# Patient Record
Sex: FEMALE | Race: WHITE | NOT HISPANIC OR LATINO | Employment: OTHER | URBAN - METROPOLITAN AREA
[De-identification: names, ages, dates, MRNs, and addresses within clinical notes are randomized per-mention and may not be internally consistent; named-entity substitution may affect disease eponyms.]

---

## 2019-12-11 ENCOUNTER — TRANSCRIBE ORDERS (OUTPATIENT)
Dept: ADMINISTRATIVE | Facility: HOSPITAL | Age: 79
End: 2019-12-11

## 2019-12-11 DIAGNOSIS — I48.0 PAROXYSMAL ATRIAL FIBRILLATION (HCC): Primary | ICD-10-CM

## 2019-12-17 ENCOUNTER — HOSPITAL ENCOUNTER (OUTPATIENT)
Dept: NON INVASIVE DIAGNOSTICS | Facility: HOSPITAL | Age: 79
Discharge: HOME | End: 2019-12-17
Attending: INTERNAL MEDICINE
Payer: MEDICARE

## 2019-12-17 DIAGNOSIS — I48.0 PAROXYSMAL ATRIAL FIBRILLATION (HCC): ICD-10-CM

## 2019-12-17 PROCEDURE — 93306 TTE W/DOPPLER COMPLETE: CPT

## 2020-01-21 ENCOUNTER — TRANSCRIBE ORDERS (OUTPATIENT)
Dept: ADMINISTRATIVE | Facility: HOSPITAL | Age: 80
End: 2020-01-21

## 2021-11-14 ENCOUNTER — HOSPITAL ENCOUNTER (EMERGENCY)
Facility: HOSPITAL | Age: 81
Discharge: LEFT BEFORE DISCHARGE | End: 2021-11-14
Attending: EMERGENCY MEDICINE | Admitting: EMERGENCY MEDICINE
Payer: MEDICARE

## 2021-11-14 ENCOUNTER — APPOINTMENT (EMERGENCY)
Dept: NON INVASIVE DIAGNOSTICS | Facility: HOSPITAL | Age: 81
End: 2021-11-14
Payer: MEDICARE

## 2021-11-14 ENCOUNTER — APPOINTMENT (EMERGENCY)
Dept: RADIOLOGY | Facility: HOSPITAL | Age: 81
End: 2021-11-14
Payer: MEDICARE

## 2021-11-14 ENCOUNTER — APPOINTMENT (EMERGENCY)
Dept: ULTRASOUND IMAGING | Facility: HOSPITAL | Age: 81
End: 2021-11-14
Payer: MEDICARE

## 2021-11-14 ENCOUNTER — APPOINTMENT (EMERGENCY)
Dept: CT IMAGING | Facility: HOSPITAL | Age: 81
End: 2021-11-14
Payer: MEDICARE

## 2021-11-14 VITALS
TEMPERATURE: 99.4 F | HEART RATE: 93 BPM | OXYGEN SATURATION: 95 % | SYSTOLIC BLOOD PRESSURE: 122 MMHG | DIASTOLIC BLOOD PRESSURE: 58 MMHG | WEIGHT: 293 LBS | RESPIRATION RATE: 18 BRPM

## 2021-11-14 DIAGNOSIS — M79.604 BILATERAL LEG PAIN: ICD-10-CM

## 2021-11-14 DIAGNOSIS — R91.8 PULMONARY NODULES: ICD-10-CM

## 2021-11-14 DIAGNOSIS — R17 SERUM TOTAL BILIRUBIN ELEVATED: Primary | ICD-10-CM

## 2021-11-14 DIAGNOSIS — K80.20 CHOLELITHIASIS: ICD-10-CM

## 2021-11-14 DIAGNOSIS — K43.9 VENTRAL HERNIA WITHOUT OBSTRUCTION OR GANGRENE: ICD-10-CM

## 2021-11-14 DIAGNOSIS — R60.0 LOWER EXTREMITY EDEMA: ICD-10-CM

## 2021-11-14 DIAGNOSIS — K74.60 CIRRHOSIS (HCC): ICD-10-CM

## 2021-11-14 DIAGNOSIS — M79.605 LEFT LEG PAIN: ICD-10-CM

## 2021-11-14 DIAGNOSIS — L03.116 CELLULITIS OF LEFT LOWER EXTREMITY: ICD-10-CM

## 2021-11-14 DIAGNOSIS — M79.605 BILATERAL LEG PAIN: ICD-10-CM

## 2021-11-14 DIAGNOSIS — R79.89 ELEVATED BRAIN NATRIURETIC PEPTIDE (BNP) LEVEL: ICD-10-CM

## 2021-11-14 DIAGNOSIS — R79.89 ELEVATED D-DIMER: ICD-10-CM

## 2021-11-14 LAB
ALBUMIN SERPL BCP-MCNC: 2.3 G/DL (ref 3.5–5)
ALP SERPL-CCNC: 193 U/L (ref 46–116)
ALT SERPL W P-5'-P-CCNC: 41 U/L (ref 12–78)
ANION GAP SERPL CALCULATED.3IONS-SCNC: 6 MMOL/L (ref 4–13)
ANISOCYTOSIS BLD QL SMEAR: PRESENT
AST SERPL W P-5'-P-CCNC: 56 U/L (ref 5–45)
BASOPHILS # BLD MANUAL: 0.07 THOUSAND/UL (ref 0–0.1)
BASOPHILS NFR MAR MANUAL: 1 % (ref 0–1)
BILIRUB SERPL-MCNC: 2.22 MG/DL (ref 0.2–1)
BUN SERPL-MCNC: 12 MG/DL (ref 5–25)
CALCIUM ALBUM COR SERPL-MCNC: 9.2 MG/DL (ref 8.3–10.1)
CALCIUM SERPL-MCNC: 7.8 MG/DL (ref 8.3–10.1)
CARDIAC TROPONIN I PNL SERPL HS: 6 NG/L (ref 8–18)
CHLORIDE SERPL-SCNC: 106 MMOL/L (ref 100–108)
CO2 SERPL-SCNC: 25 MMOL/L (ref 21–32)
CREAT SERPL-MCNC: 0.97 MG/DL (ref 0.6–1.3)
D DIMER PPP FEU-MCNC: 1.1 UG/ML FEU
EOSINOPHIL # BLD MANUAL: 0 THOUSAND/UL (ref 0–0.4)
EOSINOPHIL NFR BLD MANUAL: 0 % (ref 0–6)
ERYTHROCYTE [DISTWIDTH] IN BLOOD BY AUTOMATED COUNT: 16 % (ref 11.6–15.1)
GFR SERPL CREATININE-BSD FRML MDRD: 55 ML/MIN/1.73SQ M
GLUCOSE SERPL-MCNC: 105 MG/DL (ref 65–140)
HCT VFR BLD AUTO: 34.5 % (ref 34.8–46.1)
HGB BLD-MCNC: 11.6 G/DL (ref 11.5–15.4)
LACTATE SERPL-SCNC: 1.2 MMOL/L (ref 0.5–2)
LYMPHOCYTES # BLD AUTO: 0.7 THOUSAND/UL (ref 0.6–4.47)
LYMPHOCYTES # BLD AUTO: 10 % (ref 14–44)
MACROCYTES BLD QL AUTO: PRESENT
MCH RBC QN AUTO: 35.7 PG (ref 26.8–34.3)
MCHC RBC AUTO-ENTMCNC: 33.6 G/DL (ref 31.4–37.4)
MCV RBC AUTO: 106 FL (ref 82–98)
MONOCYTES # BLD AUTO: 1.25 THOUSAND/UL (ref 0–1.22)
MONOCYTES NFR BLD: 18 % (ref 4–12)
NEUTROPHILS # BLD MANUAL: 4.87 THOUSAND/UL (ref 1.85–7.62)
NEUTS SEG NFR BLD AUTO: 70 % (ref 43–75)
NT-PROBNP SERPL-MCNC: 2010 PG/ML
PLATELET # BLD AUTO: 145 THOUSANDS/UL (ref 149–390)
PLATELET BLD QL SMEAR: ADEQUATE
PMV BLD AUTO: 11 FL (ref 8.9–12.7)
POTASSIUM SERPL-SCNC: 4 MMOL/L (ref 3.5–5.3)
PROT SERPL-MCNC: 6.5 G/DL (ref 6.4–8.2)
RBC # BLD AUTO: 3.25 MILLION/UL (ref 3.81–5.12)
RBC MORPH BLD: PRESENT
SODIUM SERPL-SCNC: 137 MMOL/L (ref 136–145)
VARIANT LYMPHS # BLD AUTO: 1 %
WBC # BLD AUTO: 6.96 THOUSAND/UL (ref 4.31–10.16)

## 2021-11-14 PROCEDURE — 99284 EMERGENCY DEPT VISIT MOD MDM: CPT

## 2021-11-14 PROCEDURE — 96365 THER/PROPH/DIAG IV INF INIT: CPT

## 2021-11-14 PROCEDURE — 74177 CT ABD & PELVIS W/CONTRAST: CPT

## 2021-11-14 PROCEDURE — 80053 COMPREHEN METABOLIC PANEL: CPT | Performed by: EMERGENCY MEDICINE

## 2021-11-14 PROCEDURE — 36415 COLL VENOUS BLD VENIPUNCTURE: CPT | Performed by: EMERGENCY MEDICINE

## 2021-11-14 PROCEDURE — G1004 CDSM NDSC: HCPCS

## 2021-11-14 PROCEDURE — 85025 COMPLETE CBC W/AUTO DIFF WBC: CPT | Performed by: EMERGENCY MEDICINE

## 2021-11-14 PROCEDURE — 87040 BLOOD CULTURE FOR BACTERIA: CPT | Performed by: EMERGENCY MEDICINE

## 2021-11-14 PROCEDURE — 84484 ASSAY OF TROPONIN QUANT: CPT | Performed by: EMERGENCY MEDICINE

## 2021-11-14 PROCEDURE — 83605 ASSAY OF LACTIC ACID: CPT | Performed by: EMERGENCY MEDICINE

## 2021-11-14 PROCEDURE — 85007 BL SMEAR W/DIFF WBC COUNT: CPT | Performed by: EMERGENCY MEDICINE

## 2021-11-14 PROCEDURE — 99285 EMERGENCY DEPT VISIT HI MDM: CPT | Performed by: EMERGENCY MEDICINE

## 2021-11-14 PROCEDURE — 71045 X-RAY EXAM CHEST 1 VIEW: CPT

## 2021-11-14 PROCEDURE — 83880 ASSAY OF NATRIURETIC PEPTIDE: CPT | Performed by: EMERGENCY MEDICINE

## 2021-11-14 PROCEDURE — 85027 COMPLETE CBC AUTOMATED: CPT | Performed by: EMERGENCY MEDICINE

## 2021-11-14 PROCEDURE — 85379 FIBRIN DEGRADATION QUANT: CPT | Performed by: EMERGENCY MEDICINE

## 2021-11-14 PROCEDURE — 93970 EXTREMITY STUDY: CPT

## 2021-11-14 PROCEDURE — 71275 CT ANGIOGRAPHY CHEST: CPT

## 2021-11-14 PROCEDURE — 93005 ELECTROCARDIOGRAM TRACING: CPT

## 2021-11-14 RX ORDER — CLINDAMYCIN PHOSPHATE 600 MG/50ML
600 INJECTION INTRAVENOUS ONCE
Status: COMPLETED | OUTPATIENT
Start: 2021-11-14 | End: 2021-11-14

## 2021-11-14 RX ORDER — LEVOTHYROXINE SODIUM 0.15 MG/1
TABLET ORAL
COMMUNITY
Start: 2021-09-02

## 2021-11-14 RX ORDER — CLINDAMYCIN HYDROCHLORIDE 150 MG/1
450 CAPSULE ORAL EVERY 8 HOURS SCHEDULED
Qty: 45 CAPSULE | Refills: 0 | Status: SHIPPED | OUTPATIENT
Start: 2021-11-14 | End: 2021-11-19

## 2021-11-14 RX ORDER — METOPROLOL SUCCINATE 100 MG/1
1 TABLET, EXTENDED RELEASE ORAL DAILY
COMMUNITY
Start: 2021-09-02 | End: 2022-04-04 | Stop reason: HOSPADM

## 2021-11-14 RX ORDER — TRAMADOL HYDROCHLORIDE 50 MG/1
25 TABLET ORAL EVERY 6 HOURS PRN
Qty: 6 TABLET | Refills: 0 | Status: SHIPPED | OUTPATIENT
Start: 2021-11-14 | End: 2022-04-04 | Stop reason: HOSPADM

## 2021-11-14 RX ORDER — WARFARIN SODIUM 5 MG/1
TABLET ORAL
COMMUNITY
Start: 2021-06-03 | End: 2022-04-04 | Stop reason: HOSPADM

## 2021-11-14 RX ORDER — SODIUM CHLORIDE 9 MG/ML
3 INJECTION INTRAVENOUS
Status: DISCONTINUED | OUTPATIENT
Start: 2021-11-14 | End: 2021-11-14 | Stop reason: HOSPADM

## 2021-11-14 RX ORDER — GINSENG 100 MG
1 CAPSULE ORAL ONCE
Status: COMPLETED | OUTPATIENT
Start: 2021-11-14 | End: 2021-11-14

## 2021-11-14 RX ADMIN — IOHEXOL 100 ML: 350 INJECTION, SOLUTION INTRAVENOUS at 16:03

## 2021-11-14 RX ADMIN — BACITRACIN 1 SMALL APPLICATION: 500 OINTMENT TOPICAL at 14:19

## 2021-11-14 RX ADMIN — SODIUM CHLORIDE 500 ML: 0.9 INJECTION, SOLUTION INTRAVENOUS at 14:27

## 2021-11-14 RX ADMIN — CLINDAMYCIN PHOSPHATE 600 MG: 600 INJECTION, SOLUTION INTRAVENOUS at 14:29

## 2021-11-15 LAB
ATRIAL RATE: 70 BPM
P AXIS: 33 DEGREES
PR INTERVAL: 166 MS
QRS AXIS: -4 DEGREES
QRSD INTERVAL: 80 MS
QT INTERVAL: 422 MS
QTC INTERVAL: 455 MS
T WAVE AXIS: 2 DEGREES
VENTRICULAR RATE: 70 BPM

## 2021-11-15 PROCEDURE — 93970 EXTREMITY STUDY: CPT | Performed by: SURGERY

## 2021-11-19 LAB
BACTERIA BLD CULT: NORMAL
BACTERIA BLD CULT: NORMAL

## 2022-01-01 ENCOUNTER — APPOINTMENT (EMERGENCY)
Dept: CT IMAGING | Facility: HOSPITAL | Age: 82
DRG: 291 | End: 2022-01-01
Payer: MEDICARE

## 2022-01-01 ENCOUNTER — APPOINTMENT (INPATIENT)
Dept: NON INVASIVE DIAGNOSTICS | Facility: HOSPITAL | Age: 82
DRG: 291 | End: 2022-01-01
Payer: MEDICARE

## 2022-01-01 ENCOUNTER — HOSPITAL ENCOUNTER (INPATIENT)
Facility: HOSPITAL | Age: 82
LOS: 8 days | DRG: 291 | End: 2022-10-01
Attending: EMERGENCY MEDICINE | Admitting: FAMILY MEDICINE
Payer: MEDICARE

## 2022-01-01 ENCOUNTER — APPOINTMENT (INPATIENT)
Dept: CT IMAGING | Facility: HOSPITAL | Age: 82
DRG: 291 | End: 2022-01-01
Payer: MEDICARE

## 2022-01-01 ENCOUNTER — APPOINTMENT (OUTPATIENT)
Dept: ULTRASOUND IMAGING | Facility: HOSPITAL | Age: 82
DRG: 291 | End: 2022-01-01
Payer: MEDICARE

## 2022-01-01 ENCOUNTER — APPOINTMENT (OUTPATIENT)
Dept: RADIOLOGY | Facility: HOSPITAL | Age: 82
DRG: 291 | End: 2022-01-01
Payer: MEDICARE

## 2022-01-01 VITALS
DIASTOLIC BLOOD PRESSURE: 29 MMHG | BODY MASS INDEX: 50.49 KG/M2 | SYSTOLIC BLOOD PRESSURE: 70 MMHG | HEIGHT: 60 IN | OXYGEN SATURATION: 92 % | RESPIRATION RATE: 20 BRPM | WEIGHT: 257.2 LBS | HEART RATE: 107 BPM | TEMPERATURE: 99.86 F

## 2022-01-01 DIAGNOSIS — R65.21 SEPTIC SHOCK (HCC): ICD-10-CM

## 2022-01-01 DIAGNOSIS — N39.0 URINARY TRACT INFECTION: ICD-10-CM

## 2022-01-01 DIAGNOSIS — N18.31 STAGE 3A CHRONIC KIDNEY DISEASE (HCC): ICD-10-CM

## 2022-01-01 DIAGNOSIS — R53.1 WEAKNESS: Primary | ICD-10-CM

## 2022-01-01 DIAGNOSIS — I50.9 CHF (CONGESTIVE HEART FAILURE) (HCC): ICD-10-CM

## 2022-01-01 DIAGNOSIS — E11.9 TYPE 2 DIABETES MELLITUS WITHOUT COMPLICATION, WITHOUT LONG-TERM CURRENT USE OF INSULIN (HCC): ICD-10-CM

## 2022-01-01 DIAGNOSIS — R18.8 ASCITES: ICD-10-CM

## 2022-01-01 DIAGNOSIS — R41.82 ALTERED MENTAL STATUS: ICD-10-CM

## 2022-01-01 DIAGNOSIS — A41.9 SEPTIC SHOCK (HCC): ICD-10-CM

## 2022-01-01 DIAGNOSIS — G93.41 ACUTE METABOLIC ENCEPHALOPATHY: ICD-10-CM

## 2022-01-01 DIAGNOSIS — R18.8 CIRRHOSIS OF LIVER WITH ASCITES, UNSPECIFIED HEPATIC CIRRHOSIS TYPE (HCC): ICD-10-CM

## 2022-01-01 DIAGNOSIS — K74.60 CIRRHOSIS OF LIVER WITH ASCITES, UNSPECIFIED HEPATIC CIRRHOSIS TYPE (HCC): ICD-10-CM

## 2022-01-01 DIAGNOSIS — A41.9 SEPSIS (HCC): ICD-10-CM

## 2022-01-01 DIAGNOSIS — K74.60 CIRRHOSIS (HCC): ICD-10-CM

## 2022-01-01 DIAGNOSIS — I50.33 ACUTE ON CHRONIC DIASTOLIC (CONGESTIVE) HEART FAILURE (HCC): ICD-10-CM

## 2022-01-01 LAB
2HR DELTA HS TROPONIN: -2 NG/L
ABO GROUP BLD BPU: NORMAL
ABO GROUP BLD BPU: NORMAL
ABO GROUP BLD: NORMAL
ABO GROUP BLD: NORMAL
ALBUMIN SERPL BCP-MCNC: 1.5 G/DL (ref 3.5–5)
ALBUMIN SERPL BCP-MCNC: 1.6 G/DL (ref 3.5–5)
ALBUMIN SERPL BCP-MCNC: 2.5 G/DL (ref 3.5–5)
ALBUMIN SERPL BCP-MCNC: 2.6 G/DL (ref 3.5–5)
ALBUMIN SERPL BCP-MCNC: 2.7 G/DL (ref 3.5–5)
ALP SERPL-CCNC: 118 U/L (ref 46–116)
ALP SERPL-CCNC: 129 U/L (ref 46–116)
ALP SERPL-CCNC: 159 U/L (ref 46–116)
ALP SERPL-CCNC: 167 U/L (ref 46–116)
ALP SERPL-CCNC: 97 U/L (ref 46–116)
ALT SERPL W P-5'-P-CCNC: 13 U/L (ref 12–78)
ALT SERPL W P-5'-P-CCNC: 15 U/L (ref 12–78)
ALT SERPL W P-5'-P-CCNC: 15 U/L (ref 12–78)
ALT SERPL W P-5'-P-CCNC: 17 U/L (ref 12–78)
ALT SERPL W P-5'-P-CCNC: 18 U/L (ref 12–78)
AMMONIA PLAS-SCNC: 11 UMOL/L (ref 11–35)
AMMONIA PLAS-SCNC: 29 UMOL/L (ref 11–35)
ANION GAP SERPL CALCULATED.3IONS-SCNC: 20 MMOL/L (ref 4–13)
ANION GAP SERPL CALCULATED.3IONS-SCNC: 22 MMOL/L (ref 4–13)
ANION GAP SERPL CALCULATED.3IONS-SCNC: 26 MMOL/L (ref 4–13)
ANION GAP SERPL CALCULATED.3IONS-SCNC: 4 MMOL/L (ref 4–13)
ANION GAP SERPL CALCULATED.3IONS-SCNC: 5 MMOL/L (ref 4–13)
ANION GAP SERPL CALCULATED.3IONS-SCNC: 6 MMOL/L (ref 4–13)
ANION GAP SERPL CALCULATED.3IONS-SCNC: 7 MMOL/L (ref 4–13)
ANION GAP SERPL CALCULATED.3IONS-SCNC: 8 MMOL/L (ref 4–13)
ANION GAP SERPL CALCULATED.3IONS-SCNC: 9 MMOL/L (ref 4–13)
AORTIC ROOT: 3.2 CM
AORTIC VALVE MEAN VELOCITY: 14.3 M/S
APICAL FOUR CHAMBER EJECTION FRACTION: 77 %
APPEARANCE FLD: CLEAR
APTT PPP: 51 SECONDS (ref 23–37)
ARTERIAL PATENCY WRIST A: YES
ASCENDING AORTA: 3.4 CM
AST SERPL W P-5'-P-CCNC: 32 U/L (ref 5–45)
AST SERPL W P-5'-P-CCNC: 32 U/L (ref 5–45)
AST SERPL W P-5'-P-CCNC: 46 U/L (ref 5–45)
AST SERPL W P-5'-P-CCNC: 46 U/L (ref 5–45)
AST SERPL W P-5'-P-CCNC: 47 U/L (ref 5–45)
ATRIAL RATE: 84 BPM
ATRIAL RATE: 95 BPM
AV AREA BY CONTINUOUS VTI: 2.1 CM2
AV AREA PEAK VELOCITY: 1.4 CM2
AV LVOT MEAN GRADIENT: 3 MMHG
AV LVOT PEAK GRADIENT: 7 MMHG
AV MEAN GRADIENT: 11 MMHG
AV PEAK GRADIENT: 24 MMHG
AV VALVE AREA: 2.1 CM2
AV VELOCITY RATIO: 0.56
BACTERIA BLD CULT: ABNORMAL
BACTERIA BLD CULT: NORMAL
BACTERIA SPEC BFLD CULT: NO GROWTH
BACTERIA UR CULT: ABNORMAL
BACTERIA UR QL AUTO: ABNORMAL /HPF
BASE EX.OXY STD BLDV CALC-SCNC: 94.3 % (ref 60–80)
BASE EXCESS BLDA CALC-SCNC: -0.4 MMOL/L
BASE EXCESS BLDA CALC-SCNC: -14.2 MMOL/L
BASE EXCESS BLDA CALC-SCNC: -4.6 MMOL/L
BASE EXCESS BLDA CALC-SCNC: -8.7 MMOL/L
BASE EXCESS BLDV CALC-SCNC: -7.2 MMOL/L
BASOPHILS # BLD AUTO: 0.05 THOUSANDS/ΜL (ref 0–0.1)
BASOPHILS # BLD MANUAL: 0 THOUSAND/UL (ref 0–0.1)
BASOPHILS # BLD MANUAL: 0 THOUSAND/UL (ref 0–0.1)
BASOPHILS NFR BLD AUTO: 0 % (ref 0–1)
BASOPHILS NFR MAR MANUAL: 0 % (ref 0–1)
BASOPHILS NFR MAR MANUAL: 0 % (ref 0–1)
BILIRUB DIRECT SERPL-MCNC: 2.02 MG/DL (ref 0–0.2)
BILIRUB SERPL-MCNC: 3.13 MG/DL (ref 0.2–1)
BILIRUB SERPL-MCNC: 3.18 MG/DL (ref 0.2–1)
BILIRUB SERPL-MCNC: 3.54 MG/DL (ref 0.2–1)
BILIRUB SERPL-MCNC: 3.95 MG/DL (ref 0.2–1)
BILIRUB SERPL-MCNC: 4.07 MG/DL (ref 0.2–1)
BILIRUB UR QL STRIP: ABNORMAL
BLD GP AB SCN SERPL QL: NEGATIVE
BLD GP AB SCN SERPL QL: NEGATIVE
BPU ID: NORMAL
BPU ID: NORMAL
BUN SERPL-MCNC: 25 MG/DL (ref 5–25)
BUN SERPL-MCNC: 29 MG/DL (ref 5–25)
BUN SERPL-MCNC: 30 MG/DL (ref 5–25)
BUN SERPL-MCNC: 31 MG/DL (ref 5–25)
BUN SERPL-MCNC: 31 MG/DL (ref 5–25)
BUN SERPL-MCNC: 32 MG/DL (ref 5–25)
BUN SERPL-MCNC: 33 MG/DL (ref 5–25)
BUN SERPL-MCNC: 35 MG/DL (ref 5–25)
BUN SERPL-MCNC: 36 MG/DL (ref 5–25)
BUN SERPL-MCNC: 39 MG/DL (ref 5–25)
BUN SERPL-MCNC: 41 MG/DL (ref 5–25)
BUN SERPL-MCNC: 41 MG/DL (ref 5–25)
BUN SERPL-MCNC: 44 MG/DL (ref 5–25)
C3 SERPL-MCNC: 20.7 MG/DL (ref 90–180)
C4 SERPL-MCNC: 6 MG/DL (ref 10–40)
CA-I BLD-SCNC: 1.01 MMOL/L (ref 1.12–1.32)
CALCIUM ALBUM COR SERPL-MCNC: 10.4 MG/DL (ref 8.3–10.1)
CALCIUM ALBUM COR SERPL-MCNC: 10.6 MG/DL (ref 8.3–10.1)
CALCIUM ALBUM COR SERPL-MCNC: 9.1 MG/DL (ref 8.3–10.1)
CALCIUM ALBUM COR SERPL-MCNC: 9.1 MG/DL (ref 8.3–10.1)
CALCIUM SERPL-MCNC: 8 MG/DL (ref 8.3–10.1)
CALCIUM SERPL-MCNC: 8 MG/DL (ref 8.3–10.1)
CALCIUM SERPL-MCNC: 8.1 MG/DL (ref 8.3–10.1)
CALCIUM SERPL-MCNC: 8.1 MG/DL (ref 8.3–10.1)
CALCIUM SERPL-MCNC: 8.2 MG/DL (ref 8.3–10.1)
CALCIUM SERPL-MCNC: 8.2 MG/DL (ref 8.3–10.1)
CALCIUM SERPL-MCNC: 8.3 MG/DL (ref 8.3–10.1)
CALCIUM SERPL-MCNC: 8.4 MG/DL (ref 8.3–10.1)
CALCIUM SERPL-MCNC: 8.5 MG/DL (ref 8.3–10.1)
CALCIUM SERPL-MCNC: 8.6 MG/DL (ref 8.3–10.1)
CALCIUM SERPL-MCNC: 8.8 MG/DL (ref 8.3–10.1)
CALCIUM SERPL-MCNC: 8.9 MG/DL (ref 8.3–10.1)
CALCIUM SERPL-MCNC: 9 MG/DL (ref 8.3–10.1)
CARDIAC TROPONIN I PNL SERPL HS: 15 NG/L
CARDIAC TROPONIN I PNL SERPL HS: 17 NG/L
CH50 SERPL-ACNC: <10 U/ML
CHLORIDE SERPL-SCNC: 100 MMOL/L (ref 96–108)
CHLORIDE SERPL-SCNC: 101 MMOL/L (ref 96–108)
CHLORIDE SERPL-SCNC: 101 MMOL/L (ref 96–108)
CHLORIDE SERPL-SCNC: 102 MMOL/L (ref 96–108)
CHLORIDE SERPL-SCNC: 103 MMOL/L (ref 96–108)
CHLORIDE SERPL-SCNC: 103 MMOL/L (ref 96–108)
CHLORIDE SERPL-SCNC: 97 MMOL/L (ref 96–108)
CHLORIDE SERPL-SCNC: 98 MMOL/L (ref 96–108)
CHLORIDE SERPL-SCNC: 98 MMOL/L (ref 96–108)
CHLORIDE SERPL-SCNC: 99 MMOL/L (ref 96–108)
CHLORIDE SERPL-SCNC: 99 MMOL/L (ref 96–108)
CK SERPL-CCNC: 32 U/L (ref 26–192)
CLARITY UR: ABNORMAL
CO2 SERPL-SCNC: 15 MMOL/L (ref 21–32)
CO2 SERPL-SCNC: 20 MMOL/L (ref 21–32)
CO2 SERPL-SCNC: 20 MMOL/L (ref 21–32)
CO2 SERPL-SCNC: 26 MMOL/L (ref 21–32)
CO2 SERPL-SCNC: 27 MMOL/L (ref 21–32)
CO2 SERPL-SCNC: 27 MMOL/L (ref 21–32)
CO2 SERPL-SCNC: 28 MMOL/L (ref 21–32)
CO2 SERPL-SCNC: 29 MMOL/L (ref 21–32)
CO2 SERPL-SCNC: 30 MMOL/L (ref 21–32)
CO2 SERPL-SCNC: 35 MMOL/L (ref 21–32)
CO2 SERPL-SCNC: 35 MMOL/L (ref 21–32)
COLOR FLD: YELLOW
COLOR UR: YELLOW
CREAT SERPL-MCNC: 1.67 MG/DL (ref 0.6–1.3)
CREAT SERPL-MCNC: 1.69 MG/DL (ref 0.6–1.3)
CREAT SERPL-MCNC: 1.75 MG/DL (ref 0.6–1.3)
CREAT SERPL-MCNC: 1.77 MG/DL (ref 0.6–1.3)
CREAT SERPL-MCNC: 1.91 MG/DL (ref 0.6–1.3)
CREAT SERPL-MCNC: 1.98 MG/DL (ref 0.6–1.3)
CREAT SERPL-MCNC: 2.03 MG/DL (ref 0.6–1.3)
CREAT SERPL-MCNC: 2.03 MG/DL (ref 0.6–1.3)
CREAT SERPL-MCNC: 2.05 MG/DL (ref 0.6–1.3)
CREAT SERPL-MCNC: 2.07 MG/DL (ref 0.6–1.3)
CREAT SERPL-MCNC: 2.37 MG/DL (ref 0.6–1.3)
CREAT SERPL-MCNC: 2.39 MG/DL (ref 0.6–1.3)
CREAT SERPL-MCNC: 2.41 MG/DL (ref 0.6–1.3)
CREAT UR-MCNC: 62.5 MG/DL
CROSSMATCH: NORMAL
CROSSMATCH: NORMAL
DOP CALC AO PEAK VEL: 2.44 M/S
DOP CALC AO VTI: 43.63 CM
DOP CALC LVOT AREA: 2.54 CM2
DOP CALC LVOT DIAMETER: 1.8 CM
DOP CALC LVOT PEAK VEL VTI: 36.04 CM
DOP CALC LVOT PEAK VEL: 1.36 M/S
DOP CALC LVOT STROKE INDEX: 38.5 ML/M2
DOP CALC LVOT STROKE VOLUME: 91.66
E WAVE DECELERATION TIME: 210 MS
EOSINOPHIL # BLD AUTO: 0.01 THOUSAND/ΜL (ref 0–0.61)
EOSINOPHIL # BLD MANUAL: 0 THOUSAND/UL (ref 0–0.4)
EOSINOPHIL # BLD MANUAL: 0.32 THOUSAND/UL (ref 0–0.4)
EOSINOPHIL NFR BLD AUTO: 0 % (ref 0–6)
EOSINOPHIL NFR BLD MANUAL: 0 % (ref 0–6)
EOSINOPHIL NFR BLD MANUAL: 5 % (ref 0–6)
ERYTHROCYTE [DISTWIDTH] IN BLOOD BY AUTOMATED COUNT: 16.9 % (ref 11.6–15.1)
ERYTHROCYTE [DISTWIDTH] IN BLOOD BY AUTOMATED COUNT: 17 % (ref 11.6–15.1)
ERYTHROCYTE [DISTWIDTH] IN BLOOD BY AUTOMATED COUNT: 17.2 % (ref 11.6–15.1)
ERYTHROCYTE [DISTWIDTH] IN BLOOD BY AUTOMATED COUNT: 17.3 % (ref 11.6–15.1)
ERYTHROCYTE [DISTWIDTH] IN BLOOD BY AUTOMATED COUNT: 17.7 % (ref 11.6–15.1)
ERYTHROCYTE [DISTWIDTH] IN BLOOD BY AUTOMATED COUNT: 18.1 % (ref 11.6–15.1)
ERYTHROCYTE [DISTWIDTH] IN BLOOD BY AUTOMATED COUNT: 18.5 % (ref 11.6–15.1)
ERYTHROCYTE [DISTWIDTH] IN BLOOD BY AUTOMATED COUNT: 19.8 % (ref 11.6–15.1)
FERRITIN SERPL-MCNC: 216 NG/ML (ref 8–388)
FLUAV RNA RESP QL NAA+PROBE: NEGATIVE
FLUAV RNA RESP QL NAA+PROBE: NEGATIVE
FLUBV RNA RESP QL NAA+PROBE: NEGATIVE
FLUBV RNA RESP QL NAA+PROBE: NEGATIVE
FRACTIONAL SHORTENING: 35 (ref 28–44)
GFR SERPL CREATININE-BSD FRML MDRD: 18 ML/MIN/1.73SQ M
GFR SERPL CREATININE-BSD FRML MDRD: 21 ML/MIN/1.73SQ M
GFR SERPL CREATININE-BSD FRML MDRD: 22 ML/MIN/1.73SQ M
GFR SERPL CREATININE-BSD FRML MDRD: 23 ML/MIN/1.73SQ M
GFR SERPL CREATININE-BSD FRML MDRD: 24 ML/MIN/1.73SQ M
GFR SERPL CREATININE-BSD FRML MDRD: 26 ML/MIN/1.73SQ M
GFR SERPL CREATININE-BSD FRML MDRD: 26 ML/MIN/1.73SQ M
GFR SERPL CREATININE-BSD FRML MDRD: 27 ML/MIN/1.73SQ M
GFR SERPL CREATININE-BSD FRML MDRD: 28 ML/MIN/1.73SQ M
GIANT PLATELETS BLD QL SMEAR: PRESENT
GLUCOSE SERPL-MCNC: 107 MG/DL (ref 65–140)
GLUCOSE SERPL-MCNC: 112 MG/DL (ref 65–140)
GLUCOSE SERPL-MCNC: 113 MG/DL (ref 65–140)
GLUCOSE SERPL-MCNC: 117 MG/DL (ref 65–140)
GLUCOSE SERPL-MCNC: 125 MG/DL (ref 65–140)
GLUCOSE SERPL-MCNC: 56 MG/DL (ref 65–140)
GLUCOSE SERPL-MCNC: 65 MG/DL (ref 65–140)
GLUCOSE SERPL-MCNC: 88 MG/DL (ref 65–140)
GLUCOSE SERPL-MCNC: 90 MG/DL (ref 65–140)
GLUCOSE SERPL-MCNC: 91 MG/DL (ref 65–140)
GLUCOSE SERPL-MCNC: 96 MG/DL (ref 65–140)
GLUCOSE SERPL-MCNC: 96 MG/DL (ref 65–140)
GLUCOSE SERPL-MCNC: 97 MG/DL (ref 65–140)
GLUCOSE SERPL-MCNC: 97 MG/DL (ref 65–140)
GLUCOSE SERPL-MCNC: 99 MG/DL (ref 65–140)
GLUCOSE UR STRIP-MCNC: NEGATIVE MG/DL
GRAM STN SPEC: ABNORMAL
GRAM STN SPEC: NORMAL
GRAM STN SPEC: NORMAL
HAV IGM SER QL: NORMAL
HBV CORE IGM SER QL: NORMAL
HBV SURFACE AG SER QL: NORMAL
HCO3 BLDA-SCNC: 12.6 MMOL/L (ref 22–28)
HCO3 BLDA-SCNC: 17.3 MMOL/L (ref 22–28)
HCO3 BLDA-SCNC: 19.2 MMOL/L (ref 22–28)
HCO3 BLDA-SCNC: 21.4 MMOL/L (ref 22–28)
HCO3 BLDV-SCNC: 18.9 MMOL/L (ref 24–30)
HCT VFR BLD AUTO: 22.8 % (ref 34.8–46.1)
HCT VFR BLD AUTO: 24.4 % (ref 34.8–46.1)
HCT VFR BLD AUTO: 24.6 % (ref 34.8–46.1)
HCT VFR BLD AUTO: 24.8 % (ref 34.8–46.1)
HCT VFR BLD AUTO: 25.8 % (ref 34.8–46.1)
HCT VFR BLD AUTO: 26.2 % (ref 34.8–46.1)
HCT VFR BLD AUTO: 26.9 % (ref 34.8–46.1)
HCT VFR BLD AUTO: 27 % (ref 34.8–46.1)
HCT VFR BLD AUTO: 27.3 % (ref 34.8–46.1)
HCV AB SER QL: NORMAL
HGB BLD-MCNC: 7.3 G/DL (ref 11.5–15.4)
HGB BLD-MCNC: 7.8 G/DL (ref 11.5–15.4)
HGB BLD-MCNC: 7.8 G/DL (ref 11.5–15.4)
HGB BLD-MCNC: 8 G/DL (ref 11.5–15.4)
HGB BLD-MCNC: 8.5 G/DL (ref 11.5–15.4)
HGB BLD-MCNC: 8.6 G/DL (ref 11.5–15.4)
HGB BLD-MCNC: 8.6 G/DL (ref 11.5–15.4)
HGB BLD-MCNC: 8.7 G/DL (ref 11.5–15.4)
HGB BLD-MCNC: 8.9 G/DL (ref 11.5–15.4)
HGB UR QL STRIP.AUTO: ABNORMAL
HISTIOCYTES NFR FLD: 26 %
HOROWITZ INDEX BLDA+IHG-RTO: 80 MM[HG]
HYALINE CASTS #/AREA URNS LPF: ABNORMAL /LPF
IMM GRANULOCYTES # BLD AUTO: 0.13 THOUSAND/UL (ref 0–0.2)
IMM GRANULOCYTES NFR BLD AUTO: 1 % (ref 0–2)
INR PPP: 1.59 (ref 0.84–1.19)
INR PPP: 2.08 (ref 0.84–1.19)
INR PPP: 3.39 (ref 0.84–1.19)
INR PPP: 5.16 (ref 0.84–1.19)
INR PPP: 8.01 (ref 0.84–1.19)
INR PPP: 8.39 (ref 0.84–1.19)
INTERVENTRICULAR SEPTUM IN DIASTOLE (PARASTERNAL SHORT AXIS VIEW): 1.3 CM
INTERVENTRICULAR SEPTUM: 1.3 CM (ref 0.6–1.1)
IRON SATN MFR SERPL: 38 % (ref 15–50)
IRON SERPL-MCNC: 55 UG/DL (ref 50–170)
IVC: 15 MM
KETONES UR STRIP-MCNC: NEGATIVE MG/DL
LAAS-AP2: 19.7 CM2
LACTATE SERPL-SCNC: 0.7 MMOL/L (ref 0.5–2)
LACTATE SERPL-SCNC: 11.1 MMOL/L (ref 0.5–2)
LACTATE SERPL-SCNC: 12.9 MMOL/L (ref 0.5–2)
LACTATE SERPL-SCNC: 13.4 MMOL/L (ref 0.5–2)
LACTATE SERPL-SCNC: 14.7 MMOL/L (ref 0.5–2)
LACTATE SERPL-SCNC: 2.2 MMOL/L (ref 0.5–2)
LACTATE SERPL-SCNC: 2.3 MMOL/L (ref 0.5–2)
LACTATE SERPL-SCNC: 2.4 MMOL/L (ref 0.5–2)
LACTATE SERPL-SCNC: 3.7 MMOL/L (ref 0.5–2)
LDH FLD L TO P-CCNC: 48 U/L
LEFT ATRIUM SIZE: 3.4 CM
LEFT INTERNAL DIMENSION IN SYSTOLE: 2.2 CM (ref 2.1–4)
LEFT VENTRICLE DIASTOLIC VOLUME (MOD BIPLANE): 50 ML
LEFT VENTRICLE SYSTOLIC VOLUME (MOD BIPLANE): 14 ML
LEFT VENTRICULAR INTERNAL DIMENSION IN DIASTOLE: 3.4 CM (ref 3.5–6)
LEFT VENTRICULAR POSTERIOR WALL IN END DIASTOLE: 1.2 CM
LEFT VENTRICULAR STROKE VOLUME: 31 ML
LEUKOCYTE ESTERASE UR QL STRIP: ABNORMAL
LG PLATELETS BLD QL SMEAR: PRESENT
LV EF: 72 %
LVSV (TEICH): 31 ML
LYMPHOCYTES # BLD AUTO: 1.15 THOUSAND/UL (ref 0.6–4.47)
LYMPHOCYTES # BLD AUTO: 2.54 THOUSAND/UL (ref 0.6–4.47)
LYMPHOCYTES # BLD AUTO: 2.6 THOUSANDS/ΜL (ref 0.6–4.47)
LYMPHOCYTES # BLD AUTO: 40 % (ref 14–44)
LYMPHOCYTES # BLD AUTO: 9 % (ref 14–44)
LYMPHOCYTES NFR BLD AUTO: 12 % (ref 14–44)
LYMPHOCYTES NFR BLD AUTO: 20 %
MACROCYTES BLD QL AUTO: PRESENT
MACROCYTES BLD QL AUTO: PRESENT
MAGNESIUM SERPL-MCNC: 1.9 MG/DL (ref 1.6–2.6)
MAGNESIUM SERPL-MCNC: 1.9 MG/DL (ref 1.6–2.6)
MAGNESIUM SERPL-MCNC: 2.2 MG/DL (ref 1.6–2.6)
MAGNESIUM SERPL-MCNC: 2.3 MG/DL (ref 1.6–2.6)
MCH RBC QN AUTO: 33.1 PG (ref 26.8–34.3)
MCH RBC QN AUTO: 33.2 PG (ref 26.8–34.3)
MCH RBC QN AUTO: 33.2 PG (ref 26.8–34.3)
MCH RBC QN AUTO: 33.5 PG (ref 26.8–34.3)
MCH RBC QN AUTO: 33.5 PG (ref 26.8–34.3)
MCH RBC QN AUTO: 33.7 PG (ref 26.8–34.3)
MCH RBC QN AUTO: 34.3 PG (ref 26.8–34.3)
MCH RBC QN AUTO: 34.5 PG (ref 26.8–34.3)
MCHC RBC AUTO-ENTMCNC: 31.5 G/DL (ref 31.4–37.4)
MCHC RBC AUTO-ENTMCNC: 32 G/DL (ref 31.4–37.4)
MCHC RBC AUTO-ENTMCNC: 32 G/DL (ref 31.4–37.4)
MCHC RBC AUTO-ENTMCNC: 32.2 G/DL (ref 31.4–37.4)
MCHC RBC AUTO-ENTMCNC: 32.4 G/DL (ref 31.4–37.4)
MCHC RBC AUTO-ENTMCNC: 32.5 G/DL (ref 31.4–37.4)
MCHC RBC AUTO-ENTMCNC: 33.1 G/DL (ref 31.4–37.4)
MCHC RBC AUTO-ENTMCNC: 33.3 G/DL (ref 31.4–37.4)
MCV RBC AUTO: 102 FL (ref 82–98)
MCV RBC AUTO: 102 FL (ref 82–98)
MCV RBC AUTO: 103 FL (ref 82–98)
MCV RBC AUTO: 104 FL (ref 82–98)
MCV RBC AUTO: 104 FL (ref 82–98)
MCV RBC AUTO: 105 FL (ref 82–98)
MCV RBC AUTO: 106 FL (ref 82–98)
MCV RBC AUTO: 106 FL (ref 82–98)
MICROALBUMIN UR-MCNC: 6.2 MG/L (ref 0–20)
MICROALBUMIN/CREAT 24H UR: 10 MG/G CREATININE (ref 0–30)
MONO+MESO NFR FLD MANUAL: 18 %
MONOCYTES # BLD AUTO: 0.26 THOUSAND/UL (ref 0–1.22)
MONOCYTES # BLD AUTO: 0.89 THOUSAND/UL (ref 0–1.22)
MONOCYTES # BLD AUTO: 2.44 THOUSAND/ΜL (ref 0.17–1.22)
MONOCYTES NFR BLD AUTO: 11 % (ref 4–12)
MONOCYTES NFR BLD AUTO: 21 %
MONOCYTES NFR BLD: 14 % (ref 4–12)
MONOCYTES NFR BLD: 2 % (ref 4–12)
MRSA NOSE QL CULT: ABNORMAL
MRSA NOSE QL CULT: ABNORMAL
MV E'TISSUE VEL-LAT: 11 CM/S
MV E'TISSUE VEL-SEP: 9 CM/S
MV PEAK A VEL: 1.01 M/S
MV PEAK E VEL: 74 CM/S
MV STENOSIS PRESSURE HALF TIME: 61 MS
MV VALVE AREA P 1/2 METHOD: 3.61
MYELOCYTES NFR BLD MANUAL: 1 % (ref 0–1)
MYELOCYTES NFR BLD MANUAL: 1 % (ref 0–1)
NEUTROPHILS # BLD AUTO: 16.98 THOUSANDS/ΜL (ref 1.85–7.62)
NEUTROPHILS # BLD MANUAL: 10.64 THOUSAND/UL (ref 1.85–7.62)
NEUTROPHILS # BLD MANUAL: 2.42 THOUSAND/UL (ref 1.85–7.62)
NEUTS BAND NFR BLD MANUAL: 1 % (ref 0–8)
NEUTS SEG NFR BLD AUTO: 15 %
NEUTS SEG NFR BLD AUTO: 38 % (ref 43–75)
NEUTS SEG NFR BLD AUTO: 76 % (ref 43–75)
NEUTS SEG NFR BLD AUTO: 82 % (ref 43–75)
NITRITE UR QL STRIP: POSITIVE
NON VENT ROOM AIR: 21 %
NON-SQ EPI CELLS URNS QL MICRO: ABNORMAL /HPF
NRBC BLD AUTO-RTO: 0 /100 WBCS
NT-PROBNP SERPL-MCNC: 1531 PG/ML
O2 CT BLDA-SCNC: 11.5 ML/DL (ref 16–23)
O2 CT BLDA-SCNC: 12.4 ML/DL (ref 16–23)
O2 CT BLDA-SCNC: 13.8 ML/DL (ref 16–23)
O2 CT BLDA-SCNC: 16.1 ML/DL (ref 16–23)
O2 CT BLDV-SCNC: 11.7 ML/DL
OXYHGB MFR BLDA: 90.2 % (ref 94–97)
OXYHGB MFR BLDA: 94.1 % (ref 94–97)
OXYHGB MFR BLDA: 98.1 % (ref 94–97)
OXYHGB MFR BLDA: 98.2 % (ref 94–97)
P AXIS: 31 DEGREES
P AXIS: 60 DEGREES
PCO2 BLDA: 25.7 MM HG (ref 36–44)
PCO2 BLDA: 30.5 MM HG (ref 36–44)
PCO2 BLDA: 32.4 MM HG (ref 36–44)
PCO2 BLDA: 37.6 MM HG (ref 36–44)
PCO2 BLDV: 40.3 MM HG (ref 42–50)
PEEP RESPIRATORY: 8 CM[H2O]
PH BLDA: 7.21 [PH] (ref 7.35–7.45)
PH BLDA: 7.28 [PH] (ref 7.35–7.45)
PH BLDA: 7.42 [PH] (ref 7.35–7.45)
PH BLDA: 7.54 [PH] (ref 7.35–7.45)
PH BLDV: 7.29 [PH] (ref 7.3–7.4)
PH UR STRIP.AUTO: 5.5 [PH]
PLATELET # BLD AUTO: 116 THOUSANDS/UL (ref 149–390)
PLATELET # BLD AUTO: 122 THOUSANDS/UL (ref 149–390)
PLATELET # BLD AUTO: 131 THOUSANDS/UL (ref 149–390)
PLATELET # BLD AUTO: 132 THOUSANDS/UL (ref 149–390)
PLATELET # BLD AUTO: 141 THOUSANDS/UL (ref 149–390)
PLATELET # BLD AUTO: 150 THOUSANDS/UL (ref 149–390)
PLATELET # BLD AUTO: 154 THOUSANDS/UL (ref 149–390)
PLATELET # BLD AUTO: 171 THOUSANDS/UL (ref 149–390)
PLATELET BLD QL SMEAR: ABNORMAL
PLATELET BLD QL SMEAR: ADEQUATE
PMV BLD AUTO: 10 FL (ref 8.9–12.7)
PMV BLD AUTO: 10.1 FL (ref 8.9–12.7)
PMV BLD AUTO: 9.3 FL (ref 8.9–12.7)
PMV BLD AUTO: 9.7 FL (ref 8.9–12.7)
PMV BLD AUTO: 9.7 FL (ref 8.9–12.7)
PO2 BLDA: 140.7 MM HG (ref 75–129)
PO2 BLDA: 222.4 MM HG (ref 75–129)
PO2 BLDA: 60.9 MM HG (ref 75–129)
PO2 BLDA: 85.4 MM HG (ref 75–129)
PO2 BLDV: 94.1 MM HG (ref 35–45)
POTASSIUM SERPL-SCNC: 2.8 MMOL/L (ref 3.5–5.3)
POTASSIUM SERPL-SCNC: 2.9 MMOL/L (ref 3.5–5.3)
POTASSIUM SERPL-SCNC: 3.1 MMOL/L (ref 3.5–5.3)
POTASSIUM SERPL-SCNC: 3.1 MMOL/L (ref 3.5–5.3)
POTASSIUM SERPL-SCNC: 3.2 MMOL/L (ref 3.5–5.3)
POTASSIUM SERPL-SCNC: 3.2 MMOL/L (ref 3.5–5.3)
POTASSIUM SERPL-SCNC: 3.3 MMOL/L (ref 3.5–5.3)
POTASSIUM SERPL-SCNC: 3.3 MMOL/L (ref 3.5–5.3)
POTASSIUM SERPL-SCNC: 3.4 MMOL/L (ref 3.5–5.3)
POTASSIUM SERPL-SCNC: 3.7 MMOL/L (ref 3.5–5.3)
POTASSIUM SERPL-SCNC: 3.9 MMOL/L (ref 3.5–5.3)
POTASSIUM SERPL-SCNC: 4 MMOL/L (ref 3.5–5.3)
POTASSIUM SERPL-SCNC: 4.1 MMOL/L (ref 3.5–5.3)
PR INTERVAL: 192 MS
PR INTERVAL: 204 MS
PROCALCITONIN SERPL-MCNC: 0.93 NG/ML
PROT FLD-MCNC: 0.8 G/DL
PROT SERPL-MCNC: 4.9 G/DL (ref 6.4–8.4)
PROT SERPL-MCNC: 5.1 G/DL (ref 6.4–8.4)
PROT SERPL-MCNC: 5.1 G/DL (ref 6.4–8.4)
PROT SERPL-MCNC: 5.4 G/DL (ref 6.4–8.4)
PROT SERPL-MCNC: 5.5 G/DL (ref 6.4–8.4)
PROT UR STRIP-MCNC: NEGATIVE MG/DL
PROT UR-MCNC: 7 MG/DL
PROT/CREAT UR: 0.11 MG/G{CREAT} (ref 0–0.1)
PROTHROMBIN TIME: 19.1 SECONDS (ref 11.6–14.5)
PROTHROMBIN TIME: 23.5 SECONDS (ref 11.6–14.5)
PROTHROMBIN TIME: 34.3 SECONDS (ref 11.6–14.5)
PROTHROMBIN TIME: 47.5 SECONDS (ref 11.6–14.5)
PROTHROMBIN TIME: 66.7 SECONDS (ref 11.6–14.5)
PROTHROMBIN TIME: 69.2 SECONDS (ref 11.6–14.5)
QRS AXIS: -14 DEGREES
QRS AXIS: -7 DEGREES
QRSD INTERVAL: 78 MS
QRSD INTERVAL: 84 MS
QT INTERVAL: 392 MS
QT INTERVAL: 476 MS
QTC INTERVAL: 492 MS
QTC INTERVAL: 562 MS
RA PRESSURE ESTIMATED: 3 MMHG
RBC # BLD AUTO: 2.2 MILLION/UL (ref 3.81–5.12)
RBC # BLD AUTO: 2.33 MILLION/UL (ref 3.81–5.12)
RBC # BLD AUTO: 2.33 MILLION/UL (ref 3.81–5.12)
RBC # BLD AUTO: 2.49 MILLION/UL (ref 3.81–5.12)
RBC # BLD AUTO: 2.56 MILLION/UL (ref 3.81–5.12)
RBC # BLD AUTO: 2.57 MILLION/UL (ref 3.81–5.12)
RBC # BLD AUTO: 2.63 MILLION/UL (ref 3.81–5.12)
RBC # BLD AUTO: 2.64 MILLION/UL (ref 3.81–5.12)
RBC #/AREA URNS AUTO: ABNORMAL /HPF
RBC MORPH BLD: PRESENT
RBC MORPH BLD: PRESENT
RH BLD: POSITIVE
RH BLD: POSITIVE
RIGHT VENTRICLE ID DIMENSION: 4.8 CM
RSV RNA RESP QL NAA+PROBE: NEGATIVE
RSV RNA RESP QL NAA+PROBE: NEGATIVE
RV PSP: 30 MMHG
S AUREUS+CONS DNA BLD POS NAA+NON-PROBE: DETECTED
S AUREUS+CONS DNA BLD POS NAA+NON-PROBE: DETECTED
SARS-COV-2 RNA RESP QL NAA+PROBE: NEGATIVE
SARS-COV-2 RNA RESP QL NAA+PROBE: NEGATIVE
SITE: NORMAL
SL CV LEFT ATRIUM LENGTH A2C: 5.8 CM
SL CV LV EF: 70
SL CV PED ECHO LEFT VENTRICLE DIASTOLIC VOLUME (MOD BIPLANE) 2D: 47 ML
SL CV PED ECHO LEFT VENTRICLE SYSTOLIC VOLUME (MOD BIPLANE) 2D: 16 ML
SODIUM 24H UR-SCNC: 21 MOL/L
SODIUM SERPL-SCNC: 134 MMOL/L (ref 135–147)
SODIUM SERPL-SCNC: 135 MMOL/L (ref 135–147)
SODIUM SERPL-SCNC: 137 MMOL/L (ref 135–147)
SODIUM SERPL-SCNC: 138 MMOL/L (ref 135–147)
SODIUM SERPL-SCNC: 139 MMOL/L (ref 135–147)
SODIUM SERPL-SCNC: 140 MMOL/L (ref 135–147)
SP GR UR STRIP.AUTO: 1.02 (ref 1–1.03)
SPECIMEN EXPIRATION DATE: NORMAL
SPECIMEN EXPIRATION DATE: NORMAL
SPECIMEN SOURCE: ABNORMAL
T WAVE AXIS: -50 DEGREES
T WAVE AXIS: -66 DEGREES
T3FREE SERPL-MCNC: 0.55 PG/ML (ref 2.3–4.2)
T4 FREE SERPL-MCNC: 1.85 NG/DL (ref 0.76–1.46)
TIBC SERPL-MCNC: 146 UG/DL (ref 250–450)
TOTAL CELLS COUNTED SPEC: 100
TR MAX PG: 27 MMHG
TR PEAK VELOCITY: 2.6 M/S
TRICUSPID VALVE PEAK REGURGITATION VELOCITY: 2.6 M/S
TSH SERPL DL<=0.05 MIU/L-ACNC: 0.33 UIU/ML (ref 0.45–4.5)
TSH SERPL DL<=0.05 MIU/L-ACNC: 0.36 UIU/ML (ref 0.45–4.5)
TSH SERPL DL<=0.05 MIU/L-ACNC: 1.26 UIU/ML (ref 0.45–4.5)
UNIT DISPENSE STATUS: NORMAL
UNIT DISPENSE STATUS: NORMAL
UNIT PRODUCT CODE: NORMAL
UNIT PRODUCT CODE: NORMAL
UNIT PRODUCT VOLUME: 350 ML
UNIT PRODUCT VOLUME: 350 ML
UNIT RH: NORMAL
UNIT RH: NORMAL
UROBILINOGEN UR QL STRIP.AUTO: 0.2 E.U./DL
UUN 24H UR-MCNC: 470 MG/DL
VANCOMYCIN TROUGH SERPL-MCNC: <0.8 UG/ML (ref 10–20)
VARIANT LYMPHS # BLD AUTO: 2 %
VARIANT LYMPHS # BLD AUTO: 5 %
VENT AC: 16
VENTRICULAR RATE: 84 BPM
VENTRICULAR RATE: 95 BPM
VIT B12 SERPL-MCNC: 1531 PG/ML (ref 100–900)
VT SETTING VENT: 450 ML
WBC # BLD AUTO: 12.18 THOUSAND/UL (ref 4.31–10.16)
WBC # BLD AUTO: 12.58 THOUSAND/UL (ref 4.31–10.16)
WBC # BLD AUTO: 12.82 THOUSAND/UL (ref 4.31–10.16)
WBC # BLD AUTO: 13.22 THOUSAND/UL (ref 4.31–10.16)
WBC # BLD AUTO: 22.21 THOUSAND/UL (ref 4.31–10.16)
WBC # BLD AUTO: 6.36 THOUSAND/UL (ref 4.31–10.16)
WBC # BLD AUTO: 7.57 THOUSAND/UL (ref 4.31–10.16)
WBC # BLD AUTO: 9.66 THOUSAND/UL (ref 4.31–10.16)
WBC # FLD MANUAL: 66 /UL
WBC #/AREA URNS AUTO: ABNORMAL /HPF

## 2022-01-01 PROCEDURE — 85025 COMPLETE CBC W/AUTO DIFF WBC: CPT | Performed by: PHYSICIAN ASSISTANT

## 2022-01-01 PROCEDURE — 80048 BASIC METABOLIC PNL TOTAL CA: CPT | Performed by: FAMILY MEDICINE

## 2022-01-01 PROCEDURE — 99223 1ST HOSP IP/OBS HIGH 75: CPT | Performed by: FAMILY MEDICINE

## 2022-01-01 PROCEDURE — 85007 BL SMEAR W/DIFF WBC COUNT: CPT | Performed by: EMERGENCY MEDICINE

## 2022-01-01 PROCEDURE — 87081 CULTURE SCREEN ONLY: CPT | Performed by: FAMILY MEDICINE

## 2022-01-01 PROCEDURE — 88112 CYTOPATH CELL ENHANCE TECH: CPT | Performed by: PATHOLOGY

## 2022-01-01 PROCEDURE — 80053 COMPREHEN METABOLIC PANEL: CPT | Performed by: EMERGENCY MEDICINE

## 2022-01-01 PROCEDURE — 71250 CT THORAX DX C-: CPT

## 2022-01-01 PROCEDURE — G1004 CDSM NDSC: HCPCS

## 2022-01-01 PROCEDURE — 36600 WITHDRAWAL OF ARTERIAL BLOOD: CPT

## 2022-01-01 PROCEDURE — 99232 SBSQ HOSP IP/OBS MODERATE 35: CPT | Performed by: INTERNAL MEDICINE

## 2022-01-01 PROCEDURE — 99291 CRITICAL CARE FIRST HOUR: CPT | Performed by: STUDENT IN AN ORGANIZED HEALTH CARE EDUCATION/TRAINING PROGRAM

## 2022-01-01 PROCEDURE — 85027 COMPLETE CBC AUTOMATED: CPT | Performed by: EMERGENCY MEDICINE

## 2022-01-01 PROCEDURE — 86920 COMPATIBILITY TEST SPIN: CPT

## 2022-01-01 PROCEDURE — NC001 PR NO CHARGE: Performed by: STUDENT IN AN ORGANIZED HEALTH CARE EDUCATION/TRAINING PROGRAM

## 2022-01-01 PROCEDURE — 82533 TOTAL CORTISOL: CPT | Performed by: PHYSICIAN ASSISTANT

## 2022-01-01 PROCEDURE — 83880 ASSAY OF NATRIURETIC PEPTIDE: CPT | Performed by: EMERGENCY MEDICINE

## 2022-01-01 PROCEDURE — 85025 COMPLETE CBC W/AUTO DIFF WBC: CPT

## 2022-01-01 PROCEDURE — 84439 ASSAY OF FREE THYROXINE: CPT | Performed by: FAMILY MEDICINE

## 2022-01-01 PROCEDURE — 87205 SMEAR GRAM STAIN: CPT | Performed by: FAMILY MEDICINE

## 2022-01-01 PROCEDURE — 85027 COMPLETE CBC AUTOMATED: CPT | Performed by: FAMILY MEDICINE

## 2022-01-01 PROCEDURE — 36415 COLL VENOUS BLD VENIPUNCTURE: CPT | Performed by: EMERGENCY MEDICINE

## 2022-01-01 PROCEDURE — C9113 INJ PANTOPRAZOLE SODIUM, VIA: HCPCS | Performed by: PHYSICIAN ASSISTANT

## 2022-01-01 PROCEDURE — P9016 RBC LEUKOCYTES REDUCED: HCPCS

## 2022-01-01 PROCEDURE — 85610 PROTHROMBIN TIME: CPT | Performed by: FAMILY MEDICINE

## 2022-01-01 PROCEDURE — 85027 COMPLETE CBC AUTOMATED: CPT | Performed by: INTERNAL MEDICINE

## 2022-01-01 PROCEDURE — 82805 BLOOD GASES W/O2 SATURATION: CPT | Performed by: PHYSICIAN ASSISTANT

## 2022-01-01 PROCEDURE — 94760 N-INVAS EAR/PLS OXIMETRY 1: CPT

## 2022-01-01 PROCEDURE — 86901 BLOOD TYPING SEROLOGIC RH(D): CPT | Performed by: FAMILY MEDICINE

## 2022-01-01 PROCEDURE — 85730 THROMBOPLASTIN TIME PARTIAL: CPT | Performed by: PHYSICIAN ASSISTANT

## 2022-01-01 PROCEDURE — 80048 BASIC METABOLIC PNL TOTAL CA: CPT | Performed by: NURSE PRACTITIONER

## 2022-01-01 PROCEDURE — 82805 BLOOD GASES W/O2 SATURATION: CPT | Performed by: STUDENT IN AN ORGANIZED HEALTH CARE EDUCATION/TRAINING PROGRAM

## 2022-01-01 PROCEDURE — 85025 COMPLETE CBC W/AUTO DIFF WBC: CPT | Performed by: EMERGENCY MEDICINE

## 2022-01-01 PROCEDURE — 0BH17EZ INSERTION OF ENDOTRACHEAL AIRWAY INTO TRACHEA, VIA NATURAL OR ARTIFICIAL OPENING: ICD-10-PCS | Performed by: STUDENT IN AN ORGANIZED HEALTH CARE EDUCATION/TRAINING PROGRAM

## 2022-01-01 PROCEDURE — 89051 BODY FLUID CELL COUNT: CPT | Performed by: FAMILY MEDICINE

## 2022-01-01 PROCEDURE — 93308 TTE F-UP OR LMTD: CPT

## 2022-01-01 PROCEDURE — 99233 SBSQ HOSP IP/OBS HIGH 50: CPT | Performed by: FAMILY MEDICINE

## 2022-01-01 PROCEDURE — 87070 CULTURE OTHR SPECIMN AEROBIC: CPT | Performed by: FAMILY MEDICINE

## 2022-01-01 PROCEDURE — 86850 RBC ANTIBODY SCREEN: CPT | Performed by: FAMILY MEDICINE

## 2022-01-01 PROCEDURE — 84481 FREE ASSAY (FT-3): CPT | Performed by: FAMILY MEDICINE

## 2022-01-01 PROCEDURE — 84157 ASSAY OF PROTEIN OTHER: CPT | Performed by: FAMILY MEDICINE

## 2022-01-01 PROCEDURE — 82330 ASSAY OF CALCIUM: CPT | Performed by: PHYSICIAN ASSISTANT

## 2022-01-01 PROCEDURE — 74176 CT ABD & PELVIS W/O CONTRAST: CPT

## 2022-01-01 PROCEDURE — 86162 COMPLEMENT TOTAL (CH50): CPT | Performed by: INTERNAL MEDICINE

## 2022-01-01 PROCEDURE — 82570 ASSAY OF URINE CREATININE: CPT | Performed by: INTERNAL MEDICINE

## 2022-01-01 PROCEDURE — 80076 HEPATIC FUNCTION PANEL: CPT

## 2022-01-01 PROCEDURE — 81001 URINALYSIS AUTO W/SCOPE: CPT | Performed by: EMERGENCY MEDICINE

## 2022-01-01 PROCEDURE — 85018 HEMOGLOBIN: CPT | Performed by: PHYSICIAN ASSISTANT

## 2022-01-01 PROCEDURE — 82550 ASSAY OF CK (CPK): CPT

## 2022-01-01 PROCEDURE — 88305 TISSUE EXAM BY PATHOLOGIST: CPT | Performed by: PATHOLOGY

## 2022-01-01 PROCEDURE — 5A1945Z RESPIRATORY VENTILATION, 24-96 CONSECUTIVE HOURS: ICD-10-PCS | Performed by: STUDENT IN AN ORGANIZED HEALTH CARE EDUCATION/TRAINING PROGRAM

## 2022-01-01 PROCEDURE — 85014 HEMATOCRIT: CPT | Performed by: PHYSICIAN ASSISTANT

## 2022-01-01 PROCEDURE — 80048 BASIC METABOLIC PNL TOTAL CA: CPT | Performed by: INTERNAL MEDICINE

## 2022-01-01 PROCEDURE — 84156 ASSAY OF PROTEIN URINE: CPT | Performed by: INTERNAL MEDICINE

## 2022-01-01 PROCEDURE — 84443 ASSAY THYROID STIM HORMONE: CPT

## 2022-01-01 PROCEDURE — 87040 BLOOD CULTURE FOR BACTERIA: CPT | Performed by: EMERGENCY MEDICINE

## 2022-01-01 PROCEDURE — NC001 PR NO CHARGE: Performed by: PHYSICIAN ASSISTANT

## 2022-01-01 PROCEDURE — 83605 ASSAY OF LACTIC ACID: CPT | Performed by: EMERGENCY MEDICINE

## 2022-01-01 PROCEDURE — 82140 ASSAY OF AMMONIA: CPT | Performed by: INTERNAL MEDICINE

## 2022-01-01 PROCEDURE — 99285 EMERGENCY DEPT VISIT HI MDM: CPT

## 2022-01-01 PROCEDURE — 80074 ACUTE HEPATITIS PANEL: CPT | Performed by: FAMILY MEDICINE

## 2022-01-01 PROCEDURE — 99285 EMERGENCY DEPT VISIT HI MDM: CPT | Performed by: EMERGENCY MEDICINE

## 2022-01-01 PROCEDURE — 84145 PROCALCITONIN (PCT): CPT | Performed by: INTERNAL MEDICINE

## 2022-01-01 PROCEDURE — 87040 BLOOD CULTURE FOR BACTERIA: CPT | Performed by: INTERNAL MEDICINE

## 2022-01-01 PROCEDURE — 99232 SBSQ HOSP IP/OBS MODERATE 35: CPT | Performed by: NURSE PRACTITIONER

## 2022-01-01 PROCEDURE — 97110 THERAPEUTIC EXERCISES: CPT

## 2022-01-01 PROCEDURE — 86900 BLOOD TYPING SEROLOGIC ABO: CPT | Performed by: FAMILY MEDICINE

## 2022-01-01 PROCEDURE — 76770 US EXAM ABDO BACK WALL COMP: CPT

## 2022-01-01 PROCEDURE — 84443 ASSAY THYROID STIM HORMONE: CPT | Performed by: EMERGENCY MEDICINE

## 2022-01-01 PROCEDURE — 83540 ASSAY OF IRON: CPT | Performed by: FAMILY MEDICINE

## 2022-01-01 PROCEDURE — 80048 BASIC METABOLIC PNL TOTAL CA: CPT

## 2022-01-01 PROCEDURE — 36556 INSERT NON-TUNNEL CV CATH: CPT | Performed by: STUDENT IN AN ORGANIZED HEALTH CARE EDUCATION/TRAINING PROGRAM

## 2022-01-01 PROCEDURE — 82043 UR ALBUMIN QUANTITATIVE: CPT | Performed by: INTERNAL MEDICINE

## 2022-01-01 PROCEDURE — 83615 LACTATE (LD) (LDH) ENZYME: CPT | Performed by: FAMILY MEDICINE

## 2022-01-01 PROCEDURE — 93010 ELECTROCARDIOGRAM REPORT: CPT | Performed by: INTERNAL MEDICINE

## 2022-01-01 PROCEDURE — 80202 ASSAY OF VANCOMYCIN: CPT

## 2022-01-01 PROCEDURE — 83735 ASSAY OF MAGNESIUM: CPT

## 2022-01-01 PROCEDURE — 83735 ASSAY OF MAGNESIUM: CPT | Performed by: PHYSICIAN ASSISTANT

## 2022-01-01 PROCEDURE — 87077 CULTURE AEROBIC IDENTIFY: CPT | Performed by: EMERGENCY MEDICINE

## 2022-01-01 PROCEDURE — 71045 X-RAY EXAM CHEST 1 VIEW: CPT

## 2022-01-01 PROCEDURE — 82140 ASSAY OF AMMONIA: CPT | Performed by: EMERGENCY MEDICINE

## 2022-01-01 PROCEDURE — 85610 PROTHROMBIN TIME: CPT | Performed by: PHYSICIAN ASSISTANT

## 2022-01-01 PROCEDURE — 84443 ASSAY THYROID STIM HORMONE: CPT | Performed by: FAMILY MEDICINE

## 2022-01-01 PROCEDURE — 94002 VENT MGMT INPAT INIT DAY: CPT

## 2022-01-01 PROCEDURE — 36620 INSERTION CATHETER ARTERY: CPT | Performed by: PHYSICIAN ASSISTANT

## 2022-01-01 PROCEDURE — 86901 BLOOD TYPING SEROLOGIC RH(D): CPT | Performed by: PHYSICIAN ASSISTANT

## 2022-01-01 PROCEDURE — 83605 ASSAY OF LACTIC ACID: CPT

## 2022-01-01 PROCEDURE — 87154 CUL TYP ID BLD PTHGN 6+ TRGT: CPT | Performed by: EMERGENCY MEDICINE

## 2022-01-01 PROCEDURE — 82948 REAGENT STRIP/BLOOD GLUCOSE: CPT

## 2022-01-01 PROCEDURE — 30233N1 TRANSFUSION OF NONAUTOLOGOUS RED BLOOD CELLS INTO PERIPHERAL VEIN, PERCUTANEOUS APPROACH: ICD-10-PCS | Performed by: STUDENT IN AN ORGANIZED HEALTH CARE EDUCATION/TRAINING PROGRAM

## 2022-01-01 PROCEDURE — 87186 SC STD MICRODIL/AGAR DIL: CPT | Performed by: EMERGENCY MEDICINE

## 2022-01-01 PROCEDURE — 86900 BLOOD TYPING SEROLOGIC ABO: CPT | Performed by: PHYSICIAN ASSISTANT

## 2022-01-01 PROCEDURE — 82607 VITAMIN B-12: CPT | Performed by: FAMILY MEDICINE

## 2022-01-01 PROCEDURE — 86850 RBC ANTIBODY SCREEN: CPT | Performed by: PHYSICIAN ASSISTANT

## 2022-01-01 PROCEDURE — 99292 CRITICAL CARE ADDL 30 MIN: CPT | Performed by: STUDENT IN AN ORGANIZED HEALTH CARE EDUCATION/TRAINING PROGRAM

## 2022-01-01 PROCEDURE — 99233 SBSQ HOSP IP/OBS HIGH 50: CPT | Performed by: INTERNAL MEDICINE

## 2022-01-01 PROCEDURE — 97167 OT EVAL HIGH COMPLEX 60 MIN: CPT

## 2022-01-01 PROCEDURE — 93005 ELECTROCARDIOGRAM TRACING: CPT

## 2022-01-01 PROCEDURE — 80053 COMPREHEN METABOLIC PANEL: CPT | Performed by: PHYSICIAN ASSISTANT

## 2022-01-01 PROCEDURE — 84540 ASSAY OF URINE/UREA-N: CPT | Performed by: INTERNAL MEDICINE

## 2022-01-01 PROCEDURE — 82728 ASSAY OF FERRITIN: CPT | Performed by: FAMILY MEDICINE

## 2022-01-01 PROCEDURE — 84484 ASSAY OF TROPONIN QUANT: CPT | Performed by: EMERGENCY MEDICINE

## 2022-01-01 PROCEDURE — 0W9G3ZX DRAINAGE OF PERITONEAL CAVITY, PERCUTANEOUS APPROACH, DIAGNOSTIC: ICD-10-PCS | Performed by: ANESTHESIOLOGY

## 2022-01-01 PROCEDURE — 83735 ASSAY OF MAGNESIUM: CPT | Performed by: FAMILY MEDICINE

## 2022-01-01 PROCEDURE — 83550 IRON BINDING TEST: CPT | Performed by: FAMILY MEDICINE

## 2022-01-01 PROCEDURE — 4A133J1 MONITORING OF ARTERIAL PULSE, PERIPHERAL, PERCUTANEOUS APPROACH: ICD-10-PCS | Performed by: STUDENT IN AN ORGANIZED HEALTH CARE EDUCATION/TRAINING PROGRAM

## 2022-01-01 PROCEDURE — 83605 ASSAY OF LACTIC ACID: CPT | Performed by: FAMILY MEDICINE

## 2022-01-01 PROCEDURE — 96365 THER/PROPH/DIAG IV INF INIT: CPT

## 2022-01-01 PROCEDURE — 97163 PT EVAL HIGH COMPLEX 45 MIN: CPT

## 2022-01-01 PROCEDURE — 99223 1ST HOSP IP/OBS HIGH 75: CPT | Performed by: INTERNAL MEDICINE

## 2022-01-01 PROCEDURE — 86160 COMPLEMENT ANTIGEN: CPT | Performed by: INTERNAL MEDICINE

## 2022-01-01 PROCEDURE — 02H633Z INSERTION OF INFUSION DEVICE INTO RIGHT ATRIUM, PERCUTANEOUS APPROACH: ICD-10-PCS | Performed by: STUDENT IN AN ORGANIZED HEALTH CARE EDUCATION/TRAINING PROGRAM

## 2022-01-01 PROCEDURE — 87086 URINE CULTURE/COLONY COUNT: CPT | Performed by: EMERGENCY MEDICINE

## 2022-01-01 PROCEDURE — NC001 PR NO CHARGE: Performed by: ANESTHESIOLOGY

## 2022-01-01 PROCEDURE — 84300 ASSAY OF URINE SODIUM: CPT | Performed by: INTERNAL MEDICINE

## 2022-01-01 PROCEDURE — 83605 ASSAY OF LACTIC ACID: CPT | Performed by: PHYSICIAN ASSISTANT

## 2022-01-01 PROCEDURE — 49083 ABD PARACENTESIS W/IMAGING: CPT | Performed by: ANESTHESIOLOGY

## 2022-01-01 PROCEDURE — 85007 BL SMEAR W/DIFF WBC COUNT: CPT | Performed by: INTERNAL MEDICINE

## 2022-01-01 PROCEDURE — 03HY32Z INSERTION OF MONITORING DEVICE INTO UPPER ARTERY, PERCUTANEOUS APPROACH: ICD-10-PCS | Performed by: STUDENT IN AN ORGANIZED HEALTH CARE EDUCATION/TRAINING PROGRAM

## 2022-01-01 PROCEDURE — 0241U HB NFCT DS VIR RESP RNA 4 TRGT: CPT | Performed by: INTERNAL MEDICINE

## 2022-01-01 PROCEDURE — 83735 ASSAY OF MAGNESIUM: CPT | Performed by: INTERNAL MEDICINE

## 2022-01-01 PROCEDURE — 0241U HB NFCT DS VIR RESP RNA 4 TRGT: CPT | Performed by: EMERGENCY MEDICINE

## 2022-01-01 PROCEDURE — 83605 ASSAY OF LACTIC ACID: CPT | Performed by: INTERNAL MEDICINE

## 2022-01-01 PROCEDURE — 4A133B1 MONITORING OF ARTERIAL PRESSURE, PERIPHERAL, PERCUTANEOUS APPROACH: ICD-10-PCS | Performed by: STUDENT IN AN ORGANIZED HEALTH CARE EDUCATION/TRAINING PROGRAM

## 2022-01-01 PROCEDURE — 80053 COMPREHEN METABOLIC PANEL: CPT | Performed by: FAMILY MEDICINE

## 2022-01-01 RX ORDER — PANTOPRAZOLE SODIUM 40 MG/10ML
40 INJECTION, POWDER, LYOPHILIZED, FOR SOLUTION INTRAVENOUS ONCE
Status: COMPLETED | OUTPATIENT
Start: 2022-01-01 | End: 2022-01-01

## 2022-01-01 RX ORDER — CEFTRIAXONE 1 G/50ML
1000 INJECTION, SOLUTION INTRAVENOUS ONCE
Status: COMPLETED | OUTPATIENT
Start: 2022-01-01 | End: 2022-01-01

## 2022-01-01 RX ORDER — CEFEPIME HYDROCHLORIDE 2 G/50ML
2000 INJECTION, SOLUTION INTRAVENOUS EVERY 12 HOURS
Status: DISCONTINUED | OUTPATIENT
Start: 2022-01-01 | End: 2022-01-01

## 2022-01-01 RX ORDER — ACETAMINOPHEN 325 MG/1
650 TABLET ORAL EVERY 4 HOURS PRN
Status: DISCONTINUED | OUTPATIENT
Start: 2022-01-01 | End: 2022-01-01

## 2022-01-01 RX ORDER — METOPROLOL SUCCINATE 25 MG/1
12.5 TABLET, EXTENDED RELEASE ORAL DAILY
Status: DISCONTINUED | OUTPATIENT
Start: 2022-01-01 | End: 2022-01-01

## 2022-01-01 RX ORDER — POTASSIUM CHLORIDE 14.9 MG/ML
20 INJECTION INTRAVENOUS
Status: DISCONTINUED | OUTPATIENT
Start: 2022-01-01 | End: 2022-01-01

## 2022-01-01 RX ORDER — FENTANYL CITRATE 50 UG/ML
50 INJECTION, SOLUTION INTRAMUSCULAR; INTRAVENOUS
Status: DISCONTINUED | OUTPATIENT
Start: 2022-01-01 | End: 2022-01-01

## 2022-01-01 RX ORDER — CEPHALEXIN 250 MG/1
250 CAPSULE ORAL EVERY 8 HOURS SCHEDULED
Status: COMPLETED | OUTPATIENT
Start: 2022-01-01 | End: 2022-01-01

## 2022-01-01 RX ORDER — HALOPERIDOL 5 MG/ML
0.5 INJECTION INTRAMUSCULAR EVERY 2 HOUR PRN
Status: DISCONTINUED | OUTPATIENT
Start: 2022-01-01 | End: 2022-01-01

## 2022-01-01 RX ORDER — PHYTONADIONE 5 MG/1
5 TABLET ORAL ONCE
Status: COMPLETED | OUTPATIENT
Start: 2022-01-01 | End: 2022-01-01

## 2022-01-01 RX ORDER — POTASSIUM CHLORIDE 20 MEQ/1
40 TABLET, EXTENDED RELEASE ORAL ONCE
Status: COMPLETED | OUTPATIENT
Start: 2022-01-01 | End: 2022-01-01

## 2022-01-01 RX ORDER — HEPARIN SODIUM 1000 [USP'U]/ML
4000 INJECTION, SOLUTION INTRAVENOUS; SUBCUTANEOUS
Status: DISCONTINUED | OUTPATIENT
Start: 2022-01-01 | End: 2022-01-01

## 2022-01-01 RX ORDER — LIDOCAINE HYDROCHLORIDE 10 MG/ML
10 INJECTION, SOLUTION EPIDURAL; INFILTRATION; INTRACAUDAL; PERINEURAL ONCE
Status: COMPLETED | OUTPATIENT
Start: 2022-01-01 | End: 2022-01-01

## 2022-01-01 RX ORDER — POTASSIUM CHLORIDE 20 MEQ/1
40 TABLET, EXTENDED RELEASE ORAL 2 TIMES DAILY
Status: COMPLETED | OUTPATIENT
Start: 2022-01-01 | End: 2022-01-01

## 2022-01-01 RX ORDER — MIDODRINE HYDROCHLORIDE 5 MG/1
12.5 TABLET ORAL
Status: DISCONTINUED | OUTPATIENT
Start: 2022-01-01 | End: 2022-01-01

## 2022-01-01 RX ORDER — ALBUMIN (HUMAN) 12.5 G/50ML
12.5 SOLUTION INTRAVENOUS ONCE
Status: COMPLETED | OUTPATIENT
Start: 2022-01-01 | End: 2022-01-01

## 2022-01-01 RX ORDER — ALBUMIN (HUMAN) 12.5 G/50ML
12.5 SOLUTION INTRAVENOUS 2 TIMES DAILY
Status: DISCONTINUED | OUTPATIENT
Start: 2022-01-01 | End: 2022-01-01

## 2022-01-01 RX ORDER — BISACODYL 10 MG
10 SUPPOSITORY, RECTAL RECTAL DAILY
Status: DISCONTINUED | OUTPATIENT
Start: 2022-01-01 | End: 2022-01-01

## 2022-01-01 RX ORDER — MIDODRINE HYDROCHLORIDE 5 MG/1
5 TABLET ORAL
Status: DISCONTINUED | OUTPATIENT
Start: 2022-01-01 | End: 2022-01-01

## 2022-01-01 RX ORDER — CEFTRIAXONE 1 G/50ML
1000 INJECTION, SOLUTION INTRAVENOUS EVERY 24 HOURS
Status: DISCONTINUED | OUTPATIENT
Start: 2022-01-01 | End: 2022-01-01

## 2022-01-01 RX ORDER — ALBUMIN, HUMAN INJ 5% 5 %
25 SOLUTION INTRAVENOUS ONCE
Status: DISCONTINUED | OUTPATIENT
Start: 2022-01-01 | End: 2022-01-01

## 2022-01-01 RX ORDER — DEXTROSE MONOHYDRATE 25 G/50ML
INJECTION, SOLUTION INTRAVENOUS
Status: COMPLETED
Start: 2022-01-01 | End: 2022-01-01

## 2022-01-01 RX ORDER — ETOMIDATE 2 MG/ML
20 INJECTION INTRAVENOUS ONCE
Status: COMPLETED | OUTPATIENT
Start: 2022-01-01 | End: 2022-01-01

## 2022-01-01 RX ORDER — LEVOTHYROXINE SODIUM 0.15 MG/1
150 TABLET ORAL
Status: DISCONTINUED | OUTPATIENT
Start: 2022-01-01 | End: 2022-01-01

## 2022-01-01 RX ORDER — PANTOPRAZOLE SODIUM 40 MG/10ML
40 INJECTION, POWDER, LYOPHILIZED, FOR SOLUTION INTRAVENOUS
Status: DISCONTINUED | OUTPATIENT
Start: 2022-01-01 | End: 2022-01-01

## 2022-01-01 RX ORDER — FUROSEMIDE 10 MG/ML
10 SYRINGE (ML) INJECTION CONTINUOUS
Status: DISCONTINUED | OUTPATIENT
Start: 2022-01-01 | End: 2022-01-01

## 2022-01-01 RX ORDER — POTASSIUM CHLORIDE 20 MEQ/1
40 TABLET, EXTENDED RELEASE ORAL EVERY 4 HOURS
Status: COMPLETED | OUTPATIENT
Start: 2022-01-01 | End: 2022-01-01

## 2022-01-01 RX ORDER — ALBUMIN (HUMAN) 12.5 G/50ML
25 SOLUTION INTRAVENOUS ONCE
Status: COMPLETED | OUTPATIENT
Start: 2022-01-01 | End: 2022-01-01

## 2022-01-01 RX ORDER — AMOXICILLIN 250 MG
1 CAPSULE ORAL 2 TIMES DAILY
Status: DISCONTINUED | OUTPATIENT
Start: 2022-01-01 | End: 2022-01-01

## 2022-01-01 RX ORDER — VANCOMYCIN HYDROCHLORIDE 1 G/200ML
15 INJECTION, SOLUTION INTRAVENOUS EVERY 12 HOURS
Status: DISCONTINUED | OUTPATIENT
Start: 2022-01-01 | End: 2022-01-01 | Stop reason: DRUGHIGH

## 2022-01-01 RX ORDER — FENTANYL CITRATE-0.9 % NACL/PF 10 MCG/ML
75 PLASTIC BAG, INJECTION (ML) INTRAVENOUS CONTINUOUS
Status: DISCONTINUED | OUTPATIENT
Start: 2022-01-01 | End: 2022-01-01

## 2022-01-01 RX ORDER — ALBUMIN (HUMAN) 12.5 G/50ML
12.5 SOLUTION INTRAVENOUS ONCE
Status: DISCONTINUED | OUTPATIENT
Start: 2022-01-01 | End: 2022-01-01

## 2022-01-01 RX ORDER — FENTANYL CITRATE 50 UG/ML
50 INJECTION, SOLUTION INTRAMUSCULAR; INTRAVENOUS EVERY 2 HOUR PRN
Status: DISCONTINUED | OUTPATIENT
Start: 2022-01-01 | End: 2022-01-01

## 2022-01-01 RX ORDER — METOPROLOL SUCCINATE 25 MG/1
25 TABLET, EXTENDED RELEASE ORAL DAILY
Status: DISCONTINUED | OUTPATIENT
Start: 2022-01-01 | End: 2022-01-01

## 2022-01-01 RX ORDER — POTASSIUM CHLORIDE 29.8 MG/ML
40 INJECTION INTRAVENOUS ONCE
Status: COMPLETED | OUTPATIENT
Start: 2022-01-01 | End: 2022-01-01

## 2022-01-01 RX ORDER — FUROSEMIDE 10 MG/ML
80 INJECTION INTRAMUSCULAR; INTRAVENOUS 2 TIMES DAILY
Status: DISCONTINUED | OUTPATIENT
Start: 2022-01-01 | End: 2022-01-01

## 2022-01-01 RX ORDER — LORAZEPAM 2 MG/ML
1 INJECTION INTRAMUSCULAR
Status: COMPLETED | OUTPATIENT
Start: 2022-01-01 | End: 2022-01-01

## 2022-01-01 RX ORDER — FUROSEMIDE 10 MG/ML
40 INJECTION INTRAMUSCULAR; INTRAVENOUS 2 TIMES DAILY
Status: DISCONTINUED | OUTPATIENT
Start: 2022-01-01 | End: 2022-01-01

## 2022-01-01 RX ORDER — METOLAZONE 5 MG/1
2.5 TABLET ORAL DAILY
Status: DISCONTINUED | OUTPATIENT
Start: 2022-01-01 | End: 2022-01-01

## 2022-01-01 RX ORDER — HEPARIN SODIUM 10000 [USP'U]/100ML
3-20 INJECTION, SOLUTION INTRAVENOUS
Status: DISCONTINUED | OUTPATIENT
Start: 2022-01-01 | End: 2022-01-01

## 2022-01-01 RX ORDER — HEPARIN SODIUM 1000 [USP'U]/ML
2000 INJECTION, SOLUTION INTRAVENOUS; SUBCUTANEOUS
Status: DISCONTINUED | OUTPATIENT
Start: 2022-01-01 | End: 2022-01-01

## 2022-01-01 RX ORDER — ALBUMIN (HUMAN) 12.5 G/50ML
25 SOLUTION INTRAVENOUS 2 TIMES DAILY
Status: DISCONTINUED | OUTPATIENT
Start: 2022-01-01 | End: 2022-01-01

## 2022-01-01 RX ORDER — MIDODRINE HYDROCHLORIDE 5 MG/1
10 TABLET ORAL
Status: DISCONTINUED | OUTPATIENT
Start: 2022-01-01 | End: 2022-01-01

## 2022-01-01 RX ORDER — VECURONIUM BROMIDE 1 MG/ML
10 INJECTION, POWDER, LYOPHILIZED, FOR SOLUTION INTRAVENOUS ONCE
Status: COMPLETED | OUTPATIENT
Start: 2022-01-01 | End: 2022-01-01

## 2022-01-01 RX ORDER — FENTANYL CITRATE 50 UG/ML
25 INJECTION, SOLUTION INTRAMUSCULAR; INTRAVENOUS ONCE
Status: DISCONTINUED | OUTPATIENT
Start: 2022-01-01 | End: 2022-01-01

## 2022-01-01 RX ORDER — PANTOPRAZOLE SODIUM 40 MG/10ML
40 INJECTION, POWDER, LYOPHILIZED, FOR SOLUTION INTRAVENOUS EVERY 12 HOURS SCHEDULED
Status: DISCONTINUED | OUTPATIENT
Start: 2022-01-01 | End: 2022-01-01

## 2022-01-01 RX ORDER — VANCOMYCIN HYDROCHLORIDE 1 G/200ML
15 INJECTION, SOLUTION INTRAVENOUS EVERY 24 HOURS
Status: DISCONTINUED | OUTPATIENT
Start: 2022-01-01 | End: 2022-01-01

## 2022-01-01 RX ORDER — GLYCOPYRROLATE 0.2 MG/ML
0.2 INJECTION INTRAMUSCULAR; INTRAVENOUS EVERY 4 HOURS PRN
Status: DISCONTINUED | OUTPATIENT
Start: 2022-01-01 | End: 2022-01-01

## 2022-01-01 RX ADMIN — POTASSIUM CHLORIDE 40 MEQ: 1500 TABLET, EXTENDED RELEASE ORAL at 16:24

## 2022-01-01 RX ADMIN — MIDODRINE HYDROCHLORIDE 10 MG: 5 TABLET ORAL at 10:49

## 2022-01-01 RX ADMIN — Medication 10 MG/HR: at 23:14

## 2022-01-01 RX ADMIN — FUROSEMIDE 80 MG: 10 INJECTION, SOLUTION INTRAMUSCULAR; INTRAVENOUS at 17:26

## 2022-01-01 RX ADMIN — NOREPINEPHRINE BITARTRATE 30 MCG/MIN: 1 INJECTION INTRAVENOUS at 00:14

## 2022-01-01 RX ADMIN — METOPROLOL SUCCINATE 12.5 MG: 25 TABLET, EXTENDED RELEASE ORAL at 09:07

## 2022-01-01 RX ADMIN — PANTOPRAZOLE SODIUM 40 MG: 40 INJECTION, POWDER, FOR SOLUTION INTRAVENOUS at 23:17

## 2022-01-01 RX ADMIN — MIDODRINE HYDROCHLORIDE 10 MG: 5 TABLET ORAL at 06:35

## 2022-01-01 RX ADMIN — EPINEPHRINE 10 MCG/MIN: 1 INJECTION, SOLUTION, CONCENTRATE INTRAVENOUS at 23:47

## 2022-01-01 RX ADMIN — GLYCOPYRROLATE 0.2 MG: 0.2 INJECTION, SOLUTION INTRAMUSCULAR; INTRAVENOUS at 02:30

## 2022-01-01 RX ADMIN — MIDODRINE HYDROCHLORIDE 10 MG: 5 TABLET ORAL at 06:00

## 2022-01-01 RX ADMIN — MIDODRINE HYDROCHLORIDE 10 MG: 5 TABLET ORAL at 16:24

## 2022-01-01 RX ADMIN — MIDODRINE HYDROCHLORIDE 10 MG: 5 TABLET ORAL at 11:13

## 2022-01-01 RX ADMIN — NOREPINEPHRINE BITARTRATE 30 MCG/MIN: 1 INJECTION INTRAVENOUS at 20:14

## 2022-01-01 RX ADMIN — METOPROLOL SUCCINATE 12.5 MG: 25 TABLET, EXTENDED RELEASE ORAL at 08:02

## 2022-01-01 RX ADMIN — CEPHALEXIN 250 MG: 250 CAPSULE ORAL at 21:17

## 2022-01-01 RX ADMIN — VASOPRESSIN 0.04 UNITS/MIN: 20 INJECTION INTRAVENOUS at 23:57

## 2022-01-01 RX ADMIN — Medication 10 MG/HR: at 12:16

## 2022-01-01 RX ADMIN — LORAZEPAM 1 MG: 2 INJECTION INTRAMUSCULAR; INTRAVENOUS at 02:30

## 2022-01-01 RX ADMIN — Medication 10 MG/HR: at 23:48

## 2022-01-01 RX ADMIN — ACETAMINOPHEN 650 MG: 325 TABLET ORAL at 06:35

## 2022-01-01 RX ADMIN — VECURONIUM BROMIDE 10 MG: 1 INJECTION, POWDER, LYOPHILIZED, FOR SOLUTION INTRAVENOUS at 17:15

## 2022-01-01 RX ADMIN — POTASSIUM CHLORIDE 40 MEQ: 1500 TABLET, EXTENDED RELEASE ORAL at 13:23

## 2022-01-01 RX ADMIN — MIDODRINE HYDROCHLORIDE 5 MG: 5 TABLET ORAL at 11:07

## 2022-01-01 RX ADMIN — CEFTRIAXONE 1000 MG: 1 INJECTION, SOLUTION INTRAVENOUS at 09:08

## 2022-01-01 RX ADMIN — POTASSIUM CHLORIDE 40 MEQ: 29.8 INJECTION, SOLUTION INTRAVENOUS at 23:20

## 2022-01-01 RX ADMIN — CEFEPIME HYDROCHLORIDE 2000 MG: 2 INJECTION, SOLUTION INTRAVENOUS at 19:06

## 2022-01-01 RX ADMIN — CEPHALEXIN 250 MG: 250 CAPSULE ORAL at 15:16

## 2022-01-01 RX ADMIN — PHYTONADIONE 5 MG: 5 TABLET ORAL at 12:06

## 2022-01-01 RX ADMIN — ALBUMIN (HUMAN) 25 G: 0.25 INJECTION, SOLUTION INTRAVENOUS at 11:26

## 2022-01-01 RX ADMIN — MIDODRINE HYDROCHLORIDE 5 MG: 5 TABLET ORAL at 16:21

## 2022-01-01 RX ADMIN — RIVAROXABAN 15 MG: 15 TABLET, FILM COATED ORAL at 09:38

## 2022-01-01 RX ADMIN — ALBUMIN (HUMAN) 12.5 G: 0.25 INJECTION, SOLUTION INTRAVENOUS at 09:04

## 2022-01-01 RX ADMIN — LEVOTHYROXINE SODIUM 150 MCG: 150 TABLET ORAL at 13:10

## 2022-01-01 RX ADMIN — RIVAROXABAN 15 MG: 15 TABLET, FILM COATED ORAL at 09:15

## 2022-01-01 RX ADMIN — CEPHALEXIN 250 MG: 250 CAPSULE ORAL at 15:05

## 2022-01-01 RX ADMIN — SODIUM CHLORIDE, SODIUM LACTATE, POTASSIUM CHLORIDE, AND CALCIUM CHLORIDE 1000 ML: .6; .31; .03; .02 INJECTION, SOLUTION INTRAVENOUS at 17:25

## 2022-01-01 RX ADMIN — POTASSIUM CHLORIDE 40 MEQ: 1500 TABLET, EXTENDED RELEASE ORAL at 15:16

## 2022-01-01 RX ADMIN — FENTANYL CITRATE 50 MCG: 50 INJECTION INTRAMUSCULAR; INTRAVENOUS at 23:45

## 2022-01-01 RX ADMIN — EPINEPHRINE 2 MCG/MIN: 1 INJECTION, SOLUTION, CONCENTRATE INTRAVENOUS at 19:50

## 2022-01-01 RX ADMIN — MIDODRINE HYDROCHLORIDE 10 MG: 5 TABLET ORAL at 17:29

## 2022-01-01 RX ADMIN — MIDODRINE HYDROCHLORIDE 10 MG: 5 TABLET ORAL at 05:56

## 2022-01-01 RX ADMIN — MIDODRINE HYDROCHLORIDE 10 MG: 5 TABLET ORAL at 17:15

## 2022-01-01 RX ADMIN — SODIUM CHLORIDE, SODIUM LACTATE, POTASSIUM CHLORIDE, AND CALCIUM CHLORIDE 1000 ML: .6; .31; .03; .02 INJECTION, SOLUTION INTRAVENOUS at 16:20

## 2022-01-01 RX ADMIN — VASOPRESSIN 0.04 UNITS/MIN: 20 INJECTION INTRAVENOUS at 19:05

## 2022-01-01 RX ADMIN — DEXTROSE MONOHYDRATE 50 ML: 25 INJECTION, SOLUTION INTRAVENOUS at 19:21

## 2022-01-01 RX ADMIN — FENTANYL CITRATE 50 MCG: 50 INJECTION INTRAMUSCULAR; INTRAVENOUS at 02:42

## 2022-01-01 RX ADMIN — POTASSIUM CHLORIDE 40 MEQ: 29.8 INJECTION, SOLUTION INTRAVENOUS at 19:16

## 2022-01-01 RX ADMIN — ALBUMIN (HUMAN) 12.5 G: 0.25 INJECTION, SOLUTION INTRAVENOUS at 17:14

## 2022-01-01 RX ADMIN — FENTANYL CITRATE 50 MCG/HR: 0.05 INJECTION, SOLUTION INTRAMUSCULAR; INTRAVENOUS at 18:52

## 2022-01-01 RX ADMIN — ACETAMINOPHEN 650 MG: 325 TABLET ORAL at 14:39

## 2022-01-01 RX ADMIN — MIDODRINE HYDROCHLORIDE 10 MG: 5 TABLET ORAL at 08:02

## 2022-01-01 RX ADMIN — POTASSIUM CHLORIDE 40 MEQ: 1500 TABLET, EXTENDED RELEASE ORAL at 10:49

## 2022-01-01 RX ADMIN — POTASSIUM CHLORIDE 40 MEQ: 1500 TABLET, EXTENDED RELEASE ORAL at 09:07

## 2022-01-01 RX ADMIN — LORAZEPAM 1 MG: 2 INJECTION INTRAMUSCULAR; INTRAVENOUS at 00:53

## 2022-01-01 RX ADMIN — LEVOTHYROXINE SODIUM 150 MCG: 150 TABLET ORAL at 05:40

## 2022-01-01 RX ADMIN — NOREPINEPHRINE BITARTRATE 30 MCG/MIN: 1 INJECTION INTRAVENOUS at 01:51

## 2022-01-01 RX ADMIN — MIDODRINE HYDROCHLORIDE 12.5 MG: 5 TABLET ORAL at 14:39

## 2022-01-01 RX ADMIN — ALBUMIN (HUMAN) 12.5 G: 0.25 INJECTION, SOLUTION INTRAVENOUS at 20:53

## 2022-01-01 RX ADMIN — METHYLENE BLUE 117 MG: 5 INJECTION INTRAVENOUS at 20:51

## 2022-01-01 RX ADMIN — LIDOCAINE HYDROCHLORIDE 10 ML: 10 INJECTION, SOLUTION EPIDURAL; INFILTRATION; INTRACAUDAL; PERINEURAL at 11:48

## 2022-01-01 RX ADMIN — POTASSIUM CHLORIDE 40 MEQ: 1500 TABLET, EXTENDED RELEASE ORAL at 11:40

## 2022-01-01 RX ADMIN — METOLAZONE 2.5 MG: 5 TABLET ORAL at 09:07

## 2022-01-01 RX ADMIN — MIDODRINE HYDROCHLORIDE 10 MG: 5 TABLET ORAL at 11:40

## 2022-01-01 RX ADMIN — ALBUMIN (HUMAN) 12.5 G: 0.25 INJECTION, SOLUTION INTRAVENOUS at 12:46

## 2022-01-01 RX ADMIN — ALBUMIN (HUMAN) 12.5 G: 0.25 INJECTION, SOLUTION INTRAVENOUS at 20:21

## 2022-01-01 RX ADMIN — CALCIUM GLUCONATE 3 G: 98 INJECTION, SOLUTION INTRAVENOUS at 20:05

## 2022-01-01 RX ADMIN — RIVAROXABAN 15 MG: 15 TABLET, FILM COATED ORAL at 08:02

## 2022-01-01 RX ADMIN — Medication 15 MCG/MIN: at 16:33

## 2022-01-01 RX ADMIN — CEPHALEXIN 250 MG: 250 CAPSULE ORAL at 05:30

## 2022-01-01 RX ADMIN — Medication 10 MG/HR: at 01:07

## 2022-01-01 RX ADMIN — MIDODRINE HYDROCHLORIDE 10 MG: 5 TABLET ORAL at 12:11

## 2022-01-01 RX ADMIN — MIDODRINE HYDROCHLORIDE 10 MG: 5 TABLET ORAL at 10:42

## 2022-01-01 RX ADMIN — HYDROCORTISONE SODIUM SUCCINATE 50 MG: 100 INJECTION, POWDER, FOR SOLUTION INTRAMUSCULAR; INTRAVENOUS at 23:18

## 2022-01-01 RX ADMIN — CEFTRIAXONE 1000 MG: 1 INJECTION, SOLUTION INTRAVENOUS at 10:48

## 2022-01-01 RX ADMIN — LORAZEPAM 1 MG: 2 INJECTION INTRAMUSCULAR; INTRAVENOUS at 04:08

## 2022-01-01 RX ADMIN — MIDODRINE HYDROCHLORIDE 5 MG: 5 TABLET ORAL at 05:40

## 2022-01-01 RX ADMIN — POTASSIUM CHLORIDE 40 MEQ: 1500 TABLET, EXTENDED RELEASE ORAL at 11:13

## 2022-01-01 RX ADMIN — PHYTONADIONE 5 MG: 5 TABLET ORAL at 17:21

## 2022-01-01 RX ADMIN — NOREPINEPHRINE BITARTRATE 30 MCG/MIN: 1 INJECTION INTRAVENOUS at 22:07

## 2022-01-01 RX ADMIN — PHENYLEPHRINE HYDROCHLORIDE 20 MCG/MIN: 10 INJECTION INTRAVENOUS at 19:01

## 2022-01-01 RX ADMIN — VASOPRESSIN 0.04 UNITS/MIN: 20 INJECTION INTRAVENOUS at 19:03

## 2022-01-01 RX ADMIN — NOREPINEPHRINE BITARTRATE 30 MCG/MIN: 1 INJECTION INTRAVENOUS at 19:15

## 2022-01-01 RX ADMIN — ALBUMIN (HUMAN) 12.5 G: 0.25 INJECTION, SOLUTION INTRAVENOUS at 08:15

## 2022-01-01 RX ADMIN — CEFTRIAXONE 1000 MG: 1 INJECTION, SOLUTION INTRAVENOUS at 09:25

## 2022-01-01 RX ADMIN — MIDODRINE HYDROCHLORIDE 10 MG: 5 TABLET ORAL at 05:31

## 2022-01-01 RX ADMIN — CEFTRIAXONE 1000 MG: 1 INJECTION, SOLUTION INTRAVENOUS at 08:40

## 2022-01-01 RX ADMIN — ALBUMIN (HUMAN) 12.5 G: 0.25 INJECTION, SOLUTION INTRAVENOUS at 21:32

## 2022-01-01 RX ADMIN — MIDODRINE HYDROCHLORIDE 10 MG: 5 TABLET ORAL at 05:58

## 2022-01-01 RX ADMIN — POTASSIUM CHLORIDE 40 MEQ: 1500 TABLET, EXTENDED RELEASE ORAL at 17:29

## 2022-01-01 RX ADMIN — ALBUMIN (HUMAN) 12.5 G: 0.25 INJECTION, SOLUTION INTRAVENOUS at 09:06

## 2022-01-01 RX ADMIN — ALBUMIN (HUMAN) 12.5 G: 0.25 INJECTION, SOLUTION INTRAVENOUS at 09:14

## 2022-01-01 RX ADMIN — SODIUM CHLORIDE 1000 ML: 0.9 INJECTION, SOLUTION INTRAVENOUS at 11:50

## 2022-01-01 RX ADMIN — RIVAROXABAN 15 MG: 15 TABLET, FILM COATED ORAL at 09:07

## 2022-01-01 RX ADMIN — FENTANYL CITRATE 50 MCG: 50 INJECTION INTRAMUSCULAR; INTRAVENOUS at 21:07

## 2022-01-01 RX ADMIN — ALBUMIN (HUMAN) 12.5 G: 0.25 INJECTION, SOLUTION INTRAVENOUS at 08:45

## 2022-01-01 RX ADMIN — ETOMIDATE 20 MG: 2 INJECTION, SOLUTION INTRAVENOUS at 17:30

## 2022-01-01 RX ADMIN — ALBUMIN (HUMAN) 12.5 G: 0.25 INJECTION, SOLUTION INTRAVENOUS at 21:17

## 2022-01-01 RX ADMIN — Medication 10 MG/HR: at 09:35

## 2022-01-01 RX ADMIN — RIVAROXABAN 15 MG: 15 TABLET, FILM COATED ORAL at 07:55

## 2022-01-01 RX ADMIN — Medication 10 MG/HR: at 23:00

## 2022-01-01 RX ADMIN — ALBUMIN (HUMAN) 12.5 G: 0.25 INJECTION, SOLUTION INTRAVENOUS at 13:06

## 2022-01-01 RX ADMIN — METOLAZONE 2.5 MG: 5 TABLET ORAL at 08:02

## 2022-01-01 RX ADMIN — HYDROCORTISONE SODIUM SUCCINATE 50 MG: 100 INJECTION, POWDER, FOR SOLUTION INTRAMUSCULAR; INTRAVENOUS at 19:25

## 2022-01-01 RX ADMIN — METOLAZONE 2.5 MG: 5 TABLET ORAL at 07:55

## 2022-01-01 RX ADMIN — FUROSEMIDE 80 MG: 10 INJECTION, SOLUTION INTRAMUSCULAR; INTRAVENOUS at 08:57

## 2022-01-01 RX ADMIN — ALBUMIN (HUMAN) 25 G: 0.25 INJECTION, SOLUTION INTRAVENOUS at 21:22

## 2022-01-01 RX ADMIN — MIDODRINE HYDROCHLORIDE 10 MG: 5 TABLET ORAL at 16:38

## 2022-01-01 RX ADMIN — NOREPINEPHRINE BITARTRATE 15 MCG/MIN: 1 INJECTION INTRAVENOUS at 16:33

## 2022-01-01 RX ADMIN — CEFTRIAXONE 1000 MG: 1 INJECTION, SOLUTION INTRAVENOUS at 08:57

## 2022-01-01 RX ADMIN — VANCOMYCIN HYDROCHLORIDE 1000 MG: 1 INJECTION, SOLUTION INTRAVENOUS at 19:15

## 2022-01-01 RX ADMIN — METOLAZONE 2.5 MG: 5 TABLET ORAL at 09:14

## 2022-01-01 RX ADMIN — FENTANYL CITRATE 50 MCG: 50 INJECTION INTRAMUSCULAR; INTRAVENOUS at 00:48

## 2022-01-01 RX ADMIN — POTASSIUM CHLORIDE 40 MEQ: 1500 TABLET, EXTENDED RELEASE ORAL at 09:15

## 2022-01-01 RX ADMIN — METOPROLOL SUCCINATE 12.5 MG: 25 TABLET, EXTENDED RELEASE ORAL at 09:14

## 2022-01-01 RX ADMIN — PHENYLEPHRINE HYDROCHLORIDE 180 MCG/MIN: 10 INJECTION INTRAVENOUS at 23:48

## 2022-01-01 RX ADMIN — MIDODRINE HYDROCHLORIDE 10 MG: 5 TABLET ORAL at 15:16

## 2022-01-01 RX ADMIN — POTASSIUM CHLORIDE 40 MEQ: 1500 TABLET, EXTENDED RELEASE ORAL at 05:51

## 2022-01-01 RX ADMIN — MIDODRINE HYDROCHLORIDE 10 MG: 5 TABLET ORAL at 10:36

## 2022-01-26 ENCOUNTER — HOSPITAL ENCOUNTER (OUTPATIENT)
Dept: RADIOLOGY | Facility: HOSPITAL | Age: 82
Discharge: HOME | End: 2022-01-26
Payer: MEDICARE

## 2022-01-26 DIAGNOSIS — R60.9 EDEMA, UNSPECIFIED TYPE: ICD-10-CM

## 2022-01-26 DIAGNOSIS — I48.0 PAROXYSMAL ATRIAL FIBRILLATION (HCC): ICD-10-CM

## 2022-01-26 DIAGNOSIS — R79.89 ELEVATED BRAIN NATRIURETIC PEPTIDE (BNP) LEVEL: ICD-10-CM

## 2022-01-26 DIAGNOSIS — I50.9 CONGESTIVE HEART FAILURE, UNSPECIFIED HF CHRONICITY, UNSPECIFIED HEART FAILURE TYPE (HCC): ICD-10-CM

## 2022-01-26 DIAGNOSIS — I50.9 ACUTE CONGESTIVE HEART FAILURE, UNSPECIFIED HEART FAILURE TYPE (HCC): ICD-10-CM

## 2022-01-26 PROCEDURE — 71046 X-RAY EXAM CHEST 2 VIEWS: CPT

## 2022-02-09 DIAGNOSIS — I50.33 ACUTE ON CHRONIC DIASTOLIC (CONGESTIVE) HEART FAILURE (HCC): ICD-10-CM

## 2022-03-25 ENCOUNTER — APPOINTMENT (EMERGENCY)
Dept: CT IMAGING | Facility: HOSPITAL | Age: 82
DRG: 853 | End: 2022-03-25
Payer: MEDICARE

## 2022-03-25 ENCOUNTER — HOSPITAL ENCOUNTER (INPATIENT)
Facility: HOSPITAL | Age: 82
LOS: 10 days | Discharge: NURSING, REHAB OR LONG-TERM CARE | DRG: 853 | End: 2022-04-04
Attending: EMERGENCY MEDICINE | Admitting: STUDENT IN AN ORGANIZED HEALTH CARE EDUCATION/TRAINING PROGRAM
Payer: MEDICARE

## 2022-03-25 ENCOUNTER — APPOINTMENT (EMERGENCY)
Dept: RADIOLOGY | Facility: HOSPITAL | Age: 82
DRG: 853 | End: 2022-03-25
Payer: MEDICARE

## 2022-03-25 ENCOUNTER — APPOINTMENT (INPATIENT)
Dept: ULTRASOUND IMAGING | Facility: HOSPITAL | Age: 82
DRG: 853 | End: 2022-03-25
Payer: MEDICARE

## 2022-03-25 DIAGNOSIS — A41.9 SEPTIC SHOCK (HCC): ICD-10-CM

## 2022-03-25 DIAGNOSIS — L03.115 CELLULITIS OF RIGHT THIGH: ICD-10-CM

## 2022-03-25 DIAGNOSIS — R74.01 TRANSAMINITIS: ICD-10-CM

## 2022-03-25 DIAGNOSIS — I48.91 ATRIAL FIBRILLATION WITH RVR (HCC): ICD-10-CM

## 2022-03-25 DIAGNOSIS — N17.9 AKI (ACUTE KIDNEY INJURY) (HCC): ICD-10-CM

## 2022-03-25 DIAGNOSIS — T07.XXXA ABRASIONS OF MULTIPLE SITES: ICD-10-CM

## 2022-03-25 DIAGNOSIS — I48.0 PAROXYSMAL ATRIAL FIBRILLATION WITH RAPID VENTRICULAR RESPONSE (HCC): ICD-10-CM

## 2022-03-25 DIAGNOSIS — R78.81 BACTEREMIA: ICD-10-CM

## 2022-03-25 DIAGNOSIS — D68.9 COAGULOPATHY (HCC): ICD-10-CM

## 2022-03-25 DIAGNOSIS — T14.8XXA CONTUSION: ICD-10-CM

## 2022-03-25 DIAGNOSIS — R65.21 SEPTIC SHOCK (HCC): ICD-10-CM
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
## 2022-03-25 DIAGNOSIS — N39.0 URINARY TRACT INFECTION: ICD-10-CM

## 2022-03-25 PROBLEM — R23.9 IMPAIRED SKIN INTEGRITY: Status: ACTIVE | Noted: 2022-03-25

## 2022-03-25 PROBLEM — R74.8 ELEVATED CK: Status: ACTIVE | Noted: 2022-03-25

## 2022-03-25 PROBLEM — E78.5 HLD (HYPERLIPIDEMIA): Status: ACTIVE | Noted: 2022-03-25

## 2022-03-25 PROBLEM — E11.9 DIABETES MELLITUS (HCC): Status: ACTIVE | Noted: 2022-03-25

## 2022-03-25 PROBLEM — E03.9 HYPOTHYROIDISM: Status: ACTIVE | Noted: 2022-03-25

## 2022-03-25 LAB
2HR DELTA HS TROPONIN: -3 NG/L
4HR DELTA HS TROPONIN: 1 NG/L
ALBUMIN SERPL BCP-MCNC: 1.9 G/DL (ref 3.5–5)
ALP SERPL-CCNC: 165 U/L (ref 46–116)
ALT SERPL W P-5'-P-CCNC: 42 U/L (ref 12–78)
ANION GAP SERPL CALCULATED.3IONS-SCNC: 11 MMOL/L (ref 4–13)
APTT PPP: 60 SECONDS (ref 23–37)
AST SERPL W P-5'-P-CCNC: 66 U/L (ref 5–45)
ATRIAL RATE: 138 BPM
ATRIAL RATE: 147 BPM
BACTERIA UR QL AUTO: ABNORMAL /HPF
BACTERIA UR QL AUTO: ABNORMAL /HPF
BASOPHILS # BLD MANUAL: 0 THOUSAND/UL (ref 0–0.1)
BASOPHILS NFR MAR MANUAL: 0 % (ref 0–1)
BILIRUB SERPL-MCNC: 4.26 MG/DL (ref 0.2–1)
BILIRUB UR QL STRIP: ABNORMAL
BILIRUB UR QL STRIP: ABNORMAL
BUN SERPL-MCNC: 71 MG/DL (ref 5–25)
CALCIUM ALBUM COR SERPL-MCNC: 10.2 MG/DL (ref 8.3–10.1)
CALCIUM SERPL-MCNC: 8.5 MG/DL (ref 8.3–10.1)
CARDIAC TROPONIN I PNL SERPL HS: 106 NG/L
CARDIAC TROPONIN I PNL SERPL HS: 109 NG/L
CARDIAC TROPONIN I PNL SERPL HS: 110 NG/L
CHLORIDE SERPL-SCNC: 99 MMOL/L (ref 100–108)
CK MB SERPL-MCNC: 3.9 NG/ML (ref 0–5)
CK MB SERPL-MCNC: <1 % (ref 0–2.5)
CK SERPL-CCNC: 543 U/L (ref 26–192)
CLARITY UR: ABNORMAL
CLARITY UR: CLEAR
CO2 SERPL-SCNC: 24 MMOL/L (ref 21–32)
COLOR UR: ABNORMAL
COLOR UR: YELLOW
CREAT SERPL-MCNC: 2.37 MG/DL (ref 0.6–1.3)
EOSINOPHIL # BLD MANUAL: 0 THOUSAND/UL (ref 0–0.4)
EOSINOPHIL NFR BLD MANUAL: 0 % (ref 0–6)
ERYTHROCYTE [DISTWIDTH] IN BLOOD BY AUTOMATED COUNT: 14.6 % (ref 11.6–15.1)
GFR SERPL CREATININE-BSD FRML MDRD: 18 ML/MIN/1.73SQ M
GLUCOSE SERPL-MCNC: 79 MG/DL (ref 65–140)
GLUCOSE SERPL-MCNC: 91 MG/DL (ref 65–140)
GLUCOSE SERPL-MCNC: 95 MG/DL (ref 65–140)
GLUCOSE UR STRIP-MCNC: NEGATIVE MG/DL
GLUCOSE UR STRIP-MCNC: NEGATIVE MG/DL
HCT VFR BLD AUTO: 38.6 % (ref 34.8–46.1)
HGB BLD-MCNC: 13.7 G/DL (ref 11.5–15.4)
HGB UR QL STRIP.AUTO: ABNORMAL
HGB UR QL STRIP.AUTO: ABNORMAL
HYALINE CASTS #/AREA URNS LPF: ABNORMAL /LPF
HYALINE CASTS #/AREA URNS LPF: ABNORMAL /LPF
INR PPP: 11.78 (ref 0.84–1.19)
INR PPP: 7.91 (ref 0.84–1.19)
KETONES UR STRIP-MCNC: ABNORMAL MG/DL
KETONES UR STRIP-MCNC: NEGATIVE MG/DL
LACTATE SERPL-SCNC: 1.8 MMOL/L (ref 0.5–2)
LACTATE SERPL-SCNC: 2.6 MMOL/L (ref 0.5–2)
LACTATE SERPL-SCNC: 2.9 MMOL/L (ref 0.5–2)
LACTATE SERPL-SCNC: 3.3 MMOL/L (ref 0.5–2)
LEUKOCYTE ESTERASE UR QL STRIP: ABNORMAL
LEUKOCYTE ESTERASE UR QL STRIP: ABNORMAL
LYMPHOCYTES # BLD AUTO: 2.47 THOUSAND/UL (ref 0.6–4.47)
LYMPHOCYTES # BLD AUTO: 8 % (ref 14–44)
MAGNESIUM SERPL-MCNC: 2.3 MG/DL (ref 1.6–2.6)
MCH RBC QN AUTO: 34.9 PG (ref 26.8–34.3)
MCHC RBC AUTO-ENTMCNC: 35.5 G/DL (ref 31.4–37.4)
MCV RBC AUTO: 99 FL (ref 82–98)
MONOCYTES # BLD AUTO: 2.16 THOUSAND/UL (ref 0–1.22)
MONOCYTES NFR BLD: 7 % (ref 4–12)
MUCOUS THREADS UR QL AUTO: ABNORMAL
NEUTROPHILS # BLD MANUAL: 26.21 THOUSAND/UL (ref 1.85–7.62)
NEUTS SEG NFR BLD AUTO: 85 % (ref 43–75)
NITRITE UR QL STRIP: NEGATIVE
NITRITE UR QL STRIP: NEGATIVE
NON-SQ EPI CELLS URNS QL MICRO: ABNORMAL /HPF
NON-SQ EPI CELLS URNS QL MICRO: ABNORMAL /HPF
NT-PROBNP SERPL-MCNC: 8903 PG/ML
PH UR STRIP.AUTO: 5 [PH]
PH UR STRIP.AUTO: 5 [PH]
PLATELET # BLD AUTO: 248 THOUSANDS/UL (ref 149–390)
PLATELET BLD QL SMEAR: ADEQUATE
PMV BLD AUTO: 10.9 FL (ref 8.9–12.7)
POTASSIUM SERPL-SCNC: 4.6 MMOL/L (ref 3.5–5.3)
PROT SERPL-MCNC: 6.9 G/DL (ref 6.4–8.2)
PROT UR STRIP-MCNC: NEGATIVE MG/DL
PROT UR STRIP-MCNC: NEGATIVE MG/DL
PROTHROMBIN TIME: 63.5 SECONDS (ref 11.6–14.5)
PROTHROMBIN TIME: 86.1 SECONDS (ref 11.6–14.5)
QRS AXIS: -8 DEGREES
QRS AXIS: 0 DEGREES
QRSD INTERVAL: 72 MS
QRSD INTERVAL: 78 MS
QT INTERVAL: 304 MS
QT INTERVAL: 322 MS
QTC INTERVAL: 475 MS
QTC INTERVAL: 517 MS
RBC # BLD AUTO: 3.92 MILLION/UL (ref 3.81–5.12)
RBC #/AREA URNS AUTO: ABNORMAL /HPF
RBC #/AREA URNS AUTO: ABNORMAL /HPF
RBC MORPH BLD: NORMAL
SODIUM SERPL-SCNC: 134 MMOL/L (ref 136–145)
SP GR UR STRIP.AUTO: 1.01 (ref 1–1.03)
SP GR UR STRIP.AUTO: 1.02 (ref 1–1.03)
T WAVE AXIS: 136 DEGREES
T WAVE AXIS: 250 DEGREES
TSH SERPL DL<=0.05 MIU/L-ACNC: 0.3 UIU/ML (ref 0.36–3.74)
UROBILINOGEN UR QL STRIP.AUTO: 1 E.U./DL
UROBILINOGEN UR QL STRIP.AUTO: 2 E.U./DL
VENTRICULAR RATE: 147 BPM
VENTRICULAR RATE: 155 BPM
WBC # BLD AUTO: 30.84 THOUSAND/UL (ref 4.31–10.16)
WBC #/AREA URNS AUTO: ABNORMAL /HPF
WBC #/AREA URNS AUTO: ABNORMAL /HPF

## 2022-03-25 PROCEDURE — 99291 CRITICAL CARE FIRST HOUR: CPT | Performed by: PHYSICIAN ASSISTANT

## 2022-03-25 PROCEDURE — 85610 PROTHROMBIN TIME: CPT | Performed by: EMERGENCY MEDICINE

## 2022-03-25 PROCEDURE — 72170 X-RAY EXAM OF PELVIS: CPT

## 2022-03-25 PROCEDURE — 82550 ASSAY OF CK (CPK): CPT | Performed by: EMERGENCY MEDICINE

## 2022-03-25 PROCEDURE — 90715 TDAP VACCINE 7 YRS/> IM: CPT | Performed by: EMERGENCY MEDICINE

## 2022-03-25 PROCEDURE — 84484 ASSAY OF TROPONIN QUANT: CPT | Performed by: NURSE PRACTITIONER

## 2022-03-25 PROCEDURE — NC001 PR NO CHARGE: Performed by: STUDENT IN AN ORGANIZED HEALTH CARE EDUCATION/TRAINING PROGRAM

## 2022-03-25 PROCEDURE — 85025 COMPLETE CBC W/AUTO DIFF WBC: CPT | Performed by: EMERGENCY MEDICINE

## 2022-03-25 PROCEDURE — 83880 ASSAY OF NATRIURETIC PEPTIDE: CPT | Performed by: NURSE PRACTITIONER

## 2022-03-25 PROCEDURE — 4A133J1 MONITORING OF ARTERIAL PULSE, PERIPHERAL, PERCUTANEOUS APPROACH: ICD-10-PCS | Performed by: STUDENT IN AN ORGANIZED HEALTH CARE EDUCATION/TRAINING PROGRAM

## 2022-03-25 PROCEDURE — 85730 THROMBOPLASTIN TIME PARTIAL: CPT | Performed by: EMERGENCY MEDICINE

## 2022-03-25 PROCEDURE — 83605 ASSAY OF LACTIC ACID: CPT | Performed by: PHYSICIAN ASSISTANT

## 2022-03-25 PROCEDURE — 70450 CT HEAD/BRAIN W/O DYE: CPT

## 2022-03-25 PROCEDURE — 81001 URINALYSIS AUTO W/SCOPE: CPT | Performed by: EMERGENCY MEDICINE

## 2022-03-25 PROCEDURE — 87186 SC STD MICRODIL/AGAR DIL: CPT | Performed by: EMERGENCY MEDICINE

## 2022-03-25 PROCEDURE — 03HY32Z INSERTION OF MONITORING DEVICE INTO UPPER ARTERY, PERCUTANEOUS APPROACH: ICD-10-PCS | Performed by: STUDENT IN AN ORGANIZED HEALTH CARE EDUCATION/TRAINING PROGRAM

## 2022-03-25 PROCEDURE — 99291 CRITICAL CARE FIRST HOUR: CPT | Performed by: EMERGENCY MEDICINE

## 2022-03-25 PROCEDURE — 36620 INSERTION CATHETER ARTERY: CPT | Performed by: STUDENT IN AN ORGANIZED HEALTH CARE EDUCATION/TRAINING PROGRAM

## 2022-03-25 PROCEDURE — 74177 CT ABD & PELVIS W/CONTRAST: CPT

## 2022-03-25 PROCEDURE — 84439 ASSAY OF FREE THYROXINE: CPT | Performed by: NURSE PRACTITIONER

## 2022-03-25 PROCEDURE — 82948 REAGENT STRIP/BLOOD GLUCOSE: CPT

## 2022-03-25 PROCEDURE — 96365 THER/PROPH/DIAG IV INF INIT: CPT

## 2022-03-25 PROCEDURE — 70486 CT MAXILLOFACIAL W/O DYE: CPT

## 2022-03-25 PROCEDURE — 99292 CRITICAL CARE ADDL 30 MIN: CPT | Performed by: STUDENT IN AN ORGANIZED HEALTH CARE EDUCATION/TRAINING PROGRAM

## 2022-03-25 PROCEDURE — 93005 ELECTROCARDIOGRAM TRACING: CPT

## 2022-03-25 PROCEDURE — 72125 CT NECK SPINE W/O DYE: CPT

## 2022-03-25 PROCEDURE — 4A133B1 MONITORING OF ARTERIAL PRESSURE, PERIPHERAL, PERCUTANEOUS APPROACH: ICD-10-PCS | Performed by: STUDENT IN AN ORGANIZED HEALTH CARE EDUCATION/TRAINING PROGRAM

## 2022-03-25 PROCEDURE — 85027 COMPLETE CBC AUTOMATED: CPT | Performed by: EMERGENCY MEDICINE

## 2022-03-25 PROCEDURE — 85610 PROTHROMBIN TIME: CPT | Performed by: NURSE PRACTITIONER

## 2022-03-25 PROCEDURE — 83605 ASSAY OF LACTIC ACID: CPT | Performed by: NURSE PRACTITIONER

## 2022-03-25 PROCEDURE — 85007 BL SMEAR W/DIFF WBC COUNT: CPT | Performed by: EMERGENCY MEDICINE

## 2022-03-25 PROCEDURE — 80053 COMPREHEN METABOLIC PANEL: CPT | Performed by: EMERGENCY MEDICINE

## 2022-03-25 PROCEDURE — 87086 URINE CULTURE/COLONY COUNT: CPT | Performed by: EMERGENCY MEDICINE

## 2022-03-25 PROCEDURE — 83605 ASSAY OF LACTIC ACID: CPT | Performed by: EMERGENCY MEDICINE

## 2022-03-25 PROCEDURE — 96368 THER/DIAG CONCURRENT INF: CPT

## 2022-03-25 PROCEDURE — 84443 ASSAY THYROID STIM HORMONE: CPT | Performed by: NURSE PRACTITIONER

## 2022-03-25 PROCEDURE — 87040 BLOOD CULTURE FOR BACTERIA: CPT | Performed by: EMERGENCY MEDICINE

## 2022-03-25 PROCEDURE — 82553 CREATINE MB FRACTION: CPT | Performed by: EMERGENCY MEDICINE

## 2022-03-25 PROCEDURE — 81001 URINALYSIS AUTO W/SCOPE: CPT | Performed by: NURSE PRACTITIONER

## 2022-03-25 PROCEDURE — 36415 COLL VENOUS BLD VENIPUNCTURE: CPT | Performed by: EMERGENCY MEDICINE

## 2022-03-25 PROCEDURE — 83735 ASSAY OF MAGNESIUM: CPT | Performed by: PHYSICIAN ASSISTANT

## 2022-03-25 PROCEDURE — 87077 CULTURE AEROBIC IDENTIFY: CPT | Performed by: EMERGENCY MEDICINE

## 2022-03-25 PROCEDURE — 1124F ACP DISCUSS-NO DSCNMKR DOCD: CPT | Performed by: EMERGENCY MEDICINE

## 2022-03-25 PROCEDURE — 71260 CT THORAX DX C+: CPT

## 2022-03-25 PROCEDURE — 90471 IMMUNIZATION ADMIN: CPT

## 2022-03-25 PROCEDURE — 99285 EMERGENCY DEPT VISIT HI MDM: CPT

## 2022-03-25 PROCEDURE — 76705 ECHO EXAM OF ABDOMEN: CPT

## 2022-03-25 PROCEDURE — 71045 X-RAY EXAM CHEST 1 VIEW: CPT

## 2022-03-25 PROCEDURE — 96375 TX/PRO/DX INJ NEW DRUG ADDON: CPT

## 2022-03-25 RX ORDER — METOPROLOL TARTRATE 5 MG/5ML
2.5 INJECTION INTRAVENOUS ONCE
Status: DISCONTINUED | OUTPATIENT
Start: 2022-03-25 | End: 2022-03-25

## 2022-03-25 RX ORDER — FUROSEMIDE 40 MG/1
40 TABLET ORAL 2 TIMES DAILY
COMMUNITY
Start: 2022-02-09 | End: 2022-04-04 | Stop reason: HOSPADM

## 2022-03-25 RX ORDER — ALBUMIN, HUMAN INJ 5% 5 %
25 SOLUTION INTRAVENOUS ONCE
Status: COMPLETED | OUTPATIENT
Start: 2022-03-25 | End: 2022-03-25

## 2022-03-25 RX ORDER — CALCIUM CHLORIDE 100 MG/ML
1 INJECTION INTRAVENOUS; INTRAVENTRICULAR ONCE
Status: COMPLETED | OUTPATIENT
Start: 2022-03-25 | End: 2022-03-25

## 2022-03-25 RX ORDER — SODIUM CHLORIDE, SODIUM GLUCONATE, SODIUM ACETATE, POTASSIUM CHLORIDE, MAGNESIUM CHLORIDE, SODIUM PHOSPHATE, DIBASIC, AND POTASSIUM PHOSPHATE .53; .5; .37; .037; .03; .012; .00082 G/100ML; G/100ML; G/100ML; G/100ML; G/100ML; G/100ML; G/100ML
1000 INJECTION, SOLUTION INTRAVENOUS ONCE
Status: COMPLETED | OUTPATIENT
Start: 2022-03-25 | End: 2022-03-26

## 2022-03-25 RX ORDER — SODIUM CHLORIDE, SODIUM GLUCONATE, SODIUM ACETATE, POTASSIUM CHLORIDE, MAGNESIUM CHLORIDE, SODIUM PHOSPHATE, DIBASIC, AND POTASSIUM PHOSPHATE .53; .5; .37; .037; .03; .012; .00082 G/100ML; G/100ML; G/100ML; G/100ML; G/100ML; G/100ML; G/100ML
500 INJECTION, SOLUTION INTRAVENOUS ONCE
Status: COMPLETED | OUTPATIENT
Start: 2022-03-25 | End: 2022-03-25

## 2022-03-25 RX ORDER — PHYTONADIONE 5 MG/1
10 TABLET ORAL ONCE
Status: COMPLETED | OUTPATIENT
Start: 2022-03-25 | End: 2022-03-25

## 2022-03-25 RX ORDER — POTASSIUM CHLORIDE 750 MG/1
10 TABLET, EXTENDED RELEASE ORAL DAILY
COMMUNITY
Start: 2022-02-09 | End: 2022-04-04 | Stop reason: HOSPADM

## 2022-03-25 RX ORDER — METOPROLOL TARTRATE 5 MG/5ML
10 INJECTION INTRAVENOUS ONCE
Status: COMPLETED | OUTPATIENT
Start: 2022-03-25 | End: 2022-03-25

## 2022-03-25 RX ORDER — METOPROLOL TARTRATE 50 MG/1
50 TABLET, FILM COATED ORAL ONCE
Status: COMPLETED | OUTPATIENT
Start: 2022-03-25 | End: 2022-03-25

## 2022-03-25 RX ORDER — METOPROLOL TARTRATE 5 MG/5ML
5 INJECTION INTRAVENOUS ONCE
Status: COMPLETED | OUTPATIENT
Start: 2022-03-25 | End: 2022-03-25

## 2022-03-25 RX ORDER — GINSENG 100 MG
1 CAPSULE ORAL 2 TIMES DAILY
Status: DISCONTINUED | OUTPATIENT
Start: 2022-03-26 | End: 2022-03-31

## 2022-03-25 RX ORDER — LEVOTHYROXINE SODIUM 0.15 MG/1
150 TABLET ORAL
Status: DISCONTINUED | OUTPATIENT
Start: 2022-03-26 | End: 2022-04-04 | Stop reason: HOSPADM

## 2022-03-25 RX ORDER — FLUTICASONE PROPIONATE 50 MCG
2 SPRAY, SUSPENSION (ML) NASAL DAILY PRN
COMMUNITY
End: 2022-07-10

## 2022-03-25 RX ORDER — CEFTRIAXONE 1 G/50ML
1000 INJECTION, SOLUTION INTRAVENOUS ONCE
Status: COMPLETED | OUTPATIENT
Start: 2022-03-25 | End: 2022-03-25

## 2022-03-25 RX ORDER — LIDOCAINE HYDROCHLORIDE 10 MG/ML
2 INJECTION, SOLUTION EPIDURAL; INFILTRATION; INTRACAUDAL; PERINEURAL ONCE
Status: COMPLETED | OUTPATIENT
Start: 2022-03-25 | End: 2022-03-25

## 2022-03-25 RX ORDER — CEFTRIAXONE 1 G/50ML
1000 INJECTION, SOLUTION INTRAVENOUS EVERY 24 HOURS
Status: DISCONTINUED | OUTPATIENT
Start: 2022-03-26 | End: 2022-03-26

## 2022-03-25 RX ORDER — DILTIAZEM HYDROCHLORIDE 5 MG/ML
20 INJECTION INTRAVENOUS ONCE
Status: COMPLETED | OUTPATIENT
Start: 2022-03-25 | End: 2022-03-25

## 2022-03-25 RX ADMIN — ALBUMIN (HUMAN) 25 G: 12.5 INJECTION, SOLUTION INTRAVENOUS at 20:19

## 2022-03-25 RX ADMIN — IOHEXOL 100 ML: 350 INJECTION, SOLUTION INTRAVENOUS at 15:20

## 2022-03-25 RX ADMIN — METOPROLOL TARTRATE 50 MG: 50 TABLET, FILM COATED ORAL at 22:38

## 2022-03-25 RX ADMIN — TETANUS TOXOID, REDUCED DIPHTHERIA TOXOID AND ACELLULAR PERTUSSIS VACCINE, ADSORBED 0.5 ML: 5; 2.5; 8; 8; 2.5 SUSPENSION INTRAMUSCULAR at 15:34

## 2022-03-25 RX ADMIN — PHYTONADIONE 10 MG: 5 TABLET ORAL at 21:11

## 2022-03-25 RX ADMIN — SODIUM CHLORIDE, SODIUM LACTATE, POTASSIUM CHLORIDE, AND CALCIUM CHLORIDE 1000 ML: .6; .31; .03; .02 INJECTION, SOLUTION INTRAVENOUS at 17:33

## 2022-03-25 RX ADMIN — NOREPINEPHRINE BITARTRATE 2 MCG/MIN: 1 INJECTION INTRAVENOUS at 21:53

## 2022-03-25 RX ADMIN — SODIUM CHLORIDE, SODIUM LACTATE, POTASSIUM CHLORIDE, AND CALCIUM CHLORIDE 1000 ML: .6; .31; .03; .02 INJECTION, SOLUTION INTRAVENOUS at 15:34

## 2022-03-25 RX ADMIN — PHYTONADIONE 10 MG: 10 INJECTION, EMULSION INTRAMUSCULAR; INTRAVENOUS; SUBCUTANEOUS at 17:33

## 2022-03-25 RX ADMIN — LIDOCAINE HYDROCHLORIDE 2 ML: 10 INJECTION, SOLUTION EPIDURAL; INFILTRATION; INTRACAUDAL; PERINEURAL at 22:39

## 2022-03-25 RX ADMIN — METOPROLOL TARTRATE 10 MG: 1 INJECTION, SOLUTION INTRAVENOUS at 22:03

## 2022-03-25 RX ADMIN — CEFTRIAXONE 1000 MG: 1 INJECTION, SOLUTION INTRAVENOUS at 15:34

## 2022-03-25 RX ADMIN — ALBUMIN (HUMAN) 25 G: 12.5 INJECTION, SOLUTION INTRAVENOUS at 18:34

## 2022-03-25 RX ADMIN — METOPROLOL TARTRATE 5 MG: 1 INJECTION, SOLUTION INTRAVENOUS at 20:29

## 2022-03-25 RX ADMIN — BACITRACIN ZINC AND POLYMYXIN B SULFATE: 500; 10000 OINTMENT OPHTHALMIC at 21:50

## 2022-03-25 RX ADMIN — SODIUM CHLORIDE, SODIUM GLUCONATE, SODIUM ACETATE, POTASSIUM CHLORIDE, MAGNESIUM CHLORIDE, SODIUM PHOSPHATE, DIBASIC, AND POTASSIUM PHOSPHATE 500 ML: .53; .5; .37; .037; .03; .012; .00082 INJECTION, SOLUTION INTRAVENOUS at 21:15

## 2022-03-25 RX ADMIN — CALCIUM CHLORIDE 1 G: 100 INJECTION INTRAVENOUS; INTRAVENTRICULAR at 16:12

## 2022-03-25 RX ADMIN — DILTIAZEM HYDROCHLORIDE 20 MG: 5 INJECTION INTRAVENOUS at 16:14

## 2022-03-25 RX ADMIN — METOPROLOL TARTRATE 5 MG: 1 INJECTION, SOLUTION INTRAVENOUS at 19:41

## 2022-03-25 RX ADMIN — SODIUM CHLORIDE, SODIUM LACTATE, POTASSIUM CHLORIDE, AND CALCIUM CHLORIDE 750 ML: 600; 310; 30; 20 INJECTION, SOLUTION INTRAVENOUS at 16:31

## 2022-03-25 RX ADMIN — SODIUM CHLORIDE, SODIUM GLUCONATE, SODIUM ACETATE, POTASSIUM CHLORIDE, MAGNESIUM CHLORIDE, SODIUM PHOSPHATE, DIBASIC, AND POTASSIUM PHOSPHATE 1000 ML: .53; .5; .37; .037; .03; .012; .00082 INJECTION, SOLUTION INTRAVENOUS at 23:21

## 2022-03-25 NOTE — ASSESSMENT & PLAN NOTE
· CT of the head revealed Anterolateral right frontal scalp hematoma without acute traumatic injury to the facial bones  · Hematoma noted at right brow  Continue to monitor closely

## 2022-03-25 NOTE — ASSESSMENT & PLAN NOTE
· CK elevated on admission at 534 Likely secondary to fall  · Patient currently receiving fluid resuscitation for septic shock  · Repeat CK in a m

## 2022-03-25 NOTE — ASSESSMENT & PLAN NOTE
· AFib RVR with heart rate in 160s on presentation  · Patient follows with [facility] cardiology  · Patient's home metoprolol and Coumadin held  Coumadin held secondary to coagulopathy with INR greater than 11   · Heart rate improving with fluid resuscitation  With improvement in BP would resume metoprolol      · TSH, troponin and BNP pending  · Check ECHO

## 2022-03-25 NOTE — H&P
[address]  H&P- Paul Guzman 1940, 80 y o  female MRN: [id number]  Unit/Bed#: -01 Encounter: [id number]  Primary Care Provider: Debora Diaz MD   Date and time admitted to hospital: 3/25/2022  3:02 PM    * Septic shock [facility])  Assessment & Plan  Background:  Patient found down on floor by daughter  Patient is alert and oriented however does not entirely recall events of suspected fall  Patient does state that she believes she may have fell on a chair however daughter stated that she was found in the living room next to a cabinet  Patient noted to have multiple injuries and areas of ecchymosis  Trauma alert called from the ED  Patient met sepsis criteria on admission  · Meets criteria as evidence by heart rate 160s, respiratory rate 20-25, WBCs 30 and LEONARDO present on admission  · Source likely urinary +/- bacteremia  UA showed small leukocytes, no nitrates, WBCs 10-20 and innumerable bacteria  Culture pending  · Blood culture sent x2  · Trend lactic acid every 2 hours until cleared  Initial lactic acid 3 3  · Started on ceftriaxone in ED  Will continue Q 24 hours  · Initially fluid resuscitated with 1 75 L of lactated Ringer's which was based on ideal body weight due to BMI of 57 66  Patient remained hypotensive and therefore was given an additional 1 25 L  Norepinephrine ordered however not started at this time because blood pressure improved during 3 L fluid boluses  · Discussed with patient central line and arterial line placements  Patient would be agreeable if indicated  · CT the chest/abdomen/pelvis: No findings of acute thoracic or abdominopelvic injury  Small right and trace left pleural effusions are new since November 14, 2021  Persistent findings of hepatic cirrhosis new trace perihepatic ascites  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    Recommendation is for follow-up low radiation dose chest CT in one year  · Of note patient also had elevated LFTs on admission  Patient reports no history of liver disease however labs reviewed in Care everywhere reveal previous elevation LFTs  Also now with elevated INR of 11 78  Coagulopathy ([address] )  Assessment & Plan  · Patient is maintained on Coumadin for atrial fib outpatient  · INR 11 78 at time of admission  · Hold Coumadin and trend INR  · CT facial bones and head revealed Anterolateral right frontal scalp hematoma without acute traumatic injury to the facial bones  · Trauma CT scans of CT chest/abdomen/pelvis negative for hematoma or injury  · Administer vitamin K IV stat  · Monitor H/H and platelets  · Continue to monitor for further bleeding  · Would repeat CT scan for acute changes    HLD (hyperlipidemia)  Assessment & Plan  · Hold statin at this time due to transaminitis    Diabetes mellitus [facility])  Assessment & Plan  Lab Results   Component Value Date    HGBA1C 5 5 01/31/2018       No results for input(s): POCGLU in the last 72 hours  Blood Sugar Average: Last 72 hrs:     · Q 6 hour Accu-Cheks with sliding scale insulin    Hypothyroidism  Assessment & Plan  · Continue Synthroid when able to take p o  · Check TSH    Impaired skin integrity  Assessment & Plan  · Multiple areas of impaired skin integrity present at time of admission likely secondary to fall in the setting of supratherapeutic INR on Coumadin  · Ecchymosis noted in albert orbital region, forehead, chest, abdomen and extremities  · Supportive care    Hematoma  Assessment & Plan  · CT of the head revealed Anterolateral right frontal scalp hematoma without acute traumatic injury to the facial bones  · Hematoma noted at right brow  Continue to monitor closely  Fall  Assessment & Plan  · Suspected fall as patient was found down by daughter  Unknown time down however last time patient was seen or heard from was approximately 4 days ago by daughter    Patient states she does not recall all of the events but does report that she fell  · Trauma alert in ED  Right frontal scalp hematoma noted on imaging  Multiple areas of ecchymosis over entire body including bilateral periorbital areas, chest, abdomen and extremities  · PT/OT consult placed    Elevated CK  Assessment & Plan  · CK elevated on admission at 534 Likely secondary to fall  · Patient currently receiving fluid resuscitation for septic shock  · Repeat CK in a m  Atrial fibrillation with RVR (HCC)  Assessment & Plan  · AFib RVR with heart rate in 160s on presentation  · Patient follows with [facility] cardiology  · Patient's home metoprolol and Coumadin held  Coumadin held secondary to coagulopathy with INR greater than 11   · Heart rate improving with fluid resuscitation  With improvement in BP would resume metoprolol  · TSH, troponin and BNP pending  · Check ECHO    Transaminitis  Assessment & Plan  · LFTs elevated at time of admission:  AST 66, ALT 44, , T bili 4 26  · Labs reviewed in Care everywhere and showed that LFTs were elevated on 01/26/22:  AST 87, ALT 42, , T bili 2  · Patient states she has no known liver disease  · Continue to trend LFTs  · Hold statin  · Obtain right upper quadrant ultrasound    LEONARDO (acute kidney injury) ([facility] [address] )  Assessment & Plan  · Likely secondary to septic shock  · Baseline creatinine 0 8-1 1   · Creatinine 2 3 on presentation  · Ramires catheter placed in ED  Will maintain for accurate I&O at this time  · Urine output improving with fluid resuscitation    · Monitor BUN and creatinine  · Strict I&Os  · Avoid nephrotoxins and hypotension      -------------------------------------------------------------------------------------------------------------  Chief Complaint: Fall at home/head trauma/afib with RVR    History of Present Illness   HX and PE limited by: CALIN Bolden is a 80 y o  female who presents with a past medical history of hyporthyroidism, diabetes mellitus type 2, hyperlipidemia and atrial fibrillation on Coumadin  Patient was found on the floor today by her daughter  There is an unknown down time but it could have been up to 4 days when she was last seen by her daughter  Patient is unable to recall when or how she fell but thinks she may have struck her head on a chair that was nearby where she was lying  Upon evaluation in the emergency department, patient was found to have a hematoma above her right eye  She is also noted to have contusions to her head, face, left and right breast, right upper leg and left upper leg  She is alert oriented x3  Code status was discussed and the patient has made herself a level 2 with accepting intubation but does not want CPR  The patient was noted to me sirs criteria with a heart rate 160s, respiratory rate 20 to 25, white blood cell count greater than 30, LEONAROD and borderline hypotensive blood pressure  She had initial lactic acid 3 3,  and her INR was noted to be greater than 11  Ceftriaxone was administered in the ED for possible UTI  Vitamin K was administered for her elevated INR  She received 3 L fluid bolus per sepsis criteria  Levophed was ordered for persistent hypotension but was not started with her map being greater than 65  She has a history of atrial fibrillation is on Coumadin and was noted to be in atrial fibrillation with rapid ventricular rate  A dose of Cardizem was administered in the ED with no resolution of the rapid ventricular rate  CT of the chest/abdomen/pelvis had no findings of acute thoracic or abdominal pelvic injury  There was noted to be a small right and trace left pleural effusion  Critical Care was called for evaluation and admission  History obtained from child, chart review and the patient   -------------------------------------------------------------------------------------------------------------  Dispo:  Admit to Critical Care     Code Status: Level 2 - DNAR: but accepts endotracheal intubation  --------------------------------------------------------------------------------------------------------------  Review of Systems    A 12-point, complete review of systems was reviewed and negative except as stated above     Physical Exam  Constitutional:       General: She is not in acute distress  Appearance: She is obese  She is ill-appearing  She is not diaphoretic  HENT:      Head: Normocephalic  Raccoon eyes present  Jaw: No tenderness  Right Ear: External ear normal       Left Ear: External ear normal       Nose: Nose normal       Mouth/Throat:      Mouth: Mucous membranes are dry  Pharynx: Oropharynx is clear  Eyes:      General: No scleral icterus  Right eye: No discharge  Left eye: No discharge  Extraocular Movements: Extraocular movements intact  Pupils: Pupils are equal, round, and reactive to light  Cardiovascular:      Rate and Rhythm: Tachycardia present  Rhythm irregularly irregular  Heart sounds: No friction rub  No gallop  Pulmonary:      Effort: Pulmonary effort is normal  No respiratory distress  Breath sounds: Normal breath sounds  No stridor  No wheezing, rhonchi or rales  Chest:      Chest wall: Tenderness (Below right collar bone) present  Abdominal:      General: Bowel sounds are normal  There is no distension  Palpations: Abdomen is soft  There is no mass  Tenderness: There is no abdominal tenderness  There is no guarding or rebound  Musculoskeletal:         General: Signs of injury (Contusions per head and chest exam ) present  No deformity  Normal range of motion  Cervical back: Normal range of motion and neck supple  No rigidity or tenderness  Right lower leg: No edema  Left lower leg: No edema  Skin:     General: Skin is warm  Capillary Refill: Capillary refill takes less than 2 seconds  Coloration: Skin is not jaundiced or pale        Findings: Bruising present  No rash  Comments: Skin cool to touch  Neurological:      General: No focal deficit present  Mental Status: She is alert and oriented to person, place, and time  GCS: GCS eye subscore is 4  GCS verbal subscore is 5  GCS motor subscore is 6  Sensory: No sensory deficit        --------------------------------------------------------------------------------------------------------------  Vitals:   Vitals:    03/25/22 1730 03/25/22 1736 03/25/22 1742 03/25/22 1745   BP:  104/61 98/59    Pulse: (!) 146 (!) 150 (!) 149 (!) 152   Resp: 19 20 19 19   Temp: (!) 95 9 °F (35 5 °C) (!) 96 1 °F (35 6 °C) (!) 95 9 °F (35 5 °C) (!) 96 1 °F (35 6 °C)   SpO2: 99% 98% 97% 99%   Height:         Temp  Min: 93 7 °F (34 3 °C)  Max: 96 1 °F (35 6 °C)     Height: 5' 2" (157 5 cm)  Body mass index is 57 66 kg/m²  Laboratory and Diagnostics:  Results from last 7 days   Lab Units 03/25/22  1506   WBC Thousand/uL 30 84*   HEMOGLOBIN g/dL 13 7   HEMATOCRIT % 38 6   PLATELETS Thousands/uL 248   MONO PCT % 7     Results from last 7 days   Lab Units 03/25/22  1506   SODIUM mmol/L 134*   POTASSIUM mmol/L 4 6   CHLORIDE mmol/L 99*   CO2 mmol/L 24   ANION GAP mmol/L 11   BUN mg/dL 71*   CREATININE mg/dL 2 37*   CALCIUM mg/dL 8 5   GLUCOSE RANDOM mg/dL 95   ALT U/L 42   AST U/L 66*   ALK PHOS U/L 165*   ALBUMIN g/dL 1 9*   TOTAL BILIRUBIN mg/dL 4 26*          Results from last 7 days   Lab Units 03/25/22  1506   INR  11 78*   PTT seconds 60*          Results from last 7 days   Lab Units 03/25/22  1819 03/25/22  1618   LACTIC ACID mmol/L 2 9* 3 3*     ABG:    VBG:          Micro:        EKG: Afib with RVR  Imaging: I have personally reviewed pertinent reports     and I have personally reviewed pertinent films in PACS      Historical Information   Past Medical History:   Diagnosis Date    Atrial fibrillation ([facility] [address] )     Diabetes mellitus ([facility] [address] )     Disease of thyroid gland     Hyperlipidemia      Past Surgical History:   Procedure Laterality Date    HERNIA REPAIR      HIP ARTHROPLASTY      HYSTERECTOMY      KNEE ARTHROPLASTY      OOPHORECTOMY       Social History   Social History     Substance and Sexual Activity   Alcohol Use Never     Social History     Substance and Sexual Activity   Drug Use Never     Social History     Tobacco Use   Smoking Status Never Smoker   Smokeless Tobacco Never Used     Exercise History: Lives independently at home with family assistants  Family History:   History reviewed  No pertinent family history  Medications:  Current Facility-Administered Medications   Medication Dose Route Frequency    [START ON 3/26/2022] cefTRIAXone (ROCEPHIN) IVPB (premix in dextrose) 1,000 mg 50 mL  1,000 mg Intravenous Q24H    insulin lispro (HumaLOG) 100 units/mL subcutaneous injection 2-12 Units  2-12 Units Subcutaneous Q6H [address]    metoprolol (LOPRESSOR) injection 5 mg  5 mg Intravenous Once    norepinephrine (LEVOPHED) 4 mg (STANDARD CONCENTRATION) IV in sodium chloride 0 9% 250 mL  1-30 mcg/min Intravenous Titrated     Home medications:  Prior to Admission Medications   Prescriptions Last Dose Informant Patient Reported? Taking?   levothyroxine 150 mcg tablet   Yes No   Sig: TAKE (1) TABLET DAILY  FIRST THING IN THE MORNING (AT LEAST 30 MIN PRIOR TO BREAKFAST OR OTHER MEDS)  metoprolol succinate (TOPROL-XL) 100 mg 24 hr tablet   Yes No   Sig: Take 1 tablet by mouth daily   traMADol (Ultram) 50 mg tablet   No No   Sig: Take 0 5 tablets (25 mg total) by mouth every 6 (six) hours as needed for moderate pain   warfarin (COUMADIN) 5 mg tablet   Yes No   Sig: TAKE 2 & 1/2 (12 5MG) TABLETS THURS  AND 1 & 1/2 TABLETS ALL OTHER DAYS OR AS DIRECTED BY COUMADIN CLINIC      Facility-Administered Medications: None     Allergies:   Allergies   Allergen Reactions    Sulfamethoxazole-Trimethoprim GI Intolerance     Nausea/ diarrha     Penicillins Hives     rash ------------------------------------------------------------------------------------------------------------  Advance Directive and Living Will:      Power of :    POLST:    ------------------------------------------------------------------------------------------------------------  Anticipated Length of Stay is > 2 midnights    Care Time Delivered:   Upon my evaluation, this patient had a high probability of imminent or life-threatening deterioration due to trauma INR > 11 and septic shock, which required my direct attention, intervention, and personal management  I have personally provided 50 minutes (1800 to 1850) of critical care time, exclusive of procedures, teaching, family meetings, and any prior time recorded by providers other than myself  [address] [place] [address]        Portions of the record may have been created with voice recognition software  Occasional wrong word or "sound a like" substitutions may have occurred due to the inherent limitations of voice recognition software    Read the chart carefully and recognize, using context, where substitutions have occurred

## 2022-03-25 NOTE — ASSESSMENT & PLAN NOTE
· LFTs elevated at time of admission:  AST 66, ALT 44, , T bili 4 26  · Labs reviewed in Care everywhere and showed that LFTs were elevated on 01/26/22:  AST 87, ALT 42, , T bili 2  · Patient states she has no known liver disease    · Continue to trend LFTs  · Hold statin  · Obtain right upper quadrant ultrasound

## 2022-03-25 NOTE — ASSESSMENT & PLAN NOTE
· Suspected fall as patient was found down by daughter  Unknown time down however last time patient was seen or heard from was approximately 4 days ago by daughter  Patient states she does not recall all of the events but does report that she fell  · Trauma alert in ED  Right frontal scalp hematoma noted on imaging  Multiple areas of ecchymosis over entire body including bilateral periorbital areas, chest, abdomen and extremities    · PT/OT consult placed

## 2022-03-25 NOTE — ASSESSMENT & PLAN NOTE
· Likely secondary to septic shock  · Baseline creatinine 0 8-1 1   · Creatinine 2 3 on presentation  · Ramires catheter placed in ED  Will maintain for accurate I&O at this time  · Urine output improving with fluid resuscitation    · Monitor BUN and creatinine  · Strict I&Os  · Avoid nephrotoxins and hypotension

## 2022-03-25 NOTE — ED PROVIDER NOTES
History  Chief Complaint   Patient presents with    Trauma     pt found on the ground by her daughter today  unknown how long she was down for, could be up to four days  +thinners     80-year-old female arrives emergency department after being found on the floor in her home by her daughter today  It is unclear as to how long the patient was on the ground  Patient cannot recall when she fell and she was not seen for about 4 days per EMS report  Patient arrives to the emergency room with multiple areas of bruising  Patient was found in the prone position and had been incontinent  As stated, patient cannot recall any portion of the fall  She is unsure as to how many days she was on the ground  She is alert aware and at this time appears oriented  She is only complaining of pain in the area of her breast where she has evidence of abrasion to the soft tissue  Patient is bruised about the head and face status post fall        History provided by:  Patient and EMS personnel  Trauma  Mechanism of injury: fall  Injury location: head/neck, face, torso and leg  Injury location detail: head, face, L breast and R breast and R upper leg and L upper leg  Incident location: home  Time since incident: 4 days  Arrived directly from scene: yes     Fall:       Fall occurred: in unknown circumstances       Impact surface: hard floor       Point of impact: unknown       Suspicion of alcohol use: no       Suspicion of drug use: no    EMS/PTA data:       Bystander interventions: none       Ambulatory at scene: no       Blood loss: none       Responsiveness: alert       Oriented to: person, place and situation       Airway interventions: none       Breathing interventions: none       IV access: established       Airway condition since incident: stable       Breathing condition since incident: stable       Circulation condition since incident: stable       Mental status condition since incident: stable    Current symptoms: Associated symptoms:             Reports chest pain  Denies abdominal pain, back pain, nausea and vomiting  Prior to Admission Medications   Prescriptions Last Dose Informant Patient Reported? Taking?   levothyroxine 150 mcg tablet   Yes No   Sig: TAKE (1) TABLET DAILY  FIRST THING IN THE MORNING (AT LEAST 30 MIN PRIOR TO BREAKFAST OR OTHER MEDS)  metoprolol succinate (TOPROL-XL) 100 mg 24 hr tablet   Yes No   Sig: Take 1 tablet by mouth daily   traMADol (Ultram) 50 mg tablet   No No   Sig: Take 0 5 tablets (25 mg total) by mouth every 6 (six) hours as needed for moderate pain   warfarin (COUMADIN) 5 mg tablet   Yes No   Sig: TAKE 2 & 1/2 (12 5MG) TABLETS THURS  AND 1 & 1/2 TABLETS ALL OTHER DAYS OR AS DIRECTED BY COUMADIN CLINIC      Facility-Administered Medications: None       Past Medical History:   Diagnosis Date    [facility] fibrillation ([facility] )     Diabetes mellitus ([facility] )     Disease of thyroid gland     Hyperlipidemia        Past Surgical History:   Procedure Laterality Date    HERNIA REPAIR      HIP ARTHROPLASTY      HYSTERECTOMY      KNEE ARTHROPLASTY      OOPHORECTOMY         History reviewed  No pertinent family history  I have reviewed and agree with the history as documented  E-Cigarette/Vaping     E-Cigarette/Vaping Substances     Social History     Tobacco Use    Smoking status: Never Smoker    Smokeless tobacco: Never Used   Substance Use Topics    Alcohol use: Never    Drug use: Never       Review of Systems   Constitutional: Negative  HENT: Negative  Respiratory: Negative  Negative for chest tightness, shortness of breath and wheezing  Cardiovascular: Positive for chest pain  Negative for palpitations  Gastrointestinal: Negative for abdominal pain, constipation, diarrhea, nausea and vomiting  Genitourinary: Negative  Musculoskeletal: Negative for back pain and joint swelling  Skin: Positive for wound  Negative for color change     All other systems reviewed and are negative  Physical Exam  Physical Exam  Vitals and nursing note reviewed  Constitutional:       General: She is awake  She is not in acute distress  Appearance: Normal appearance  She is well-developed  She is obese  She is not ill-appearing, toxic-appearing or diaphoretic  Interventions: Cervical collar in place  Comments: Cervical collar was removed as it was causing further skin breakdown and restricting airway  Patient has no tenderness over cervical spine  HENT:      Head: Normocephalic  Raccoon eyes, contusion and laceration present  Hair is normal       Jaw: No pain on movement  Comments: Patient's face is with bruising/ecchymosis  Right Ear: External ear normal       Left Ear: External ear normal       Nose: Nose normal    Eyes:      General: Lids are normal  No scleral icterus  Extraocular Movements: Extraocular movements intact  Pupils: Pupils are equal, round, and reactive to light  Neck:     Cardiovascular:      Rate and Rhythm: Tachycardia present  Rhythm irregularly irregular  Heart sounds: Normal heart sounds  No murmur heard  Pulmonary:      Effort: Pulmonary effort is normal  No respiratory distress  Breath sounds: Normal breath sounds  No stridor or decreased air movement  No decreased breath sounds, wheezing or rales  Chest:      Chest wall: No lacerations  Comments: Patient has multiple abrasions and skin breakdown across both breast   Abdominal:      General: Abdomen is flat  There is no distension or abdominal bruit  There are no signs of injury  Palpations: Abdomen is soft  Tenderness: There is no abdominal tenderness  There is no guarding  Musculoskeletal:         General: No deformity  Normal range of motion  Cervical back: Normal range of motion and neck supple  No swelling, deformity, signs of trauma or crepitus        Thoracic back: Normal       Lumbar back: Normal    Skin:     General: Skin is warm and dry  Coloration: Skin is not jaundiced or pale  Findings: Abrasion and rash present  Rash is pustular  Neurological:      Mental Status: She is alert, oriented to person, place, and time and easily aroused  Mental status is at baseline  Cranial Nerves: No cranial nerve deficit  Psychiatric:         Attention and Perception: Attention normal          Mood and Affect: Mood normal          Speech: Speech normal          Behavior: Behavior normal  Behavior is cooperative           Vital Signs  ED Triage Vitals   Temperature Pulse Respirations Blood Pressure SpO2   03/25/22 1545 03/25/22 1500 03/25/22 1500 03/25/22 1500 03/25/22 1500   (!) 93 7 °F (34 3 °C) (!) 165 20 110/76 (!) 88 %      Temp src Heart Rate Source Patient Position - Orthostatic VS BP Location FiO2 (%)   -- 03/25/22 1500 03/25/22 1500 -- --    Monitor Lying        Pain Score       --                  Vitals:    03/25/22 1645 03/25/22 1700 03/25/22 1715 03/25/22 1736   BP: (!) 79/40 95/67 119/54 104/61   Pulse: (!) 143 (!) 148 (!) 148 (!) 150   Patient Position - Orthostatic VS:             Visual Acuity  Visual Acuity      Most Recent Value   L Pupil Size (mm) 3   R Pupil Size (mm) 3   L Pupil Shape Round   R Pupil Shape Round          ED Medications  Medications   lactated ringers bolus 1,000 mL (1,000 mL Intravenous New Bag 3/25/22 1733)   norepinephrine (LEVOPHED) 4 mg (STANDARD CONCENTRATION) IV in sodium chloride 0 9% 250 mL (has no administration in time range)   phytonadione (AQUA-MEPHYTON) 10 mg/mL 10 mg in sodium chloride 0 9 % 50 mL IVPB (10 mg Intravenous New Bag 3/25/22 1733)   lactated ringers bolus 1,000 mL (1,000 mL Intravenous New Bag 3/25/22 1534)   tetanus-diphtheria-acellular pertussis (BOOSTRIX) IM injection 0 5 mL (0 5 mL Intramuscular Given 3/25/22 1534)   iohexol (OMNIPAQUE) 350 MG/ML injection (SINGLE-DOSE) 100 mL (100 mL Intravenous Given 3/25/22 1520)   cefTRIAXone (ROCEPHIN) IVPB (premix in dextrose) 1,000 mg 50 mL (0 mg Intravenous Stopped 3/25/22 1604)   diltiazem (CARDIZEM) injection 20 mg (20 mg Intravenous Given 3/25/22 1614)   calcium chloride 10 % injection 1 g (1 g Intravenous Given 3/25/22 1612)   lactated ringers bolus 750 mL (0 mL Intravenous Stopped 3/25/22 1731)       Diagnostic Studies  Results Reviewed     Procedure Component Value Units Date/Time    TSH, 3rd generation with Free T4 reflex [[id number]]     Lab Status: No result Specimen: Blood     Lactic acid 2 Hours [[id number]]     Lab Status: No result Specimen: Blood     Lactic acid, plasma [[id number]]  (Abnormal) Collected: 03/25/22 1618    Lab Status: Final result Specimen: Blood from Arm, Right Updated: 03/25/22 1647     LACTIC ACID 3 3 mmol/L     Narrative:      Result may be elevated if tourniquet was used during collection  Blood culture #2 [[id number]] Collected: 03/25/22 1611    Lab Status: In process Specimen: Blood from Arm, Left Updated: 03/25/22 1622    Blood culture #1 [[id number]] Collected: 03/25/22 1618    Lab Status: In process Specimen: Blood from Arm, Right Updated: 03/25/22 1622    CKMB [[id number]]  (Normal) Collected: 03/25/22 1506    Lab Status: Final result Specimen: Blood Updated: 03/25/22 1620     CK-MB Index <1 0 %      CK-MB 3 9 ng/mL     Urine Microscopic [[id number]]  (Abnormal) Collected: 03/25/22 1545    Lab Status: Final result Specimen: Urine, Indwelling Ramires Catheter Updated: 03/25/22 1616     RBC, UA 4-10 /hpf      WBC, UA 10-20 /hpf      Epithelial Cells Moderate /hpf      Bacteria, UA Innumerable /hpf      Hyaline Casts, UA 4-10 /lpf      MUCUS THREADS Occasional    Urine culture [[id number]] Collected: 03/25/22 1545    Lab Status:  In process Specimen: Urine, Indwelling Ramires Catheter Updated: 03/25/22 1616    Urine culture [[id number]]     Lab Status: No result Specimen: Urine     UA w Reflex to Microscopic w Reflex to Culture [[id number]]  (Abnormal) Collected: 03/25/22 1545    Lab Status: Final result Specimen: Urine, Indwelling Ramires Catheter Updated: 03/25/22 1601     Color, UA Johanna     Clarity, UA Cloudy     Specific Gravity, UA 1 025     pH, UA 5 0     Leukocytes, UA Small     Nitrite, UA Negative     Protein, UA Negative mg/dl      Glucose, UA Negative mg/dl      Ketones, UA Trace mg/dl      Urobilinogen, UA 2 0 E U /dl      Bilirubin, UA Moderate     Blood, UA Small    Comprehensive metabolic panel [[id number]]  (Abnormal) Collected: 03/25/22 1506    Lab Status: Final result Specimen: Blood Updated: 03/25/22 1559     Sodium 134 mmol/L      Potassium 4 6 mmol/L      Chloride 99 mmol/L      CO2 24 mmol/L      ANION GAP 11 mmol/L      BUN 71 mg/dL      Creatinine 2 37 mg/dL      Glucose 95 mg/dL      Calcium 8 5 mg/dL      Corrected Calcium 10 2 mg/dL      AST 66 U/L      ALT 42 U/L      Alkaline Phosphatase 165 U/L      Total Protein 6 9 g/dL      Albumin 1 9 g/dL      Total Bilirubin 4 26 mg/dL      eGFR 18 ml/min/1 73sq m     Narrative:      National Kid[employer] guidelines for Chronic Kidney Disease (CKD):     Stage 1 with normal or high GFR (GFR > 90 mL/min/1 73 square meters)    Stage 2 Mild CKD (GFR = 60-89 mL/min/1 73 square meters)    Stage 3A Moderate CKD (GFR = 45-59 mL/min/1 73 square meters)    Stage 3B Moderate CKD (GFR = 30-44 mL/min/1 73 square meters)    Stage 4 Severe CKD (GFR = 15-29 mL/min/1 73 square meters)    Stage 5 End Stage CKD (GFR <15 mL/min/1 73 square meters)  Note: GFR calculation is accurate only with a steady state creatinine    Manual Differential(PHLEBS Do Not Order) [[id number]]  (Abnormal) Collected: 03/25/22 1506    Lab Status: Final result Specimen: Blood Updated: 03/25/22 1557     Segmented % 85 %      Lymphocytes % 8 %      Monocytes % 7 %      Eosinophils, % 0 %      Basophils % 0 %      Absolute Neutrophils 26 21 Thousand/uL      Lymphocytes Absolute 2 47 Thousand/uL      Monocytes Absolute 2 16 Thousand/uL      Eosinophils Absolute 0 00 Thousand/uL      Basophils Absolute 0 00 Thousand/uL      Total Counted --     RBC Morphology Normal     Platelet Estimate Adequate    CK (with reflex to MB) [[id number]]  (Abnormal) Collected: 03/25/22 1506    Lab Status: Final result Specimen: Blood Updated: 03/25/22 1533     Total  U/L     Protime-INR [[id number]]  (Abnormal) Collected: 03/25/22 1506    Lab Status: Final result Specimen: Blood Updated: 03/25/22 1532     Protime 86 1 seconds      INR 11 78    APTT [[id number]]  (Abnormal) Collected: 03/25/22 1506    Lab Status: Final result Specimen: Blood Updated: 03/25/22 1532     PTT 60 seconds     CBC and differential [[id number]]  (Abnormal) Collected: 03/25/22 1506    Lab Status: Final result Specimen: Blood Updated: 03/25/22 1527     WBC 30 84 Thousand/uL      RBC 3 92 Million/uL      Hemoglobin 13 7 g/dL      Hematocrit 38 6 %      MCV 99 fL      MCH 34 9 pg      MCHC 35 5 g/dL      RDW 14 6 %      MPV 10 9 fL      Platelets 573 Thousands/uL     Narrative: This is an appended report  These results have been appended to a previously verified report  CT recon only thoracolumbar (no charge)   Final Result by Mayra Manzano MD (03/25 1629)      No fracture or traumatic subluxation  Scoliosis  Severe lumbar degenerative spondylosis  Workstation performed: [id number]         CT facial bones without contrast   Final Result by Fabrice Paniagua MD (03/25 1537)      Anterolateral right frontal scalp hematoma without acute traumatic injury to the facial bones  Workstation performed: [id number]         TRAUMA - CT head wo contrast   Final Result by Fabrice Paniagua MD (03/25 1540)      Anterolateral right frontal scalp hematoma without an acute intracranial abnormality                    Workstation performed: [id number]         TRAUMA - CT spine cervical wo contrast   Final Result by Fabrice Paniagua MD (03/25 5970)      No cervical spine fracture or traumatic malalignment  Workstation performed: [id number]         TRAUMA - CT chest abdomen pelvis w contrast   Final Result by Juan J Dudley MD (03/25 9266)      1  No findings of acute thoracic or abdominopelvic injury  2   Small right and trace left pleural effusions are new since November 14, 2021  3   Persistent findings of hepatic cirrhosis new trace perihepatic ascites   4  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  Recommendation is for follow-up low radiation dose chest CT in one year  Workstation performed: [id number]         XR Trauma chest portable   ED Interpretation by Vicente Michele DO (03/25 1541)   No active disease      XR Trauma pelvis ap only 1 or 2 vw   ED Interpretation by Vicente Michele DO (03/25 1542)   No obvious fracture or dislocation  Procedures  ECG 12 Lead Documentation Only    Date/Time: 3/25/2022 3:18 PM  Performed by: Vicente Michele DO  Authorized by: Vicente Michele DO     Indications / Diagnosis:  Trauma  ECG reviewed by me, the ED Provider: yes    Patient location:  ED  Previous ECG:     Previous ECG:  Compared to current    Comparison ECG info:  Patient now in atrial fibrillation  Similarity:  Changes noted    Comparison to cardiac monitor: Yes    Interpretation:     Interpretation: abnormal    Rate:     ECG rate assessment: tachycardic    Rhythm:     Rhythm: atrial fibrillation    Ectopy:     Ectopy: none    QRS:     QRS axis:  Left  Conduction:     Conduction: normal    ST segments:     ST segments:  Non-specific  T waves:     T waves: non-specific    Comments:      Patient with documented history of paroxysmal atrial fibrillation so this finding does not appear to be new  ED Course  ED Course as of 03/25/22 1737   Fri Mar 25, 2022   1540 Patient's EKG demonstrates atrial fibrillation  Patient has history of same    Patient reports that she has not taken her medications for multiple days  Will provided dose of her beta-blocker  1550 Beta-blocker held secondary to the patient's blood pressure   1550 Patient's BMI > 30  For purposes of fluid resuscitation, IBW was utilized to calculate target volume to be administered  0927 Daughter at bedside  Patient was last seen or spoken to on Tuesday  Patient did try to call her daughter today while she was on the floor  Daughter did not answer the phone at the time and tried to call her back and did not get an answer  [phone] Patient also made a sepsis alert  1619 No acute traumatic injuries in the chest abdomen pelvis  Small pleural effusions noted  1619 Scalp hematoma  No acute intracranial injury  1619 No spinal fracture   1619 No acute facial bone injury  1619 Attempting to control the patient's heart rate and hopefully improve blood pressure in subsequently doing so    1620 Discussed with medicine  Request critical care team    1266 No acute fracture on thoracolumbar spine   1646 Critical care team to evaluate   1648 Patient's mentation and interaction have remained normal despite fluctuations in blood pressure  1702 Blood pressure improved to 95/67   1702 ICU team accepting patient  1717 Blood pressure 119/54      Patient has continue to improve with fluid resuscitation                             Default Flowsheet Data (last 720 hours)     Sepsis Reassess     Row Name 03/25/22 1700                   Repeat Volume Status and Tissue Perfusion Assessment Performed    Repeat Volume Status and Tissue Perfusion Assessment Performed Yes  -JR                  Volume Status and Tissue Perfusion Post Fluid Resuscitation * Must Document All *    Vital Signs Reviewed (HR, RR, BP, T) Yes  -JR        Shock Index Reviewed --        Arterial Oxygen Saturation Reviewed (POx, SaO2 or SpO2) Yes (comment %)  SPO2 98% on room air  -JR        Cardio Irregular rhythm  Afib RVR  -JR        Pulmonary Normal effort;Clear to auscultation  -JR        Capillary Refill Brisk  -JR        Peripheral Pulses Radial  -JR        Peripheral Pulse +3  -JR        Skin Cool;Pale  -JR        Urine output assessed Other (comment)  Improving-50 ml since catheter insertion  -JR                  *OR*   Intensive Monitoring- Must Document One of the Following Four *:    Vital Signs Reviewed Yes  -JR        * Central Venous Pressure (CVP or RAP) --        * Central Venous Oxygen (SVO2, ScvO2 or Oxygen saturation via central catheter) --        * Bedside Cardiovascular US in IVC diameter and % collapse --        * Passive Leg Raise OR Crystalloid Challenge Crystalloid fluid challenge completed  -JR        Crystalloid fluid challenge completed 1000mL in 30 minutes  -Ul  [address]  (r) = Recorded By, (t) = Taken By, (c) = Cosigned By    Initials Name Provider Type    LEXIE Marte Nurse Practitioner                            MDM  Number of Diagnoses or Management Options  Abrasions of multiple sites  LEONARDO (acute kidney injury) ([facility] )  Coagulopathy ([facility] )  Contusion  Fall, initial encounter  Paroxysmal atrial fibrillation with rapid ventricular response (HCC)  Urinary tract infection  Diagnosis management comments: CXR was obtained  Pelvic plain film was obtained    Patient deemed stable to proceed to any necessary CT imaging  Cervical spine was cleared via imaging and then clinically    Patient presented to the emergency department and a MSE was performed  The patient was evaluated and diagnosed with acute fall with multiple injuries and evidence of urinary tract infection LEONARDO I with paroxysmal atrial fibrillation with rapid ventricular response  Patient on anticoagulation therapy  This is a new issue that will require additional planned work-up and treatment in a hospitalized setting   As may have been required as part of this evaluation, clinical laboratory test, radiology imaging and medical testing (I e  EKG) were ordered as necessitated by the patient's presentation  I independently reviewed these studies, imaging and testing  This patient's case is considered to be a considerable risk secondary to the above listed disease process and poses a threat to the patient's well-being and baseline function  Further in-patient diagnostic testing and management, which may include the administration of parenteral medications, is required  Amount and/or Complexity of Data Reviewed  Decide to obtain previous medical records or to obtain history from someone other than the patient: yes  Independent visualization of images, tracings, or specimens: yes    Risk of Complications, Morbidity, and/or Mortality  Presenting problems: high  Diagnostic procedures: moderate  Management options: moderate  General comments: Patient presented to the ED and was found to be critically ill as demonstrated by the clinical history and primary physical evaluation  Pt had demonstrative findings and / or derangements of vital signs indicative for severe illness or injury  I personally performed bedside history and evaluation  Interventions to address these clinical needs were ordered/performed  These included, but not necessarily limited to, the ordering and subsequent review of lab studies, imaging and EKG  Please see chart with regards to specific resuscitative interventions and diagnostics  Due to a probability of clinically significant, life or limb threatening condition, the patient required my highest level of care, intervention and attention  I personally spent the documented time directly managing the patient  The critical care time included obtaining a history, examining the patient, ordering and review of studies, arranging urgent treatment with development of a management plan, evaluation of patient's response to treatment, reassessment, and, if warranted, discussions with other providers or consultants   Documentation to the medical record for continuity of care was also required  Patients records pertinent to the emergent presenting condition were reviewed as available  Family was updated as available and appropriate  This critical care time was performed to assess and manage the high probability of imminent, life-threatening deterioration that could result in multi-organ failure if not addressed  It was exclusive of separately billable procedures and treating other patients and teaching time  Please see MDM section and the rest of the note for further information on patient assessment, reassessment, interventions and treatment  Total time was 40 mins exclusive of separate billable procedures  Patient Progress  Patient progress: improved      Disposition  Final diagnoses:   Fall, initial encounter   Contusion   Abrasions of multiple sites   Urinary tract infection   LEONARDO (acute kidney injury) ([facility] [address] )   Coagulopathy ([facility] )   Paroxysmal atrial fibrillation with rapid ventricular response ([facility] )     Time reflects when diagnosis was documented in both MDM as applicable and the Disposition within this note     Time User Action Codes Description Comment    3/25/2022  4:17 PM Orlo Poplar Add Deneb Talib  [id number]] Fall, initial encounter     3/25/2022  4:17 PM Rolo Poplar Add Ardelle Hait  [id number]] Contusion     3/25/2022  4:18 PM Bonfante Vernemadonna Cuna Add [T07  XXXA] Abrasions of multiple sites     3/25/2022  4:18 PM Rolo Rosedale Add [N39 0] Urinary tract infection     3/25/2022  4:18 PM Bonfante Verneice Cuna Add [N17 9] LEONARDO (acute kidney injury) ([facility] )     3/25/2022  4:18 PM Rolo Poplar Add [D68 9] Coagulopathy ([facility] [address] )     3/25/2022  4:20 PM Rolo Poplar Add [I48 0] Paroxysmal atrial fibrillation with rapid ventricular response [facility])       ED Disposition     ED Disposition Condition Date/Time Comment    Admit Stable Fri Mar 25, 2022  4:20 PM         Follow-up Information    None         Patient's Medications   Discharge Prescriptions    No medications on file       No discharge procedures on file      PDMP Review     None          ED Provider  Electronically Signed by           Bryn Voss, DO  03/25/22 9515 Jhonatan Collins, DO  03/25/22 Fausto, DO  03/25/22 9515 Jhonatan Collins, DO  03/25/22 1737

## 2022-03-25 NOTE — ASSESSMENT & PLAN NOTE
· Patient is maintained on Coumadin for atrial fib outpatient  · INR 11 78 at time of admission  · Hold Coumadin and trend INR  · CT facial bones and head revealed Anterolateral right frontal scalp hematoma without acute traumatic injury to the facial bones     · Trauma CT scans of CT chest/abdomen/pelvis negative for hematoma or injury  · Administer vitamin K IV stat  · Monitor H/H and platelets  · Continue to monitor for further bleeding  · Would repeat CT scan for acute changes

## 2022-03-25 NOTE — ASSESSMENT & PLAN NOTE
Lab Results   Component Value Date    HGBA1C 5 5 01/31/2018       No results for input(s): POCGLU in the last 72 hours      Blood Sugar Average: Last 72 hrs:     · Q 6 hour Accu-Cheks with sliding scale insulin

## 2022-03-25 NOTE — ASSESSMENT & PLAN NOTE
· Multiple areas of impaired skin integrity present at time of admission likely secondary to fall in the setting of supratherapeutic INR on Coumadin  · Ecchymosis noted in albert orbital region, forehead, chest, abdomen and extremities  · Supportive care

## 2022-03-25 NOTE — ASSESSMENT & PLAN NOTE
Background:  Patient found down on floor by daughter  Patient is alert and oriented however does not entirely recall events of suspected fall  Patient does state that she believes she may have fell on a chair however daughter stated that she was found in the living room next to a cabinet  Patient noted to have multiple injuries and areas of ecchymosis  Trauma alert called from the ED  Patient met sepsis criteria on admission  · Meets criteria as evidence by heart rate 160s, respiratory rate 20-25, WBCs 30 and LEONARDO present on admission  · Source likely urinary +/- bacteremia  UA showed small leukocytes, no nitrates, WBCs 10-20 and innumerable bacteria  Culture pending  · Blood culture sent x2  · Trend lactic acid every 2 hours until cleared  Initial lactic acid 3 3  · Started on ceftriaxone in ED  Will continue Q 24 hours  · Initially fluid resuscitated with 1 75 L of lactated Ringer's which was based on ideal body weight due to BMI of 57 66  Patient remained hypotensive and therefore was given an additional 1 25 L  Norepinephrine ordered however not started at this time because blood pressure improved during 3 L fluid boluses  · Discussed with patient central line and arterial line placements  Patient would be agreeable if indicated  · CT the chest/abdomen/pelvis: No findings of acute thoracic or abdominopelvic injury  Small right and trace left pleural effusions are new since November 14, 2021  Persistent findings of hepatic cirrhosis new trace perihepatic ascites  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

## 2022-03-26 ENCOUNTER — APPOINTMENT (INPATIENT)
Dept: RADIOLOGY | Facility: HOSPITAL | Age: 82
DRG: 853 | End: 2022-03-26
Payer: MEDICARE

## 2022-03-26 ENCOUNTER — APPOINTMENT (INPATIENT)
Dept: CT IMAGING | Facility: HOSPITAL | Age: 82
DRG: 853 | End: 2022-03-26
Payer: MEDICARE

## 2022-03-26 PROBLEM — Q25.40 AORTIC ANOMALY: Chronic | Status: ACTIVE | Noted: 2022-03-26

## 2022-03-26 PROBLEM — E44.0 MODERATE PROTEIN-CALORIE MALNUTRITION (HCC): Status: ACTIVE | Noted: 2022-03-26

## 2022-03-26 PROBLEM — H57.89 EYE DISCHARGE: Status: ACTIVE | Noted: 2022-03-26

## 2022-03-26 LAB
ABO GROUP BLD: NORMAL
ALBUMIN SERPL BCP-MCNC: 2.5 G/DL (ref 3.5–5)
ALP SERPL-CCNC: 121 U/L (ref 46–116)
ALT SERPL W P-5'-P-CCNC: 33 U/L (ref 12–78)
ANION GAP SERPL CALCULATED.3IONS-SCNC: 10 MMOL/L (ref 4–13)
ANION GAP SERPL CALCULATED.3IONS-SCNC: 13 MMOL/L (ref 4–13)
AST SERPL W P-5'-P-CCNC: 54 U/L (ref 5–45)
BASE EX.OXY STD BLDV CALC-SCNC: 95.5 % (ref 60–80)
BASE EXCESS BLDV CALC-SCNC: -3.8 MMOL/L
BASOPHILS # BLD MANUAL: 0 THOUSAND/UL (ref 0–0.1)
BASOPHILS NFR MAR MANUAL: 0 % (ref 0–1)
BILIRUB SERPL-MCNC: 5.08 MG/DL (ref 0.2–1)
BLD GP AB SCN SERPL QL: NEGATIVE
BODY TEMPERATURE: 99.3 DEGREES FEHRENHEIT
BUN SERPL-MCNC: 62 MG/DL (ref 5–25)
BUN SERPL-MCNC: 66 MG/DL (ref 5–25)
CALCIUM ALBUM COR SERPL-MCNC: 9.3 MG/DL (ref 8.3–10.1)
CALCIUM SERPL-MCNC: 8.1 MG/DL (ref 8.3–10.1)
CALCIUM SERPL-MCNC: 8.1 MG/DL (ref 8.3–10.1)
CARDIAC TROPONIN I PNL SERPL HS: 112 NG/L (ref 8–18)
CHLORIDE SERPL-SCNC: 101 MMOL/L (ref 100–108)
CHLORIDE SERPL-SCNC: 102 MMOL/L (ref 100–108)
CK MB SERPL-MCNC: 2.6 NG/ML (ref 0–5)
CK MB SERPL-MCNC: <1 % (ref 0–2.5)
CK SERPL-CCNC: 326 U/L (ref 26–192)
CO2 SERPL-SCNC: 20 MMOL/L (ref 21–32)
CO2 SERPL-SCNC: 22 MMOL/L (ref 21–32)
CREAT SERPL-MCNC: 1.81 MG/DL (ref 0.6–1.3)
CREAT SERPL-MCNC: 1.82 MG/DL (ref 0.6–1.3)
EOSINOPHIL # BLD MANUAL: 0 THOUSAND/UL (ref 0–0.4)
EOSINOPHIL NFR BLD MANUAL: 0 % (ref 0–6)
ERYTHROCYTE [DISTWIDTH] IN BLOOD BY AUTOMATED COUNT: 14.7 % (ref 11.6–15.1)
FIBRINOGEN PPP-MCNC: 365 MG/DL (ref 227–495)
GFR SERPL CREATININE-BSD FRML MDRD: 25 ML/MIN/1.73SQ M
GFR SERPL CREATININE-BSD FRML MDRD: 25 ML/MIN/1.73SQ M
GLUCOSE SERPL-MCNC: 132 MG/DL (ref 65–140)
GLUCOSE SERPL-MCNC: 153 MG/DL (ref 65–140)
GLUCOSE SERPL-MCNC: 169 MG/DL (ref 65–140)
GLUCOSE SERPL-MCNC: 86 MG/DL (ref 65–140)
GLUCOSE SERPL-MCNC: 88 MG/DL (ref 65–140)
GLUCOSE SERPL-MCNC: 97 MG/DL (ref 65–140)
HCO3 BLDV-SCNC: 20.3 MMOL/L (ref 24–30)
HCT VFR BLD AUTO: 29 % (ref 34.8–46.1)
HGB BLD-MCNC: 10.5 G/DL (ref 11.5–15.4)
INR PPP: 4.35 (ref 0.84–1.19)
LG PLATELETS BLD QL SMEAR: PRESENT
LYMPHOCYTES # BLD AUTO: 0.53 THOUSAND/UL (ref 0.6–4.47)
LYMPHOCYTES # BLD AUTO: 2 % (ref 14–44)
MAGNESIUM SERPL-MCNC: 2.3 MG/DL (ref 1.6–2.6)
MCH RBC QN AUTO: 35.8 PG (ref 26.8–34.3)
MCHC RBC AUTO-ENTMCNC: 36.2 G/DL (ref 31.4–37.4)
MCV RBC AUTO: 99 FL (ref 82–98)
METAMYELOCYTES NFR BLD MANUAL: 1 % (ref 0–1)
MONOCYTES # BLD AUTO: 1.85 THOUSAND/UL (ref 0–1.22)
MONOCYTES NFR BLD: 7 % (ref 4–12)
NASAL CANNULA: 2
NEUTROPHILS # BLD MANUAL: 23.78 THOUSAND/UL (ref 1.85–7.62)
NEUTS BAND NFR BLD MANUAL: 1 % (ref 0–8)
NEUTS SEG NFR BLD AUTO: 89 % (ref 43–75)
O2 CT BLDV-SCNC: 15.7 ML/DL
PCO2 BLD: 34.1 MM HG (ref 42–50)
PCO2 BLDV: 33.5 MM HG (ref 42–50)
PH BLD: 7.39 [PH] (ref 7.3–7.4)
PH BLDV: 7.4 [PH] (ref 7.3–7.4)
PLATELET # BLD AUTO: 208 THOUSANDS/UL (ref 149–390)
PLATELET BLD QL SMEAR: ADEQUATE
PMV BLD AUTO: 10.9 FL (ref 8.9–12.7)
PO2 BLDV: 102.6 MM HG (ref 35–45)
PO2 VENOUS TEMP CORRECTED: 105 MM HG (ref 35–45)
POTASSIUM SERPL-SCNC: 3.7 MMOL/L (ref 3.5–5.3)
POTASSIUM SERPL-SCNC: 3.8 MMOL/L (ref 3.5–5.3)
POTASSIUM SERPL-SCNC: 4.1 MMOL/L (ref 3.5–5.3)
PROT SERPL-MCNC: 5.6 G/DL (ref 6.4–8.2)
PROTHROMBIN TIME: 40.3 SECONDS (ref 11.6–14.5)
RBC # BLD AUTO: 2.93 MILLION/UL (ref 3.81–5.12)
RBC MORPH BLD: NORMAL
RH BLD: POSITIVE
SMUDGE CELLS BLD QL SMEAR: PRESENT
SODIUM SERPL-SCNC: 133 MMOL/L (ref 136–145)
SODIUM SERPL-SCNC: 135 MMOL/L (ref 136–145)
SPECIMEN EXPIRATION DATE: NORMAL
T4 FREE SERPL-MCNC: 1.07 NG/DL (ref 0.76–1.46)
TOXIC GRANULES BLD QL SMEAR: PRESENT
WBC # BLD AUTO: 26.42 THOUSAND/UL (ref 4.31–10.16)

## 2022-03-26 PROCEDURE — 85007 BL SMEAR W/DIFF WBC COUNT: CPT | Performed by: PHYSICIAN ASSISTANT

## 2022-03-26 PROCEDURE — P9017 PLASMA 1 DONOR FRZ W/IN 8 HR: HCPCS

## 2022-03-26 PROCEDURE — 82948 REAGENT STRIP/BLOOD GLUCOSE: CPT

## 2022-03-26 PROCEDURE — 30233K1 TRANSFUSION OF NONAUTOLOGOUS FROZEN PLASMA INTO PERIPHERAL VEIN, PERCUTANEOUS APPROACH: ICD-10-PCS | Performed by: STUDENT IN AN ORGANIZED HEALTH CARE EDUCATION/TRAINING PROGRAM

## 2022-03-26 PROCEDURE — 73700 CT LOWER EXTREMITY W/O DYE: CPT

## 2022-03-26 PROCEDURE — 02HV33Z INSERTION OF INFUSION DEVICE INTO SUPERIOR VENA CAVA, PERCUTANEOUS APPROACH: ICD-10-PCS | Performed by: STUDENT IN AN ORGANIZED HEALTH CARE EDUCATION/TRAINING PROGRAM

## 2022-03-26 PROCEDURE — 94760 N-INVAS EAR/PLS OXIMETRY 1: CPT

## 2022-03-26 PROCEDURE — 80048 BASIC METABOLIC PNL TOTAL CA: CPT | Performed by: PHYSICIAN ASSISTANT

## 2022-03-26 PROCEDURE — 36556 INSERT NON-TUNNEL CV CATH: CPT | Performed by: STUDENT IN AN ORGANIZED HEALTH CARE EDUCATION/TRAINING PROGRAM

## 2022-03-26 PROCEDURE — 82553 CREATINE MB FRACTION: CPT | Performed by: NURSE PRACTITIONER

## 2022-03-26 PROCEDURE — 85027 COMPLETE CBC AUTOMATED: CPT | Performed by: PHYSICIAN ASSISTANT

## 2022-03-26 PROCEDURE — 84484 ASSAY OF TROPONIN QUANT: CPT | Performed by: PHYSICIAN ASSISTANT

## 2022-03-26 PROCEDURE — 82550 ASSAY OF CK (CPK): CPT | Performed by: NURSE PRACTITIONER

## 2022-03-26 PROCEDURE — 82805 BLOOD GASES W/O2 SATURATION: CPT | Performed by: PHYSICIAN ASSISTANT

## 2022-03-26 PROCEDURE — 86901 BLOOD TYPING SEROLOGIC RH(D): CPT | Performed by: PHYSICIAN ASSISTANT

## 2022-03-26 PROCEDURE — 83735 ASSAY OF MAGNESIUM: CPT | Performed by: NURSE PRACTITIONER

## 2022-03-26 PROCEDURE — 94660 CPAP INITIATION&MGMT: CPT

## 2022-03-26 PROCEDURE — 87081 CULTURE SCREEN ONLY: CPT | Performed by: PHYSICIAN ASSISTANT

## 2022-03-26 PROCEDURE — 80053 COMPREHEN METABOLIC PANEL: CPT | Performed by: NURSE PRACTITIONER

## 2022-03-26 PROCEDURE — 99292 CRITICAL CARE ADDL 30 MIN: CPT | Performed by: STUDENT IN AN ORGANIZED HEALTH CARE EDUCATION/TRAINING PROGRAM

## 2022-03-26 PROCEDURE — 85610 PROTHROMBIN TIME: CPT | Performed by: NURSE PRACTITIONER

## 2022-03-26 PROCEDURE — 71045 X-RAY EXAM CHEST 1 VIEW: CPT

## 2022-03-26 PROCEDURE — 85384 FIBRINOGEN ACTIVITY: CPT | Performed by: PHYSICIAN ASSISTANT

## 2022-03-26 PROCEDURE — 99291 CRITICAL CARE FIRST HOUR: CPT | Performed by: PHYSICIAN ASSISTANT

## 2022-03-26 PROCEDURE — 84132 ASSAY OF SERUM POTASSIUM: CPT | Performed by: PHYSICIAN ASSISTANT

## 2022-03-26 PROCEDURE — G1004 CDSM NDSC: HCPCS

## 2022-03-26 PROCEDURE — NC001 PR NO CHARGE: Performed by: PHYSICIAN ASSISTANT

## 2022-03-26 PROCEDURE — 86850 RBC ANTIBODY SCREEN: CPT | Performed by: PHYSICIAN ASSISTANT

## 2022-03-26 PROCEDURE — 86900 BLOOD TYPING SEROLOGIC ABO: CPT | Performed by: PHYSICIAN ASSISTANT

## 2022-03-26 RX ORDER — VANCOMYCIN HYDROCHLORIDE 1 G/200ML
12.5 INJECTION, SOLUTION INTRAVENOUS DAILY PRN
Status: DISCONTINUED | OUTPATIENT
Start: 2022-03-26 | End: 2022-03-26

## 2022-03-26 RX ORDER — POTASSIUM CHLORIDE 14.9 MG/ML
20 INJECTION INTRAVENOUS ONCE
Status: COMPLETED | OUTPATIENT
Start: 2022-03-26 | End: 2022-03-26

## 2022-03-26 RX ORDER — ALBUMIN, HUMAN INJ 5% 5 %
12.5 SOLUTION INTRAVENOUS ONCE
Status: DISCONTINUED | OUTPATIENT
Start: 2022-03-26 | End: 2022-03-27

## 2022-03-26 RX ORDER — ALBUMIN, HUMAN INJ 5% 5 %
25 SOLUTION INTRAVENOUS ONCE
Status: COMPLETED | OUTPATIENT
Start: 2022-03-26 | End: 2022-03-26

## 2022-03-26 RX ORDER — SODIUM CHLORIDE, SODIUM GLUCONATE, SODIUM ACETATE, POTASSIUM CHLORIDE, MAGNESIUM CHLORIDE, SODIUM PHOSPHATE, DIBASIC, AND POTASSIUM PHOSPHATE .53; .5; .37; .037; .03; .012; .00082 G/100ML; G/100ML; G/100ML; G/100ML; G/100ML; G/100ML; G/100ML
125 INJECTION, SOLUTION INTRAVENOUS CONTINUOUS
Status: DISCONTINUED | OUTPATIENT
Start: 2022-03-26 | End: 2022-03-26

## 2022-03-26 RX ORDER — FUROSEMIDE 10 MG/ML
20 INJECTION INTRAMUSCULAR; INTRAVENOUS ONCE
Status: COMPLETED | OUTPATIENT
Start: 2022-03-26 | End: 2022-03-26

## 2022-03-26 RX ORDER — FUROSEMIDE 10 MG/ML
40 INJECTION INTRAMUSCULAR; INTRAVENOUS ONCE
Status: COMPLETED | OUTPATIENT
Start: 2022-03-26 | End: 2022-03-26

## 2022-03-26 RX ORDER — METOPROLOL TARTRATE 50 MG/1
50 TABLET, FILM COATED ORAL EVERY 12 HOURS SCHEDULED
Status: DISCONTINUED | OUTPATIENT
Start: 2022-03-26 | End: 2022-03-29

## 2022-03-26 RX ORDER — SODIUM CHLORIDE, SODIUM GLUCONATE, SODIUM ACETATE, POTASSIUM CHLORIDE, MAGNESIUM CHLORIDE, SODIUM PHOSPHATE, DIBASIC, AND POTASSIUM PHOSPHATE .53; .5; .37; .037; .03; .012; .00082 G/100ML; G/100ML; G/100ML; G/100ML; G/100ML; G/100ML; G/100ML
1000 INJECTION, SOLUTION INTRAVENOUS ONCE
Status: COMPLETED | OUTPATIENT
Start: 2022-03-26 | End: 2022-03-26

## 2022-03-26 RX ORDER — VANCOMYCIN HYDROCHLORIDE 1 G/200ML
12.5 INJECTION, SOLUTION INTRAVENOUS EVERY 24 HOURS
Status: DISCONTINUED | OUTPATIENT
Start: 2022-03-27 | End: 2022-03-27

## 2022-03-26 RX ORDER — POTASSIUM CHLORIDE 14.9 MG/ML
20 INJECTION INTRAVENOUS ONCE
Status: COMPLETED | OUTPATIENT
Start: 2022-03-26 | End: 2022-03-27

## 2022-03-26 RX ORDER — CEFEPIME HYDROCHLORIDE 1 G/50ML
1000 INJECTION, SOLUTION INTRAVENOUS EVERY 12 HOURS
Status: DISCONTINUED | OUTPATIENT
Start: 2022-03-26 | End: 2022-03-29

## 2022-03-26 RX ADMIN — VASOPRESSIN 0.04 UNITS/MIN: 20 INJECTION INTRAVENOUS at 07:21

## 2022-03-26 RX ADMIN — METOPROLOL TARTRATE 50 MG: 50 TABLET, FILM COATED ORAL at 20:16

## 2022-03-26 RX ADMIN — POTASSIUM CHLORIDE 20 MEQ: 14.9 INJECTION, SOLUTION INTRAVENOUS at 23:42

## 2022-03-26 RX ADMIN — CEFEPIME HYDROCHLORIDE 1000 MG: 1 INJECTION, SOLUTION INTRAVENOUS at 18:41

## 2022-03-26 RX ADMIN — SODIUM CHLORIDE, SODIUM GLUCONATE, SODIUM ACETATE, POTASSIUM CHLORIDE, MAGNESIUM CHLORIDE, SODIUM PHOSPHATE, DIBASIC, AND POTASSIUM PHOSPHATE 1000 ML: .53; .5; .37; .037; .03; .012; .00082 INJECTION, SOLUTION INTRAVENOUS at 00:31

## 2022-03-26 RX ADMIN — LEVOTHYROXINE SODIUM 150 MCG: 150 TABLET ORAL at 05:03

## 2022-03-26 RX ADMIN — CEFEPIME HYDROCHLORIDE 1000 MG: 1 INJECTION, SOLUTION INTRAVENOUS at 07:33

## 2022-03-26 RX ADMIN — FUROSEMIDE 40 MG: 10 INJECTION, SOLUTION INTRAMUSCULAR; INTRAVENOUS at 22:16

## 2022-03-26 RX ADMIN — INSULIN LISPRO 2 UNITS: 100 INJECTION, SOLUTION INTRAVENOUS; SUBCUTANEOUS at 22:20

## 2022-03-26 RX ADMIN — BACITRACIN 1 LARGE APPLICATION: 500 OINTMENT TOPICAL at 09:29

## 2022-03-26 RX ADMIN — NOREPINEPHRINE BITARTRATE 8 MCG/MIN: 1 INJECTION INTRAVENOUS at 03:10

## 2022-03-26 RX ADMIN — VASOPRESSIN 0.04 UNITS/MIN: 20 INJECTION INTRAVENOUS at 11:35

## 2022-03-26 RX ADMIN — NOREPINEPHRINE BITARTRATE 20 MCG/MIN: 1 INJECTION INTRAVENOUS at 13:43

## 2022-03-26 RX ADMIN — METOPROLOL TARTRATE 50 MG: 50 TABLET, FILM COATED ORAL at 09:26

## 2022-03-26 RX ADMIN — NOREPINEPHRINE BITARTRATE 16 MCG/MIN: 1 INJECTION INTRAVENOUS at 11:37

## 2022-03-26 RX ADMIN — FUROSEMIDE 20 MG: 10 INJECTION, SOLUTION INTRAMUSCULAR; INTRAVENOUS at 13:48

## 2022-03-26 RX ADMIN — BACITRACIN 1 LARGE APPLICATION: 500 OINTMENT TOPICAL at 18:38

## 2022-03-26 RX ADMIN — VASOPRESSIN 0.04 UNITS/MIN: 20 INJECTION INTRAVENOUS at 20:16

## 2022-03-26 RX ADMIN — SODIUM CHLORIDE, SODIUM GLUCONATE, SODIUM ACETATE, POTASSIUM CHLORIDE, MAGNESIUM CHLORIDE, SODIUM PHOSPHATE, DIBASIC, AND POTASSIUM PHOSPHATE 125 ML/HR: .53; .5; .37; .037; .03; .012; .00082 INJECTION, SOLUTION INTRAVENOUS at 03:40

## 2022-03-26 RX ADMIN — VANCOMYCIN HYDROCHLORIDE 1250 MG: 5 INJECTION, POWDER, LYOPHILIZED, FOR SOLUTION INTRAVENOUS at 07:27

## 2022-03-26 RX ADMIN — POTASSIUM CHLORIDE 20 MEQ: 14.9 INJECTION, SOLUTION INTRAVENOUS at 22:27

## 2022-03-26 RX ADMIN — ALBUMIN (HUMAN) 25 G: 12.5 INJECTION, SOLUTION INTRAVENOUS at 03:50

## 2022-03-26 RX ADMIN — INSULIN LISPRO 2 UNITS: 100 INJECTION, SOLUTION INTRAVENOUS; SUBCUTANEOUS at 12:26

## 2022-03-26 RX ADMIN — NOREPINEPHRINE BITARTRATE 20 MCG/MIN: 1 INJECTION INTRAVENOUS at 07:21

## 2022-03-26 NOTE — ASSESSMENT & PLAN NOTE
· AFib RVR with heart rate in 160s on presentation  · Patient follows with [facility] cardiology  · Patient's home metoprolol and Coumadin held  Coumadin held secondary to coagulopathy with INR greater than 11   · Heart rate improving with fluid resuscitation  With improvement in BP would resume metoprolol  · TSH 0 301  · Troponin peaked at 109 and trending down  · BNP > 8900  · Check ECHO  · Persistent afib with RVR despite Lopressor IV and PO  Rates improved to 120s from 150s

## 2022-03-26 NOTE — PROGRESS NOTES
Vancomycin Assessment    Charla Miles is a 80 y o  female who is currently receiving vancomycin 1000mg daily PRN if  random level 20 for bacteremia,skin-soft tissue infection     Relevant clinical data and objective history reviewed:  Creatinine   Date Value Ref Range Status   03/26/2022 1 81 (H) 0 60 - 1 30 mg/dL Final     Comment:     Specimen Icteric; Results May be Affected   03/25/2022 2 37 (H) 0 60 - 1 30 mg/dL Final     Comment:     Specimen Icteric; Results May be Affected   11/14/2021 0 97 0 60 - 1 30 mg/dL Final     Comment:     Standardized to IDMS reference method     /55   Pulse (!) 116   Temp 99 5 °F (37 5 °C)   Resp (!) 24   Ht 5' 2" (1 575 m)   Wt 118 kg (260 lb 5 8 oz)   SpO2 96%   BMI 47 62 kg/m²   I/O last 3 completed shifts: In: 1800 [IV Piggyback:1800]  Out: 150 [Urine:150]  Lab Results   Component Value Date/Time    BUN 62 (H) 03/26/2022 05:03 AM    WBC 26 42 (H) 03/26/2022 05:03 AM    HGB 10 5 (L) 03/26/2022 05:03 AM    HCT 29 0 (L) 03/26/2022 05:03 AM    MCV 99 (H) 03/26/2022 05:03 AM     03/26/2022 05:03 AM     Temp Readings from Last 3 Encounters:   03/26/22 99 5 °F (37 5 °C)   11/14/21 99 4 °F (37 4 °C) (Temporal)     Vancomycin Days of Therapy: 1    Assessment/Plan  The patient is currently on vancomycin utilizing pulse dosing  Baseline risks associated with therapy include: pre-existing renal impairment and advanced age  The patient is receiving 1000mg daily PRN if  random level 20 for a goal level of 15-20 (appropriate for most indications)  Pharmacy will continue to follow closely for s/sx of nephrotoxicity, infusion reactions, and appropriateness of therapy  BMP and CBC will be ordered per protocol  Plan for random with AM lab at 0600 on 3/27/22  Pharmacy will continue to follow the patients culture results and clinical progress daily      Speedy Young, Pharmacist, PharmD, BCPS

## 2022-03-26 NOTE — ASSESSMENT & PLAN NOTE
· Patient is maintained on Coumadin for atrial fib outpatient  · INR 11 78 at time of admission  · Hold Coumadin and trend INR  · Vit K 10 mg IV x1 in ED  · Repeat INR 7 6 Vit K 10 mg PO x1   · 1 unit FFP  · 3/27: INR 2 01  · CT facial bones and head revealed Anterolateral right frontal scalp hematoma without acute traumatic injury to the facial bones     · Trauma CT scans of CT chest/abdomen/pelvis negative for hematoma or injury  · Monitor H/H and platelets  · Continue to monitor for further bleeding  · Would repeat CT scan for acute changes

## 2022-03-26 NOTE — RESPIRATORY THERAPY NOTE
Paged to patient's room by nurse for hours of sleep CPAP  Patient had bruising around her eyes and a larger hematoma above her right eye  Discussed bruising with critical care LILIAN QUINTANA felt if patient was comfortable wearing mask the benefit outweighed the risk    Patient deneid any discomfort when mask was applied

## 2022-03-26 NOTE — PROCEDURES
Arterial Line Insertion    Date/Time: 3/25/2022 11:20 PM  Performed by: Kathleen Rodríguez PA-C  Authorized by: Kathleen Rodríguez PA-C     Patient location:  ICU  Consent:     Consent obtained:  Written    Consent given by:  Patient    Risks discussed:  Bleeding, ischemia, pain, infection and repeat procedure  Universal protocol:     Procedure explained and questions answered to patient or proxy's satisfaction: yes      Relevant documents present and verified: yes      Required blood products, implants, devices, and special equipment available: yes      Site/side marked: yes      Immediately prior to procedure a time out was called: yes      Patient identity confirmed:  Verbally with patient and arm band  Indications:     Indications: hemodynamic monitoring and continuous blood pressure monitoring    Pre-procedure details:     Skin preparation:  Chlorhexidine    Preparation: Patient was prepped and draped in sterile fashion    Anesthesia (see MAR for exact dosages): Anesthesia method:  Local infiltration    Local anesthetic:  Lidocaine 1% w/o epi  Procedure details:     Location / Tip of Catheter:  Radial    Laterality:  Right    Needle gauge:  20 G    Placement technique:  Percutaneous    Number of attempts:  1    Successful placement: yes      Transducer: waveform confirmed    Post-procedure details:     Post-procedure:  Biopatch applied, secured with tape, sterile dressing applied, sutured and wrist guard applied    CMS:  Normal    Patient tolerance of procedure:   Tolerated well, no immediate complications

## 2022-03-26 NOTE — PROGRESS NOTES
[address]  Progress Note - Cecil Camp 1940, 80 y o  female MRN: [id number]  Unit/Bed#: -01 Encounter: [id number]  Primary Care Provider: Day Todd MD   Date and time admitted to hospital: 3/25/2022  3:02 PM    * Septic shock [facility])  Assessment & Plan  Background:  Patient found down on floor by daughter  Patient is alert and oriented however does not entirely recall events of suspected fall  Patient does state that she believes she may have fell on a chair however daughter stated that she was found in the living room next to a cabinet  Patient noted to have multiple injuries and areas of ecchymosis  Trauma alert called from the ED  Patient met sepsis criteria on admission  · Meets criteria as evidence by heart rate 160s, respiratory rate 20-25, WBCs 30 and LEONARDO present on admission  · Source likely urinary +/- bacteremia  UA showed small leukocytes, no nitrates, WBCs 10-20 and innumerable bacteria  Culture pending  · Blood culture sent x2  · Initial lactic acid 3 3  Lactic acidosis cleared  · Started on ceftriaxone in ED  Will continue Q 24 hours  · Initially fluid resuscitated with 1 75 L of lactated Ringer's which was based on ideal body weight due to BMI of 57 66  Patient remained hypotensive and therefore was given an additional 1 25 L  Norepinephrine ordered however not started at this time because blood pressure improved during 3 L fluid boluses  · Discussed with patient central line and arterial line placements  Patient would be agreeable if indicated  · CT the chest/abdomen/pelvis: No findings of acute thoracic or abdominopelvic injury  Small right and trace left pleural effusions are new since November 14, 2021  Persistent findings of hepatic cirrhosis new trace perihepatic ascites  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    Recommendation is for follow-up low radiation dose chest CT in one year   · Of note patient also had elevated LFTs on admission  Patient reports no history of liver disease however labs reviewed in Care everywhere reveal previous elevation LFTs  Also now with elevated INR of 11 78    · 3/25 persistent hypotension despite fluid bolus'  Levophed started, arterial line and CVC inserted  Coagulopathy ([address] )  Assessment & Plan  · Patient is maintained on Coumadin for atrial fib outpatient  · INR 11 78 at time of admission  · Hold Coumadin and trend INR  · Vit K 10 mg IV x1 in ED  · Repeat INR 7 6 Vit K 10 mg PO x1   · CT facial bones and head revealed Anterolateral right frontal scalp hematoma without acute traumatic injury to the facial bones  · Trauma CT scans of CT chest/abdomen/pelvis negative for hematoma or injury  · Monitor H/H and platelets  · Continue to monitor for further bleeding  · Would repeat CT scan for acute changes    HLD (hyperlipidemia)  Assessment & Plan  · Hold statin at this time due to transaminitis    Diabetes mellitus [facility])  Assessment & Plan  Lab Results   Component Value Date    HGBA1C 5 5 01/31/2018       Recent Labs     03/25/22  1830 03/25/22  2155 03/26/22  0141   POCGLU 91 79 97       Blood Sugar Average: Last 72 hrs:  (P) 91   · AC/HS Accu-Cheks with sliding scale insulin    Hypothyroidism  Assessment & Plan  · Continue home Synthroid dosage  · TSH 0 301    Impaired skin integrity  Assessment & Plan  · Multiple areas of impaired skin integrity present at time of admission likely secondary to fall in the setting of supratherapeutic INR on Coumadin  · Ecchymosis noted in albert orbital region, forehead, chest, abdomen and extremities  · Supportive care    Hematoma  Assessment & Plan  · CT of the head revealed Anterolateral right frontal scalp hematoma without acute traumatic injury to the facial bones  · Hematoma noted at right brow  Continue to monitor closely  Fall  Assessment & Plan  · Suspected fall as patient was found down by daughter  Unknown time down however last time patient was seen or heard from was approximately 4 days ago by daughter  Patient states she does not recall all of the events but does report that she fell  · Trauma alert in ED  Right frontal scalp hematoma noted on imaging  Multiple areas of ecchymosis over entire body including bilateral periorbital areas, chest, abdomen and extremities  · PT/OT consult placed    Elevated CK  Assessment & Plan  · CK elevated on admission at 534 Likely secondary to fall  · Patient currently receiving fluid resuscitation for septic shock  · Repeat CK in a m  Atrial fibrillation with RVR (HCC)  Assessment & Plan  · AFib RVR with heart rate in 160s on presentation  · Patient follows with [facility] cardiology  · Patient's home metoprolol and Coumadin held  Coumadin held secondary to coagulopathy with INR greater than 11   · Heart rate improving with fluid resuscitation  With improvement in BP would resume metoprolol  · TSH 0 301  · Troponin peaked at 109 and trending down  · BNP > 8900  · Check ECHO  · Persistent afib with RVR despite Lopressor IV and PO  Rates improved to 120s from 150s  Transaminitis  Assessment & Plan  · LFTs elevated at time of admission:  AST 66, ALT 44, , T bili 4 26  · Labs reviewed in Care everywhere and showed that LFTs were elevated on 01/26/22:  AST 87, ALT 42, , T bili 2  · Patient states she has no known liver disease  · Continue to trend LFTs  · Hold statin  · Right upper quadrant ultrasound: results pending    LEONARDO (acute kidney injury) ([facility] [address] )  Assessment & Plan  · Likely secondary to septic shock  · Baseline creatinine 0 8-1 1   · Creatinine 2 3 on presentation  · Ramires catheter placed in ED  Will maintain for accurate I&O at this time  · Urine output improving with fluid resuscitation    · Monitor BUN and creatinine  · Strict I&Os  · Avoid nephrotoxins and hypotension      ----------------------------------------------------------------------------------------  HPI/24hr events: Afib with RVR persisted despite fluids, lopressor IV and lopressor PO  Hypotension presented last in evening of 3/25 requiring vasopressor support  Additional fluid bolus' were administered  BP responsive during fluid bolus' but hypotension persisted after fluids ended  An arterial line and right CVC were inserted  Patient became disoriented several times overnight but was able to be redirected  Of note, while patient was in reverse trendelenburg, her BP increased significantly  Patient c/o chest pressure with deep breaths one time overnight  The pressure resolved without intervention        Patient appropriate for transfer out of the ICU today?: No  Disposition: Continue Critical Care   Code Status: Level 2 - DNAR: but accepts endotracheal intubation  ---------------------------------------------------------------------------------------    Review of Systems  Review of systems was reviewed and negative unless stated above in HPI/24-hour events   ---------------------------------------------------------------------------------------  OBJECTIVE    Vitals   Vitals:    22 0230 22 0245 22 0300 22 031   BP: 106/57  108/57    BP Location:       Pulse: (!) 120 (!) 118 (!) 122 (!) 123   Resp: (!) 26 (!) 27 (!) 29 (!) 28   Temp:   99 °F (37 2 °C)    TempSrc:       SpO2: 97%  100%    Weight:       Height:         Temp (24hrs), Av 8 °F (36 °C), Min:93 7 °F (34 3 °C), Max:99 °F (37 2 °C)  Current: Temperature: 99 °F (37 2 °C)  Arterial Line BP: 117/47  Arterial Line MAP (mmHg): 67 mmHg    Respiratory:  SpO2: SpO2: 100 %  Nasal Cannula O2 Flow Rate (L/min): 6 L/min    Invasive/non-invasive ventilation settings   Respiratory  Report   Lab Data (Last 4 hours)    None         O2/Vent Data (Last 4 hours)    None                Physical Exam  Constitutional:       General: She is not in acute distress  Appearance: She is obese  She is ill-appearing  She is not diaphoretic  Interventions: Nasal cannula in place  HENT:      Head: Normocephalic  Right Ear: External ear normal       Left Ear: External ear normal       Mouth/Throat:      Pharynx: Oropharynx is clear  Eyes:      General:         Right eye: Discharge present  Left eye: Discharge present  Extraocular Movements: Extraocular movements intact  Pupils: Pupils are equal, round, and reactive to light  Cardiovascular:      Rate and Rhythm: Tachycardia present  Rhythm irregularly irregular  Heart sounds: No friction rub  No gallop  Pulmonary:      Effort: Pulmonary effort is normal  No respiratory distress  Breath sounds: Normal breath sounds  No stridor  No wheezing, rhonchi or rales  Chest:      Chest wall: No tenderness  Abdominal:      General: Bowel sounds are normal  There is no distension  Palpations: Abdomen is soft  There is no mass  Tenderness: There is no abdominal tenderness  There is no guarding or rebound  Musculoskeletal:         General: No tenderness or deformity  Normal range of motion  Cervical back: Normal range of motion and neck supple  No rigidity or tenderness  Right lower leg: No edema  Left lower leg: No edema  Skin:     General: Skin is warm and dry  Capillary Refill: Capillary refill takes less than 2 seconds  Coloration: Skin is not jaundiced or pale  Findings: No rash  Neurological:      General: No focal deficit present  Mental Status: She is alert  GCS: GCS eye subscore is 4  GCS verbal subscore is 5  GCS motor subscore is 6  Sensory: No sensory deficit  Comments: Becomes disoriented to place at times  Easily redirectable                Laboratory and Diagnostics:  Results from last 7 days   Lab Units 03/25/22  1506   WBC Thousand/uL 30 84*   HEMOGLOBIN g/dL 13 7   HEMATOCRIT % 38 6 PLATELETS Thousands/uL 248   MONO PCT % 7     Results from last 7 days   Lab Units 03/25/22  1506   SODIUM mmol/L 134*   POTASSIUM mmol/L 4 6   CHLORIDE mmol/L 99*   CO2 mmol/L 24   ANION GAP mmol/L 11   BUN mg/dL 71*   CREATININE mg/dL 2 37*   CALCIUM mg/dL 8 5   GLUCOSE RANDOM mg/dL 95   ALT U/L 42   AST U/L 66*   ALK PHOS U/L 165*   ALBUMIN g/dL 1 9*   TOTAL BILIRUBIN mg/dL 4 26*     Results from last 7 days   Lab Units 03/25/22 2013   MAGNESIUM mg/dL 2 3      Results from last 7 days   Lab Units 03/25/22 2016 03/25/22  1506   INR  7 91* 11 78*   PTT seconds  --  60*          Results from last 7 days   Lab Units 03/25/22  2317 03/25/22 2013 03/25/22  1819 03/25/22  1618   LACTIC ACID mmol/L 1 8 2 6* 2 9* 3 3*     ABG:    VBG:          Micro  Results from last 7 days   Lab Units 03/25/22  1618 03/25/22  1611   BLOOD CULTURE  Received in Microbiology Lab  Culture in Progress  Received in Microbiology Lab  Culture in Progress  EKG: Afib with RVR  Imaging: I have personally reviewed pertinent films in PACS    Intake and Output  I/O       03/24 0701  03/25 0700 03/25 0701  03/26 0700    P  O   400    I V  (mL/kg)  1565 3 (13 7)    IV Piggyback  3850    Total Intake(mL/kg)  5815 3 (51)    Urine (mL/kg/hr)  435    Stool  0    Total Output  435    Net  +5380 3          Unmeasured Stool Occurrence  1 x          Height and Weights   Height: 5' 2" (157 5 cm)     Body mass index is 45 93 kg/m²  Weight (last 2 days)     Date/Time Weight    03/25/22 1930 114 (251 1)            Nutrition       Diet Orders   (From admission, onward)             Start     Ordered    03/25/22 2025  Diet Mohit/CHO Controlled; Consistent Carbohydrate Diet Level 3 (6 carb servings/90 grams CHO/meal);  Cardiac  Diet effective now        References:    Nutrtion Support Algorithm Enteral vs  Parenteral   Question Answer Comment   Diet Type Mohit/CHO Controlled    Mohit/CHO Controlled Consistent Carbohydrate Diet Level 3 (6 carb servings/90 grams CHO/meal)    Other Restriction(s): Cardiac    RD to adjust diet per protocol? Yes        03/25/22 2025                  Active Medications  Scheduled Meds:  Current Facility-Administered Medications   Medication Dose Route Frequency Provider Last Rate    bacitracin  1 large application Topical BID Andre Harrison PA-C      bacitracin-polymyxin b   Both Eyes TID Ivy Del Rosario PA-C      cefTRIAXone  1,000 mg Intravenous Q24H LEXIE Antony      insulin lispro  2-12 Units Subcutaneous 4x Daily (AC & HS) Ivy Del Rosario PA-C      levothyroxine  150 mcg Oral Early Morning Ivy Del Rosario PA-C      norepinephrine  1-30 mcg/min Intravenous Titrated Ivy Villafana Jr, PA-C 8 mcg/min (03/26/22 0310)     Continuous Infusions:  norepinephrine, 1-30 mcg/min, Last Rate: 8 mcg/min (03/26/22 0310)      PRN Meds:        Invasive Devices Review  Invasive Devices  Report    Central Venous Catheter Line            CVC Central Lines 03/26/22 Triple 16cm <1 day          Peripheral Intravenous Line            Peripheral IV 03/25/22 Left;Proximal;Ventral (anterior) Forearm <1 day    Peripheral IV 03/25/22 Right Antecubital <1 day          Arterial Line            Arterial Line 03/25/22 Right Radial <1 day          Drain            Urethral Catheter Temperature probe <1 day              ---------------------------------------------------------------------------------------  Advance Directive and Living Will:      Power of :    POLST:    ---------------------------------------------------------------------------------------  Care Time Delivered:   Upon my evaluation, this patient had a high probability of imminent or life-threatening deterioration due to septic shock, which required my direct attention, intervention, and personal management    I have personally provided 40 minutes (0200 to 0240) of critical care time, exclusive of procedures, teaching, family meetings, and any prior time recorded by providers other than myself  [facility], PA-C      Portions of the record may have been created with voice recognition software  Occasional wrong word or "sound a like" substitutions may have occurred due to the inherent limitations of voice recognition software    Read the chart carefully and recognize, using context, where substitutions have occurred

## 2022-03-26 NOTE — ASSESSMENT & PLAN NOTE
· LFTs elevated at time of admission:  AST 66, ALT 44, , T bili 4 26  · Labs reviewed in Care everywhere and showed that LFTs were elevated on 01/26/22:  AST 87, ALT 42, , T bili 2  · Patient states she has no known liver disease    · Continue to trend LFTs  · Hold statin  · Right upper quadrant ultrasound: results pending

## 2022-03-26 NOTE — ASSESSMENT & PLAN NOTE
Background:  Patient found down on floor by daughter  Patient is alert and oriented however does not entirely recall events of suspected fall  Patient does state that she believes she may have fell on a chair however daughter stated that she was found in the living room next to a cabinet  Patient noted to have multiple injuries and areas of ecchymosis  Trauma alert called from the ED  Patient met sepsis criteria on admission  · Meets criteria as evidence by heart rate 160s, respiratory rate 20-25, WBCs 30 and LEONARDO present on admission  · Source likely urinary vs cellulitis right thigh +/- bacteremia  UA showed small leukocytes, no nitrates, WBCs 10-20 and innumerable bacteria  Culture: Gram negative mark enteric like  Sensitivity pending  · Blood culture x2: 1/2 positive for GPC in clusters  · Initial lactic acid 3 3  Lactic acidosis cleared  · Started on ceftriaxone in ED, but transitioned to Vanco/Cefepime 3/26 due to worsening clinical status  · MRSA swab sent  · Initially fluid resuscitated with 1 75 L of lactated Ringer's which was based on ideal body weight due to BMI of 57 66  Patient remained hypotensive and therefore was given an additional 1 25 L  Norepinephrine ordered however not started at this time because blood pressure improved during 3 L fluid boluses  · Discussed with patient central line and arterial line placements  Patient would be agreeable if indicated  · CT the chest/abdomen/pelvis: No findings of acute thoracic or abdominopelvic injury  Small right and trace left pleural effusions are new since November 14, 2021  Persistent findings of hepatic cirrhosis new trace perihepatic ascites  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  Recommendation is for follow-up low radiation dose chest CT in one year  · Of note patient also had elevated LFTs on admission    Patient reports no history of liver disease however labs reviewed in Care everywhere reveal previous elevation LFTs  Also now with elevated INR of 11 78    · 3/25 persistent hypotension despite fluid bolus'  Levophed started, arterial line and CVC inserted    · 3/26 Vasopressin added to due escalating dose of levophed  · "Insect bite" right thigh with cellulitis, appears to be worsening, concern for abscess vs necrotizing infection- CT leg ordered and General Surgery consulted  · CT showed: Diffuse subcutaneous edema and overlying skin thickening most prominent about the medial aspect of the mid/distal thigh with superficial and deep subcutaneous fluid #3/169 appears nonloculated without a surrounding rind however abscess cannot be excluded   without IV contrast    · Surgery will continue to follow- no need for surgery at this time

## 2022-03-26 NOTE — ASSESSMENT & PLAN NOTE
· Patient is maintained on Coumadin for atrial fib outpatient  · INR 11 78 at time of admission  · Hold Coumadin and trend INR  · Vit K 10 mg IV x1 in ED  · Repeat INR 7 6 Vit K 10 mg PO x1   · CT facial bones and head revealed Anterolateral right frontal scalp hematoma without acute traumatic injury to the facial bones     · Trauma CT scans of CT chest/abdomen/pelvis negative for hematoma or injury  · Monitor H/H and platelets  · Continue to monitor for further bleeding  · Would repeat CT scan for acute changes

## 2022-03-26 NOTE — PROGRESS NOTES
Vancomycin IV Pharmacy-to-Dose Consultation    Jd Haynes is a 80 y o  female who is currently receiving Vancomycin IV with management by the Pharmacy Consult service  Assessment/Plan:    Day # 1    The patient was reviewed and the current dose of 1000mg daily PRN if random level 20 will be changed to 1000 mg IV every 24 hours  Dose will be adjusted accordingly based on renal fxn  Next scheduled trough to be drawn prior to 4th dose on 3/29/22 @ 0700  We will continue to follow the patients culture results and clinical progress daily       Daniel Kelsey, Pharmacist

## 2022-03-26 NOTE — PLAN OF CARE
Pt admitted yesterday after being found on floor  Pt being treated for sepsis  Pt's continued with hypotension  Pt given multiple boluses of isolyte and given total of 1500ml of albumin  Pt started on levophed and dose titrated up to 15 mcg/min  [place] and right IJ TFC placed during this shift  Pt was AxOx4 at beginnning of shift but as of 0130, pt confused to place, time, and situation  Pt having visual hallucinations of cats, dogs, and people in room  Pt needing frequent reorientation to place  Pt with multiple wounds on admit  O2 sat high 90's on 2L when awake but when she falls asleep her appears to have sleep apnea and sats down to low 80's then back up to 90's  Labs drawn and results pending  Problem: Nutrition/Hydration-ADULT  Goal: Nutrient/Hydration intake appropriate for improving, restoring or maintaining nutritional needs  Description: Monitor and assess patient's nutrition/hydration status for malnutrition  Collaborate with interdisciplinary team and initiate plan and interventions as ordered  Monitor patient's weight and dietary intake as ordered or per policy  Utilize nutrition screening tool and intervene as necessary  Determine patient's food preferences and provide high-protein, high-caloric foods as appropriate       INTERVENTIONS:  - Monitor oral intake, urinary output, labs, and treatment plans  - Assess nutrition and hydration status and recommend course of action  - Evaluate amount of meals eaten  - Assist patient with eating if necessary   - Allow adequate time for meals  - Recommend/ encourage appropriate diets, oral nutritional supplements, and vitamin/mineral supplements  - Order, calculate, and assess calorie counts as needed  - Recommend, monitor, and adjust tube feedings and TPN/PPN based on assessed needs  - Assess need for intravenous fluids  - Provide specific nutrition/hydration education as appropriate  - Include patient/family/caregiver in decisions related to nutrition  Outcome: Progressing     Problem: Prexisting or High Potential for Compromised Skin Integrity  Goal: Skin integrity is maintained or improved  Description: INTERVENTIONS:  - Identify patients at risk for skin breakdown  - Assess and monitor skin integrity  - Assess and monitor nutrition and hydration status  - Monitor labs   - Assess for incontinence   - Turn and reposition patient  - Assist with mobility/ambulation  - Relieve pressure over bony prominences  - Avoid friction and shearing  - Provide appropriate hygiene as needed including keeping skin clean and dry  - Evaluate need for skin moisturizer/barrier cream  - Collaborate with interdisciplinary team   - Patient/family teaching  - Consider wound care consult   Outcome: Progressing     Problem: MOBILITY - ADULT  Goal: Maintain or return to baseline ADL function  Description: INTERVENTIONS:  -  Assess patient's ability to carry out ADLs; assess patient's baseline for ADL function and identify physical deficits which impact ability to perform ADLs (bathing, care of mouth/teeth, toileting, grooming, dressing, etc )  - Assess/evaluate cause of self-care deficits   - Assess range of motion  - Assess patient's mobility; develop plan if impaired  - Assess patient's need for assistive devices and provide as appropriate  - Encourage maximum independence but intervene and supervise when necessary  - Involve family in performance of ADLs  - Assess for home care needs following discharge   - Consider OT consult to assist with ADL evaluation and planning for discharge  - Provide patient education as appropriate  Outcome: Progressing  Goal: Maintains/Returns to pre admission functional level  Description: INTERVENTIONS:  - Perform BMAT or MOVE assessment daily    - Set and communicate daily mobility goal to care team and patient/family/caregiver     - Collaborate with rehabilitation services on mobility goals if consulted  - Perform Range of Motion 3 times a day   - Reposition patient every 2 hours    - Record patient progress and toleration of activity level   Outcome: Progressing     Problem: PAIN - ADULT  Goal: Verbalizes/displays adequate comfort level or baseline comfort level  Description: Interventions:  - Encourage patient to monitor pain and request assistance  - Assess pain using appropriate pain scale  - Administer analgesics based on type and severity of pain and evaluate response  - Implement non-pharmacological measures as appropriate and evaluate response  - Consider cultural and social influences on pain and pain management  - Notify physician/advanced practitioner if interventions unsuccessful or patient reports new pain  Outcome: Progressing     Problem: INFECTION - ADULT  Goal: Absence or prevention of progression during hospitalization  Description: INTERVENTIONS:  - Assess and monitor for signs and symptoms of infection  - Monitor lab/diagnostic results  - Monitor all insertion sites, i e  indwelling lines, tubes, and drains  - Monitor endotracheal if appropriate and nasal secretions for changes in amount and color  - [place] appropriate cooling/warming therapies per order  - Administer medications as ordered  - Instruct and encourage patient and family to use good hand hygiene technique  - Identify and instruct in appropriate isolation precautions for identified infection/condition  Outcome: Progressing     Problem: SAFETY ADULT  Goal: Maintain or return to baseline ADL function  Description: INTERVENTIONS:  -  Assess patient's ability to carry out ADLs; assess patient's baseline for ADL function and identify physical deficits which impact ability to perform ADLs (bathing, care of mouth/teeth, toileting, grooming, dressing, etc )  - Assess/evaluate cause of self-care deficits   - Assess range of motion  - Assess patient's mobility; develop plan if impaired  - Assess patient's need for assistive devices and provide as appropriate  - Encourage maximum independence but intervene and supervise when necessary  - Involve family in performance of ADLs  - Assess for home care needs following discharge   - Consider OT consult to assist with ADL evaluation and planning for discharge  - Provide patient education as appropriate  Outcome: Progressing  Goal: Maintains/Returns to pre admission functional level  Description: INTERVENTIONS:  - Perform BMAT or MOVE assessment daily    - Set and communicate daily mobility goal to care team and patient/family/caregiver  - Collaborate with rehabilitation services on mobility goals if consulted  - Perform Range of Motion 3 times a day  - Reposition patient every 2 hours    - Record patient progress and toleration of activity level   Outcome: Progressing  Goal: Patient will remain free of falls  Description: INTERVENTIONS:  - Educate patient/family on patient safety including physical limitations  - Instruct patient to call for assistance with activity   - Consult OT/PT to assist with strengthening/mobility   - Keep Call bell within reach  - Keep bed low and locked with side rails adjusted as appropriate  - Keep care items and personal belongings within reach  - Initiate and maintain comfort rounds  - Make Fall Risk Sign visible to staff  - Offer Toileting every 2 Hours, in advance of need  - Initiate/Maintain bed/chair alarm prn  - Apply yellow socks and bracelet for high fall risk patients  - Consider moving patient to room near nurses station  Outcome: Progressing     Problem: DISCHARGE PLANNING  Goal: Discharge to home or other facility with appropriate resources  Description: INTERVENTIONS:  - Identify barriers to discharge w/patient and caregiver  - Arrange for needed discharge resources and transportation as appropriate  - Identify discharge learning needs (meds, wound care, etc )  - Arrange for interpretive services to assist at discharge as needed  - Refer to Case Management Department for coordinating discharge planning if the patient needs post-hospital services based on physician/advanced practitioner order or complex needs related to functional status, cognitive ability, or social support system  Outcome: Progressing     Problem: Knowledge Deficit  Goal: Patient/family/caregiver demonstrates understanding of disease process, treatment plan, medications, and discharge instructions  Description: Complete learning assessment and assess knowledge base    Interventions:  - Provide teaching at level of understanding  - Provide teaching via preferred learning methods  Outcome: Progressing     Problem: CARDIOVASCULAR - ADULT  Goal: Maintains optimal cardiac output and hemodynamic stability  Description: INTERVENTIONS:  - Monitor I/O, vital signs and rhythm  - Monitor for S/S and trends of decreased cardiac output  - Administer and titrate ordered vasoactive medications to optimize hemodynamic stability  - Assess quality of pulses, skin color and temperature  - Assess for signs of decreased coronary artery perfusion  - Instruct patient to report change in severity of symptoms  Outcome: Progressing  Goal: Absence of cardiac dysrhythmias or at baseline rhythm  Description: INTERVENTIONS:  - Continuous cardiac monitoring, vital signs, obtain 12 lead EKG if ordered  - Administer antiarrhythmic and heart rate control medications as ordered  - Monitor electrolytes and administer replacement therapy as ordered  Outcome: Progressing     Problem: GENITOURINARY - ADULT  Goal: Maintains or returns to baseline urinary function  Description: INTERVENTIONS:  - Assess urinary function  - Encourage oral fluids to ensure adequate hydration if ordered  - Administer IV fluids as ordered to ensure adequate hydration  - Administer ordered medications as needed  - Offer frequent toileting  - Follow urinary retention protocol if ordered  Outcome: Progressing  Goal: Absence of urinary retention  Description: INTERVENTIONS:  - Assess patients ability to void and empty bladder  - Monitor I/O  - Bladder scan as needed  - Discuss with physician/AP medications to alleviate retention as needed  - Discuss catheterization for long term situations as appropriate  Outcome: Progressing  Goal: Urinary catheter remains patent  Description: INTERVENTIONS:  - Assess patency of urinary catheter  - If patient has a chronic gutierrez, consider changing catheter if non-functioning  - Follow guidelines for intermittent irrigation of non-functioning urinary catheter  Outcome: Progressing     Problem: METABOLIC, FLUID AND ELECTROLYTES - ADULT  Goal: Electrolytes maintained within normal limits  Description: INTERVENTIONS:  - Monitor labs and assess patient for signs and symptoms of electrolyte imbalances  - Administer electrolyte replacement as ordered  - Monitor response to electrolyte replacements, including repeat lab results as appropriate  - Instruct patient on fluid and nutrition as appropriate  Outcome: Progressing  Goal: Fluid balance maintained  Description: INTERVENTIONS:  - Monitor labs   - Monitor I/O and WT  - Instruct patient on fluid and nutrition as appropriate  - Assess for signs & symptoms of volume excess or deficit  Outcome: Progressing  Goal: Glucose maintained within target range  Description: INTERVENTIONS:  - Monitor Blood Glucose as ordered  - Assess for signs and symptoms of hyperglycemia and hypoglycemia  - Administer ordered medications to maintain glucose within target range  - Assess nutritional intake and initiate nutrition service referral as needed  Outcome: Progressing     Problem: SKIN/TISSUE INTEGRITY - ADULT  Goal: Incision(s), wounds(s) or drain site(s) healing without S/S of infection  Description: INTERVENTIONS  - Assess and document dressing, incision, wound bed, drain sites and surrounding tissue  - Provide patient and family education  Outcome: Progressing  Goal: Pressure injury heals and does not worsen  Description: Interventions:  - Implement low air loss mattress or specialty surface (Criteria met)  - Apply silicone foam dressing  - Consider nutrition services referral as needed  Outcome: Progressing

## 2022-03-26 NOTE — ASSESSMENT & PLAN NOTE
Background:  Patient found down on floor by daughter  Patient is alert and oriented however does not entirely recall events of suspected fall  Patient does state that she believes she may have fell on a chair however daughter stated that she was found in the living room next to a cabinet  Patient noted to have multiple injuries and areas of ecchymosis  Trauma alert called from the ED  Patient met sepsis criteria on admission  · Meets criteria as evidence by heart rate 160s, respiratory rate 20-25, WBCs 30 and LEONARDO present on admission  · Source likely urinary +/- bacteremia  UA showed small leukocytes, no nitrates, WBCs 10-20 and innumerable bacteria  Culture pending  · Blood culture sent x2  · Initial lactic acid 3 3  Lactic acidosis cleared  · Started on ceftriaxone in ED  Will continue Q 24 hours  · Initially fluid resuscitated with 1 75 L of lactated Ringer's which was based on ideal body weight due to BMI of 57 66  Patient remained hypotensive and therefore was given an additional 1 25 L  Norepinephrine ordered however not started at this time because blood pressure improved during 3 L fluid boluses  · Discussed with patient central line and arterial line placements  Patient would be agreeable if indicated  · CT the chest/abdomen/pelvis: No findings of acute thoracic or abdominopelvic injury  Small right and trace left pleural effusions are new since November 14, 2021  Persistent findings of hepatic cirrhosis new trace perihepatic ascites  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

## 2022-03-26 NOTE — ASSESSMENT & PLAN NOTE
Lab Results   Component Value Date    HGBA1C 5 5 01/31/2018       Recent Labs     03/25/22  1830 03/25/22  2155 03/26/22  0141   POCGLU 91 79 97       Blood Sugar Average: Last 72 hrs:  (P) 91   · AC/HS Accu-Cheks with sliding scale insulin

## 2022-03-26 NOTE — PLAN OF CARE
Problem: Nutrition/Hydration-ADULT  Goal: Nutrient/Hydration intake appropriate for improving, restoring or maintaining nutritional needs  Description: Monitor and assess patient's nutrition/hydration status for malnutrition  Collaborate with interdisciplinary team and initiate plan and interventions as ordered  Monitor patient's weight and dietary intake as ordered or per policy  Utilize nutrition screening tool and intervene as necessary  Determine patient's food preferences and provide high-protein, high-caloric foods as appropriate       INTERVENTIONS:  - Monitor oral intake, urinary output, labs, and treatment plans  - Assess nutrition and hydration status and recommend course of action  - Evaluate amount of meals eaten  - Assist patient with eating if necessary   - Allow adequate time for meals  - Recommend/ encourage appropriate diets, oral nutritional supplements, and vitamin/mineral supplements  - Order, calculate, and assess calorie counts as needed  - Recommend, monitor, and adjust tube feedings and TPN/PPN based on assessed needs  - Assess need for intravenous fluids  - Provide specific nutrition/hydration education as appropriate  - Include patient/family/caregiver in decisions related to nutrition  Outcome: Progressing     Problem: Prexisting or High Potential for Compromised Skin Integrity  Goal: Skin integrity is maintained or improved  Description: INTERVENTIONS:  - Identify patients at risk for skin breakdown  - Assess and monitor skin integrity  - Assess and monitor nutrition and hydration status  - Monitor labs   - Assess for incontinence   - Turn and reposition patient  - Assist with mobility/ambulation  - Relieve pressure over bony prominences  - Avoid friction and shearing  - Provide appropriate hygiene as needed including keeping skin clean and dry  - Evaluate need for skin moisturizer/barrier cream  - Collaborate with interdisciplinary team   - Patient/family teaching  - Consider wound care consult   Outcome: Progressing     Problem: MOBILITY - ADULT  Goal: Maintain or return to baseline ADL function  Description: INTERVENTIONS:  -  Assess patient's ability to carry out ADLs; assess patient's baseline for ADL function and identify physical deficits which impact ability to perform ADLs (bathing, care of mouth/teeth, toileting, grooming, dressing, etc )  - Assess/evaluate cause of self-care deficits   - Assess range of motion  - Assess patient's mobility; develop plan if impaired  - Assess patient's need for assistive devices and provide as appropriate  - Encourage maximum independence but intervene and supervise when necessary  - Involve family in performance of ADLs  - Assess for home care needs following discharge   - Consider OT consult to assist with ADL evaluation and planning for discharge  - Provide patient education as appropriate  Outcome: Progressing  Goal: Maintains/Returns to pre admission functional level  Description: INTERVENTIONS:  - Perform BMAT or MOVE assessment daily    - Set and communicate daily mobility goal to care team and patient/family/caregiver  - Collaborate with rehabilitation services on mobility goals if consulted  - Perform Range of Motion 3 times a day  - Reposition patient every 2 hours    - Record patient progress and toleration of activity level   Outcome: Progressing     Problem: PAIN - ADULT  Goal: Verbalizes/displays adequate comfort level or baseline comfort level  Description: Interventions:  - Encourage patient to monitor pain and request assistance  - Assess pain using appropriate pain scale  - Administer analgesics based on type and severity of pain and evaluate response  - Implement non-pharmacological measures as appropriate and evaluate response  - Consider cultural and social influences on pain and pain management  - Notify physician/advanced practitioner if interventions unsuccessful or patient reports new pain  Outcome: Progressing     Problem: INFECTION - ADULT  Goal: Absence or prevention of progression during hospitalization  Description: INTERVENTIONS:  - Assess and monitor for signs and symptoms of infection  - Monitor lab/diagnostic results  - Monitor all insertion sites, i e  indwelling lines, tubes, and drains  - Monitor endotracheal if appropriate and nasal secretions for changes in amount and color  - Soldier appropriate cooling/warming therapies per order  - Administer medications as ordered  - Instruct and encourage patient and family to use good hand hygiene technique  - Identify and instruct in appropriate isolation precautions for identified infection/condition  Outcome: Progressing     Problem: SAFETY ADULT  Goal: Maintain or return to baseline ADL function  Description: INTERVENTIONS:  -  Assess patient's ability to carry out ADLs; assess patient's baseline for ADL function and identify physical deficits which impact ability to perform ADLs (bathing, care of mouth/teeth, toileting, grooming, dressing, etc )  - Assess/evaluate cause of self-care deficits   - Assess range of motion  - Assess patient's mobility; develop plan if impaired  - Assess patient's need for assistive devices and provide as appropriate  - Encourage maximum independence but intervene and supervise when necessary  - Involve family in performance of ADLs  - Assess for home care needs following discharge   - Consider OT consult to assist with ADL evaluation and planning for discharge  - Provide patient education as appropriate  Outcome: Progressing  Goal: Maintains/Returns to pre admission functional level  Description: INTERVENTIONS:  - Perform BMAT or MOVE assessment daily    - Set and communicate daily mobility goal to care team and patient/family/caregiver  - Collaborate with rehabilitation services on mobility goals if consulted  - Perform Range of Motion 3 times a day  - Reposition patient every 2 hours    - Record patient progress and toleration of activity level   Outcome: Progressing  Goal: Patient will remain free of falls  Description: INTERVENTIONS:  - Educate patient/family on patient safety including physical limitations  - Instruct patient to call for assistance with activity   - Consult OT/PT to assist with strengthening/mobility   - Keep Call bell within reach  - Keep bed low and locked with side rails adjusted as appropriate  - Keep care items and personal belongings within reach  - Initiate and maintain comfort rounds  - Make Fall Risk Sign visible to staff  - Offer Toileting every 2 Hours, in advance of need  - Initiate/Maintain bed/chair alarm prn  - Apply yellow socks and bracelet for high fall risk patients  - Consider moving patient to room near nurses station  Outcome: Progressing     Problem: DISCHARGE PLANNING  Goal: Discharge to home or other facility with appropriate resources  Description: INTERVENTIONS:  - Identify barriers to discharge w/patient and caregiver  - Arrange for needed discharge resources and transportation as appropriate  - Identify discharge learning needs (meds, wound care, etc )  - Arrange for interpretive services to assist at discharge as needed  - Refer to Case Management Department for coordinating discharge planning if the patient needs post-hospital services based on physician/advanced practitioner order or complex needs related to functional status, cognitive ability, or social support system  Outcome: Progressing     Problem: Knowledge Deficit  Goal: Patient/family/caregiver demonstrates understanding of disease process, treatment plan, medications, and discharge instructions  Description: Complete learning assessment and assess knowledge base    Interventions:  - Provide teaching at level of understanding  - Provide teaching via preferred learning methods  Outcome: Progressing     Problem: CARDIOVASCULAR - ADULT  Goal: Maintains optimal cardiac output and hemodynamic stability  Description: INTERVENTIONS:  - Monitor I/O, vital signs and rhythm  - Monitor for S/S and trends of decreased cardiac output  - Administer and titrate ordered vasoactive medications to optimize hemodynamic stability  - Assess quality of pulses, skin color and temperature  - Assess for signs of decreased coronary artery perfusion  - Instruct patient to report change in severity of symptoms  Outcome: Progressing  Goal: Absence of cardiac dysrhythmias or at baseline rhythm  Description: INTERVENTIONS:  - Continuous cardiac monitoring, vital signs, obtain 12 lead EKG if ordered  - Administer antiarrhythmic and heart rate control medications as ordered  - Monitor electrolytes and administer replacement therapy as ordered  Outcome: Progressing     Problem: GENITOURINARY - ADULT  Goal: Maintains or returns to baseline urinary function  Description: INTERVENTIONS:  - Assess urinary function  - Encourage oral fluids to ensure adequate hydration if ordered  - Administer IV fluids as ordered to ensure adequate hydration  - Administer ordered medications as needed  - Offer frequent toileting  - Follow urinary retention protocol if ordered  Outcome: Progressing  Goal: Absence of urinary retention  Description: INTERVENTIONS:  - Assess patients ability to void and empty bladder  - Monitor I/O  - Bladder scan as needed  - Discuss with physician/AP medications to alleviate retention as needed  - Discuss catheterization for long term situations as appropriate  Outcome: Progressing  Goal: Urinary catheter remains patent  Description: INTERVENTIONS:  - Assess patency of urinary catheter  - If patient has a chronic gutierrez, consider changing catheter if non-functioning  - Follow guidelines for intermittent irrigation of non-functioning urinary catheter  Outcome: Progressing     Problem: METABOLIC, FLUID AND ELECTROLYTES - ADULT  Goal: Electrolytes maintained within normal limits  Description: INTERVENTIONS:  - Monitor labs and assess patient for signs and symptoms of electrolyte imbalances  - Administer electrolyte replacement as ordered  - Monitor response to electrolyte replacements, including repeat lab results as appropriate  - Instruct patient on fluid and nutrition as appropriate  Outcome: Progressing  Goal: Fluid balance maintained  Description: INTERVENTIONS:  - Monitor labs   - Monitor I/O and WT  - Instruct patient on fluid and nutrition as appropriate  - Assess for signs & symptoms of volume excess or deficit  Outcome: Progressing  Goal: Glucose maintained within target range  Description: INTERVENTIONS:  - Monitor Blood Glucose as ordered  - Assess for signs and symptoms of hyperglycemia and hypoglycemia  - Administer ordered medications to maintain glucose within target range  - Assess nutritional intake and initiate nutrition service referral as needed  Outcome: Progressing     Problem: SKIN/TISSUE INTEGRITY - ADULT  Goal: Incision(s), wounds(s) or drain site(s) healing without S/S of infection  Description: INTERVENTIONS  - Assess and document dressing, incision, wound bed, drain sites and surrounding tissue  - Provide patient and family education  - Perform skin care/dressing changes as needed  Outcome: Progressing  Goal: Pressure injury heals and does not worsen  Description: Interventions:  - Implement low air loss mattress or specialty surface (Criteria met)  - Apply silicone foam dressing  - Use special pressure reducing interventions such as waffle cushion when in chair   - Apply fecal or urinary incontinence containment device   - Perform passive or active ROM every 8  - Turn and reposition patient & offload bony prominences every 2 hours   - Utilize friction reducing device or surface for transfers   - Consider consults to  interdisciplinary teams such as wound team  - Use incontinent care products after each incontinent episode such as barrier cream  - Consider nutrition services referral as needed  Outcome: Progressing

## 2022-03-26 NOTE — CONSULTS
Consultation - General Surgery   Nilton Colin 80 y o  female MRN: [id number]  Unit/Bed#: -01 Encounter: [id number]    Assessment/Plan     Assessment:  Ecchymosis with sloughing superficial skin of the right medial thigh  Combination of possible insect bite and trauma  Status post fall  Elevated INR  Elevated liver enzymes  Evidence of cirrhosis on ultrasound  Confusion  Sepsis    Plan:  No surgical intervention at this time  Will observe the area the right inner thigh  If the area does change or worsen the patient may need debridement  The patient is a high surgical risk  This was discussed with the patient and family  History of Present Illness     HPI:  Nilton Colin is a 80 y o  female who presents with complaint of fall  The patient was apparently found by family yesterday  The patient does not remember falling  She states that she was lost and could not find where she was  She is not oriented to place but she is oriented to the month  She admits to some pain in her back in her right leg and in her forehead  She denies any paresthesias  She denies any shortness of breath       Consults    Review of Systems   Respiratory: Negative  Gastrointestinal: Negative  All other systems reviewed and are negative        Historical Information   Past Medical History:   Diagnosis Date    LEONARDO (acute kidney injury) ([address] )     Atrial fibrillation ([address] )     CHF (congestive heart failure) (HCC)     diastolic grade 1    Cirrhosis ([address]     Diabetes mellitus ([address] )     Disease of thyroid gland     hypothyroid    Endometrial ca ([address]     Hiatal hernia     Hyperlipidemia     Hypertension     Hypothyroid     Lung nodules     Periumbilical hernia     Pulmonary HTN ([address] )     Thoracic aortic aneurysm without rupture ([facility] [address] ) 03/25/2022    41 mm     Past Surgical History:   Procedure Laterality Date    HERNIA REPAIR      HIP ARTHROPLASTY      HYSTERECTOMY      JOINT REPLACEMENT Bilateral knee; b/l hip    KNEE ARTHROPLASTY      OOPHORECTOMY       Social History   Social History     Substance and Sexual Activity   Alcohol Use Never     Social History     Substance and Sexual Activity   Drug Use Never     E-Cigarette/Vaping     E-Cigarette/Vaping Substances     Social History     Tobacco Use   Smoking Status Never Smoker   Smokeless Tobacco Never Used     Family History: History reviewed  No pertinent family history  Meds/Allergies   current meds:   Current Facility-Administered Medications   Medication Dose Route Frequency    albumin human (FLEXBUMIN) 5 % injection 12 5 g  12 5 g Intravenous Once    alteplase (CATHFLO) injection 2 mg  2 mg Intracatheter Once    bacitracin topical ointment 1 large application  1 large application Topical BID    bacitracin-polymyxin b (POLYSPORIN) ophthalmic ointment   Both Eyes TID    cefepime (MAXIPIME) IVPB (premix in dextrose) 1,000 mg 50 mL  1,000 mg Intravenous Q12H    insulin lispro (HumaLOG) 100 units/mL subcutaneous injection 2-12 Units  2-12 Units Subcutaneous 4x Daily (AC & HS)    levothyroxine tablet 150 mcg  150 mcg Oral Early Morning    metoprolol tartrate (LOPRESSOR) tablet 50 mg  50 mg Oral Q12H MOR    norepinephrine (LEVOPHED) 8 mg (DOUBLE CONCENTRATION) IV in sodium chloride 0 9% 250 mL  1-30 mcg/min Intravenous Titrated    [START ON 3/27/2022] vancomycin (VANCOCIN) IVPB (premix in dextrose) 1,000 mg 200 mL  12 5 mg/kg (Adjusted) Intravenous Q24H    vasopressin (PITRESSIN) 20 Units in sodium chloride 0 9 % 100 mL infusion  0 04 Units/min Intravenous Continuous    and PTA meds:   Prior to Admission Medications   Prescriptions Last Dose Informant Patient Reported?  Taking?   fluticasone (FLONASE) 50 mcg/act nasal spray Unknown at Unknown time  Yes No   Si sprays into each nostril daily as needed   furosemide (LASIX) 40 mg tablet Unknown at Unknown time  Yes No   Sig: Take 40 mg by mouth 2 (two) times a day   levothyroxine 150 mcg tablet Unknown at Unknown time  Yes No   Sig: TAKE (1) TABLET DAILY  FIRST THING IN THE MORNING (AT LEAST 30 MIN PRIOR TO BREAKFAST OR OTHER MEDS)  metoprolol succinate (TOPROL-XL) 100 mg 24 hr tablet Unknown at Unknown time  Yes No   Sig: Take 1 tablet by mouth daily   potassium chloride (K-DUR,KLOR-CON) 10 mEq tablet Unknown at Unknown time  Yes No   Sig: Take 10 mEq by mouth daily   traMADol (Ultram) 50 mg tablet Unknown at Unknown time  No No   Sig: Take 0 5 tablets (25 mg total) by mouth every 6 (six) hours as needed for moderate pain   warfarin (COUMADIN) 5 mg tablet Unknown at Unknown time  Yes No   Sig: TAKE 2 & 1/2 (12 5MG) TABLETS THURS   AND 1 & 1/2 TABLETS ALL OTHER DAYS OR AS DIRECTED BY COUMADIN CLINIC      Facility-Administered Medications: None     Allergies   Allergen Reactions    Penicillins Hives           Sulfamethoxazole-Trimethoprim GI Intolerance       Objective   First Vitals:   Blood Pressure: 110/76 (03/25/22 1500)  Pulse: (!) 165 (03/25/22 1500)  Temperature: (!) 93 7 °F (34 3 °C) (03/25/22 1545)  Temp Source: Bladder (03/25/22 1930)  Respirations: 20 (03/25/22 1500)  Height: 5' 2" (157 5 cm) (03/25/22 1615)  Weight - Scale: 114 kg (251 lb 1 7 oz) (03/25/22 1930)  SpO2: (!) 88 % (03/25/22 1500)    Current Vitals:   Blood Pressure: 107/58 (03/26/22 1342)  Pulse: 78 (03/26/22 1342)  Temperature: 99 3 °F (37 4 °C) (03/26/22 1342)  Temp Source: Bladder (03/26/22 1200)  Respirations: (!) 28 (03/26/22 1342)  Height: 5' 2" (157 5 cm) (03/25/22 1930)  Weight - Scale: 118 kg (260 lb 5 8 oz) (03/26/22 0400)  SpO2: 96 % (03/26/22 1342)      Intake/Output Summary (Last 24 hours) at 3/26/2022 1426  Last data filed at 3/26/2022 1342  Gross per 24 hour   Intake 9565 87 ml   Output 891 ml   Net 8674 87 ml       Invasive Devices  Report    Central Venous Catheter Line            CVC Central Lines 03/26/22 Triple 16cm <1 day          Peripheral Intravenous Line            Peripheral IV 03/25/22 Left;Proximal;Ventral (anterior) Forearm <1 day    Peripheral IV 03/25/22 Right Antecubital <1 day          Arterial Line            Arterial Line 03/25/22 Right Radial <1 day          Drain            Urethral Catheter Temperature probe <1 day                Physical Exam  HENT:      Head: Normocephalic  Comments: Ecchymosis noted across the forehead and both eyebrows areas  There is a hematoma of the right eyebrow with no active bleeding or oozing  Neck is supple and nontender  Scalp is without laceration  Nose: Nose normal       Mouth/Throat:      Mouth: Mucous membranes are moist    Cardiovascular:      Rate and Rhythm: Normal rate and regular rhythm  Pulses: Normal pulses  Heart sounds: Normal heart sounds  Pulmonary:      Effort: Pulmonary effort is normal    Abdominal:      General: Abdomen is flat  Palpations: Abdomen is soft  Tenderness: There is no abdominal tenderness  There is no guarding  Hernia: No hernia is present  Musculoskeletal:         General: Tenderness present  Normal range of motion  Cervical back: Normal range of motion and neck supple  Comments: The bilateral lower extremities are edematous  There is edema of the bilateral thighs  The right inner thigh has an area of ecchymosis with some overlying blistering of the skin  There was a small amount of serous drainage and no bleeding  There is no fluctuance  There is no erythema  Skin:     General: Skin is warm and dry  Comments: There is ecchymosis noted of the bilateral breasts with a small blister at the left inframammary crease  No drainage  No erythema  Neurological:      Mental Status: She is alert  She is disoriented     Psychiatric:         Mood and Affect: Mood normal          Behavior: Behavior normal          Lab Results:   CBC:   Lab Results   Component Value Date    WBC 26 42 (H) 03/26/2022    HGB 10 5 (L) 03/26/2022    HCT 29 0 (L) 03/26/2022    MCV 99 (H) 03/26/2022     03/26/2022    MCH 35 8 (H) 03/26/2022    MCHC 36 2 03/26/2022    RDW 14 7 03/26/2022    MPV 10 9 03/26/2022   , CMP:   Lab Results   Component Value Date    SODIUM 135 (L) 03/26/2022    K 4 1 03/26/2022     03/26/2022    CO2 20 (L) 03/26/2022    BUN 62 (H) 03/26/2022    CREATININE 1 81 (H) 03/26/2022    CALCIUM 8 1 (L) 03/26/2022    AST 54 (H) 03/26/2022    ALT 33 03/26/2022    ALKPHOS 121 (H) 03/26/2022    EGFR 25 03/26/2022   , Coagulation:   Lab Results   Component Value Date    INR 4 35 (H) 03/26/2022     Imaging: I have personally reviewed pertinent films in PACS  EKG, Pathology, and Other Studies: I have personally reviewed pertinent reports  Counseling / Coordination of Care   Total floor / unit time spent today 30 minutes  Greater than 50% of total time was spent with the patient and / or family counseling and / or coordination of care  A description of the counseling / coordination of care: Will observe the area of the right thigh  Serial labs     This was discussed with the advanced practitioner and the patient's family

## 2022-03-26 NOTE — ASSESSMENT & PLAN NOTE
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

## 2022-03-26 NOTE — ASSESSMENT & PLAN NOTE
Lab Results   Component Value Date    HGBA1C 5 5 01/31/2018       Recent Labs     03/26/22  0141 03/26/22  0732 03/26/22  1106 03/26/22  2217   POCGLU 97 88 169* 153*       Blood Sugar Average: Last 72 hrs:  (P) 104 8   · AC/HS Accu-Cheks with sliding scale insulin

## 2022-03-26 NOTE — PROCEDURES
Central Line Insertion    Date/Time: 3/26/2022 3:11 AM  Performed by: Pravin Condon PA-C  Authorized by: Pravin Condon PA-C     Patient location:  ICU  Consent:     Consent obtained:  Written    Consent given by:  Patient    Risks discussed:  Arterial puncture, bleeding, infection, incorrect placement, nerve damage and pneumothorax    Alternatives discussed:  No treatment, delayed treatment and observation  Universal protocol:     Procedure explained and questions answered to patient or proxy's satisfaction: yes      Relevant documents present and verified: yes      Required blood products, implants, devices, and special equipment available: yes      Site/side marked: yes      Immediately prior to procedure, a time out was called: yes      Patient identity confirmed:  Verbally with patient and arm band  Pre-procedure details:     Hand hygiene: Hand hygiene performed prior to insertion      Sterile barrier technique: All elements of maximal sterile technique followed      Skin preparation:  ChloraPrep    Skin preparation agent: Skin preparation agent completely dried prior to procedure    Indications:     Central line indications: medications requiring central line    Anesthesia (see MAR for exact dosages):      Anesthesia method:  Local infiltration    Local anesthetic:  Lidocaine 1% w/o epi  Procedure details:     Location:  Right internal jugular    Vessel type: vein      Laterality:  Right    Approach: percutaneous technique used      Patient position:  Reverse Trendelenburg    Catheter type:  Triple lumen 16cm    Catheter size:  7 Fr    Landmarks identified: yes      Ultrasound guidance: yes      Ultrasound image availability:  Images available in PACS    Sterile ultrasound techniques: Sterile gel and sterile probe covers were used      Manometry confirmation: yes      Number of attempts:  1    Successful placement: yes      Vessel of catheter tip end:  Cavoatrial junction  Post-procedure details: Post-procedure:  Dressing applied and line sutured    Assessment:  Blood return through all ports, no pneumothorax on x-ray, placement verified by x-ray and free fluid flow    Patient tolerance of procedure:   Tolerated well, no immediate complications

## 2022-03-27 ENCOUNTER — APPOINTMENT (INPATIENT)
Dept: RADIOLOGY | Facility: HOSPITAL | Age: 82
DRG: 853 | End: 2022-03-27
Payer: MEDICARE

## 2022-03-27 LAB
ABO GROUP BLD BPU: NORMAL
ALBUMIN SERPL BCP-MCNC: 2.2 G/DL (ref 3.5–5)
ALP SERPL-CCNC: 123 U/L (ref 46–116)
ALT SERPL W P-5'-P-CCNC: 35 U/L (ref 12–78)
ANION GAP SERPL CALCULATED.3IONS-SCNC: 10 MMOL/L (ref 4–13)
ANION GAP SERPL CALCULATED.3IONS-SCNC: 9 MMOL/L (ref 4–13)
APTT PPP: 36 SECONDS (ref 23–37)
AST SERPL W P-5'-P-CCNC: 62 U/L (ref 5–45)
ATRIAL RATE: 144 BPM
BACTERIA UR CULT: ABNORMAL
BACTERIA UR CULT: ABNORMAL
BILIRUB SERPL-MCNC: 6.35 MG/DL (ref 0.2–1)
BPU ID: NORMAL
BUN SERPL-MCNC: 63 MG/DL (ref 5–25)
BUN SERPL-MCNC: 63 MG/DL (ref 5–25)
CALCIUM ALBUM COR SERPL-MCNC: 9.4 MG/DL (ref 8.3–10.1)
CALCIUM SERPL-MCNC: 8 MG/DL (ref 8.3–10.1)
CALCIUM SERPL-MCNC: 8.4 MG/DL (ref 8.3–10.1)
CHLORIDE SERPL-SCNC: 101 MMOL/L (ref 100–108)
CHLORIDE SERPL-SCNC: 102 MMOL/L (ref 100–108)
CO2 SERPL-SCNC: 22 MMOL/L (ref 21–32)
CO2 SERPL-SCNC: 23 MMOL/L (ref 21–32)
CREAT SERPL-MCNC: 1.65 MG/DL (ref 0.6–1.3)
CREAT SERPL-MCNC: 1.77 MG/DL (ref 0.6–1.3)
ERYTHROCYTE [DISTWIDTH] IN BLOOD BY AUTOMATED COUNT: 14.6 % (ref 11.6–15.1)
ERYTHROCYTE [DISTWIDTH] IN BLOOD BY AUTOMATED COUNT: 14.8 % (ref 11.6–15.1)
GFR SERPL CREATININE-BSD FRML MDRD: 26 ML/MIN/1.73SQ M
GFR SERPL CREATININE-BSD FRML MDRD: 28 ML/MIN/1.73SQ M
GLUCOSE SERPL-MCNC: 119 MG/DL (ref 65–140)
GLUCOSE SERPL-MCNC: 129 MG/DL (ref 65–140)
GLUCOSE SERPL-MCNC: 132 MG/DL (ref 65–140)
GLUCOSE SERPL-MCNC: 132 MG/DL (ref 65–140)
GLUCOSE SERPL-MCNC: 139 MG/DL (ref 65–140)
GLUCOSE SERPL-MCNC: 148 MG/DL (ref 65–140)
HCT VFR BLD AUTO: 27.5 % (ref 34.8–46.1)
HCT VFR BLD AUTO: 29.5 % (ref 34.8–46.1)
HGB BLD-MCNC: 10.1 G/DL (ref 11.5–15.4)
HGB BLD-MCNC: 10.3 G/DL (ref 11.5–15.4)
INR PPP: 1.85 (ref 0.84–1.19)
INR PPP: 2.01 (ref 0.84–1.19)
MAGNESIUM SERPL-MCNC: 2 MG/DL (ref 1.6–2.6)
MAGNESIUM SERPL-MCNC: 2.1 MG/DL (ref 1.6–2.6)
MAGNESIUM SERPL-MCNC: 2.5 MG/DL (ref 1.6–2.6)
MCH RBC QN AUTO: 35 PG (ref 26.8–34.3)
MCH RBC QN AUTO: 35.6 PG (ref 26.8–34.3)
MCHC RBC AUTO-ENTMCNC: 34.9 G/DL (ref 31.4–37.4)
MCHC RBC AUTO-ENTMCNC: 36.7 G/DL (ref 31.4–37.4)
MCV RBC AUTO: 100 FL (ref 82–98)
MCV RBC AUTO: 97 FL (ref 82–98)
MRSA NOSE QL CULT: NORMAL
PLATELET # BLD AUTO: 175 THOUSANDS/UL (ref 149–390)
PLATELET # BLD AUTO: 178 THOUSANDS/UL (ref 149–390)
PMV BLD AUTO: 10.5 FL (ref 8.9–12.7)
PMV BLD AUTO: 10.7 FL (ref 8.9–12.7)
POTASSIUM SERPL-SCNC: 3.5 MMOL/L (ref 3.5–5.3)
POTASSIUM SERPL-SCNC: 4.1 MMOL/L (ref 3.5–5.3)
PROT SERPL-MCNC: 6 G/DL (ref 6.4–8.2)
PROTHROMBIN TIME: 21 SECONDS (ref 11.6–14.5)
PROTHROMBIN TIME: 22.3 SECONDS (ref 11.6–14.5)
QRS AXIS: -3 DEGREES
QRSD INTERVAL: 78 MS
QT INTERVAL: 312 MS
QTC INTERVAL: 496 MS
RBC # BLD AUTO: 2.84 MILLION/UL (ref 3.81–5.12)
RBC # BLD AUTO: 2.94 MILLION/UL (ref 3.81–5.12)
SODIUM SERPL-SCNC: 133 MMOL/L (ref 136–145)
SODIUM SERPL-SCNC: 134 MMOL/L (ref 136–145)
T WAVE AXIS: 105 DEGREES
UNIT DISPENSE STATUS: NORMAL
UNIT PRODUCT CODE: NORMAL
UNIT PRODUCT VOLUME: 208 ML
UNIT RH: NORMAL
VENTRICULAR RATE: 152 BPM
WBC # BLD AUTO: 13.46 THOUSAND/UL (ref 4.31–10.16)
WBC # BLD AUTO: 16.5 THOUSAND/UL (ref 4.31–10.16)

## 2022-03-27 PROCEDURE — 85610 PROTHROMBIN TIME: CPT | Performed by: NURSE PRACTITIONER

## 2022-03-27 PROCEDURE — 71045 X-RAY EXAM CHEST 1 VIEW: CPT

## 2022-03-27 PROCEDURE — 99291 CRITICAL CARE FIRST HOUR: CPT | Performed by: STUDENT IN AN ORGANIZED HEALTH CARE EDUCATION/TRAINING PROGRAM

## 2022-03-27 PROCEDURE — 85730 THROMBOPLASTIN TIME PARTIAL: CPT | Performed by: PHYSICIAN ASSISTANT

## 2022-03-27 PROCEDURE — 83735 ASSAY OF MAGNESIUM: CPT | Performed by: PHYSICIAN ASSISTANT

## 2022-03-27 PROCEDURE — 82948 REAGENT STRIP/BLOOD GLUCOSE: CPT

## 2022-03-27 PROCEDURE — 80048 BASIC METABOLIC PNL TOTAL CA: CPT | Performed by: PHYSICIAN ASSISTANT

## 2022-03-27 PROCEDURE — 80053 COMPREHEN METABOLIC PANEL: CPT | Performed by: PHYSICIAN ASSISTANT

## 2022-03-27 PROCEDURE — 94760 N-INVAS EAR/PLS OXIMETRY 1: CPT

## 2022-03-27 PROCEDURE — 85027 COMPLETE CBC AUTOMATED: CPT | Performed by: PHYSICIAN ASSISTANT

## 2022-03-27 PROCEDURE — NC001 PR NO CHARGE: Performed by: PHYSICIAN ASSISTANT

## 2022-03-27 PROCEDURE — 85610 PROTHROMBIN TIME: CPT | Performed by: PHYSICIAN ASSISTANT

## 2022-03-27 RX ORDER — FUROSEMIDE 10 MG/ML
40 INJECTION INTRAMUSCULAR; INTRAVENOUS
Status: COMPLETED | OUTPATIENT
Start: 2022-03-27 | End: 2022-03-27

## 2022-03-27 RX ORDER — METOLAZONE 5 MG/1
5 TABLET ORAL ONCE
Status: COMPLETED | OUTPATIENT
Start: 2022-03-27 | End: 2022-03-27

## 2022-03-27 RX ORDER — POTASSIUM CHLORIDE 29.8 MG/ML
40 INJECTION INTRAVENOUS ONCE
Status: COMPLETED | OUTPATIENT
Start: 2022-03-27 | End: 2022-03-27

## 2022-03-27 RX ORDER — HEPARIN SODIUM 1000 [USP'U]/ML
4000 INJECTION, SOLUTION INTRAVENOUS; SUBCUTANEOUS
Status: DISCONTINUED | OUTPATIENT
Start: 2022-03-27 | End: 2022-04-04

## 2022-03-27 RX ORDER — FUROSEMIDE 10 MG/ML
40 INJECTION INTRAMUSCULAR; INTRAVENOUS ONCE
Status: COMPLETED | OUTPATIENT
Start: 2022-03-27 | End: 2022-03-27

## 2022-03-27 RX ORDER — HEPARIN SODIUM 10000 [USP'U]/100ML
3-20 INJECTION, SOLUTION INTRAVENOUS
Status: DISCONTINUED | OUTPATIENT
Start: 2022-03-27 | End: 2022-04-04

## 2022-03-27 RX ORDER — HEPARIN SODIUM 1000 [USP'U]/ML
2000 INJECTION, SOLUTION INTRAVENOUS; SUBCUTANEOUS
Status: DISCONTINUED | OUTPATIENT
Start: 2022-03-27 | End: 2022-04-04

## 2022-03-27 RX ADMIN — CEFEPIME HYDROCHLORIDE 1000 MG: 1 INJECTION, SOLUTION INTRAVENOUS at 18:14

## 2022-03-27 RX ADMIN — VANCOMYCIN HYDROCHLORIDE 1000 MG: 1 INJECTION, SOLUTION INTRAVENOUS at 08:23

## 2022-03-27 RX ADMIN — METOLAZONE 5 MG: 5 TABLET ORAL at 05:22

## 2022-03-27 RX ADMIN — POTASSIUM CHLORIDE 40 MEQ: 29.8 INJECTION, SOLUTION INTRAVENOUS at 18:12

## 2022-03-27 RX ADMIN — BACITRACIN 1 LARGE APPLICATION: 500 OINTMENT TOPICAL at 08:38

## 2022-03-27 RX ADMIN — FUROSEMIDE 40 MG: 10 INJECTION, SOLUTION INTRAMUSCULAR; INTRAVENOUS at 11:37

## 2022-03-27 RX ADMIN — METOPROLOL TARTRATE 50 MG: 50 TABLET, FILM COATED ORAL at 08:04

## 2022-03-27 RX ADMIN — CEFEPIME HYDROCHLORIDE 1000 MG: 1 INJECTION, SOLUTION INTRAVENOUS at 06:29

## 2022-03-27 RX ADMIN — VASOPRESSIN 0.04 UNITS/MIN: 20 INJECTION INTRAVENOUS at 14:35

## 2022-03-27 RX ADMIN — LEVOTHYROXINE SODIUM 150 MCG: 150 TABLET ORAL at 04:25

## 2022-03-27 RX ADMIN — BACITRACIN 1 LARGE APPLICATION: 500 OINTMENT TOPICAL at 18:00

## 2022-03-27 RX ADMIN — NOREPINEPHRINE BITARTRATE 9 MCG/MIN: 1 INJECTION INTRAVENOUS at 18:14

## 2022-03-27 RX ADMIN — FUROSEMIDE 40 MG: 10 INJECTION, SOLUTION INTRAMUSCULAR; INTRAVENOUS at 05:50

## 2022-03-27 RX ADMIN — METOPROLOL TARTRATE 50 MG: 50 TABLET, FILM COATED ORAL at 20:58

## 2022-03-27 RX ADMIN — VASOPRESSIN 0.04 UNITS/MIN: 20 INJECTION INTRAVENOUS at 04:35

## 2022-03-27 RX ADMIN — HEPARIN SODIUM 11.1 UNITS/KG/HR: 10000 INJECTION, SOLUTION INTRAVENOUS at 18:13

## 2022-03-27 RX ADMIN — ALTEPLASE 2 MG: 2.2 INJECTION, POWDER, LYOPHILIZED, FOR SOLUTION INTRAVENOUS at 05:17

## 2022-03-27 RX ADMIN — NOREPINEPHRINE BITARTRATE 10 MCG/MIN: 1 INJECTION INTRAVENOUS at 03:09

## 2022-03-27 RX ADMIN — FUROSEMIDE 40 MG: 10 INJECTION, SOLUTION INTRAMUSCULAR; INTRAVENOUS at 18:16

## 2022-03-27 NOTE — NURSING NOTE
Attempted to get blood return via medial and distal ports  Both ports still without blood return  TPA to instill for another 1 5 hrs per orders

## 2022-03-27 NOTE — PROGRESS NOTES
[address]  Progress Note - [place] Listen 1940, 80 y o  female MRN: [id number]  Unit/Bed#: -01 Encounter: [id number]  Primary Care Provider: Thuan Kelley MD   Date and time admitted to hospital: 3/25/2022  3:02 PM    * Septic shock [facility])  Assessment & Plan  Background:  Patient found down on floor by daughter  Patient is alert and oriented however does not entirely recall events of suspected fall  Patient does state that she believes she may have fell on a chair however daughter stated that she was found in the living room next to a cabinet  Patient noted to have multiple injuries and areas of ecchymosis  Trauma alert called from the ED  Patient met sepsis criteria on admission  · Meets criteria as evidence by heart rate 160s, respiratory rate 20-25, WBCs 30 and LEONARDO present on admission  · Source likely urinary vs cellulitis right thigh +/- bacteremia  UA showed small leukocytes, no nitrates, WBCs 10-20 and innumerable bacteria  Culture: Gram negative mark enteric like  Sensitivity pending  · Blood culture x2: 1/2 positive for GPC in clusters  · Initial lactic acid 3 3  Lactic acidosis cleared  · Started on ceftriaxone in ED, but transitioned to Vanco/Cefepime 3/26 due to worsening clinical status  · MRSA swab sent  · Initially fluid resuscitated with 1 75 L of lactated Ringer's which was based on ideal body weight due to BMI of 57 66  Patient remained hypotensive and therefore was given an additional 1 25 L  Norepinephrine ordered however not started at this time because blood pressure improved during 3 L fluid boluses  · Discussed with patient central line and arterial line placements  Patient would be agreeable if indicated  · CT the chest/abdomen/pelvis: No findings of acute thoracic or abdominopelvic injury  Small right and trace left pleural effusions are new since November 14, 2021   Persistent findings of hepatic cirrhosis new trace perihepatic ascites  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    · 3/25 persistent hypotension despite fluid bolus'  Levophed started, arterial line and CVC inserted  · 3/26 Vasopressin added to due escalating dose of levophed  · "Insect bite" right thigh with cellulitis, appears to be worsening, concern for abscess vs necrotizing infection- CT leg ordered and General Surgery consulted  · CT showed: Diffuse subcutaneous edema and overlying skin thickening most prominent about the medial aspect of the mid/distal thigh with superficial and deep subcutaneous fluid #3/169 appears nonloculated without a surrounding rind however abscess cannot be excluded   without IV contrast    · Surgery will continue to follow- no need for surgery at this time    Coagulopathy [facility])  Assessment & Plan  · Patient is maintained on Coumadin for atrial fib outpatient  · INR 11 78 at time of admission  · Hold Coumadin and trend INR  · Vit K 10 mg IV x1 in ED  · Repeat INR 7 6 Vit K 10 mg PO x1   · 1 unit FFP  · 3/27: INR 2 01  · CT facial bones and head revealed Anterolateral right frontal scalp hematoma without acute traumatic injury to the facial bones  · Trauma CT scans of CT chest/abdomen/pelvis negative for hematoma or injury  · Monitor H/H and platelets  · Continue to monitor for further bleeding  · Would repeat CT scan for acute changes    Fusiform ectasia of ascending aorta  Assessment & Plan  · CT abd shows: 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  · Recommendation is for follow-up low radiation dose chest CT in one year      Eye discharge  Assessment & Plan  · Suhail Ramos discharge from both eyes  · Bacitracin-polymyxin B ointment ordered  HLD (hyperlipidemia)  Assessment & Plan  · Hold statin at this time due to transaminitis    Diabetes mellitus [facility])  Assessment & Plan  Lab Results   Component Value Date    HGBA1C 5 5 01/31/2018       Recent Labs     03/26/22  0141 03/26/22  0732 03/26/22  1106 03/26/22  2217   POCGLU 97 88 169* 153*       Blood Sugar Average: Last 72 hrs:  (P) 104 8   · AC/HS Accu-Cheks with sliding scale insulin    Hypothyroidism  Assessment & Plan  · Continue home Synthroid dosage  · TSH 0 301    Impaired skin integrity  Assessment & Plan  · Multiple areas of impaired skin integrity present at time of admission likely secondary to fall in the setting of supratherapeutic INR on Coumadin  · Ecchymosis noted in albert orbital region, forehead, chest, abdomen and extremities  · Supportive care    Hematoma  Assessment & Plan  · CT of the head revealed Anterolateral right frontal scalp hematoma without acute traumatic injury to the facial bones  · Hematoma noted at right brow  Continue to monitor closely  Fall  Assessment & Plan  · Suspected fall as patient was found down by daughter  Unknown time down however last time patient was seen or heard from was approximately 4 days ago by daughter  Patient states she does not recall all of the events but does report that she fell  · Trauma alert in ED  Right frontal scalp hematoma noted on imaging  Multiple areas of ecchymosis over entire body including bilateral periorbital areas, chest, abdomen and extremities  · PT/OT consult placed    Elevated CK  Assessment & Plan  · CK elevated on admission at 534 Likely secondary to fall  · Patient currently receiving fluid resuscitation for septic shock  · CK decreasing on repeat  Trend as clinically necessary      Atrial fibrillation with RVR (HCC)  Assessment & Plan  · AFib RVR with heart rate in 160s on presentation  · Patient [facility] with [facility] cardiology  · Patient's home metoprolol and Coumadin held  Coumadin held secondary to coagulopathy with INR greater than 11   · Heart rate improving with fluid resuscitation  With improvement in BP would resume metoprolol  · TSH 0 301  · Troponin peaked at 109 and trending down  · BNP > 8900  · Check ECHO  · Persistent afib with RVR despite Lopressor IV and PO  Rates improved to 120s from 150s  · Rate controlled as of late morning 3/26     Transaminitis  Assessment & Plan  · LFTs elevated at time of admission:  AST 66, ALT 44, , T bili 4 26  · Labs reviewed in Care everywhere and showed that LFTs were elevated on 01/26/22:  AST 87, ALT 42, , T bili 2  · Patient states she has no known liver disease  · Continue to trend LFTs  · Hold statin  · Right upper quadrant ultrasound: Cholelithiasis and sludge with wall thickening and pericholecystic fluid  Sonographic Mila Cellar sign is negative  Findings are equivocal for acute cholecystitis, particularly in the setting of underlying hepatic dysfunction which can also cause gallbladder wall abnormalities  If there is continued clinical concern, further evaluation with HIDA scan recommended  LEONARDO (acute kidney injury) ([facility] [address] )  Assessment & Plan  · Likely secondary to septic shock  · Baseline creatinine 0 8-1 1   · Creatinine 2 3 on presentation  · Ramires catheter placed in ED  Will maintain for accurate I&O at this time  · Urine output improving with fluid resuscitation  · Monitor BUN and creatinine  · Strict I&Os  · Avoid nephrotoxins and hypotension        ----------------------------------------------------------------------------------------  HPI/24hr events: Lasix 40 mg IV repeated last evening  Urine output averaged 150 mL over 2 hrs overnight  Repeat dosing of Lasix 40 mg IV and addition of metolazone administered this morning  CXR this morning shows moderate pulmonary edema    Nursing reported 2 ports on CVC with no return blood flow and not able to flush  Cathflo ordered  Urine culture resulted with gram neg mark enteric like  Sensitivity pending   blood cultures with GPC in clusters  Will wait for second set and consider repeating blood cultures later today or tomorrow  INR 2 01 this morning  Patient appears jaundiced this morning  AST, ALT and alk phos slightly worse  T bili increased to 6 35  There were no other acute events overnight  Intake/Output Summary (Last 24 hours) at 3/27/2022 0544  Last data filed at 3/27/2022 0530  Gross per 24 hour   Intake 3746 34 ml   Output 1386 ml   Net 2360 34 ml       Patient appropriate for transfer out of the ICU today?: No  Disposition: Continue Critical Care   Code Status: Level 2 - DNAR: but accepts endotracheal intubation  ---------------------------------------------------------------------------------------    Review of Systems  Review of systems was reviewed and negative unless stated above in HPI/24-hour events   ---------------------------------------------------------------------------------------  OBJECTIVE    Vitals   Vitals:    22 0445 22 0500 22 0515 22 0530   BP:  124/57     BP Location:       Pulse: 88 97 99 103   Resp: (!) 27 (!) 27 (!) 25 (!) 25   Temp:  99 5 °F (37 5 °C)     TempSrc:       SpO2:  96%     Weight:       Height:         Temp (24hrs), Av 2 °F (37 3 °C), Min:99 °F (37 2 °C), Max:99 9 °F (37 7 °C)  Current: Temperature: 99 5 °F (37 5 °C)  Arterial Line BP: 115/51  Arterial Line MAP (mmHg): 74 mmHg    Respiratory:  SpO2: SpO2: 96 %  Nasal Cannula O2 Flow Rate (L/min): 2 L/min    Invasive/non-invasive ventilation settings   Respiratory  Report   Lab Data (Last 4 hours)    None         O2/Vent Data (Last 4 hours)    None                Physical Exam  Constitutional:       General: She is not in acute distress  Appearance: She is ill-appearing  She is not diaphoretic  HENT:      Head: Normocephalic  Raccoon eyes present          Right Ear: External ear normal       Left Ear: External ear normal       Nose: Nose normal       Mouth/Throat:      Mouth: Mucous membranes are moist       Pharynx: Oropharynx is clear  Eyes:      General:         Right eye: Discharge present  Left eye: Discharge present  Extraocular Movements: Extraocular movements intact  Pupils: Pupils are equal, round, and reactive to light  Comments: Scleral jaundice  Cardiovascular:      Rate and Rhythm: Normal rate  Rhythm regularly irregular  Pulses: Normal pulses  Heart sounds: No friction rub  No gallop  Pulmonary:      Effort: Pulmonary effort is normal  No respiratory distress  Breath sounds: No stridor  Rales present  No wheezing or rhonchi  Chest:      Chest wall: No tenderness  Abdominal:      General: Bowel sounds are normal  There is no distension  Palpations: Abdomen is soft  There is no mass  Tenderness: There is no abdominal tenderness  There is no guarding or rebound  Musculoskeletal:         General: Signs of injury present  No swelling or tenderness  Normal range of motion  Cervical back: Normal range of motion and neck supple  No rigidity or tenderness  Right lower leg: Edema (trace) present  Left lower leg: Edema (trace) present  Skin:     General: Skin is warm and dry  Capillary Refill: Capillary refill takes less than 2 seconds  Coloration: Skin is jaundiced  Skin is not pale  Findings: No rash  Neurological:      General: No focal deficit present  Mental Status: She is alert and oriented to person, place, and time  GCS: GCS eye subscore is 4  GCS verbal subscore is 5  GCS motor subscore is 6  Sensory: No sensory deficit               Laboratory and Diagnostics:  Results from last 7 days   Lab Units 03/27/22  0433 03/26/22  0503 03/25/22  1506   WBC Thousand/uL 16 50* 26 42* 30 84*   HEMOGLOBIN g/dL 10 1* 10 5* 13 7   HEMATOCRIT % 27 5* 29 0* 38 6 PLATELETS Thousands/uL 178 208 248   BANDS PCT %  --  1  --    MONO PCT %  --  7 7     Results from last 7 days   Lab Units 03/27/22  0433 03/26/22  1621 03/26/22  0720 03/26/22  0503 03/25/22  1506   SODIUM mmol/L 134* 133*  --  135* 134*   POTASSIUM mmol/L 4 1 3 7 4 1 3 8 4 6   CHLORIDE mmol/L 102 101  --  102 99*   CO2 mmol/L 22 22  --  20* 24   ANION GAP mmol/L 10 10  --  13 11   BUN mg/dL 63* 66*  --  62* 71*   CREATININE mg/dL 1 77* 1 82*  --  1 81* 2 37*   CALCIUM mg/dL 8 0* 8 1*  --  8 1* 8 5   GLUCOSE RANDOM mg/dL 119 132  --  86 95   ALT U/L 35  --   --  33 42   AST U/L 62*  --   --  54* 66*   ALK PHOS U/L 123*  --   --  121* 165*   ALBUMIN g/dL 2 2*  --   --  2 5* 1 9*   TOTAL BILIRUBIN mg/dL 6 35*  --   --  5 08* 4 26*     Results from last 7 days   Lab Units 03/27/22  0433 03/26/22  0503 03/25/22 2013   MAGNESIUM mg/dL 2 1 2 3 2 3      Results from last 7 days   Lab Units 03/27/22  0433 03/26/22  0503 03/25/22 2016 03/25/22  1506   INR  2 01* 4 35* 7 91* 11 78*   PTT seconds  --   --   --  60*          Results from last 7 days   Lab Units 03/25/22  2317 03/25/22 2013 03/25/22  1819 03/25/22  1618   LACTIC ACID mmol/L 1 8 2 6* 2 9* 3 3*     ABG:    VBG:  Results from last 7 days   Lab Units 03/26/22  1225   PH JOANN  7 400   PCO2 JOANN mm Hg 33 5*   PO2 JOANN mm Hg 102 6*   HCO3 JOANN mmol/L 20 3*   BASE EXC JOANN mmol/L -3 8           Micro  Results from last 7 days   Lab Units 03/25/22  1618 03/25/22  1611 03/25/22  1545   BLOOD CULTURE  No Growth at 24 hrs   --   --    Reita Albe STAIN RESULT   --  Gram positive cocci in clusters*  --    URINE CULTURE   --   --  >100,000 cfu/ml Gram Negative Ryley Enteric Like*       EKG: afib with controlled rate  Imaging: I have personally reviewed pertinent reports  and I have personally reviewed pertinent films in PACS    Intake and Output  I/O       03/25 0701 03/26 0700 03/26 0701 03/27 0700    P  O  640 1140    I V  (mL/kg) 3117 9 (26 4) 1433 8 (11 9)    Blood  208 IV Piggyback 4350 520 1    Total Intake(mL/kg) 8107 9 (68 7) 3301 9 (27 5)    Urine (mL/kg/hr) 610 1306 (0 5)    Stool 0     Blood 15     Total Output 625 1306    Net +7482 9 +1995 9          Unmeasured Stool Occurrence 1 x           Height and Weights   Height: 5' 2" (157 5 cm)     Body mass index is 48 27 kg/m²  Weight (last 2 days)     Date/Time Weight    03/27/22 0400 120 (263 89)    03/26/22 0400 118 (260 36)    03/25/22 1930 114 (251 1)            Nutrition       Diet Orders   (From admission, onward)             Start     Ordered    03/25/22 2025  Diet Mohit/CHO Controlled; Consistent Carbohydrate Diet Level 3 (6 carb servings/90 grams CHO/meal); Cardiac  Diet effective now        References:    Nutrtion Support Algorithm Enteral vs  Parenteral   Question Answer Comment   Diet Type Mohit/CHO Controlled    Mohit/CHO Controlled Consistent Carbohydrate Diet Level 3 (6 carb servings/90 grams CHO/meal)    Other Restriction(s): Cardiac    RD to adjust diet per protocol?  Yes        03/25/22 2025                  Active Medications  Scheduled Meds:  Current Facility-Administered Medications   Medication Dose Route Frequency Provider Last Rate    albumin human  12 5 g Intravenous Once Mathieu Sawyer PA-C      bacitracin  1 large application Topical BID Trevon Oakes PA-C      bacitracin-polymyxin b   Both Eyes TID Trevon Morales [facility] LILIAN Qureshi      cefepime  1,000 mg Intravenous Q12H Geoffrey Gardiner PA-C Stopped (03/26/22 1911)    furosemide  40 mg Intravenous Once Mathieu Sawyer PA-C      insulin lispro  2-12 Units Subcutaneous 4x Daily [facility]) Trevon Oakes PA-C      levothyroxine  150 mcg Oral Early Morning Trevon Morales angel Qureshi PA-C      metoprolol tartrate  50 mg Oral Q12H [address] [place], [place]      norepinephrine  1-30 mcg/min Intravenous Titrated Geoffrey Gardiner PA-C 10 mcg/min (03/27/22 0309)    vancomycin  12 5 mg/kg (Adjusted) Intravenous Q24H Ovidio Badillo DO      vasopressin  0 04 Units/min Intravenous Continuous Jan Schulz PA-C 0 04 Units/min (03/27/22 0435)     Continuous Infusions:  norepinephrine, 1-30 mcg/min, Last Rate: 10 mcg/min (03/27/22 0309)  vasopressin, 0 04 Units/min, Last Rate: 0 04 Units/min (03/27/22 0435)      PRN Meds:        Invasive Devices Review  Invasive Devices  Report    Central Venous Catheter Line            CVC Central Lines 03/26/22 Triple 16cm 1 day          Peripheral Intravenous Line            Peripheral IV 03/25/22 Left;Proximal;Ventral (anterior) Forearm 1 day    Peripheral IV 03/25/22 Right Antecubital 1 day          Arterial Line            Arterial Line 03/25/22 Right Radial 1 day          Drain            Urethral Catheter Temperature probe 1 day              ---------------------------------------------------------------------------------------  Advance Directive and Living Will:      Power of :    POLST:    ---------------------------------------------------------------------------------------  Care Time Delivered:   Upon my evaluation, this patient had a high probability of imminent or life-threatening deterioration due to septic shock with hemodynamic instability requirng vasopressor support, which required my direct attention, intervention, and personal management  I have personally provided 20 minutes (0500 to [phone]) of critical care time, exclusive of procedures, teaching, family meetings, and any prior time recorded by providers other than myself  [facility]      Portions of the record may have been created with voice recognition software  Occasional wrong word or "sound a like" substitutions may have occurred due to the inherent limitations of voice recognition software    Read the chart carefully and recognize, using context, where substitutions have occurred

## 2022-03-27 NOTE — ASSESSMENT & PLAN NOTE
· LFTs elevated at time of admission:  AST 66, ALT 44, , T bili 4 26  · Labs reviewed in Care everywhere and showed that LFTs were elevated on 01/26/22:  AST 87, ALT 42, , T bili 2  · Patient states she has no known liver disease  · Continue to trend LFTs  · Hold statin  · Right upper quadrant ultrasound: Cholelithiasis and sludge with wall thickening and pericholecystic fluid  Sonographic Hedy Foots sign is negative  Findings are equivocal for acute cholecystitis, particularly in the setting of underlying hepatic dysfunction which can also cause gallbladder wall abnormalities  If there is continued clinical concern, further evaluation with HIDA scan recommended

## 2022-03-27 NOTE — ASSESSMENT & PLAN NOTE
· AFib RVR with heart rate in 160s on presentation  · Patient follows with [facility] cardiology  · Patient's home metoprolol and Coumadin held  Coumadin held secondary to coagulopathy with INR greater than 11   · Heart rate improving with fluid resuscitation  With improvement in BP would resume metoprolol  · TSH 0 301  · Troponin peaked at 109 and trending down  · BNP > 8900  · Check ECHO  · Persistent afib with RVR despite Lopressor IV and PO  Rates improved to 120s from 150s    · Rate controlled as of late morning 3/26

## 2022-03-27 NOTE — ASSESSMENT & PLAN NOTE
· AFib RVR with heart rate in 160s on presentation  · Patient follows with [facility] cardiology  · Patient's home metoprolol and Coumadin held  Coumadin held secondary to coagulopathy with INR greater than 11   · Heart rate improving with fluid resuscitation  With improvement in BP would resume metoprolol  · TSH 0 301  · Troponin peaked at 109 and trending down  · BNP > 8900  · Check ECHO  · Persistent afib with RVR despite Lopressor IV and PO  Rates improved to 120s from 150s    · Rate controlled as of late morning 3/26   · Heparin gtt started since INR <2

## 2022-03-27 NOTE — PLAN OF CARE
Pt continues on levophed and vasopressin  Pt continues to be confused to place and intermittently to time  Pt slept through shift without difficulty and without desaturations  Pt has gradually become more jaundiced as the shift has progressed  Marcel COPPOLA notified  CXR completed and results pending  Pt given 5 mg metolazone followed by 40mg IV lasix 1/2 hr later  Pt received 40 meq KCL IV last evening  TPA continues to instill and will reevaluate at [address]  Pt's right thigh more firm with more area of black/purple  Pt with moderate to large serosanguinous drainage  Will continue to monitor and support as needed  Problem: Nutrition/Hydration-ADULT  Goal: Nutrient/Hydration intake appropriate for improving, restoring or maintaining nutritional needs  Description: Monitor and assess patient's nutrition/hydration status for malnutrition  Collaborate with interdisciplinary team and initiate plan and interventions as ordered  Monitor patient's weight and dietary intake as ordered or per policy  Utilize nutrition screening tool and intervene as necessary  Determine patient's food preferences and provide high-protein, high-caloric foods as appropriate       INTERVENTIONS:  - Monitor oral intake, urinary output, labs, and treatment plans  - Assess nutrition and hydration status and recommend course of action  - Evaluate amount of meals eaten  - Assist patient with eating if necessary   - Allow adequate time for meals  - Recommend/ encourage appropriate diets, oral nutritional supplements, and vitamin/mineral supplements  - Order, calculate, and assess calorie counts as needed  - Recommend, monitor, and adjust tube feedings and TPN/PPN based on assessed needs  - Assess need for intravenous fluids  - Provide specific nutrition/hydration education as appropriate  - Include patient/family/caregiver in decisions related to nutrition  3/27/2022 0608 by Fara Marques RN  Outcome: Progressing  3/27/2022 0606 by Nurys Son RN  Outcome: Progressing     Problem: Prexisting or High Potential for Compromised Skin Integrity  Goal: Skin integrity is maintained or improved  Description: INTERVENTIONS:  - Identify patients at risk for skin breakdown  - Assess and monitor skin integrity  - Assess and monitor nutrition and hydration status  - Monitor labs   - Assess for incontinence   - Turn and reposition patient  - Assist with mobility/ambulation  - Relieve pressure over bony prominences  - Avoid friction and shearing  - Provide appropriate hygiene as needed including keeping skin clean and dry  - Evaluate need for skin moisturizer/barrier cream  - Collaborate with interdisciplinary team   - Patient/family teaching  - Consider wound care consult   3/27/2022 0608 by Nurys Son RN  Outcome: Progressing  3/27/2022 0606 by Nurys Son RN  Outcome: Progressing     Problem: MOBILITY - ADULT  Goal: Maintain or return to baseline ADL function  Description: INTERVENTIONS:  -  Assess patient's ability to carry out ADLs; assess patient's baseline for ADL function and identify physical deficits which impact ability to perform ADLs (bathing, care of mouth/teeth, toileting, grooming, dressing, etc )  - Assess/evaluate cause of self-care deficits   - Assess range of motion  - Assess patient's mobility; develop plan if impaired  - Assess patient's need for assistive devices and provide as appropriate  - Encourage maximum independence but intervene and supervise when necessary  - Involve family in performance of ADLs  - Assess for home care needs following discharge   - Consider OT consult to assist with ADL evaluation and planning for discharge  - Provide patient education as appropriate  3/27/2022 0608 by Nurys Son RN  Outcome: Progressing  3/27/2022 0606 by Nurys Son RN  Outcome: Progressing  Goal: Maintains/Returns to pre admission functional level  Description: INTERVENTIONS:  - Perform BMAT or MOVE assessment daily    - Set and communicate daily mobility goal to care team and patient/family/caregiver  - Collaborate with rehabilitation services on mobility goals if consulted  - Perform Range of Motion 3 times a day  - Reposition patient every 2 hours    - Record patient progress and toleration of activity level   3/27/2022 0608 by Darwin Rico RN  Outcome: Progressing  3/27/2022 0606 by Darwin Rico RN  Outcome: Progressing     Problem: PAIN - ADULT  Goal: Verbalizes/displays adequate comfort level or baseline comfort level  Description: Interventions:  - Encourage patient to monitor pain and request assistance  - Assess pain using appropriate pain scale  - Administer analgesics based on type and severity of pain and evaluate response  - Implement non-pharmacological measures as appropriate and evaluate response  - Consider cultural and social influences on pain and pain management  - Notify physician/advanced practitioner if interventions unsuccessful or patient reports new pain  3/27/2022 0608 by Darwin Rico RN  Outcome: Progressing  3/27/2022 0606 by Darwin Rico RN  Outcome: Progressing     Problem: INFECTION - ADULT  Goal: Absence or prevention of progression during hospitalization  Description: INTERVENTIONS:  - Assess and monitor for signs and symptoms of infection  - Monitor lab/diagnostic results  - Monitor all insertion sites, i e  indwelling lines, tubes, and drains  - Monitor endotracheal if appropriate and nasal secretions for changes in amount and color  - [place] appropriate cooling/warming therapies per order  - Administer medications as ordered  - Instruct and encourage patient and family to use good hand hygiene technique  - Identify and instruct in appropriate isolation precautions for identified infection/condition  3/27/2022 0608 by Darwin Rico RN  Outcome: Progressing  3/27/2022 0606 by Darwin Rico RN  Outcome: Progressing     Problem: SAFETY ADULT  Goal: Maintain or return to baseline ADL function  Description: INTERVENTIONS:  -  Assess patient's ability to carry out ADLs; assess patient's baseline for ADL function and identify physical deficits which impact ability to perform ADLs (bathing, care of mouth/teeth, toileting, grooming, dressing, etc )  - Assess/evaluate cause of self-care deficits   - Assess range of motion  - Assess patient's mobility; develop plan if impaired  - Assess patient's need for assistive devices and provide as appropriate  - Encourage maximum independence but intervene and supervise when necessary  - Involve family in performance of ADLs  - Assess for home care needs following discharge   - Consider OT consult to assist with ADL evaluation and planning for discharge  - Provide patient education as appropriate  3/27/2022 0608 by Martha Van RN  Outcome: Progressing  3/27/2022 0606 by Martha Van RN  Outcome: Progressing  Goal: Maintains/Returns to pre admission functional level  Description: INTERVENTIONS:  - Perform BMAT or MOVE assessment daily    - Set and communicate daily mobility goal to care team and patient/family/caregiver  - Collaborate with rehabilitation services on mobility goals if consulted  - Perform Range of Motion 3 times a day  - Reposition patient every 2 hours    - Record patient progress and toleration of activity level   3/27/2022 0608 by Martha Van RN  Outcome: Progressing  3/27/2022 0606 by Martha Van RN  Outcome: Progressing  Goal: Patient will remain free of falls  Description: INTERVENTIONS:  - Educate patient/family on patient safety including physical limitations  - Instruct patient to call for assistance with activity   - Consult OT/PT to assist with strengthening/mobility   - Keep Call bell within reach  - Keep bed low and locked with side rails adjusted as appropriate  - Keep care items and personal belongings within reach  - Initiate and maintain comfort rounds  - Make Fall Risk Sign visible to staff  - Offer Toileting every 2 Hours, in advance of need  - Initiate/Maintain bed/chair alarm prn  - Apply yellow socks and bracelet for high fall risk patients  - Consider moving patient to room near nurses station  3/27/2022 0608 by Evangelista Motley RN  Outcome: Progressing  3/27/2022 0606 by Evangelista Motlye RN  Outcome: Progressing     Problem: DISCHARGE PLANNING  Goal: Discharge to home or other facility with appropriate resources  Description: INTERVENTIONS:  - Identify barriers to discharge w/patient and caregiver  - Arrange for needed discharge resources and transportation as appropriate  - Identify discharge learning needs (meds, wound care, etc )  - Arrange for interpretive services to assist at discharge as needed  - Refer to Case Management Department for coordinating discharge planning if the patient needs post-hospital services based on physician/advanced practitioner order or complex needs related to functional status, cognitive ability, or social support system  3/27/2022 0608 by Evangelista Motley RN  Outcome: Progressing  3/27/2022 0606 by Evangelista Motley RN  Outcome: Progressing     Problem: Knowledge Deficit  Goal: Patient/family/caregiver demonstrates understanding of disease process, treatment plan, medications, and discharge instructions  Description: Complete learning assessment and assess knowledge base    Interventions:  - Provide teaching at level of understanding  - Provide teaching via preferred learning methods  3/27/2022 0608 by Evangelista Motley RN  Outcome: Progressing  3/27/2022 0606 by Evangelista Motley RN  Outcome: Progressing     Problem: CARDIOVASCULAR - ADULT  Goal: Maintains optimal cardiac output and hemodynamic stability  Description: INTERVENTIONS:  - Monitor I/O, vital signs and rhythm  - Monitor for S/S and trends of decreased cardiac output  - Administer and titrate ordered vasoactive medications to optimize hemodynamic stability  - Assess quality of pulses, skin color and temperature  - Assess for signs of decreased coronary artery perfusion  - Instruct patient to report change in severity of symptoms  3/27/2022 0608 by Elvis Otto RN  Outcome: Progressing  3/27/2022 0606 by Elvis Otto RN  Outcome: Progressing  Goal: Absence of cardiac dysrhythmias or at baseline rhythm  Description: INTERVENTIONS:  - Continuous cardiac monitoring, vital signs, obtain 12 lead EKG if ordered  - Administer antiarrhythmic and heart rate control medications as ordered  - Monitor electrolytes and administer replacement therapy as ordered  3/27/2022 0608 by Elvis Otto RN  Outcome: Progressing  3/27/2022 0606 by Elvis Otto RN  Outcome: Progressing     Problem: GENITOURINARY - ADULT  Goal: Maintains or returns to baseline urinary function  Description: INTERVENTIONS:  - Assess urinary function  - Encourage oral fluids to ensure adequate hydration if ordered  - Administer IV fluids as ordered to ensure adequate hydration  - Administer ordered medications as needed  - Offer frequent toileting  - Follow urinary retention protocol if ordered  3/27/2022 0608 by Elvis Otto RN  Outcome: Progressing  3/27/2022 0606 by Elvis Otto RN  Outcome: Progressing  Goal: Absence of urinary retention  Description: INTERVENTIONS:  - Assess patients ability to void and empty bladder  - Monitor I/O  - Bladder scan as needed  - Discuss with physician/AP medications to alleviate retention as needed  - Discuss catheterization for long term situations as appropriate  3/27/2022 0608 by Elvis Otto RN  Outcome: Progressing  3/27/2022 0606 by Elvis Otto RN  Outcome: Progressing  Goal: Urinary catheter remains patent  Description: INTERVENTIONS:  - Assess patency of urinary catheter  - If patient has a chronic gutierrez, consider changing catheter if non-functioning  - Follow guidelines for intermittent irrigation of non-functioning urinary catheter  3/27/2022 0608 by Vika Tatum RN  Outcome: Progressing  3/27/2022 0606 by Vika Tatum RN  Outcome: Progressing     Problem: METABOLIC, FLUID AND ELECTROLYTES - ADULT  Goal: Electrolytes maintained within normal limits  Description: INTERVENTIONS:  - Monitor labs and assess patient for signs and symptoms of electrolyte imbalances  - Administer electrolyte replacement as ordered  - Monitor response to electrolyte replacements, including repeat lab results as appropriate  - Instruct patient on fluid and nutrition as appropriate  3/27/2022 0608 by Vika Tatum RN  Outcome: Progressing  3/27/2022 0606 by Vika Tatum RN  Outcome: Progressing  Goal: Fluid balance maintained  Description: INTERVENTIONS:  - Monitor labs   - Monitor I/O and WT  - Instruct patient on fluid and nutrition as appropriate  - Assess for signs & symptoms of volume excess or deficit  3/27/2022 0608 by Vika Tatum RN  Outcome: Progressing  3/27/2022 0606 by Vika Tatum RN  Outcome: Progressing  Goal: Glucose maintained within target range  Description: INTERVENTIONS:  - Monitor Blood Glucose as ordered  - Assess for signs and symptoms of hyperglycemia and hypoglycemia  - Administer ordered medications to maintain glucose within target range  - Assess nutritional intake and initiate nutrition service referral as needed  3/27/2022 0608 by Vika Tatum RN  Outcome: Progressing  3/27/2022 0606 by Vika Tatum RN  Outcome: Progressing     Problem: SKIN/TISSUE INTEGRITY - ADULT  Goal: Incision(s), wounds(s) or drain site(s) healing without S/S of infection  Description: INTERVENTIONS  - Assess and document dressing, incision, wound bed, drain sites and surrounding tissue  - Provide patient and family education  - Perform skin care/dressing changes as needed  3/27/2022 0608 by Vika Tatum RN  Outcome: Progressing  3/27/2022 0606 by Fara Marques RN  Outcome: Progressing  Goal: Pressure injury heals and does not worsen  Description: Interventions:  - Implement low air loss mattress or specialty surface (Criteria met)  - Apply silicone foam dressing  - Use special pressure reducing interventions such as waffle cushion when in chair   - Apply fecal or urinary incontinence containment device   - Perform passive or active ROM every 8  - Turn and reposition patient & offload bony prominences every 2 hours   - Utilize friction reducing device or surface for transfers   - Consider consults to  interdisciplinary teams such as wound team  - Use incontinent care products after each incontinent episode such as barrier cream  - Consider nutrition services referral as needed  3/27/2022 0608 by Fara Marques RN  Outcome: Progressing  3/27/2022 0606 by Fara Marques RN  Outcome: Progressing     Problem: Potential for Falls  Goal: Patient will remain free of falls  Description: INTERVENTIONS:  - Educate patient/family on patient safety including physical limitations  - Instruct patient to call for assistance with activity   - Consult OT/PT to assist with strengthening/mobility   - Keep Call bell within reach  - Keep bed low and locked with side rails adjusted as appropriate  - Keep care items and personal belongings within reach  - Initiate and maintain comfort rounds  - Make Fall Risk Sign visible to staff  - Offer Toileting every 2 Hours, in advance of need  - Initiate/Maintain bed/chair alarm prn  - Apply yellow socks and bracelet for high fall risk patients  - Consider moving patient to room near nurses station  3/27/2022 0608 by Fara Marques RN  Outcome: Progressing  3/27/2022 0606 by Fara Marques RN  Outcome: Progressing

## 2022-03-27 NOTE — PROGRESS NOTES
Vancomycin IV Pharmacy-to-Dose Consultation    Rom Mahmood is a 80 y o  female who is currently receiving Vancomycin IV with management by the Pharmacy Consult service  Assessment/Plan:    Day # 2    The patient was reviewed and the current dose of 1000 mg IV every 24 hours will be continued  Dose will be adjusted accordingly based on renal fxn  Next scheduled trough to be drawn on 3/29/22 @ 0700  We will continue to follow the patients culture results and clinical progress daily       Kristi Davison, Pharmacist

## 2022-03-27 NOTE — ASSESSMENT & PLAN NOTE
· Patient is maintained on Coumadin for atrial fib outpatient  · INR 11 78 at time of admission  · Hold Coumadin and trend INR  · Vit K 10 mg IV x1 in ED  · Repeat INR 7 6 Vit K 10 mg PO x1   · 1 unit FFP  · 3/27: INR 2 01  · CT facial bones and head revealed Anterolateral right frontal scalp hematoma without acute traumatic injury to the facial bones  · Trauma CT scans of CT chest/abdomen/pelvis negative for hematoma or injury  · Monitor H/H and platelets  · Continue to monitor for further bleeding  · Would repeat CT scan for acute changes  · Heparin gtt started 3/27

## 2022-03-27 NOTE — NURSING NOTE
Attempted to get blood return off of medial and distal ports  Unable to remove TPA from ports  Mike COPPOLA notified

## 2022-03-27 NOTE — PROGRESS NOTES
Progress Note - General Surgery   Tyrone Erickson 80 y o  female MRN: [id number]  Unit/Bed#: -01 Encounter: [id number]    Assessment:  Decreasing area of ecchymosis with sloughing superficial skin of the right medial thigh  Status post fall with multiple contusions, reportedly down for 4 days  Septic shock present on admission with tachycardia, tachypnea, leukocytosis, LEONARDO  Leukocytosis improving 16 50 (26 42, 30 84,)  Elevated INR 11 78 on admission, now 2 01  Hgb 10 1 (10 5, 13 7)  Blood cultures with GPC in clusters  Elevated liver enzymes, evidence of cirrhosis on ultrasound, appears jaundiced  Confusion    Plan:  Continue conservative management of right thigh at this time  The patient is a surgical high risk   Continue antibiotics as indicated  Medicate PRN pain, minimize opioids  Follow CBC and CMP  Frequent turning while in bed    Subjective/Objective     Subjective: Patient resting in bed this AM  Complains of pain to right thigh with palpation  Denies fevers/chills  Objective:   Blood pressure 127/65, pulse 85, temperature 99 3 °F (37 4 °C), resp  rate (!) 25, height 5' 2" (1 575 m), weight 120 kg (263 lb 14 3 oz), SpO2 97 %  ,Body mass index is 48 27 kg/m²  Intake/Output Summary (Last 24 hours) at 3/27/2022 0708  Last data filed at 3/27/2022 0630  Gross per 24 hour   Intake 3301 86 ml   Output 1496 ml   Net 1805 86 ml       Invasive Devices  Report    Central Venous Catheter Line            CVC Central Lines 03/26/22 Triple 16cm 1 day          Peripheral Intravenous Line            Peripheral IV 03/25/22 Left;Proximal;Ventral (anterior) Forearm 1 day    Peripheral IV 03/25/22 Right Antecubital 1 day          Arterial Line            Arterial Line 03/25/22 Right Radial 1 day          Drain            Urethral Catheter Temperature probe 1 day                Physical Exam:  Gen: alert to person and place, no distress  Ext: Right thigh contusion decreasing in size, area is tender to touch  Superficial skin slough  Small amount of serous drainage for areas of skin tear  No purulence noted  No tracking erythema noted  Lab, Imaging and other studies:  I have personally reviewed pertinent lab results      CBC:   Lab Results   Component Value Date    WBC 16 50 (H) 03/27/2022    HGB 10 1 (L) 03/27/2022    HCT 27 5 (L) 03/27/2022    MCV 97 03/27/2022     03/27/2022    MCH 35 6 (H) 03/27/2022    MCHC 36 7 03/27/2022    RDW 14 6 03/27/2022    MPV 10 5 03/27/2022     CMP:   Lab Results   Component Value Date    SODIUM 134 (L) 03/27/2022    K 4 1 03/27/2022     03/27/2022    CO2 22 03/27/2022    BUN 63 (H) 03/27/2022    CREATININE 1 77 (H) 03/27/2022    CALCIUM 8 0 (L) 03/27/2022    AST 62 (H) 03/27/2022    ALT 35 03/27/2022    ALKPHOS 123 (H) 03/27/2022    EGFR 26 03/27/2022     Coagulation:   Lab Results   Component Value Date    INR 2 01 (H) 03/27/2022     VTE Pharmacologic Prophylaxis: Reason for no pharmacologic prophylaxis supratherapeutic INR on admission, multiple areas of ecchymosis  VTE Mechanical Prophylaxis: sequential compression device       Nicholas Medeiros PA-C

## 2022-03-27 NOTE — ASSESSMENT & PLAN NOTE
· CK elevated on admission at 534 Likely secondary to fall  · Patient currently receiving fluid resuscitation for septic shock  · CK decreasing on repeat  Trend as clinically necessary

## 2022-03-27 NOTE — ASSESSMENT & PLAN NOTE
Background:  Patient found down on floor by daughter  Patient is alert and oriented however does not entirely recall events of suspected fall  Patient does state that she believes she may have fell on a chair however daughter stated that she was found in the living room next to a cabinet  Patient noted to have multiple injuries and areas of ecchymosis  Trauma alert called from the ED  Patient met sepsis criteria on admission  · Meets criteria as evidence by heart rate 160s, respiratory rate 20-25, WBCs 30 and LEONARDO present on admission  · Source likely urinary vs cellulitis right thigh +/- bacteremia  UA showed small leukocytes, no nitrates, WBCs 10-20 and innumerable bacteria  Culture: Gram negative mark enteric like  Sensitivity pending  · Blood culture x2: 1/2 positive for GPC in clusters  · Initial lactic acid 3 3  Lactic acidosis cleared  · Started on ceftriaxone in ED, but transitioned to Vanco/Cefepime 3/26 due to worsening clinical status  · MRSA swab sent  · Initially fluid resuscitated with 1 75 L of lactated Ringer's which was based on ideal body weight due to BMI of 57 66  Patient remained hypotensive and therefore was given an additional 1 25 L  Norepinephrine ordered however not started at this time because blood pressure improved during 3 L fluid boluses  · Discussed with patient central line and arterial line placements  Patient would be agreeable if indicated  · CT the chest/abdomen/pelvis: No findings of acute thoracic or abdominopelvic injury  Small right and trace left pleural effusions are new since November 14, 2021  Persistent findings of hepatic cirrhosis new trace perihepatic ascites  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  Recommendation is for follow-up low radiation dose chest CT in one year  · Of note patient also had elevated LFTs on admission    Patient reports no history of liver disease however labs reviewed in Care everywhere reveal previous elevation LFTs  Also now with elevated INR of 11 78    · 3/25 persistent hypotension despite fluid bolus'  Levophed started, arterial line and CVC inserted    · 3/26 Vasopressin added to due escalating dose of levophed  · "Insect bite" right thigh with cellulitis, appears to be worsening, concern for abscess vs necrotizing infection- CT leg ordered and General Surgery consulted  · CT showed: Diffuse subcutaneous edema and overlying skin thickening most prominent about the medial aspect of the mid/distal thigh with superficial and deep subcutaneous fluid #3/169 appears nonloculated without a surrounding rind however abscess cannot be excluded   without IV contrast    · Surgery will continue to follow- conservative management with wound care

## 2022-03-28 ENCOUNTER — APPOINTMENT (INPATIENT)
Dept: CT IMAGING | Facility: HOSPITAL | Age: 82
DRG: 853 | End: 2022-03-28
Payer: MEDICARE

## 2022-03-28 ENCOUNTER — APPOINTMENT (INPATIENT)
Dept: NON INVASIVE DIAGNOSTICS | Facility: HOSPITAL | Age: 82
DRG: 853 | End: 2022-03-28
Payer: MEDICARE

## 2022-03-28 LAB
ALBUMIN SERPL BCP-MCNC: 1.9 G/DL (ref 3.5–5)
ALP SERPL-CCNC: 130 U/L (ref 46–116)
ALT SERPL W P-5'-P-CCNC: 37 U/L (ref 12–78)
ANION GAP SERPL CALCULATED.3IONS-SCNC: 8 MMOL/L (ref 4–13)
AORTIC ROOT: 2.9 CM
AORTIC VALVE MEAN VELOCITY: 27.6 M/S
APICAL FOUR CHAMBER EJECTION FRACTION: 85 %
APTT PPP: 55 SECONDS (ref 23–37)
APTT PPP: 74 SECONDS (ref 23–37)
APTT PPP: 80 SECONDS (ref 23–37)
ASCENDING AORTA: 3.8 CM (ref 2.19–3.28)
AST SERPL W P-5'-P-CCNC: 56 U/L (ref 5–45)
AV AREA BY CONTINUOUS VTI: 0.7 CM2
AV AREA PEAK VELOCITY: 0.9 CM2
AV LVOT MEAN GRADIENT: 4 MMHG
AV LVOT PEAK GRADIENT: 6 MMHG
AV MEAN GRADIENT: 41 MMHG
AV PEAK GRADIENT: 77 MMHG
AV VALVE AREA: 0.62 CM2
AV VELOCITY RATIO: 0.28
BILIRUB DIRECT SERPL-MCNC: 3.48 MG/DL (ref 0–0.2)
BILIRUB SERPL-MCNC: 5.74 MG/DL (ref 0.2–1)
BUN SERPL-MCNC: 57 MG/DL (ref 5–25)
CALCIUM ALBUM COR SERPL-MCNC: 9.8 MG/DL (ref 8.3–10.1)
CALCIUM SERPL-MCNC: 8.1 MG/DL (ref 8.3–10.1)
CHLORIDE SERPL-SCNC: 101 MMOL/L (ref 100–108)
CO2 SERPL-SCNC: 25 MMOL/L (ref 21–32)
CREAT SERPL-MCNC: 1.53 MG/DL (ref 0.6–1.3)
DOP CALC AO PEAK VEL: 4.3 M/S
DOP CALC AO VTI: 87 CM
DOP CALC LVOT AREA: 2.27 CM2
DOP CALC LVOT DIAMETER: 1.7 CM
DOP CALC LVOT PEAK VEL VTI: 23.67 CM
DOP CALC LVOT PEAK VEL: 1.22 M/S
DOP CALC LVOT STROKE INDEX: 25.5 ML/M2
DOP CALC LVOT STROKE VOLUME: 53.7 CM3
ERYTHROCYTE [DISTWIDTH] IN BLOOD BY AUTOMATED COUNT: 14.6 % (ref 11.6–15.1)
FERRITIN SERPL-MCNC: 922 NG/ML (ref 8–388)
FRACTIONAL SHORTENING: 49 % (ref 28–44)
GFR SERPL CREATININE-BSD FRML MDRD: 31 ML/MIN/1.73SQ M
GLUCOSE SERPL-MCNC: 108 MG/DL (ref 65–140)
GLUCOSE SERPL-MCNC: 116 MG/DL (ref 65–140)
GLUCOSE SERPL-MCNC: 118 MG/DL (ref 65–140)
GLUCOSE SERPL-MCNC: 129 MG/DL (ref 65–140)
GLUCOSE SERPL-MCNC: 98 MG/DL (ref 65–140)
HBV CORE AB SER QL: NORMAL
HBV SURFACE AB SER-ACNC: <3.1 MIU/ML
HBV SURFACE AG SER QL: NORMAL
HCT VFR BLD AUTO: 27.9 % (ref 34.8–46.1)
HCV AB SER QL: NORMAL
HGB BLD-MCNC: 9.8 G/DL (ref 11.5–15.4)
IGG SERPL-MCNC: 1280 MG/DL (ref 700–1600)
INR PPP: 1.91 (ref 0.84–1.19)
INTERVENTRICULAR SEPTUM IN DIASTOLE (PARASTERNAL SHORT AXIS VIEW): 1 CM
INTERVENTRICULAR SEPTUM: 1 CM (ref 0.57–1.06)
IRON SATN MFR SERPL: 39 % (ref 15–50)
IRON SERPL-MCNC: 55 UG/DL (ref 50–170)
LAAS-AP4: 30.3 CM2
LEFT ATRIUM SIZE: 4.1 CM
LEFT INTERNAL DIMENSION IN SYSTOLE: 2.3 CM (ref 4.71–7.13)
LEFT VENTRICULAR INTERNAL DIMENSION IN DIASTOLE: 4.5 CM (ref 7.89–11.76)
LEFT VENTRICULAR POSTERIOR WALL IN END DIASTOLE: 0.9 CM (ref 0.55–1.05)
LEFT VENTRICULAR STROKE VOLUME: 74 ML
LVSV (TEICH): 74 ML
MCH RBC QN AUTO: 35.3 PG (ref 26.8–34.3)
MCHC RBC AUTO-ENTMCNC: 35.1 G/DL (ref 31.4–37.4)
MCV RBC AUTO: 100 FL (ref 82–98)
PLATELET # BLD AUTO: 151 THOUSANDS/UL (ref 149–390)
PMV BLD AUTO: 10.9 FL (ref 8.9–12.7)
POTASSIUM SERPL-SCNC: 3.6 MMOL/L (ref 3.5–5.3)
PROCALCITONIN SERPL-MCNC: 2.65 NG/ML
PROT SERPL-MCNC: 5.9 G/DL (ref 6.4–8.2)
PROTHROMBIN TIME: 21.4 SECONDS (ref 11.6–14.5)
RA PRESSURE ESTIMATED: 15 MMHG
RBC # BLD AUTO: 2.78 MILLION/UL (ref 3.81–5.12)
RIGHT ATRIAL 2D VOLUME: 65 ML
RIGHT ATRIUM AREA SYSTOLE A4C: 21.4 CM2
RIGHT VENTRICLE ID DIMENSION: 4.1 CM
RV PSP: 44 MMHG
SL CV LV EF: 70
SL CV PED ECHO LEFT VENTRICLE DIASTOLIC VOLUME (MOD BIPLANE) 2D: 93 ML
SL CV PED ECHO LEFT VENTRICLE SYSTOLIC VOLUME (MOD BIPLANE) 2D: 19 ML
SODIUM SERPL-SCNC: 134 MMOL/L (ref 136–145)
TIBC SERPL-MCNC: 142 UG/DL (ref 250–450)
TR MAX PG: 29 MMHG
TR PEAK VELOCITY: 2.7 M/S
TRICUSPID VALVE PEAK REGURGITATION VELOCITY: 2.69 M/S
WBC # BLD AUTO: 9.42 THOUSAND/UL (ref 4.31–10.16)
Z-SCORE OF ASCENDING AORTA: 3.86
Z-SCORE OF INTERVENTRICULAR SEPTUM IN END DIASTOLE: 1.46
Z-SCORE OF LEFT VENTRICULAR DIMENSION IN END DIASTOLE: -7.61
Z-SCORE OF LEFT VENTRICULAR DIMENSION IN END SYSTOLE: -7.3
Z-SCORE OF LEFT VENTRICULAR POSTERIOR WALL IN END DIASTOLE: 0.78

## 2022-03-28 PROCEDURE — 93306 TTE W/DOPPLER COMPLETE: CPT

## 2022-03-28 PROCEDURE — 87340 HEPATITIS B SURFACE AG IA: CPT | Performed by: INTERNAL MEDICINE

## 2022-03-28 PROCEDURE — 73701 CT LOWER EXTREMITY W/DYE: CPT

## 2022-03-28 PROCEDURE — 83550 IRON BINDING TEST: CPT | Performed by: INTERNAL MEDICINE

## 2022-03-28 PROCEDURE — 82728 ASSAY OF FERRITIN: CPT | Performed by: INTERNAL MEDICINE

## 2022-03-28 PROCEDURE — 86015 ACTIN ANTIBODY EACH: CPT | Performed by: INTERNAL MEDICINE

## 2022-03-28 PROCEDURE — 85730 THROMBOPLASTIN TIME PARTIAL: CPT | Performed by: STUDENT IN AN ORGANIZED HEALTH CARE EDUCATION/TRAINING PROGRAM

## 2022-03-28 PROCEDURE — 86381 MITOCHONDRIAL ANTIBODY EACH: CPT | Performed by: INTERNAL MEDICINE

## 2022-03-28 PROCEDURE — 82103 ALPHA-1-ANTITRYPSIN TOTAL: CPT | Performed by: INTERNAL MEDICINE

## 2022-03-28 PROCEDURE — 82248 BILIRUBIN DIRECT: CPT | Performed by: INTERNAL MEDICINE

## 2022-03-28 PROCEDURE — 97163 PT EVAL HIGH COMPLEX 45 MIN: CPT

## 2022-03-28 PROCEDURE — 86803 HEPATITIS C AB TEST: CPT | Performed by: INTERNAL MEDICINE

## 2022-03-28 PROCEDURE — 83540 ASSAY OF IRON: CPT | Performed by: INTERNAL MEDICINE

## 2022-03-28 PROCEDURE — 82948 REAGENT STRIP/BLOOD GLUCOSE: CPT

## 2022-03-28 PROCEDURE — 85027 COMPLETE CBC AUTOMATED: CPT | Performed by: PHYSICIAN ASSISTANT

## 2022-03-28 PROCEDURE — 87040 BLOOD CULTURE FOR BACTERIA: CPT | Performed by: PHYSICIAN ASSISTANT

## 2022-03-28 PROCEDURE — G1004 CDSM NDSC: HCPCS

## 2022-03-28 PROCEDURE — 99233 SBSQ HOSP IP/OBS HIGH 50: CPT | Performed by: ANESTHESIOLOGY

## 2022-03-28 PROCEDURE — 85610 PROTHROMBIN TIME: CPT | Performed by: PHYSICIAN ASSISTANT

## 2022-03-28 PROCEDURE — 86704 HEP B CORE ANTIBODY TOTAL: CPT | Performed by: INTERNAL MEDICINE

## 2022-03-28 PROCEDURE — NC001 PR NO CHARGE: Performed by: PHYSICIAN ASSISTANT

## 2022-03-28 PROCEDURE — 82784 ASSAY IGA/IGD/IGG/IGM EACH: CPT | Performed by: INTERNAL MEDICINE

## 2022-03-28 PROCEDURE — 97530 THERAPEUTIC ACTIVITIES: CPT

## 2022-03-28 PROCEDURE — 84145 PROCALCITONIN (PCT): CPT | Performed by: PHYSICIAN ASSISTANT

## 2022-03-28 PROCEDURE — 86706 HEP B SURFACE ANTIBODY: CPT | Performed by: INTERNAL MEDICINE

## 2022-03-28 PROCEDURE — 86038 ANTINUCLEAR ANTIBODIES: CPT | Performed by: INTERNAL MEDICINE

## 2022-03-28 PROCEDURE — 97535 SELF CARE MNGMENT TRAINING: CPT

## 2022-03-28 PROCEDURE — 80053 COMPREHEN METABOLIC PANEL: CPT | Performed by: PHYSICIAN ASSISTANT

## 2022-03-28 PROCEDURE — 93306 TTE W/DOPPLER COMPLETE: CPT | Performed by: STUDENT IN AN ORGANIZED HEALTH CARE EDUCATION/TRAINING PROGRAM

## 2022-03-28 PROCEDURE — 97167 OT EVAL HIGH COMPLEX 60 MIN: CPT

## 2022-03-28 RX ORDER — SODIUM CHLORIDE 9 MG/ML
100 INJECTION, SOLUTION INTRAVENOUS CONTINUOUS
Status: DISCONTINUED | OUTPATIENT
Start: 2022-03-28 | End: 2022-03-29

## 2022-03-28 RX ORDER — POTASSIUM CHLORIDE 20 MEQ/1
40 TABLET, EXTENDED RELEASE ORAL ONCE
Status: COMPLETED | OUTPATIENT
Start: 2022-03-28 | End: 2022-03-28

## 2022-03-28 RX ORDER — ALBUMIN, HUMAN INJ 5% 5 %
12.5 SOLUTION INTRAVENOUS ONCE
Status: COMPLETED | OUTPATIENT
Start: 2022-03-28 | End: 2022-03-28

## 2022-03-28 RX ADMIN — POTASSIUM CHLORIDE 40 MEQ: 1500 TABLET, EXTENDED RELEASE ORAL at 07:42

## 2022-03-28 RX ADMIN — IODIXANOL 100 ML: 320 INJECTION, SOLUTION INTRAVASCULAR at 12:05

## 2022-03-28 RX ADMIN — ALBUMIN (HUMAN) 12.5 G: 12.5 INJECTION, SOLUTION INTRAVENOUS at 06:40

## 2022-03-28 RX ADMIN — BACITRACIN 1 LARGE APPLICATION: 500 OINTMENT TOPICAL at 18:00

## 2022-03-28 RX ADMIN — NOREPINEPHRINE BITARTRATE 8 MCG/MIN: 1 INJECTION INTRAVENOUS at 16:16

## 2022-03-28 RX ADMIN — HEPARIN SODIUM 2000 UNITS: 1000 INJECTION INTRAVENOUS; SUBCUTANEOUS at 01:25

## 2022-03-28 RX ADMIN — BACITRACIN 1 LARGE APPLICATION: 500 OINTMENT TOPICAL at 08:08

## 2022-03-28 RX ADMIN — LEVOTHYROXINE SODIUM 150 MCG: 150 TABLET ORAL at 05:28

## 2022-03-28 RX ADMIN — CEFEPIME HYDROCHLORIDE 1000 MG: 1 INJECTION, SOLUTION INTRAVENOUS at 06:40

## 2022-03-28 RX ADMIN — HEPARIN SODIUM 13.1 UNITS/KG/HR: 10000 INJECTION, SOLUTION INTRAVENOUS at 13:42

## 2022-03-28 RX ADMIN — METOPROLOL TARTRATE 50 MG: 50 TABLET, FILM COATED ORAL at 20:17

## 2022-03-28 RX ADMIN — VASOPRESSIN 0.04 UNITS/MIN: 20 INJECTION INTRAVENOUS at 00:17

## 2022-03-28 RX ADMIN — CEFEPIME HYDROCHLORIDE 1000 MG: 1 INJECTION, SOLUTION INTRAVENOUS at 20:09

## 2022-03-28 RX ADMIN — VANCOMYCIN HYDROCHLORIDE 1500 MG: 1 INJECTION, POWDER, LYOPHILIZED, FOR SOLUTION INTRAVENOUS at 13:39

## 2022-03-28 RX ADMIN — METOPROLOL TARTRATE 50 MG: 50 TABLET, FILM COATED ORAL at 08:07

## 2022-03-28 RX ADMIN — SODIUM CHLORIDE 100 ML/HR: 0.9 INJECTION, SOLUTION INTRAVENOUS at 11:30

## 2022-03-28 NOTE — DISCHARGE INSTR - OTHER ORDERS
Skin care plans:  1-Calazime to sacrum, buttocks TID and PRN  2-Hydraguard to bilateral heel BID and PRN  3-Elevate heels to offload pressure  4-Ehob cushion when out of bed  5-Turn/repoisiton q2h or when medically stable for pressure re-distribution on skin  6-Moisturize skin daily with skin nourishing cream  7-Cleanse right medial thigh wound with normal saline, apply santyl to all necrotic areas wound bed, then pack wound with saline moistened Kerlix/Grayson peer, cover with gauze 4x4 , then ABD pad change daily and prn soil or dislodgement  8-Cleanse open area under left breast with normal saline, apply Maxorb robe to open wound then pad beneath breast and intact blister on breast with an ABD pad  Change daily and prn soil or dislodgment  9-DTI left anterior foot open to air      Follow up with wound clinic

## 2022-03-28 NOTE — ASSESSMENT & PLAN NOTE
· LFTs elevated at time of admission:  AST 66, ALT 44, , T bili 4 26  · Labs reviewed in Care everywhere and showed that LFTs were elevated on 01/26/22:  AST 87, ALT 42, , T bili 2  · Patient states she has no known liver disease  · Continue to trend LFTs  · Hold statin  · Right upper quadrant ultrasound: Cholelithiasis and sludge with wall thickening and pericholecystic fluid  Sonographic Fauzia Sinner sign is negative  Findings are equivocal for acute cholecystitis, particularly in the setting of underlying hepatic dysfunction which can also cause gallbladder wall abnormalities  If there is continued clinical concern, further evaluation with HIDA scan recommended  · GI consult pending

## 2022-03-28 NOTE — PLAN OF CARE
Problem: OCCUPATIONAL THERAPY ADULT  Goal: Performs self-care activities at highest level of function for planned discharge setting  See evaluation for individualized goals  Description: Treatment Interventions: ADL retraining,Functional transfer training,UE strengthening/ROM,Endurance training,Patient/family training,Equipment evaluation/education,Cognitive reorientation,Neuromuscular reeducation,Compensatory technique education,Continued evaluation,Energy conservation,Activityengagement          See flowsheet documentation for full assessment, interventions and recommendations  Note: Limitation: Decreased ADL status,Decreased UE ROM,Decreased UE strength,Decreased Safe judgement during ADL,Decreased cognition,Decreased endurance,Decreased self-care trans,Decreased high-level ADLs  Prognosis: Good  Assessment: Pt is a 80 y o  female, admitted to [facility] 3/25/2022 after being found on floor for an unknown amount of time  Dx: septic shock  Pt with PMHx impacting their performance during ADL tasks, including: hypothyroidism, DM2, hyperlipidemia and A-Fib  Prior to admission to the hospital Pt was performing ADLs without physical assistance  IADLs with physical assistance  Functional transfers/ambulation without physical assistance  Cognitive status was PTA was Intact  OT order placed to assess Pt's ADLs, cognitive status, and performance during functional tasks in order to maximize safety and independence while making most appropriate d/c recommendations   Pt's clinical presentation is currently evolving given new onset deficits that effect Pt's occupational performance and ability to safely return to OF including decrease activity tolerance, decrease standing balance, decrease performance during ADL tasks, decrease cognition, decrease BUE ROM, decrease UB MS, increased pain, decrease generalized strength, decrease activity engagement, decrease performance during functional transfers, limited family support and high fall risk combined with medical complications of hypotension, pain impacting overall mobility status, abnormal renal lab values, abnormal H&H, abnormal CBC, abnormal sodium values, edema/swelling, wounds and need for input for mobility technique/safety  Personal factors affecting Pt at time of initial evaluation include: step(s) to enter environment, limited home support, advanced age, inability to perform IADLs, inability to ambulate household distances, recent fall(s)/fall history and questionable non-compliance  At this time, Pt required OT/PT co-eval d/t significant deficits with mobility and safety concerns  Pt will benefit from continued skilled OT services to address deficits as defined above and to maximize level independence/participation during ADLs and functional tasks to facilitate return toward PLOF and improved quality of life  From an occupational therapy standpoint, recommendation at time of d/c would be post acute rehabilitation services       OT Discharge Recommendation: Post acute rehabilitation services

## 2022-03-28 NOTE — CONSULTS
Consultation - [facility]  Jeannine Chen 80 y o  female MRN: [id number]  Unit/Bed#: -01 Encounter: [id number]        Inpatient consult to gastroenterology  Consult performed by: LEXIE Cruz  Consult ordered by: Bola Mar PA-C          Reason for Consult / Principal Problem:   Elevated liver enzymes, longstanding with acute worsening  Nodular appearence to liver, suggestive of cirrhosis      ASSESSMENT AND PLAN:    Elevated liver enzymes, longstanding with acute worsening  Nodular appearence to liver, suggestive of cirrhosis    Dating back to 2018 per chart review with elevated AST and ALT and Alk phos, per PCP note has been elevated since GYN cancer   INR not accurate for measure of intrinsic function as she was on coumadin for afib with presenting INR of 11  plts currently normal at 151  Etiology can be from hepatic congestion, chronic hepatitis, or iron overload  Recommend checking chronic hepatitis panel and iron panel  Echo pending  Limit hepatotoxic medications   MELD NA 27  Child coughlin 11 points, class C  r factor 1 0 consistent with cholestatic injury  Unsure if dementia vs metabolic encephalopathy vs hepatic encephalopathy  If felt to be possible hepatic encephalopathy, can check ammonia level and consider addition of lactulose if elevated to see if any improvement      [facility]  [facility]astroenter[facility]    Patient plan of care formulated with Dr Kathryn Hensley     ______________________________________________________________________    HPI:    Patient is a 80year old female with a past medical history of hyporthyroidism, diabetes mellitus type 2, hyperlipidemia and atrial fibrillation on Coumadin  Lives alone with her dog  Reports she was found on the floor by her daughter, unsure how long she was on the floor  Does not remember what happened, but remembered seeing her dog walking around her  Thinks she hit her head on a chair        Says she does not believe that she has any history of liver disease, but upon chart review enzymes were elevated since 2018  She denies any GI symptoms  Does not know if she has had any previous endoscopic evaluations  Says she is on coumadin but does not know why  OTC medications-denies    Supplements-denies    Tobacco use-denies    ETOH use-denies    Drug use-denies    Family history for   Colon cancer-denies  Pancreatic cancer-denies  Gastric cancer-denies  Esophogeal cancer-denies  Liver disease-denies  Bowel disorders-denies          REVIEW OF SYSTEMS:    Review of Systems   Constitutional:        Per HPI   All other systems reviewed and are negative  Historical Information   Past Medical History:   Diagnosis Date    LEONARDO [facility]) ([facility] )     Atrial fibrillation (HCC)     CHF (congestive heart failure) (HCC)     diastolic grade 1    Cirrhosis ([place] [address] )     Diabetes mellitus ([place] [address] )     Disease of thyroid gland     hypothyroid    Endometrial ca ([place] [address] )     Hiatal hernia     Hyperlipidemia     Hypertension     Hypothyroid     Lung nodules     Periumbilical hernia     Pulmonary HTN ([place] [address] )     Thoracic aortic aneurysm without rupture ([place] [address] ) 03/25/2022    41 mm     Past Surgical History:   Procedure Laterality Date    HERNIA REPAIR      HIP ARTHROPLASTY      HYSTERECTOMY      JOINT REPLACEMENT Bilateral     knee; b/l hip    KNEE ARTHROPLASTY      OOPHORECTOMY       Social History   Social History     Substance and Sexual Activity   Alcohol Use Never     Social History     Substance and Sexual Activity   Drug Use Never     Social History     Tobacco Use   Smoking Status Never Smoker   Smokeless Tobacco Never Used     History reviewed  No pertinent family history      Meds/Allergies     Medications Prior to Admission   Medication    fluticasone (FLONASE) 50 mcg/act nasal spray    furosemide (LASIX) 40 mg tablet    levothyroxine 150 mcg tablet    metoprolol succinate (TOPROL-XL) 100 mg 24 hr tablet    potassium chloride (K-DUR,KLOR-CON) 10 mEq tablet    traMADol (Ultram) 50 mg tablet    warfarin (COUMADIN) 5 mg tablet     Current Facility-Administered Medications   Medication Dose Route Frequency    bacitracin topical ointment 1 large application  1 large application Topical BID    bacitracin-polymyxin b (POLYSPORIN) ophthalmic ointment   Both Eyes TID    cefepime (MAXIPIME) IVPB (premix in dextrose) 1,000 mg 50 mL  1,000 mg Intravenous Q12H    heparin (porcine) 25,000 units in 0 45% NaCl 250 mL infusion (premix)  3-20 Units/kg/hr (Order-Specific) Intravenous Titrated    heparin (porcine) injection 2,000 Units  2,000 Units Intravenous Q1H PRN    heparin (porcine) injection 4,000 Units  4,000 Units Intravenous Q1H PRN    insulin lispro (HumaLOG) 100 units/mL subcutaneous injection 2-12 Units  2-12 Units Subcutaneous 4x Daily (AC & HS)    levothyroxine tablet 150 mcg  150 mcg Oral Early Morning    metoprolol tartrate (LOPRESSOR) tablet 50 mg  50 mg Oral Q12H MOR    norepinephrine (LEVOPHED) 8 mg (DOUBLE CONCENTRATION) IV in sodium chloride 0 9% 250 mL  1-30 mcg/min Intravenous Titrated       Allergies   Allergen Reactions    Penicillins Hives           Sulfamethoxazole-Trimethoprim GI Intolerance           Objective     Blood pressure 107/55, pulse 74, temperature 98 1 °F (36 7 °C), resp  rate 22, height 5' 2" (1 575 m), weight 118 kg (260 lb), SpO2 96 %  Body mass index is 47 55 kg/m²  Intake/Output Summary (Last 24 hours) at 3/28/2022 0936  Last data filed at 3/28/2022 0730  Gross per 24 hour   Intake 1729 56 ml   Output 5940 ml   Net -4210 44 ml         PHYSICAL EXAM:      Physical Exam  Vitals and nursing note reviewed  Constitutional:       General: She is not in acute distress  Appearance: Normal appearance  She is obese  She is ill-appearing  She is not toxic-appearing or diaphoretic     HENT:      Head:      Comments: Multiple ecchymosis around face     Mouth/Throat: Mouth: Mucous membranes are moist       Pharynx: Oropharynx is clear  Eyes:      General: Scleral icterus present  Conjunctiva/sclera: Conjunctivae normal    Cardiovascular:      Rate and Rhythm: Normal rate  Pulmonary:      Effort: Pulmonary effort is normal    Abdominal:      General: There is no distension  Palpations: Abdomen is soft  There is no mass  Tenderness: There is no abdominal tenderness  There is no guarding or rebound  Hernia: No hernia is present  Skin:     General: Skin is warm and dry  Coloration: Skin is not jaundiced  Findings: Bruising present  Neurological:      General: No focal deficit present  Mental Status: She is alert  She is disoriented  Comments: AA&OX2, did not know president, no asterixis    Psychiatric:         Mood and Affect: Mood normal          Behavior: Behavior normal              Lab Results:   No results displayed because visit has over 200 results  Imaging Studies: I have personally reviewed pertinent imaging studies  Us RUQ 03/26/2022:  TECHNIQUE:   Real-time ultrasound of the right upper quadrant was performed with a curvilinear transducer with both volumetric sweeps and still imaging techniques      FINDINGS:     PANCREAS:  Visualized portions of the pancreas are within normal limits      AORTA AND IVC:  Visualized portions are normal for patient age      LIVER:  Size:  Within normal range  The liver measures 13 6 cm in the midclavicular line  Contour:  Surface contour is nodular  Parenchyma: There is diffuse coarsened heterogeneous echotexture suggesting underlying cirrhotic changes  No liver mass identified  Limited imaging of the main portal vein shows it to be patent and hepatopetal      BILIARY:  The gallbladder is normal in caliber  There is gallbladder wall thickening and pericholecystic fluid  There are multiple calculi and sludge present    2 mm hyperechoic focus with ringdown artifact is present, likely adenomyomatosis  No sonographic Mendoza sign  No intrahepatic biliary dilatation  CBD measures 7 mm  No choledocholithiasis      KIDNEY:   Right kidney measures 10 4 x 4 9 x 5 0 cm  Volume 133 1 mL  Kidney within normal limits      ASCITES:   None      IMPRESSION:        1  Cholelithiasis and sludge with wall thickening and pericholecystic fluid  Sonographic Mila Cellar sign is negative  Findings are equivocal for acute cholecystitis, particularly in the setting of underlying hepatic dysfunction which can also cause   gallbladder wall abnormalities  If there is continued clinical concern, further evaluation with HIDA scan recommended      2   Liver cirrhosis

## 2022-03-28 NOTE — PLAN OF CARE
Problem: PHYSICAL THERAPY ADULT  Goal: Performs mobility at highest level of function for planned discharge setting  See evaluation for individualized goals  Description: Treatment/Interventions: Functional transfer training,LE strengthening/ROM,Therapeutic exercise,Endurance training,Patient/family training,Equipment eval/education,Bed mobility,Gait training,Compensatory technique education,Spoke to nursing,OT  Equipment Recommended:  (TBD by rehab)       See flowsheet documentation for full assessment, interventions and recommendations  3/28/2022 1538 by Dhara Maldonado PT  Outcome: Progressing  Note: Prognosis: Good  Problem List: Decreased strength,Decreased range of motion,Decreased endurance,Decreased mobility,Impaired balance,Impaired judgement,Decreased safety awareness,Obesity,Decreased skin integrity  Pt seen for PT treatment session this date with interventions consisting of Therapeutic exercise consisting of: AROM 10 reps and 20 reps B LE in sitting position and therapeutic activity consisting of training: sit<>stand transfers  Pt agreeable to PT treatment session upon arrival, pt found seated at EOB, in no apparent distress  In comparison to previous session, pt with improvements in BP control with ability to stand at bedside for second trial x 1 min with min A for balance and RW to swap bed <> chair for pt to sit OOB  Post session: pt returned back to recliner, chair alarm engaged, all needs in reach and RN notified of session findings/recommendations Continue to recommend STR at time of d/c in order to maximize pt's functional independence and safety w/ mobility  Pt continues to be functioning below baseline level, and remains limited 2* factors listed above and including decreased strength, ROM, balance, mobility, activity tolerance, and skin integrity   PT will continue to see pt while here in order to address the deficits listed above and provide interventions consistent w/ POC in effort to achieve STGs  PT Discharge Recommendation: Post acute rehabilitation services          See flowsheet documentation for full assessment

## 2022-03-28 NOTE — OCCUPATIONAL THERAPY NOTE
Occupational Therapy Cancel Note    Patient Name: Anel Duque  XWNUF'O Date: 3/28/2022     03/28/22 1125   Note Type   Note type Evaluation; Cancelled Session   Cancel Reasons Patient off floor/test     OT orders received  Chart reviewed  Pt admitted to [address] [place] [address] on 3/25/2022 with Dx: septic shock  Attempted to see pt this AM  Pt off floor to CT scan  Will continue to follow case and address as appropriate and as time allows      NESTOR Jade/MANUEL

## 2022-03-28 NOTE — PROGRESS NOTES
Progress Note - General Surgery   Jose Wilde 80 y o  female MRN: [id number]  Unit/Bed#: -01 Encounter: [id number]    Assessment:  Decreasing area of ecchymosis of the right medial thigh  Thigh pain improving  Status post fall with multiple contusions, reportedly down for 4 days  Multiple contusions  Leukocytosis resolved 9 42 (16 50, 26 42, 30 84,)  Elevated INR 11 78 on admission, now 1 85  Hgb 9 8 (10 1, 10 5, 13 7)  Urine Culture grew E  Coli  Blood cultures with GPC in clusters on stain  Elevated liver enzymes, evidence of cirrhosis on ultrasound, appears jaundiced  Confusion  Pressors being weaned    Plan:  Continue conservative management of right thigh at this time  The patient is a high risk surgical candidate   Daily and PRN non adherent dressing changes, xeroform to wound, cover with ABDs  Continue antibiotics as indicated, currently Cefepime 1g IV q12h  Medicate PRN pain, minimize opioids  Follow CBC and CMP  Frequent turning while in bed  Management of comorbidities per primary team    Subjective/Objective     Subjective: No acute events  Sitting comfortably in bed eating breakfast this morning  No complaints    Objective:  Blood pressure 94/51, pulse 84, temperature 98 1 °F (36 7 °C), resp  rate 22, height 5' 2" (1 575 m), weight 116 kg (255 lb 11 7 oz), SpO2 96 %  ,Body mass index is 46 77 kg/m²        Intake/Output Summary (Last 24 hours) at 3/28/2022 0711  Last data filed at 3/28/2022 0631  Gross per 24 hour   Intake 1746 56 ml   Output 6540 ml   Net -4793 44 ml       Invasive Devices  Report    Central Venous Catheter Line            CVC Central Lines 03/26/22 Triple 16cm 2 days          Peripheral Intravenous Line            Peripheral IV 03/25/22 Left;Proximal;Ventral (anterior) Forearm 2 days    Peripheral IV 03/25/22 Right Antecubital 2 days          Arterial Line            Arterial Line 03/25/22 Right Radial 2 days          Drain            Urethral Catheter Temperature probe 2 days Physical Exam:   Gen: appears comfortable this AM, no distress  Ext: Right thigh contusion decreasing in size, less tender to touch  No fluctuance is appreciated  Superficial skin slough is noted  No purulence noted  No tracking erythema noted  Lab, Imaging and other studies:  I have personally reviewed pertinent lab results      CBC:   Lab Results   Component Value Date    WBC 9 42 03/28/2022    HGB 9 8 (L) 03/28/2022    HCT 27 9 (L) 03/28/2022     (H) 03/28/2022     03/28/2022    MCH 35 3 (H) 03/28/2022    MCHC 35 1 03/28/2022    RDW 14 6 03/28/2022    MPV 10 9 03/28/2022     CMP:   Lab Results   Component Value Date    SODIUM 134 (L) 03/28/2022    K 3 6 03/28/2022     03/28/2022    CO2 25 03/28/2022    BUN 57 (H) 03/28/2022    CREATININE 1 53 (H) 03/28/2022    CALCIUM 8 1 (L) 03/28/2022    AST 56 (H) 03/28/2022    ALT 37 03/28/2022    ALKPHOS 130 (H) 03/28/2022    EGFR 31 03/28/2022     Coagulation:   Lab Results   Component Value Date    INR 1 85 (H) 03/27/2022     VTE Pharmacologic Prophylaxis: Heparin  VTE Mechanical Prophylaxis: sequential compression device       Rock Jaimee PA-C

## 2022-03-28 NOTE — CASE MANAGEMENT
Case Management Assessment & Discharge Planning Note    Patient name Jd Haynes  Location /-51 MRN [id number]  : 1940 Date 3/28/2022       Current Admission Date: 3/25/2022  Current Admission Diagnosis:Septic shock [facility])   Patient Active Problem List    Diagnosis Date Noted    Eye discharge 2022    Fusiform ectasia of ascending aorta 2022    Moderate protein-calorie malnutrition ([address] ) 2022    Septic shock ([facility] ) 2022    LEONARDO (acute kidney injury) ([facility] ) 2022    Transaminitis 2022    Atrial fibrillation with RVR ([facility] ) 2022    Elevated CK 2022    Fall 2022    Hematoma 2022    Impaired skin integrity 2022    Hypothyroidism 2022    Diabetes mellitus ([facility] ) 2022    HLD (hyperlipidemia) 2022    Coagulopathy ([facility] ) 2022      LOS (days): 3  Geometric Mean LOS (GMLOS) (days): 4 80  Days to GMLOS:1 9     OBJECTIVE:    Risk of Unplanned Readmission Score: 16         Current admission status: Inpatient  Referral Reason:  (Disposition planning)    Preferred Pharmacy:   [id number] [id number] [place] [address] [facility]  [address] [facility]  Phone: [phone] Fax: [phone]    [place] [address] [address]  [address]  [address] [facility] [place] [address]  Phone: [phone] Fax: [phone]    Primary Care Provider: Ciara Davis MD    Primary Insurance: MEDICARE  Secondary Insurance: AARP    ASSESSMENT:  Adela Daniel Proxies    There are no active Health Care Proxies on file           Readmission Root Cause  30 Day Readmission: No    Patient Information  Admitted from[de-identified] Home  Mental Status: Alert  During Assessment patient was accompanied by: Not accompanied during assessment  Assessment information provided by[de-identified] Patient,Daughter  Primary Caregiver: Self  Support Systems: [address] of Residence: [address] do you live in?: [address]  Type of Current Residence: [place]  In the last 12 months, was there a time when you were not able to pay the mortgage or rent on time?: No  In the last 12 months, how many places have you lived?: 1  In the last 12 months, was there a time when you did not have a steady place to sleep or slept in a shelter (including now)?: No  Homeless/housing insecurity resource given?: N/A  Living Arrangements: Lives Alone  Is patient a ?: No    Activities of Daily Living Prior to Admission  Functional Status: Independent  Completes ADLs independently?: Yes  Ambulates independently?: Yes  Does patient use assisted devices?: Yes  Assisted Devices (DME) used:  Shower Chair  Does patient currently own DME?: Yes  What DME does the patient currently own?: Shower Chair  Does patient have a history of Outpatient Therapy (PT/OT)?: No  Does the patient have a history of Short-Term Rehab?: No  Does patient have a history of HH?: Yes  Does patient currently have [facility]?: No         Patient Information Continued  Income Source: SSI/SSD  Does patient have prescription coverage?: No  Within the past 12 months, you worried that your food would run out before you got the money to buy more : Never true  Within the past 12 months, the food you bought just didnt last and you didnt have money to get more : Never true  Food insecurity resource given?: N/A  Does patient receive dialysis treatments?: No  Does patient have a history of substance abuse?: No  Does patient have a history of Mental Health Diagnosis?: No         Means of Transportation  Means of Transport to Appts[de-identified] Drives Self  In the past 12 months, has lack of transportation kept you from medical appointments or from getting medications?: No  In the past 12 months, has lack of transportation kept you from meetings, work, or from getting things needed for daily living?: No  Was application for public transport provided?: Refused        DISCHARGE DETAILS:    Discharge planning discussed with[de-identified] patient and daughter, Charley Cranker of Choice: Yes     CM contacted family/caregiver?: Yes             Contacts  Patient Contacts: Abby Wang, daughter  Relationship to Patient[de-identified] Family  Contact Method: Phone  Phone Number: see face sheet  Reason/Outcome: Discharge Planning,Continuity of Care      CM met with patient at the bedside,baseline information  was obtained  CM discussed the role of CM in helping the patient develop a discharge plan and assist the patient in carry out their plan  Patient identified she lives independently but knows she needs to be strong in order to return back home independently  Patient has identified Abby Wang, daughter, as contact resource  CM contacted Abby Wang and she indicated Lady Dominguez, daughter, is decision maker for patient  CM will follow for discharge recommendations and referrals when identified Potential for [place] [address] vs SNF to be determined  Potential recommendations discussed with daughter, Abby Wang

## 2022-03-28 NOTE — PROGRESS NOTES
[address]  Progress Note - Scott Carson 1940, 80 y o  female MRN: [id number]  Unit/Bed#: -01 Encounter: [id number]  Primary Care Provider: Aida Galeano MD   Date and time admitted to hospital: 3/25/2022  3:02 PM    * Septic shock [facility])  Assessment & Plan  Background:  Patient found down on floor by daughter  Patient is alert and oriented however does not entirely recall events of suspected fall  Patient does state that she believes she may have fell on a chair however daughter stated that she was found in the living room next to a cabinet  Patient noted to have multiple injuries and areas of ecchymosis  Trauma alert called from the ED  Patient met sepsis criteria on admission  · Meets criteria as evidence by heart rate 160s, respiratory rate 20-25, WBCs 30 and LEONARDO present on admission  · Source likely urinary vs cellulitis right thigh +/- bacteremia  UA showed small leukocytes, no nitrates, WBCs 10-20 and innumerable bacteria  Culture: Gram negative mark enteric like  Sensitivity pending  · Blood culture x2: 1/2 positive for GPC in clusters  · Initial lactic acid 3 3  Lactic acidosis cleared  · Started on ceftriaxone in ED, but transitioned to Vanco/Cefepime 3/26 due to worsening clinical status  · MRSA swab sent  · Initially fluid resuscitated with 1 75 L of lactated Ringer's which was based on ideal body weight due to BMI of 57 66  Patient remained hypotensive and therefore was given an additional 1 25 L  Norepinephrine ordered however not started at this time because blood pressure improved during 3 L fluid boluses  · Discussed with patient central line and arterial line placements  Patient would be agreeable if indicated  · CT the chest/abdomen/pelvis: No findings of acute thoracic or abdominopelvic injury  Small right and trace left pleural effusions are new since November 14, 2021   Persistent findings of hepatic cirrhosis new trace perihepatic [de-identified] : 83 year old male postop from left CFA flap and ear lobe reconstruction. 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    · 3/25 persistent hypotension despite fluid bolus'  Levophed started, arterial line and CVC inserted  · 3/26 Vasopressin added to due escalating dose of levophed  · "Insect bite" right thigh with cellulitis, appears to be worsening, concern for abscess vs necrotizing infection- CT leg ordered and General Surgery consulted  · CT showed: Diffuse subcutaneous edema and overlying skin thickening most prominent about the medial aspect of the mid/distal thigh with superficial and deep subcutaneous fluid #3/169 appears nonloculated without a surrounding rind however abscess cannot be excluded   without IV contrast    · Surgery will continue to follow- conservative management with wound care    Coagulopathy [facility])  Assessment & Plan  · Patient is maintained on Coumadin for atrial fib outpatient  · INR 11 78 at time of admission  · Hold Coumadin and trend INR  · Vit K 10 mg IV x1 in ED  · Repeat INR 7 6 Vit K 10 mg PO x1   · 1 unit FFP  · 3/27: INR 2 01  · CT facial bones and head revealed Anterolateral right frontal scalp hematoma without acute traumatic injury to the facial bones  · Trauma CT scans of CT chest/abdomen/pelvis negative for hematoma or injury  · Monitor H/H and platelets  · Continue to monitor for further bleeding  · Would repeat CT scan for acute changes  · Heparin gtt started 3/27  Fusiform ectasia of ascending aorta  Assessment & Plan  · CT abd shows: 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  · Recommendation is for follow-up low radiation dose chest CT in one year      Eye discharge  Assessment & Plan  · Greenish discharge from both eyes  · Bacitracin-polymyxin B ointment ordered  HLD (hyperlipidemia)  Assessment & Plan  · Hold statin at this time due to transaminitis    Diabetes mellitus [facility])  Assessment & Plan  Lab Results   Component Value Date    HGBA1C 5 5 01/31/2018       Recent Labs     03/27/22  0751 03/27/22  1154 03/27/22  1630 03/27/22  2056   POCGLU 129 148* 132 139       Blood Sugar Average: Last 72 hrs:  (P) 122 5   · AC/HS Accu-Cheks with sliding scale insulin    Hypothyroidism  Assessment & Plan  · Continue home Synthroid dosage  · TSH 0 301    Impaired skin integrity  Assessment & Plan  · Multiple areas of impaired skin integrity present at time of admission likely secondary to fall in the setting of supratherapeutic INR on Coumadin  · Ecchymosis noted in albert orbital region, forehead, chest, abdomen and extremities  · Supportive care    Hematoma  Assessment & Plan  · CT of the head revealed Anterolateral right frontal scalp hematoma without acute traumatic injury to the facial bones  · Hematoma noted at right brow  Continue to monitor closely  Fall  Assessment & Plan  · Suspected fall as patient was found down by daughter  Unknown time down however last time patient was seen or heard from was approximately 4 days ago by daughter  Patient states she does not recall all of the events but does report that she fell  · Trauma alert in ED  Right frontal scalp hematoma noted on imaging  Multiple areas of ecchymosis over entire body including bilateral periorbital areas, chest, abdomen and extremities  · PT/OT consult placed    Elevated CK  Assessment & Plan  · CK elevated on admission at 534 Likely secondary to fall  · Patient currently receiving fluid resuscitation for septic shock  · CK decreasing on repeat  Trend as clinically necessary      Atrial fibrillation with RVR (HCC)  Assessment & Plan  · AFib RVR with heart rate in 160s on presentation  · Patient follows with [facility] cardiology  · Patient's home metoprolol and Coumadin held  Coumadin held secondary to coagulopathy with INR greater than 11   · Heart rate improving with fluid resuscitation  With improvement in BP would resume metoprolol  · TSH 0 301  · Troponin peaked at 109 and trending down  · BNP > 8900  · Check ECHO  · Persistent afib with RVR despite Lopressor IV and PO  Rates improved to 120s from 150s  · Rate controlled as of late morning 3/26   · Heparin gtt started since INR <2    Transaminitis  Assessment & Plan  · LFTs elevated at time of admission:  AST 66, ALT 44, , T bili 4 26  · Labs reviewed in Care everywhere and showed that LFTs were elevated on 01/26/22:  AST 87, ALT 42, , T bili 2  · Patient states she has no known liver disease  · Continue to trend LFTs  · Hold statin  · Right upper quadrant ultrasound: Cholelithiasis and sludge with wall thickening and pericholecystic fluid  Sonographic Hickman Bur sign is negative  Findings are equivocal for acute cholecystitis, particularly in the setting of underlying hepatic dysfunction which can also cause gallbladder wall abnormalities  If there is continued clinical concern, further evaluation with HIDA scan recommended  · GI consult pending  LEONARDO (acute kidney injury) ([facility] [address] )  Assessment & Plan  · Likely secondary to septic shock  · Baseline creatinine 0 8-1 1   · Creatinine 2 3 on presentation  · Ramires catheter placed in ED  Will maintain for accurate I&O at this time  · Urine output improving with fluid resuscitation  · Monitor BUN and creatinine  · Strict I&Os  · Avoid nephrotoxins and hypotension      ----------------------------------------------------------------------------------------  HPI/24hr events: Overnight, Levophed was able to be weaned to 3 mcg  Will attempt to hold Vasopressin this morning and wean Levophed off as her BP permits    Both sets of blood cultures positive for GPC in clusters  Repeat blood cultures sent this AM   MRSA culture negative  UA culture positive for E  Coli showing no resistance to antibiotics  Vancomycin d/c last evening  Morning labs pending for this AM   Patient offers no complaints this morning and denies headache, abdominal pain, chest pain and SOB  Intake/Output Summary (Last 24 hours) at 3/28/2022 0530  Last data filed at 3/28/2022 0500  Gross per 24 hour   Intake 1567 ml   Output 6480 ml   Net -4913 ml       Patient appropriate for transfer out of the ICU today?: No  Disposition: Continue Critical Care   Code Status: Level 2 - DNAR: but accepts endotracheal intubation  ---------------------------------------------------------------------------------------    Review of Systems  Review of systems was reviewed and negative unless stated above in HPI/24-hour events   ---------------------------------------------------------------------------------------  OBJECTIVE    Vitals   Vitals:    22 0245 22 0300 22 0315 22 0415   BP:       BP Location:       Pulse: 87 86 105 96   Resp: (!) 25 (!) 24 (!) 27 (!) 24   Temp: 99 °F (37 2 °C) 99 °F (37 2 °C) 99 °F (37 2 °C)    TempSrc:       SpO2: 95% 96% 95% 96%   Weight:       Height:         Temp (24hrs), Av 2 °F (37 3 °C), Min:98 8 °F (37 1 °C), Max:99 5 °F (37 5 °C)  Current: Temperature: 99 °F (37 2 °C)  Arterial Line BP: 113/55  Arterial Line MAP (mmHg): 78 mmHg    Respiratory:  SpO2: SpO2: 96 %  Nasal Cannula O2 Flow Rate (L/min): 2 L/min    Invasive/non-invasive ventilation settings   Respiratory  Report   Lab Data (Last 4 hours)    None         O2/Vent Data (Last 4 hours)    None                Physical Exam  Constitutional:       General: She is not in acute distress  Appearance: She is obese  She is not ill-appearing or diaphoretic  HENT:      Head: Normocephalic  Raccoon eyes and contusion present          Right Ear: External ear normal       Left Ear: External ear normal  Nose: Nose normal       Mouth/Throat:      Mouth: Mucous membranes are moist       Pharynx: Oropharynx is clear  Eyes:      General: No scleral icterus  Right eye: No discharge  Left eye: No discharge  Extraocular Movements: Extraocular movements intact  Pupils: Pupils are equal, round, and reactive to light  Cardiovascular:      Rate and Rhythm: Normal rate  Rhythm regularly irregular  Pulses: Normal pulses  Heart sounds: No friction rub  No gallop  Pulmonary:      Effort: Pulmonary effort is normal  No respiratory distress  Breath sounds: Normal breath sounds  No stridor  No wheezing, rhonchi or rales  Chest:      Chest wall: No tenderness  Abdominal:      General: Bowel sounds are normal  There is no distension  Palpations: Abdomen is soft  There is no mass  Tenderness: There is no guarding or rebound  Musculoskeletal:         General: No swelling  Normal range of motion  Cervical back: Normal range of motion and neck supple  No rigidity or tenderness  Right lower leg: No edema  Left lower leg: No edema  Legs:    Skin:     General: Skin is warm and dry  Capillary Refill: Capillary refill takes less than 2 seconds  Coloration: Skin is jaundiced  Skin is not pale  Findings: No rash  Neurological:      General: No focal deficit present  Mental Status: She is alert and oriented to person, place, and time  GCS: GCS eye subscore is 4  GCS verbal subscore is 5  GCS motor subscore is 6  Sensory: No sensory deficit               Laboratory and Diagnostics:  Results from last 7 days   Lab Units 03/27/22  1604 03/27/22  0433 03/26/22  0503 03/25/22  1506   WBC Thousand/uL 13 46* 16 50* 26 42* 30 84*   HEMOGLOBIN g/dL 10 3* 10 1* 10 5* 13 7   HEMATOCRIT % 29 5* 27 5* 29 0* 38 6   PLATELETS Thousands/uL 175 178 208 248   BANDS PCT %  --   --  1  --    MONO PCT %  --   --  7 7     Results from last 7 days   Lab Units 03/27/22  1557 03/27/22  0433 03/26/22  1621 03/26/22  0720 03/26/22  0503 03/25/22  1506   SODIUM mmol/L 133* 134* 133*  --  135* 134*   POTASSIUM mmol/L 3 5 4 1 3 7 4 1 3 8 4 6   CHLORIDE mmol/L 101 102 101  --  102 99*   CO2 mmol/L 23 22 22  --  20* 24   ANION GAP mmol/L 9 10 10  --  13 11   BUN mg/dL 63* 63* 66*  --  62* 71*   CREATININE mg/dL 1 65* 1 77* 1 82*  --  1 81* 2 37*   CALCIUM mg/dL 8 4 8 0* 8 1*  --  8 1* 8 5   GLUCOSE RANDOM mg/dL 132 119 132  --  86 95   ALT U/L  --  35  --   --  33 42   AST U/L  --  62*  --   --  54* 66*   ALK PHOS U/L  --  123*  --   --  121* 165*   ALBUMIN g/dL  --  2 2*  --   --  2 5* 1 9*   TOTAL BILIRUBIN mg/dL  --  6 35*  --   --  5 08* 4 26*     Results from last 7 days   Lab Units 03/27/22  1557 03/27/22  1347 03/27/22  0433 03/26/22  0503 03/25/22 2013   MAGNESIUM mg/dL 2 5 2 0 2 1 2 3 2 3      Results from last 7 days   Lab Units 03/28/22  0019 03/27/22  1807 03/27/22  1556 03/27/22  0433 03/26/22  0503 03/25/22 2016 03/25/22  1506   INR   --   --  1 85* 2 01* 4 35* 7 91* 11 78*   PTT seconds 55* 36  --   --   --   --  60*          Results from last 7 days   Lab Units 03/25/22  2317 03/25/22 2013 03/25/22  1819 03/25/22  1618   LACTIC ACID mmol/L 1 8 2 6* 2 9* 3 3*     ABG:    VBG:  Results from last 7 days   Lab Units 03/26/22  1225   PH JOANN  7 400   PCO2 JOANN mm Hg 33 5*   PO2 JOANN mm Hg 102 6*   HCO3 JOANN mmol/L 20 3*   BASE EXC JOANN mmol/L -3 8           Micro  Results from last 7 days   Lab Units 03/26/22  0720 03/25/22  1618 03/25/22  1611 03/25/22  1545   GRAM STAIN RESULT   --  Gram positive cocci in clusters* Gram positive cocci in clusters*  --    URINE CULTURE   --   --   --  >100,000 cfu/ml Escherichia coli*  <10,000 cfu/ml    MRSA CULTURE ONLY  No Methicillin Resistant Staphlyococcus aureus (MRSA) isolated  --   --   --        EKG: Afib with controlled rate on telemetry  Imaging: I have personally reviewed pertinent reports        Intake and Output  I/O       03/26 0701  03/27 0700 03/27 0701  03/28 0700    P  O  1140 360    I V  (mL/kg) 1458 8 (12 2) 698 1 (5 8)    Blood 208     IV Piggyback 580 1 367 9    Total Intake(mL/kg) 3386 9 (28 2) 1426 (11 9)    Urine (mL/kg/hr) 1496 (0 5) 5790 (2)    Stool  0    Total Output 1496 5790    Net +1890 9 -4364          Unmeasured Stool Occurrence  2 x          Height and Weights   Height: 5' 2" (157 5 cm)     Body mass index is 48 27 kg/m²  Weight (last 2 days)     Date/Time Weight    03/27/22 0400 120 (263 89)    03/26/22 0400 118 (260 36)            Nutrition       Diet Orders   (From admission, onward)             Start     Ordered    03/25/22 2025  Diet Mohit/CHO Controlled; Consistent Carbohydrate Diet Level 3 (6 carb servings/90 grams CHO/meal); Cardiac  Diet effective now        References:    Nutrtion Support Algorithm Enteral vs  Parenteral   Question Answer Comment   Diet Type Mohit/CHO Controlled    Mohit/CHO Controlled Consistent Carbohydrate Diet Level 3 (6 carb servings/90 grams CHO/meal)    Other Restriction(s): Cardiac    RD to adjust diet per protocol?  Yes        03/25/22 2025                  Active Medications  Scheduled Meds:  Current Facility-Administered Medications   Medication Dose Route Frequency Provider Last Rate    bacitracin  1 large application Topical BID Niles Cruz PA-C      bacitracin-polymyxin b   Both Eyes TID Boogie Preciado [facility] LILIAN Qureshi      cefepime  1,000 mg Intravenous Q12H Todd Smiley PA-C Stopped (03/1940)    heparin (porcine)  3-20 Units/kg/hr (Order-Specific) Intravenous Titrated Todd LILIAN Smiley 13 1 Units/kg/hr (03/28/22 0300)    heparin (porcine)  2,000 Units Intravenous Q1H PRN Todd LILIAN Smiley      heparin (porcine)  4,000 Units Intravenous Q1H PRN Todd Smiley PA-C      insulin lispro  2-12 Units Subcutaneous 4x Daily (AC & HS) Boogie Jacobs PA-C      levothyroxine  150 mcg Oral Early Morning Boogie Jacobs , LILIAN      metoprolol tartrate  50 mg Oral Q12H [facility] Pearl Delatorre [place], [place]      norepinephrine  1-30 mcg/min Intravenous Titrated Juanna Nageotte, PA-C 2 mcg/min (03/28/22 0316)   Eugene Andrés vasopressin  0 04 Units/min Intravenous Continuous Pearl MenesesLILIAN 0 04 Units/min (03/28/22 0300)     Continuous Infusions:  heparin (porcine), 3-20 Units/kg/hr (Order-Specific), Last Rate: 13 1 Units/kg/hr (03/28/22 0300)  norepinephrine, 1-30 mcg/min, Last Rate: 2 mcg/min (03/28/22 0316)  vasopressin, 0 04 Units/min, Last Rate: 0 04 Units/min (03/28/22 0300)      PRN Meds:   heparin (porcine), 2,000 Units, Q1H PRN  heparin (porcine), 4,000 Units, Q1H PRN        Invasive Devices Review  Invasive Devices  Report    Central Venous Catheter Line            CVC Central Lines 03/26/22 Triple 16cm 2 days          Peripheral Intravenous Line            Peripheral IV 03/25/22 Left;Proximal;Ventral (anterior) Forearm 2 days    Peripheral IV 03/25/22 Right Antecubital 2 days          Arterial Line            Arterial Line 03/25/22 Right Radial 2 days          Drain            Urethral Catheter Temperature probe 2 days                Rationale for remaining devices: CVC and arterial lines due to hemodynamic instability requiring vasopressor medications and close hemodynamic monitoring  Ramires remained due to aggressive diuresis and close monitoring of I&O    ---------------------------------------------------------------------------------------  Advance Directive and Living Will:      Power of :    POLST:    ---------------------------------------------------------------------------------------  Care Time Delivered:   Upon my evaluation, this patient had a high probability of imminent or life-threatening deterioration due to septic shock requiring vasopressor support, which required my direct attention, intervention, and personal management    I have personally provided 15 minutes (0500 to 0515) of critical care time, exclusive of procedures, teaching, family meetings, and any prior time recorded by providers other than myself  17 [facility], PA-C      Portions of the record may have been created with voice recognition software  Occasional wrong word or "sound a like" substitutions may have occurred due to the inherent limitations of voice recognition software    Read the chart carefully and recognize, using context, where substitutions have occurred

## 2022-03-28 NOTE — PROGRESS NOTES
Pt was unable to provide much diet hx during time of visit  Says she was typically eating 3 meals per day though could not describe further, "I dont remember " Was not taking nutrition supplements at home per her report  Noting 11 6% weight loss x 4 mo significant, pt reports this partly fluid related once she started taking lasix  Suspect also due to poor intake  11/19/20 295lb, 6/28/21 304lb, 11/23/21 294lb, 3/28/22 260lb  Physical exam performed noting moderate malnutrition  Will liberalize diet 4 gm GLADYS, CCD 3  Trial ensure max protein daily and glucerna daily, vanilla flavors per pt preference  Recommend daily weights  Will follow up

## 2022-03-28 NOTE — MALNUTRITION/BMI
This medical record reflects one or more clinical indicators suggestive of malnutrition and morbid obesity  Malnutrition Findings:   Adult Malnutrition type: Chronic illness  Adult Degree of Malnutrition: Malnutrition of moderate degree  Malnutrition Characteristics: Muscle loss,Fat loss,Weight loss                  360 Statement: Chronic moderate malnutrition related to decreased appetite as evidenced by 11 6% weight loss x 4 mo (11/23/21 294lb, 3/28/22 260lb, suspect partly fluid related), moderate fat loss to orbitals, moderate muscle loss to temporalis, deltoid and interroseous muscles  Treated with: Will liberalize cardiac to 4gm GLADYS, continue CCD 3 at this time  Will trial glucerna daily and ensure max protein daily, increase as tolerated/indicated  Recommend daily weights  BMI Findings: Body mass index is 47 55 kg/m²  See Nutrition note dated 3/28/22 for additional details  Completed nutrition assessment is viewable in the nutrition documentation

## 2022-03-28 NOTE — PROGRESS NOTES
Vancomycin Assessment    Anel Duque is a 80 y o  female who is currently ordered vancomycin 1750 mg IV q 12 hours for skin-soft tissue infection,bacteremia     Relevant clinical data and objective history reviewed:  Creatinine   Date Value Ref Range Status   03/28/2022 1 53 (H) 0 60 - 1 30 mg/dL Final     Comment:     Specimen Icteric; Results May be Affected   03/27/2022 1 65 (H) 0 60 - 1 30 mg/dL Final     Comment:     Specimen Icteric; Results May be Affected   03/27/2022 1 77 (H) 0 60 - 1 30 mg/dL Final     Comment:     Specimen Icteric; Results May be Affected     BP 98/62   Pulse 88   Temp 98 6 °F (37 °C)   Resp (!) 26   Ht 5' 2" (1 575 m)   Wt 118 kg (260 lb)   SpO2 98%   BMI 47 55 kg/m²   I/O last 3 completed shifts: In: 3120 [P O :1440; I V :1141 1; IV Piggyback:897 9]  Out: 7505 [Urine:7505]  Lab Results   Component Value Date/Time    BUN 57 (H) 03/28/2022 04:38 AM    WBC 9 42 03/28/2022 04:38 AM    HGB 9 8 (L) 03/28/2022 04:38 AM    HCT 27 9 (L) 03/28/2022 04:38 AM     (H) 03/28/2022 04:38 AM     03/28/2022 04:38 AM     Temp Readings from Last 3 Encounters:   03/28/22 98 6 °F (37 °C)   11/14/21 99 4 °F (37 4 °C) (Temporal)     Vancomycin Days of Therapy: 1    Assessment/Plan  The patient is currently on vancomycin utilizing scheduled dosing based on adjusted body weight (due to obesity)  Baseline risks associated with therapy include: pre-existing renal impairment, advanced age, and dehydration  The patient is currently ordered 1750 mg IV q 12 hours and after clinical evaluation will be changed to 1500 mg IV q 24 hours  Pharmacy will also follow closely for s/sx of nephrotoxicity, infusion reactions, and appropriateness of therapy  BMP and CBC will be ordered per protocol  Plan for trough as patient approaches steady state, prior to the 4th  dose at approximately 1330 on 3/31/22    Due to infection severity, will target a trough of 15-20 (appropriate for most indications)    Pharmacy will continue to follow the patients culture results and clinical progress daily      Yarely Bradford, Pharmacist

## 2022-03-28 NOTE — WOUND OSTOMY CARE
Consult Note - Wound   Thai Fenton 80 y o  female MRN: [id number]  Unit/Bed#: -01 Encounter: [id number]      History and Present Illness:   80year-old female arrives emergency department after being found on the floor in her home by her daughter 3/25/2022    It is unclear as to how long the patient was on the ground  Patient cannot recall when she fell and she was not seen for about 4 days per EMS report  Patient arrives to the emergency room with multiple areas of bruising  Patient was found in the prone position and had been incontinent  Seen today for initial wound consult  Due to patients unknown down time, pressure injury, Deep Tissue Injury may develop to a stage 3, 4 or unstageable injury  Seen today for initial wound consult  Pt is alert, seated out of bed in recliner  Family is present and agreeable to wound assessment  Pt requires assist to turn and reposition  She has a gutierrez catheter for bladder management  She has nutritional supplements  Glucerna and Ensure  Assessment Findings:   1) Ecchymosis and hematoma to head and face, see media  2) Bilateral heels are intact, lower extremities are elevated and heels are offloaded  3) Anterior aspect of left foot deep tissue injury noted POA nonblanchable deep maroon light purple  4) Below left breast in non blanchabe erythema light purple, lateral edges is open with partial thickness skin loss, area cleansed   Intact fluid filled blister to underiside of same breast  ABd applied as a protective meausure   May be and evolving DTI  5) Medial aspect of right inner thigh, appears as evolving deep tissue injury, wound has induration surrounding open wound   Wound bed is decreased in size in past 3 days  Wound edges are outline in photo below  Wound bed is a mix of red and black, moderate amount of serosangenous drainage on Maxorb and 4x4 gauze bangades (6)  Area cleansed and Maxorb AG  4x4 ABd applied         Skin care plans:  1-Calazime to sacrum, buttocks TID and PRN  2-Hydraguard to bilateral heel BID and PRN  3-Elevate heels to offload pressure  4-Ehob cushion when out of bed  5-Turn/repoisiton q2h or when medically stable for pressure re-distribution on skin  6-Moisturize skin daily with skin nourishing cream  7-Cleanse right medial thigh wound with normal saline, apply Maxorb AG to wound bed, 4x4 , then ABD pad change daily and prn soil or dislodgement  8-Cleanse open area under left breast with normal saline, apply Maxorb robe to open wound then pad beneath breast and intact blister on breast with an ABD pad  Change daily and prn soil or dislodgment  9-DTI left anterior foot open to air  Wound 03/25/22 Other (comment) Other (Comment) Thigh Anterior;Right (Active)   Wound Image   03/28/22 1630   Wound Description Beefy red;Black;Drainage;Fragile; Non-blanchable erythema 03/28/22 1630   Pressure Injury Stage DTPI 03/28/22 1630   Argenis-wound Assessment Intact; Erythema;Fragile; Induration; Purple 03/28/22 1630   Wound Length (cm) 10 cm 03/28/22 1630   Wound Width (cm) 9 cm 03/28/22 1630   Wound Surface Area (cm^2) 90 cm^2 03/28/22 1630   Wound Site Closure Unapproximated 03/28/22 0400   Drainage Amount Moderate 03/28/22 1630   Drainage Description Serosanguineous 03/28/22 1630   Non-staged Wound Description Partial thickness 03/28/22 1530   Treatments Cleansed;Site care 03/28/22 1630   Dressing ABD;Calcium Alginate with Silver 03/28/22 1630   Dressing Changed New 03/28/22 1630   Patient Tolerance Tolerated well 03/28/22 1630   Dressing Status Clean;Dry; Intact 03/28/22 1630       Wound 03/25/22 Skin Tear Skin tear Breast Lateral;Left;Lower (Active)   Wound Image    03/28/22 1626   Wound Description Dry; Intact; Beefy red 03/28/22 1626   Pressure Injury Stage 2 03/28/22 1626   Argenis-wound Assessment Purple 03/28/22 1626   Wound Length (cm) 3 cm 03/28/22 1626   Wound Width (cm) 5 cm 03/28/22 1626   Wound Surface Area (cm^2) 15 cm^2 03/28/22 1626 Wound Site Closure Unapproximated 03/28/22 0400   Drainage Amount Scant 03/28/22 1626   Drainage Description Clear 03/28/22 1626   Non-staged Wound Description Full thickness 03/28/22 1626   Treatments Cleansed;Site care 03/28/22 1626   Dressing ABD;Calcium Alginate with Silver 03/28/22 1626   Dressing Changed New 03/28/22 1626   Patient Tolerance Tolerated well 03/28/22 1626   Dressing Status Clean;Dry; Intact 03/28/22 1626       Wound 03/25/22 Pressure Injury Foot Anterior;Left;Medial (Active)   Wound Image   03/28/22 1628   Wound Description Dry; Intact; Beefy red;Fragile;Light purple;Non-blanchable erythema 03/28/22 1628   Pressure Injury Stage DTPI 03/28/22 1628   Argenis-wound Assessment Clean;Dry; Intact 03/28/22 1530   Wound Length (cm) 0 8 cm 03/28/22 1628   Wound Width (cm) 1 1 cm 03/28/22 1628   Wound Surface Area (cm^2) 0 88 cm^2 03/28/22 1628   Drainage Amount None 03/28/22 1628   Treatments Cleansed;Site care 03/25/22 1930   Dressing Open to air 03/28/22 1628   Patient Tolerance Tolerated well 03/25/22 1930   Dressing Status Clean;Dry; Intact 03/28/22 1530           Call or tigertext with any questions  Wound Care will continue to follow    Bonifacio Kraft

## 2022-03-28 NOTE — ASSESSMENT & PLAN NOTE
Lab Results   Component Value Date    HGBA1C 5 5 01/31/2018       Recent Labs     03/27/22  0751 03/27/22  1154 03/27/22  1630 03/27/22 2056   POCGLU 129 148* 132 139       Blood Sugar Average: Last 72 hrs:  (P) 122 5   · AC/HS Accu-Cheks with sliding scale insulin

## 2022-03-28 NOTE — OCCUPATIONAL THERAPY NOTE
Occupational Therapy Evaluation     Patient Name: Zurdo Ortiz  UTCJY'R Date: 3/28/2022  Problem List  Principal Problem:    Septic shock ([place] [address] )  Active Problems:    LEONARDO (acute kidney injury) ([facility] )    Transaminitis    Atrial fibrillation with RVR ([facility])    Elevated CK    Fall    Hematoma    Impaired skin integrity    Hypothyroidism    Diabetes mellitus ([facility] )    HLD (hyperlipidemia)    Coagulopathy ([place] [address] )    Eye discharge    Fusiform ectasia of ascending aorta    Moderate protein-calorie malnutrition ([place] [address] )    Past Medical History  Past Medical History:   Diagnosis Date    LEONARDO (acute kidney injury) ([facility] )     Atrial fibrillation ([place] [address] )     CH[facility]congestive heart failure[facility])     diastolic grade 1    Cirrhosis ([place] [address] )     Diabetes mellitus ([place] [address] )     Disease of thyroid gland     hypothyroid    Endometrial ca ([facility] )     Hiatal hernia     Hyperlipidemia     Hypertension     Hypothyroid     Lung nodules     Periumbilical hernia     Pulmonary HTN ([place] [address] )     Thoracic aortic aneurysm without rupture ([place] [address] ) 03/25/2022    41 mm     Past Surgical History  Past Surgical History:   Procedure Laterality Date    HERNIA REPAIR      HIP ARTHROPLASTY      HYSTERECTOMY      JOINT REPLACEMENT Bilateral     knee; b/l hip    KNEE ARTHROPLASTY     [facility]       OT Evaluation [id number]  OT Treatment [id number]   03/28/22 1355   Note Type   Note type Evaluation   Restrictions/Precautions   Other Precautions Chair Alarm; Bed Alarm;Multiple lines;Telemetry; Fall Risk   Pain Assessment   Pain Assessment Tool 0-10   Pain Score No Pain   Home Living   Type of Home House  (1 JAMIA)   Home Layout One level;Performs ADLs on one level; Able to live on main level with bedroom/bathroom   Bathroom Shower/Tub Tub/shower unit   Bathroom Equipment Tub transfer bench   Home Equipment Walker;Cane   Additional Comments Pt reports living alone in a [facility]  No AD PTA     Prior Function   Level of [place] Independent with ADLs and functional mobility   Lives With Alone   Receives Help From Family   ADL Assistance Independent   IADLs Needs assistance   Falls in the last 6 months 1 to 4   Comments Pt reports completing ADLs and community mobility (+driving) @ I, requires daughters assistance with IADLs  Subjective   Subjective "I have no idea how I used to put my socks on before this"   ADL   UB Dressing Assistance 5  Supervision/Setup   UB Dressing Deficit Setup; Increased time to complete   LB Dressing Assistance 2  Maximal Assistance   LB Dressing Deficit Setup; Requires assistive device for steadying;Supervision/safety; Increased time to complete;Verbal cueing   Additional Comments UB ADLs @ S/set-up while seated  LB ADLs @ Max A  Pt required assist to don socks while seated at EOB  Declined briefs but would require assist for CM while standing d/t BUE support required on RW  Bed Mobility   Supine to Sit 4  Minimal assistance   Additional items Assist x 1; Increased time required;Verbal cues  (trunk management)   Additional Comments Pt supine in bed at beginning of session  Supine to sit @ Min A  Transfers   Sit to Stand 4  Minimal assistance   Additional items Assist x 2; Increased time required;Verbal cues   Stand to Sit 4  Minimal assistance   Additional items Assist x 2; Increased time required;Verbal cues   Stand pivot Unable to assess   Additional Comments STS from EOB to RW @ Min A x2  Pt able to stand for approximately 30 seconds before requesting to sit d/t dizziness  Vitals monitored throughout     Balance   Static Sitting Good   Dynamic Sitting Good   Static Standing Fair   Activity Tolerance   Activity Tolerance Patient limited by fatigue;Patient limited by pain   Medical Staff Made Aware Co-eval with PT Shari   Nurse Made Aware Spoke with [facility]   RUE Assessment   RUE Assessment WFL   LUE Assessment   LUE Assessment WFL   Hand Function   Gross Motor Coordination Functional   Fine Motor Coordination Functional   Sensation   Light Touch No apparent deficits   Additional Comments Decreased RUE shoulder AROM but still WFL   Cognition   Overall Cognitive Status Impaired   Arousal/Participation Alert; Responsive; Cooperative   Attention Attends with cues to redirect   Orientation Level Oriented to person;Oriented to place;Oriented to time;Disoriented to situation  (increased cues for time)   Memory Decreased recall of recent events   Following Commands Follows one step commands with increased time or repetition   Assessment   Limitation Decreased ADL status; Decreased UE ROM; Decreased UE strength;Decreased Safe judgement during ADL;Decreased cognition;Decreased endurance;Decreased self-care trans;Decreased high-level ADLs   Prognosis Good   Assessment Pt is a 80 y o  female, admitted to [address] [place] [address] 3/25/2022 after being found on floor for an unknown amount of time  Dx: septic shock  Pt with PMHx impacting their performance during ADL tasks, including: hypothyroidism, DM2, hyperlipidemia and A-Fib  Prior to admission to the hospital Pt was performing ADLs without physical assistance  IADLs with physical assistance  Functional transfers/ambulation without physical assistance  Cognitive status was PTA was Intact  OT order placed to assess Pt's ADLs, cognitive status, and performance during functional tasks in order to maximize safety and independence while making most appropriate d/c recommendations   Pt's clinical presentation is currently evolving given new onset deficits that effect Pt's occupational performance and ability to safely return to [facility] including decrease activity tolerance, decrease standing balance, decrease performance during ADL tasks, decrease cognition, decrease BUE ROM, decrease UB MS, increased pain, decrease generalized strength, decrease activity engagement, decrease performance during functional transfers, limited family support and high fall risk combined with medical complications of hypotension, pain impacting overall mobility status, abnormal renal lab values, abnormal H&H, abnormal CBC, abnormal sodium values, edema/swelling, wounds and need for input for mobility technique/safety  Personal factors affecting Pt at time of initial evaluation include: step(s) to enter environment, limited home support, advanced age, inability to perform IADLs, inability to ambulate household distances, recent fall(s)/fall history and questionable non-compliance  At this time, Pt required OT/PT co-eval d/t significant deficits with mobility and safety concerns  Pt will benefit from continued skilled OT services to address deficits as defined above and to maximize level independence/participation during ADLs and functional tasks to facilitate return toward PLOF and improved quality of life  From an occupational therapy standpoint, recommendation at time of d/c would be post acute rehabilitation services  Plan   Treatment Interventions ADL retraining;Functional transfer training;UE strengthening/ROM; Endurance training;Patient/family training;Equipment evaluation/education;Cognitive reorientation; Neuromuscular reeducation; Compensatory technique education;Continued evaluation; Energy conservation; Activityengagement   Goal Expiration Date 04/11/22   OT Treatment Day 1   OT Frequency 3-5x/wk   Additional Treatment Session   Start Time 5255   End Time 1444   Treatment Assessment Pt completed OT tx session #1 on this date focused on ADL performance and functional mobility  Pt alert and agreeable  Pt seated at EOB at beginning of session  Pt able to maintain Good seated balance while seated at EOB performing ADL tasks  Pt able to complete grooming tasks of brushing hair and teeth @ S/set-up  STS from EOB to RW @ Min A x2  Pt able to maintain standing for approximately 1 minute with Min A x1 to switch bed and chair  Pt seated in recliner chair at end of session with chair alarm intact and call bell within reach  All needs met     Recommendation   OT Discharge Recommendation Post acute rehabilitation services   [facility] Daily Activity Inpatient   Lower Body Dressing 2   Bathing 2   Toileting 2   Upper Body Dressing 3   Grooming 3   Eating 4   Daily Activity Raw Score 16   Daily Activity Standardized Score (Calc for Raw Score >=11) 35 96   AM-PAC Applied Cognition Inpatient   Following a Speech/Presentation 3   Understanding Ordinary Conversation 3   Taking Medications 3   Remembering Where Things Are Placed or Put Away 3   Remembering List of 4-5 Errands 2   Taking Care of Complicated Tasks 2   Applied Cognition Raw Score 16   Applied Cognition Standardized Score 35 03      03/28/22 1415 03/28/22 1422 03/28/22 1424   Vitals   Blood Pressure 102/63 (!) 84/48 (!) 83/51   MAP (mmHg) 72 58 62   BP Location Left arm Left arm Left arm   BP Method Automatic Automatic Automatic   Patient Position - Orthostatic VS Sitting  (c/o dizziness) Sitting  (c/o dizziness) Sitting  (c/o minimal dizziness)      03/28/22 1426   Vitals   Blood Pressure 107/57   MAP (mmHg) 74   BP Location Left arm   BP Method Automatic   Patient Position - Orthostatic VS Sitting  (asymptomatic)      The patient's raw score on the AM-PAC Daily Activity inpatient short form is 16, standardized score is 35 96, less than 39 4  Patients at this level are likely to benefit from DC to post-acute rehabilitation services  Please refer to the recommendation of the Occupational Therapist for safe DC planning  Pt goals to be met by 4/11/2022    1  Pt will demonstrate ability to complete supine<>sit @ Mod I in order to increase safety and independence during ADL tasks  2  Pt will demonstrate ability to complete UB ADLs including washing/dressing @ Mod I in order to increase performance and participation during meaningful tasks  3  Pt will demonstrate ability to complete LB dressing @ Min A in order to increase safety and independence during meaningful tasks     4  Pt will demonstrate ability to luis/doff socks/shoes while sitting EOB @ Min A in order to increase safety and independence during meaningful tasks  5  Pt will demonstrate ability to complete toileting tasks including CM and pericare @ Min A in order to increase safety and independence during meaningful tasks  6  Pt will demonstrate ability to complete EOB, chair, toilet/commode transfers @ SBA in order to increase performance and participation during functional tasks  7  Pt will demonstrate ability to stand for 3-5 minutes while maintaining Good balance with use of RW for UB support PRN  8  Pt will demonstrate ability to tolerate 30-35 minute OT session with no vc'ing for deep breathing or use of energy conservation techniques in order to increase activity tolerance during functional tasks  9  Pt will demonstrate Good carryover of use of energy conservation/compensatory strategies during ADLs and functional tasks in order to increase safety and reduce risk for falls  10  Pt will demonstrate Good attention and participation in continued evaluation of functional ambulation house hold distances in order to assist with safe d/c planning  11  Pt will attend to continued cognitive assessments 100% of the time in order to provide most appropriate d/c recommendations  12  Pt will follow 100% simple 2-step commands and be A&O x4 consistently with environmental cues to increase participation in functional activities  13  Pt will identify 3 areas of interest/hobbies and 1 intervention on how to incorporate into daily life in order to increase interaction with environment and peers as well as increase participation in meaningful tasks  14  Pt will demonstrate 100% carryover of BUE HEP in order to increase BUE MS and increase performance during functional tasks upon d/c home      Renita Allen OTR/L

## 2022-03-28 NOTE — PHYSICAL THERAPY NOTE
PHYSICAL THERAPY EVALUATION  NAME:  Scott Carson  DATE: 03/28/22    AGE:   80 y o  Mrn:   [id number]  ADMIT DX:  Coagulopathy ([facility] ) [D68 9]  Urinary tract infection [N39 0]  Contusion [T14  8XXA]  Head injury [S09 90XA]  Abrasions of multiple sites [T07  XXXA]  LEONARDO (acute kidney injury) ([facility] ) [N17 9]  Paroxysmal atrial fibrillation with rapid ventricular response ([address] ) [I48 0]    Past Medical History:   Diagnosis Date    LEONARDO (acute kidney injury) ([facility] )     Atrial fibrillation ([facility] )     CHF (congestive heart failure) (HCC)     diastolic grade 1    Cirrhosis ([facility] )     Diabetes mellitus ([facility] )     Disease of thyroid gland     hypothyroid    Endometrial ca ([address] )     Hiatal hernia     Hyperlipidemia     Hypertension     Hypothyroid     Lung nodules     Periumbilical hernia     Pulmonary HTN ([facility] )     Thoracic aortic aneurysm without rupture ([facility] ) 03/25/2022    41 mm     Length Of Stay: 3  Performed at least 2 patient identifiers during session: Name and Birthday  PHYSICAL THERAPY EVALUATION :        03/28/22 1416   Note Type   Note type Evaluation   Pain Assessment   Pain Assessment Tool 0-10   Pain Score No Pain   Restrictions/Precautions   Other Precautions Chair Alarm; Bed Alarm; Fall Risk;Pain;Multiple lines;Telemetry   Home Living   Type of Home House  (1 JAMIA)   Home Layout One level;Performs ADLs on one level; Able to live on main level with bedroom/bathroom   Bathroom Shower/Tub Tub/shower unit   Bathroom Equipment Tub transfer bench   Home Equipment Walker;Cane  (doesn't use PTA)   Additional Comments Pt reports living alone in [facility]    Prior Function   Level of [place] Independent with ADLs and functional mobility   Lives With Alone   Receives Help From Family   ADL Assistance Independent   IADLs Needs assistance   Falls in the last 6 months 1 to 4   Comments Pt reports completing ADLs, mobility without AD, and drives at , has assistance from dtr for IADLs   Cognition   Overall Cognitive Status Impaired   Orientation Level Oriented to person;Oriented to place; Disoriented to situation;Oriented to time  (time with cues)   Following Commands Follows one step commands without difficulty   RLE Assessment   RLE Assessment WFL  (grossly assessed at least 3+/5 strength)   LLE Assessment   LLE Assessment WFL  (4-/5 strength)   Light Touch   RLE Light Touch Grossly intact   LLE Light Touch Grossly intact   Bed Mobility   Supine to Sit 4  Minimal assistance   Additional items Assist x 1; Increased time required;Verbal cues  (trunk management)   Additional Comments Min A x 1 for trunk management, bedrails PRN, increased time and VC for technique  Pt c/o dizziness when sitting upright, BP assessed initially 102/63, after 7-8 min seated EOB with improving sx BP 84/48, reassessed within 2 min 85/51, and after another 2 min 107/57, slight dizziness only  Pt sat EOB with F+ static sitting balance x 20 min   Transfers   Sit to Stand 4  Minimal assistance   Additional items Assist x 2; Increased time required;Verbal cues   Stand to Sit 4  Minimal assistance   Additional items Assist x 2; Increased time required;Verbal cues   Stand pivot Unable to assess   Additional Comments Pt used RW for STS, min A x 2 for force production, unable to progress LEs to walk, swapped bed <> recliner to complete transfer to chair  Moderate weeping noted from RLE wound, XAVIER Lucas present and aware  Pt able to tolerate standing with RW x 1 min    Balance   Static Sitting Fair +   Dynamic Sitting Fair   Static Standing Poor +   Endurance Deficit   Endurance Deficit Yes   Endurance Deficit Description fatigue   Activity Tolerance   Activity Tolerance Patient limited by fatigue   Medical Staff Made Aware Abbie DEAN   Nurse Made Aware Shalini PARK present throughout session   Assessment   Prognosis Good   Problem List Decreased strength;Decreased range of motion;Decreased endurance;Decreased mobility; Impaired balance; Impaired judgement;Decreased safety awareness; Obesity; Decreased skin integrity   Barriers to Discharge Inaccessible home environment;Decreased caregiver support   Barriers to Discharge Comments Pt requires increased assistance for mobility and lives alone   Goals   Patient Goals Get into the chair   STG Expiration Date 04/11/22   Plan   Treatment/Interventions Functional transfer training;LE strengthening/ROM; Therapeutic exercise; Endurance training;Patient/family training;Equipment eval/education; Bed mobility;Gait training; Compensatory technique education;Spoke to nursing;OT   PT Frequency 3-5x/wk   Recommendation   PT Discharge Recommendation Post acute rehabilitation services   Equipment Recommended   (TBD by rehab)   AM-PAC Basic Mobility Inpatient   Turning in Bed Without Bedrails 3   Lying on Back to Sitting on Edge of Flat Bed 3   Moving Bed to Chair 2   Standing Up From Chair 3   Walk in Room 2   Climb 3-5 Stairs 2   Basic Mobility Inpatient Raw Score 15   Basic Mobility Standardized Score 36 97   Highest Level Of Mobility   JH-HLM Goal 4: Move to chair/commode   JH-HLM Highest Level of Mobility 4: Move to chair/commode   JH-HLM Goal Achieved Yes   Additional Treatment Session   Start Time 1430   End Time 1443   Treatment Assessment Pt seen for PT treatment session this date with interventions consisting of Therapeutic exercise consisting of: AROM 10 reps and 20 reps B LE in sitting position and therapeutic activity consisting of training: sit<>stand transfers  Pt agreeable to PT treatment session upon arrival, pt found seated at EOB, in no apparent distress  In comparison to previous session, pt with improvements in BP control with ability to stand at bedside for second trial x 1 min with min A for balance and RW to swap bed <> chair for pt to sit OOB   Post session: pt returned back to recliner, chair alarm engaged, all needs in reach and RN notified of session findings/recommendations Continue to recommend STR at time of d/c in order to maximize pt's functional independence and safety w/ mobility  Pt continues to be functioning below baseline level, and remains limited 2* factors listed above and including decreased strength, ROM, balance, mobility, activity tolerance, and skin integrity  PT will continue to see pt while here in order to address the deficits listed above and provide interventions consistent w/ POC in effort to achieve STGs  Equipment Use Pt performed therex as noted below  Pt completed sit <> stand transfer with RW and min A x 2 able to stand x 1 min with min A x 1, unable to progress LEs to attempt ambulation  Able to swap bed <> recliner for pt to sit OOB with chair alarm engaged, all needs in reach, and RN, Cristin Diaz present   Additional Treatment Day 1   Exercises   Knee AROM Long Arc Quad Sitting;10 reps;AROM; Bilateral   Ankle Pumps Sitting;20 reps;Bilateral   End of Consult   Patient Position at End of Consult Bedside chair;Bed/Chair alarm activated; All needs within reach     The patient's AM-PAC Basic Mobility Inpatient Short Form Raw Score is 15    A Raw score of less than or equal to 16 suggests the patient may benefit from discharge to post-acute rehabilitation services  Please also refer to the recommendation of the Physical Therapist for safe discharge planning  (Please find full objective findings from PT assessment regarding body systems outlined above)  Assessment: Pt is a 80 y o  female seen for PT evaluation s/p admission to [address] [place] [address] on 3/25/2022 with Septic shock ([facility] )  Order placed for PT services  Upon evaluation: Pt is presenting with impaired functional mobility due to decreased strength, decreased ROM, decreased endurance, impaired balance, gait deviations, decreased safety awareness, impaired judgment, fall risk and impaired skin integrity requiring min A x 1 assistance for bed mobility and min A x 2 assistance for transfers   Pt's clinical presentation is currently unstable/unpredictable given the functional mobility deficits above coupled with fall risks as indicated by AM-PAC 6-Clicks: 22/46 as well as hx of falls, impaired balance, polypharmacy, impaired judgement and decreased safety awareness and combined with medical complications of hypertension , poor blood pressure control, abnormal renal lab values, abnormal H&H, abnormal sodium values and need for input for mobility technique/safety  Pt's PMHx and comorbidities that may affect physical performance and progress include: CHF, A fib, DM, HTN and obesity  Personal factors affecting pt at time of IE include: inaccessible home environment, limited home support, advanced age, past experience, inability to perform IADLs, inability to perform ADLs, inability to navigate level surfaces without external assistance, inability to ambulate household distances, inability to navigate community distances and recent fall(s)/fall history  Pt will benefit from continued skilled PT services to address deficits as defined above and to maximize level of functional mobility to facilitate return toward PLOF and improved QOL  From PT/mobility standpoint, recommendation at time of d/c would be Short term rehab pending progress in order to reduce fall risk and maximize pt's functional independence and consistency with mobility in order to facilitate return to PLOF  Recommend trial with walker next 1-2 sessions and ther ex next 1-2 sessions  Goals: Pt will: Perform bed mobility tasks with modified I to reposition in bed and prepare for transfers  Pt will perform transfers with modified I to increase Indep in home alone environment and prepare for ambulation  Pt will ambulate with RW for >/= 10' with  modified I  to improve gait quality and promote proper use of assistive device and to access home environment   Increase bilateral LE strength 1/2 grade to facilitate independent mobility and Increase all balance 1/2 grade to decrease risk for falls          Mariann oMreno, PT,DPT

## 2022-03-29 PROBLEM — K74.60 CIRRHOSIS (HCC): Status: ACTIVE | Noted: 2022-01-01

## 2022-03-29 PROBLEM — K80.20 CHOLELITHIASES: Status: ACTIVE | Noted: 2022-03-29

## 2022-03-29 PROBLEM — L03.115 CELLULITIS OF RIGHT THIGH: Status: ACTIVE | Noted: 2022-01-01

## 2022-03-29 PROBLEM — S00.93XA TRAUMATIC HEMATOMA OF HEAD: Status: ACTIVE | Noted: 2022-03-25

## 2022-03-29 LAB
A1AT SERPL-MCNC: 195 MG/DL (ref 101–187)
ACTIN IGG SERPL-ACNC: 10 UNITS (ref 0–19)
ALBUMIN SERPL BCP-MCNC: 1.9 G/DL (ref 3.5–5)
ALP SERPL-CCNC: 146 U/L (ref 46–116)
ALT SERPL W P-5'-P-CCNC: 35 U/L (ref 12–78)
ANION GAP SERPL CALCULATED.3IONS-SCNC: 7 MMOL/L (ref 4–13)
APTT PPP: 86 SECONDS (ref 23–37)
APTT PPP: 86 SECONDS (ref 23–37)
APTT PPP: 95 SECONDS (ref 23–37)
AST SERPL W P-5'-P-CCNC: 48 U/L (ref 5–45)
BACTERIA BLD CULT: ABNORMAL
BILIRUB SERPL-MCNC: 4.15 MG/DL (ref 0.2–1)
BUN SERPL-MCNC: 46 MG/DL (ref 5–25)
CALCIUM ALBUM COR SERPL-MCNC: 9.8 MG/DL (ref 8.3–10.1)
CALCIUM SERPL-MCNC: 8.1 MG/DL (ref 8.3–10.1)
CHLORIDE SERPL-SCNC: 102 MMOL/L (ref 100–108)
CO2 SERPL-SCNC: 26 MMOL/L (ref 21–32)
CREAT SERPL-MCNC: 1.39 MG/DL (ref 0.6–1.3)
ERYTHROCYTE [DISTWIDTH] IN BLOOD BY AUTOMATED COUNT: 14.7 % (ref 11.6–15.1)
GFR SERPL CREATININE-BSD FRML MDRD: 35 ML/MIN/1.73SQ M
GLUCOSE SERPL-MCNC: 100 MG/DL (ref 65–140)
GLUCOSE SERPL-MCNC: 104 MG/DL (ref 65–140)
GLUCOSE SERPL-MCNC: 108 MG/DL (ref 65–140)
GLUCOSE SERPL-MCNC: 121 MG/DL (ref 65–140)
GLUCOSE SERPL-MCNC: 127 MG/DL (ref 65–140)
GRAM STN SPEC: ABNORMAL
GRAM STN SPEC: ABNORMAL
HCT VFR BLD AUTO: 30.8 % (ref 34.8–46.1)
HGB BLD-MCNC: 10.8 G/DL (ref 11.5–15.4)
IMM GRANULOCYTES # BLD AUTO: >0.5 THOUSAND/UL (ref 0–0.2)
INR PPP: 1.72 (ref 0.84–1.19)
MCH RBC QN AUTO: 35.2 PG (ref 26.8–34.3)
MCHC RBC AUTO-ENTMCNC: 35.1 G/DL (ref 31.4–37.4)
MCV RBC AUTO: 100 FL (ref 82–98)
MITOCHONDRIA M2 IGG SER-ACNC: 134.8 UNITS (ref 0–20)
NRBC BLD AUTO-RTO: 0 /100 WBCS
PLATELET # BLD AUTO: 180 THOUSANDS/UL (ref 149–390)
PMV BLD AUTO: 10.2 FL (ref 8.9–12.7)
POTASSIUM SERPL-SCNC: 3.4 MMOL/L (ref 3.5–5.3)
PROT SERPL-MCNC: 6.2 G/DL (ref 6.4–8.2)
PROTHROMBIN TIME: 19.8 SECONDS (ref 11.6–14.5)
RBC # BLD AUTO: 3.07 MILLION/UL (ref 3.81–5.12)
SODIUM SERPL-SCNC: 135 MMOL/L (ref 136–145)
WBC # BLD AUTO: 12.48 THOUSAND/UL (ref 4.31–10.16)

## 2022-03-29 PROCEDURE — NC001 PR NO CHARGE: Performed by: PHYSICIAN ASSISTANT

## 2022-03-29 PROCEDURE — 85730 THROMBOPLASTIN TIME PARTIAL: CPT | Performed by: ANESTHESIOLOGY

## 2022-03-29 PROCEDURE — 85730 THROMBOPLASTIN TIME PARTIAL: CPT | Performed by: PHYSICIAN ASSISTANT

## 2022-03-29 PROCEDURE — NC001 PR NO CHARGE: Performed by: INTERNAL MEDICINE

## 2022-03-29 PROCEDURE — 97530 THERAPEUTIC ACTIVITIES: CPT

## 2022-03-29 PROCEDURE — 85610 PROTHROMBIN TIME: CPT | Performed by: PHYSICIAN ASSISTANT

## 2022-03-29 PROCEDURE — G0427 INPT/ED TELECONSULT70: HCPCS | Performed by: INTERNAL MEDICINE

## 2022-03-29 PROCEDURE — 85027 COMPLETE CBC AUTOMATED: CPT | Performed by: PHYSICIAN ASSISTANT

## 2022-03-29 PROCEDURE — 97116 GAIT TRAINING THERAPY: CPT

## 2022-03-29 PROCEDURE — 99233 SBSQ HOSP IP/OBS HIGH 50: CPT | Performed by: ANESTHESIOLOGY

## 2022-03-29 PROCEDURE — 80053 COMPREHEN METABOLIC PANEL: CPT | Performed by: PHYSICIAN ASSISTANT

## 2022-03-29 PROCEDURE — 82948 REAGENT STRIP/BLOOD GLUCOSE: CPT

## 2022-03-29 RX ORDER — POTASSIUM CHLORIDE 20 MEQ/1
40 TABLET, EXTENDED RELEASE ORAL ONCE
Status: COMPLETED | OUTPATIENT
Start: 2022-03-29 | End: 2022-03-29

## 2022-03-29 RX ORDER — CEFAZOLIN SODIUM 2 G/50ML
2000 SOLUTION INTRAVENOUS EVERY 8 HOURS
Status: DISCONTINUED | OUTPATIENT
Start: 2022-03-29 | End: 2022-04-02

## 2022-03-29 RX ORDER — ALBUMIN, HUMAN INJ 5% 5 %
12.5 SOLUTION INTRAVENOUS ONCE
Status: COMPLETED | OUTPATIENT
Start: 2022-03-29 | End: 2022-03-29

## 2022-03-29 RX ORDER — POTASSIUM CHLORIDE 14.9 MG/ML
20 INJECTION INTRAVENOUS ONCE
Status: COMPLETED | OUTPATIENT
Start: 2022-03-29 | End: 2022-03-29

## 2022-03-29 RX ADMIN — HEPARIN SODIUM 11.1 UNITS/KG/HR: 10000 INJECTION, SOLUTION INTRAVENOUS at 11:41

## 2022-03-29 RX ADMIN — CEFAZOLIN SODIUM 2000 MG: 2 SOLUTION INTRAVENOUS at 14:51

## 2022-03-29 RX ADMIN — NOREPINEPHRINE BITARTRATE 3 MCG/MIN: 1 INJECTION INTRAVENOUS at 18:03

## 2022-03-29 RX ADMIN — CEFEPIME HYDROCHLORIDE 1000 MG: 1 INJECTION, SOLUTION INTRAVENOUS at 06:19

## 2022-03-29 RX ADMIN — METOPROLOL TARTRATE 50 MG: 50 TABLET, FILM COATED ORAL at 09:11

## 2022-03-29 RX ADMIN — METRONIDAZOLE 500 MG: 500 INJECTION, SOLUTION INTRAVENOUS at 10:19

## 2022-03-29 RX ADMIN — BACITRACIN 1 LARGE APPLICATION: 500 OINTMENT TOPICAL at 09:11

## 2022-03-29 RX ADMIN — POTASSIUM CHLORIDE 20 MEQ: 14.9 INJECTION, SOLUTION INTRAVENOUS at 06:14

## 2022-03-29 RX ADMIN — BACITRACIN 1 LARGE APPLICATION: 500 OINTMENT TOPICAL at 18:03

## 2022-03-29 RX ADMIN — CEFAZOLIN SODIUM 2000 MG: 2 SOLUTION INTRAVENOUS at 22:10

## 2022-03-29 RX ADMIN — NOREPINEPHRINE BITARTRATE 4 MCG/MIN: 1 INJECTION INTRAVENOUS at 23:06

## 2022-03-29 RX ADMIN — POTASSIUM CHLORIDE 40 MEQ: 1500 TABLET, EXTENDED RELEASE ORAL at 06:10

## 2022-03-29 RX ADMIN — LEVOTHYROXINE SODIUM 150 MCG: 150 TABLET ORAL at 06:10

## 2022-03-29 RX ADMIN — ALBUMIN (HUMAN) 12.5 G: 12.5 INJECTION, SOLUTION INTRAVENOUS at 10:19

## 2022-03-29 NOTE — ASSESSMENT & PLAN NOTE
· LFTs elevated at time of admission:  AST 66, ALT 44, , T bili 4 26  · Labs reviewed in Care everywhere and showed that LFTs were elevated on 01/26/22:  AST 87, ALT 42, , T bili 2  · Patient states she has no known liver disease  · Continue to trend LFTs  · Hold statin  · Right upper quadrant ultrasound: Cholelithiasis and sludge with wall thickening and pericholecystic fluid  Sonographic Enid Carne sign is negative  Findings are equivocal for acute cholecystitis, particularly in the setting of underlying hepatic dysfunction which can also cause gallbladder wall abnormalities  If there is continued clinical concern, further evaluation with HIDA scan recommended  · GI consult pending

## 2022-03-29 NOTE — PHYSICAL THERAPY NOTE
PHYSICAL THERAPY TREATMENT NOTE  NAME:  Jd Haynes  DATE: 03/29/22    Length Of Stay: 4  Performed at least 2 patient identifiers during session: Name and Birthday    TREATMENT FLOW SHEET:    03/29/22 0917   PT Last Visit   PT Visit Date 03/29/22   Note Type   Note Type Treatment   Pain Assessment   Pain Assessment Tool FLACC   Pain Rating: FLACC (Rest) - Face 0   Pain Rating: FLACC (Rest) - Legs 0   Pain Rating: FLACC (Rest) - Activity 0   Pain Rating: FLACC (Rest) - Cry 1   Pain Rating: FLACC (Rest) - Consolability 0   Score: FLACC (Rest) 1   Pain Rating: FLACC (Activity) - Face 1   Pain Rating: FLACC (Activity) - Legs 1   Pain Rating: FLACC (Activity) - Activity 1   Pain Rating: FLACC (Activity) - Cry 1   Pain Rating: FLACC (Activity) - Consolability 0   Score: FLACC (Activity) 4   Restrictions/Precautions   Other Precautions Chair Alarm;Multiple lines;Telemetry; Fall Risk;Pain   General   Chart Reviewed Yes   Response to Previous Treatment Patient reporting fatigue but able to participate  Family/Caregiver Present Yes  (daughter mandi)   Cognition   Arousal/Participation Lethargic   Following Commands Follows one step commands without difficulty   Comments Patient was slightly confused at times   Subjective   Subjective Patient was agreeable to participation  "I'll try"   Bed Mobility   Supine to Sit 4  Minimal assistance   Additional items Assist x 1;HOB elevated;Verbal cues; Bedrails; Increased time required;LE management  (trunk management )   Additional Comments HOB elevated 45 degrees  Verbal and tactile cues provided   Transfers   Sit to Stand 4  Minimal assistance   Additional items Assist x 2;Verbal cues; Increased time required   Stand to Sit 4  Minimal assistance   Additional items Assist x 1;Verbal cues; Increased time required   Stand pivot   (mod x 1, min x 1 )   Additional items Increased time required;Assist x 2;Verbal cues   Toilet transfer 4  Minimal assistance   Additional items Assist x 1;Increased time required;Verbal cues; Commode   Additional Comments Patient utilized a bariatric FWW for all mobility  Verbal and tactile cues provided t/o  with A for weight shifting   Ambulation/Elevation   Gait pattern Decreased foot clearance; Foward flexed; Short stride; Step to;Excessively slow;Decreased hip extension;Decreased heel strike;Decreased toe off   Gait Assistance 4  Minimal assist   Additional items Assist x 1;Verbal cues; Tactile cues   Assistive Device Bariatric Rolling walker   Distance Patient ambulated 3' with bariatric RW and min A x 1  Max VC's for encouragment, weight shift   Balance   Static Sitting Fair +   Dynamic Sitting Fair   Static Standing Poor +   Dynamic Standing Poor +   Ambulatory Poor +   Endurance Deficit   Endurance Deficit Yes   Activity Tolerance   Activity Tolerance Patient limited by fatigue;Patient limited by pain;Treatment limited secondary to medical complications (Comment)  (patient with c/o light headedness during standing)   Nurse Made Aware Spoke with Rolo PARK    Assessment   Prognosis Good   Problem List Decreased strength;Decreased endurance; Impaired balance;Decreased mobility; Decreased coordination;Decreased cognition;Decreased safety awareness; Obesity; Decreased skin integrity;Pain   Barriers to Discharge Decreased caregiver support; Inaccessible home environment   Barriers to Discharge Comments Patient lives alone, decreased activity tolerance, increased assistance with mobility   Goals   Patient Goals unstated   PT Treatment Day 1   Plan   Treatment/Interventions Functional transfer training;LE strengthening/ROM; Endurance training;Elevations; Therapeutic exercise;Patient/family training;Bed mobility;Gait training;Spoke to nursing;Spoke to case management;OT;Equipment eval/education   Progress Slow progress, decreased activity tolerance   PT Frequency 3-5x/wk   Recommendation   PT Discharge Recommendation Post acute rehabilitation services   Equipment Recommended (TBD by rehab)   [address] Mobility Inpatient   Turning in Bed Without Bedrails 2   Lying on Back to Sitting on Edge of Flat Bed 2   Moving Bed to Chair 1   Standing Up From Chair 1   Walk in Room 3   Climb 3-5 Stairs 1   Basic Mobility Inpatient Raw Score 10   Turning Head Towards Sound 4   Follow Simple Instructions 4   Low Function Basic Mobility Raw Score 18   Low Function Basic Mobility Standardized Score 29 25   Highest Level Of Mobility   JH-HLM Goal 4: Move to chair/commode   JH-HLM Highest Level of Mobility 4: Move to chair/commode   JH-HLM Goal Achieved Yes   End of Consult   Patient Position at End of Consult Bedside chair;Bed/Chair alarm activated; All needs within reach  (reclined in recliner, daughter present)      03/29/22 1000   Vitals   Pulse (!) 150   Heart Rate Source Monitor   Respirations (!) 26   Blood Pressure 96/54   MAP (mmHg) 67   BP Location Left arm   BP Method Automatic   Patient Position - Orthostatic VS Sitting   Cardiac Rhythm Atrial fibrillation       The patient's AM-PAC Basic Mobility Inpatient Short Form Raw Score is 10  A Raw score of less than or equal to 16 suggests the patient may benefit from discharge to post-acute rehabilitation services  Please also refer to the recommendation of the Physical Therapist for safe discharge planning  Patient tolerated treatment well  Although lethargic and limited by decreased activity tolerance and pain, patient demonstrated improvement with BM and STS with RW  Patient able to progress to SPT with RW and ambulate  Further activity limited by onset of dizziness with static standing after toileting with patient stating "its going black"  Increased rest breaks required  Patient noted to have elevated HR with activity        David Solomon, PT

## 2022-03-29 NOTE — TELEMEDICINE
REQUIRED DOCUMENTATION:     1  This service was provided via Telemedicine  2  Provider located at [facility]  3  TeleMed provider: Giancarlo Avalos MD   4  Identify all parties in room with patient during tele consult:  Patient, RN  5  After connecting through [employer], patient was identified by name and date of birth and assistant checked wristband  Patient was then informed that this was a Telemedicine visit and that the exam was being conducted confidentially over secure lines  Patient acknowledged consent and understanding of privacy and security of the Telemedicine visit, and gave us permission to have the assistant stay in the room in order to assist with the history and to conduct the exam   I informed the patient that I have reviewed their record in Epic and presented the opportunity for them to ask any questions regarding the visit today  The patient agreed to participate  TeleConsultation - Infectious Disease   cD Sosa 80 y o  female MRN: [id number]  Unit/Bed#: -01 Encounter: [id number]      IMPRESSION & RECOMMENDATIONS:   This is an 20-year-old woman with multiple medical problems, admitted on 08/25 after she was found down at home for an unknown period of time  At presentation, she had hypotension and multiorgan failure  Admission blood cultures now have growth of coagulase-negative Staphylococcus  1  SIRS, with hypotension, POA  Consider sepsis/septic shock with there is no obvious source of infection, other than possible right thigh cellulitis  Patient is clinically improved  Patient remains on Levophed but pressor support is decreasing  She remains systemically well, without toxicity  Antibiotic plan as in below  Monitor temperature/WBC  Monitor hemodynamics  Pressor weaning per Critical Medicine Service  2  Coagulase-negative Staphylococcus bacteremia    Although there was growth in both admission blood cultures, it appears the patient has different species of coagulase-negative Staphylococcus in each of the set of blood cultures  With patient being hypotensive on admission, I suspect that she was a very difficult blood draw access  Patient has no indwelling intravascular foreign body  Repeat blood cultures obtained yesterday have no growth thus far  I suspect this is contaminated blood draw  No antibiotic needed for this  Follow-up on repeat blood cultures  3  Right thigh cellulitis, versus hematoma  Erythema and pain appears to be improved  With MRSA screen negative, MRSA would be unlikely pathogen  Vancomycin can be de-escalated  CT appearance is more consistent with a hematoma than abscess  Regardless, if superficial necrosis continues to worsen in right eye, hematoma may need to be drained, either surgically or percutaneously  Change IV vancomycin to IV cefazolin  No further need for cefepime  Serial right thigh exams  If right thigh superficial necrosis worsens, hematoma may need to be drained  4  Asymptomatic bacteriuria, likely bladder colonization in this elderly patient  Antibiotic is not necessary, although cefazolin above foot cellulitis with provide coverage for E coli and urine culture  No antibiotic needed for this, although cefazolin provides coverage for E coli in urine culture  5  LOENARDO, likely secondary to prerenal state and rhabdomyolysis  Creatinine improving  Antibiotic dosages adjusted accordingly  Monitor creatinine  6  Possible cirrhosis  GI follow-up  7  Status post fall with patient being down for an unknown period of time  Fortunately, there is no significant sequela from fall  Extensive review of the medical records in epic including review of the notes, radiographs, and laboratory results    Discussed with patient in detail regarding the above plan  Discussed with Critical Medicine Service      I spent 70 minutes in evaluation of the patient of which 40 minutes was in counseling/coordination of care    HISTORY OF PRESENT ILLNESS:  Reason for Consult:  Sepsis  HPI: Charla Miles is a 80y o  year old female, with multiple medical problems, including DM, hyperlipidemia, hypothyroidism and atrial fibrillation on anticoagulation, was brought to the ER on 03/25 after she was found down for an unclear period of time by her daughter  On presentation, patient had hypotension and multiorgan failure  She also had multiple ecchymosis  Patient was admitted and started on IV ceftriaxone for possible sepsis  Antibiotic regimen was subsequently escalated to vancomycin/cefepime  Admission blood cultures subsequently have growth of coagulase-negative Staphylococcus  For these reasons, we are asked to evaluate the patient  At present, patient complains of pain all over, worse in the right thigh  She has no flank pain  She has no urinary symptoms  She has no fever or chills  She has no recollection of her fall or event leading to her being down  REVIEW OF SYSTEMS:  A complete 12 point system-based review of systems is negative other than that noted in the HPI      PAST MEDICAL HISTORY:  Past Medical History:   Diagnosis Date    LEONARDO (acute kidney injury) [facility]     Atrial fibrillation ([facility]     CHF (congestive heart failure) (HCC)     diastolic grade 1    Cirrhosis ([facility]     Diabetes mellitus ([facility]     Disease of thyroid gland     hypothyroid    Endometrial ca ([address]     Hiatal hernia     Hyperlipidemia     Hypertension     Hypothyroid     Lung nodules     Periumbilical hernia     Pulmonary HTN ([facility]     Thoracic aortic aneurysm without rupture ([facility] ) 03/25/2022    41 mm     Past Surgical History:   Procedure Laterality Date    HERNIA REPAIR      HIP ARTHROPLASTY      HYSTERECTOMY      JOINT REPLACEMENT Bilateral     knee; b/l hip    KNEE ARTHROPLASTY      OOPHORECTOMY         FAMILY HISTORY:  Non-contributory    SOCIAL HISTORY:  Social History   Social History     Substance and Sexual Activity   Alcohol Use Never     Social History     Substance and Sexual Activity   Drug Use Never     Social History     Tobacco Use   Smoking Status Never Smoker   Smokeless Tobacco Never Used       ALLERGIES:  Allergies   Allergen Reactions    Penicillins Hives           Sulfamethoxazole-Trimethoprim GI Intolerance       MEDICATIONS:  All current active medications have been reviewed  Patient is currently on vancomycin/cefepime  PHYSICAL EXAM:  Temp:  [97 2 °F (36 2 °C)-99 5 °F (37 5 °C)] 97 7 °F (36 5 °C)  HR:  [] 119  Resp:  [15-36] 21  BP: ()/(47-72) 98/57  SpO2:  [94 %-100 %] 98 %  Temp (24hrs), Av °F (36 7 °C), Min:97 2 °F (36 2 °C), Max:99 5 °F (37 5 °C)  Current: Temperature: 97 7 °F (36 5 °C)    Intake/Output Summary (Last 24 hours) at 3/29/2022 1249  Last data filed at 3/29/2022 1101  Gross per 24 hour   Intake 2349 95 ml   Output 2140 ml   Net 209 95 ml        Physical exam has been primarily done by the patient's nurse and/or the primary service due to limited examination abilities on telemedicine    General Appearance:  Appearing well, nontoxic, and in no distress   Head:  Normocephalic, without obvious abnormality, atraumatic   Eyes:  Conjunctiva pink and sclera anicteric, both eyes   Nose: Nares normal, mucosa normal, no drainage   Throat: Oropharynx moist without lesions   Neck: Supple, symmetrical, no adenopathy   Back:   Symmetric, no curvature, ROM normal, no CVA tenderness   Lungs:   Clear to auscultation bilaterally, respirations unlabored   Chest Wall:  No tenderness or deformity   Heart:  RRR; no murmur, rub or gallop   Abdomen:   Soft, non-tender, non-distended, positive bowel sounds,    Extremities: Right thigh edematous  There is erythema that appears to be receding from ink romi placed earlier  Wound with superficial necrosis  No purulence  Skin: No rashes or lesions  No draining wounds noted     Lymph nodes: Cervical, supraclavicular nodes normal   Neurologic: Alert and oriented times 3, extremity strength 5/5 and symmetric       LABS, IMAGING, & OTHER STUDIES:  Lab Results:  I have personally reviewed pertinent labs  Results from last 7 days   Lab Units 03/29/22  0434 03/28/22  0438 03/27/22  1604   WBC Thousand/uL 12 48* 9 42 13 46*   HEMOGLOBIN g/dL 10 8* 9 8* 10 3*   PLATELETS Thousands/uL 180 151 175     Results from last 7 days   Lab Units 03/29/22  0434 03/28/22  0438 03/28/22  0438 03/27/22  1557 03/27/22  0433   POTASSIUM mmol/L 3 4*   < > 3 6   < > 4 1   CHLORIDE mmol/L 102   < > 101   < > 102   CO2 mmol/L 26   < > 25   < > 22   BUN mg/dL 46*   < > 57*   < > 63*   CREATININE mg/dL 1 39*   < > 1 53*   < > 1 77*   EGFR ml/min/1 73sq m 35   < > 31   < > 26   CALCIUM mg/dL 8 1*   < > 8 1*   < > 8 0*   AST U/L 48*  --  56*  --  62*   ALT U/L 35   < > 37  --  35   ALK PHOS U/L 146*   < > 130*  --  123*    < > = values in this interval not displayed  Results from last 7 days   Lab Units 03/28/22  0527 03/26/22  0720 03/25/22  1618 03/25/22  1611 03/25/22  1545   BLOOD CULTURE  No Growth at 24 hrs  No Growth at 24 hrs   --  Staphylococcus epidermidis* Staphylococcus coagulase negative*  Staphylococcus epidermidis*  --    GRAM STAIN RESULT   --   --  Gram positive cocci in clusters* Gram positive cocci in clusters*  --    URINE CULTURE   --   --   --   --  >100,000 cfu/ml Escherichia coli*  <10,000 cfu/ml    MRSA CULTURE ONLY   --  No Methicillin Resistant Staphlyococcus aureus (MRSA) isolated  --   --   --      Results from last 7 days   Lab Units 03/28/22  0438   PROCALCITONIN ng/ml 2 65*         Results from last 7 days   Lab Units 03/28/22  0438   FERRITIN ng/mL 922*           Imaging Studies:   I have personally reviewed pertinent imaging study reports and images in PACS  Right leg CT with contrast reviewed personally  Subcutaneous fluid collection without enhancement  Chest/abdomen/pelvis CT reviewed personally  Small bilateral pleural effusions    No consolidation  Cirrhosis findings  No acute intra-abdominal pathology  Other Studies:   I have personally reviewed pertinent reports

## 2022-03-29 NOTE — ASSESSMENT & PLAN NOTE
Malnutrition Findings:   Adult Malnutrition type: Chronic illness  Adult Degree of Malnutrition: Malnutrition of moderate degree  Malnutrition Characteristics: Muscle loss,Fat loss,Weight loss                  360 Statement: Chronic moderate malnutrition related to decreased appetite as evidenced by 11 6% weight loss x 4 mo (11/23/21 294lb, 3/28/22 260lb, suspect partly fluid related), moderate fat loss to orbitals, moderate muscle loss to temporalis, deltoid and interroseous muscles  Treated with: Will liberalize cardiac to 4gm GLADYS, continue CCD 3 at this time  Will trial glucerna daily and ensure max protein daily, increase as tolerated/indicated  Recommend daily weights  BMI Findings: Body mass index is 47 55 kg/m²

## 2022-03-29 NOTE — ASSESSMENT & PLAN NOTE
· AFib RVR with heart rate in 160s on presentation  · Patient follows with [facility] cardiology  · Patient's home metoprolol and Coumadin held  Coumadin held secondary to coagulopathy with INR greater than 11   · Heart rate improving with fluid resuscitation  With improvement in BP would resume metoprolol  · TSH 0 301  · Troponin peaked at 109 and trending down  · BNP > 8900  · Check ECHO  · Persistent afib with RVR despite Lopressor IV and PO  Rates improved to 120s from 150s  · Rate controlled as of late morning 3/26   · Heparin gtt started since INR <2  · Metoprolol PO discontinued 3/29 as pt is still requiring levophed  Eventually will resume metoprolol when BP improved off pressors  · Amiodarone gtt started 3/30 with rates sustained in the 120s

## 2022-03-29 NOTE — PLAN OF CARE
Problem: PHYSICAL THERAPY ADULT  Goal: Performs mobility at highest level of function for planned discharge setting  See evaluation for individualized goals  Description: Treatment/Interventions: Functional transfer training,LE strengthening/ROM,Therapeutic exercise,Endurance training,Patient/family training,Equipment eval/education,Bed mobility,Gait training,Compensatory technique education,Spoke to nursing,OT  Equipment Recommended:  (TBD by rehab)       See flowsheet documentation for full assessment, interventions and recommendations  3/29/2022 1108 by Félix Purdy PT  Outcome: Progressing  Note: Prognosis: Good  Problem List: Decreased strength,Decreased endurance,Impaired balance,Decreased mobility,Decreased coordination,Decreased cognition,Decreased safety awareness,Obesity,Decreased skin integrity,Pain  Assessment: Patient tolerated treatment well  Although lethargic and limited by decreased activity tolerance and pain, patient demonstrated improvement with BM and STS with RW  Patient able to progress to SPT with RW and ambulate  Further activity limited by onset of dizziness with static standing after toileting with patient stating "its going black"  Increased rest breaks required  Patient noted to have elevated HR with activity  Barriers to Discharge: Decreased caregiver support,Inaccessible home environment  Barriers to Discharge Comments: Patient lives alone, decreased activity tolerance, increased assistance with mobility     PT Discharge Recommendation: Post acute rehabilitation services          See flowsheet documentation for full assessment

## 2022-03-29 NOTE — ASSESSMENT & PLAN NOTE
· Ultrasound revealed gallbladder sludge and cholelithiasis  · GI on consult - pt does not appear to be symptomatic from this at this time and she would be a high risk surgical candidate; if this is still concerning and contributing to overall picture consider HIDA scan and if positive consider surgical consultation

## 2022-03-29 NOTE — ASSESSMENT & PLAN NOTE
· AFib RVR with heart rate in 160s on presentation  · Patient follows with [facility] cardiology  · Patient's home metoprolol and Coumadin held  Coumadin held secondary to coagulopathy with INR greater than 11   · Heart rate improving with fluid resuscitation  With improvement in BP would resume metoprolol  · TSH 0 301  · Troponin peaked at 109 and trending down  · BNP > 8900  · Check ECHO  · Persistent afib with RVR despite Lopressor IV and PO  Rates improved to 120s from 150s    · Rate controlled as of late morning 3/26   · Heparin gtt started since INR <2

## 2022-03-29 NOTE — ASSESSMENT & PLAN NOTE
· suspect cirrhosis likely secondary to SORIA  · CT abd/pelvis revealed "persistent findings of hepatic cirrhosis new trace perihepatic ascites"   Ultrasound also revealed "Liver cirrhosis "  · GI on consult  · Avoid hepatotoxins

## 2022-03-29 NOTE — PLAN OF CARE
Problem: Prexisting or High Potential for Compromised Skin Integrity  Goal: Skin integrity is maintained or improved  Description: INTERVENTIONS:  - Identify patients at risk for skin breakdown  - Assess and monitor skin integrity  - Assess and monitor nutrition and hydration status  - Monitor labs   - Assess for incontinence   - Turn and reposition patient  - Assist with mobility/ambulation  - Relieve pressure over bony prominences  - Avoid friction and shearing  - Provide appropriate hygiene as needed including keeping skin clean and dry  - Evaluate need for skin moisturizer/barrier cream  - Collaborate with interdisciplinary team   - Patient/family teaching  - Consider wound care consult   Outcome: Progressing     Problem: PAIN - ADULT  Goal: Verbalizes/displays adequate comfort level or baseline comfort level  Description: Interventions:  - Encourage patient to monitor pain and request assistance  - Assess pain using appropriate pain scale  - Administer analgesics based on type and severity of pain and evaluate response  - Implement non-pharmacological measures as appropriate and evaluate response  - Consider cultural and social influences on pain and pain management  - Notify physician/advanced practitioner if interventions unsuccessful or patient reports new pain  Outcome: Progressing     Problem: SAFETY ADULT  Goal: Patient will remain free of falls  Description: INTERVENTIONS:  - Educate patient/family on patient safety including physical limitations  - Instruct patient to call for assistance with activity   - Consult OT/PT to assist with strengthening/mobility   - Keep Call bell within reach  - Keep bed low and locked with side rails adjusted as appropriate  - Keep care items and personal belongings within reach  - Initiate and maintain comfort rounds  - Make Fall Risk Sign visible to staff  - Offer Toileting every 2 Hours, in advance of need  - Initiate/Maintain bed/chair alarm prn  - Apply yellow socks and bracelet for high fall risk patients  - Consider moving patient to room near nurses station  Outcome: Progressing     Problem: Knowledge Deficit  Goal: Patient/family/caregiver demonstrates understanding of disease process, treatment plan, medications, and discharge instructions  Description: Complete learning assessment and assess knowledge base    Interventions:  - Provide teaching at level of understanding  - Provide teaching via preferred learning methods  Outcome: Progressing     Problem: CARDIOVASCULAR - ADULT  Goal: Maintains optimal cardiac output and hemodynamic stability  Description: INTERVENTIONS:  - Monitor I/O, vital signs and rhythm  - Monitor for S/S and trends of decreased cardiac output  - Administer and titrate ordered vasoactive medications to optimize hemodynamic stability  - Assess quality of pulses, skin color and temperature  - Assess for signs of decreased coronary artery perfusion  - Instruct patient to report change in severity of symptoms  Outcome: Progressing  Goal: Absence of cardiac dysrhythmias or at baseline rhythm  Description: INTERVENTIONS:  - Continuous cardiac monitoring, vital signs, obtain 12 lead EKG if ordered  - Administer antiarrhythmic and heart rate control medications as ordered  - Monitor electrolytes and administer replacement therapy as ordered  Outcome: Progressing     Problem: GENITOURINARY - ADULT  Goal: Maintains or returns to baseline urinary function  Description: INTERVENTIONS:  - Assess urinary function  - Encourage oral fluids to ensure adequate hydration if ordered  - Administer IV fluids as ordered to ensure adequate hydration  - Administer ordered medications as needed  - Offer frequent toileting  - Follow urinary retention protocol if ordered  Outcome: Progressing  Goal: Absence of urinary retention  Description: INTERVENTIONS:  - Assess patients ability to void and empty bladder  - Monitor I/O  - Bladder scan as needed  - Discuss with physician/AP medications to alleviate retention as needed  - Discuss catheterization for long term situations as appropriate  Outcome: Progressing  Goal: Urinary catheter remains patent  Description: INTERVENTIONS:  - Assess patency of urinary catheter  - If patient has a chronic gutierrez, consider changing catheter if non-functioning  - Follow guidelines for intermittent irrigation of non-functioning urinary catheter  Outcome: Progressing     Problem: METABOLIC, FLUID AND ELECTROLYTES - ADULT  Goal: Electrolytes maintained within normal limits  Description: INTERVENTIONS:  - Monitor labs and assess patient for signs and symptoms of electrolyte imbalances  - Administer electrolyte replacement as ordered  - Monitor response to electrolyte replacements, including repeat lab results as appropriate  - Instruct patient on fluid and nutrition as appropriate  Outcome: Progressing  Goal: Fluid balance maintained  Description: INTERVENTIONS:  - Monitor labs   - Monitor I/O and WT  - Instruct patient on fluid and nutrition as appropriate  - Assess for signs & symptoms of volume excess or deficit  Outcome: Progressing  Goal: Glucose maintained within target range  Description: INTERVENTIONS:  - Monitor Blood Glucose as ordered  - Assess for signs and symptoms of hyperglycemia and hypoglycemia  - Administer ordered medications to maintain glucose within target range  - Assess nutritional intake and initiate nutrition service referral as needed  Outcome: Progressing     Problem: SKIN/TISSUE INTEGRITY - ADULT  Goal: Incision(s), wounds(s) or drain site(s) healing without S/S of infection  Description: INTERVENTIONS  - Assess and document dressing, incision, wound bed, drain sites and surrounding tissue  - Provide patient and family education  - Perform skin care/dressing changes as needed  Outcome: Progressing  Goal: Pressure injury heals and does not worsen  Description: Interventions:  - Implement low air loss mattress or specialty surface (Criteria met)  - Apply silicone foam dressing  - Use special pressure reducing interventions such as waffle cushion when in chair   - Apply fecal or urinary incontinence containment device   - Perform passive or active ROM every 8  - Turn and reposition patient & offload bony prominences every 2 hours   - Utilize friction reducing device or surface for transfers   - Consider consults to  interdisciplinary teams such as wound team  - Use incontinent care products after each incontinent episode such as barrier cream  - Consider nutrition services referral as needed  Outcome: Progressing     Problem: Potential for Falls  Goal: Patient will remain free of falls  Description: INTERVENTIONS:  - Educate patient/family on patient safety including physical limitations  - Instruct patient to call for assistance with activity   - Consult OT/PT to assist with strengthening/mobility   - Keep Call bell within reach  - Keep bed low and locked with side rails adjusted as appropriate  - Keep care items and personal belongings within reach  - Initiate and maintain comfort rounds  - Make Fall Risk Sign visible to staff  - Offer Toileting every 2 Hours, in advance of need  - Initiate/Maintain bed/chair alarm prn  - Apply yellow socks and bracelet for high fall risk patients  - Consider moving patient to room near nurses station  Outcome: Progressing     Problem: Nutrition/Hydration-ADULT  Goal: Nutrient/Hydration intake appropriate for improving, restoring or maintaining nutritional needs  Description: Monitor and assess patient's nutrition/hydration status for malnutrition  Collaborate with interdisciplinary team and initiate plan and interventions as ordered  Monitor patient's weight and dietary intake as ordered or per policy  Utilize nutrition screening tool and intervene as necessary  Determine patient's food preferences and provide high-protein, high-caloric foods as appropriate       INTERVENTIONS:  - Monitor oral intake, urinary output, labs, and treatment plans  - Assess nutrition and hydration status and recommend course of action  - Evaluate amount of meals eaten  - Assist patient with eating if necessary   - Allow adequate time for meals  - Recommend/ encourage appropriate diets, oral nutritional supplements, and vitamin/mineral supplements  - Order, calculate, and assess calorie counts as needed  - Recommend, monitor, and adjust tube feedings and TPN/PPN based on assessed needs  - Assess need for intravenous fluids  - Provide specific nutrition/hydration education as appropriate  - Include patient/family/caregiver in decisions related to nutrition  Outcome: Not Progressing     Problem: MOBILITY - ADULT  Goal: Maintain or return to baseline ADL function  Description: INTERVENTIONS:  -  Assess patient's ability to carry out ADLs; assess patient's baseline for ADL function and identify physical deficits which impact ability to perform ADLs (bathing, care of mouth/teeth, toileting, grooming, dressing, etc )  - Assess/evaluate cause of self-care deficits   - Assess range of motion  - Assess patient's mobility; develop plan if impaired  - Assess patient's need for assistive devices and provide as appropriate  - Encourage maximum independence but intervene and supervise when necessary  - Involve family in performance of ADLs  - Assess for home care needs following discharge   - Consider OT consult to assist with ADL evaluation and planning for discharge  - Provide patient education as appropriate  Outcome: Not Progressing  Goal: Maintains/Returns to pre admission functional level  Description: INTERVENTIONS:  - Perform BMAT or MOVE assessment daily    - Set and communicate daily mobility goal to care team and patient/family/caregiver  - Collaborate with rehabilitation services on mobility goals if consulted  - Perform Range of Motion 3 times a day  - Reposition patient every 2 hours    - Record patient progress and toleration of activity level   Outcome: Not Progressing     Problem: INFECTION - ADULT  Goal: Absence or prevention of progression during hospitalization  Description: INTERVENTIONS:  - Assess and monitor for signs and symptoms of infection  - Monitor lab/diagnostic results  - Monitor all insertion sites, i e  indwelling lines, tubes, and drains  - Monitor endotracheal if appropriate and nasal secretions for changes in amount and color  - [place] appropriate cooling/warming therapies per order  - Administer medications as ordered  - Instruct and encourage patient and family to use good hand hygiene technique  - Identify and instruct in appropriate isolation precautions for identified infection/condition  Outcome: Not Progressing     Problem: SAFETY ADULT  Goal: Maintain or return to baseline ADL function  Description: INTERVENTIONS:  -  Assess patient's ability to carry out ADLs; assess patient's baseline for ADL function and identify physical deficits which impact ability to perform ADLs (bathing, care of mouth/teeth, toileting, grooming, dressing, etc )  - Assess/evaluate cause of self-care deficits   - Assess range of motion  - Assess patient's mobility; develop plan if impaired  - Assess patient's need for assistive devices and provide as appropriate  - Encourage maximum independence but intervene and supervise when necessary  - Involve family in performance of ADLs  - Assess for home care needs following discharge   - Consider OT consult to assist with ADL evaluation and planning for discharge  - Provide patient education as appropriate  Outcome: Not Progressing  Goal: Maintains/Returns to pre admission functional level  Description: INTERVENTIONS:  - Perform BMAT or MOVE assessment daily    - Set and communicate daily mobility goal to care team and patient/family/caregiver  - Collaborate with rehabilitation services on mobility goals if consulted  - Perform Range of Motion 3 times a day    - Reposition patient every 2 hours   - Record patient progress and toleration of activity level   Outcome: Not Progressing     Problem: DISCHARGE PLANNING  Goal: Discharge to home or other facility with appropriate resources  Description: INTERVENTIONS:  - Identify barriers to discharge w/patient and caregiver  - Arrange for needed discharge resources and transportation as appropriate  - Identify discharge learning needs (meds, wound care, etc )  - Arrange for interpretive services to assist at discharge as needed  - Refer to Case Management Department for coordinating discharge planning if the patient needs post-hospital services based on physician/advanced practitioner order or complex needs related to functional status, cognitive ability, or social support system  Outcome: Not Progressing

## 2022-03-29 NOTE — PROGRESS NOTES
[address]  Progress Note - Brian Fan 1940, 80 y o  female MRN: [id number]  Unit/Bed#: -01 Encounter: [id number]  Primary Care Provider: Elsie Eubanks MD   Date and time admitted to hospital: 3/25/2022  3:02 PM    * Septic shock [facility])  Assessment & Plan  Background:  Patient found down on floor by daughter  Patient is alert and oriented however does not entirely recall events of suspected fall  Patient does state that she believes she may have fell on a chair however daughter stated that she was found in the living room next to a cabinet  Patient noted to have multiple injuries and areas of ecchymosis  Trauma alert called from the ED  Patient met sepsis criteria on admission  · Meets criteria as evidence by heart rate 160s, respiratory rate 20-25, WBCs 30 and LEONARDO present on admission  · Source likely urinary vs cellulitis right thigh +/- bacteremia  UA showed small leukocytes, no nitrates, WBCs 10-20 and innumerable bacteria  Culture: Gram negative mark enteric like  Sensitivity pending  · Blood culture x2: 1/2 positive for GPC in clusters  · Repeat BC drawn 3/28   · Initial lactic acid 3 3  Lactic acidosis cleared  · Started on ceftriaxone in ED, but transitioned to Vanco/Cefepime 3/26 due to worsening clinical status  · MRSA swab negative  · Initially fluid resuscitated with 1 75 L of lactated Ringer's which was based on ideal body weight due to BMI of 57 66  Patient remained hypotensive and therefore was given an additional 1 25 L  Norepinephrine ordered however not started at this time because blood pressure improved during 3 L fluid boluses  · Discussed with patient central line and arterial line placements  Patient would be agreeable if indicated  · CT the chest/abdomen/pelvis: No findings of acute thoracic or abdominopelvic injury  Small right and trace left pleural effusions are new since November 14, 2021   Persistent findings of hepatic cirrhosis new trace perihepatic ascites  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    · 3/25 persistent hypotension despite fluid bolus'  Levophed started, arterial line and CVC inserted  · 3/26 Vasopressin added to due escalating dose of levophed  · Vasopressin d/c 3/28  · Levophed continued  · "Insect bite" right thigh with cellulitis, appears to be worsening, concern for abscess vs necrotizing infection- CT leg ordered and General Surgery consulted  · CT showed: Diffuse subcutaneous edema and overlying skin thickening most prominent about the medial aspect of the mid/distal thigh with superficial and deep subcutaneous fluid #3/169 appears nonloculated without a surrounding rind however abscess cannot be excluded   without IV contrast    · Surgery will continue to follow- conservative management with wound care    Coagulopathy [facility])  Assessment & Plan  · Patient is maintained on Coumadin for atrial fib outpatient  · INR 11 78 at time of admission  · Hold Coumadin and trend INR  · Vit K 10 mg IV x1 in ED  · Repeat INR 7 6 Vit K 10 mg PO x1   · 1 unit FFP  · 3/27: INR 2 01  · CT facial bones and head revealed Anterolateral right frontal scalp hematoma without acute traumatic injury to the facial bones  · Trauma CT scans of CT chest/abdomen/pelvis negative for hematoma or injury  · Monitor H/H and platelets  · Continue to monitor for further bleeding  · Would repeat CT scan for acute changes  · Heparin gtt started 3/27      Moderate protein-calorie malnutrition ([facility] [address] )  Assessment & Plan  Malnutrition Findings:   Adult Malnutrition type: Chronic illness  Adult Degree of Malnutrition: Malnutrition of moderate degree  Malnutrition Characteristics: Muscle loss,Fat loss,Weight loss                  360 Statement: Chronic moderate malnutrition related to decreased appetite as evidenced by 11 6% weight loss x 4 mo (11/23/21 294lb, 3/28/22 260lb, suspect partly fluid related), moderate fat loss to orbitals, moderate muscle loss to temporalis, deltoid and interroseous muscles  Treated with: Will liberalize cardiac to 4gm GLADYS, continue CCD 3 at this time  Will trial glucerna daily and ensure max protein daily, increase as tolerated/indicated  Recommend daily weights  BMI Findings: Body mass index is 47 55 kg/m²  Fusiform ectasia of ascending aorta  Assessment & Plan  · CT abd shows: 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  · Recommendation is for follow-up low radiation dose chest CT in one year  Eye discharge  Assessment & Plan  · Greenish discharge from both eyes  · Bacitracin-polymyxin B ointment ordered  HLD (hyperlipidemia)  Assessment & Plan  · Hold statin at this time due to transaminitis    Diabetes mellitus [facility])  Assessment & Plan  Lab Results   Component Value Date    HGBA1C 5 5 01/31/2018       Recent Labs     03/28/22  0729 03/28/22  1119 03/28/22  1625 03/28/22  2121   POCGLU 98 116 118 129       Blood Sugar Average: Last 72 hrs:  (P) 060 [id number]   · AC/HS Accu-Cheks with sliding scale insulin    Hypothyroidism  Assessment & Plan  · Continue home Synthroid dosage    · TSH 0 301    Impaired skin integrity  Assessment & Plan  · Multiple areas of impaired skin integrity present at time of admission likely secondary to fall in the setting of supratherapeutic INR on Coumadin  · Ecchymosis noted in albert orbital region, forehead, chest, abdomen and extremities  · Supportive care    Hematoma  Assessment & Plan  · CT of the head revealed Anterolateral right frontal scalp hematoma without acute traumatic injury to the facial bones  · Hematoma noted at right brow  Continue to monitor closely  Fall  Assessment & Plan  · Suspected fall as patient was found down by daughter  Unknown time down however last time patient was seen or heard from was approximately 4 days ago by daughter  Patient states she does not recall all of the events but does report that she fell  · Trauma alert in ED  Right frontal scalp hematoma noted on imaging  Multiple areas of ecchymosis over entire body including bilateral periorbital areas, chest, abdomen and extremities  · PT/OT consult placed    Elevated CK  Assessment & Plan  · CK elevated on admission at 534 Likely secondary to fall  · Patient currently receiving fluid resuscitation for septic shock  · CK decreasing on repeat  Trend as clinically necessary  Atrial fibrillation with RVR (HCC)  Assessment & Plan  · AFib RVR with heart rate in 160s on presentation  · Patient [facility] with [facility] cardiology  · Patient's home metoprolol and Coumadin held  Coumadin held secondary to coagulopathy with INR greater than 11   · Heart rate improving with fluid resuscitation  With improvement in BP would resume metoprolol  · TSH 0 301  · Troponin peaked at 109 and trending down  · BNP > 8900  · Check ECHO  · Persistent afib with RVR despite Lopressor IV and PO  Rates improved to 120s from 150s  · Rate controlled as of late morning 3/26   · Heparin gtt started since INR <2    Transaminitis  Assessment & Plan  · LFTs elevated at time of admission:  AST 66, ALT 44, , T bili 4 26  · Labs reviewed in Care everywhere and showed that LFTs were elevated on 01/26/22:  AST 87, ALT 42, , T bili 2  · Patient states she has no known liver disease  · Continue to trend LFTs  · Hold statin  · Right upper quadrant ultrasound: Cholelithiasis and sludge with wall thickening and pericholecystic fluid  Sonographic Roselynn Miguel sign is negative    Findings are equivocal for acute cholecystitis, particularly in the setting of underlying hepatic dysfunction which can also cause gallbladder wall abnormalities  If there is continued clinical concern, further evaluation with HIDA scan recommended  · GI consult pending  LEONARDO (acute kidney injury) ([address] )  Assessment & Plan  · Likely secondary to septic shock  · Baseline creatinine 0 8-1 1   · Creatinine 2 3 on presentation  · Ramires catheter placed in ED  Will maintain for accurate I&O at this time  · Urine output improving with fluid resuscitation  · Monitor BUN and creatinine  · Strict I&Os  · Avoid nephrotoxins and hypotension      ----------------------------------------------------------------------------------------  HPI/24hr events: There were no acute events reported overnight  Patient remains on Levophed  Vasopressin discontinued  Repeat blood cultures drawn on 3/28 pending results  Day 4 of vancomycin and cefepime  Patient offers no complaints       Patient appropriate for transfer out of the ICU today?: No  Disposition: Continue Critical Care   Code Status: Level 2 - DNAR: but accepts endotracheal intubation  ---------------------------------------------------------------------------------------    Review of Systems  Review of systems was reviewed and negative unless stated above in HPI/24-hour events   ---------------------------------------------------------------------------------------  OBJECTIVE    Vitals   Vitals:    22 0415 22 0430 22 0445 22 0500   BP:    117/65   BP Location:       Pulse: 94 101 89 102   Resp: 19 17 18 17   Temp: 97 7 °F (36 5 °C) 97 5 °F (36 4 °C) 97 5 °F (36 4 °C) (!) 97 2 °F (36 2 °C)   TempSrc:       SpO2: 95% 94% 96% 96%   Weight:       Height:         Temp (24hrs), Av 2 °F (36 8 °C), Min:97 2 °F (36 2 °C), Max:99 5 °F (37 5 °C)  Current: Temperature: (!) 97 2 °F (36 2 °C)  Arterial Line BP: 107/48  Arterial Line MAP (mmHg): 67 mmHg    Respiratory:  SpO2: SpO2: 96 %  Nasal Cannula O2 Flow Rate (L/min): 2 L/min    Invasive/non-invasive ventilation settings   Respiratory  Report   Lab Data (Last 4 hours)    None         O2/Vent Data (Last 4 hours)    None                Physical Exam  Constitutional:       General: She is not in acute distress  Appearance: She is obese  She is not ill-appearing or diaphoretic  HENT:      Head: Normocephalic  Comments: Hematoma over right eye with raccoon eyes  Contusion present anterior neck  Nose: Nose normal       Mouth/Throat:      Mouth: Mucous membranes are moist       Pharynx: Oropharynx is clear  Eyes:      General: No scleral icterus  Right eye: No discharge  Left eye: No discharge  Extraocular Movements: Extraocular movements intact  Conjunctiva/sclera: Conjunctivae normal       Pupils: Pupils are equal, round, and reactive to light  Cardiovascular:      Rate and Rhythm: Normal rate  Rhythm regularly irregular  Pulses: Normal pulses  Heart sounds: No friction rub  No gallop  Pulmonary:      Effort: Pulmonary effort is normal  No respiratory distress  Breath sounds: Normal breath sounds  No stridor  No wheezing, rhonchi or rales  Chest:      Chest wall: No tenderness  Abdominal:      General: Bowel sounds are normal  There is no distension  Palpations: Abdomen is soft  There is no mass  Tenderness: There is no abdominal tenderness  There is no guarding or rebound  Musculoskeletal:         General: Tenderness (Left thigh area ) present  No swelling or deformity  Normal range of motion  Cervical back: Normal range of motion and neck supple  No rigidity or tenderness  Right lower leg: No edema  Left lower leg: No edema  Comments: Left wound with clean, dry dressing in place  Skin:     General: Skin is warm and dry  Capillary Refill: Capillary refill takes less than 2 seconds  Coloration: Skin is not jaundiced or pale  Findings: No rash  Neurological:      General: No focal deficit present  Mental Status: She is alert and oriented to person, place, and time  GCS: GCS eye subscore is 4  GCS verbal subscore is 5  GCS motor subscore is 6  Sensory: No sensory deficit  Laboratory and Diagnostics:  Results from last 7 days   Lab Units 03/29/22  0434 03/28/22  0438 03/27/22  1604 03/27/22  0433 03/26/22  0503 03/25/22  1506   WBC Thousand/uL 12 48* 9 42 13 46* 16 50* 26 42* 30 84*   HEMOGLOBIN g/dL 10 8* 9 8* 10 3* 10 1* 10 5* 13 7   HEMATOCRIT % 30 8* 27 9* 29 5* 27 5* 29 0* 38 6   PLATELETS Thousands/uL 180 151 175 178 208 248   BANDS PCT %  --   --   --   --  1  --    MONO PCT %  --   --   --   --  7 7     Results from last 7 days   Lab Units 03/29/22  0434 03/28/22  0438 03/27/22  1557 03/27/22  0433 03/26/22  1621 03/26/22  0720 03/26/22  0503 03/25/22  1506 03/25/22  1506   SODIUM mmol/L 135* 134* 133* 134* 133*  --  135*  --  134*   POTASSIUM mmol/L 3 4* 3 6 3 5 4 1 3 7 4 1 3 8   < > 4 6   CHLORIDE mmol/L 102 101 101 102 101  --  102  --  99*   CO2 mmol/L 26 25 23 22 22  --  20*  --  24   ANION GAP mmol/L 7 8 9 10 10  --  13  --  11   BUN mg/dL 46* 57* 63* 63* 66*  --  62*  --  71*   CREATININE mg/dL 1 39* 1 53* 1 65* 1 77* 1 82*  --  1 81*  --  2 37*   CALCIUM mg/dL 8 1* 8 1* 8 4 8 0* 8 1*  --  8 1*  --  8 5   GLUCOSE RANDOM mg/dL 104 108 132 119 132  --  86  --  95   ALT U/L 35 37  --  35  --   --  33  --  42   AST U/L 48* 56*  --  62*  --   --  54*  --  66*   ALK PHOS U/L 146* 130*  --  123*  --   --  121*  --  165*   ALBUMIN g/dL 1 9* 1 9*  --  2 2*  --   --  2 5*  --  1 9*   TOTAL BILIRUBIN mg/dL 4 15* 5 74*  --  6 35*  --   --  5 08*  --  4 26*    < > = values in this interval not displayed       Results from last 7 days   Lab Units 03/27/22  1557 03/27/22  1347 03/27/22  0433 03/26/22  0503 03/25/22 2013   MAGNESIUM mg/dL 2 5 2 0 2 1 2 3 2 3      Results from last 7 days   Lab Units 03/29/22  0434 03/28/22  1404 03/28/22  0801 03/28/22  0019 03/27/22  1807 03/27/22  1556 03/27/22  0433 03/26/22  0503 03/25/22  2016 03/25/22  1506   INR  1 72*  --  1 91*  --   --  1 85* 2 01* 4 35* 7 91* 11 78*   PTT seconds 95* 74* 80* 55* 36  --   --   --   --  60*          Results from last 7 days   Lab Units 03/25/22  2317 03/25/22 2013 03/25/22  1819 03/25/22  1618   LACTIC ACID mmol/L 1 8 2 6* 2 9* 3 3*     ABG:    VBG:  Results from last 7 days   Lab Units 03/26/22  1225   PH JOANN  7 400   PCO2 JOANN mm Hg 33 5*   PO2 JOANN mm Hg 102 6*   HCO3 JOANN mmol/L 20 3*   BASE EXC JOANN mmol/L -3 8     Results from last 7 days   Lab Units 03/28/22  0438   PROCALCITONIN ng/ml 2 65*       Micro  Results from last 7 days   Lab Units 03/28/22  0527 03/26/22  0720 03/25/22  1618 03/25/22  1611 03/25/22  1545   BLOOD CULTURE  Received in Microbiology Lab  Culture in Progress  Received in Microbiology Lab  Culture in Progress  --  Staphylococcus epidermidis* Staphylococcus coagulase negative*  Staphylococcus epidermidis*  --    GRAM STAIN RESULT   --   --  Gram positive cocci in clusters* Gram positive cocci in clusters*  --    URINE CULTURE   --   --   --   --  >100,000 cfu/ml Escherichia coli*  <10,000 cfu/ml    MRSA CULTURE ONLY   --  No Methicillin Resistant Staphlyococcus aureus (MRSA) isolated  --   --   --        EKG: Afib with controlled rate on telemetry  Imaging: I have personally reviewed pertinent reports  Intake and Output  I/O       03/27 0701 03/28 0700 03/28 0701 03/29 0700    P  O  600 420    I V  (mL/kg) 778 6 (6 7) 1644 7 (13 9)    IV Piggyback 617 9 100    Total Intake(mL/kg) 1996 6 (17 2) 2164 7 (18 3)    Urine (mL/kg/hr) 6540 (2 3) 2385 (0 8)    Stool 0 0    Total Output 6540 2385    Net -4543 4 -220 4          Unmeasured Stool Occurrence 2 x 2 x          Height and Weights   Height: 5' 2" (157 5 cm)     Body mass index is 47 55 kg/m²    Weight (last 2 days)     Date/Time Weight    03/28/22 0730 118 (260)    03/28/22 0525 116 (255 73)    03/27/22 0400 120 (263 89)            Nutrition       Diet Orders   (From admission, onward)             Start     Ordered    03/28/22 0917  Diet Mohit/CHO Controlled; Consistent Carbohydrate Diet Level 3 (6 carb servings/90 grams CHO/meal); Sodium 4 GM (GLADYS)  Diet effective now        References:    Nutrtion Support Algorithm Enteral vs  Parenteral   Question Answer Comment   Diet Type Mohit/CHO Controlled    Mohit/CHO Controlled Consistent Carbohydrate Diet Level 3 (6 carb servings/90 grams CHO/meal)    Other Restriction(s): Sodium 4 GM (GLADSY)    RD to adjust diet per protocol?  Yes        03/28/22 0916    03/28/22 0917  Dietary nutrition supplements  Once        Question Answer Comment   Select Supplement: Blair Zaidi    Frequency Dinner        03/28/22 6798    03/28/22 0917  Dietary nutrition supplements  Once        Question Answer Comment   Select Supplement: Abeba Genia    Frequency Breakfast        03/28/22 7907                  Active Medications  Scheduled Meds:  Current Facility-Administered Medications   Medication Dose Route Frequency Provider Last Rate    bacitracin  1 large application Topical BID Abigail Sexton PA-C      bacitracin-polymyxin b   Both Eyes TID Aba Woods [facility] LILIAN Qureshi      cefepime  1,000 mg Intravenous Q12H Boom Jauregui PA-C Stopped (03/28/22 2101)    heparin (porcine)  3-20 Units/kg/hr (Order-Specific) Intravenous Titrated Boom Jauregui PA-C 11 1 Units/kg/hr (03/29/22 0501)    heparin (porcine)  2,000 Units Intravenous Q1H PRN Boom Jauregui PA-C      heparin (porcine)  4,000 Units Intravenous Q1H PRN Boom Jauregui PA-C      insulin lispro  2-12 Units Subcutaneous 4x Daily (AC & HS) Aba Avitia PA-C      levothyroxine  150 mcg Oral Early Morning Aba Villafana Jr, PA-C      metoprolol tartrate  50 mg Oral Q12H [facility] Ousmane Ziegler      norepinephrine  1-30 mcg/min Intravenous Titrated Boom Jauregui PA-C 4 mcg/min (03/29/22 0501)    potassium chloride  40 mEq Oral Once [employer] , PA-C      potassium chloride  20 mEq Intravenous Once [employer] , PA-C      sodium chloride  100 mL/hr Intravenous Continuous Waylon Hippo, PA-C Stopped (03/28/22 2301)    vancomycin  20 mg/kg (Adjusted) Intravenous Q24H Jori Diez,        Continuous Infusions:  heparin (porcine), 3-20 Units/kg/hr (Order-Specific), Last Rate: 11 1 Units/kg/hr (03/29/22 0501)  norepinephrine, 1-30 mcg/min, Last Rate: 4 mcg/min (03/29/22 0501)  sodium chloride, 100 mL/hr, Last Rate: Stopped (03/28/22 2301)      PRN Meds:   heparin (porcine), 2,000 Units, Q1H PRN  heparin (porcine), 4,000 Units, Q1H PRN        Invasive Devices Review  Invasive Devices  Report    Central Venous Catheter Line            CVC Central Lines 03/26/22 Triple 16cm 3 days          Peripheral Intravenous Line            Peripheral IV 03/25/22 Left;Proximal;Ventral (anterior) Forearm 3 days    Peripheral IV 03/25/22 Right Antecubital 3 days          Arterial Line            Arterial Line 03/25/22 Right Radial 3 days          Drain            Urethral Catheter Temperature probe 3 days                ---------------------------------------------------------------------------------------  Advance Directive and Living Will:      Power of :    POLST:    ---------------------------------------------------------------------------------------  Care Time Delivered:   Upon my evaluation, this patient had a high probability of imminent or life-threatening deterioration due to septic shock requiring vasopressor support, which required my direct attention, intervention, and personal management  I have personally provided 20 minutes (0500 to [phone]) of critical care time, exclusive of procedures, teaching, family meetings, and any prior time recorded by providers other than myself         [employer], LILIAN      Portions of the record may have been created with voice recognition software  Occasional wrong word or "sound a like" substitutions may have occurred due to the inherent limitations of voice recognition software    Read the chart carefully and recognize, using context, where substitutions have occurred

## 2022-03-29 NOTE — PROGRESS NOTES
Vancomycin IV Pharmacy-to-Dose Consultation    Jed Day is a 80 y o  female who is currently receiving Vancomycin IV with management by the Pharmacy Consult service  Assessment/Plan:  The patient was reviewed  Renal function is stable and no signs or symptoms of nephrotoxicity and/or infusion reactions were documented in the chart  Based on todays assessment, continue current vancomycin (day # 2) dosing of 1500 mg IV q 24 hours, with a plan for trough to be drawn at 1330 on 3/31/22  We will continue to follow the patients culture results and clinical progress daily      Sriram Gordon, Pharmacist

## 2022-03-29 NOTE — ASSESSMENT & PLAN NOTE
Lab Results   Component Value Date    HGBA1C 5 5 01/31/2018       Recent Labs     03/28/22  0729 03/28/22  1119 03/28/22  1625 03/28/22 2121   POCGLU 98 116 118 129       Blood Sugar Average: Last 72 hrs:  (P) 196 [id number]   · AC/HS Accu-Cheks with sliding scale insulin

## 2022-03-29 NOTE — ASSESSMENT & PLAN NOTE
Background:  Patient found down on floor by daughter  Patient is alert and oriented however does not entirely recall events of suspected fall  Patient does state that she believes she may have fell on a chair however daughter stated that she was found in the living room next to a cabinet  Patient noted to have multiple injuries and areas of ecchymosis  Trauma alert called from the ED  Patient met sepsis criteria on admission  · Meets criteria as evidence by heart rate 160s, respiratory rate 20-25, WBCs 30 and LEONARDO present on admission  · Source likely urinary vs cellulitis right thigh +/- bacteremia  · UA showed small leukocytes, no nitrates, WBCs 10-20 and innumerable bacteria  Culture: E  Coli  Likely a colonization per ID  · Blood culture x2: First set-Staph epi  Second set-Staph epi, Staph pettenkoferi  Likely a contamination per ID  · Repeat BC drawn 3/28 - pending  · Initial lactic acid 3 3  Lactic acidosis cleared  · Started on ceftriaxone in ED, but transitioned to Vanco/Cefepime 3/26 due to worsening clinical status  · MRSA swab negative  · Initially fluid resuscitated with 1 75 L of lactated Ringer's which was based on ideal body weight due to BMI of 57 66  Patient remained hypotensive and therefore was given an additional 1 25 L  Norepinephrine ordered however not started at this time because blood pressure improved during 3 L fluid boluses  · Discussed with patient central line and arterial line placements  Patient would be agreeable if indicated  · CT the chest/abdomen/pelvis: No findings of acute thoracic or abdominopelvic injury  Small right and trace left pleural effusions are new since November 14, 2021  Persistent findings of hepatic cirrhosis new trace perihepatic ascites  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  Recommendation is for follow-up low radiation dose chest CT in one year    · Of note patient also had elevated LFTs on admission  Patient reports no history of liver disease however labs reviewed in Care everywhere reveal previous elevation LFTs  Also now with elevated INR of 11 78    · 3/25 persistent hypotension despite fluid bolus'  Levophed started, arterial line and CVC inserted  · 3/26 Vasopressin added to due escalating dose of levophed  · Vasopressin d/c 3/28  · Levophed continued  · "Insect bite" right thigh with cellulitis, appears to be worsening, concern for abscess vs necrotizing infection- CT leg ordered and General Surgery consulted  · CT showed: Diffuse subcutaneous edema and overlying skin thickening most prominent about the medial aspect of the mid/distal thigh with superficial and deep subcutaneous fluid #3/169 appears nonloculated without a surrounding rind however abscess cannot be excluded   without IV contrast    · Surgery will continue to follow- conservative management with wound care  · ID on consult  Abx changed to Cefazolin

## 2022-03-29 NOTE — PROGRESS NOTES
Progress Note - General Surgery   Liudmila Bautista 80 y o  female MRN: [id number]  Unit/Bed#: -01 Encounter: [id number]    Assessment:  Right medial thigh hematoma, improving  Status post fall with multiple contusions, reportedly down for 4 days  Leukocytosis 12 48 (9 42, 16 50, 26 42, 30 84,)  Elevated INR 11 78 on admission, now 1 72  Hgb 10 8 (9 8, 10 1, 10 5, 13 7)  Urine Culture grew E  Coli  Blood cultures with Staph epidemidis  Elevated liver enzymes, evidence of cirrhosis on ultrasound, appears jaundiced  Confusion      Plan:  Continue conservative management of right thigh at this time  The patient is a high risk surgical candidate   Daily and PRN non adherent dressing changes, xeroform to wound, cover with ABDs  Continue antibiotics as indicated, currently Cefepime 1g IV q12h  Medicate PRN pain, minimize opioids  Follow CBC and CMP  Frequent turning while in bed  Management of comorbidities per primary team        Subjective/Objective     Subjective: Resting in bed  States that her right thigh pain is improving  Denies fevers/chills  Objective:   Blood pressure 108/53, pulse 87, temperature (!) 97 2 °F (36 2 °C), resp  rate 18, height 5' 2" (1 575 m), weight 116 kg (256 lb 9 9 oz), SpO2 97 %  ,Body mass index is 46 94 kg/m²        Intake/Output Summary (Last 24 hours) at 3/29/2022 0721  Last data filed at 3/29/2022 0716  Gross per 24 hour   Intake 2332 81 ml   Output 2485 ml   Net -152 19 ml       Invasive Devices  Report    Central Venous Catheter Line            CVC Central Lines 03/26/22 Triple 16cm 3 days          Peripheral Intravenous Line            Peripheral IV 03/25/22 Left;Proximal;Ventral (anterior) Forearm 3 days    Peripheral IV 03/25/22 Right Antecubital 3 days          Arterial Line            Arterial Line 03/25/22 Right Radial 3 days          Drain            Urethral Catheter Temperature probe 3 days                Physical Exam:  Gen: resting in bed this morning, no distress  Ext: Right thigh contusion continues to decrease in size, less tender to touch  No fluctuance is appreciated  Superficial skin slough is noted  No purulence  No tracking erythema  Lab, Imaging and other studies:  I have personally reviewed pertinent lab results      CBC:   Lab Results   Component Value Date    WBC 12 48 (H) 03/29/2022    HGB 10 8 (L) 03/29/2022    HCT 30 8 (L) 03/29/2022     (H) 03/29/2022     03/29/2022    MCH 35 2 (H) 03/29/2022    MCHC 35 1 03/29/2022    RDW 14 7 03/29/2022    MPV 10 2 03/29/2022    NRBC 0 03/29/2022     CMP:   Lab Results   Component Value Date    SODIUM 135 (L) 03/29/2022    K 3 4 (L) 03/29/2022     03/29/2022    CO2 26 03/29/2022    BUN 46 (H) 03/29/2022    CREATININE 1 39 (H) 03/29/2022    CALCIUM 8 1 (L) 03/29/2022    AST 48 (H) 03/29/2022    ALT 35 03/29/2022    ALKPHOS 146 (H) 03/29/2022    EGFR 35 03/29/2022     VTE Pharmacologic Prophylaxis: Heparin  VTE Mechanical Prophylaxis: sequential compression device      Lamar Mcgregor PA-C

## 2022-03-29 NOTE — ASSESSMENT & PLAN NOTE
· +/- hematoma of right thigh  · See further plan under septic shock  · 3/26 CT of right lower extremity revealed "Diffuse subcutaneous edema and overlying skin thickening most prominent about the medial aspect of the mid/distal thigh with superficial and deep subcutaneous fluid #3/169 appears nonloculated without a surrounding rind however abscess cannot be excluded without IV contrast "  · 3/28 Repeat CT revealed "Skin thickening and inflammatory stranding subcutaneous tissues both medially and laterally greater medially compatible with cellulitis  More confluent areas of fluid are redemonstrated medially and laterally without significant rim enhancement suggestive of phlegmonous change    No well-defined discrete rim-enhancing fluid collection seen to suggest a discrete abscess "  · Cont wound care  · Surgery following  · ID recommending changing abx to cefazolin

## 2022-03-29 NOTE — ASSESSMENT & PLAN NOTE
Background:  Patient found down on floor by daughter  Patient is alert and oriented however does not entirely recall events of suspected fall  Patient does state that she believes she may have fell on a chair however daughter stated that she was found in the living room next to a cabinet  Patient noted to have multiple injuries and areas of ecchymosis  Trauma alert called from the ED  Patient met sepsis criteria on admission  · Meets criteria as evidence by heart rate 160s, respiratory rate 20-25, WBCs 30 and LEONARDO present on admission  · Source likely urinary vs cellulitis right thigh +/- bacteremia  UA showed small leukocytes, no nitrates, WBCs 10-20 and innumerable bacteria  Culture: Gram negative mark enteric like  Sensitivity pending  · Blood culture x2: 1/2 positive for GPC in clusters  · Repeat BC drawn 3/28   · Initial lactic acid 3 3  Lactic acidosis cleared  · Started on ceftriaxone in ED, but transitioned to Vanco/Cefepime 3/26 due to worsening clinical status  · MRSA swab negative  · Initially fluid resuscitated with 1 75 L of lactated Ringer's which was based on ideal body weight due to BMI of 57 66  Patient remained hypotensive and therefore was given an additional 1 25 L  Norepinephrine ordered however not started at this time because blood pressure improved during 3 L fluid boluses  · Discussed with patient central line and arterial line placements  Patient would be agreeable if indicated  · CT the chest/abdomen/pelvis: No findings of acute thoracic or abdominopelvic injury  Small right and trace left pleural effusions are new since November 14, 2021  Persistent findings of hepatic cirrhosis new trace perihepatic ascites  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  Recommendation is for follow-up low radiation dose chest CT in one year  · Of note patient also had elevated LFTs on admission    Patient reports no history of liver disease however labs reviewed in Care everywhere reveal previous elevation LFTs  Also now with elevated INR of 11 78    · 3/25 persistent hypotension despite fluid bolus'  Levophed started, arterial line and CVC inserted  · 3/26 Vasopressin added to due escalating dose of levophed  · Vasopressin d/c 3/28  · Levophed continued    · "Insect bite" right thigh with cellulitis, appears to be worsening, concern for abscess vs necrotizing infection- CT leg ordered and General Surgery consulted  · CT showed: Diffuse subcutaneous edema and overlying skin thickening most prominent about the medial aspect of the mid/distal thigh with superficial and deep subcutaneous fluid #3/169 appears nonloculated without a surrounding rind however abscess cannot be excluded   without IV contrast    · Surgery will continue to follow- conservative management with wound care

## 2022-03-29 NOTE — ASSESSMENT & PLAN NOTE
· LFTs elevated at time of admission:  AST 66, ALT 44, , T bili 4 26  · Labs reviewed in Care everywhere and showed that LFTs were elevated on 01/26/22:  AST 87, ALT 42, , T bili 2  · Patient states she has no known liver disease  · Continue to trend LFTs  · Hold statin  · Right upper quadrant ultrasound: Cholelithiasis and sludge with wall thickening and pericholecystic fluid  Sonographic Roselynn Miguel sign is negative  Findings are equivocal for acute cholecystitis, particularly in the setting of underlying hepatic dysfunction which can also cause gallbladder wall abnormalities  If there is continued clinical concern, further evaluation with HIDA scan recommended    · GI consult -suspect cirrhosis likely secondary to SORIA

## 2022-03-30 PROBLEM — D68.9 COAGULOPATHY (HCC): Status: RESOLVED | Noted: 2022-03-25 | Resolved: 2022-03-30

## 2022-03-30 PROBLEM — R74.8 ELEVATED CK: Status: RESOLVED | Noted: 2022-03-25 | Resolved: 2022-03-30

## 2022-03-30 PROBLEM — N17.9 AKI (ACUTE KIDNEY INJURY) (HCC): Status: RESOLVED | Noted: 2022-03-25 | Resolved: 2022-03-30

## 2022-03-30 PROBLEM — E03.9 HYPOTHYROIDISM: Chronic | Status: ACTIVE | Noted: 2022-03-25

## 2022-03-30 PROBLEM — R17 SERUM TOTAL BILIRUBIN ELEVATED: Status: ACTIVE | Noted: 2022-03-25

## 2022-03-30 LAB
ALBUMIN SERPL BCP-MCNC: 1.8 G/DL (ref 3.5–5)
ALP SERPL-CCNC: 166 U/L (ref 46–116)
ALT SERPL W P-5'-P-CCNC: 29 U/L (ref 12–78)
ANION GAP SERPL CALCULATED.3IONS-SCNC: 6 MMOL/L (ref 4–13)
APTT PPP: 87 SECONDS (ref 23–37)
AST SERPL W P-5'-P-CCNC: 42 U/L (ref 5–45)
BILIRUB SERPL-MCNC: 2.48 MG/DL (ref 0.2–1)
BUN SERPL-MCNC: 35 MG/DL (ref 5–25)
CALCIUM ALBUM COR SERPL-MCNC: 9.7 MG/DL (ref 8.3–10.1)
CALCIUM SERPL-MCNC: 7.9 MG/DL (ref 8.3–10.1)
CHLORIDE SERPL-SCNC: 105 MMOL/L (ref 100–108)
CO2 SERPL-SCNC: 27 MMOL/L (ref 21–32)
CREAT SERPL-MCNC: 1.17 MG/DL (ref 0.6–1.3)
ERYTHROCYTE [DISTWIDTH] IN BLOOD BY AUTOMATED COUNT: 14.9 % (ref 11.6–15.1)
GFR SERPL CREATININE-BSD FRML MDRD: 43 ML/MIN/1.73SQ M
GLUCOSE SERPL-MCNC: 113 MG/DL (ref 65–140)
HCT VFR BLD AUTO: 28.6 % (ref 34.8–46.1)
HGB BLD-MCNC: 10.3 G/DL (ref 11.5–15.4)
INR PPP: 1.79 (ref 0.84–1.19)
MAGNESIUM SERPL-MCNC: 2 MG/DL (ref 1.6–2.6)
MCH RBC QN AUTO: 35.5 PG (ref 26.8–34.3)
MCHC RBC AUTO-ENTMCNC: 36 G/DL (ref 31.4–37.4)
MCV RBC AUTO: 99 FL (ref 82–98)
PLATELET # BLD AUTO: 165 THOUSANDS/UL (ref 149–390)
PMV BLD AUTO: 10.3 FL (ref 8.9–12.7)
POTASSIUM SERPL-SCNC: 3.7 MMOL/L (ref 3.5–5.3)
PROT SERPL-MCNC: 5.8 G/DL (ref 6.4–8.2)
PROTHROMBIN TIME: 20.5 SECONDS (ref 11.6–14.5)
RBC # BLD AUTO: 2.9 MILLION/UL (ref 3.81–5.12)
RYE IGE QN: NEGATIVE
SODIUM SERPL-SCNC: 138 MMOL/L (ref 136–145)
WBC # BLD AUTO: 9.71 THOUSAND/UL (ref 4.31–10.16)

## 2022-03-30 PROCEDURE — 85027 COMPLETE CBC AUTOMATED: CPT | Performed by: NURSE PRACTITIONER

## 2022-03-30 PROCEDURE — 99233 SBSQ HOSP IP/OBS HIGH 50: CPT | Performed by: PHYSICIAN ASSISTANT

## 2022-03-30 PROCEDURE — NC001 PR NO CHARGE

## 2022-03-30 PROCEDURE — 80053 COMPREHEN METABOLIC PANEL: CPT | Performed by: NURSE PRACTITIONER

## 2022-03-30 PROCEDURE — 85610 PROTHROMBIN TIME: CPT | Performed by: PHYSICIAN ASSISTANT

## 2022-03-30 PROCEDURE — 85730 THROMBOPLASTIN TIME PARTIAL: CPT | Performed by: ANESTHESIOLOGY

## 2022-03-30 PROCEDURE — G0408 INPT/TELE FOLLOW UP 35: HCPCS | Performed by: INTERNAL MEDICINE

## 2022-03-30 PROCEDURE — 83735 ASSAY OF MAGNESIUM: CPT | Performed by: NURSE PRACTITIONER

## 2022-03-30 RX ORDER — MIDODRINE HYDROCHLORIDE 5 MG/1
10 TABLET ORAL
Status: DISCONTINUED | OUTPATIENT
Start: 2022-03-30 | End: 2022-03-31

## 2022-03-30 RX ORDER — POTASSIUM CHLORIDE 20 MEQ/1
40 TABLET, EXTENDED RELEASE ORAL ONCE
Status: COMPLETED | OUTPATIENT
Start: 2022-03-30 | End: 2022-03-30

## 2022-03-30 RX ADMIN — AMIODARONE HYDROCHLORIDE 1 MG/MIN: 50 INJECTION, SOLUTION INTRAVENOUS at 04:38

## 2022-03-30 RX ADMIN — MIDODRINE HYDROCHLORIDE 10 MG: 5 TABLET ORAL at 10:50

## 2022-03-30 RX ADMIN — BACITRACIN 1 LARGE APPLICATION: 500 OINTMENT TOPICAL at 17:19

## 2022-03-30 RX ADMIN — BACITRACIN 1 LARGE APPLICATION: 500 OINTMENT TOPICAL at 08:24

## 2022-03-30 RX ADMIN — CEFAZOLIN SODIUM 2000 MG: 2 SOLUTION INTRAVENOUS at 21:14

## 2022-03-30 RX ADMIN — AMIODARONE HYDROCHLORIDE 1 MG/MIN: 50 INJECTION, SOLUTION INTRAVENOUS at 16:26

## 2022-03-30 RX ADMIN — HEPARIN SODIUM 11.1 UNITS/KG/HR: 10000 INJECTION, SOLUTION INTRAVENOUS at 14:40

## 2022-03-30 RX ADMIN — AMIODARONE HYDROCHLORIDE 150 MG: 50 INJECTION, SOLUTION INTRAVENOUS at 10:44

## 2022-03-30 RX ADMIN — POTASSIUM CHLORIDE 40 MEQ: 1500 TABLET, EXTENDED RELEASE ORAL at 05:48

## 2022-03-30 RX ADMIN — AMIODARONE HYDROCHLORIDE 150 MG: 50 INJECTION, SOLUTION INTRAVENOUS at 20:49

## 2022-03-30 RX ADMIN — CEFAZOLIN SODIUM 2000 MG: 2 SOLUTION INTRAVENOUS at 14:40

## 2022-03-30 RX ADMIN — AMIODARONE HYDROCHLORIDE 150 MG: 50 INJECTION, SOLUTION INTRAVENOUS at 04:14

## 2022-03-30 RX ADMIN — MIDODRINE HYDROCHLORIDE 10 MG: 5 TABLET ORAL at 16:24

## 2022-03-30 RX ADMIN — CEFAZOLIN SODIUM 2000 MG: 2 SOLUTION INTRAVENOUS at 05:47

## 2022-03-30 RX ADMIN — LEVOTHYROXINE SODIUM 150 MCG: 150 TABLET ORAL at 05:47

## 2022-03-30 NOTE — TELEMEDICINE
REQUIRED DOCUMENTATION:     1  This service was provided via Telemedicine  2  Provider located at [facility]  3  TeleMed provider: Francis Guillermo MD   4  Identify all parties in room with patient during tele consult:  Patient  5  After connecting through [email or web address], patient was identified by name and date of birth and assistant checked wristband  Patient was then informed that this was a Telemedicine visit and that the exam was being conducted confidentially over secure lines  Patient acknowledged consent and understanding of privacy and security of the Telemedicine visit, and gave us permission to have the assistant stay in the room in order to assist with the history and to conduct the exam   I informed the patient that I have reviewed their record in Epic and presented the opportunity for them to ask any questions regarding the visit today  The patient agreed to participate  TeleConsultation - Infectious Disease   Thai Fenton 80 y o  female MRN: [id number]  Unit/Bed#: -01 Encounter: [id number]      Impression/Recommendations:  1  SIRS, with hypotension, POA  Consider sepsis/septic shock with there is no obvious source of infection, other than possible right thigh cellulitis  Patient is clinically improved  Vasopressin was discontinued yesterday and Levophed was just discontinued earlier today  She remains systemically well, without toxicity  Antibiotic plan as in below  Monitor temperature/WBC  Monitor hemodynamics     2  Coagulase-negative Staphylococcus bacteremia  Although there was growth in both admission blood cultures, it appears the patient has different species of coagulase-negative Staphylococcus in each of the set of blood cultures  With patient being hypotensive on admission, I suspect that she was a very difficult blood draw access  Patient has no indwelling intravascular foreign body  Repeat blood cultures have no growth thus far    I suspect this is contaminated blood draw  No antibiotic needed for this  Follow-up on repeat blood cultures      3  Right thigh cellulitis, versus hematoma  Erythema and pain appears to be improved  With MRSA screen negative, MRSA would be unlikely pathogen  Vancomycin can be de-escalated  CT appearance is more consistent with a hematoma than abscess  Regardless, if superficial necrosis continues to worsen in right eye, hematoma may need to be drained, either surgically or percutaneously  Fortunately, right thigh wound appears to be improving  Continue IV cefazolin  Serial right thigh exams  If right thigh superficial necrosis worsens, hematoma may need to be drained      4  Asymptomatic bacteriuria, likely bladder colonization in this elderly patient  Antibiotic is not necessary, although cefazolin above foot cellulitis with provide coverage for E coli and urine culture  No antibiotic needed for this, although cefazolin provides coverage for E coli in urine culture      5  LEONARDO, likely secondary to prerenal state and rhabdomyolysis  Creatinine continues to improve  Antibiotic at full dose  Monitor creatinine      6  Possible cirrhosis  GI follow-up      7  Status post fall with patient being down for an unknown period of time  Fortunately, there is no significant sequela from fall      Discussed with patient in detail regarding the above plan  Discussed with Critical Medicine Service  I spent 30 minutes in evaluation of the patient of which 15 minutes was in counseling/coordination of care    Antibiotics:  Cefazolin  Antibiotic # 5     Subjective:  Patient feels better  Pain in right thigh is improved  Temperature stays down  No chills  She is tolerating antibiotic well  No nausea, vomiting or diarrhea      Objective:  Vitals:  Temp:  [97 5 °F (36 4 °C)-98 8 °F (37 1 °C)] 98 1 °F (36 7 °C)  HR:  [] 112  Resp:  [15-23] 20  BP: ()/(47-63) 99/60  SpO2:  [96 %-99 %] 98 %  Temp (24hrs), Av 2 °F (36 8 °C), Min:97 5 °F (36 4 °C), Max:98 8 °F (37 1 °C)  Current: Temperature: 98 1 °F (36 7 °C)    Physical Exam:    Physical exam has been primarily done by the patient's nurse and/or the primary service due to limited examination abilities on telemedicine  General: Awake, alert, cooperative, no distress  Neck:  Supple  No mass  No lymphadenopathy  Lungs: Expansion symmetric, no rales, no wheezing, respirations unlabored  Heart:  Regular rate and rhythm, S1 and S2 normal, no murmur  Abdomen: Soft, nondistended, non-tender, bowel sounds active all four quadrants, no masses, no organomegaly  Extremities: Right thigh with stable erythema  Wound with stable necrotic base without extension or necrosis  No purulence  Skin:  No rash  Neuro: Moves all extremities  Invasive Devices  Report    Central Venous Catheter Line            CVC Central Lines 03/26/22 Triple 16cm 4 days          Peripheral Intravenous Line            Peripheral IV 03/25/22 Left;Proximal;Ventral (anterior) Forearm 4 days    Peripheral IV 03/25/22 Right Antecubital 4 days          Arterial Line            Arterial Line 03/25/22 Right Radial 4 days          Drain            Urethral Catheter Temperature probe 4 days                Labs studies:   I have personally reviewed pertinent labs    Results from last 7 days   Lab Units 03/30/22  0441 03/29/22  0434 03/28/22  0438   POTASSIUM mmol/L 3 7 3 4* 3 6   CHLORIDE mmol/L 105 102 101   CO2 mmol/L 27 26 25   BUN mg/dL 35* 46* 57*   CREATININE mg/dL 1 17 1 39* 1 53*   EGFR ml/min/1 73sq m 43 35 31   CALCIUM mg/dL 7 9* 8 1* 8 1*   AST U/L 42 48* 56*   ALT U/L 29 35 37   ALK PHOS U/L 166* 146* 130*     Results from last 7 days   Lab Units 03/30/22  0441 03/29/22  0434 03/28/22  0438   WBC Thousand/uL 9 71 12 48* 9 42   HEMOGLOBIN g/dL 10 3* 10 8* 9 8*   PLATELETS Thousands/uL 165 180 151     Results from last 7 days   Lab Units 03/28/22  0527 03/26/22  0720 03/25/22  1618 03/25/22  1611 03/25/22  1545   BLOOD CULTURE  No Growth at 48 hrs  No Growth at 48 hrs  --  Staphylococcus epidermidis* Staphylococcus pettenkoferi*  Staphylococcus epidermidis*  --    GRAM STAIN RESULT   --   --  Gram positive cocci in clusters* Gram positive cocci in clusters*  --    URINE CULTURE   --   --   --   --  >100,000 cfu/ml Escherichia coli*  <10,000 cfu/ml    MRSA CULTURE ONLY   --  No Methicillin Resistant Staphlyococcus aureus (MRSA) isolated  --   --   --        Imaging Studies:   I have personally reviewed pertinent imaging study reports and images in PACS  EKG, Pathology, and Other Studies:   I have personally reviewed pertinent reports

## 2022-03-30 NOTE — PROGRESS NOTES
Progress Note - General Surgery   Sabine Escudero 80 y o  female MRN: [id number]  Unit/Bed#: -01 Encounter: [id number]    Assessment:  80 y o  female with right medial thigh hematoma   - Status post fall with multiple contusions, reportedly down x 4 days  - Afebrile, tachycardia, episodes of hypotension  - Leukocytosis improved 9 71 (12 48, 9 42, 13 46, 16 50)  - Hgb stable 10 3 (10 8, 9 8, 10 3)  - Elevated INR 11 78 on admission, now 1 79  - Urine culture grew E  Coli  - Blood cultures grew Staph epidemidis  - DM, Hypothyroid  - Transaminitis, Cirrhosis on US, appears jaundiced   - A fib RVR    Plan:  - Will continue conservative management at this time, high risk surgical candidate  - Daily and PRN non adherent dressing changes, xeroform to wound, cover with ABDs  - Restarting diet at this time  - NPO at midnight   - Trend WBC, vitals  - Continue antibiotics as indicated, currently Cefepime 1g IV q12  - Pain control PRN - minimize opioids  - Co-morbidities per primary team    Subjective:   Patient states she is doing well today  She feels her leg has continued to show improvement stating it seems like it continues to get smaller everyday  She has had some continued leg pain but mentions "it will take awhile until this gets better anyway " She denies fevers, chills and is resting comfortably in bed  Objective:   Blood pressure 115/55, pulse (!) 108, temperature 98 4 °F (36 9 °C), resp  rate 19, height 5' 2" (1 575 m), weight 117 kg (257 lb 11 5 oz), SpO2 97 %  ,Body mass index is 47 14 kg/m²      Intake/Output Summary (Last 24 hours) at 3/30/2022 0749  Last data filed at 3/30/2022 0601  Gross per 24 hour   Intake 1024 12 ml   Output 2145 ml   Net -1120 88 ml     Invasive Devices  Report    Central Venous Catheter Line            CVC Central Lines 03/26/22 Triple 16cm 4 days          Peripheral Intravenous Line            Peripheral IV 03/25/22 Left;Proximal;Ventral (anterior) Forearm 4 days    Peripheral IV 03/25/22 Right Antecubital 4 days          Arterial Line            Arterial Line 03/25/22 Right Radial 4 days          Drain            Urethral Catheter Temperature probe 4 days              Physical Exam:  General: no acute distress, pt comfortably resting in bed  Skin: warm and dry to touch, several contusions noted   Musculoskeletal: right thigh contusion noted with some superficial skin necrosis, erythema showing improvement, fluctuance noted to the inferior aspect, serous drainage with minimal purulence noted on dressing, unable to milk further drainage out  4x4 dressing changed at this time with ABDs and tape placed back over the area  Neuro: alert and oriented     Lab, Imaging and other studies:  I have personally reviewed pertinent lab results      CBC:   Lab Results   Component Value Date    WBC 9 71 03/30/2022    HGB 10 3 (L) 03/30/2022    HCT 28 6 (L) 03/30/2022    MCV 99 (H) 03/30/2022     03/30/2022    MCH 35 5 (H) 03/30/2022    MCHC 36 0 03/30/2022    RDW 14 9 03/30/2022    MPV 10 3 03/30/2022   CMP:   Lab Results   Component Value Date    SODIUM 138 03/30/2022    K 3 7 03/30/2022     03/30/2022    CO2 27 03/30/2022    BUN 35 (H) 03/30/2022    CREATININE 1 17 03/30/2022    CALCIUM 7 9 (L) 03/30/2022    AST 42 03/30/2022    ALT 29 03/30/2022    ALKPHOS 166 (H) 03/30/2022    EGFR 43 03/30/2022     VTE Pharmacologic Prophylaxis: Heparin  VTE Mechanical Prophylaxis: sequential compression device     Pearl Parra PA-C   3/30/2022

## 2022-03-30 NOTE — ASSESSMENT & PLAN NOTE
· LFTs elevated at time of admission:  AST 66, ALT 44, , T bili 4 26  · Labs reviewed in Care everywhere and showed that LFTs were elevated on 01/26/22:  AST 87, ALT 42, , T bili 2  · Patient states she has no known liver disease  · Continue to trend LFTs  · Hold statin  · Right upper quadrant ultrasound: Cholelithiasis and sludge with wall thickening and pericholecystic fluid  Sonographic Angelique Good sign is negative  Findings are equivocal for acute cholecystitis, particularly in the setting of underlying hepatic dysfunction which can also cause gallbladder wall abnormalities  If there is continued clinical concern, further evaluation with HIDA scan recommended    · GI consult -suspect cirrhosis likely secondary to SORIA

## 2022-03-30 NOTE — PROGRESS NOTES
[address]  Progress Note - Paul Guzman 1940, 80 y o  female MRN: [id number]  Unit/Bed#: -01 Encounter: [id number]  Primary Care Provider: Debora Diaz MD   Date and time admitted to hospital: 3/25/2022  3:02 PM    * Septic shock [facility])  Assessment & Plan  Background:  Patient found down on floor by daughter  Patient is alert and oriented however does not entirely recall events of suspected fall  Patient does state that she believes she may have fell on a chair however daughter stated that she was found in the living room next to a cabinet  Patient noted to have multiple injuries and areas of ecchymosis  Trauma alert called from the ED  Patient met sepsis criteria on admission  · Meets criteria as evidence by heart rate 160s, respiratory rate 20-25, WBCs 30 and LEONARDO present on admission  · Source likely urinary vs cellulitis right thigh +/- bacteremia  · UA showed small leukocytes, no nitrates, WBCs 10-20 and innumerable bacteria  Culture: E  Coli  Likely a colonization per ID  · Blood culture x2: First set-Staph epi  Second set-Staph epi, Staph pettenkoferi  Likely a contamination per ID  · Repeat BC drawn 3/28 - pending  · Initial lactic acid 3 3  Lactic acidosis cleared  · Started on ceftriaxone in ED, but transitioned to Vanco/Cefepime 3/26 due to worsening clinical status  · MRSA swab negative  · Initially fluid resuscitated with 1 75 L of lactated Ringer's which was based on ideal body weight due to BMI of 57 66  Patient remained hypotensive and therefore was given an additional 1 25 L  Norepinephrine ordered however not started at this time because blood pressure improved during 3 L fluid boluses  · Discussed with patient central line and arterial line placements  Patient would be agreeable if indicated  · CT the chest/abdomen/pelvis: No findings of acute thoracic or abdominopelvic injury   Small right and trace left pleural effusions are new since November 14, 2021  Persistent findings of hepatic cirrhosis new trace perihepatic ascites  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    · 3/25 persistent hypotension despite fluid bolus'  Levophed started, arterial line and CVC inserted  · 3/26 Vasopressin added to due escalating dose of levophed  · Vasopressin d/c 3/28  · Levophed continued  · "Insect bite" right thigh with cellulitis, appears to be worsening, concern for abscess vs necrotizing infection- CT leg ordered and General Surgery consulted  · CT showed: Diffuse subcutaneous edema and overlying skin thickening most prominent about the medial aspect of the mid/distal thigh with superficial and deep subcutaneous fluid #3/169 appears nonloculated without a surrounding rind however abscess cannot be excluded   without IV contrast    · Surgery will continue to follow- conservative management with wound care  · ID on consult  Abx changed to Cefazolin  Coagulopathy ([address] )  Assessment & Plan  · Patient is maintained on Coumadin for atrial fib outpatient  · INR 11 78 at time of admission  · Hold Coumadin and trend INR  · Vit K 10 mg IV x1 in ED  · Repeat INR 7 6 Vit K 10 mg PO x1   · 1 unit FFP  · 3/27: INR 2 01  · CT facial bones and head revealed Anterolateral right frontal scalp hematoma without acute traumatic injury to the facial bones  · Trauma CT scans of CT chest/abdomen/pelvis negative for hematoma or injury  · Monitor H/H and platelets  · Continue to monitor for further bleeding  · Would repeat CT scan for acute changes  · Heparin gtt started 3/27      Cellulitis of right thigh  Assessment & Plan  · +/- hematoma of right thigh  · See further plan under septic shock  · 3/26 CT of right lower extremity revealed "Diffuse subcutaneous edema and overlying skin thickening most prominent about the medial aspect of the mid/distal thigh with superficial and deep subcutaneous fluid #3/169 appears nonloculated without a surrounding rind however abscess cannot be excluded without IV contrast "  · 3/28 Repeat CT revealed "Skin thickening and inflammatory stranding subcutaneous tissues both medially and laterally greater medially compatible with cellulitis  More confluent areas of fluid are redemonstrated medially and laterally without significant rim enhancement suggestive of phlegmonous change  No well-defined discrete rim-enhancing fluid collection seen to suggest a discrete abscess "  · Cont wound care  · Surgery following  · ID recommending changing abx to cefazolin    Cirrhosis ([facility] [address] )  Assessment & Plan  · suspect cirrhosis likely secondary to SORIA  · CT abd/pelvis revealed "persistent findings of hepatic cirrhosis new trace perihepatic ascites"   Ultrasound also revealed "Liver cirrhosis "  · GI on consult  · Avoid hepatotoxins    Cholelithiases  Assessment & Plan  · Ultrasound revealed gallbladder sludge and cholelithiasis  · GI on consult - pt does not appear to be symptomatic from this at this time and she would be a high risk surgical candidate; if this is still concerning and contributing to overall picture consider HIDA scan and if positive consider surgical consultation    Moderate protein-calorie malnutrition ([facility] [address] )  Assessment & Plan  Malnutrition Findings:   Adult Malnutrition type: Chronic illness  Adult Degree of Malnutrition: Malnutrition of moderate degree  Malnutrition Characteristics: Muscle loss,Fat loss,Weight loss                  360 Statement: Chronic moderate malnutrition related to decreased appetite as evidenced by 11 6% weight loss x 4 mo (11/23/21 294lb, 3/28/22 260lb, suspect partly fluid related), moderate fat loss to orbitals, moderate muscle loss to temporalis, deltoid and interroseous muscles  Treated with: Will liberalize cardiac to 4gm GLADYS, continue CCD 3 at this time  Will trial glucerna daily and ensure max protein daily, increase as tolerated/indicated  Recommend daily weights  BMI Findings: Body mass index is 46 94 kg/m²  Fusiform ectasia of ascending aorta  Assessment & Plan  · CT abd shows: 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  · Recommendation is for follow-up low radiation dose chest CT in one year  Eye discharge  Assessment & Plan  · Greenish discharge from both eyes  · Bacitracin-polymyxin B ointment ordered  HLD (hyperlipidemia)  Assessment & Plan  · Hold statin at this time due to transaminitis    Diabetes mellitus [facility])  Assessment & Plan  Lab Results   Component Value Date    HGBA1C 5 5 01/31/2018       Recent Labs     03/29/22  0735 03/29/22  1104 03/29/22  1616 03/29/22  2116   POCGLU 100 108 121 127       Blood Sugar Average: Last 72 hrs:  (P) 867 [id number]   · AC/HS Accu-Cheks with sliding scale insulin    Hypothyroidism  Assessment & Plan  · Continue home Synthroid dosage  · TSH 0 301    Impaired skin integrity  Assessment & Plan  · Multiple areas of impaired skin integrity present at time of admission likely secondary to fall in the setting of supratherapeutic INR on Coumadin  · Ecchymosis noted in albert orbital region, forehead, chest, abdomen and extremities  · Supportive care    Traumatic hematoma of head  Assessment & Plan  · CT of the head revealed Anterolateral right frontal scalp hematoma without acute traumatic injury to the facial bones  · Hematoma noted at right brow  Continue to monitor closely  Fall  Assessment & Plan  · Suspected fall as patient was found down by daughter  Unknown time down however last time patient was seen or heard from was approximately 4 days ago by daughter    Patient states she does not recall all of the events but does report that she fell  · Trauma alert in ED  Right frontal scalp hematoma noted on imaging  Multiple areas of ecchymosis over entire body including bilateral periorbital areas, chest, abdomen and extremities  · PT/OT consult placed    Elevated CK  Assessment & Plan  · CK elevated on admission at 534 Likely secondary to fall  · Patient currently receiving fluid resuscitation for septic shock  · CK decreasing on repeat  Trend as clinically necessary  Atrial fibrillation with RVR (HCC)  Assessment & Plan  · AFib RVR with heart rate in 160s on presentation  · Patient follows with [facility] cardiology  · Patient's home metoprolol and Coumadin held  Coumadin held secondary to coagulopathy with INR greater than 11   · Heart rate improving with fluid resuscitation  With improvement in BP would resume metoprolol  · TSH 0 301  · Troponin peaked at 109 and trending down  · BNP > 8900  · Check ECHO  · Persistent afib with RVR despite Lopressor IV and PO  Rates improved to 120s from 150s  · Rate controlled as of late morning 3/26   · Heparin gtt started since INR <2  · Metoprolol PO discontinued 3/29 as pt is still requiring levophed  Eventually will resume metoprolol when BP improved off pressors  · Amiodarone gtt started 3/30 with rates sustained in the 120s  Transaminitis  Assessment & Plan  · LFTs elevated at time of admission:  AST 66, ALT 44, , T bili 4 26  · Labs reviewed in Care everywhere and showed that LFTs were elevated on 01/26/22:  AST 87, ALT 42, , T bili 2  · Patient states she has no known liver disease  · Continue to trend LFTs  · Hold statin  · Right upper quadrant ultrasound: Cholelithiasis and sludge with wall thickening and pericholecystic fluid  Sonographic Virlinda Marielena sign is negative    Findings are equivocal for acute cholecystitis, particularly in the setting of underlying hepatic dysfunction which can also cause gallbladder wall abnormalities  If there is continued clinical concern, further evaluation with HIDA scan recommended  · GI consult -suspect cirrhosis likely secondary to SORIA    LEONARDO (acute kidney injury) ([facility] [address] )  Assessment & Plan  · Likely secondary to septic shock  · Baseline creatinine 0 8-1 1   · Creatinine 2 3 on presentation  · Ramires catheter placed in ED  Will maintain for accurate I&O at this time  · Urine output improving with fluid resuscitation  · Monitor BUN and creatinine  · Strict I&Os  · Avoid nephrotoxins and hypotension      ----------------------------------------------------------------------------------------  HPI/24hr events: Metoprolol d/c yesterday due to patient requiring vasopressor support  HR increased to the 120s overnight  Amiodarone gtt started  ID consult complete yesterday  Abx de-escalated to cefazolin  Levophed remains at 4 mcg/min       Patient appropriate for transfer out of the ICU today?: No  Disposition: Continue Critical Care   Code Status: Level 2 - DNAR: but accepts endotracheal intubation  ---------------------------------------------------------------------------------------    Review of Systems  Review of systems was reviewed and negative unless stated above in HPI/24-hour events   ---------------------------------------------------------------------------------------  OBJECTIVE    Vitals   Vitals:    22 0200 22 0300 22 0400 22 0500   BP: (!) 94/47 100/56 (!) 87/51 91/53   Pulse: (!) 112 (!) 112 (!) 117 (!) 112   Resp:  18  20   Temp: 98 6 °F (37 °C) 98 8 °F (37 1 °C) 98 8 °F (37 1 °C) 98 8 °F (37 1 °C)   TempSrc:       SpO2:       Weight:       Height:         Temp (24hrs), Av 8 °F (36 6 °C), Min:97 2 °F (36 2 °C), Max:98 8 °F (37 1 °C)  Current: Temperature: 98 8 °F (37 1 °C)  Arterial Line BP: 107/48  Arterial Line MAP (mmHg): 67 mmHg    Respiratory:  SpO2: SpO2: 97 %  Nasal Cannula O2 Flow Rate (L/min): 2 L/min    Invasive/non-invasive ventilation settings   Respiratory  Report   Lab Data (Last 4 hours)    None         O2/Vent Data (Last 4 hours)    None                Physical Exam  Constitutional:       General: She is not in acute distress  Appearance: She is obese  She is not ill-appearing or diaphoretic  HENT:      Head: Normocephalic  Comments: Raccoon eyes with hematoma over right eye  Right Ear: External ear normal       Left Ear: External ear normal       Nose: Nose normal       Mouth/Throat:      Mouth: Mucous membranes are moist       Pharynx: Oropharynx is clear  Eyes:      General: No scleral icterus  Right eye: No discharge  Left eye: No discharge  Extraocular Movements: Extraocular movements intact  Conjunctiva/sclera: Conjunctivae normal       Pupils: Pupils are equal, round, and reactive to light  Neck:      Comments: Contusion to anterior, lower portion of neck  Cardiovascular:      Rate and Rhythm: Tachycardia present  Rhythm irregularly irregular  Pulses: Normal pulses  Heart sounds: No friction rub  No gallop  Pulmonary:      Effort: Pulmonary effort is normal  No respiratory distress  Breath sounds: Normal breath sounds  No stridor  No wheezing, rhonchi or rales  Chest:      Chest wall: No tenderness  Abdominal:      General: Bowel sounds are normal  There is no distension  Palpations: Abdomen is soft  There is no mass  Tenderness: There is no abdominal tenderness  There is no guarding or rebound  Musculoskeletal:         General: Tenderness (Anterior right thigh) and signs of injury (Anterior right thigh) present  No deformity  Normal range of motion  Cervical back: Normal range of motion and neck supple  No rigidity or tenderness  Right lower leg: No edema  Left lower leg: No edema  Skin:     General: Skin is warm and dry  Capillary Refill: Capillary refill takes less than 2 seconds        Coloration: Skin is not jaundiced or pale       Findings: No rash  Neurological:      General: No focal deficit present  Mental Status: She is alert and oriented to person, place, and time  GCS: GCS eye subscore is 4  GCS verbal subscore is 5  GCS motor subscore is 6  Sensory: No sensory deficit               Laboratory and Diagnostics:  Results from last 7 days   Lab Units 03/30/22 0441 03/29/22  0434 03/28/22  0438 03/27/22  1604 03/27/22  0433 03/26/22  0503 03/25/22  1506   WBC Thousand/uL 9 71 12 48* 9 42 13 46* 16 50* 26 42* 30 84*   HEMOGLOBIN g/dL 10 3* 10 8* 9 8* 10 3* 10 1* 10 5* 13 7   HEMATOCRIT % 28 6* 30 8* 27 9* 29 5* 27 5* 29 0* 38 6   PLATELETS Thousands/uL 165 180 151 175 178 208 248   BANDS PCT %  --   --   --   --   --  1  --    MONO PCT %  --   --   --   --   --  7 7     Results from last 7 days   Lab Units 03/30/22  0441 03/29/22  0434 03/28/22  0438 03/27/22  1557 03/27/22  0433 03/26/22  1621 03/26/22  0720 03/26/22  0503 03/26/22  0503 03/25/22  1506 03/25/22  1506   SODIUM mmol/L 138 135* 134* 133* 134* 133*  --   --  135*   < > 134*   POTASSIUM mmol/L 3 7 3 4* 3 6 3 5 4 1 3 7 4 1   < > 3 8   < > 4 6   CHLORIDE mmol/L 105 102 101 101 102 101  --   --  102   < > 99*   CO2 mmol/L 27 26 25 23 22 22  --   --  20*   < > 24   ANION GAP mmol/L 6 7 8 9 10 10  --   --  13   < > 11   BUN mg/dL 35* 46* 57* 63* 63* 66*  --   --  62*   < > 71*   CREATININE mg/dL 1 17 1 39* 1 53* 1 65* 1 77* 1 82*  --   --  1 81*   < > 2 37*   CALCIUM mg/dL 7 9* 8 1* 8 1* 8 4 8 0* 8 1*  --   --  8 1*   < > 8 5   GLUCOSE RANDOM mg/dL 113 104 108 132 119 132  --   --  86   < > 95   ALT U/L 29 35 37  --  35  --   --   --  33  --  42   AST U/L 42 48* 56*  --  62*  --   --   --  54*  --  66*   ALK PHOS U/L 166* 146* 130*  --  123*  --   --   --  121*  --  165*   ALBUMIN g/dL 1 8* 1 9* 1 9*  --  2 2*  --   --   --  2 5*  --  1 9*   TOTAL BILIRUBIN mg/dL 2 48* 4 15* 5 74*  --  6 35*  --   --   --  5 08*  --  4 26*    < > = values in this interval not displayed  Results from last 7 days   Lab Units 03/30/22  0441 03/27/22  1557 03/27/22  1347 03/27/22  0433 03/26/22  0503 03/25/22 2013   MAGNESIUM mg/dL 2 0 2 5 2 0 2 1 2 3 2 3      Results from last 7 days   Lab Units 03/30/22  0441 03/29/22  1804 03/29/22  1150 03/29/22  0434 03/28/22  1404 03/28/22  0801 03/28/22  0019 03/27/22  1807 03/27/22  1556 03/27/22  0433 03/26/22  0503 03/25/22 2016   INR  1 79*  --   --  1 72*  --  1 91*  --   --  1 85* 2 01* 4 35* 7 91*   PTT seconds 87* 86* 86* 95* 74* 80* 55*   < >  --   --   --   --     < > = values in this interval not displayed  Results from last 7 days   Lab Units 03/25/22  2317 03/25/22 2013 03/25/22  1819 03/25/22  1618   LACTIC ACID mmol/L 1 8 2 6* 2 9* 3 3*     ABG:    VBG:  Results from last 7 days   Lab Units 03/26/22  1225   PH JOANN  7 400   PCO2 JOANN mm Hg 33 5*   PO2 JOANN mm Hg 102 6*   HCO3 JOANN mmol/L 20 3*   BASE EXC JOANN mmol/L -3 8     Results from last 7 days   Lab Units 03/28/22  0438   PROCALCITONIN ng/ml 2 65*       Micro  Results from last 7 days   Lab Units 03/28/22  0527 03/26/22  0720 03/25/22  1618 03/25/22  1611 03/25/22  1545   BLOOD CULTURE  No Growth at 24 hrs  No Growth at 24 hrs   --  Staphylococcus epidermidis* Staphylococcus pettenkoferi*  Staphylococcus epidermidis*  --    GRAM STAIN RESULT   --   --  Gram positive cocci in clusters* Gram positive cocci in clusters*  --    URINE CULTURE   --   --   --   --  >100,000 cfu/ml Escherichia coli*  <10,000 cfu/ml    MRSA CULTURE ONLY   --  No Methicillin Resistant Staphlyococcus aureus (MRSA) isolated  --   --   --        Intake and Output  I/O       03/28 0701  03/29 0700 03/29 0701 03/30 0700    P  O  540 240    I V  (mL/kg) 1662 8 (14 3) 264 1 (2 3)    IV Piggyback 100 600    Total Intake(mL/kg) 2302 8 (19 9) 1104 1 (9 5)    Urine (mL/kg/hr) 2485 (0 9) 1895 (0 7)    Stool 0 0    Total Output 2485 1895    Net -182 2 -790 9          Unmeasured Stool Occurrence 2 x 1 x          Height and Weights   Height: 5' 2" (157 5 cm)     Body mass index is 46 94 kg/m²  Weight (last 2 days)     Date/Time Weight    03/29/22 0600 116 (256 62)    03/28/22 0730 118 (260)    03/28/22 0528 116 (255 73)            Nutrition       Diet Orders   (From admission, onward)             Start     Ordered    03/30/22 0001  Diet NPO  Diet effective midnight        References:    Nutrtion Support Algorithm Enteral vs  Parenteral   Question Answer Comment   Diet Type NPO    RD to adjust diet per protocol?  Yes        03/29/22 1405    03/28/22 0917  Dietary nutrition supplements  Once        Question Answer Comment   Select Supplement: Ensure Max - Vanilla    Frequency Dinner        03/28/22 4483    03/28/22 0917  Dietary nutrition supplements  Once        Question Answer Comment   Select Supplement: Veatrice Kurk    Frequency Breakfast        03/28/22 2803                  Active Medications  Scheduled Meds:  Current Facility-Administered Medications   Medication Dose Route Frequency Provider Last Rate    amiodarone  1 mg/min Intravenous Continuous Treva Sabins Eric Cranker , PA-C 1 mg/min (03/30/22 0438)    bacitracin  1 large application Topical BID Yesy Guerra PA-C      bacitracin-polymyxin b   Both Eyes TID Treva Sabins Eric Cranker , PA-C      cefazolin  2,000 mg Intravenous Q8H Mauri Aguilar MD 2,000 mg (03/29/22 2210)    heparin (porcine)  3-20 Units/kg/hr (Order-Specific) Intravenous Titrated ABDON Mosquera-C 11 1 Units/kg/hr (03/29/22 1141)    heparin (porcine)  2,000 Units Intravenous Q1H PRN Viridiana Giron PA-C      heparin (porcine)  4,000 Units Intravenous Q1H PRN Viridiana Giron PA-DENISSE      insulin lispro  2-12 Units Subcutaneous 4x Daily (AC & HS) Treva Sabritesh Sydic Cranker , PA-DENISSE      levothyroxine  150 mcg Oral Early Morning Treva Sabins Eric Cranker , PA-DENISSE      norepinephrine  1-30 mcg/min Intravenous Titrated Viridiana Giron PA-C 4 mcg/min (03/29/22 2306)     Continuous Infusions:  amiodarone, 1 mg/min, Last Rate: 1 mg/min (03/30/22 5038)  heparin (porcine), 3-20 Units/kg/hr (Order-Specific), Last Rate: 11 1 Units/kg/hr (03/29/22 1141)  norepinephrine, 1-30 mcg/min, Last Rate: 4 mcg/min (03/29/22 2306)      PRN Meds:   heparin (porcine), 2,000 Units, Q1H PRN  heparin (porcine), 4,000 Units, Q1H PRN        Invasive Devices Review  Invasive Devices  Report    Central Venous Catheter Line            CVC Central Lines 03/26/22 Triple 16cm 4 days          Peripheral Intravenous Line            Peripheral IV 03/25/22 Left;Proximal;Ventral (anterior) Forearm 4 days    Peripheral IV 03/25/22 Right Antecubital 4 days          Arterial Line            Arterial Line 03/25/22 Right Radial 4 days          Drain            Urethral Catheter Temperature probe 4 days                Rationale for remaining devices: CVC/arterial line for hemodynamic monitoring and medications requiring central line  Ramires left in place pending decision for OR    ---------------------------------------------------------------------------------------  Advance Directive and Living Will:      Power of :    POLST:    ---------------------------------------------------------------------------------------  Care Time Delivered:   Upon my evaluation, this patient had a high probability of imminent or life-threatening deterioration due to septic shock requiring vasopressor support, which required my direct attention, intervention, and personal management  I have personally provided 20 minutes (0400 to 0420) of critical care time, exclusive of procedures, teaching, family meetings, and any prior time recorded by providers other than myself  [address] [place] [address], PA-C      Portions of the record may have been created with voice recognition software  Occasional wrong word or "sound a like" substitutions may have occurred due to the inherent limitations of voice recognition software    Read the chart carefully and recognize, using context, where substitutions have occurred

## 2022-03-30 NOTE — PLAN OF CARE
Problem: Nutrition/Hydration-ADULT  Goal: Nutrient/Hydration intake appropriate for improving, restoring or maintaining nutritional needs  Description: Monitor and assess patient's nutrition/hydration status for malnutrition  Collaborate with interdisciplinary team and initiate plan and interventions as ordered  Monitor patient's weight and dietary intake as ordered or per policy  Utilize nutrition screening tool and intervene as necessary  Determine patient's food preferences and provide high-protein, high-caloric foods as appropriate       INTERVENTIONS:  - Monitor oral intake, urinary output, labs, and treatment plans  - Assess nutrition and hydration status and recommend course of action  - Evaluate amount of meals eaten  - Assist patient with eating if necessary   - Allow adequate time for meals  - Recommend/ encourage appropriate diets, oral nutritional supplements, and vitamin/mineral supplements  - Order, calculate, and assess calorie counts as needed  - Recommend, monitor, and adjust tube feedings and TPN/PPN based on assessed needs  - Assess need for intravenous fluids  - Provide specific nutrition/hydration education as appropriate  - Include patient/family/caregiver in decisions related to nutrition  Outcome: Progressing     Problem: Prexisting or High Potential for Compromised Skin Integrity  Goal: Skin integrity is maintained or improved  Description: INTERVENTIONS:  - Identify patients at risk for skin breakdown  - Assess and monitor skin integrity  - Assess and monitor nutrition and hydration status  - Monitor labs   - Assess for incontinence   - Turn and reposition patient  - Assist with mobility/ambulation  - Relieve pressure over bony prominences  - Avoid friction and shearing  - Provide appropriate hygiene as needed including keeping skin clean and dry  - Evaluate need for skin moisturizer/barrier cream  - Collaborate with interdisciplinary team   - Patient/family teaching  - Consider wound care consult   Outcome: Progressing     Problem: MOBILITY - ADULT  Goal: Maintain or return to baseline ADL function  Description: INTERVENTIONS:  -  Assess patient's ability to carry out ADLs; assess patient's baseline for ADL function and identify physical deficits which impact ability to perform ADLs (bathing, care of mouth/teeth, toileting, grooming, dressing, etc )  - Assess/evaluate cause of self-care deficits   - Assess range of motion  - Assess patient's mobility; develop plan if impaired  - Assess patient's need for assistive devices and provide as appropriate  - Encourage maximum independence but intervene and supervise when necessary  - Involve family in performance of ADLs  - Assess for home care needs following discharge   - Consider OT consult to assist with ADL evaluation and planning for discharge  - Provide patient education as appropriate  Outcome: Progressing  Goal: Maintains/Returns to pre admission functional level  Description: INTERVENTIONS:  - Perform BMAT or MOVE assessment daily    - Set and communicate daily mobility goal to care team and patient/family/caregiver  - Collaborate with rehabilitation services on mobility goals if consulted  - Perform Range of Motion 3 times a day  - Reposition patient every 2 hours    - Record patient progress and toleration of activity level   Outcome: Progressing     Problem: PAIN - ADULT  Goal: Verbalizes/displays adequate comfort level or baseline comfort level  Description: Interventions:  - Encourage patient to monitor pain and request assistance  - Assess pain using appropriate pain scale  - Administer analgesics based on type and severity of pain and evaluate response  - Implement non-pharmacological measures as appropriate and evaluate response  - Consider cultural and social influences on pain and pain management  - Notify physician/advanced practitioner if interventions unsuccessful or patient reports new pain  Outcome: Progressing     Problem: SAFETY ADULT  Goal: Maintain or return to baseline ADL function  Description: INTERVENTIONS:  -  Assess patient's ability to carry out ADLs; assess patient's baseline for ADL function and identify physical deficits which impact ability to perform ADLs (bathing, care of mouth/teeth, toileting, grooming, dressing, etc )  - Assess/evaluate cause of self-care deficits   - Assess range of motion  - Assess patient's mobility; develop plan if impaired  - Assess patient's need for assistive devices and provide as appropriate  - Encourage maximum independence but intervene and supervise when necessary  - Involve family in performance of ADLs  - Assess for home care needs following discharge   - Consider OT consult to assist with ADL evaluation and planning for discharge  - Provide patient education as appropriate  Outcome: Progressing  Goal: Maintains/Returns to pre admission functional level  Description: INTERVENTIONS:  - Perform BMAT or MOVE assessment daily    - Set and communicate daily mobility goal to care team and patient/family/caregiver  - Collaborate with rehabilitation services on mobility goals if consulted  - Perform Range of Motion 3 times a day  - Reposition patient every 2 hours    - Record patient progress and toleration of activity level   Outcome: Progressing  Goal: Patient will remain free of falls  Description: INTERVENTIONS:  - Educate patient/family on patient safety including physical limitations  - Instruct patient to call for assistance with activity   - Consult OT/PT to assist with strengthening/mobility   - Keep Call bell within reach  - Keep bed low and locked with side rails adjusted as appropriate  - Keep care items and personal belongings within reach  - Initiate and maintain comfort rounds  - Make Fall Risk Sign visible to staff  - Offer Toileting every 2 Hours, in advance of need  - Initiate/Maintain bed/chair alarm prn  - Apply yellow socks and bracelet for high fall risk patients  - Consider moving patient to room near nurses station  Outcome: Progressing     Problem: INFECTION - ADULT  Goal: Absence or prevention of progression during hospitalization  Description: INTERVENTIONS:  - Assess and monitor for signs and symptoms of infection  - Monitor lab/diagnostic results  - Monitor all insertion sites, i e  indwelling lines, tubes, and drains  - Monitor endotracheal if appropriate and nasal secretions for changes in amount and color  - [place] appropriate cooling/warming therapies per order  - Administer medications as ordered  - Instruct and encourage patient and family to use good hand hygiene technique  - Identify and instruct in appropriate isolation precautions for identified infection/condition  Outcome: Progressing     Problem: DISCHARGE PLANNING  Goal: Discharge to home or other facility with appropriate resources  Description: INTERVENTIONS:  - Identify barriers to discharge w/patient and caregiver  - Arrange for needed discharge resources and transportation as appropriate  - Identify discharge learning needs (meds, wound care, etc )  - Arrange for interpretive services to assist at discharge as needed  - Refer to Case Management Department for coordinating discharge planning if the patient needs post-hospital services based on physician/advanced practitioner order or complex needs related to functional status, cognitive ability, or social support system  Outcome: Progressing     Problem: CARDIOVASCULAR - ADULT  Goal: Maintains optimal cardiac output and hemodynamic stability  Description: INTERVENTIONS:  - Monitor I/O, vital signs and rhythm  - Monitor for S/S and trends of decreased cardiac output  - Administer and titrate ordered vasoactive medications to optimize hemodynamic stability  - Assess quality of pulses, skin color and temperature  - Assess for signs of decreased coronary artery perfusion  - Instruct patient to report change in severity of symptoms  Outcome: Progressing  Goal: Absence of cardiac dysrhythmias or at baseline rhythm  Description: INTERVENTIONS:  - Continuous cardiac monitoring, vital signs, obtain 12 lead EKG if ordered  - Administer antiarrhythmic and heart rate control medications as ordered  - Monitor electrolytes and administer replacement therapy as ordered  Outcome: Progressing     Problem: Knowledge Deficit  Goal: Patient/family/caregiver demonstrates understanding of disease process, treatment plan, medications, and discharge instructions  Description: Complete learning assessment and assess knowledge base    Interventions:  - Provide teaching at level of understanding  - Provide teaching via preferred learning methods  Outcome: Progressing     Problem: GENITOURINARY - ADULT  Goal: Maintains or returns to baseline urinary function  Description: INTERVENTIONS:  - Assess urinary function  - Encourage oral fluids to ensure adequate hydration if ordered  - Administer IV fluids as ordered to ensure adequate hydration  - Administer ordered medications as needed  - Offer frequent toileting  - Follow urinary retention protocol if ordered  Outcome: Progressing  Goal: Absence of urinary retention  Description: INTERVENTIONS:  - Assess patients ability to void and empty bladder  - Monitor I/O  - Bladder scan as needed  - Discuss with physician/AP medications to alleviate retention as needed  - Discuss catheterization for long term situations as appropriate  Outcome: Progressing  Goal: Urinary catheter remains patent  Description: INTERVENTIONS:  - Assess patency of urinary catheter  - If patient has a chronic gutierrez, consider changing catheter if non-functioning  - Follow guidelines for intermittent irrigation of non-functioning urinary catheter  Outcome: Progressing     Problem: METABOLIC, FLUID AND ELECTROLYTES - ADULT  Goal: Electrolytes maintained within normal limits  Description: INTERVENTIONS:  - Monitor labs and assess patient for signs and symptoms of electrolyte imbalances  - Administer electrolyte replacement as ordered  - Monitor response to electrolyte replacements, including repeat lab results as appropriate  - Instruct patient on fluid and nutrition as appropriate  Outcome: Progressing  Goal: Fluid balance maintained  Description: INTERVENTIONS:  - Monitor labs   - Monitor I/O and WT  - Instruct patient on fluid and nutrition as appropriate  - Assess for signs & symptoms of volume excess or deficit  Outcome: Progressing  Goal: Glucose maintained within target range  Description: INTERVENTIONS:  - Monitor Blood Glucose as ordered  - Assess for signs and symptoms of hyperglycemia and hypoglycemia  - Administer ordered medications to maintain glucose within target range  - Assess nutritional intake and initiate nutrition service referral as needed  Outcome: Progressing     Problem: SKIN/TISSUE INTEGRITY - ADULT  Goal: Incision(s), wounds(s) or drain site(s) healing without S/S of infection  Description: INTERVENTIONS  - Assess and document dressing, incision, wound bed, drain sites and surrounding tissue  - Provide patient and family education  - Perform skin care/dressing changes as needed  Outcome: Progressing  Goal: Pressure injury heals and does not worsen  Description: Interventions:  - Implement low air loss mattress or specialty surface (Criteria met)  - Apply silicone foam dressing  - Use special pressure reducing interventions such as waffle cushion when in chair   - Apply fecal or urinary incontinence containment device   - Perform passive or active ROM every 8  - Turn and reposition patient & offload bony prominences every 2 hours   - Utilize friction reducing device or surface for transfers   - Consider consults to  interdisciplinary teams such as wound team  - Use incontinent care products after each incontinent episode such as barrier cream  - Consider nutrition services referral as needed  Outcome: Progressing     Problem: Potential for Falls  Goal: Patient will remain free of falls  Description: INTERVENTIONS:  - Educate patient/family on patient safety including physical limitations  - Instruct patient to call for assistance with activity   - Consult OT/PT to assist with strengthening/mobility   - Keep Call bell within reach  - Keep bed low and locked with side rails adjusted as appropriate  - Keep care items and personal belongings within reach  - Initiate and maintain comfort rounds  - Make Fall Risk Sign visible to staff  - Offer Toileting every 2 Hours, in advance of need  - Initiate/Maintain bed/chair alarm prn  - Apply yellow socks and bracelet for high fall risk patients  - Consider moving patient to room near nurses station  Outcome: Progressing

## 2022-03-30 NOTE — WOUND OSTOMY CARE
Progress Note - Wound   Shirley Polo 80 y o  female MRN: [id number]  Unit/Bed#: -01 Encounter: [id number]  History and Present Illness:   80year-old female arrives emergency department after being found on the floor in her home by her daughter 3/25/2022    It is unclear as to how long the patient was on the ground   Patient cannot recall when she fell and she was not seen for about 4 days per EMS report  Hershell Barrier arrives to the emergency room with multiple areas of bruising   Patient was found in the prone position and had been incontinent  Due to patients unknown down time, pressure injury, Deep Tissue Injury may develop to a stage 3, 4 or unstageable injury  Seen today for follow up wound assessment         Assessment:   1)Deep Tissue Injury Right anterior thigh wound 4x4 removed, was changed at 0800  Removed at 1345, small to moderate clear serosangenous drainage on dressing,no odor  48 hours prior drainage was milky and tan  Distal end of wound is moist , with 75% of wound with soften black eschar, edges are intact, Maxorb Ag and dsd ABd applied  2)Fluid filled blister intact beneath left breast   3)Deep Tissue injury to left breast fold, ecchymosis with open partial thickness wound on lateral end of incision deep in skin fold  Maxorb rope and ABD applied to decrease pressure  4)Bilateral heels intact  5)Deep Tissue Injury left anterior foot non blanchable, no induration  No drainage  open to air  6)Right foot 3rd and 5th toe deep purple DTI POA, non blanchable  No induration open to air  Wound 03/25/22 Traumatic Abrasion(s) Face Upper (Active)   Wound Image   03/30/22 1300   Wound Description Dry; Intact;Fragile;Light purple 03/30/22 1300   Argenis-wound Assessment Fragile;Edema;Purple; Swelling 03/30/22 1200   Wound Length (cm) 5 cm 03/30/22 1300   Wound Width (cm) 5 cm 03/30/22 1300   Wound Surface Area (cm^2) 25 cm^2 03/30/22 1300   Wound Site Closure Unapproximated 03/29/22 0400 Drainage Amount None 03/30/22 1300   Non-staged Wound Description Partial thickness 03/28/22 0400   Treatments Cleansed;Site care 03/30/22 0800   Dressing Open to air 03/30/22 1300   Patient Tolerance Tolerated well 03/30/22 1300       Wound 03/25/22 Other (comment) Other (Comment) Thigh Anterior;Right (Active)   Wound Image   03/30/22 1305   Wound Description Dry; Intact; Black;Drainage;Fragile;Pink;Swelling 03/30/22 1305   Pressure Injury Stage DTPI 03/30/22 1305   Argenis-wound Assessment Intact;Edema; Erythema;Fragile 03/30/22 1305   Wound Length (cm) 9 cm 03/30/22 1305   Wound Width (cm) 17 5 cm 03/30/22 1305   Wound Surface Area (cm^2) 157 5 cm^2 03/30/22 1305   Wound Site Closure Unapproximated 03/29/22 0400   Drainage Amount Moderate 03/30/22 1305   Drainage Description Clear;Serosanguineous 03/30/22 1305   Non-staged Wound Description Partial thickness 03/30/22 1200   Treatments Elevated;Site care 03/30/22 0800   Dressing ABD;Calcium Alginate with Silver 03/30/22 1305   Wound packed? No 03/29/22 0400   Dressing Changed Changed 03/30/22 1305   Patient Tolerance Tolerated well 03/30/22 1305   Dressing Status Intact; Clean;Dry 03/30/22 1200       Wound 03/25/22 Skin Tear Skin tear Breast Lateral;Left;Lower (Active)   Wound Image    03/30/22 1255   Wound Description Intact; Beefy red;Fragile 03/30/22 1255   Pressure Injury Stage 2 03/30/22 1255   Argenis-wound Assessment Fragile; Purple 03/30/22 1255   Wound Length (cm) 0 5 cm 03/30/22 1255   Wound Width (cm) 1 cm 03/30/22 1255   Wound Surface Area (cm^2) 0 5 cm^2 03/30/22 1255   Wound Site Closure Unapproximated 03/29/22 0400   Drainage Amount None 03/30/22 1255   Drainage Description Clear 03/28/22 1626   Non-staged Wound Description Full thickness 03/28/22 1626   Treatments Site care 03/30/22 1255   Dressing ABD;Calcium Alginate with Silver 03/30/22 1255   Wound packed?  No 03/29/22 1600   Dressing Changed Changed 03/30/22 1255   Patient Tolerance Tolerated well 03/30/22 1255   Dressing Status Clean;Dry; Intact 03/30/22 1255       Wound 03/25/22 Pressure Injury Foot Anterior;Left;Medial (Active)   Wound Image   03/30/22 1301   Wound Description Light purple;Non-blanchable erythema 03/30/22 1301   Pressure Injury Stage DTPI 03/30/22 1301   Argenis-wound Assessment Clean;Dry; Intact 03/30/22 1301   Wound Length (cm) 1 cm 03/30/22 1301   Wound Width (cm) 1 5 cm 03/30/22 1301   Wound Surface Area (cm^2) 1 5 cm^2 03/30/22 1301   Drainage Amount None 03/29/22 0400   Treatments Cleansed;Site care;Elevated 03/30/22 1301   Dressing Open to air 03/30/22 1301   Patient Tolerance Tolerated well 03/30/22 1301   Dressing Status Clean;Dry; Intact 03/30/22 1301       Wound 03/30/22 Pressure Injury Toe (Comment  which one) Right (Active)   Wound Image   03/30/22 1302   Wound Description Dry;Light purple;Non-blanchable erythema 03/30/22 1302   Pressure Injury Stage DTPI 03/30/22 1302   Argenis-wound Assessment Dry; Intact 03/30/22 1302   Wound Length (cm) 0 5 cm 03/30/22 1302   Wound Width (cm) 1 cm 03/30/22 1302   Wound Surface Area (cm^2) 0 5 cm^2 03/30/22 1302   Treatments Elevated 03/30/22 1302   Dressing Open to air 03/30/22 1302         Skin care plans:  1-Calazime to sacrum, buttocks TID and PRN  2-Hydraguard to bilateral heel BID and PRN  3-Elevate heels to offload pressure  4-Ehob cushion when out of bed  5-Turn/repoisiton q2h or when medically stable for pressure re-distribution on skin  6-Moisturize skin daily with skin nourishing cream  7-Cleanse right medial thigh wound with normal saline, apply Maxorb AG to wound bed, 4x4 , then ABD pad change daily and prn soil or dislodgement  8-Cleanse open area under left breast with normal saline, apply Maxorb robe to open wound then pad beneath breast and intact blister on breast with an ABD pad  Change daily and prn soil or dislodgment  9-DTI left anterior foot open to air    10-P500 low air loss mattress       Call or tigertext with any questions  Wound Care will continue to follow      Larry Corona

## 2022-03-30 NOTE — CASE MANAGEMENT
Case Management Discharge Planning Note    Patient name Heather Carvajal  Location /-75 MRN [id number]  : 1940 Date 3/30/2022       Current Admission Date: 3/25/2022  Current Admission Diagnosis:Septic shock [facility])   Patient Active Problem List    Diagnosis Date Noted    Cholelithiases 2022    Cirrhosis ([facility] ) 2022    Cellulitis of right thigh 2022    Eye discharge 2022    Fusiform ectasia of ascending aorta 2022    Moderate protein-calorie malnutrition ([facility] ) 2022    [facility] 20[facility]    Serum total bilirubin elevated 2022    Atrial fibrillation with RVR ([facility] ) 2022    Fall 2022    Traumatic hematoma of head 2022    Impaired skin integrity 2022    Hypothyroidism 2022    Diabetes mellitus [facility] 2022    HLD (hyperlipidemia) 2022      LOS (days): 5  Geometric Mean LOS (GMLOS) (days): 4 80  Days to GMLOS:-0 1     OBJECTIVE:  Risk of Unplanned Readmission Score: 16         Current admission status: Inpatient   Preferred Pharmacy:   [address] [place] [address]  Phone: [phone] Fax: [phone]    [place], [address]  [place] [address] [place] [address]  Phone: [phone] Fax: [phone]    Primary Care Provider: Yaneth Calderon MD    Primary Insurance: MEDICARE  Secondary Insurance: AARP    DISCHARGE DETAILS:  CM met with patient to discuss therapy recommendation of SNF when patient medically clear for discharge  CM reviewed options with patient  Patient indicated she would prefer local SNF and decided on [address], [address] [facility]  CM submitted [facility] referrals for patient

## 2022-03-30 NOTE — ASSESSMENT & PLAN NOTE
Lab Results   Component Value Date    HGBA1C 5 5 01/31/2018       Recent Labs     03/29/22  0735 03/29/22  1104 03/29/22  1616 03/29/22  2116   POCGLU 100 108 121 127       Blood Sugar Average: Last 72 hrs:  (P) 028 [id number]   · AC/HS Accu-Cheks with sliding scale insulin

## 2022-03-30 NOTE — PROGRESS NOTES
[facility]  Progress Note - Rom Mahmood 80 y o  female MRN: [id number]    Unit/Bed#: -01 Encounter: [id number]    Assessment/Plan:  Cirrhosis  -MELD Na 19  -AMA is positive so likely secondary to longstanding and undiagnosed PBC(primary biliary cholangitis) versus SORIA given her obesity state  -given cirrhotic state of the liver no indication for treatment of PBC  -given her advanced age and comorbid conditions liver biopsy would not  and she is not a candidate for liver transplantation  -her liver enzymes are downtrending including bilirubin suspect acute rise secondary to sepsis  -labs are not consistent with autoimmune hepatitis, alpha-1 antitrypsin deficiency, chronic hepatitis or hemochromatosis  Suspect elevated ferritin is because it is an acute phase reactant  -she appears to be compensated at this time-no evidence of HE, ascites or variceal bleeding or HCC  -from a GI standpoint would recommend supportive measures and direct cirrhotic care at potential complications  -patient appears to be high risk for anticoagulation given her fall however if anticoagulation will be continued, may need to consider EGD to evaluate for varices as she could be a high risk for bleeding-will wait until patient is more stable  -when appropriate advise on modest weight loss of 5-10%        Subjective:   Patient seen and examined  Objective:     Vitals: Blood pressure 99/60, pulse (!) 112, temperature 98 1 °F (36 7 °C), resp  rate 20, height 5' 2" (1 575 m), weight 117 kg (257 lb 11 5 oz), SpO2 98 %  ,Body mass index is 47 14 kg/m²  Intake/Output Summary (Last 24 hours) at 3/30/2022 1329  Last data filed at 3/30/2022 1301  Gross per 24 hour   Intake 1237 09 ml   Output 2225 ml   Net -987 91 ml       Review of Systems: as per HPI  Review of Systems    Physical Exam:     Physical Exam  Constitutional:       General: She is not in acute distress  Appearance: She is obese  She is ill-appearing  HENT:      Head:      Comments: Traumatic  Eyes:      General: No scleral icterus  Cardiovascular:      Rate and Rhythm: Tachycardia present  Pulmonary:      Effort: Pulmonary effort is normal    Abdominal:      General: There is no distension  Palpations: Abdomen is soft  Tenderness: There is no abdominal tenderness  There is no guarding  Musculoskeletal:         General: Swelling present  Cervical back: Neck supple  Skin:     Coloration: Skin is jaundiced  Neurological:      Mental Status: She is alert and oriented to person, place, and time     Psychiatric:         Behavior: Behavior normal            Invasive Devices  Report    Central Venous Catheter Line            CVC Central Lines 03/26/22 Triple 16cm 4 days          Peripheral Intravenous Line            Peripheral IV 03/25/22 Left;Proximal;Ventral (anterior) Forearm 4 days    Peripheral IV 03/25/22 Right Antecubital 4 days          Arterial Line            Arterial Line 03/25/22 Right Radial 4 days          Drain            Urethral Catheter Temperature probe 4 days                        CBC:   Lab Results   Component Value Date    WBC 9 71 03/30/2022    HGB 10 3 (L) 03/30/2022    HCT 28 6 (L) 03/30/2022    MCV 99 (H) 03/30/2022     03/30/2022    MCH 35 5 (H) 03/30/2022    MCHC 36 0 03/30/2022    RDW 14 9 03/30/2022    MPV 10 3 03/30/2022   ,   CMP:   Lab Results   Component Value Date    K 3 7 03/30/2022     03/30/2022    CO2 27 03/30/2022    BUN 35 (H) 03/30/2022    CREATININE 1 17 03/30/2022    CALCIUM 7 9 (L) 03/30/2022    AST 42 03/30/2022    ALT 29 03/30/2022    ALKPHOS 166 (H) 03/30/2022    EGFR 43 03/30/2022   ,   Lipase: No results found for: LIPASE,  PT/INR:   Lab Results   Component Value Date    INR 1 79 (H) 03/30/2022

## 2022-03-30 NOTE — ASSESSMENT & PLAN NOTE
Malnutrition Findings:   Adult Malnutrition type: Chronic illness  Adult Degree of Malnutrition: Malnutrition of moderate degree  Malnutrition Characteristics: Muscle loss,Fat loss,Weight loss                  360 Statement: Chronic moderate malnutrition related to decreased appetite as evidenced by 11 6% weight loss x 4 mo (11/23/21 294lb, 3/28/22 260lb, suspect partly fluid related), moderate fat loss to orbitals, moderate muscle loss to temporalis, deltoid and interroseous muscles  Treated with: Will liberalize cardiac to 4gm GLADYS, continue CCD 3 at this time  Will trial glucerna daily and ensure max protein daily, increase as tolerated/indicated  Recommend daily weights  BMI Findings: Body mass index is 46 94 kg/m²

## 2022-03-31 ENCOUNTER — ANESTHESIA EVENT (INPATIENT)
Dept: PERIOP | Facility: HOSPITAL | Age: 82
DRG: 853 | End: 2022-03-31
Payer: MEDICARE

## 2022-03-31 ENCOUNTER — ANESTHESIA (INPATIENT)
Dept: PERIOP | Facility: HOSPITAL | Age: 82
DRG: 853 | End: 2022-03-31
Payer: MEDICARE

## 2022-03-31 PROBLEM — I71.20 THORACIC AORTIC ANEURYSM WITHOUT RUPTURE: Status: ACTIVE | Noted: 2022-01-01

## 2022-03-31 PROBLEM — H57.89 EYE DISCHARGE: Status: RESOLVED | Noted: 2022-01-01 | Resolved: 2022-01-01

## 2022-03-31 PROBLEM — I35.0 AORTIC STENOSIS, SEVERE: Status: ACTIVE | Noted: 2022-01-01

## 2022-03-31 PROBLEM — I35.0 AORTIC STENOSIS, SEVERE: Chronic | Status: ACTIVE | Noted: 2022-03-31

## 2022-03-31 PROBLEM — R17 SERUM TOTAL BILIRUBIN ELEVATED: Status: RESOLVED | Noted: 2022-03-25 | Resolved: 2022-03-31

## 2022-03-31 PROBLEM — I71.2 THORACIC AORTIC ANEURYSM WITHOUT RUPTURE (HCC): Status: ACTIVE | Noted: 2022-03-25

## 2022-03-31 PROBLEM — I71.20 THORACIC AORTIC ANEURYSM WITHOUT RUPTURE: Chronic | Status: ACTIVE | Noted: 2022-01-01

## 2022-03-31 PROBLEM — I71.2 THORACIC AORTIC ANEURYSM WITHOUT RUPTURE (HCC): Chronic | Status: ACTIVE | Noted: 2022-03-25

## 2022-03-31 LAB
ALBUMIN SERPL BCP-MCNC: 1.8 G/DL (ref 3.5–5)
ALP SERPL-CCNC: 182 U/L (ref 46–116)
ALT SERPL W P-5'-P-CCNC: 28 U/L (ref 12–78)
ANION GAP SERPL CALCULATED.3IONS-SCNC: 4 MMOL/L (ref 4–13)
ANISOCYTOSIS BLD QL SMEAR: PRESENT
APTT PPP: 110 SECONDS (ref 23–37)
APTT PPP: 119 SECONDS (ref 23–37)
AST SERPL W P-5'-P-CCNC: 52 U/L (ref 5–45)
BASOPHILS # BLD MANUAL: 0 THOUSAND/UL (ref 0–0.1)
BASOPHILS NFR MAR MANUAL: 0 % (ref 0–1)
BILIRUB SERPL-MCNC: 2.43 MG/DL (ref 0.2–1)
BUN SERPL-MCNC: 28 MG/DL (ref 5–25)
CALCIUM ALBUM COR SERPL-MCNC: 9.7 MG/DL (ref 8.3–10.1)
CALCIUM SERPL-MCNC: 7.9 MG/DL (ref 8.3–10.1)
CHLORIDE SERPL-SCNC: 104 MMOL/L (ref 100–108)
CO2 SERPL-SCNC: 27 MMOL/L (ref 21–32)
CREAT SERPL-MCNC: 1.17 MG/DL (ref 0.6–1.3)
EOSINOPHIL # BLD MANUAL: 0.11 THOUSAND/UL (ref 0–0.4)
EOSINOPHIL NFR BLD MANUAL: 1 % (ref 0–6)
ERYTHROCYTE [DISTWIDTH] IN BLOOD BY AUTOMATED COUNT: 15.9 % (ref 11.6–15.1)
GFR SERPL CREATININE-BSD FRML MDRD: 43 ML/MIN/1.73SQ M
GLUCOSE SERPL-MCNC: 87 MG/DL (ref 65–140)
GLUCOSE SERPL-MCNC: 88 MG/DL (ref 65–140)
HCT VFR BLD AUTO: 29.7 % (ref 34.8–46.1)
HGB BLD-MCNC: 9.8 G/DL (ref 11.5–15.4)
INR PPP: 1.8 (ref 0.84–1.19)
LYMPHOCYTES # BLD AUTO: 2.9 THOUSAND/UL (ref 0.6–4.47)
LYMPHOCYTES # BLD AUTO: 27 % (ref 14–44)
MACROCYTES BLD QL AUTO: PRESENT
MCH RBC QN AUTO: 34.8 PG (ref 26.8–34.3)
MCHC RBC AUTO-ENTMCNC: 33 G/DL (ref 31.4–37.4)
MCV RBC AUTO: 105 FL (ref 82–98)
METAMYELOCYTES NFR BLD MANUAL: 1 % (ref 0–1)
MONOCYTES # BLD AUTO: 0.97 THOUSAND/UL (ref 0–1.22)
MONOCYTES NFR BLD: 9 % (ref 4–12)
MYELOCYTES NFR BLD MANUAL: 1 % (ref 0–1)
NEUTROPHILS # BLD MANUAL: 6.56 THOUSAND/UL (ref 1.85–7.62)
NEUTS BAND NFR BLD MANUAL: 3 % (ref 0–8)
NEUTS SEG NFR BLD AUTO: 58 % (ref 43–75)
PLATELET # BLD AUTO: 155 THOUSANDS/UL (ref 149–390)
PLATELET BLD QL SMEAR: ADEQUATE
PMV BLD AUTO: 10.3 FL (ref 8.9–12.7)
POLYCHROMASIA BLD QL SMEAR: PRESENT
POTASSIUM SERPL-SCNC: 4.2 MMOL/L (ref 3.5–5.3)
PROCALCITONIN SERPL-MCNC: 0.85 NG/ML
PROT SERPL-MCNC: 5.8 G/DL (ref 6.4–8.2)
PROTHROMBIN TIME: 20.5 SECONDS (ref 11.6–14.5)
RBC # BLD AUTO: 2.82 MILLION/UL (ref 3.81–5.12)
RBC MORPH BLD: PRESENT
SODIUM SERPL-SCNC: 135 MMOL/L (ref 136–145)
STOMATOCYTES BLD QL SMEAR: PRESENT
WBC # BLD AUTO: 10.75 THOUSAND/UL (ref 4.31–10.16)

## 2022-03-31 PROCEDURE — 85730 THROMBOPLASTIN TIME PARTIAL: CPT | Performed by: PHYSICIAN ASSISTANT

## 2022-03-31 PROCEDURE — 82948 REAGENT STRIP/BLOOD GLUCOSE: CPT

## 2022-03-31 PROCEDURE — 87070 CULTURE OTHR SPECIMN AEROBIC: CPT | Performed by: STUDENT IN AN ORGANIZED HEALTH CARE EDUCATION/TRAINING PROGRAM

## 2022-03-31 PROCEDURE — NC001 PR NO CHARGE: Performed by: NURSE PRACTITIONER

## 2022-03-31 PROCEDURE — 84145 PROCALCITONIN (PCT): CPT | Performed by: NURSE PRACTITIONER

## 2022-03-31 PROCEDURE — 80053 COMPREHEN METABOLIC PANEL: CPT | Performed by: NURSE PRACTITIONER

## 2022-03-31 PROCEDURE — G0408 INPT/TELE FOLLOW UP 35: HCPCS | Performed by: INTERNAL MEDICINE

## 2022-03-31 PROCEDURE — 88304 TISSUE EXAM BY PATHOLOGIST: CPT | Performed by: PATHOLOGY

## 2022-03-31 PROCEDURE — 87205 SMEAR GRAM STAIN: CPT | Performed by: STUDENT IN AN ORGANIZED HEALTH CARE EDUCATION/TRAINING PROGRAM

## 2022-03-31 PROCEDURE — 0JBL0ZZ EXCISION OF RIGHT UPPER LEG SUBCUTANEOUS TISSUE AND FASCIA, OPEN APPROACH: ICD-10-PCS | Performed by: STUDENT IN AN ORGANIZED HEALTH CARE EDUCATION/TRAINING PROGRAM

## 2022-03-31 PROCEDURE — 85610 PROTHROMBIN TIME: CPT | Performed by: NURSE PRACTITIONER

## 2022-03-31 PROCEDURE — 99233 SBSQ HOSP IP/OBS HIGH 50: CPT | Performed by: PHYSICIAN ASSISTANT

## 2022-03-31 PROCEDURE — 87075 CULTR BACTERIA EXCEPT BLOOD: CPT | Performed by: STUDENT IN AN ORGANIZED HEALTH CARE EDUCATION/TRAINING PROGRAM

## 2022-03-31 PROCEDURE — 85027 COMPLETE CBC AUTOMATED: CPT | Performed by: PHYSICIAN ASSISTANT

## 2022-03-31 PROCEDURE — 85025 COMPLETE CBC W/AUTO DIFF WBC: CPT | Performed by: PHYSICIAN ASSISTANT

## 2022-03-31 PROCEDURE — 85007 BL SMEAR W/DIFF WBC COUNT: CPT | Performed by: PHYSICIAN ASSISTANT

## 2022-03-31 PROCEDURE — 87186 SC STD MICRODIL/AGAR DIL: CPT | Performed by: STUDENT IN AN ORGANIZED HEALTH CARE EDUCATION/TRAINING PROGRAM

## 2022-03-31 RX ORDER — LIDOCAINE HYDROCHLORIDE AND EPINEPHRINE 10; 10 MG/ML; UG/ML
INJECTION, SOLUTION INFILTRATION; PERINEURAL AS NEEDED
Status: DISCONTINUED | OUTPATIENT
Start: 2022-03-31 | End: 2022-03-31 | Stop reason: HOSPADM

## 2022-03-31 RX ORDER — AMIODARONE HYDROCHLORIDE 200 MG/1
200 TABLET ORAL
Status: DISCONTINUED | OUTPATIENT
Start: 2022-04-08 | End: 2022-04-01

## 2022-03-31 RX ORDER — ONDANSETRON 2 MG/ML
4 INJECTION INTRAMUSCULAR; INTRAVENOUS ONCE AS NEEDED
Status: DISCONTINUED | OUTPATIENT
Start: 2022-03-31 | End: 2022-03-31

## 2022-03-31 RX ORDER — ACETAMINOPHEN 325 MG/1
650 TABLET ORAL ONCE
Status: COMPLETED | OUTPATIENT
Start: 2022-03-31 | End: 2022-03-31

## 2022-03-31 RX ORDER — MIDODRINE HYDROCHLORIDE 5 MG/1
5 TABLET ORAL
Status: DISCONTINUED | OUTPATIENT
Start: 2022-03-31 | End: 2022-04-02

## 2022-03-31 RX ORDER — LANOLIN ALCOHOL/MO/W.PET/CERES
6 CREAM (GRAM) TOPICAL
Status: DISCONTINUED | OUTPATIENT
Start: 2022-03-31 | End: 2022-04-04 | Stop reason: HOSPADM

## 2022-03-31 RX ORDER — HYDROMORPHONE HCL IN WATER/PF 6 MG/30 ML
0.2 PATIENT CONTROLLED ANALGESIA SYRINGE INTRAVENOUS
Status: DISCONTINUED | OUTPATIENT
Start: 2022-03-31 | End: 2022-03-31

## 2022-03-31 RX ORDER — SODIUM CHLORIDE, SODIUM LACTATE, POTASSIUM CHLORIDE, CALCIUM CHLORIDE 600; 310; 30; 20 MG/100ML; MG/100ML; MG/100ML; MG/100ML
INJECTION, SOLUTION INTRAVENOUS CONTINUOUS PRN
Status: DISCONTINUED | OUTPATIENT
Start: 2022-03-31 | End: 2022-03-31

## 2022-03-31 RX ORDER — FENTANYL CITRATE 50 UG/ML
INJECTION, SOLUTION INTRAMUSCULAR; INTRAVENOUS AS NEEDED
Status: DISCONTINUED | OUTPATIENT
Start: 2022-03-31 | End: 2022-03-31

## 2022-03-31 RX ORDER — LANOLIN ALCOHOL/MO/W.PET/CERES
3 CREAM (GRAM) TOPICAL
Status: DISCONTINUED | OUTPATIENT
Start: 2022-03-31 | End: 2022-03-31

## 2022-03-31 RX ORDER — ONDANSETRON 2 MG/ML
INJECTION INTRAMUSCULAR; INTRAVENOUS AS NEEDED
Status: DISCONTINUED | OUTPATIENT
Start: 2022-03-31 | End: 2022-03-31

## 2022-03-31 RX ORDER — SODIUM CHLORIDE, SODIUM LACTATE, POTASSIUM CHLORIDE, CALCIUM CHLORIDE 600; 310; 30; 20 MG/100ML; MG/100ML; MG/100ML; MG/100ML
100 INJECTION, SOLUTION INTRAVENOUS CONTINUOUS
Status: ACTIVE | OUTPATIENT
Start: 2022-03-31 | End: 2022-03-31

## 2022-03-31 RX ORDER — AMIODARONE HYDROCHLORIDE 200 MG/1
200 TABLET ORAL
Status: DISCONTINUED | OUTPATIENT
Start: 2022-03-31 | End: 2022-04-01

## 2022-03-31 RX ORDER — FENTANYL CITRATE/PF 50 MCG/ML
25 SYRINGE (ML) INJECTION
Status: DISCONTINUED | OUTPATIENT
Start: 2022-03-31 | End: 2022-03-31

## 2022-03-31 RX ORDER — MAGNESIUM HYDROXIDE 1200 MG/15ML
LIQUID ORAL AS NEEDED
Status: DISCONTINUED | OUTPATIENT
Start: 2022-03-31 | End: 2022-03-31 | Stop reason: HOSPADM

## 2022-03-31 RX ORDER — PROPOFOL 10 MG/ML
INJECTION, EMULSION INTRAVENOUS AS NEEDED
Status: DISCONTINUED | OUTPATIENT
Start: 2022-03-31 | End: 2022-03-31

## 2022-03-31 RX ADMIN — PROPOFOL 30 MG: 10 INJECTION, EMULSION INTRAVENOUS at 13:30

## 2022-03-31 RX ADMIN — ACETAMINOPHEN 325MG 650 MG: 325 TABLET ORAL at 05:48

## 2022-03-31 RX ADMIN — FENTANYL CITRATE 25 MCG: 50 INJECTION, SOLUTION INTRAMUSCULAR; INTRAVENOUS at 13:45

## 2022-03-31 RX ADMIN — BACITRACIN 1 LARGE APPLICATION: 500 OINTMENT TOPICAL at 09:01

## 2022-03-31 RX ADMIN — FENTANYL CITRATE 25 MCG: 50 INJECTION, SOLUTION INTRAMUSCULAR; INTRAVENOUS at 13:31

## 2022-03-31 RX ADMIN — ONDANSETRON 4 MG: 2 INJECTION INTRAMUSCULAR; INTRAVENOUS at 13:39

## 2022-03-31 RX ADMIN — MIDODRINE HYDROCHLORIDE 5 MG: 5 TABLET ORAL at 15:40

## 2022-03-31 RX ADMIN — LEVOTHYROXINE SODIUM 150 MCG: 150 TABLET ORAL at 04:45

## 2022-03-31 RX ADMIN — FENTANYL CITRATE 25 MCG: 50 INJECTION, SOLUTION INTRAMUSCULAR; INTRAVENOUS at 13:39

## 2022-03-31 RX ADMIN — AMIODARONE HYDROCHLORIDE 200 MG: 200 TABLET ORAL at 15:40

## 2022-03-31 RX ADMIN — CEFAZOLIN SODIUM 2000 MG: 2 SOLUTION INTRAVENOUS at 21:40

## 2022-03-31 RX ADMIN — PROPOFOL 60 MG: 10 INJECTION, EMULSION INTRAVENOUS at 13:29

## 2022-03-31 RX ADMIN — PHENYLEPHRINE HYDROCHLORIDE 100 MCG/MIN: 10 INJECTION INTRAVENOUS at 13:29

## 2022-03-31 RX ADMIN — CEFAZOLIN SODIUM 2000 MG: 2 SOLUTION INTRAVENOUS at 04:47

## 2022-03-31 RX ADMIN — SODIUM CHLORIDE, SODIUM LACTATE, POTASSIUM CHLORIDE, AND CALCIUM CHLORIDE: .6; .31; .03; .02 INJECTION, SOLUTION INTRAVENOUS at 13:14

## 2022-03-31 RX ADMIN — CEFAZOLIN SODIUM 2000 MG: 2 SOLUTION INTRAVENOUS at 13:40

## 2022-03-31 RX ADMIN — MIDODRINE HYDROCHLORIDE 10 MG: 5 TABLET ORAL at 11:36

## 2022-03-31 RX ADMIN — FENTANYL CITRATE 25 MCG: 50 INJECTION, SOLUTION INTRAMUSCULAR; INTRAVENOUS at 13:52

## 2022-03-31 RX ADMIN — MIDODRINE HYDROCHLORIDE 10 MG: 5 TABLET ORAL at 09:00

## 2022-03-31 NOTE — ASSESSMENT & PLAN NOTE
· Elevated LFT's and T Bili levels 4 26-current 2 43  · suspect cirrhosis PBC versus SORIA MELD 19  · CT abd/pelvis revealed "persistent findings of hepatic cirrhosis new trace perihepatic ascites"   Ultrasound also revealed "Liver cirrhosis "  · GI consulted and signed off  · Avoid hepatotoxins

## 2022-03-31 NOTE — OCCUPATIONAL THERAPY NOTE
Occupational Therapy Cancel Note    Patient Name: Sae Aguilar  [id number]'U Date: 3/31/2022     03/31/22 1230   Note Type   Note Type Cancelled Session   Cancel Reasons Patient off floor/test     Chart reviewed  Attempted to see pt for OT session at 0940 this AM  Spoke with ICU nursing staff who reported pt was undergoing an ID consult at the time  Attempted to see pt for 2nd time at 1230  Pt being taken down to OR for wound debridement, unavailable for therapy services at this time  Will continue to follow case and address as appropriate and as time allows      NESTOR Camara/MANUEL

## 2022-03-31 NOTE — ASSESSMENT & PLAN NOTE
· Sepsis Secondary to right thigh cellulitis  · POD #1 s/p debridement/washout R thigh  · Wound cx Pending  · BC -staph contaminate, Repeat BC NG  · UC + ecoli bacteria asymptomatic  ? Colonization  · ID/Surgery consulted  · Cefazolin day 4 anbx day 7  · Consider discontinuing TLC  · Possible return to OR today for addition debridement/washout vs vac placement   Cont heparin gtt off and NPO until re eval by surgery

## 2022-03-31 NOTE — ASSESSMENT & PLAN NOTE
· AFib RVR with heart rate in 160s on presentation  · Patient follows with [facility] cardiology  · Patient's home metoprolol and Coumadin held  Coumadin held secondary to coagulopathy with INR greater than 11   · Heart rate improving with fluid resuscitation  With improvement in BP would resume metoprolol  · TSH 0 301  · Troponin peaked at 109 and trending down  · BNP > 8900  · Check ECHO  · Persistent afib with RVR despite Lopressor IV and PO  Rates improved to 120s from 150s  · Rate controlled as of late morning 3/26   · Heparin gtt started since INR <2  · Metoprolol PO discontinued 3/29 as pt is still requiring levophed  Eventually will resume metoprolol when BP improved off pressors  · Amiodarone gtt started 3/30 with amiodarone bolus for rates sustained in the 120s  · 3/30: Repeat Amiodarone bolus in the evening  Overnight, patient converted to NSR  Amiodarone gtt decreased to 0 5

## 2022-03-31 NOTE — PROGRESS NOTES
[address]  Progress Note - Zurdo Ortiz 1940, 80 y o  female MRN: [id number]  Unit/Bed#: -01 Encounter: [id number]  Primary Care Provider: Denia Carter MD   Date and time admitted to hospital: 3/25/2022  3:02 PM    * Septic shock [facility])  Assessment & Plan  Background:  Patient found down on floor by daughter  Patient is alert and oriented however does not entirely recall events of suspected fall  Patient does state that she believes she may have fell on a chair however daughter stated that she was found in the living room next to a cabinet  Patient noted to have multiple injuries and areas of ecchymosis  Trauma alert called from the ED  Patient met sepsis criteria on admission  · Meets criteria as evidence by heart rate 160s, respiratory rate 20-25, WBCs 30 and LEONARDO present on admission  · Source likely urinary vs cellulitis right thigh +/- bacteremia  · UA showed small leukocytes, no nitrates, WBCs 10-20 and innumerable bacteria  Culture: E  Coli  Likely a colonization per ID  · Blood culture x2: First set-Staph epi  Second set-Staph epi, Staph pettenkoferi  Likely a contamination per ID  · Repeat BC drawn 3/28 - pending  · Initial lactic acid 3 3  Lactic acidosis cleared  · Started on ceftriaxone in ED, but transitioned to Vanco/Cefepime 3/26 due to worsening clinical status  · MRSA swab negative  · Initially fluid resuscitated with 1 75 L of lactated Ringer's which was based on ideal body weight due to BMI of 57 66  Patient remained hypotensive and therefore was given an additional 1 25 L  Norepinephrine ordered however not started at this time because blood pressure improved during 3 L fluid boluses  · Discussed with patient central line and arterial line placements  Patient would be agreeable if indicated  · CT the chest/abdomen/pelvis: No findings of acute thoracic or abdominopelvic injury   Small right and trace left pleural effusions are new since November 14, 2021  Persistent findings of hepatic cirrhosis new trace perihepatic ascites  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    · 3/25 persistent hypotension despite fluid bolus'  Levophed started, arterial line and CVC inserted  · 3/26 Vasopressin added to due escalating dose of levophed  · Vasopressin d/c 3/28  · Levophed continued  · "Insect bite" right thigh with cellulitis, appears to be worsening, concern for abscess vs necrotizing infection- CT leg ordered and General Surgery consulted  · CT showed: Diffuse subcutaneous edema and overlying skin thickening most prominent about the medial aspect of the mid/distal thigh with superficial and deep subcutaneous fluid #3/169 appears nonloculated without a surrounding rind however abscess cannot be excluded   without IV contrast    · Surgery will continue to follow- conservative management with wound care  · ID on consult  Abx changed to Cefazolin  Cellulitis of right thigh  Assessment & Plan  · +/- hematoma of right thigh  · See further plan under septic shock  · 3/26 CT of right lower extremity revealed "Diffuse subcutaneous edema and overlying skin thickening most prominent about the medial aspect of the mid/distal thigh with superficial and deep subcutaneous fluid #3/169 appears nonloculated without a surrounding rind however abscess cannot be excluded without IV contrast "  · 3/28 Repeat CT revealed "Skin thickening and inflammatory stranding subcutaneous tissues both medially and laterally greater medially compatible with cellulitis    More confluent areas of fluid are redemonstrated medially and laterally without significant rim enhancement suggestive of phlegmonous change  No well-defined discrete rim-enhancing fluid collection seen to suggest a discrete abscess "  · Cont wound care  · Surgery following  · ID recommending changing abx to cefazolin    Cirrhosis ([facility] [address] )  Assessment & Plan  · suspect cirrhosis likely secondary to SORIA  · CT abd/pelvis revealed "persistent findings of hepatic cirrhosis new trace perihepatic ascites"  Ultrasound also revealed "Liver cirrhosis "  · GI on consult  · Avoid hepatotoxins    Cholelithiases  Assessment & Plan  · Ultrasound revealed gallbladder sludge and cholelithiasis  · GI on consult - pt does not appear to be symptomatic from this at this time and she would be a high risk surgical candidate; if this is still concerning and contributing to overall picture consider HIDA scan and if positive consider surgical consultation    Moderate protein-calorie malnutrition ([facility] [address] )  Assessment & Plan  Malnutrition Findings:   Adult Malnutrition type: Chronic illness  Adult Degree of Malnutrition: Malnutrition of moderate degree  Malnutrition Characteristics: Muscle loss,Fat loss,Weight loss                  360 Statement: Chronic moderate malnutrition related to decreased appetite as evidenced by 11 6% weight loss x 4 mo (11/23/21 294lb, 3/28/22 260lb, suspect partly fluid related), moderate fat loss to orbitals, moderate muscle loss to temporalis, deltoid and interroseous muscles  Treated with: Will liberalize cardiac to 4gm GLADYS, continue CCD 3 at this time  Will trial glucerna daily and ensure max protein daily, increase as tolerated/indicated  Recommend daily weights  BMI Findings: Body mass index is 47 14 kg/m²  Fusiform ectasia of ascending aorta  Assessment & Plan  · CT abd shows: 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  · Recommendation is for follow-up low radiation dose chest CT in one year      Eye discharge  Assessment & Plan  · Greenish discharge from both eyes  · Bacitracin-polymyxin B ointment ordered  Hypothyroidism  Assessment & Plan  · Continue home Synthroid dosage  · TSH 0 301    Impaired skin integrity  Assessment & Plan  · Multiple areas of impaired skin integrity present at time of admission likely secondary to fall in the setting of supratherapeutic INR on Coumadin  · Ecchymosis noted in albert orbital region, forehead, chest, abdomen and extremities  · Supportive care    Traumatic hematoma of head  Assessment & Plan  · CT of the head revealed Anterolateral right frontal scalp hematoma without acute traumatic injury to the facial bones  · Hematoma noted at right brow  Continue to monitor closely  Fall  Assessment & Plan  · Suspected fall as patient was found down by daughter  Unknown time down however last time patient was seen or heard from was approximately 4 days ago by daughter  Patient states she does not recall all of the events but does report that she fell  · Trauma alert in ED  Right frontal scalp hematoma noted on imaging  Multiple areas of ecchymosis over entire body including bilateral periorbital areas, chest, abdomen and extremities  · PT/OT consult placed    Atrial fibrillation with RVR ([address] )  Assessment & Plan  · AFib RVR with heart rate in 160s on presentation  · Patient follows with [facility] cardiology  · Patient's home metoprolol and Coumadin held  Coumadin held secondary to coagulopathy with INR greater than 11   · Heart rate improving with fluid resuscitation  With improvement in BP would resume metoprolol  · TSH 0 301  · Troponin peaked at 109 and trending down  · BNP > 8900  · Check ECHO  · Persistent afib with RVR despite Lopressor IV and PO  Rates improved to 120s from 150s  · Rate controlled as of late morning 3/26   · Heparin gtt started since INR <2  · Metoprolol PO discontinued 3/29 as pt is still requiring levophed   Eventually will resume metoprolol when BP improved off pressors  · Amiodarone gtt started 3/30 with amiodarone bolus for rates sustained in the 120s  · 3/30: Repeat Amiodarone bolus in the evening  Overnight, patient converted to NSR  Amiodarone gtt decreased to 0 5  Serum total bilirubin elevated  Assessment & Plan  · LFTs elevated at time of admission:  AST 66, ALT 44, , T bili 4 26  · Labs reviewed in Care everywhere and showed that LFTs were elevated on 01/26/22:  AST 87, ALT 42, , T bili 2  · Patient states she has no known liver disease  · Continue to trend LFTs  · Hold statin  · Right upper quadrant ultrasound: Cholelithiasis and sludge with wall thickening and pericholecystic fluid  Sonographic Monia Sat sign is negative  Findings are equivocal for acute cholecystitis, particularly in the setting of underlying hepatic dysfunction which can also cause gallbladder wall abnormalities  If there is continued clinical concern, further evaluation with HIDA scan recommended  · GI consult -suspect cirrhosis likely secondary to SORIA      ----------------------------------------------------------------------------------------  HPI/24hr events: Levophed weaned off 3/30  Repeat amiodarone bolus evening of 3/30 with heart rates sustained in the 120s  Overnight, heart rhythm converted to NSR  Patient complains of dizziness with movement  She denies headache, SOB, chest pain and abdominal pain  Patient appropriate for transfer out of the ICU today?: Patient does not meet criteria for referral to the ICU Follow-Up Clinic; referral has not been made     Disposition: Transfer to Med Surg with Telemetry   Code Status: Level 2 - DNAR: but accepts endotracheal intubation  ---------------------------------------------------------------------------------------    Review of Systems  Review of systems was reviewed and negative unless stated above in HPI/24-hour events ---------------------------------------------------------------------------------------  OBJECTIVE    Vitals   Vitals:    22 0015 22 0100 22 0115 22 0200   BP:  119/59  110/62   BP Location:       Pulse: 75 62 61 67   Resp: 18 18 19 20   Temp:       TempSrc:       SpO2: 96% 97% 97% 97%   Weight:       Height:         Temp (24hrs), Av 2 °F (36 8 °C), Min:98 °F (36 7 °C), Max:98 8 °F (37 1 °C)  Current: Temperature: 98 °F (36 7 °C)  Arterial Line BP: 107/48  Arterial Line MAP (mmHg): 67 mmHg    Respiratory:  SpO2: SpO2: 97 %  Nasal Cannula O2 Flow Rate (L/min): 2 L/min    Invasive/non-invasive ventilation settings   Respiratory  Report   Lab Data (Last 4 hours)    None         O2/Vent Data (Last 4 hours)    None                Physical Exam  Constitutional:       General: She is not in acute distress  Appearance: She is obese  She is not ill-appearing or diaphoretic  HENT:      Head: Normocephalic  Comments: Hematoma above right eye  Raccoon eyes present  Right Ear: External ear normal       Left Ear: External ear normal       Nose: Nose normal  No congestion  Mouth/Throat:      Mouth: Mucous membranes are moist       Pharynx: Oropharynx is clear  Eyes:      General: No scleral icterus  Right eye: No discharge  Left eye: No discharge  Extraocular Movements: Extraocular movements intact  Conjunctiva/sclera: Conjunctivae normal       Pupils: Pupils are equal, round, and reactive to light  Cardiovascular:      Rate and Rhythm: Normal rate and regular rhythm  Pulses: Normal pulses  Heart sounds: No friction rub  No gallop  Pulmonary:      Effort: Pulmonary effort is normal  No respiratory distress  Breath sounds: Normal breath sounds  No stridor  No wheezing, rhonchi or rales  Chest:      Chest wall: No tenderness  Abdominal:      General: Bowel sounds are normal  There is no distension  Palpations: Abdomen is soft  There is no mass  Tenderness: There is no abdominal tenderness  There is no guarding or rebound  Musculoskeletal:         General: Tenderness (Tenderness to right anterior thigh wound ) and signs of injury (Right anterior thigh wound ) present  No swelling or deformity  Normal range of motion  Cervical back: Normal range of motion and neck supple  No rigidity or tenderness  Right lower leg: No edema  Left lower leg: No edema  Skin:     General: Skin is warm and dry  Capillary Refill: Capillary refill takes less than 2 seconds  Coloration: Skin is not jaundiced or pale  Findings: No rash  Neurological:      General: No focal deficit present  Mental Status: She is alert and oriented to person, place, and time  GCS: GCS eye subscore is 4  GCS verbal subscore is 5  GCS motor subscore is 6  Sensory: No sensory deficit               Laboratory and Diagnostics:  Results from last 7 days   Lab Units 03/30/22 0441 03/29/22  0434 03/28/22  0438 03/27/22  1604 03/27/22  0433 03/26/22  0503 03/25/22  1506   WBC Thousand/uL 9 71 12 48* 9 42 13 46* 16 50* 26 42* 30 84*   HEMOGLOBIN g/dL 10 3* 10 8* 9 8* 10 3* 10 1* 10 5* 13 7   HEMATOCRIT % 28 6* 30 8* 27 9* 29 5* 27 5* 29 0* 38 6   PLATELETS Thousands/uL 165 180 151 175 178 208 248   BANDS PCT %  --   --   --   --   --  1  --    MONO PCT %  --   --   --   --   --  7 7     Results from last 7 days   Lab Units 03/30/22 0441 03/29/22 0434 03/28/22  0438 03/27/22  1557 03/27/22  0433 03/26/22  1621 03/26/22  0720 03/26/22  0503 03/26/22  0503 03/25/22  1506 03/25/22  1506   SODIUM mmol/L 138 135* 134* 133* 134* 133*  --   --  135*   < > 134*   POTASSIUM mmol/L 3 7 3 4* 3 6 3 5 4 1 3 7 4 1   < > 3 8   < > 4 6   CHLORIDE mmol/L 105 102 101 101 102 101  --   --  102   < > 99*   CO2 mmol/L 27 26 25 23 22 22  --   --  20*   < > 24   ANION GAP mmol/L 6 7 8 9 10 10  --   --  13   < > 11   BUN mg/dL 35* 46* 57* 63* 63* 66*  --   -- 62*   < > 71*   CREATININE mg/dL 1 17 1 39* 1 53* 1 65* 1 77* 1 82*  --   --  1 81*   < > 2 37*   CALCIUM mg/dL 7 9* 8 1* 8 1* 8 4 8 0* 8 1*  --   --  8 1*   < > 8 5   GLUCOSE RANDOM mg/dL 113 104 108 132 119 132  --   --  86   < > 95   ALT U/L 29 35 37  --  35  --   --   --  33  --  42   AST U/L 42 48* 56*  --  62*  --   --   --  54*  --  66*   ALK PHOS U/L 166* 146* 130*  --  123*  --   --   --  121*  --  165*   ALBUMIN g/dL 1 8* 1 9* 1 9*  --  2 2*  --   --   --  2 5*  --  1 9*   TOTAL BILIRUBIN mg/dL 2 48* 4 15* 5 74*  --  6 35*  --   --   --  5 08*  --  4 26*    < > = values in this interval not displayed  Results from last 7 days   Lab Units 03/30/22  0441 03/27/22  1557 03/27/22  1347 03/27/22  0433 03/26/22  0503 03/25/22 2013   MAGNESIUM mg/dL 2 0 2 5 2 0 2 1 2 3 2 3      Results from last 7 days   Lab Units 03/30/22  0441 03/29/22  1804 03/29/22  1150 03/29/22  0434 03/28/22  1404 03/28/22  0801 03/28/22  0019 03/27/22  1807 03/27/22  1556 03/27/22  0433 03/26/22  0503 03/25/22 2016   INR  1 79*  --   --  1 72*  --  1 91*  --   --  1 85* 2 01* 4 35* 7 91*   PTT seconds 87* 86* 86* 95* 74* 80* 55*   < >  --   --   --   --     < > = values in this interval not displayed  Results from last 7 days   Lab Units 03/25/22  2317 03/25/22 2013 03/25/22  1819 03/25/22  1618   LACTIC ACID mmol/L 1 8 2 6* 2 9* 3 3*     ABG:    VBG:  Results from last 7 days   Lab Units 03/26/22  1225   PH JOANN  7 400   PCO2 JOANN mm Hg 33 5*   PO2 JOANN mm Hg 102 6*   HCO3 JOANN mmol/L 20 3*   BASE EXC JOANN mmol/L -3 8     Results from last 7 days   Lab Units 03/28/22  0438   PROCALCITONIN ng/ml 2 65*       Micro  Results from last 7 days   Lab Units 03/28/22  0527 03/26/22  0720 03/25/22  1618 03/25/22  1611 03/25/22  1545   BLOOD CULTURE  No Growth at 48 hrs  No Growth at 48 hrs    --  Staphylococcus epidermidis* Staphylococcus pettenkoferi*  Staphylococcus epidermidis*  --    GRAM STAIN RESULT   --   --  Gram positive cocci in clusters* Gram positive cocci in clusters*  --    URINE CULTURE   --   --   --   --  >100,000 cfu/ml Escherichia coli*  <10,000 cfu/ml    MRSA CULTURE ONLY   --  No Methicillin Resistant Staphlyococcus aureus (MRSA) isolated  --   --   --        EKG: NSR on telemetry  Imaging: I have personally reviewed pertinent reports  Intake and Output  I/O       03/29 0701 03/30 0700 03/30 0701 03/31 0700    P  O  240 840    I V  (mL/kg) 519 8 (4 4) 854 9 (7 3)    IV Piggyback 600 500    Total Intake(mL/kg) 1359 8 (11 6) 2194 9 (18 8)    Urine (mL/kg/hr) 2145 (0 8) 700 (0 2)    Stool 0     Total Output 2145 700    Net -785 2 +1494 9          Unmeasured Stool Occurrence 1 x           Height and Weights   Height: 5' 2" (157 5 cm)     Body mass index is 47 14 kg/m²  Weight (last 2 days)     Date/Time Weight    03/30/22 0546 117 (257 72)    03/29/22 0600 116 (256 62)            Nutrition       Diet Orders   (From admission, onward)             Start     Ordered    03/31/22 0001  Diet NPO  Diet effective midnight        References:    Nutrtion Support Algorithm Enteral vs  Parenteral   Question Answer Comment   Diet Type NPO    RD to adjust diet per protocol?  Yes        03/30/22 0919    03/28/22 0917  Dietary nutrition supplements  Once        Question Answer Comment   Select Supplement: Ensure Max Marcelyn Mazzoni    Frequency Dinner        03/28/22 5681    03/28/22 0917  Dietary nutrition supplements  Once        Question Answer Comment   Select Supplement: Delta John    Frequency Breakfast        03/28/22 3145                  Active Medications  Scheduled Meds:  Current Facility-Administered Medications   Medication Dose Route Frequency Provider Last Rate    amiodarone  0 5 mg/min Intravenous Continuous [facility]  PA-C 0 5 mg/min (03/31/22 0000)    bacitracin  1 large application Topical BID [facility] , PA-C      bacitracin-polymyxin b   Both Eyes TID [facility]  PA-C      cefazolin  2,000 mg Intravenous Q8H Gemini Moreira MD Stopped (03/30/22 6510)    heparin (porcine)  3-20 Units/kg/hr (Order-Specific) Intravenous Titrated Anel Kaplan PA-C 11 1 Units/kg/hr (03/31/22 0000)    heparin (porcine)  2,000 Units Intravenous Q1H PRN Anel Kaplan PA-C      heparin (porcine)  4,000 Units Intravenous Q1H PRN Anel Kaplan PA-C      levothyroxine  150 mcg Oral Early Morning [facility] LILIAN Qureshi      midodrine  10 mg Oral TID AC LEXIE Martinez       Continuous Infusions:  amiodarone, 0 5 mg/min, Last Rate: 0 5 mg/min (03/31/22 0000)  heparin (porcine), 3-20 Units/kg/hr (Order-Specific), Last Rate: 11 1 Units/kg/hr (03/31/22 0000)      PRN Meds:   heparin (porcine), 2,000 Units, Q1H PRN  heparin (porcine), 4,000 Units, Q1H PRN        Invasive Devices Review  Invasive Devices  Report    Central Venous Catheter Line            CVC Central Lines 03/26/22 Triple 16cm 5 days          Drain            External Urinary Catheter <1 day                Rationale for remaining devices: Consider d/c central line today    ---------------------------------------------------------------------------------------  Advance Directive and Living Will:      Power of :    POLST:    ---------------------------------------------------------------------------------------  Care Time Delivered:   No Critical Care time spent       [employer], PA-C      Portions of the record may have been created with voice recognition software  Occasional wrong word or "sound a like" substitutions may have occurred due to the inherent limitations of voice recognition software    Read the chart carefully and recognize, using context, where substitutions have occurred

## 2022-03-31 NOTE — OP NOTE
OPERATIVE REPORT  PATIENT NAME: Jackie Escobar    :  1940  MRN: [id number]  Pt Location: OW OR ROOM 01    SURGERY DATE: 3/31/2022    Surgeon(s) and Role:     * Adarsh Valero DO - Primary     * Loyda Meier PA-C - Assisting    Preop Diagnosis:  Right thigh abscess    Post-Op Diagnosis Codes:    Same    Procedure(s) (LRB):  DEBRIDEMENT WOUND ([facility] OUT), right medial thigh (Right)    Specimen(s):  ID Type Source Tests Collected by Time Destination   1 : right medial thigh wound debrided tissue Tissue Soft Tissue, Debridement TISSUE EXAM Adarsh Valero DO 3/31/2022  1:50 PM    A : wound cultures, right medial thigh wound Wound Wound ANAEROBIC CULTURE AND GRAM STAIN, WOUND CULTURE Adarsh Valero DO 3/31/2022  1:40 PM        Estimated Blood Loss:   Minimal    Drains:  External Urinary Catheter (Active)   Collection Container Canister and suction tubing (For Female) 22 0600   Suction Pressure (mmHg) 120 mmHg 22 0600   Interventions Removed and skin assessed; Pericare performed;Device changed;Suction canister changed;Suction tubing changed 22 0400   Output (mL) 200 mL 22 1147   Number of days: 1       Anesthesia Type:   Choice    Operative Indications: The patient has a large area of skin necrosis of the right upper thigh with underlying fluctuance and purulent drainage    Operative Findings: An approximately 17 x 6 cm area of skin necrosis was identified  Deep to this, there was an abscess collection which involved only the subcutaneous tissue  Complications:   None    Procedure and Technique:  The patient is brought to the operating  A time-out was held the patient identified and procedure verified  Appropriate antibiotic prophylaxis and DVT prophylaxis was used  General anesthesia was administered  The area of the right thigh was prepped and draped usual sterile fashion using Betadine solution    Local anesthesia using 1% lidocaine was infiltrated around wound  The necrotic skin was sharply excisionally debrided using a 15 blade scalpel  Underlying the necrotic skin was then abscess collection  Necrotic subcutaneous tissue was present which was also sharply excisionally debrided using a scalpel  Only skin and subcutaneous tissue were debrided  Once debridement was completed the wound measured approximately 17 x 6 x 5 cm  Hemostasis was achieved using electrocautery  The wound was packed with a saline moistened Kerlix and covered with a clean sterile dressing and Ace wrap  The patient tolerated the procedure well transferred to recovery in stable condition  At the end the procedure all needle instrument sponge counts were correct x2       I was present for the entire procedure and A physician assistant was required during the procedure for retraction tissue handling,dissection and suturing    Patient Disposition:  PACU       SIGNATURE: Antionette Nassar DO  DATE: March 31, 2022  TIME: 2:10 PM

## 2022-03-31 NOTE — PROGRESS NOTES
Progress Note - General Surgery   Cecil Camp 80 y o  female MRN: [id number]  Unit/Bed#: -01 Encounter: [id number]    Assessment:  Right thigh hematoma/contusion w seropurulent drainage  Afebrile, VSS  Leukocytosis 10 75 (9 71)  Hgb 9 8 (10 3)  Procalcitonin 0 85  INR  1 80  Urine Culture grew E  Coli  Blood cultures with Staph epidemidis (3/25), repeat 3/28 NG x48h  Elevated liver enzymes, evidence of cirrhosis on ultrasound, appears jaundiced  Confusion    Plan:  NPO  OR later today for wound debridement/washout  Medicate PRN pain/nausea  IVF hydration  Monitor I/Os  Follow qAM CBC and BMP  Management of comorbidities per primary team      Subjective/Objective     Subjective: No acute events  Continues to complain of right thigh pain  Denies fevers/chills  Objective:    Blood pressure 117/56, pulse 61, temperature 98 7 °F (37 1 °C), temperature source Temporal, resp  rate 16, height 5' 2" (1 575 m), weight 116 kg (255 lb 11 7 oz), SpO2 98 %  ,Body mass index is 46 77 kg/m²  Intake/Output Summary (Last 24 hours) at 3/31/2022 0817  Last data filed at 3/31/2022 0600  Gross per 24 hour   Intake 2471 6 ml   Output 850 ml   Net 1621 6 ml       Invasive Devices  Report    Central Venous Catheter Line            CVC Central Lines 03/26/22 Triple 16cm 5 days          Peripheral Intravenous Line            Peripheral IV 03/31/22 Right;Ventral (anterior); Lateral Antecubital <1 day          Drain            External Urinary Catheter <1 day                Physical Exam:   Gen: AxOx3  Ext: right medial thigh now with seropurulent drainage with foul odor, no surrounding erythema, overlying superficial skin necrosis  Area is tender to the touch  Area of fluctuance slightly enlarged  Lab, Imaging and other studies:  I have personally reviewed pertinent lab results      CBC:   Lab Results   Component Value Date    WBC 10 75 (H) 03/31/2022    HGB 9 8 (L) 03/31/2022    HCT 29 7 (L) 03/31/2022     (H) 03/31/2022     03/31/2022    MCH 34 8 (H) 03/31/2022    MCHC 33 0 03/31/2022    RDW 15 9 (H) 03/31/2022    MPV 10 3 03/31/2022     CMP:   Lab Results   Component Value Date    SODIUM 135 (L) 03/31/2022    K 4 2 03/31/2022     03/31/2022    CO2 27 03/31/2022    BUN 28 (H) 03/31/2022    CREATININE 1 17 03/31/2022    CALCIUM 7 9 (L) 03/31/2022    AST 52 (H) 03/31/2022    ALT 28 03/31/2022    ALKPHOS 182 (H) 03/31/2022    EGFR 43 03/31/2022     Coagulation:   Lab Results   Component Value Date    INR 1 80 (H) 03/31/2022     VTE Pharmacologic Prophylaxis: Heparin  VTE Mechanical Prophylaxis: sequential compression device     Cong Saini PA-C

## 2022-03-31 NOTE — ASSESSMENT & PLAN NOTE
· AFib RVR rate of 160s   · Home Metoprolol held with shock state now on midodrine  · Heparin gtt on hold per Surgery until aM  · Patient continued to have difficult rate control and needed pressure support  · Amiodarone changed to PO load on 3/31

## 2022-03-31 NOTE — PROGRESS NOTES
Critical Care Services- Post op Progress Note   Shirley Polo 80 y o  female MRN: [id number]  Unit/Bed#: -01 Encounter: [id number]  Assessment and Plan  Interval Events:  Post op R thigh debridement and washout        Diagnosis: Post Op R thight Debridement and Washout   o Plan: wound care per surgery, cx pending, re eval in am for addition debridement/washout vs vac placement, cont heparin gtt off until tomorrow am on re eval     Diagnosis: Afib w RVR now NSR   o Plan: D/C amio gtt, change to oral amio load po then maintenance vs eventual change back to lopressor if weaned off midodrine        Upon my evaluation, this patient had a high probability of imminent or life-threatening deterioration due to pos op, which required my direct attention, intervention, and personal management  I have personally provided 0 minute of critical care time, exclusive of procedures, teaching, family meetings, and any prior time recorded by providers other than myself  Time includes discussion with consultants,  Interventions were performed as documented above    -------------------------------------------------------------------------------------------------------------------------------------  Hemodynamic Monitoring:  Vital Signs:   HR: see epic      Laboratory   Results from last 7 days   Lab Units 03/31/22  0448 03/30/22  0441 03/29/22  0434 03/28/22  0438 03/27/22  1604 03/27/22  0433 03/26/22  0503 03/25/22  1506 03/25/22  1506   WBC Thousand/uL 10 75* 9 71 12 48* 9 42 13 46* 16 50* 26 42*   < > 30 84*   HEMOGLOBIN g/dL 9 8* 10 3* 10 8* 9 8* 10 3* 10 1* 10 5*   < > 13 7   HEMATOCRIT % 29 7* 28 6* 30 8* 27 9* 29 5* 27 5* 29 0*   < > 38 6   PLATELETS Thousands/uL 155 165 180 151 175 178 208   < > 248   BANDS PCT % 3  --   --   --   --   --  1  --   --    MONO PCT % 9  --   --   --   --   --  7  --  7    < > = values in this interval not displayed       Results from last 7 days   Lab Units 03/31/22  0448 03/30/22  0441 03/29/22  0434 03/28/22  0438 03/27/22  1557 03/27/22  0433 03/26/22  1621 03/26/22  0720 03/26/22  0503 03/25/22  1506 03/25/22  1506   SODIUM mmol/L 135* 138 135* 134* 133* 134* 133*   < > 135*   < > 134*   POTASSIUM mmol/L 4 2 3 7 3 4* 3 6 3 5 4 1 3 7   < > 3 8   < > 4 6   CHLORIDE mmol/L 104 105 102 101 101 102 101   < > 102   < > 99*   CO2 mmol/L 27 27 26 25 23 22 22   < > 20*   < > 24   ANION GAP mmol/L 4 6 7 8 9 10 10   < > 13   < > 11   BUN mg/dL 28* 35* 46* 57* 63* 63* 66*   < > 62*   < > 71*   CREATININE mg/dL 1 17 1 17 1 39* 1 53* 1 65* 1 77* 1 82*   < > 1 81*   < > 2 37*   CALCIUM mg/dL 7 9* 7 9* 8 1* 8 1* 8 4 8 0* 8 1*   < > 8 1*   < > 8 5   GLUCOSE RANDOM mg/dL 87 113 104 108 132 119 132   < > 86   < > 95   ALT U/L 28 29 35 37  --  35  --   --  33  --  42   AST U/L 52* 42 48* 56*  --  62*  --   --  54*  --  66*   ALK PHOS U/L 182* 166* 146* 130*  --  123*  --   --  121*  --  165*   ALBUMIN g/dL 1 8* 1 8* 1 9* 1 9*  --  2 2*  --   --  2 5*  --  1 9*   TOTAL BILIRUBIN mg/dL 2 43* 2 48* 4 15* 5 74*  --  6 35*  --   --  5 08*  --  4 26*    < > = values in this interval not displayed  Results from last 7 days   Lab Units 03/30/22 0441 03/27/22  1557 03/27/22  1347 03/27/22  0433 03/26/22  0503 03/25/22 2013   MAGNESIUM mg/dL 2 0 2 5 2 0 2 1 2 3 2 3      Results from last 7 days   Lab Units 03/31/22  0540 03/31/22  0448 03/30/22  0441 03/29/22  1804 03/29/22  1150 03/29/22  0434 03/28/22  1404 03/28/22  0801 03/28/22  0801 03/27/22  1807 03/27/22  1556 03/27/22  0433 03/26/22  0503   INR   --  1 80* 1 79*  --   --  1 72*  --   --  1 91*  --  1 85* 2 01* 4 35*   PTT seconds 119* 110* 87* 86* 86* 95* 74*   < > 80*   < >  --   --   --     < > = values in this interval not displayed            Results from last 7 days   Lab Units 03/25/22  2317 03/25/22  2013 03/25/22  1819 03/25/22  1618   LACTIC ACID mmol/L 1 8 2 6* 2 9* 3 3*     ABG:    VBG:  Results from last 7 days   Lab Units 03/26/22  1225   PH JOANN  7 400   PCO2 JOANN mm Hg 33 5*   PO2 JOANN mm Hg 102 6*   HCO3 JOANN mmol/L 20 3*   BASE EXC JOANN mmol/L -3 8     Results from last 7 days   Lab Units 03/31/22  0448 03/28/22  0438   PROCALCITONIN ng/ml 0 85* 2 65*       Micro  Results from last 7 days   Lab Units 03/28/22  0527 03/26/22  0720 03/25/22  1618 03/25/22  1611 03/25/22  1545   BLOOD CULTURE  No Growth at 72 hrs  No Growth at 72 hrs   --  Staphylococcus epidermidis* Staphylococcus pettenkoferi*  Staphylococcus epidermidis*  --    GRAM STAIN RESULT   --   --  Gram positive cocci in clusters* Gram positive cocci in clusters*  --    URINE CULTURE   --   --   --   --  >100,000 cfu/ml Escherichia coli*  <10,000 cfu/ml    MRSA CULTURE ONLY   --  No Methicillin Resistant Staphlyococcus aureus (MRSA) isolated  --   --   --        Diagnostic Imaging / Data: I have personally reviewed pertinent reports  Medications:  Current Facility-Administered Medications   Medication Dose Route Frequency    amiodarone tablet 200 mg  200 mg Oral TID With Meals    Followed by   [facility] Agent ON 4/8/2022] amiodarone tablet 200 mg  200 mg Oral Daily With Breakfast    ceFAZolin (ANCEF) IVPB (premix in dextrose) 2,000 mg 50 mL  2,000 mg Intravenous Q8H    heparin (porcine) 25,000 units in 0 45% NaCl 250 mL infusion (premix)  3-20 Units/kg/hr (Order-Specific) Intravenous Titrated    heparin (porcine) injection 2,000 Units  2,000 Units Intravenous Q1H PRN    heparin (porcine) injection 4,000 Units  4,000 Units Intravenous Q1H PRN    lactated ringers infusion  100 mL/hr Intravenous Continuous    levothyroxine tablet 150 mcg  150 mcg Oral Early Morning    melatonin tablet 3 mg  3 mg Oral HS    midodrine (PROAMATINE) tablet 5 mg  5 mg Oral TID AC       SIGNATURE: LEXIE Florentino    Portions of the record may have been created with voice recognition software    Occasional wrong word or "sound a like" substitutions may have occurred due to the inherent limitations of voice recognition software    Read the chart carefully and recognize, using context, where substitutions have occurred

## 2022-03-31 NOTE — PLAN OF CARE
Problem: Nutrition/Hydration-ADULT  Goal: Nutrient/Hydration intake appropriate for improving, restoring or maintaining nutritional needs  Description: Monitor and assess patient's nutrition/hydration status for malnutrition  Collaborate with interdisciplinary team and initiate plan and interventions as ordered  Monitor patient's weight and dietary intake as ordered or per policy  Utilize nutrition screening tool and intervene as necessary  Determine patient's food preferences and provide high-protein, high-caloric foods as appropriate       INTERVENTIONS:  - Monitor oral intake, urinary output, labs, and treatment plans  - Assess nutrition and hydration status and recommend course of action  - Evaluate amount of meals eaten  - Assist patient with eating if necessary   - Allow adequate time for meals  - Recommend/ encourage appropriate diets, oral nutritional supplements, and vitamin/mineral supplements  - Order, calculate, and assess calorie counts as needed  - Recommend, monitor, and adjust tube feedings and TPN/PPN based on assessed needs  - Assess need for intravenous fluids  - Provide specific nutrition/hydration education as appropriate  - Include patient/family/caregiver in decisions related to nutrition  Outcome: Progressing     Problem: Prexisting or High Potential for Compromised Skin Integrity  Goal: Skin integrity is maintained or improved  Description: INTERVENTIONS:  - Identify patients at risk for skin breakdown  - Assess and monitor skin integrity  - Assess and monitor nutrition and hydration status  - Monitor labs   - Assess for incontinence   - Turn and reposition patient  - Assist with mobility/ambulation  - Relieve pressure over bony prominences  - Avoid friction and shearing  - Provide appropriate hygiene as needed including keeping skin clean and dry  - Evaluate need for skin moisturizer/barrier cream  - Collaborate with interdisciplinary team   - Patient/family teaching  - Consider wound care consult   Outcome: Progressing     Problem: MOBILITY - ADULT  Goal: Maintain or return to baseline ADL function  Description: INTERVENTIONS:  -  Assess patient's ability to carry out ADLs; assess patient's baseline for ADL function and identify physical deficits which impact ability to perform ADLs (bathing, care of mouth/teeth, toileting, grooming, dressing, etc )  - Assess/evaluate cause of self-care deficits   - Assess range of motion  - Assess patient's mobility; develop plan if impaired  - Assess patient's need for assistive devices and provide as appropriate  - Encourage maximum independence but intervene and supervise when necessary  - Involve family in performance of ADLs  - Assess for home care needs following discharge   - Consider OT consult to assist with ADL evaluation and planning for discharge  - Provide patient education as appropriate  Outcome: Progressing  Goal: Maintains/Returns to pre admission functional level  Description: INTERVENTIONS:  - Perform BMAT or MOVE assessment daily    - Set and communicate daily mobility goal to care team and patient/family/caregiver  - Collaborate with rehabilitation services on mobility goals if consulted  - Perform Range of Motion 3 times a day  - Reposition patient every 2 hours    - Record patient progress and toleration of activity level   Outcome: Progressing     Problem: PAIN - ADULT  Goal: Verbalizes/displays adequate comfort level or baseline comfort level  Description: Interventions:  - Encourage patient to monitor pain and request assistance  - Assess pain using appropriate pain scale  - Administer analgesics based on type and severity of pain and evaluate response  - Implement non-pharmacological measures as appropriate and evaluate response  - Consider cultural and social influences on pain and pain management  - Notify physician/advanced practitioner if interventions unsuccessful or patient reports new pain  Outcome: Progressing     Problem: INFECTION - ADULT  Goal: Absence or prevention of progression during hospitalization  Description: INTERVENTIONS:  - Assess and monitor for signs and symptoms of infection  - Monitor lab/diagnostic results  - Monitor all insertion sites, i e  indwelling lines, tubes, and drains  - Monitor endotracheal if appropriate and nasal secretions for changes in amount and color  - [place] appropriate cooling/warming therapies per order  - Administer medications as ordered  - Instruct and encourage patient and family to use good hand hygiene technique  - Identify and instruct in appropriate isolation precautions for identified infection/condition  Outcome: Progressing     Problem: SAFETY ADULT  Goal: Maintain or return to baseline ADL function  Description: INTERVENTIONS:  -  Assess patient's ability to carry out ADLs; assess patient's baseline for ADL function and identify physical deficits which impact ability to perform ADLs (bathing, care of mouth/teeth, toileting, grooming, dressing, etc )  - Assess/evaluate cause of self-care deficits   - Assess range of motion  - Assess patient's mobility; develop plan if impaired  - Assess patient's need for assistive devices and provide as appropriate  - Encourage maximum independence but intervene and supervise when necessary  - Involve family in performance of ADLs  - Assess for home care needs following discharge   - Consider OT consult to assist with ADL evaluation and planning for discharge  - Provide patient education as appropriate  Outcome: Progressing  Goal: Maintains/Returns to pre admission functional level  Description: INTERVENTIONS:  - Perform BMAT or MOVE assessment daily    - Set and communicate daily mobility goal to care team and patient/family/caregiver  - Collaborate with rehabilitation services on mobility goals if consulted  - Perform Range of Motion 3 times a day  - Reposition patient every 2 hours    - Record patient progress and toleration of activity level   Outcome: Progressing  Goal: Patient will remain free of falls  Description: INTERVENTIONS:  - Educate patient/family on patient safety including physical limitations  - Instruct patient to call for assistance with activity   - Consult OT/PT to assist with strengthening/mobility   - Keep Call bell within reach  - Keep bed low and locked with side rails adjusted as appropriate  - Keep care items and personal belongings within reach  - Initiate and maintain comfort rounds  - Make Fall Risk Sign visible to staff  - Offer Toileting every 2 Hours, in advance of need  - Initiate/Maintain bed/chair alarm prn  - Apply yellow socks and bracelet for high fall risk patients  - Consider moving patient to room near nurses station  Outcome: Progressing     Problem: DISCHARGE PLANNING  Goal: Discharge to home or other facility with appropriate resources  Description: INTERVENTIONS:  - Identify barriers to discharge w/patient and caregiver  - Arrange for needed discharge resources and transportation as appropriate  - Identify discharge learning needs (meds, wound care, etc )  - Arrange for interpretive services to assist at discharge as needed  - Refer to Case Management Department for coordinating discharge planning if the patient needs post-hospital services based on physician/advanced practitioner order or complex needs related to functional status, cognitive ability, or social support system  Outcome: Progressing     Problem: Knowledge Deficit  Goal: Patient/family/caregiver demonstrates understanding of disease process, treatment plan, medications, and discharge instructions  Description: Complete learning assessment and assess knowledge base    Interventions:  - Provide teaching at level of understanding  - Provide teaching via preferred learning methods  Outcome: Progressing     Problem: CARDIOVASCULAR - ADULT  Goal: Maintains optimal cardiac output and hemodynamic stability  Description: INTERVENTIONS:  - Monitor I/O, vital signs and rhythm  - Monitor for S/S and trends of decreased cardiac output  - Administer and titrate ordered vasoactive medications to optimize hemodynamic stability  - Assess quality of pulses, skin color and temperature  - Assess for signs of decreased coronary artery perfusion  - Instruct patient to report change in severity of symptoms  Outcome: Progressing  Goal: Absence of cardiac dysrhythmias or at baseline rhythm  Description: INTERVENTIONS:  - Continuous cardiac monitoring, vital signs, obtain 12 lead EKG if ordered  - Administer antiarrhythmic and heart rate control medications as ordered  - Monitor electrolytes and administer replacement therapy as ordered  Outcome: Progressing     Problem: GENITOURINARY - ADULT  Goal: Maintains or returns to baseline urinary function  Description: INTERVENTIONS:  - Assess urinary function  - Encourage oral fluids to ensure adequate hydration if ordered  - Administer IV fluids as ordered to ensure adequate hydration  - Administer ordered medications as needed  - Offer frequent toileting  - Follow urinary retention protocol if ordered  Outcome: Progressing  Goal: Absence of urinary retention  Description: INTERVENTIONS:  - Assess patients ability to void and empty bladder  - Monitor I/O  - Bladder scan as needed  - Discuss with physician/AP medications to alleviate retention as needed  - Discuss catheterization for long term situations as appropriate  Outcome: Progressing  Goal: Urinary catheter remains patent  Description: INTERVENTIONS:  - Assess patency of urinary catheter  - If patient has a chronic gutierrez, consider changing catheter if non-functioning  - Follow guidelines for intermittent irrigation of non-functioning urinary catheter  Outcome: Progressing     Problem: METABOLIC, FLUID AND ELECTROLYTES - ADULT  Goal: Electrolytes maintained within normal limits  Description: INTERVENTIONS:  - Monitor labs and assess patient for signs and symptoms of electrolyte imbalances  - Administer electrolyte replacement as ordered  - Monitor response to electrolyte replacements, including repeat lab results as appropriate  - Instruct patient on fluid and nutrition as appropriate  Outcome: Progressing  Goal: Fluid balance maintained  Description: INTERVENTIONS:  - Monitor labs   - Monitor I/O and WT  - Instruct patient on fluid and nutrition as appropriate  - Assess for signs & symptoms of volume excess or deficit  Outcome: Progressing  Goal: Glucose maintained within target range  Description: INTERVENTIONS:  - Monitor Blood Glucose as ordered  - Assess for signs and symptoms of hyperglycemia and hypoglycemia  - Administer ordered medications to maintain glucose within target range  - Assess nutritional intake and initiate nutrition service referral as needed  Outcome: Progressing     Problem: SKIN/TISSUE INTEGRITY - ADULT  Goal: Incision(s), wounds(s) or drain site(s) healing without S/S of infection  Description: INTERVENTIONS  - Assess and document dressing, incision, wound bed, drain sites and surrounding tissue  - Provide patient and family education  - Perform skin care/dressing changes as needed  Outcome: Progressing  Goal: Pressure injury heals and does not worsen  Description: Interventions:  - Implement low air loss mattress or specialty surface (Criteria met)  - Apply silicone foam dressing  - Use special pressure reducing interventions such as waffle cushion when in chair   - Apply fecal or urinary incontinence containment device   - Perform passive or active ROM every 8  - Turn and reposition patient & offload bony prominences every 2 hours   - Utilize friction reducing device or surface for transfers   - Consider consults to  interdisciplinary teams such as wound team  - Use incontinent care products after each incontinent episode such as barrier cream  - Consider nutrition services referral as needed  Outcome: Progressing     Problem: Potential for Falls  Goal: Patient will remain free of falls  Description: INTERVENTIONS:  - Educate patient/family on patient safety including physical limitations  - Instruct patient to call for assistance with activity   - Consult OT/PT to assist with strengthening/mobility   - Keep Call bell within reach  - Keep bed low and locked with side rails adjusted as appropriate  - Keep care items and personal belongings within reach  - Initiate and maintain comfort rounds  - Make Fall Risk Sign visible to staff  - Offer Toileting every 2 Hours, in advance of need  - Initiate/Maintain bed/chair alarm prn  - Apply yellow socks and bracelet for high fall risk patients  - Consider moving patient to room near nurses station  Outcome: Progressing

## 2022-03-31 NOTE — CASE MANAGEMENT
Case Management Discharge Planning Note    Patient name Scott Carson  Location /-67 MRN [id number]  : 1940 Date 3/31/2022       Current Admission Date: 3/25/2022  Current Admission Diagnosis:Septic shock [facility])   Patient Active Problem List    Diagnosis Date Noted    Aortic stenosis, severe 2022    Cholelithiases 2022    Cirrhosis ([facility] ) 2022    Cellulitis of right thigh 2022    Eye discharge 2022    Fusiform ectasia of ascending aorta 2022    Moderate protein-calorie malnutrition ([facility] ) 2022    [facility] shock [facility] 2022    Serum total bilirubin elevated 2022    [facility]rial fibrillation with RVR ([facility] 2022    Fall 2022    Traumatic hematoma of head 2022    Impaired skin integrity 2022    Hypothyroidism 2022    Diabetes mellitus ([facility] ) 2022    HLD (hyperlipidemia) 2022      LOS (days): 6  Geometric Mean LOS (GMLOS) (days): 4 80  Days to GMLOS:-0 9     OBJECTIVE:  Risk of Unplanned Readmission Score: 16         Current admission status: Inpatient   Preferred Pharmacy:   [address] [facility]  [address] [facility]  Phone: [phone] Fax: [phone]    [address] [address]  Phone: [phone] Fax: [phone]    Primary Care Provider: Aida Galeano MD    Primary Insurance: MEDICARE  Secondary Insurance: [facility]    DISCHARGE DETAILS:   CM spoke with daughter, Roz Madera, regarding patient's choices yesterday  Patient was accepted at [facility], patient's first choice  Daughter, Roz Madera, in agreement and will review with Andrew Wood who she indicated is decision maker  CM accepted SNF bed at [facility] through [employer]

## 2022-03-31 NOTE — ASSESSMENT & PLAN NOTE
· LFTs elevated at time of admission:  AST 66, ALT 44, , T bili 4 26 current T-bili 2 43  · GI Consult PBC vs SORIA MELD 19  · Continue to trend LFTs/ hold statin  · US 3/26 Cholelithiasis and sludge with wall thickening and pericholecystic fluid  Sonographic Margert Drain sign is negative  Findings are equivocal for acute cholecystitis, particularly in the setting of underlying hepatic dysfunction which can also cause gallbladder wall abnormalities  If there is continued clinical concern, further evaluation with HIDA scan recommended    · GI signed off will need EGD outpatient

## 2022-03-31 NOTE — ASSESSMENT & PLAN NOTE
· Sepsis Secondary to right thigh cellulitis  · POD #1 s/p debridement/washout R thigh  · Wound cx Pending  · BC -staph contaminate, Repeat BC NG  · UC + ecoli bacteria asymptomatic  ? Colonization  · ID/Surgery consulted  · POD #1 - washout  · No plans to return to OR at this time     · Cefazolin day 4 anbx day 7

## 2022-03-31 NOTE — ANESTHESIA POSTPROCEDURE EVALUATION
Post-Op Assessment Note    CV Status:  Stable  Pain Score: 0    Pain management: adequate     Mental Status:  Alert and awake   Hydration Status:  Euvolemic   PONV Controlled:  Controlled   Airway Patency:  Patent      Post Op Vitals Reviewed: Yes      Staff: CRNA         No complications documented      /55 (03/31/22 1500)    Temp   97 7   Pulse 89 (03/31/22 1500)   Resp (!) 23 (03/31/22 1500)    SpO2 90 % (03/31/22 1500)

## 2022-03-31 NOTE — ASSESSMENT & PLAN NOTE
Malnutrition Findings:   Adult Malnutrition type: Chronic illness  Adult Degree of Malnutrition: Malnutrition of moderate degree  Malnutrition Characteristics: Muscle loss,Fat loss,Weight loss              360 Statement: Chronic moderate malnutrition related to decreased appetite as evidenced by 11 6% weight loss x 4 mo (11/23/21 294lb, 3/28/22 260lb, suspect partly fluid related), moderate fat loss to orbitals, moderate muscle loss to temporalis, deltoid and interroseous muscles  Treated with: Will liberalize cardiac to 4gm GLADYS, continue CCD 3 at this time  Will trial glucerna daily and ensure max protein daily, increase as tolerated/indicated  Recommend daily weights  BMI Findings: Body mass index is 46 77 kg/m²

## 2022-03-31 NOTE — PHYSICAL THERAPY NOTE
Physical Therapy Cancellation Note      Patient Name: Thasi Davalos  ZGLRP'A Date: 3/31/22       03/31/22 1231   Note Type   Note Type Cancelled Session   Cancel Reasons Patient to operating room       Chart reviewed  Attempted to see pt for PT session at 0940 this AM  Spoke with ICU nursing staff who reported pt was undergoing an ID consult at the time  Attempted to see pt for 2nd time at 1230  Pt being taken down to OR for wound debridement, unavailable for therapy services at this time  Will continue to follow case and address as appropriate and as time allows      Maribell Rashid, PT, DPT

## 2022-03-31 NOTE — TELEMEDICINE
REQUIRED DOCUMENTATION:     1  This service was provided via Telemedicine  2  Provider located at [facility]  3  TeleMed provider: Sarah Weston MD   4  Identify all parties in room with patient during tele consult:  Patient  RN   5  After connecting through [employer], patient was identified by name and date of birth and assistant checked wristband  Patient was then informed that this was a Telemedicine visit and that the exam was being conducted confidentially over secure lines  Patient acknowledged consent and understanding of privacy and security of the Telemedicine visit, and gave us permission to have the assistant stay in the room in order to assist with the history and to conduct the exam   I informed the patient that I have reviewed their record in Epic and presented the opportunity for them to ask any questions regarding the visit today  The patient agreed to participate  TeleConsultation - Infectious Disease   Cecil Camp 80 y o  female MRN: [id number]  Unit/Bed#: -01 Encounter: [id number]      Impression/Recommendations:  1  SIRS, with hypotension, POA   Consider sepsis/septic shock from right thigh cellulitis but patient also has multiple noninfectious etiologies for SIRS   Patient is clinically and systemically improved  Vasopressin was discontinued 2 days ago and Levophed was just discontinued yesterday   She remains systemically well, without toxicity  Antibiotic plan as in below  Monitor temperature/WBC    Monitor hemodynamics     2  Coagulase-negative Staphylococcus bacteremia   Although there was growth in both admission blood cultures, patient has different species of coagulase-negative Staphylococcus in each of the set of blood cultures   With patient being hypotensive on admission, I suspect that she was a very difficult blood draw access   Patient has no indwelling intravascular foreign body   Repeat blood cultures have no growth thus far   I suspect this is contaminated blood draw  No antibiotic needed for this  Follow-up on repeat blood cultures      3  Right thigh cellulitis, with underlying subcutaneous hematoma  Although cellulitis is improved, right thigh wound has more necrosis and now with purulent drainage, likely secondary to infected hematoma  Plan for I&D in OR noted  With patient clinically well, will continue current antibiotic regimen  Continue IV cefazolin  Plan for right thigh I&D noted  Follow-up on operative findings and culture      4  Asymptomatic bacteriuria, likely bladder colonization in this elderly patient   Antibiotic is not necessary, although cefazolin above foot cellulitis with provide coverage for E coli and urine culture  No antibiotic needed for this, although cefazolin provides coverage for E coli in urine culture      5  LEONARDO, likely secondary to prerenal state and rhabdomyolysis   Creatinine  has improved and at baseline  Antibiotic at full dose  Monitor creatinine      6  Possible cirrhosis  GI follow-up      7  Status post fall with patient being down for an unknown period of time   Fortunately, there are no significant sequela from fall      Discussed with patient in detail regarding the above plan      I spent 30 minutes in evaluation of the patient of which 15 minutes was in counseling/coordination of care     Antibiotics:  Cefazolin  Antibiotic # 6     Subjective:  Patient feels about the same  Pain in right thigh is stable  Temperature stays down  No chills  She is tolerating antibiotic well  No nausea, vomiting or diarrhea      Discussed with patient in detail regarding the above plan      I spent 30 minutes in evaluation of the patient of which 15 minutes was in counseling/coordination of care    Objective:  Vitals:  Temp:  [98 °F (36 7 °C)-98 7 °F (37 1 °C)] 98 7 °F (37 1 °C)  HR:  [] 61  Resp:  [16-23] 16  BP: ()/(51-71) 117/56  SpO2:  [94 %-99 %] 98 %  Temp (24hrs), Av 2 °F (36 8 °C), Min:98 °F (36 7 °C), Max:98 7 °F (37 1 °C)  Current: Temperature: 98 7 °F (37 1 °C)    Physical Exam:    Physical exam has been primarily done by the patient's nurse and/or the primary service due to limited examination abilities on telemedicine  General: Awake, alert, cooperative, no distress  Neck:  Supple  No mass  No lymphadenopathy  Lungs: Expansion symmetric, no rales, no wheezing, respirations unlabored  Heart:  Regular rate and rhythm, S1 and S2 normal, no murmur  Abdomen: Soft, nondistended, non-tender, bowel sounds active all four quadrants, no masses, no organomegaly  Extremities: Stable right thigh edema  Worsening necrosis but improved erythema  No purulent drainage to present (purulent drainage earlier noted)  Stable tenderness  Skin:  No rash  Neuro: Moves all extremities  Invasive Devices  Report    Central Venous Catheter Line            CVC Central Lines 03/26/22 Triple 16cm 5 days          Peripheral Intravenous Line            Peripheral IV 03/31/22 Right;Ventral (anterior); Lateral Antecubital <1 day          Drain            External Urinary Catheter <1 day                Labs studies:   I have personally reviewed pertinent labs  Results from last 7 days   Lab Units 03/31/22 0448 03/30/22 0441 03/29/22  0434   POTASSIUM mmol/L 4 2 3 7 3 4*   CHLORIDE mmol/L 104 105 102   CO2 mmol/L 27 27 26   BUN mg/dL 28* 35* 46*   CREATININE mg/dL 1 17 1 17 1 39*   EGFR ml/min/1 73sq m 43 43 35   CALCIUM mg/dL 7 9* 7 9* 8 1*   AST U/L 52* 42 48*   ALT U/L 28 29 35   ALK PHOS U/L 182* 166* 146*     Results from last 7 days   Lab Units 03/31/22 0448 03/30/22  0441 03/29/22  0434   WBC Thousand/uL 10 75* 9 71 12 48*   HEMOGLOBIN g/dL 9 8* 10 3* 10 8*   PLATELETS Thousands/uL 155 165 180     Results from last 7 days   Lab Units 03/28/22  0527 03/26/22  0720 03/25/22  1618 03/25/22  1611 03/25/22  1545   BLOOD CULTURE  No Growth at 48 hrs  No Growth at 48 hrs    --  Staphylococcus epidermidis* Staphylococcus pettenkoferi*  Staphylococcus epidermidis*  --    GRAM STAIN RESULT   --   --  Gram positive cocci in clusters* Gram positive cocci in clusters*  --    URINE CULTURE   --   --   --   --  >100,000 cfu/ml Escherichia coli*  <10,000 cfu/ml    MRSA CULTURE ONLY   --  No Methicillin Resistant Staphlyococcus aureus (MRSA) isolated  --   --   --        Imaging Studies:   I have personally reviewed pertinent imaging study reports and images in PACS  EKG, Pathology, and Other Studies:   I have personally reviewed pertinent reports

## 2022-03-31 NOTE — ASSESSMENT & PLAN NOTE
Malnutrition Findings:   Adult Malnutrition type: Chronic illness  Adult Degree of Malnutrition: Malnutrition of moderate degree  Malnutrition Characteristics: Muscle loss,Fat loss,Weight loss                  360 Statement: Chronic moderate malnutrition related to decreased appetite as evidenced by 11 6% weight loss x 4 mo (11/23/21 294lb, 3/28/22 260lb, suspect partly fluid related), moderate fat loss to orbitals, moderate muscle loss to temporalis, deltoid and interroseous muscles  Treated with: Will liberalize cardiac to 4gm GLADYS, continue CCD 3 at this time  Will trial glucerna daily and ensure max protein daily, increase as tolerated/indicated  Recommend daily weights  BMI Findings: Body mass index is 47 14 kg/m²

## 2022-03-31 NOTE — ANESTHESIA PREPROCEDURE EVALUATION
Procedure:  DEBRIDEMENT WOUND [facility] OUT), right medial thigh (Right Leg Upper)    Relevant Problems   CARDIO   (+) Aortic stenosis, severe   (+) Atrial fibrillation with RVR (HCC)   (+) Fusiform ectasia of ascending aorta   (+) HLD (hyperlipidemia)   (+) Thoracic aortic aneurysm without rupture (HCC)      ENDO   (+) Hypothyroidism      GI/HEPATIC   (+) Cirrhosis (HCC)      /RENAL   (+) LEONARDO (acute kidney injury) (HCC) (Resolved)      Other   (+) Cellulitis of right thigh   (+) Cholelithiases   (+) Diabetes mellitus (HCC)   (+) Septic shock (HCC)             Anesthesia Plan  ASA Score- 4     Anesthesia Type- general with ASA Monitors  Additional Monitors: arterial line  Airway Plan: ETT  Plan Factors-    Chart reviewed  EKG reviewed  Existing labs reviewed  Patient summary reviewed  Induction- intravenous  Postoperative Plan-   Planned trial extubation    Informed Consent- Anesthetic plan and risks discussed with patient  I personally reviewed this patient with the CRNA  Discussed and agreed on the Anesthesia Plan with the CRNA  Robert Calvin

## 2022-04-01 LAB
ALBUMIN SERPL BCP-MCNC: 1.7 G/DL (ref 3.5–5)
ALP SERPL-CCNC: 141 U/L (ref 46–116)
ALT SERPL W P-5'-P-CCNC: 15 U/L (ref 12–78)
ANION GAP SERPL CALCULATED.3IONS-SCNC: 2 MMOL/L (ref 4–13)
APTT PPP: 117 SECONDS (ref 23–37)
APTT PPP: 75 SECONDS (ref 23–37)
AST SERPL W P-5'-P-CCNC: 43 U/L (ref 5–45)
BILIRUB SERPL-MCNC: 2.02 MG/DL (ref 0.2–1)
BUN SERPL-MCNC: 28 MG/DL (ref 5–25)
CALCIUM ALBUM COR SERPL-MCNC: 9.5 MG/DL (ref 8.3–10.1)
CALCIUM SERPL-MCNC: 7.7 MG/DL (ref 8.3–10.1)
CHLORIDE SERPL-SCNC: 105 MMOL/L (ref 100–108)
CO2 SERPL-SCNC: 28 MMOL/L (ref 21–32)
CREAT SERPL-MCNC: 1.15 MG/DL (ref 0.6–1.3)
ERYTHROCYTE [DISTWIDTH] IN BLOOD BY AUTOMATED COUNT: 15.8 % (ref 11.6–15.1)
GFR SERPL CREATININE-BSD FRML MDRD: 44 ML/MIN/1.73SQ M
GLUCOSE SERPL-MCNC: 102 MG/DL (ref 65–140)
GLUCOSE SERPL-MCNC: 116 MG/DL (ref 65–140)
GLUCOSE SERPL-MCNC: 77 MG/DL (ref 65–140)
GLUCOSE SERPL-MCNC: 78 MG/DL (ref 65–140)
HCT VFR BLD AUTO: 24.1 % (ref 34.8–46.1)
HGB BLD-MCNC: 8.3 G/DL (ref 11.5–15.4)
MAGNESIUM SERPL-MCNC: 1.9 MG/DL (ref 1.6–2.6)
MCH RBC QN AUTO: 35.2 PG (ref 26.8–34.3)
MCHC RBC AUTO-ENTMCNC: 34.4 G/DL (ref 31.4–37.4)
MCV RBC AUTO: 102 FL (ref 82–98)
PLATELET # BLD AUTO: 117 THOUSANDS/UL (ref 149–390)
PMV BLD AUTO: 10.1 FL (ref 8.9–12.7)
POTASSIUM SERPL-SCNC: 4.4 MMOL/L (ref 3.5–5.3)
PROT SERPL-MCNC: 5.5 G/DL (ref 6.4–8.2)
RBC # BLD AUTO: 2.36 MILLION/UL (ref 3.81–5.12)
SODIUM SERPL-SCNC: 135 MMOL/L (ref 136–145)
WBC # BLD AUTO: 7.66 THOUSAND/UL (ref 4.31–10.16)

## 2022-04-01 PROCEDURE — 82948 REAGENT STRIP/BLOOD GLUCOSE: CPT

## 2022-04-01 PROCEDURE — 97535 SELF CARE MNGMENT TRAINING: CPT

## 2022-04-01 PROCEDURE — 85027 COMPLETE CBC AUTOMATED: CPT | Performed by: NURSE PRACTITIONER

## 2022-04-01 PROCEDURE — 85730 THROMBOPLASTIN TIME PARTIAL: CPT

## 2022-04-01 PROCEDURE — 80053 COMPREHEN METABOLIC PANEL: CPT | Performed by: NURSE PRACTITIONER

## 2022-04-01 PROCEDURE — 97530 THERAPEUTIC ACTIVITIES: CPT

## 2022-04-01 PROCEDURE — 85730 THROMBOPLASTIN TIME PARTIAL: CPT | Performed by: FAMILY MEDICINE

## 2022-04-01 PROCEDURE — 97116 GAIT TRAINING THERAPY: CPT

## 2022-04-01 PROCEDURE — 99233 SBSQ HOSP IP/OBS HIGH 50: CPT | Performed by: ANESTHESIOLOGY

## 2022-04-01 PROCEDURE — 83735 ASSAY OF MAGNESIUM: CPT | Performed by: NURSE PRACTITIONER

## 2022-04-01 RX ORDER — AMIODARONE HYDROCHLORIDE 200 MG/1
200 TABLET ORAL EVERY 12 HOURS
Status: DISCONTINUED | OUTPATIENT
Start: 2022-04-01 | End: 2022-04-01

## 2022-04-01 RX ORDER — FUROSEMIDE 40 MG/1
40 TABLET ORAL DAILY
Status: DISCONTINUED | OUTPATIENT
Start: 2022-04-01 | End: 2022-04-04 | Stop reason: HOSPADM

## 2022-04-01 RX ORDER — AMIODARONE HYDROCHLORIDE 200 MG/1
200 TABLET ORAL
Status: DISCONTINUED | OUTPATIENT
Start: 2022-04-06 | End: 2022-04-01

## 2022-04-01 RX ORDER — FUROSEMIDE 10 MG/ML
20 INJECTION INTRAMUSCULAR; INTRAVENOUS ONCE
Status: COMPLETED | OUTPATIENT
Start: 2022-04-01 | End: 2022-04-01

## 2022-04-01 RX ORDER — AMIODARONE HYDROCHLORIDE 200 MG/1
200 TABLET ORAL EVERY 12 HOURS
Status: DISCONTINUED | OUTPATIENT
Start: 2022-04-01 | End: 2022-04-04 | Stop reason: HOSPADM

## 2022-04-01 RX ORDER — AMIODARONE HYDROCHLORIDE 200 MG/1
200 TABLET ORAL
Status: DISCONTINUED | OUTPATIENT
Start: 2022-04-05 | End: 2022-04-04 | Stop reason: HOSPADM

## 2022-04-01 RX ADMIN — FUROSEMIDE 40 MG: 40 TABLET ORAL at 11:33

## 2022-04-01 RX ADMIN — MIDODRINE HYDROCHLORIDE 5 MG: 5 TABLET ORAL at 07:58

## 2022-04-01 RX ADMIN — FUROSEMIDE 20 MG: 10 INJECTION, SOLUTION INTRAMUSCULAR; INTRAVENOUS at 11:32

## 2022-04-01 RX ADMIN — AMIODARONE HYDROCHLORIDE 200 MG: 200 TABLET ORAL at 20:24

## 2022-04-01 RX ADMIN — MIDODRINE HYDROCHLORIDE 5 MG: 5 TABLET ORAL at 15:51

## 2022-04-01 RX ADMIN — LEVOTHYROXINE SODIUM 150 MCG: 150 TABLET ORAL at 05:25

## 2022-04-01 RX ADMIN — Medication 6 MG: at 21:28

## 2022-04-01 RX ADMIN — MIDODRINE HYDROCHLORIDE 5 MG: 5 TABLET ORAL at 11:33

## 2022-04-01 RX ADMIN — AMIODARONE HYDROCHLORIDE 200 MG: 200 TABLET ORAL at 07:58

## 2022-04-01 RX ADMIN — CEFAZOLIN SODIUM 2000 MG: 2 SOLUTION INTRAVENOUS at 21:28

## 2022-04-01 RX ADMIN — CEFAZOLIN SODIUM 2000 MG: 2 SOLUTION INTRAVENOUS at 14:20

## 2022-04-01 RX ADMIN — HEPARIN SODIUM 11.1 UNITS/KG/HR: 10000 INJECTION, SOLUTION INTRAVENOUS at 08:54

## 2022-04-01 RX ADMIN — CEFAZOLIN SODIUM 2000 MG: 2 SOLUTION INTRAVENOUS at 05:25

## 2022-04-01 NOTE — PHYSICAL THERAPY NOTE
PHYSICAL THERAPY TREATMENT NOTE  NAME:  Daron Arrnabila  DATE: 04/01/22    Length Of Stay: 7  Performed at least 2 patient identifiers during session: Name and Birthday  Treatment time: 929-1009  Treatment time: 40 min       04/01/22 0929   Note Type   Note Type Treatment   Pain Assessment   Pain Assessment Tool 0-10   Pain Score No Pain   Restrictions/Precautions   Other Precautions Chair Alarm; Bed Alarm;Multiple lines;Telemetry; Fall Risk   General   Chart Reviewed Yes   Response to Previous Treatment Patient with no complaints from previous session  Cognition   Overall Cognitive Status WFL   Orientation Level Oriented X4   Following Commands Follows one step commands without difficulty   Bed Mobility   Additional Comments Pt received seated OOB in recliner at start and end of session; bed mobility not assessed this session   Transfers   Sit to Stand   (steadying assist)   Additional items Increased time required;Verbal cues   Stand to Sit   (steadying assist)   Additional items Increased time required;Verbal cues   Stand pivot   (steadying assist)   Additional items Increased time required;Verbal cues   Additional Comments Pt used RW for all transfers  VC for hand placement and safety  Pt with instance of incontinence while performing hygiene at sink, pt able to stand at sink x 4-5 min with BUE support and SBA  Ambulation/Elevation   Gait pattern Improper Weight shift; Wide KITTY; Decreased foot clearance; Foward flexed; Ataxia; Excessively slow   Gait Assistance   (steadying assist)   Additional items Verbal cues   Assistive Device Rolling walker   Distance 25' + 15' Steadying assist for safety, VC for increased step length   No LOB, pt requiring frequent rest breaks due to fatigue however vitals WNL throughout session   Balance   Static Sitting Good   Dynamic Sitting Fair +   Static Standing Fair   Dynamic Standing Fair -   Ambulatory Fair -   Endurance Deficit   Endurance Deficit Yes   Endurance Deficit Description fatigue   Activity Tolerance   Activity Tolerance Patient limited by fatigue   Medical Staff Made Aware OTB[place]bara   Nurse Made Aware Bernice PARK   Assessment   Prognosis Good   Problem List Decreased strength;Decreased endurance; Impaired balance;Decreased mobility; Impaired judgement;Decreased safety awareness; Obesity   Barriers to Discharge Inaccessible home environment;Decreased caregiver support   Barriers to Discharge Comments Pt requires increased assistance for mobility and lives alone   Goals   PT Treatment Day 2   Plan   Treatment/Interventions Functional transfer training;LE strengthening/ROM; Elevations; Therapeutic exercise; Endurance training;Patient/family training;Equipment eval/education; Bed mobility;Gait training; Compensatory technique education;Spoke to nursing;OT   Progress Slow progress, decreased activity tolerance   PT Frequency 3-5x/wk   Recommendation   PT Discharge Recommendation Post acute rehabilitation services   Equipment Recommended   (TBD by rehab)   Additional Comments 2 RN, Lou Gusman made aware of pt soiling her RLE dressing as well as weeping of uncovered wound under L breast   AM-PAC Basic Mobility Inpatient   Turning in Bed Without Bedrails 2   Lying on Back to Sitting on Edge of Flat Bed 2   Moving Bed to Chair 3   Standing Up From Chair 3   Walk in Room 3   Climb 3-5 Stairs 2   Basic Mobility Inpatient Raw Score 15   Basic Mobility Standardized Score 36 97   Highest Level Of Mobility   JH-HLM Goal 4: Move to chair/commode   JH-HLM Highest Level of Mobility 7: Walk 25 feet or more   JH-HLM Goal Achieved Yes   Education   Education Provided Mobility training   Patient Demonstrates acceptance/verbal understanding   End of Consult   Patient Position at End of Consult Bedside chair;Bed/Chair alarm activated; All needs within reach     The patient's AM-PAC Basic Mobility Inpatient Short Form Raw Score is 15   A Raw score of less than or equal to 16 suggests the patient may benefit from discharge to post-acute rehabilitation services  Please also refer to the recommendation of the Physical Therapist for safe discharge planning  Assessment: Pt seen for PT treatment session this date with interventions consisting of gait training w/ emphasis on improving pt's ability to ambulate level surfaces x 25' + 15' with steadying assist provided by therapist with RW and therapeutic activity consisting of training: sit<>stand transfers  Pt agreeable to PT treatment session upon arrival, pt found seated OOB in recliner, in no apparent distress  In comparison to previous session, pt with improvements in pt requires decreased assistance for mobility and is able to ambulate increased distance this session  Post session: pt returned back to recliner, chair alarm engaged, all needs in reach and RN notified of session findings/recommendations Continue to recommend STR at time of d/c in order to maximize pt's functional independence and safety w/ mobility  Pt continues to be functioning below baseline level, and remains limited 2* factors listed above and including decreased strength, balance, mobility, and activity tolerance  PT will continue to see pt while here in order to address the deficits listed above and provide interventions consistent w/ POC in effort to achieve STGs       Ingrid Song, PT,DPT

## 2022-04-01 NOTE — ASSESSMENT & PLAN NOTE
· AFib RVR rate of 160s   · Home Metoprolol held with shock state now on midodrine  · Heparin gtt restarted on 4/1   · Patient continued to have difficult rate control and needed pressure support  ?  Amiodarone changed to PO load on 3/31

## 2022-04-01 NOTE — PROGRESS NOTES
[address]  Progress Note - Marlon Listen 1940, 80 y o  female MRN: [id number]  Unit/Bed#: -01 Encounter: [id number]  Primary Care Provider: Thuan Kelley MD   Date and time admitted to hospital: 3/25/2022  3:02 PM    * Septic shock [facility])  Assessment & Plan  · Sepsis Secondary to right thigh cellulitis  · POD #1 s/p debridement/washout R thigh  · Wound cx Pending  · BC -staph contaminate, Repeat BC NG  · UC + ecoli bacteria asymptomatic  ? Colonization  · ID/Surgery consulted  · Cefazolin day 4 anbx day 7  · Consider discontinuing TLC  · Possible return to OR today for addition debridement/washout vs vac placement  Cont heparin gtt off and NPO until re eval by surgery    Cellulitis of right thigh  Assessment & Plan  · See above in septic shock    Aortic stenosis, severe  Assessment & Plan  · Valve area stenosis 0 6    Atrial fibrillation with RVR (HCC)  Assessment & Plan  · AFib RVR rate of 160s   · Home Metoprolol held with shock state now on midodrine  · Heparin gtt on hold per Surgery until aM  · Patient continued to have difficult rate control and needed pressure support  · Amiodarone changed to PO load on 3/31      Cirrhosis ([address] )  Assessment & Plan  · Elevated LFT's and T Bili levels 4 26-current 2 43  · suspect cirrhosis PBC versus SORIA MELD 19  · CT abd/pelvis revealed "persistent findings of hepatic cirrhosis new trace perihepatic ascites"  Ultrasound also revealed "Liver cirrhosis "  · GI consulted and signed off  · Avoid hepatotoxins    Serum total bilirubin elevated-resolved as of 3/31/2022  Assessment & Plan  · LFTs elevated at time of admission:  AST 66, ALT 44, , T bili 4 26 current T-bili 2 43  · GI Consult PBC vs SORIA MELD 19  · Continue to trend LFTs/ hold statin  · US 3/26 Cholelithiasis and sludge with wall thickening and pericholecystic fluid  Sonographic Nova Savant sign is negative    Findings are equivocal for acute cholecystitis, particularly in the setting of underlying hepatic dysfunction which can also cause gallbladder wall abnormalities  If there is continued clinical concern, further evaluation with HIDA scan recommended  · GI signed off will need EGD outpatient      Cholelithiases  Assessment & Plan  · US with cholelithiasis, not surgical candidate  · GI signed off     Moderate protein-calorie malnutrition ([address] )  Assessment & Plan  Malnutrition Findings:   Adult Malnutrition type: Chronic illness  Adult Degree of Malnutrition: Malnutrition of moderate degree  Malnutrition Characteristics: Muscle loss,Fat loss,Weight loss              360 Statement: Chronic moderate malnutrition related to decreased appetite as evidenced by 11 6% weight loss x 4 mo (11/23/21 294lb, 3/28/22 260lb, suspect partly fluid related), moderate fat loss to orbitals, moderate muscle loss to temporalis, deltoid and interroseous muscles  Treated with: Will liberalize cardiac to 4gm GLADYS, continue CCD 3 at this time  Will trial glucerna daily and ensure max protein daily, increase as tolerated/indicated  Recommend daily weights  BMI Findings: Body mass index is 46 77 kg/m²  Fusiform ectasia of ascending aorta  Assessment & Plan  · CT abd shows: 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  · Recommendation is for follow-up low radiation dose chest CT in one year  Hypothyroidism  Assessment & Plan  · Continue home Synthroid dosage  · TSH 0 301    Impaired skin integrity  Assessment & Plan  · Patient with multiple DTI's   · Wound Care consult    Traumatic hematoma of head  Assessment & Plan  · Hematoma noted at right brow  Continue to monitor closely  Fall  Assessment & Plan  · PT/OT consult placed  · Placement in acute rehab    Eye discharge-resolved as of 3/31/2022  Assessment & Plan  · Greenish discharge from both eyes  · Bacitracin-polymyxin B ointment ordered  ----------------------------------------------------------------------------------------  HPI/24hr events: Yesterday (), pt was taken to the OR for right thigh debridement and washout  Cultures sent  Heparin gtt remained off post op till re-evaluated today  Possible return to OR for additional debridement/washout verses vac placement  Pt remained in NSR  Amiodarone gtt was transitioned to amiodarone PO loading dose  BP improved and remained off pressors  Midodrine decreased from 10 mg to 5 mg TID  No acute events overnight  Pt slept well  Denies pain  Patient appropriate for transfer out of the ICU today?: Patient does not meet criteria for referral to the ICU Follow-Up Clinic; referral has not been made  Disposition: Transer to med-surg with telemetry after OR    Code Status: Level 2 - DNAR: but accepts endotracheal intubation  ---------------------------------------------------------------------------------------  SUBJECTIVE    Review of Systems  Review of systems was reviewed and negative unless stated above in HPI/24-hour events   ---------------------------------------------------------------------------------------  OBJECTIVE    Vitals   Vitals:    22 1854 22 0000   BP: 136/64   129/58   BP Location: Left arm   Left arm   Pulse: (!) 54 (!) 52 58 63   Resp: 13 14 15 16   Temp: 97 5 °F (36 4 °C)   97 8 °F (36 6 °C)   TempSrc: Temporal   Temporal   SpO2: 100% 96% 97% 94%   Weight:       Height:         Temp (24hrs), Av 8 °F (36 6 °C), Min:97 5 °F (36 4 °C), Max:98 7 °F (37 1 °C)  Current: Temperature: 97 8 °F (36 6 °C)  Arterial Line BP: 107/48  Arterial Line MAP (mmHg): 67 mmHg    Respiratory:  SpO2: SpO2: 94 %, SpO2 Activity: SpO2 Activity: At Rest, SpO2 Device: O2 Device: None (Room air)    Invasive/non-invasive ventilation settings   Respiratory  Report   Lab Data (Last 4 hours)    None         O2/Vent Data (Last 4 hours)    None Physical Exam  Constitutional:       General: She is not in acute distress  Appearance: She is obese  She is not ill-appearing, toxic-appearing or diaphoretic  HENT:      Head:      Comments: Right forehead hematoma     Nose: No congestion  Mouth/Throat:      Mouth: Mucous membranes are moist       Pharynx: Oropharynx is clear  No oropharyngeal exudate  Eyes:      Extraocular Movements: Extraocular movements intact  Conjunctiva/sclera: Conjunctivae normal       Pupils: Pupils are equal, round, and reactive to light  Comments: Bilateral orbital ecchymosis   Cardiovascular:      Rate and Rhythm: Normal rate and regular rhythm  Pulses: Normal pulses  Pulmonary:      Effort: Pulmonary effort is normal  No respiratory distress  Breath sounds: Normal breath sounds  No wheezing or rhonchi  Abdominal:      General: Bowel sounds are normal  There is no distension  Palpations: Abdomen is soft  Musculoskeletal:         General: Normal range of motion  Cervical back: No rigidity  Right lower leg: Edema present  Left lower leg: Edema present  Skin:     General: Skin is warm and dry  Capillary Refill: Capillary refill takes less than 2 seconds  Comments: Dressing intact to right thigh  Ecchymotic areas to face, neck, chest, abdomen, extremities  Neurological:      General: No focal deficit present  Mental Status: She is alert and oriented to person, place, and time     Psychiatric:         Mood and Affect: Mood normal          Laboratory and Diagnostics:  Results from last 7 days   Lab Units 03/31/22  0448 03/30/22  0441 03/29/22  0434 03/28/22  0438 03/27/22  1604 03/27/22  0433 03/26/22  0503 03/25/22  1506 03/25/22  1506   WBC Thousand/uL 10 75* 9 71 12 48* 9 42 13 46* 16 50* 26 42*   < > 30 84*   HEMOGLOBIN g/dL 9 8* 10 3* 10 8* 9 8* 10 3* 10 1* 10 5*   < > 13 7   HEMATOCRIT % 29 7* 28 6* 30 8* 27 9* 29 5* 27 5* 29 0*   < > 38 6   PLATELETS Thousands/uL 155 165 180 151 175 178 208   < > 248   BANDS PCT % 3  --   --   --   --   --  1  --   --    MONO PCT % 9  --   --   --   --   --  7  --  7    < > = values in this interval not displayed  Results from last 7 days   Lab Units 03/31/22  0448 03/30/22  0441 03/29/22  0434 03/28/22  0438 03/27/22  1557 03/27/22  0433 03/26/22  1621 03/26/22  0720 03/26/22  0503 03/25/22  1506 03/25/22  1506   SODIUM mmol/L 135* 138 135* 134* 133* 134* 133*   < > 135*   < > 134*   POTASSIUM mmol/L 4 2 3 7 3 4* 3 6 3 5 4 1 3 7   < > 3 8   < > 4 6   CHLORIDE mmol/L 104 105 102 101 101 102 101   < > 102   < > 99*   CO2 mmol/L 27 27 26 25 23 22 22   < > 20*   < > 24   ANION GAP mmol/L 4 6 7 8 9 10 10   < > 13   < > 11   BUN mg/dL 28* 35* 46* 57* 63* 63* 66*   < > 62*   < > 71*   CREATININE mg/dL 1 17 1 17 1 39* 1 53* 1 65* 1 77* 1 82*   < > 1 81*   < > 2 37*   CALCIUM mg/dL 7 9* 7 9* 8 1* 8 1* 8 4 8 0* 8 1*   < > 8 1*   < > 8 5   GLUCOSE RANDOM mg/dL 87 113 104 108 132 119 132   < > 86   < > 95   ALT U/L 28 29 35 37  --  35  --   --  33  --  42   AST U/L 52* 42 48* 56*  --  62*  --   --  54*  --  66*   ALK PHOS U/L 182* 166* 146* 130*  --  123*  --   --  121*  --  165*   ALBUMIN g/dL 1 8* 1 8* 1 9* 1 9*  --  2 2*  --   --  2 5*  --  1 9*   TOTAL BILIRUBIN mg/dL 2 43* 2 48* 4 15* 5 74*  --  6 35*  --   --  5 08*  --  4 26*    < > = values in this interval not displayed       Results from last 7 days   Lab Units 03/30/22  0441 03/27/22  1557 03/27/22  1347 03/27/22  0433 03/26/22  0503 03/25/22 2013   MAGNESIUM mg/dL 2 0 2 5 2 0 2 1 2 3 2 3      Results from last 7 days   Lab Units 03/31/22  0540 03/31/22  0448 03/30/22  0441 03/29/22  1804 03/29/22  1150 03/29/22  0434 03/28/22  1404 03/28/22  0801 03/28/22  0801 03/27/22  1807 03/27/22  1556 03/27/22  0433 03/26/22  0503   INR   --  1 80* 1 79*  --   --  1 72*  --   --  1 91*  --  1 85* 2 01* 4 35*   PTT seconds 119* 110* 87* 86* 86* 95* 74*   < > 80*   < >  --   --   -- < > = values in this interval not displayed  Results from last 7 days   Lab Units 03/25/22  2317 03/25/22  2013 03/25/22  1819 03/25/22  1618   LACTIC ACID mmol/L 1 8 2 6* 2 9* 3 3*     ABG:    VBG:  Results from last 7 days   Lab Units 03/26/22  1225   PH JOANN  7 400   PCO2 JOANN mm Hg 33 5*   PO2 JOANN mm Hg 102 6*   HCO3 JOANN mmol/L 20 3*   BASE EXC JOANN mmol/L -3 8     Results from last 7 days   Lab Units 03/31/22  0448 03/28/22  0438   PROCALCITONIN ng/ml 0 85* 2 65*       Micro  Results from last 7 days   Lab Units 03/28/22  0527 03/26/22  0720 03/25/22  1618 03/25/22  1611 03/25/22  1545   BLOOD CULTURE  No Growth at 72 hrs  No Growth at 72 hrs   --  Staphylococcus epidermidis* Staphylococcus pettenkoferi*  Staphylococcus epidermidis*  --    GRAM STAIN RESULT   --   --  Gram positive cocci in clusters* Gram positive cocci in clusters*  --    URINE CULTURE   --   --   --   --  >100,000 cfu/ml Escherichia coli*  <10,000 cfu/ml    MRSA CULTURE ONLY   --  No Methicillin Resistant Staphlyococcus aureus (MRSA) isolated  --   --   --        EKG: NSR  Imaging: I have personally reviewed pertinent reports  and I have personally reviewed pertinent films in PACS    Intake and Output  I/O       03/30 0701  03/31 0700 03/31 0701  04/01 0700    P  O  900 120    I V  (mL/kg) 1085 1 (9 4) 722 (6 2)    IV Piggyback 550 110    Total Intake(mL/kg) 2535 1 (21 9) 952 (8 2)    Urine (mL/kg/hr) 1100 (0 4) 350 (0 2)    Stool      Total Output 1100 350    Net +1435 1 +602                Height and Weights   Height: 5' 2" (157 5 cm)     Body mass index is 46 77 kg/m²    Weight (last 2 days)     Date/Time Weight    03/31/22 1214 116 (255 73)    03/31/22 0400 116 (255 73)    03/30/22 0546 117 (257 72)            Nutrition       Diet Orders   (From admission, onward)             Start     Ordered    04/01/22 0001  Diet NPO  Diet effective midnight        References:    Nutrtion Support Algorithm Enteral vs  Parenteral   Question Answer Comment   Diet Type NPO    RD to adjust diet per protocol? Yes        03/31/22 1928    03/28/22 0917  Dietary nutrition supplements  Once        Question Answer Comment   Select Supplement: Ensure Max - Vanilla    Frequency Dinner        03/28/22 1791    03/28/22 0917  Dietary nutrition supplements  Once        Question Answer Comment   Select Supplement: Shani Stallion    Frequency Breakfast        03/28/22 3214                  Active Medications  Scheduled Meds:  Current Facility-Administered Medications   Medication Dose Route Frequency Provider Last Rate    amiodarone  200 mg Oral TID With Meals LEXIE Frederick      Followed by   Bobby Hernandez ON 4/8/2022] amiodarone  200 mg Oral Daily With Breakfast LEXIE Frederick      cefazolin  2,000 mg Intravenous Q8H Coughlin Orange, DO Stopped (03/31/22 2210)    heparin (porcine)  3-20 Units/kg/hr (Order-Specific) Intravenous Titrated Coughlin Orange, DO Stopped (03/31/22 0631)    heparin (porcine)  2,000 Units Intravenous Q1H PRN Coughlin Orange, DO      heparin (porcine)  4,000 Units Intravenous Q1H PRN Coughlin Orange, DO      levothyroxine  150 mcg Oral Early Morning Coughlin Orange, DO      melatonin  6 mg Oral HS LEXIE Antony      midodrine  5 mg Oral TID AC LEXIE Martinez       Continuous Infusions:  heparin (porcine), 3-20 Units/kg/hr (Order-Specific), Last Rate: Stopped (03/31/22 0631)      PRN Meds:   heparin (porcine), 2,000 Units, Q1H PRN  heparin (porcine), 4,000 Units, Q1H PRN        Invasive Devices Review  Invasive Devices  Report    Central Venous Catheter Line            CVC Central Lines 03/26/22 Triple 16cm 5 days          Peripheral Intravenous Line            Peripheral IV 03/31/22 Right;Ventral (anterior); Lateral Antecubital <1 day          Drain            External Urinary Catheter 1 day                Rationale for remaining devices: CVC maintained due to lack of peripheral access;  ---------------------------------------------------------------------------------------  Advance Directive and Living Will:      Power of :    POLST:    ---------------------------------------------------------------------------------------  Care Time Delivered:   No Critical Care time spent       [employer]      Portions of the record may have been created with voice recognition software  Occasional wrong word or "sound a like" substitutions may have occurred due to the inherent limitations of voice recognition software    Read the chart carefully and recognize, using context, where substitutions have occurred

## 2022-04-01 NOTE — PLAN OF CARE
Problem: OCCUPATIONAL THERAPY ADULT  Goal: Performs self-care activities at highest level of function for planned discharge setting  See evaluation for individualized goals  Description: Treatment Interventions: ADL retraining,Functional transfer training,UE strengthening/ROM,Endurance training,Patient/family training,Equipment evaluation/education,Cognitive reorientation,Neuromuscular reeducation,Compensatory technique education,Continued evaluation,Energy conservation,Activityengagement          See flowsheet documentation for full assessment, interventions and recommendations  Outcome: Progressing  Note: Limitation: Decreased ADL status,Decreased UE ROM,Decreased UE strength,Decreased Safe judgement during ADL,Decreased cognition,Decreased endurance,Decreased self-care trans,Decreased high-level ADLs  Prognosis: Good  Assessment: Pt completed OT tx session #1 focused on ADL performance and functional mobility  Pt alert and agreeable to participate  Pt demonstrating improvements in functional mobility, transfer status and endurance this session but still requires significant assistance for toileting and LB dressing d/t body hiabitus and decreased standing balance/tolerance  Pt demonstrating Good potential to achieve goals but is currently demonstrating deficits in endurance, activity tolerance, standing balance/tolerance, activity tolerance  Continue to recommend post acute rehabilitation services at this time to increase safety and independence in ADL tasks and functional mobility       OT Discharge Recommendation: Post acute rehabilitation services

## 2022-04-01 NOTE — PROGRESS NOTES
Pt consuming 100% of glucerna daily and ensure max protein daily per pt report, she likes the supplements  Eating % of meals per nursing flowsheets  Noting POC BG levels have been WNL, will increase to CCD 3, if continues with appropriate levels will d/c restriction at follow up

## 2022-04-01 NOTE — OCCUPATIONAL THERAPY NOTE
Occupational Therapy Progress Note     Patient Name: Marlon Price  GPJPI'T Date: 4/1/2022  Problem List  Principal Problem:    Septic shock [facility])  Active Problems:    Atrial fibrillation with RVR ([address] )    Fall    Traumatic hematoma of head    Impaired skin integrity    Hypothyroidism    Moderate protein-calorie malnutrition (HCC)    Cholelithiases    Cirrhosis (HCC)    Cellulitis of right thigh    Aortic stenosis, severe    *Flowsheet time entered in error- OT treatment: [id number]*   04/01/22 1200   Note Type   Note Type Treatment   Restrictions/Precautions   Other Precautions Chair Alarm; Bed Alarm;Telemetry;Multiple lines; Fall Risk   Pain Assessment   Pain Assessment Tool 0-10   Pain Score No Pain   ADL   Grooming Assistance   (SBA)   Grooming Comments Pt able to wash hands while standing at sink @ SBA   UB Dressing Assistance 5  Supervision/Setup   UB Dressing Deficit Setup; Increased time to complete   LB Dressing Assistance 2  Maximal Assistance   LB Dressing Deficit Setup; Increased time to complete   LB Dressing Comments Pt required assist to do socks while seated on BSC  Not wearing briefs d/t wound dressing but would require assist to thread and for CM while standing  Toileting Assistance  2  Maximal Assistance   Toileting Deficit Perineal hygiene;Clothing management down;Clothing management up   Toileting Comments Pt spent significant time on BSC over standard toilet attempting to urinate, unsuccessful  While pt washing hands at sink, pt incontinent of urine on floor  Assist for pericare  Transfers   Sit to Stand   (Steadying assist)   Additional items Assist x 1; Increased time required;Verbal cues   Stand to Sit   (Steadying assist)   Additional items Assist x 1; Increased time required;Verbal cues   Stand pivot   (Steadying assist)   Additional items Assist x 1; Increased time required;Verbal cues   Toilet transfer   (Steadying assist)   Additional items Assist x 1; Increased time required;Verbal cues;Commode   Additional Comments Pt seated in recliner chair before and after tx session  STS from chair to RW @ Steadying assist  Pt able to complete 25ft +15ft to/from bathroom, toilet and sink and return to chair with RW @ Steadying assist  Pt seated in chair with all needs met at end of session  Cognition   Overall Cognitive Status WFL   Arousal/Participation Alert; Responsive; Cooperative   Attention Within functional limits   Orientation Level Oriented X4   Memory Decreased recall of recent events   Following Commands Follows one step commands without difficulty   Activity Tolerance   Activity Tolerance Patient limited by fatigue   Medical Staff Made Aware Co-treat with PT Jarad Damico; Spoke with RN Bart Pablo   Assessment   Assessment Pt completed OT tx session #1 focused on ADL performance and functional mobility  Pt alert and agreeable to participate  Pt demonstrating improvements in functional mobility, transfer status and endurance this session but still requires significant assistance for toileting and LB dressing d/t body hiabitus and decreased standing balance/tolerance  Pt demonstrating Good potential to achieve goals but is currently demonstrating deficits in endurance, activity tolerance, standing balance/tolerance, activity tolerance  Continue to recommend post acute rehabilitation services at this time to increase safety and independence in ADL tasks and functional mobility  Plan   Treatment Interventions ADL retraining;Functional transfer training; Endurance training   Goal Expiration Date 04/11/22   OT Treatment Day 2   OT Frequency 3-5x/wk   Recommendation   OT Discharge Recommendation Post acute rehabilitation services   AM-PAC Daily Activity Inpatient   Lower Body Dressing 2   Bathing 2   Toileting 2   Upper Body Dressing 3   Grooming 3   Eating 4   Daily Activity Raw Score 16   Daily Activity Standardized Score (Calc for Raw Score >=11) 35 96   AM-PAC Applied Cognition Inpatient   Following a Speech/Presentation 3   Understanding Ordinary Conversation 3   Taking Medications 3   Remembering Where Things Are Placed or Put Away 3   Remembering List of 4-5 Errands 3   Taking Care of Complicated Tasks 3   Applied Cognition Raw Score 18   Applied Cognition Standardized Score 38 07     Pt goals to be met by 4/11/2022     1  Pt will demonstrate ability to complete supine<>sit @ Mod I in order to increase safety and independence during ADL tasks  2  Pt will demonstrate ability to complete UB ADLs including washing/dressing @ Mod I in order to increase performance and participation during meaningful tasks  3  Pt will demonstrate ability to complete LB dressing @ Min A in order to increase safety and independence during meaningful tasks  4  Pt will demonstrate ability to luis/doff socks/shoes while sitting EOB @ Min A in order to increase safety and independence during meaningful tasks  5  Pt will demonstrate ability to complete toileting tasks including CM and pericare @ Min A in order to increase safety and independence during meaningful tasks  6  Pt will demonstrate ability to complete EOB, chair, toilet/commode transfers @ SBA in order to increase performance and participation during functional tasks  7  Pt will demonstrate ability to stand for 3-5 minutes while maintaining Good balance with use of RW for UB support PRN  8  Pt will demonstrate ability to tolerate 30-35 minute OT session with no vc'ing for deep breathing or use of energy conservation techniques in order to increase activity tolerance during functional tasks  9  Pt will demonstrate Good carryover of use of energy conservation/compensatory strategies during ADLs and functional tasks in order to increase safety and reduce risk for falls  10  Pt will demonstrate Good attention and participation in continued evaluation of functional ambulation house hold distances in order to assist with safe d/c planning    11  Pt will attend to continued cognitive assessments 100% of the time in order to provide most appropriate d/c recommendations  12  Pt will follow 100% simple 2-step commands and be A&O x4 consistently with environmental cues to increase participation in functional activities  13  Pt will identify 3 areas of interest/hobbies and 1 intervention on how to incorporate into daily life in order to increase interaction with environment and peers as well as increase participation in meaningful tasks     14  Pt will demonstrate 100% carryover of BUE HEP in order to increase BUE MS and increase performance during functional tasks upon d/c home      Frederick Salomon, OTR/L

## 2022-04-01 NOTE — PROGRESS NOTES
REQUIRED DOCUMENTATION:      1  This service was provided via Telemedicine  2  Provider located at [facility] [place]  3  TeleMed provider: Claudean Parchment, MD  4  Identify all parties in room with patient during tele consult:  Patient and virtual care assistant Puma Larson  After connecting through [employer], patient was identified by name and date of birth and assistant checked wristband  Patient was then informed that this was a Telemedicine visit and that the exam was being conducted confidentially over secure lines  My office door was closed  No one else was in the room  Patient acknowledged consent and understanding of privacy and security of the Telemedicine visit, and gave us permission to have the assistant stay in the room in order to assist with the history and to conduct the exam   I informed the patient that I have reviewed their record in Epic and presented the opportunity for them to ask any questions regarding the visit today  The patient agreed to participate  Progress Note - Infectious Disease   Mary Pierre 80 y o  female MRN: [id number]  Unit/Bed#: -01 Encounter: [id number]      Impression/Plan:    1  SIRS, with hypotension, POA   Consider sepsis/septic shock from right thigh cellulitis but patient also has multiple noninfectious etiologies for SIRS   Patient is clinically and systemically improved   Off pressors   -Antibiotic plan as below  -Monitor temperature/WBC count  -Monitor hemodynamics     2  Coagulase-negative Staphylococcus bacteremia   Although there was growth in both admission blood cultures, patient has different species of coagulase-negative Staphylococcus in each of the set of blood cultures   With patient being hypotensive on admission, suspect that she was a very difficult blood draw access   Patient has no indwelling intravascular foreign body   Repeat blood cultures have no growth thus far   Suspect this is contamination   -no antibiotics indicated for this     3  Right thigh cellulitis, with underlying subcutaneous hematoma  Although cellulitis is improved, right thigh wound developed more necrosis and purulent drainage, evidence of infected hematoma  Status post  debridement and washout of right thigh wound and SQ abscess 3/31  Culture growing staph aureus   -continue IV cefazolin  -follow up OR cultures  If MRSA, switch to vancomycin  -surgery follow up  -if continues to improve, anticipate switch to PO antibiotics before discharge and 7 day post-op course of antibiotics      4  Asymptomatic bacteriuria, likely bladder colonization in this elderly patient   Antibiotic is not necessary, although cefazolin above foot cellulitis with provide coverage for E coli and urine culture  No antibiotic needed for this, although cefazolin provides coverage for E coli in urine culture      5  LEONARDO, likely secondary to prerenal state and rhabdomyolysis   Creatinine  has improved and at baseline  Antibiotic at full dose  Monitor creatinine      6  Possible cirrhosis  GI follow-up      7  Status post fall with patient being down for an unknown period of time   Fortunately, there are no significant sequela from fall  Above management plan discussed in detail with the primary team   ID will see again 22, please call with questions  I spent 30 minutes in evaluation of the patient of which 15 minutes was in counseling/coordination of care    Antibiotics:  Cefazolin day 4  Abx day 8    Subjective:  s/p debridement and washout of right thigh wound yesterday  Culture growing staph aureus  She feels well today, denies fever, chills, chest pain, shortness of breath, pain in her right leg      Objective:  Vitals:  Temp:  [97 5 °F (36 4 °C)-98 3 °F (36 8 °C)] 98 3 °F (36 8 °C)  HR:  [52-78] 70  Resp:  [13-22] 19  BP: (101-136)/(51-65) 116/61  SpO2:  [93 %-100 %] 95 %  Temp (24hrs), Av 8 °F (36 6 °C), Min:97 5 °F (36 4 °C), Max:98 3 °F (36 8 °C)  Current: Temperature: 98 3 °F (36 8 °C)    Physical Exam:     Documented physical exam has been primarily done by the patient's nurse and/or the primary service due to limited examination abilities on telemedicine     General Appearance:  Alert, interactive, nontoxic, no acute distress  Throat: Oropharynx moist without lesions  Lungs:   Clear to auscultation bilaterally; no wheezes, rhonchi or rales; respirations unlabored   Heart:  RRR; no murmur, rub or gallop   Abdomen:   Soft, non-tender, non-distended, positive bowel sounds  Extremities: Bilateral lower extremity edema  Right thigh wrapped in ace bandage   Skin: No new rashes  Ecchymoses over face       Labs: All pertinent labs and imaging studies were personally reviewed  Results from last 7 days   Lab Units 04/01/22 0448 03/31/22 0448 03/30/22  0441   WBC Thousand/uL 7 66 10 75* 9 71   HEMOGLOBIN g/dL 8 3* 9 8* 10 3*   PLATELETS Thousands/uL 117* 155 165     Results from last 7 days   Lab Units 04/01/22 0448 03/31/22 0448 03/31/22 0448 03/30/22 0441 03/30/22  0441   SODIUM mmol/L 135*  --  135*  --  138   POTASSIUM mmol/L 4 4   < > 4 2   < > 3 7   CHLORIDE mmol/L 105   < > 104   < > 105   CO2 mmol/L 28   < > 27   < > 27   BUN mg/dL 28*   < > 28*   < > 35*   CREATININE mg/dL 1 15   < > 1 17   < > 1 17   EGFR ml/min/1 73sq m 44   < > 43   < > 43   CALCIUM mg/dL 7 7*   < > 7 9*   < > 7 9*   AST U/L 43  --  52*  --  42   ALT U/L 15   < > 28   < > 29   ALK PHOS U/L 141*   < > 182*   < > 166*    < > = values in this interval not displayed  Results from last 7 days   Lab Units 03/31/22  0448 03/28/22  0438   PROCALCITONIN ng/ml 0 85* 2 65*         Results from last 7 days   Lab Units 03/28/22  0438   FERRITIN ng/mL 922*           Micro:  Results from last 7 days   Lab Units 03/31/22  1340 03/28/22  0527 03/26/22  0720   BLOOD CULTURE   --  No Growth After 4 Days  No Growth After 4 Days    --    GRAM STAIN RESULT  1+ Gram positive cocci in clusters*  No polys seen* --   --    WOUND CULTURE  2+ Growth of Staphylococcus aureus*  --   --    MRSA CULTURE ONLY   --   --  No Methicillin Resistant Staphlyococcus aureus (MRSA) isolated       Imaging:  I have personally reviewed pertinent imaging study reports and images in PACS

## 2022-04-01 NOTE — ASSESSMENT & PLAN NOTE
Malnutrition Findings:   Adult Malnutrition type: Chronic illness  Adult Degree of Malnutrition: Malnutrition of moderate degree  Malnutrition Characteristics: Muscle loss,Fat loss,Weight loss  360 Statement: Chronic moderate malnutrition related to decreased appetite as evidenced by 11 6% weight loss x 4 mo (11/23/21 294lb, 3/28/22 260lb, suspect partly fluid related), moderate fat loss to orbitals, moderate muscle loss to temporalis, deltoid and interroseous muscles  Treated with: Will liberalize cardiac to 4gm GLADYS, continue CCD 3 at this time  Will trial glucerna daily and ensure max protein daily, increase as tolerated/indicated  Recommend daily weights  BMI Findings: Body mass index is 46 65 kg/m²

## 2022-04-01 NOTE — PROGRESS NOTES
Progress Note - General Surgery   Jed Day 80 y o  female MRN: [id number]  Unit/Bed#: -01 Encounter: [id number]    Assessment:  POD1 s/p debridement and washout of right thigh wound  Wound gram stain with GPC in clusters  Afebrile, vss  WBC 7 66  Hgb 8 3    Plan:  Daily and PRN dressing changes, moist Kerlix, cover with Monster ACE and tape  May resume heparin this morning, hold in setting of excessive bleeding from thigh wound  Continue antibiotics as indicated  Follow qAM CBC and BMP  Management of comorbidities per primary team      Subjective/Objective     Subjective: No acute events  Seems comfortable this morning  Denies fevers/chills  Complaints of mild discomfort at operative site  Objective:   Blood pressure 112/54, pulse 77, temperature 97 9 °F (36 6 °C), temperature source Oral, resp  rate 14, height 5' 2" (1 575 m), weight 116 kg (255 lb 1 2 oz), SpO2 97 %  ,Body mass index is 46 65 kg/m²  Intake/Output Summary (Last 24 hours) at 4/1/2022 0736  Last data filed at 4/1/2022 0600  Gross per 24 hour   Intake 1086 99 ml   Output 475 ml   Net 611 99 ml       Invasive Devices  Report    Central Venous Catheter Line            CVC Central Lines 03/26/22 Triple 16cm 6 days          Peripheral Intravenous Line            Peripheral IV 03/31/22 Right;Ventral (anterior); Lateral Antecubital 1 day          Drain            External Urinary Catheter 1 day                Physical Exam:   Gen: AxO this morning, comfortable  Ext: right thigh wound dressing removed, wound bed is pink, no necrosis present, no gross purulence or foul odor  No significant erythema to surrounding skin  New dressing removed following wound evaluation  Lab, Imaging and other studies:  I have personally reviewed pertinent lab results      CBC:   Lab Results   Component Value Date    WBC 7 66 04/01/2022    HGB 8 3 (L) 04/01/2022    HCT 24 1 (L) 04/01/2022     (H) 04/01/2022     (L) 04/01/2022    MCH 35 2 (H) 04/01/2022    MCHC 34 4 04/01/2022    RDW 15 8 (H) 04/01/2022    MPV 10 1 04/01/2022     CMP:   Lab Results   Component Value Date    SODIUM 135 (L) 04/01/2022    K 4 4 04/01/2022     04/01/2022    CO2 28 04/01/2022    BUN 28 (H) 04/01/2022    CREATININE 1 15 04/01/2022    CALCIUM 7 7 (L) 04/01/2022    AST 43 04/01/2022    ALT 15 04/01/2022    ALKPHOS 141 (H) 04/01/2022    EGFR 44 04/01/2022     VTE Pharmacologic Prophylaxis: Heparin  VTE Mechanical Prophylaxis: sequential compression device       Cong Saini PA-C

## 2022-04-01 NOTE — PLAN OF CARE
Pt slept through night without problems  Pt only voided 50 ml prior to falling asleep  Bladder scan this am was 499 ml  Pt stated she did need to void  Will continue to monitor  Pt to OR yesterday for I & D of right thigh  Right thigh with ace wrap dressing with dependent serous drainage  Labs drawn with Hbg 8 3  Pt NPO for possible OR again this am       Problem: Nutrition/Hydration-ADULT  Goal: Nutrient/Hydration intake appropriate for improving, restoring or maintaining nutritional needs  Description: Monitor and assess patient's nutrition/hydration status for malnutrition  Collaborate with interdisciplinary team and initiate plan and interventions as ordered  Monitor patient's weight and dietary intake as ordered or per policy  Utilize nutrition screening tool and intervene as necessary  Determine patient's food preferences and provide high-protein, high-caloric foods as appropriate       INTERVENTIONS:  - Monitor oral intake, urinary output, labs, and treatment plans  - Assess nutrition and hydration status and recommend course of action  - Evaluate amount of meals eaten  - Assist patient with eating if necessary   - Allow adequate time for meals  - Recommend/ encourage appropriate diets, oral nutritional supplements, and vitamin/mineral supplements  - Order, calculate, and assess calorie counts as needed  - Recommend, monitor, and adjust tube feedings and TPN/PPN based on assessed needs  - Assess need for intravenous fluids  - Provide specific nutrition/hydration education as appropriate  - Include patient/family/caregiver in decisions related to nutrition  Outcome: Progressing     Problem: Prexisting or High Potential for Compromised Skin Integrity  Goal: Skin integrity is maintained or improved  Description: INTERVENTIONS:  - Identify patients at risk for skin breakdown  - Assess and monitor skin integrity  - Assess and monitor nutrition and hydration status  - Monitor labs   - Assess for incontinence   - Turn and reposition patient  - Assist with mobility/ambulation  - Relieve pressure over bony prominences  - Avoid friction and shearing  - Provide appropriate hygiene as needed including keeping skin clean and dry  - Evaluate need for skin moisturizer/barrier cream  - Collaborate with interdisciplinary team   - Patient/family teaching  - Consider wound care consult   Outcome: Progressing     Problem: MOBILITY - ADULT  Goal: Maintain or return to baseline ADL function  Description: INTERVENTIONS:  -  Assess patient's ability to carry out ADLs; assess patient's baseline for ADL function and identify physical deficits which impact ability to perform ADLs (bathing, care of mouth/teeth, toileting, grooming, dressing, etc )  - Assess/evaluate cause of self-care deficits   - Assess range of motion  - Assess patient's mobility; develop plan if impaired  - Assess patient's need for assistive devices and provide as appropriate  - Encourage maximum independence but intervene and supervise when necessary  - Involve family in performance of ADLs  - Assess for home care needs following discharge   - Consider OT consult to assist with ADL evaluation and planning for discharge  - Provide patient education as appropriate  Outcome: Progressing  Goal: Maintains/Returns to pre admission functional level  Description: INTERVENTIONS:  - Perform BMAT or MOVE assessment daily    - Set and communicate daily mobility goal to care team and patient/family/caregiver  - Collaborate with rehabilitation services on mobility goals if consulted  - Perform Range of Motion 3 times a day  - Reposition patient every 2 hours    - Record patient progress and toleration of activity level   Outcome: Progressing     Problem: PAIN - ADULT  Goal: Verbalizes/displays adequate comfort level or baseline comfort level  Description: Interventions:  - Encourage patient to monitor pain and request assistance  - Assess pain using appropriate pain scale  - Administer analgesics based on type and severity of pain and evaluate response  - Implement non-pharmacological measures as appropriate and evaluate response  - Consider cultural and social influences on pain and pain management  - Notify physician/advanced practitioner if interventions unsuccessful or patient reports new pain  Outcome: Progressing     Problem: INFECTION - ADULT  Goal: Absence or prevention of progression during hospitalization  Description: INTERVENTIONS:  - Assess and monitor for signs and symptoms of infection  - Monitor lab/diagnostic results  - Monitor all insertion sites, i e  indwelling lines, tubes, and drains  - Monitor endotracheal if appropriate and nasal secretions for changes in amount and color  - Sturgeon appropriate cooling/warming therapies per order  - Administer medications as ordered  - Instruct and encourage patient and family to use good hand hygiene technique  - Identify and instruct in appropriate isolation precautions for identified infection/condition  Outcome: Progressing     Problem: SAFETY ADULT  Goal: Maintain or return to baseline ADL function  Description: INTERVENTIONS:  -  Assess patient's ability to carry out ADLs; assess patient's baseline for ADL function and identify physical deficits which impact ability to perform ADLs (bathing, care of mouth/teeth, toileting, grooming, dressing, etc )  - Assess/evaluate cause of self-care deficits   - Assess range of motion  - Assess patient's mobility; develop plan if impaired  - Assess patient's need for assistive devices and provide as appropriate  - Encourage maximum independence but intervene and supervise when necessary  - Involve family in performance of ADLs  - Assess for home care needs following discharge   - Consider OT consult to assist with ADL evaluation and planning for discharge  - Provide patient education as appropriate  Outcome: Progressing  Goal: Maintains/Returns to pre admission functional level  Description: INTERVENTIONS:  - Perform BMAT or MOVE assessment daily    - Set and communicate daily mobility goal to care team and patient/family/caregiver  - Collaborate with rehabilitation services on mobility goals if consulted  - Perform Range of Motion 3 times a day  - Reposition patient every 2 hours    - Record patient progress and toleration of activity level   Outcome: Progressing  Goal: Patient will remain free of falls  Description: INTERVENTIONS:  - Educate patient/family on patient safety including physical limitations  - Instruct patient to call for assistance with activity   - Consult OT/PT to assist with strengthening/mobility   - Keep Call bell within reach  - Keep bed low and locked with side rails adjusted as appropriate  - Keep care items and personal belongings within reach  - Initiate and maintain comfort rounds  - Make Fall Risk Sign visible to staff  - Offer Toileting every 2 Hours, in advance of need  - Initiate/Maintain bed/chair alarm prn  - Apply yellow socks and bracelet for high fall risk patients  - Consider moving patient to room near nurses station  Outcome: Progressing     Problem: DISCHARGE PLANNING  Goal: Discharge to home or other facility with appropriate resources  Description: INTERVENTIONS:  - Identify barriers to discharge w/patient and caregiver  - Arrange for needed discharge resources and transportation as appropriate  - Identify discharge learning needs (meds, wound care, etc )  - Arrange for interpretive services to assist at discharge as needed  - Refer to Case Management Department for coordinating discharge planning if the patient needs post-hospital services based on physician/advanced practitioner order or complex needs related to functional status, cognitive ability, or social support system  Outcome: Progressing     Problem: Knowledge Deficit  Goal: Patient/family/caregiver demonstrates understanding of disease process, treatment plan, medications, and discharge instructions  Description: Complete learning assessment and assess knowledge base    Interventions:  - Provide teaching at level of understanding  - Provide teaching via preferred learning methods  Outcome: Progressing     Problem: CARDIOVASCULAR - ADULT  Goal: Maintains optimal cardiac output and hemodynamic stability  Description: INTERVENTIONS:  - Monitor I/O, vital signs and rhythm  - Monitor for S/S and trends of decreased cardiac output  - Administer and titrate ordered vasoactive medications to optimize hemodynamic stability  - Assess quality of pulses, skin color and temperature  - Assess for signs of decreased coronary artery perfusion  - Instruct patient to report change in severity of symptoms  Outcome: Progressing  Goal: Absence of cardiac dysrhythmias or at baseline rhythm  Description: INTERVENTIONS:  - Continuous cardiac monitoring, vital signs, obtain 12 lead EKG if ordered  - Administer antiarrhythmic and heart rate control medications as ordered  - Monitor electrolytes and administer replacement therapy as ordered  Outcome: Progressing     Problem: METABOLIC, FLUID AND ELECTROLYTES - ADULT  Goal: Electrolytes maintained within normal limits  Description: INTERVENTIONS:  - Monitor labs and assess patient for signs and symptoms of electrolyte imbalances  - Administer electrolyte replacement as ordered  - Monitor response to electrolyte replacements, including repeat lab results as appropriate  - Instruct patient on fluid and nutrition as appropriate  Outcome: Progressing  Goal: Fluid balance maintained  Description: INTERVENTIONS:  - Monitor labs   - Monitor I/O and WT  - Instruct patient on fluid and nutrition as appropriate  - Assess for signs & symptoms of volume excess or deficit  Outcome: Progressing  Goal: Glucose maintained within target range  Description: INTERVENTIONS:  - Monitor Blood Glucose as ordered  - Assess for signs and symptoms of hyperglycemia and hypoglycemia  - Administer ordered medications to maintain glucose within target range  - Assess nutritional intake and initiate nutrition service referral as needed  Outcome: Progressing     Problem: SKIN/TISSUE INTEGRITY - ADULT  Goal: Incision(s), wounds(s) or drain site(s) healing without S/S of infection  Description: INTERVENTIONS  - Assess and document dressing, incision, wound bed, drain sites and surrounding tissue  - Provide patient and family education  - Perform skin care/dressing changes as needed  Outcome: Progressing  Goal: Pressure injury heals and does not worsen  Description: Interventions:  - Implement low air loss mattress or specialty surface (Criteria met)  - Apply silicone foam dressing  - Use special pressure reducing interventions such as waffle cushion when in chair   - Apply fecal or urinary incontinence containment device   - Perform passive or active ROM every 8  - Turn and reposition patient & offload bony prominences every 2 hours   - Utilize friction reducing device or surface for transfers   - Consider consults to  interdisciplinary teams such as wound team  - Use incontinent care products after each incontinent episode such as barrier cream  - Consider nutrition services referral as needed  Outcome: Progressing     Problem: Potential for Falls  Goal: Patient will remain free of falls  Description: INTERVENTIONS:  - Educate patient/family on patient safety including physical limitations  - Instruct patient to call for assistance with activity   - Consult OT/PT to assist with strengthening/mobility   - Keep Call bell within reach  - Keep bed low and locked with side rails adjusted as appropriate  - Keep care items and personal belongings within reach  - Initiate and maintain comfort rounds  - Make Fall Risk Sign visible to staff  - Offer Toileting every 2 Hours, in advance of need  - Initiate/Maintain bed/chair alarm prn  - Apply yellow socks and bracelet for high fall risk patients  - Consider moving patient to room near nurses station  Outcome: Progressing

## 2022-04-01 NOTE — PLAN OF CARE
Problem: PHYSICAL THERAPY ADULT  Goal: Performs mobility at highest level of function for planned discharge setting  See evaluation for individualized goals  Description: Treatment/Interventions: Functional transfer training,LE strengthening/ROM,Therapeutic exercise,Endurance training,Patient/family training,Equipment eval/education,Bed mobility,Gait training,Compensatory technique education,Spoke to nursing,OT  Equipment Recommended:  (TBD by rehab)       See flowsheet documentation for full assessment, interventions and recommendations  Outcome: Progressing  Note: Prognosis: Good  Problem List: Decreased strength,Decreased endurance,Impaired balance,Decreased mobility,Impaired judgement,Decreased safety awareness,Obesity  Assessment: Pt seen for PT treatment session this date with interventions consisting of gait training w/ emphasis on improving pt's ability to ambulate level surfaces x 25' + 15' with steadying assist provided by therapist with RW and therapeutic activity consisting of training: sit<>stand transfers  Pt agreeable to PT treatment session upon arrival, pt found seated OOB in recliner, in no apparent distress  In comparison to previous session, pt with improvements in pt requires decreased assistance for mobility and is able to ambulate increased distance this session  Post session: pt returned back to recliner, chair alarm engaged, all needs in reach and RN notified of session findings/recommendations Continue to recommend STR at time of d/c in order to maximize pt's functional independence and safety w/ mobility  Pt continues to be functioning below baseline level, and remains limited 2* factors listed above and including decreased strength, balance, mobility, and activity tolerance  PT will continue to see pt while here in order to address the deficits listed above and provide interventions consistent w/ POC in effort to achieve STGs     Barriers to Discharge: Inaccessible home environment,Decreased caregiver support  Barriers to Discharge Comments: Pt requires increased assistance for mobility and lives alone     PT Discharge Recommendation: Post acute rehabilitation services          See flowsheet documentation for full assessment

## 2022-04-02 LAB
ANION GAP SERPL CALCULATED.3IONS-SCNC: 6 MMOL/L (ref 4–13)
APTT PPP: 87 SECONDS (ref 23–37)
BACTERIA BLD CULT: NORMAL
BACTERIA SPEC ANAEROBE CULT: NORMAL
BACTERIA WND AEROBE CULT: ABNORMAL
BUN SERPL-MCNC: 32 MG/DL (ref 5–25)
CALCIUM SERPL-MCNC: 7.6 MG/DL (ref 8.3–10.1)
CHLORIDE SERPL-SCNC: 102 MMOL/L (ref 100–108)
CO2 SERPL-SCNC: 27 MMOL/L (ref 21–32)
CREAT SERPL-MCNC: 1.2 MG/DL (ref 0.6–1.3)
ERYTHROCYTE [DISTWIDTH] IN BLOOD BY AUTOMATED COUNT: 16.4 % (ref 11.6–15.1)
GFR SERPL CREATININE-BSD FRML MDRD: 42 ML/MIN/1.73SQ M
GLUCOSE SERPL-MCNC: 78 MG/DL (ref 65–140)
GRAM STN SPEC: ABNORMAL
GRAM STN SPEC: ABNORMAL
HCT VFR BLD AUTO: 24.8 % (ref 34.8–46.1)
HGB BLD-MCNC: 8.1 G/DL (ref 11.5–15.4)
MAGNESIUM SERPL-MCNC: 1.9 MG/DL (ref 1.6–2.6)
MCH RBC QN AUTO: 34.8 PG (ref 26.8–34.3)
MCHC RBC AUTO-ENTMCNC: 32.7 G/DL (ref 31.4–37.4)
MCV RBC AUTO: 106 FL (ref 82–98)
PLATELET # BLD AUTO: 123 THOUSANDS/UL (ref 149–390)
PMV BLD AUTO: 10.3 FL (ref 8.9–12.7)
POTASSIUM SERPL-SCNC: 4.1 MMOL/L (ref 3.5–5.3)
RBC # BLD AUTO: 2.33 MILLION/UL (ref 3.81–5.12)
SODIUM SERPL-SCNC: 135 MMOL/L (ref 136–145)
WBC # BLD AUTO: 6.97 THOUSAND/UL (ref 4.31–10.16)

## 2022-04-02 PROCEDURE — 99232 SBSQ HOSP IP/OBS MODERATE 35: CPT | Performed by: INTERNAL MEDICINE

## 2022-04-02 PROCEDURE — 85027 COMPLETE CBC AUTOMATED: CPT | Performed by: NURSE PRACTITIONER

## 2022-04-02 PROCEDURE — 85730 THROMBOPLASTIN TIME PARTIAL: CPT | Performed by: FAMILY MEDICINE

## 2022-04-02 PROCEDURE — 80048 BASIC METABOLIC PNL TOTAL CA: CPT | Performed by: NURSE PRACTITIONER

## 2022-04-02 PROCEDURE — 83735 ASSAY OF MAGNESIUM: CPT | Performed by: NURSE PRACTITIONER

## 2022-04-02 RX ORDER — MIDODRINE HYDROCHLORIDE 5 MG/1
2.5 TABLET ORAL
Status: DISCONTINUED | OUTPATIENT
Start: 2022-04-02 | End: 2022-04-04 | Stop reason: HOSPADM

## 2022-04-02 RX ORDER — WARFARIN SODIUM 5 MG/1
10 TABLET ORAL
Status: DISCONTINUED | OUTPATIENT
Start: 2022-04-02 | End: 2022-04-04 | Stop reason: HOSPADM

## 2022-04-02 RX ADMIN — MIDODRINE HYDROCHLORIDE 5 MG: 5 TABLET ORAL at 11:09

## 2022-04-02 RX ADMIN — CEFAZOLIN SODIUM 2000 MG: 2 SOLUTION INTRAVENOUS at 05:27

## 2022-04-02 RX ADMIN — WARFARIN SODIUM 10 MG: 5 TABLET ORAL at 16:51

## 2022-04-02 RX ADMIN — MIDODRINE HYDROCHLORIDE 2.5 MG: 5 TABLET ORAL at 16:47

## 2022-04-02 RX ADMIN — Medication 6 MG: at 22:26

## 2022-04-02 RX ADMIN — AMIODARONE HYDROCHLORIDE 200 MG: 200 TABLET ORAL at 20:47

## 2022-04-02 RX ADMIN — VANCOMYCIN HYDROCHLORIDE 1500 MG: 1 INJECTION, POWDER, LYOPHILIZED, FOR SOLUTION INTRAVENOUS at 16:47

## 2022-04-02 RX ADMIN — MIDODRINE HYDROCHLORIDE 5 MG: 5 TABLET ORAL at 08:18

## 2022-04-02 RX ADMIN — HEPARIN SODIUM 8.1 UNITS/KG/HR: 10000 INJECTION, SOLUTION INTRAVENOUS at 13:55

## 2022-04-02 RX ADMIN — LEVOTHYROXINE SODIUM 150 MCG: 150 TABLET ORAL at 05:27

## 2022-04-02 RX ADMIN — CEFAZOLIN SODIUM 2000 MG: 2 SOLUTION INTRAVENOUS at 13:51

## 2022-04-02 NOTE — ASSESSMENT & PLAN NOTE
· Sepsis secondary to cellulitis and abscess of right thigh  · Currently on cefazolin however given growth of MRSA will change to IV vancomycin  · Infectious disease consulted  · Positive blood cultures likely contaminant per ID    Repeat blood cultures remain negative

## 2022-04-02 NOTE — PLAN OF CARE
Pt slept through shift after given melatonin last evening  VS remain stable  Pt on heparin gtt for afib  Pt for labs this am   Pt working with PT/OT  Surgery following for leg wound  Will continue to monitor and support pt  Problem: Nutrition/Hydration-ADULT  Goal: Nutrient/Hydration intake appropriate for improving, restoring or maintaining nutritional needs  Description: Monitor and assess patient's nutrition/hydration status for malnutrition  Collaborate with interdisciplinary team and initiate plan and interventions as ordered  Monitor patient's weight and dietary intake as ordered or per policy  Utilize nutrition screening tool and intervene as necessary  Determine patient's food preferences and provide high-protein, high-caloric foods as appropriate       INTERVENTIONS:  - Monitor oral intake, urinary output, labs, and treatment plans  - Assess nutrition and hydration status and recommend course of action  - Evaluate amount of meals eaten  - Assist patient with eating if necessary   - Allow adequate time for meals  - Recommend/ encourage appropriate diets, oral nutritional supplements, and vitamin/mineral supplements  - Order, calculate, and assess calorie counts as needed  - Recommend, monitor, and adjust tube feedings and TPN/PPN based on assessed needs  - Assess need for intravenous fluids  - Provide specific nutrition/hydration education as appropriate  - Include patient/family/caregiver in decisions related to nutrition  Outcome: Progressing     Problem: Prexisting or High Potential for Compromised Skin Integrity  Goal: Skin integrity is maintained or improved  Description: INTERVENTIONS:  - Identify patients at risk for skin breakdown  - Assess and monitor skin integrity  - Assess and monitor nutrition and hydration status  - Monitor labs   - Assess for incontinence   - Turn and reposition patient  - Assist with mobility/ambulation  - Relieve pressure over bony prominences  - Avoid friction and shearing  - Provide appropriate hygiene as needed including keeping skin clean and dry  - Evaluate need for skin moisturizer/barrier cream  - Collaborate with interdisciplinary team   - Patient/family teaching  - Consider wound care consult   Outcome: Progressing     Problem: MOBILITY - ADULT  Goal: Maintain or return to baseline ADL function  Description: INTERVENTIONS:  -  Assess patient's ability to carry out ADLs; assess patient's baseline for ADL function and identify physical deficits which impact ability to perform ADLs (bathing, care of mouth/teeth, toileting, grooming, dressing, etc )  - Assess/evaluate cause of self-care deficits   - Assess range of motion  - Assess patient's mobility; develop plan if impaired  - Assess patient's need for assistive devices and provide as appropriate  - Encourage maximum independence but intervene and supervise when necessary  - Involve family in performance of ADLs  - Assess for home care needs following discharge   - Consider OT consult to assist with ADL evaluation and planning for discharge  - Provide patient education as appropriate  Outcome: Progressing  Goal: Maintains/Returns to pre admission functional level  Description: INTERVENTIONS:  - Perform BMAT or MOVE assessment daily    - Set and communicate daily mobility goal to care team and patient/family/caregiver  - Collaborate with rehabilitation services on mobility goals if consulted  - Perform Range of Motion 3 times a day  - Reposition patient every 2 hours    - Record patient progress and toleration of activity level   Outcome: Progressing     Problem: PAIN - ADULT  Goal: Verbalizes/displays adequate comfort level or baseline comfort level  Description: Interventions:  - Encourage patient to monitor pain and request assistance  - Assess pain using appropriate pain scale  - Administer analgesics based on type and severity of pain and evaluate response  - Implement non-pharmacological measures as appropriate and evaluate response  - Consider cultural and social influences on pain and pain management  - Notify physician/advanced practitioner if interventions unsuccessful or patient reports new pain  Outcome: Progressing     Problem: INFECTION - ADULT  Goal: Absence or prevention of progression during hospitalization  Description: INTERVENTIONS:  - Assess and monitor for signs and symptoms of infection  - Monitor lab/diagnostic results  - Monitor all insertion sites, i e  indwelling lines, tubes, and drains  - Monitor endotracheal if appropriate and nasal secretions for changes in amount and color  - [place] appropriate cooling/warming therapies per order  - Administer medications as ordered  - Instruct and encourage patient and family to use good hand hygiene technique  - Identify and instruct in appropriate isolation precautions for identified infection/condition  Outcome: Progressing     Problem: SAFETY ADULT  Goal: Maintain or return to baseline ADL function  Description: INTERVENTIONS:  -  Assess patient's ability to carry out ADLs; assess patient's baseline for ADL function and identify physical deficits which impact ability to perform ADLs (bathing, care of mouth/teeth, toileting, grooming, dressing, etc )  - Assess/evaluate cause of self-care deficits   - Assess range of motion  - Assess patient's mobility; develop plan if impaired  - Assess patient's need for assistive devices and provide as appropriate  - Encourage maximum independence but intervene and supervise when necessary  - Involve family in performance of ADLs  - Assess for home care needs following discharge   - Consider OT consult to assist with ADL evaluation and planning for discharge  - Provide patient education as appropriate  Outcome: Progressing  Goal: Maintains/Returns to pre admission functional level  Description: INTERVENTIONS:  - Perform BMAT or MOVE assessment daily    - Set and communicate daily mobility goal to care team and patient/family/caregiver  - Collaborate with rehabilitation services on mobility goals if consulted  - Perform Range of Motion 3 times a day  - Reposition patient every 2 hours  - Record patient progress and toleration of activity level   Outcome: Progressing  Goal: Patient will remain free of falls  Description: INTERVENTIONS:  - Educate patient/family on patient safety including physical limitations  - Instruct patient to call for assistance with activity   - Consult OT/PT to assist with strengthening/mobility   - Keep Call bell within reach  - Keep bed low and locked with side rails adjusted as appropriate  - Keep care items and personal belongings within reach  - Initiate and maintain comfort rounds  - Make Fall Risk Sign visible to staff  - Offer Toileting every 2 Hours, in advance of need  - Initiate/Maintain bed/chair alarm prn  - Apply yellow socks and bracelet for high fall risk patients  - Consider moving patient to room near nurses station  Outcome: Progressing     Problem: DISCHARGE PLANNING  Goal: Discharge to home or other facility with appropriate resources  Description: INTERVENTIONS:  - Identify barriers to discharge w/patient and caregiver  - Arrange for needed discharge resources and transportation as appropriate  - Identify discharge learning needs (meds, wound care, etc )  - Arrange for interpretive services to assist at discharge as needed  - Refer to Case Management Department for coordinating discharge planning if the patient needs post-hospital services based on physician/advanced practitioner order or complex needs related to functional status, cognitive ability, or social support system  Outcome: Progressing     Problem: Knowledge Deficit  Goal: Patient/family/caregiver demonstrates understanding of disease process, treatment plan, medications, and discharge instructions  Description: Complete learning assessment and assess knowledge base    Interventions:  - Provide teaching at level of understanding  - Provide teaching via preferred learning methods  Outcome: Progressing     Problem: CARDIOVASCULAR - ADULT  Goal: Maintains optimal cardiac output and hemodynamic stability  Description: INTERVENTIONS:  - Monitor I/O, vital signs and rhythm  - Monitor for S/S and trends of decreased cardiac output  - Administer and titrate ordered vasoactive medications to optimize hemodynamic stability  - Assess quality of pulses, skin color and temperature  - Assess for signs of decreased coronary artery perfusion  - Instruct patient to report change in severity of symptoms  Outcome: Progressing  Goal: Absence of cardiac dysrhythmias or at baseline rhythm  Description: INTERVENTIONS:  - Continuous cardiac monitoring, vital signs, obtain 12 lead EKG if ordered  - Administer antiarrhythmic and heart rate control medications as ordered  - Monitor electrolytes and administer replacement therapy as ordered  Outcome: Progressing     Problem: METABOLIC, FLUID AND ELECTROLYTES - ADULT  Goal: Electrolytes maintained within normal limits  Description: INTERVENTIONS:  - Monitor labs and assess patient for signs and symptoms of electrolyte imbalances  - Administer electrolyte replacement as ordered  - Monitor response to electrolyte replacements, including repeat lab results as appropriate  - Instruct patient on fluid and nutrition as appropriate  Outcome: Progressing  Goal: Fluid balance maintained  Description: INTERVENTIONS:  - Monitor labs   - Monitor I/O and WT  - Instruct patient on fluid and nutrition as appropriate  - Assess for signs & symptoms of volume excess or deficit  Outcome: Progressing  Goal: Glucose maintained within target range  Description: INTERVENTIONS:  - Monitor Blood Glucose as ordered  - Assess for signs and symptoms of hyperglycemia and hypoglycemia  - Administer ordered medications to maintain glucose within target range  - Assess nutritional intake and initiate nutrition service referral as needed  Outcome: Progressing     Problem: SKIN/TISSUE INTEGRITY - ADULT  Goal: Incision(s), wounds(s) or drain site(s) healing without S/S of infection  Description: INTERVENTIONS  - Assess and document dressing, incision, wound bed, drain sites and surrounding tissue  - Provide patient and family education  - Perform skin care/dressing changes as needed  Outcome: Progressing  Goal: Pressure injury heals and does not worsen  Description: Interventions:  - Implement low air loss mattress or specialty surface (Criteria met)  - Apply silicone foam dressing  - Use special pressure reducing interventions such as waffle cushion when in chair   - Apply fecal or urinary incontinence containment device   - Perform passive or active ROM every 8  - Turn and reposition patient & offload bony prominences every 2 hours   - Utilize friction reducing device or surface for transfers   - Consider consults to  interdisciplinary teams such as wound team  - Use incontinent care products after each incontinent episode such as barrier cream  - Consider nutrition services referral as needed  Outcome: Progressing     Problem: Potential for Falls  Goal: Patient will remain free of falls  Description: INTERVENTIONS:  - Educate patient/family on patient safety including physical limitations  - Instruct patient to call for assistance with activity   - Consult OT/PT to assist with strengthening/mobility   - Keep Call bell within reach  - Keep bed low and locked with side rails adjusted as appropriate  - Keep care items and personal belongings within reach  - Initiate and maintain comfort rounds  - Make Fall Risk Sign visible to staff  - Offer Toileting every 2 Hours, in advance of need  - Initiate/Maintain bed/chair alarm prn  - Apply yellow socks and bracelet for high fall risk patients  - Consider moving patient to room near nurses station  Outcome: Progressing

## 2022-04-02 NOTE — PLAN OF CARE
Problem: Nutrition/Hydration-ADULT  Goal: Nutrient/Hydration intake appropriate for improving, restoring or maintaining nutritional needs  Description: Monitor and assess patient's nutrition/hydration status for malnutrition  Collaborate with interdisciplinary team and initiate plan and interventions as ordered  Monitor patient's weight and dietary intake as ordered or per policy  Utilize nutrition screening tool and intervene as necessary  Determine patient's food preferences and provide high-protein, high-caloric foods as appropriate       INTERVENTIONS:  - Monitor oral intake, urinary output, labs, and treatment plans  - Assess nutrition and hydration status and recommend course of action  - Evaluate amount of meals eaten  - Assist patient with eating if necessary   - Allow adequate time for meals  - Recommend/ encourage appropriate diets, oral nutritional supplements, and vitamin/mineral supplements  - Order, calculate, and assess calorie counts as needed  - Recommend, monitor, and adjust tube feedings and TPN/PPN based on assessed needs  - Assess need for intravenous fluids  - Provide specific nutrition/hydration education as appropriate  - Include patient/family/caregiver in decisions related to nutrition  Outcome: Progressing     Problem: Prexisting or High Potential for Compromised Skin Integrity  Goal: Skin integrity is maintained or improved  Description: INTERVENTIONS:  - Identify patients at risk for skin breakdown  - Assess and monitor skin integrity  - Assess and monitor nutrition and hydration status  - Monitor labs   - Assess for incontinence   - Turn and reposition patient  - Assist with mobility/ambulation  - Relieve pressure over bony prominences  - Avoid friction and shearing  - Provide appropriate hygiene as needed including keeping skin clean and dry  - Evaluate need for skin moisturizer/barrier cream  - Collaborate with interdisciplinary team   - Patient/family teaching  - Consider wound care consult   Outcome: Progressing     Problem: MOBILITY - ADULT  Goal: Maintain or return to baseline ADL function  Description: INTERVENTIONS:  -  Assess patient's ability to carry out ADLs; assess patient's baseline for ADL function and identify physical deficits which impact ability to perform ADLs (bathing, care of mouth/teeth, toileting, grooming, dressing, etc )  - Assess/evaluate cause of self-care deficits   - Assess range of motion  - Assess patient's mobility; develop plan if impaired  - Assess patient's need for assistive devices and provide as appropriate  - Encourage maximum independence but intervene and supervise when necessary  - Involve family in performance of ADLs  - Assess for home care needs following discharge   - Consider OT consult to assist with ADL evaluation and planning for discharge  - Provide patient education as appropriate  Outcome: Progressing  Goal: Maintains/Returns to pre admission functional level  Description: INTERVENTIONS:  - Perform BMAT or MOVE assessment daily    - Set and communicate daily mobility goal to care team and patient/family/caregiver  - Collaborate with rehabilitation services on mobility goals if consulted  - Perform Range of Motion 3 times a day  - Reposition patient every 2 hours    - Record patient progress and toleration of activity level   Outcome: Progressing     Problem: PAIN - ADULT  Goal: Verbalizes/displays adequate comfort level or baseline comfort level  Description: Interventions:  - Encourage patient to monitor pain and request assistance  - Assess pain using appropriate pain scale  - Administer analgesics based on type and severity of pain and evaluate response  - Implement non-pharmacological measures as appropriate and evaluate response  - Consider cultural and social influences on pain and pain management  - Notify physician/advanced practitioner if interventions unsuccessful or patient reports new pain  Outcome: Progressing     Problem: INFECTION - ADULT  Goal: Absence or prevention of progression during hospitalization  Description: INTERVENTIONS:  - Assess and monitor for signs and symptoms of infection  - Monitor lab/diagnostic results  - Monitor all insertion sites, i e  indwelling lines, tubes, and drains  - Monitor endotracheal if appropriate and nasal secretions for changes in amount and color  - [place] appropriate cooling/warming therapies per order  - Administer medications as ordered  - Instruct and encourage patient and family to use good hand hygiene technique  - Identify and instruct in appropriate isolation precautions for identified infection/condition  Outcome: Progressing     Problem: SAFETY ADULT  Goal: Maintain or return to baseline ADL function  Description: INTERVENTIONS:  -  Assess patient's ability to carry out ADLs; assess patient's baseline for ADL function and identify physical deficits which impact ability to perform ADLs (bathing, care of mouth/teeth, toileting, grooming, dressing, etc )  - Assess/evaluate cause of self-care deficits   - Assess range of motion  - Assess patient's mobility; develop plan if impaired  - Assess patient's need for assistive devices and provide as appropriate  - Encourage maximum independence but intervene and supervise when necessary  - Involve family in performance of ADLs  - Assess for home care needs following discharge   - Consider OT consult to assist with ADL evaluation and planning for discharge  - Provide patient education as appropriate  Outcome: Progressing  Goal: Maintains/Returns to pre admission functional level  Description: INTERVENTIONS:  - Perform BMAT or MOVE assessment daily    - Set and communicate daily mobility goal to care team and patient/family/caregiver  - Collaborate with rehabilitation services on mobility goals if consulted  - Perform Range of Motion 3 times a day  - Reposition patient every 2 hours    - Record patient progress and toleration of activity level   Outcome: Progressing  Goal: Patient will remain free of falls  Description: INTERVENTIONS:  - Educate patient/family on patient safety including physical limitations  - Instruct patient to call for assistance with activity   - Consult OT/PT to assist with strengthening/mobility   - Keep Call bell within reach  - Keep bed low and locked with side rails adjusted as appropriate  - Keep care items and personal belongings within reach  - Initiate and maintain comfort rounds  - Make Fall Risk Sign visible to staff  - Offer Toileting every 2 Hours, in advance of need  - Initiate/Maintain bed/chair alarm prn  - Apply yellow socks and bracelet for high fall risk patients  - Consider moving patient to room near nurses station  Outcome: Progressing     Problem: DISCHARGE PLANNING  Goal: Discharge to home or other facility with appropriate resources  Description: INTERVENTIONS:  - Identify barriers to discharge w/patient and caregiver  - Arrange for needed discharge resources and transportation as appropriate  - Identify discharge learning needs (meds, wound care, etc )  - Arrange for interpretive services to assist at discharge as needed  - Refer to Case Management Department for coordinating discharge planning if the patient needs post-hospital services based on physician/advanced practitioner order or complex needs related to functional status, cognitive ability, or social support system  Outcome: Progressing     Problem: Knowledge Deficit  Goal: Patient/family/caregiver demonstrates understanding of disease process, treatment plan, medications, and discharge instructions  Description: Complete learning assessment and assess knowledge base    Interventions:  - Provide teaching at level of understanding  - Provide teaching via preferred learning methods  Outcome: Progressing     Problem: CARDIOVASCULAR - ADULT  Goal: Maintains optimal cardiac output and hemodynamic stability  Description: INTERVENTIONS:  - Monitor I/O, vital signs and rhythm  - Monitor for S/S and trends of decreased cardiac output  - Administer and titrate ordered vasoactive medications to optimize hemodynamic stability  - Assess quality of pulses, skin color and temperature  - Assess for signs of decreased coronary artery perfusion  - Instruct patient to report change in severity of symptoms  Outcome: Progressing  Goal: Absence of cardiac dysrhythmias or at baseline rhythm  Description: INTERVENTIONS:  - Continuous cardiac monitoring, vital signs, obtain 12 lead EKG if ordered  - Administer antiarrhythmic and heart rate control medications as ordered  - Monitor electrolytes and administer replacement therapy as ordered  Outcome: Progressing     Problem: METABOLIC, FLUID AND ELECTROLYTES - ADULT  Goal: Electrolytes maintained within normal limits  Description: INTERVENTIONS:  - Monitor labs and assess patient for signs and symptoms of electrolyte imbalances  - Administer electrolyte replacement as ordered  - Monitor response to electrolyte replacements, including repeat lab results as appropriate  - Instruct patient on fluid and nutrition as appropriate  Outcome: Progressing  Goal: Fluid balance maintained  Description: INTERVENTIONS:  - Monitor labs   - Monitor I/O and WT  - Instruct patient on fluid and nutrition as appropriate  - Assess for signs & symptoms of volume excess or deficit  Outcome: Progressing  Goal: Glucose maintained within target range  Description: INTERVENTIONS:  - Monitor Blood Glucose as ordered  - Assess for signs and symptoms of hyperglycemia and hypoglycemia  - Administer ordered medications to maintain glucose within target range  - Assess nutritional intake and initiate nutrition service referral as needed  Outcome: Progressing     Problem: SKIN/TISSUE INTEGRITY - ADULT  Goal: Incision(s), wounds(s) or drain site(s) healing without S/S of infection  Description: INTERVENTIONS  - Assess and document dressing, incision, wound bed, drain sites and surrounding tissue  - Provide patient and family education  - Perform skin care/dressing changes as needed  Outcome: Progressing  Goal: Pressure injury heals and does not worsen  Description: Interventions:  - Implement low air loss mattress or specialty surface (Criteria met)  - Apply silicone foam dressing  - Use special pressure reducing interventions such as waffle cushion when in chair   - Apply fecal or urinary incontinence containment device   - Perform passive or active ROM every 8  - Turn and reposition patient & offload bony prominences every 2 hours   - Utilize friction reducing device or surface for transfers   - Consider consults to  interdisciplinary teams such as wound team  - Use incontinent care products after each incontinent episode such as barrier cream  - Consider nutrition services referral as needed  Outcome: Progressing     Problem: Potential for Falls  Goal: Patient will remain free of falls  Description: INTERVENTIONS:  - Educate patient/family on patient safety including physical limitations  - Instruct patient to call for assistance with activity   - Consult OT/PT to assist with strengthening/mobility   - Keep Call bell within reach  - Keep bed low and locked with side rails adjusted as appropriate  - Keep care items and personal belongings within reach  - Initiate and maintain comfort rounds  - Make Fall Risk Sign visible to staff  - Offer Toileting every 2 Hours, in advance of need  - Initiate/Maintain bed/chair alarm prn  - Apply yellow socks and bracelet for high fall risk patients  - Consider moving patient to room near nurses station  Outcome: Progressing

## 2022-04-02 NOTE — PROGRESS NOTES
[address]  Progress Note - [place] Listen 1940, 80 y o  female MRN: [id number]  Unit/Bed#: -01 Encounter: [id number]  Primary Care Provider: Thuan Kelley MD   Date and time admitted to hospital: 3/25/2022  3:02 PM    * Septic shock [facility])  Assessment & Plan  · Sepsis secondary to cellulitis and abscess of right thigh  · Currently on cefazolin however given growth of MRSA will change to IV vancomycin  · Infectious disease consulted  · Positive blood cultures likely contaminant per ID  Repeat blood cultures remain negative    Atrial fibrillation with RVR ([address] )  Assessment & Plan  · Tachycardic on admission secondary to septic shock  · Metoprolol held due to hypotension  · Continue amiodarone loading  · On heparin infusion:  Okay with surgery to restart warfarin bridging    Results from last 7 days   Lab Units 03/31/22  0448 03/30/22  0441 03/29/22  0434 03/28/22  0801 03/27/22  1556 03/27/22  0433   INR  1 80* 1 79* 1 72* 1 91* 1 85* 2 01*       Aortic stenosis, severe  Assessment & Plan  · Severe AS noted    Cirrhosis (HCC)  Assessment & Plan  · Cirrhosis noted on imaging  GI was consulted    Outpatient follow-up    Results from last 7 days   Lab Units 04/01/22  0448 03/31/22  0448 03/30/22  0441 03/29/22  0434 03/28/22  0438 03/27/22  0433   AST U/L 43 52* 42 48* 56* 62*   ALT U/L 15 28 29 35 37 35   TOTAL BILIRUBIN mg/dL 2 02* 2 43* 2 48* 4 15* 5 74* 6 35*       Moderate protein-calorie malnutrition (HCC)  Assessment & Plan  Malnutrition Findings:   Adult Malnutrition type: Chronic illness  Adult Degree of Malnutrition: Malnutrition of moderate degree  Malnutrition Characteristics: Muscle loss,Fat loss,Weight loss  360 Statement: Chronic moderate malnutrition related to decreased appetite as evidenced by 11 6% weight loss x 4 mo (11/23/21 294lb, 3/28/22 260lb, suspect partly fluid related), moderate fat loss to orbitals, moderate muscle loss to temporalis, deltoid and interroseous muscles  Treated with: Will liberalize cardiac to 4gm GLADYS, continue CCD 3 at this time  Will trial glucerna daily and ensure max protein daily, increase as tolerated/indicated  Recommend daily weights  BMI Findings: Body mass index is 46 65 kg/m²  Hypothyroidism  Assessment & Plan  · Continue levothyroxine    Impaired skin integrity  Assessment & Plan  · Wound care consulted    Traumatic hematoma of head  Assessment & Plan  · Traumatic hematoma after fall/being found down  · No active bleeding    LEONARDO (acute kidney injury) (HCC)-resolved as of 3/30/2022  Assessment & Plan  · Acute kidney injury secondary to septic shock  Resolved  Results from last 7 days   Lab Units 04/02/22  0526 04/01/22  0448 03/31/22  0448 03/30/22  0441 03/29/22  0434 03/28/22  0438 03/27/22  1557 03/27/22  0433 03/26/22  1621 03/26/22  1621   BUN mg/dL 32* 28* 28* 35* 46* 57* 63* 63*   < > 66*   CREATININE mg/dL 1 20 1 15 1 17 1 17 1 39* 1 53* 1 65* 1 77*   < > 1 82*   EGFR ml/min/1 73sq m 42 44 43 43 35 31 28 26  --  25    < > = values in this interval not displayed  VTE Pharmacologic Prophylaxis: VTE Score: 8 High Risk (Score >/= 5) - Pharmacological DVT Prophylaxis Ordered: Heparin Drip  Sequential Compression Devices Ordered  Patient Centered Rounds: I have performed bedside rounds with nursing staff today  Discussions with Specialists or Other Care Team Provider:     Education and Discussions with Family / Patient:  Case discussed with daughter on telephone    Time Spent for Care: 25 mins  More than 50% of total time spent on counseling and coordination of care as described above  Current Length of Stay: 8 day(s)  Current Patient Status: Inpatient   Certification Statement: The patient will continue to require additional inpatient hospital stay due to sepsis and will need rehab placement  Discharge Plan / Estimated Discharge Date: Anticipate discharge in 48-72 hrs to rehab facility      Code Status: Level 2 - DNAR: but accepts endotracheal intubation      Subjective:   Patient seen and examined during morning rounds  Feeling pretty good, no complaints  No pain  Objective:   Vitals: Blood pressure 106/53, pulse 58, temperature 97 6 °F (36 4 °C), temperature source Temporal, resp  rate 12, height 5' 2" (1 575 m), weight 117 kg (258 lb 13 1 oz), SpO2 96 %  Physical Exam  Vitals reviewed  Constitutional:       General: She is not in acute distress  Appearance: Normal appearance  HENT:      Head:      Comments: Hematoma right eyebrow  Cardiovascular:      Rate and Rhythm: Regular rhythm  Heart sounds: Murmur heard  Pulmonary:      Breath sounds: Decreased breath sounds present  No wheezing  Abdominal:      General: Bowel sounds are normal       Palpations: Abdomen is soft  Tenderness: There is no guarding or rebound  Musculoskeletal:         General: Swelling present  Skin:     General: Skin is warm  Neurological:      General: No focal deficit present  Mental Status: She is alert     Psychiatric:         Mood and Affect: Mood normal        Additional Data:   Labs:  Results from last 7 days   Lab Units 04/02/22  0526 04/01/22  0448 03/31/22  0448 03/30/22  0441 03/29/22  0434 03/28/22  0801 03/28/22  0438 03/27/22  1604 03/27/22  1556 03/27/22  0433   WBC Thousand/uL 6 97 7 66 10 75* 9 71 12 48*  --  9 42 13 46*  --  16 50*   HEMOGLOBIN g/dL 8 1* 8 3* 9 8* 10 3* 10 8*  --  9 8* 10 3*  --  10 1*   HEMATOCRIT % 24 8* 24 1* 29 7* 28 6* 30 8*  --  27 9* 29 5*  --  27 5*   MCV fL 106* 102* 105* 99* 100*  --  100* 100*  --  97   TOTAL NEUT ABS Thousand/uL  --   --  6 56  --   --   --   --   --   --   --    BANDS PCT %  --   --  3  --   --   --   --   --   --   --    PLATELETS Thousands/uL 123* 117* 155 165 180  --  151 175  --  178   INR   --   --  1 80* 1 79* 1 72* 1 91*  --   --  1 85* 2 01*     Results from last 7 days   Lab Units 04/02/22  0526 04/01/22  0448 03/31/22  0448 03/30/22  0441 03/29/22  0434 03/28/22  0438 03/27/22  1557 03/27/22  0433 03/26/22  1621   SODIUM mmol/L 135* 135* 135* 138 135* 134* 133* 134* 133*   POTASSIUM mmol/L 4 1 4 4 4 2 3 7 3 4* 3 6 3 5 4 1 3 7   CHLORIDE mmol/L 102 105 104 105 102 101 101 102 101   CO2 mmol/L 27 28 27 27 26 25 23 22 22   ANION GAP mmol/L 6 2* 4 6 7 8 9 10 10   BUN mg/dL 32* 28* 28* 35* 46* 57* 63* 63* 66*   CREATININE mg/dL 1 20 1 15 1 17 1 17 1 39* 1 53* 1 65* 1 77* 1 82*   CALCIUM mg/dL 7 6* 7 7* 7 9* 7 9* 8 1* 8 1* 8 4 8 0* 8 1*   ALBUMIN g/dL  --  1 7* 1 8* 1 8* 1 9* 1 9*  --  2 2*  --    TOTAL BILIRUBIN mg/dL  --  2 02* 2 43* 2 48* 4 15* 5 74*  --  6 35*  --    ALK PHOS U/L  --  141* 182* 166* 146* 130*  --  123*  --    ALT U/L  --  15 28 29 35 37  --  35  --    AST U/L  --  43 52* 42 48* 56*  --  62*  --    EGFR ml/min/1 73sq m 42 44 43 43 35 31 28 26 25   GLUCOSE RANDOM mg/dL 78 77 87 113 104 108 132 119 132     Results from last 7 days   Lab Units 04/02/22  0526 04/01/22  0448 03/30/22  0441 03/27/22  1557 03/27/22  1347   MAGNESIUM mg/dL 1 9 1 9 2 0 2 5 2 0                  Results from last 7 days   Lab Units 03/31/22 0448   PROCALCITONIN ng/ml 0 85*     Results from last 7 days   Lab Units 04/01/22  1643 04/01/22  1114 04/01/22  0742 03/31/22  1151 03/29/22  2116 03/29/22  1616 03/29/22  1104 03/29/22  0735 03/28/22  2121 03/28/22  1625 03/28/22  1119 03/28/22  0729   POC GLUCOSE mg/dl 102 116 78 88 127 121 108 100 129 118 116 98             * I Have Reviewed All Lab Data Listed Above  Cultures:   Results from last 7 days   Lab Units 03/31/22  1340 03/28/22  0527   BLOOD CULTURE   --  No Growth After 5 Days  No Growth After 5 Days     GRAM STAIN RESULT  1+ Gram positive cocci in clusters*  No polys seen*  --    WOUND CULTURE  2+ Growth of Methicillin Resistant Staphylococcus aureus*  --                  Lines/Drains:  Invasive Devices  Report    Peripheral Intravenous Line            Peripheral IV 03/31/22 Right Antecubital 2 days    Peripheral IV 04/01/22 Left Antecubital 1 day          Drain            External Urinary Catheter 2 days              Telemetry:   Telemetry Orders (From admission, onward)             48 Hour Telemetry Monitoring  Continuous x 48 hours        References:    Telemetry Guidelines   Question:  Reason for 48 Hour Telemetry  Answer:  Arrhythmias Requiring Medical Therapy (eg  SVT, Vtach/fib, Bradycardia, Uncontrolled A-fib)                  Imaging:  Imaging Reports Reviewed Today Include:   XR chest portable    Result Date: 3/27/2022  Impression: No active pulmonary disease on examination which is somewhat limited secondary to low lung volumes  Workstation performed: [id number]     XR Trauma chest portable    Result Date: 3/26/2022  Impression: No acute cardiopulmonary disease  Workstation performed: [id number]     TRAUMA - CT head wo contrast    Result Date: 3/25/2022  Impression: Anterolateral right frontal scalp hematoma without an acute intracranial abnormality  Workstation performed: [id number]     CT facial bones without contrast    Result Date: 3/25/2022  Impression: Anterolateral right frontal scalp hematoma without acute traumatic injury to the facial bones  Workstation performed: [id number]     TRAUMA - CT spine cervical wo contrast    Result Date: 3/25/2022  Impression: No cervical spine fracture or traumatic malalignment  Workstation performed: [id number]     XR Trauma pelvis ap only 1 or 2 vw    Result Date: 3/26/2022  Impression: No acute osseous abnormality  Workstation performed: [id number]     TRAUMA - CT chest abdomen pelvis w contrast    Result Date: 3/25/2022  Impression: 1  No findings of acute thoracic or abdominopelvic injury  2   Small right and trace left pleural effusions are new since November 14, 2021  3   Persistent findings of hepatic cirrhosis new trace perihepatic ascites 4  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  Recommendation is for follow-up low radiation dose chest CT in one year  Workstation performed: [id number]     XR chest portable ICU    Result Date: 3/26/2022  Impression: Right jugular catheter in mid SVC with no pneumothorax  No acute pulmonary disease  Workstation performed: [id number]     CT recon only thoracolumbar (no charge)    Result Date: 3/25/2022  Impression: No fracture or traumatic subluxation  Scoliosis  Severe lumbar degenerative spondylosis  Workstation performed: [id number]     US right upper quadrant    Result Date: 3/26/2022  Impression: 1  Cholelithiasis and sludge with wall thickening and pericholecystic fluid  Sonographic [place] sign is negative  Findings are equivocal for acute cholecystitis, particularly in the setting of underlying hepatic dysfunction which can also cause gallbladder wall abnormalities  If there is continued clinical concern, further evaluation with HIDA scan recommended  2   Liver cirrhosis  Workstation performed: [id number]     CT lower extremity w contrast right    Result Date: 3/28/2022  Impression: Skin thickening and inflammatory stranding subcutaneous tissues both medially and laterally greater medially compatible with cellulitis  More confluent areas of fluid are redemonstrated medially and laterally without significant rim enhancement suggestive of phlegmonous change  No well-defined discrete rim-enhancing fluid collection seen to suggest a discrete abscess  Workstation performed: [id number]     CT lower extremity wo contrast right    Result Date: 3/26/2022  Impression: Diffuse subcutaneous edema and overlying skin thickening most prominent about the medial aspect of the mid/distal thigh with superficial and deep subcutaneous fluid #3/169 appears nonloculated without a surrounding rind however abscess cannot be excluded  without IV contrast  The study was marked in EPIC for immediate notification   Workstation performed: [id number]       Scheduled Meds:  Current Facility-Administered Medications   Medication Dose Route Frequency Provider Last Rate    [START ON 4/5/2022] amiodarone  200 mg Oral Daily With Breakfast LEXIE Guzman      Followed by   [facility] amiodarone  200 mg Oral Q12H LEXIE Lainez      cefazolin  2,000 mg Intravenous Q8H LEXIE Buenrostro Stopped (04/02/22 1425)    furosemide  40 mg Oral Daily LEXIE Guzman      heparin (porcine)  3-20 Units/kg/hr (Order-Specific) Intravenous Titrated LEXIE Buenrostro 8 1 Units/kg/hr (04/02/22 1355)    heparin (porcine)  2,000 Units Intravenous Q1H PRN LEXIE Buenrostro      heparin (porcine)  4,000 Units Intravenous Q1H PRN LEXIE Buenrostro      levothyroxine  150 mcg Oral Early Morning Herlene Crea LEXIE Johnson      melatonin  6 mg Oral HS LEXIE Guzman      midodrine  5 mg Oral TID AC LEXIE Buenrostro         Today, Patient Was Seen By: María Dimas DO    ** Please Note: Dictation voice to text software may have been used in the creation of this document   **

## 2022-04-02 NOTE — ASSESSMENT & PLAN NOTE
· Cirrhosis noted on imaging  GI was consulted    Outpatient follow-up    Results from last 7 days   Lab Units 04/01/22  0448 03/31/22  0448 03/30/22  0441 03/29/22  0434 03/28/22  0438 03/27/22  0433   AST U/L 43 52* 42 48* 56* 62*   ALT U/L 15 28 29 35 37 35   TOTAL BILIRUBIN mg/dL 2 02* 2 43* 2 48* 4 15* 5 74* 6 35*

## 2022-04-02 NOTE — ASSESSMENT & PLAN NOTE
· Acute kidney injury secondary to septic shock  Resolved  Results from last 7 days   Lab Units 04/02/22  0526 04/01/22  0448 03/31/22  0448 03/30/22  0441 03/29/22  0434 03/28/22  0438 03/27/22  1557 03/27/22  0433 03/26/22  1621 03/26/22  1621   BUN mg/dL 32* 28* 28* 35* 46* 57* 63* 63*   < > 66*   CREATININE mg/dL 1 20 1 15 1 17 1 17 1 39* 1 53* 1 65* 1 77*   < > 1 82*   EGFR ml/min/1 73sq m 42 44 43 43 35 31 28 26  --  25    < > = values in this interval not displayed

## 2022-04-02 NOTE — ASSESSMENT & PLAN NOTE
· Tachycardic on admission secondary to septic shock  · Metoprolol held due to hypotension  · Continue amiodarone loading    · On heparin infusion:  Okay with surgery to restart warfarin bridging    Results from last 7 days   Lab Units 03/31/22  0448 03/30/22  0441 03/29/22  0434 03/28/22  0801 03/27/22  1556 03/27/22  0433   INR  1 80* 1 79* 1 72* 1 91* 1 85* 2 01*

## 2022-04-02 NOTE — PROGRESS NOTES
Progress Note - General Surgery   Ricardo Jackson 80 y o  female MRN: [id number]  Unit/Bed#: -01 Encounter: [id number]    Assessment:  - POD#2 s/p debridement and washout of right thigh wound  - Wound gram stain with staphylococcus aureus and gram positive cocci in clusters  - Afebrile, vss  - WBC 6 97 (7 66)  - Hgb 8 1 (8 3)    Plan:  - Daily packing/dressing change:  Saline moistened [place] wrap packed into the wound, cover with gauze and ABD, secured with Ace wrap  - Pain control prn  - Carb control diet  - Daily CBC and BMP  - Resume anticoagulation as per medicine  - Medical management as per primary  Subjective/Objective   Subjective: No complaints  Reports pain at some parts of the wound with dressing changes only  No fever or chills  Objective:     Blood pressure 104/58, pulse 60, temperature (!) 97 4 °F (36 3 °C), temperature source Temporal, resp  rate 16, height 5' 2" (1 575 m), weight 117 kg (258 lb 13 1 oz), SpO2 95 %  ,Body mass index is 47 34 kg/m²  Intake/Output Summary (Last 24 hours) at 4/2/2022 0953  Last data filed at 4/2/2022 0600  Gross per 24 hour   Intake 1380 13 ml   Output 500 ml   Net 880 13 ml       Invasive Devices  Report    Peripheral Intravenous Line            Peripheral IV 03/31/22 Right Antecubital 2 days    Peripheral IV 04/01/22 Left Antecubital 1 day          Drain            External Urinary Catheter 2 days                Physical Exam: /58 (BP Location: Right arm)   Pulse 60   Temp (!) 97 4 °F (36 3 °C) (Temporal)   Resp 16   Ht 5' 2" (1 575 m)   Wt 117 kg (258 lb 13 1 oz)   SpO2 95%   BMI 47 34 kg/m²   General appearance: alert and oriented, in no acute distress  Skin: Left anteromedial thigh wound measures approximately [id number]  Wound bed consists mostly of healthy-appearing BP red granulation tissue  There is some adherent slough tissue present at the superior right corner and medial aspect of the wound   There is ample serosanguinous drainage soaked into the dressing  No active bleeding or drainage at time of dressing change  Wound packed with saline-moistende gauze and covered with gauze and ABD, secured with ACE wrap  Lab, Imaging and other studies:  I have personally reviewed pertinent lab results      CBC:   Lab Results   Component Value Date    WBC 6 97 04/02/2022    HGB 8 1 (L) 04/02/2022    HCT 24 8 (L) 04/02/2022     (H) 04/02/2022     (L) 04/02/2022    MCH 34 8 (H) 04/02/2022    MCHC 32 7 04/02/2022    RDW 16 4 (H) 04/02/2022    MPV 10 3 04/02/2022   CMP:   Lab Results   Component Value Date    SODIUM 135 (L) 04/02/2022    K 4 1 04/02/2022     04/02/2022    CO2 27 04/02/2022    BUN 32 (H) 04/02/2022    CREATININE 1 20 04/02/2022    CALCIUM 7 6 (L) 04/02/2022    EGFR 42 04/02/2022     VTE Pharmacologic Prophylaxis: Heparin  VTE Mechanical Prophylaxis: sequential compression device

## 2022-04-02 NOTE — PROGRESS NOTES
Vancomycin Assessment    Rom Mahmood is a 80 y o  female who is currently receiving vancomycin 1500 mg IV every 24 hours for skin-soft tissue infection     Relevant clinical data and objective history reviewed:  Creatinine   Date Value Ref Range Status   04/02/2022 1 20 0 60 - 1 30 mg/dL Final     Comment:     Standardized to IDMS reference method   04/01/2022 1 15 0 60 - 1 30 mg/dL Final     Comment:     Standardized to IDMS reference method   03/31/2022 1 17 0 60 - 1 30 mg/dL Final     Comment:     Standardized to IDMS reference method     /53 (BP Location: Right arm)   Pulse 58   Temp 97 6 °F (36 4 °C) (Temporal)   Resp 12   Ht 5' 2" (1 575 m)   Wt 117 kg (258 lb 13 1 oz)   SpO2 96%   BMI 47 34 kg/m²   I/O last 3 completed shifts: In: 1635 1 [P O :1050; I V :345 1; IV Piggyback:240]  Out: 868 [Urine:775]  Lab Results   Component Value Date/Time    BUN 32 (H) 04/02/2022 05:26 AM    WBC 6 97 04/02/2022 05:26 AM    HGB 8 1 (L) 04/02/2022 05:26 AM    HCT 24 8 (L) 04/02/2022 05:26 AM     (H) 04/02/2022 05:26 AM     (L) 04/02/2022 05:26 AM     Temp Readings from Last 3 Encounters:   04/02/22 97 6 °F (36 4 °C) (Temporal)   11/14/21 99 4 °F (37 4 °C) (Temporal)     Vancomycin Days of Therapy: 1    Assessment/Plan  The patient is currently on vancomycin utilizing scheduled dosing based on adjusted body weight (due to obesity)  The patient is currently receiving 1500 mg IV every 24 hours and is clinically appropriate and dose will be continued  Pharmacy will also follow closely for s/sx of nephrotoxicity, infusion reactions, and appropriateness of therapy  BMP and CBC will be ordered per protocol  Plan for trough as patient approaches steady state, prior to the 4th  dose at approximately 1600 on 4/5/22  Due to infection severity, will target a trough of 15-20 (appropriate for most indications)     Pharmacy will continue to follow the patients culture results and clinical progress daily      Adebayo Jesus, Pharmacist

## 2022-04-03 LAB
ANION GAP SERPL CALCULATED.3IONS-SCNC: 5 MMOL/L (ref 4–13)
ANISOCYTOSIS BLD QL SMEAR: PRESENT
APTT PPP: 48 SECONDS (ref 23–37)
APTT PPP: 94 SECONDS (ref 23–37)
APTT PPP: 98 SECONDS (ref 23–37)
BASOPHILS # BLD MANUAL: 0.05 THOUSAND/UL (ref 0–0.1)
BASOPHILS NFR MAR MANUAL: 1 % (ref 0–1)
BUN SERPL-MCNC: 31 MG/DL (ref 5–25)
CALCIUM SERPL-MCNC: 7.7 MG/DL (ref 8.3–10.1)
CHLORIDE SERPL-SCNC: 104 MMOL/L (ref 100–108)
CO2 SERPL-SCNC: 27 MMOL/L (ref 21–32)
CREAT SERPL-MCNC: 1.15 MG/DL (ref 0.6–1.3)
EOSINOPHIL # BLD MANUAL: 0.11 THOUSAND/UL (ref 0–0.4)
EOSINOPHIL NFR BLD MANUAL: 2 % (ref 0–6)
ERYTHROCYTE [DISTWIDTH] IN BLOOD BY AUTOMATED COUNT: 16.1 % (ref 11.6–15.1)
GFR SERPL CREATININE-BSD FRML MDRD: 44 ML/MIN/1.73SQ M
GLUCOSE SERPL-MCNC: 79 MG/DL (ref 65–140)
HCT VFR BLD AUTO: 24.3 % (ref 34.8–46.1)
HGB BLD-MCNC: 8.1 G/DL (ref 11.5–15.4)
INR PPP: 1.83 (ref 0.84–1.19)
LG PLATELETS BLD QL SMEAR: PRESENT
LYMPHOCYTES # BLD AUTO: 0.66 THOUSAND/UL (ref 0.6–4.47)
LYMPHOCYTES # BLD AUTO: 12 % (ref 14–44)
MACROCYTES BLD QL AUTO: PRESENT
MCH RBC QN AUTO: 35.5 PG (ref 26.8–34.3)
MCHC RBC AUTO-ENTMCNC: 33.3 G/DL (ref 31.4–37.4)
MCV RBC AUTO: 107 FL (ref 82–98)
MONOCYTES # BLD AUTO: 0.49 THOUSAND/UL (ref 0–1.22)
MONOCYTES NFR BLD: 9 % (ref 4–12)
NEUTROPHILS # BLD MANUAL: 4.16 THOUSAND/UL (ref 1.85–7.62)
NEUTS SEG NFR BLD AUTO: 76 % (ref 43–75)
PLATELET # BLD AUTO: 129 THOUSANDS/UL (ref 149–390)
PLATELET BLD QL SMEAR: ABNORMAL
PMV BLD AUTO: 10 FL (ref 8.9–12.7)
POLYCHROMASIA BLD QL SMEAR: PRESENT
POTASSIUM SERPL-SCNC: 4.1 MMOL/L (ref 3.5–5.3)
PROTHROMBIN TIME: 20.8 SECONDS (ref 11.6–14.5)
RBC # BLD AUTO: 2.28 MILLION/UL (ref 3.81–5.12)
RBC MORPH BLD: PRESENT
SODIUM SERPL-SCNC: 136 MMOL/L (ref 136–145)
STOMATOCYTES BLD QL SMEAR: PRESENT
WBC # BLD AUTO: 5.47 THOUSAND/UL (ref 4.31–10.16)

## 2022-04-03 PROCEDURE — 85025 COMPLETE CBC W/AUTO DIFF WBC: CPT | Performed by: PHYSICIAN ASSISTANT

## 2022-04-03 PROCEDURE — 85730 THROMBOPLASTIN TIME PARTIAL: CPT | Performed by: INTERNAL MEDICINE

## 2022-04-03 PROCEDURE — 85610 PROTHROMBIN TIME: CPT | Performed by: INTERNAL MEDICINE

## 2022-04-03 PROCEDURE — 80048 BASIC METABOLIC PNL TOTAL CA: CPT | Performed by: PHYSICIAN ASSISTANT

## 2022-04-03 PROCEDURE — 85027 COMPLETE CBC AUTOMATED: CPT | Performed by: PHYSICIAN ASSISTANT

## 2022-04-03 PROCEDURE — 99232 SBSQ HOSP IP/OBS MODERATE 35: CPT | Performed by: INTERNAL MEDICINE

## 2022-04-03 PROCEDURE — 85730 THROMBOPLASTIN TIME PARTIAL: CPT | Performed by: FAMILY MEDICINE

## 2022-04-03 PROCEDURE — 85007 BL SMEAR W/DIFF WBC COUNT: CPT | Performed by: PHYSICIAN ASSISTANT

## 2022-04-03 RX ORDER — AMOXICILLIN 250 MG
1 CAPSULE ORAL 2 TIMES DAILY
Status: DISCONTINUED | OUTPATIENT
Start: 2022-04-03 | End: 2022-04-04 | Stop reason: HOSPADM

## 2022-04-03 RX ORDER — POLYETHYLENE GLYCOL 3350 17 G/17G
17 POWDER, FOR SOLUTION ORAL DAILY
Status: DISCONTINUED | OUTPATIENT
Start: 2022-04-03 | End: 2022-04-04 | Stop reason: HOSPADM

## 2022-04-03 RX ADMIN — WARFARIN SODIUM 10 MG: 5 TABLET ORAL at 17:11

## 2022-04-03 RX ADMIN — Medication 6 MG: at 22:17

## 2022-04-03 RX ADMIN — POLYETHYLENE GLYCOL 3350 17 G: 17 POWDER, FOR SOLUTION ORAL at 13:16

## 2022-04-03 RX ADMIN — AMIODARONE HYDROCHLORIDE 200 MG: 200 TABLET ORAL at 08:22

## 2022-04-03 RX ADMIN — DOCUSATE SODIUM AND SENNOSIDES 1 TABLET: 8.6; 5 TABLET ORAL at 17:11

## 2022-04-03 RX ADMIN — VANCOMYCIN HYDROCHLORIDE 750 MG: 750 INJECTION, SOLUTION INTRAVENOUS at 19:59

## 2022-04-03 RX ADMIN — AMIODARONE HYDROCHLORIDE 200 MG: 200 TABLET ORAL at 19:51

## 2022-04-03 RX ADMIN — MIDODRINE HYDROCHLORIDE 2.5 MG: 5 TABLET ORAL at 11:48

## 2022-04-03 RX ADMIN — VANCOMYCIN HYDROCHLORIDE 750 MG: 750 INJECTION, SOLUTION INTRAVENOUS at 09:20

## 2022-04-03 RX ADMIN — MIDODRINE HYDROCHLORIDE 2.5 MG: 5 TABLET ORAL at 06:34

## 2022-04-03 RX ADMIN — HEPARIN SODIUM 2000 UNITS: 1000 INJECTION INTRAVENOUS; SUBCUTANEOUS at 21:22

## 2022-04-03 RX ADMIN — MIDODRINE HYDROCHLORIDE 2.5 MG: 5 TABLET ORAL at 17:11

## 2022-04-03 RX ADMIN — LEVOTHYROXINE SODIUM 150 MCG: 150 TABLET ORAL at 06:34

## 2022-04-03 RX ADMIN — DOCUSATE SODIUM AND SENNOSIDES 1 TABLET: 8.6; 5 TABLET ORAL at 13:16

## 2022-04-03 NOTE — ASSESSMENT & PLAN NOTE
· Cirrhosis noted on imaging  GI was consulted    Outpatient follow-up    Results from last 7 days   Lab Units 04/01/22  0448 03/31/22  0448 03/30/22  0441 03/29/22  0434 03/28/22  0438   AST U/L 43 52* 42 48* 56*   ALT U/L 15 28 29 35 37   TOTAL BILIRUBIN mg/dL 2 02* 2 43* 2 48* 4 15* 5 74*

## 2022-04-03 NOTE — ASSESSMENT & PLAN NOTE
· Acute kidney injury secondary to septic shock  Resolved      Results from last 7 days   Lab Units 04/03/22  0548 04/02/22  0526 04/01/22  0448 03/31/22  0448 03/30/22  0441 03/29/22  0434 03/28/22  0438 03/27/22  1557   BUN mg/dL 31* 32* 28* 28* 35* 46* 57* 63*   CREATININE mg/dL 1 15 1 20 1 15 1 17 1 17 1 39* 1 53* 1 65*   EGFR ml/min/1 73sq m 44 42 44 43 43 35 31 28

## 2022-04-03 NOTE — ASSESSMENT & PLAN NOTE
· Tachycardic on admission secondary to septic shock  · Metoprolol held due to hypotension  · Continue amiodarone loading  Will DC telemetry    · Okay with surgery for heparin/ warfarin bridging -- warfarin ordered at 10 mg daily    Results from last 7 days   Lab Units 04/03/22  0548 03/31/22  0448 03/30/22  0441 03/29/22  0434 03/28/22  0801 03/27/22  1556   INR  1 83* 1 80* 1 79* 1 72* 1 91* 1 85*

## 2022-04-03 NOTE — PLAN OF CARE
Problem: Nutrition/Hydration-ADULT  Goal: Nutrient/Hydration intake appropriate for improving, restoring or maintaining nutritional needs  Description: Monitor and assess patient's nutrition/hydration status for malnutrition  Collaborate with interdisciplinary team and initiate plan and interventions as ordered  Monitor patient's weight and dietary intake as ordered or per policy  Utilize nutrition screening tool and intervene as necessary  Determine patient's food preferences and provide high-protein, high-caloric foods as appropriate       INTERVENTIONS:  - Monitor oral intake, urinary output, labs, and treatment plans  - Assess nutrition and hydration status and recommend course of action  - Evaluate amount of meals eaten  - Assist patient with eating if necessary   - Allow adequate time for meals  - Recommend/ encourage appropriate diets, oral nutritional supplements, and vitamin/mineral supplements  - Order, calculate, and assess calorie counts as needed  - Recommend, monitor, and adjust tube feedings and TPN/PPN based on assessed needs  - Assess need for intravenous fluids  - Provide specific nutrition/hydration education as appropriate  - Include patient/family/caregiver in decisions related to nutrition  Outcome: Progressing     Problem: Prexisting or High Potential for Compromised Skin Integrity  Goal: Skin integrity is maintained or improved  Description: INTERVENTIONS:  - Identify patients at risk for skin breakdown  - Assess and monitor skin integrity  - Assess and monitor nutrition and hydration status  - Monitor labs   - Assess for incontinence   - Turn and reposition patient  - Assist with mobility/ambulation  - Relieve pressure over bony prominences  - Avoid friction and shearing  - Provide appropriate hygiene as needed including keeping skin clean and dry  - Evaluate need for skin moisturizer/barrier cream  - Collaborate with interdisciplinary team   - Patient/family teaching  - Consider wound care consult   Outcome: Progressing     Problem: MOBILITY - ADULT  Goal: Maintain or return to baseline ADL function  Description: INTERVENTIONS:  -  Assess patient's ability to carry out ADLs; assess patient's baseline for ADL function and identify physical deficits which impact ability to perform ADLs (bathing, care of mouth/teeth, toileting, grooming, dressing, etc )  - Assess/evaluate cause of self-care deficits   - Assess range of motion  - Assess patient's mobility; develop plan if impaired  - Assess patient's need for assistive devices and provide as appropriate  - Encourage maximum independence but intervene and supervise when necessary  - Involve family in performance of ADLs  - Assess for home care needs following discharge   - Consider OT consult to assist with ADL evaluation and planning for discharge  - Provide patient education as appropriate  Outcome: Progressing  Goal: Maintains/Returns to pre admission functional level  Description: INTERVENTIONS:  - Perform BMAT or MOVE assessment daily    - Set and communicate daily mobility goal to care team and patient/family/caregiver  - Collaborate with rehabilitation services on mobility goals if consulted  - Perform Range of Motion 3 times a day  - Reposition patient every 2 hours    - Record patient progress and toleration of activity level   Outcome: Progressing     Problem: PAIN - ADULT  Goal: Verbalizes/displays adequate comfort level or baseline comfort level  Description: Interventions:  - Encourage patient to monitor pain and request assistance  - Assess pain using appropriate pain scale  - Administer analgesics based on type and severity of pain and evaluate response  - Implement non-pharmacological measures as appropriate and evaluate response  - Consider cultural and social influences on pain and pain management  - Notify physician/advanced practitioner if interventions unsuccessful or patient reports new pain  Outcome: Progressing     Problem: INFECTION - ADULT  Goal: Absence or prevention of progression during hospitalization  Description: INTERVENTIONS:  - Assess and monitor for signs and symptoms of infection  - Monitor lab/diagnostic results  - Monitor all insertion sites, i e  indwelling lines, tubes, and drains  - Monitor endotracheal if appropriate and nasal secretions for changes in amount and color  - [place] appropriate cooling/warming therapies per order  - Administer medications as ordered  - Instruct and encourage patient and family to use good hand hygiene technique  - Identify and instruct in appropriate isolation precautions for identified infection/condition  Outcome: Progressing     Problem: SAFETY ADULT  Goal: Maintain or return to baseline ADL function  Description: INTERVENTIONS:  -  Assess patient's ability to carry out ADLs; assess patient's baseline for ADL function and identify physical deficits which impact ability to perform ADLs (bathing, care of mouth/teeth, toileting, grooming, dressing, etc )  - Assess/evaluate cause of self-care deficits   - Assess range of motion  - Assess patient's mobility; develop plan if impaired  - Assess patient's need for assistive devices and provide as appropriate  - Encourage maximum independence but intervene and supervise when necessary  - Involve family in performance of ADLs  - Assess for home care needs following discharge   - Consider OT consult to assist with ADL evaluation and planning for discharge  - Provide patient education as appropriate  Outcome: Progressing  Goal: Maintains/Returns to pre admission functional level  Description: INTERVENTIONS:  - Perform BMAT or MOVE assessment daily    - Set and communicate daily mobility goal to care team and patient/family/caregiver  - Collaborate with rehabilitation services on mobility goals if consulted  - Perform Range of Motion 3 times a day  - Reposition patient every 2 hours    - Record patient progress and toleration of activity level   Outcome: Progressing  Goal: Patient will remain free of falls  Description: INTERVENTIONS:  - Educate patient/family on patient safety including physical limitations  - Instruct patient to call for assistance with activity   - Consult OT/PT to assist with strengthening/mobility   - Keep Call bell within reach  - Keep bed low and locked with side rails adjusted as appropriate  - Keep care items and personal belongings within reach  - Initiate and maintain comfort rounds  - Make Fall Risk Sign visible to staff  - Offer Toileting every 2 Hours, in advance of need  - Initiate/Maintain bed/chair alarm prn  - Apply yellow socks and bracelet for high fall risk patients  - Consider moving patient to room near nurses station  Outcome: Progressing     Problem: DISCHARGE PLANNING  Goal: Discharge to home or other facility with appropriate resources  Description: INTERVENTIONS:  - Identify barriers to discharge w/patient and caregiver  - Arrange for needed discharge resources and transportation as appropriate  - Identify discharge learning needs (meds, wound care, etc )  - Arrange for interpretive services to assist at discharge as needed  - Refer to Case Management Department for coordinating discharge planning if the patient needs post-hospital services based on physician/advanced practitioner order or complex needs related to functional status, cognitive ability, or social support system  Outcome: Progressing     Problem: Knowledge Deficit  Goal: Patient/family/caregiver demonstrates understanding of disease process, treatment plan, medications, and discharge instructions  Description: Complete learning assessment and assess knowledge base    Interventions:  - Provide teaching at level of understanding  - Provide teaching via preferred learning methods  Outcome: Progressing     Problem: CARDIOVASCULAR - ADULT  Goal: Maintains optimal cardiac output and hemodynamic stability  Description: INTERVENTIONS:  - Monitor I/O, vital signs and rhythm  - Monitor for S/S and trends of decreased cardiac output  - Administer and titrate ordered vasoactive medications to optimize hemodynamic stability  - Assess quality of pulses, skin color and temperature  - Assess for signs of decreased coronary artery perfusion  - Instruct patient to report change in severity of symptoms  Outcome: Progressing  Goal: Absence of cardiac dysrhythmias or at baseline rhythm  Description: INTERVENTIONS:  - Continuous cardiac monitoring, vital signs, obtain 12 lead EKG if ordered  - Administer antiarrhythmic and heart rate control medications as ordered  - Monitor electrolytes and administer replacement therapy as ordered  Outcome: Progressing     Problem: METABOLIC, FLUID AND ELECTROLYTES - ADULT  Goal: Electrolytes maintained within normal limits  Description: INTERVENTIONS:  - Monitor labs and assess patient for signs and symptoms of electrolyte imbalances  - Administer electrolyte replacement as ordered  - Monitor response to electrolyte replacements, including repeat lab results as appropriate  - Instruct patient on fluid and nutrition as appropriate  Outcome: Progressing  Goal: Fluid balance maintained  Description: INTERVENTIONS:  - Monitor labs   - Monitor I/O and WT  - Instruct patient on fluid and nutrition as appropriate  - Assess for signs & symptoms of volume excess or deficit  Outcome: Progressing  Goal: Glucose maintained within target range  Description: INTERVENTIONS:  - Monitor Blood Glucose as ordered  - Assess for signs and symptoms of hyperglycemia and hypoglycemia  - Administer ordered medications to maintain glucose within target range  - Assess nutritional intake and initiate nutrition service referral as needed  Outcome: Progressing     Problem: SKIN/TISSUE INTEGRITY - ADULT  Goal: Incision(s), wounds(s) or drain site(s) healing without S/S of infection  Description: INTERVENTIONS  - Assess and document dressing, incision, wound bed, drain sites and surrounding tissue  - Provide patient and family education  - Perform skin care/dressing changes as needed  Outcome: Progressing  Goal: Pressure injury heals and does not worsen  Description: Interventions:  - Implement low air loss mattress or specialty surface (Criteria met)  - Apply silicone foam dressing  - Use special pressure reducing interventions such as waffle cushion when in chair   - Apply fecal or urinary incontinence containment device   - Perform passive or active ROM every 8  - Turn and reposition patient & offload bony prominences every 2 hours   - Utilize friction reducing device or surface for transfers   - Consider consults to  interdisciplinary teams such as wound team  - Use incontinent care products after each incontinent episode such as barrier cream  - Consider nutrition services referral as needed  Outcome: Progressing     Problem: Potential for Falls  Goal: Patient will remain free of falls  Description: INTERVENTIONS:  - Educate patient/family on patient safety including physical limitations  - Instruct patient to call for assistance with activity   - Consult OT/PT to assist with strengthening/mobility   - Keep Call bell within reach  - Keep bed low and locked with side rails adjusted as appropriate  - Keep care items and personal belongings within reach  - Initiate and maintain comfort rounds  - Make Fall Risk Sign visible to staff  - Offer Toileting every 2 Hours, in advance of need  - Initiate/Maintain bed/chair alarm prn  - Apply yellow socks and bracelet for high fall risk patients  - Consider moving patient to room near nurses station  Outcome: Progressing

## 2022-04-03 NOTE — PROGRESS NOTES
Progress Note - General Surgery   Paul Guzman 80 y o  female MRN: [id number]  Unit/Bed#: -01 Encounter: [id number]    Assessment:  - POD#3 s/p debridement and washout of right thigh wound  - Wound gram stain with staphylococcus aureus and gram positive cocci in clusters  - Afebrile, vss  - WBC 5 47 (6 97, 7 66)  - Hgb 8 1 (8 1, 8 3)    Plan:  - Daily packing/dressing change:  Saline moistened [place] wrap packed into the wound, cover with gauze and ABD, secured with Ace wrap  - Wound care consult placed, possible vac placement  - Pain control prn  - Carb control diet  - Daily CBC and BMP  - Resume anticoagulation as per medicine  - Medical management as per primary  Subjective/Objective     Subjective:  No acute events overnight  Reports no increased pain in the leg  No increase in bleeding or drainage  No fever or chills  Objective:    Blood pressure (!) 101/49, pulse 73, temperature 97 6 °F (36 4 °C), temperature source Oral, resp  rate 18, height 5' 2" (1 575 m), weight 117 kg (258 lb 13 1 oz), SpO2 94 %  ,Body mass index is 47 34 kg/m²  Intake/Output Summary (Last 24 hours) at 4/3/2022 0939  Last data filed at 4/3/2022 0640  Gross per 24 hour   Intake 180 07 ml   Output 1400 ml   Net -1219 93 ml       Invasive Devices  Report    Peripheral Intravenous Line            Peripheral IV 03/31/22 Right Antecubital 3 days    Peripheral IV 04/01/22 Left Antecubital 2 days          Drain            External Urinary Catheter 3 days                Physical Exam: BP (!) 101/49 (BP Location: Right arm)   Pulse 73   Temp 97 6 °F (36 4 °C) (Oral)   Resp 18   Ht 5' 2" (1 575 m)   Wt 117 kg (258 lb 13 1 oz)   SpO2 94%   BMI 47 34 kg/m²   General appearance: alert and oriented, in no acute distress  Skin: Large wound of right anteromedial thigh, s/p debridement  Wound measure approximately 17 x 7 5cm and is 5cm at its center  Wound consists mostly of beefy red granulation tissue   Some superficial necrosis at the lateral aspect of the wound  No active bleeding or drainage  Moderate amount of serosanguinous drainage on the bandage  Lab, Imaging and other studies:  I have personally reviewed pertinent lab results      CBC:   Lab Results   Component Value Date    WBC 5 47 04/03/2022    HGB 8 1 (L) 04/03/2022    HCT 24 3 (L) 04/03/2022     (H) 04/03/2022     (L) 04/03/2022    MCH 35 5 (H) 04/03/2022    MCHC 33 3 04/03/2022    RDW 16 1 (H) 04/03/2022    MPV 10 0 04/03/2022   CMP:   Lab Results   Component Value Date    SODIUM 136 04/03/2022    K 4 1 04/03/2022     04/03/2022    CO2 27 04/03/2022    BUN 31 (H) 04/03/2022    CREATININE 1 15 04/03/2022    CALCIUM 7 7 (L) 04/03/2022    EGFR 44 04/03/2022   Coagulation:   Lab Results   Component Value Date    INR 1 83 (H) 04/03/2022     VTE Pharmacologic Prophylaxis: Warfarin (Coumadin)  VTE Mechanical Prophylaxis: sequential compression device    Rolo Harris PA-C

## 2022-04-03 NOTE — ASSESSMENT & PLAN NOTE
· Sepsis secondary to cellulitis and abscess of right thigh  · Was on cefazolin however given growth of MRSA has been changed to IV vancomycin  · Infectious disease consulted  · Positive blood cultures likely contaminant per ID  Repeat blood cultures remain negative  · Continue weaning midodrine down to 2 5 mg t i d  Results from last 7 days   Lab Units 03/28/22  0527   BLOOD CULTURE  No Growth After 5 Days  No Growth After 5 Days

## 2022-04-03 NOTE — PROGRESS NOTES
Vancomycin Assessment    Rom Mahmood is a 80 y o  female who is currently receiving vancomycin 1500 mg IV q 24 hrs for skin-soft tissue infection     Relevant clinical data and objective history reviewed:  Creatinine   Date Value Ref Range Status   04/03/2022 1 15 0 60 - 1 30 mg/dL Final     Comment:     Standardized to IDMS reference method   04/02/2022 1 20 0 60 - 1 30 mg/dL Final     Comment:     Standardized to IDMS reference method   04/01/2022 1 15 0 60 - 1 30 mg/dL Final     Comment:     Standardized to IDMS reference method     /56 (BP Location: Right arm)   Pulse 73   Temp 98 1 °F (36 7 °C) (Temporal)   Resp (!) 23   Ht 5' 2" (1 575 m)   Wt 117 kg (258 lb 13 1 oz)   SpO2 94%   BMI 47 34 kg/m²   I/O last 3 completed shifts: In: 543 9 [P O :60; I V :363 9; IV Piggyback:120]  Out: 9547 [Urine:1450]  Lab Results   Component Value Date/Time    BUN 31 (H) 04/03/2022 05:48 AM    WBC 5 47 04/03/2022 05:48 AM    HGB 8 1 (L) 04/03/2022 05:48 AM    HCT 24 3 (L) 04/03/2022 05:48 AM     (H) 04/03/2022 05:48 AM     (L) 04/03/2022 05:48 AM     Temp Readings from Last 3 Encounters:   04/03/22 98 1 °F (36 7 °C) (Temporal)   11/14/21 99 4 °F (37 4 °C) (Temporal)     Vancomycin Days of Therapy: 2    Assessment/Plan  The patient is currently on vancomycin utilizing scheduled dosing  Baseline risks associated with therapy include: pre-existing renal impairment, concomitant nephrotoxic medications, advanced age, and dehydration  The patient is currently receiving 1500 mg IV q 24 hrs  With improvement in renal function and active MRSA infection, based on a goal of 15-20 (appropriate for most indications), after clinical evaluation will be changed to 750 mg IV q 12 hrs   Pharmacy will continue to follow closely for s/sx of nephrotoxicity, infusion reactions, and appropriateness of therapy  BMP and CBC will be ordered per protocol    Plan for trough as patient approaches steady state, prior to the 4th  dose at approximately 1930 on 04/04/2022  Pharmacy will continue to follow the patients culture results and clinical progress daily      Harjit Causey, Pharmacist

## 2022-04-03 NOTE — ASSESSMENT & PLAN NOTE
Malnutrition Findings:   Adult Malnutrition type: Chronic illness  Adult Degree of Malnutrition: Malnutrition of moderate degree  Malnutrition Characteristics: Muscle loss,Fat loss,Weight loss  360 Statement: Chronic moderate malnutrition related to decreased appetite as evidenced by 11 6% weight loss x 4 mo (11/23/21 294lb, 3/28/22 260lb, suspect partly fluid related), moderate fat loss to orbitals, moderate muscle loss to temporalis, deltoid and interroseous muscles  Treated with: Will liberalize cardiac to 4gm GLADYS, continue CCD 3 at this time  Will trial glucerna daily and ensure max protein daily, increase as tolerated/indicated  Recommend daily weights  BMI Findings: Body mass index is 47 34 kg/m²

## 2022-04-03 NOTE — PROGRESS NOTES
[address]  Progress Note - Cecil Camp 1940, 80 y o  female MRN: [id number]  Unit/Bed#: -01 Encounter: [id number]  Primary Care Provider: Day Todd MD   Date and time admitted to hospital: 3/25/2022  3:02 PM    * Septic shock [facility])  Assessment & Plan  · Sepsis secondary to cellulitis and abscess of right thigh  · Was on cefazolin however given growth of MRSA has been changed to IV vancomycin  · Infectious disease consulted  · Positive blood cultures likely contaminant per ID  Repeat blood cultures remain negative  · Continue weaning midodrine down to 2 5 mg t i d  Results from last 7 days   Lab Units 03/28/22  0527   BLOOD CULTURE  No Growth After 5 Days  No Growth After 5 Days  Atrial fibrillation with RVR (HCC)  Assessment & Plan  · Tachycardic on admission secondary to septic shock  · Metoprolol held due to hypotension  · Continue amiodarone loading  Will DC telemetry  · Okay with surgery for heparin/ warfarin bridging -- warfarin ordered at 10 mg daily    Results from last 7 days   Lab Units 04/03/22  0548 03/31/22  0448 03/30/22  0441 03/29/22  0434 03/28/22  0801 03/27/22  1556   INR  1 83* 1 80* 1 79* 1 72* 1 91* 1 85*       Aortic stenosis, severe  Assessment & Plan  · Severe AS noted    Cirrhosis (HCC)  Assessment & Plan  · Cirrhosis noted on imaging  GI was consulted    Outpatient follow-up    Results from last 7 days   Lab Units 04/01/22  0448 03/31/22  0448 03/30/22  0441 03/29/22  0434 03/28/22  0438   AST U/L 43 52* 42 48* 56*   ALT U/L 15 28 29 35 37   TOTAL BILIRUBIN mg/dL 2 02* 2 43* 2 48* 4 15* 5 74*       Moderate protein-calorie malnutrition (HCC)  Assessment & Plan  Malnutrition Findings:   Adult Malnutrition type: Chronic illness  Adult Degree of Malnutrition: Malnutrition of moderate degree  Malnutrition Characteristics: Muscle loss,Fat loss,Weight loss  360 Statement: Chronic moderate malnutrition related to decreased appetite as evidenced by 11 6% weight loss x 4 mo (11/23/21 294lb, 3/28/22 260lb, suspect partly fluid related), moderate fat loss to orbitals, moderate muscle loss to temporalis, deltoid and interroseous muscles  Treated with: Will liberalize cardiac to 4gm GLADYS, continue CCD 3 at this time  Will trial glucerna daily and ensure max protein daily, increase as tolerated/indicated  Recommend daily weights  BMI Findings: Body mass index is 47 34 kg/m²  Hypothyroidism  Assessment & Plan  · Continue levothyroxine    Impaired skin integrity  Assessment & Plan  · Wound care consulted    Traumatic hematoma of head  Assessment & Plan  · Traumatic hematoma after fall/being found down  · No active bleeding    LEONARDO (acute kidney injury) (HCC)-resolved as of 3/30/2022  Assessment & Plan  · Acute kidney injury secondary to septic shock  Resolved  Results from last 7 days   Lab Units 04/03/22  0548 04/02/22  0526 04/01/22  0448 03/31/22  0448 03/30/22  0441 03/29/22  0434 03/28/22  0438 03/27/22  1557   BUN mg/dL 31* 32* 28* 28* 35* 46* 57* 63*   CREATININE mg/dL 1 15 1 20 1 15 1 17 1 17 1 39* 1 53* 1 65*   EGFR ml/min/1 73sq m 44 42 44 43 43 35 31 28       VTE Pharmacologic Prophylaxis: VTE Score: 8 High Risk (Score >/= 5) - Pharmacological DVT Prophylaxis Ordered: Heparin Drip  Sequential Compression Devices Ordered  Patient Centered Rounds: I have performed bedside rounds with nursing staff today  Discussions with Specialists or Other Care Team Provider:     Education and Discussions with Family / Patient:  Daughter on telephone    Time Spent for Care: 25 mins  More than 50% of total time spent on counseling and coordination of care as described above      Current Length of Stay: 9 day(s)  Current Patient Status: Inpatient   Certification Statement: The patient will continue to require additional inpatient hospital stay due to sepsis, final antibiotic recommendations, and discharge to rehab  Discharge Plan / Estimated Discharge Date: Anticipate discharge in 48-72 hrs to rehab facility  Code Status: Level 2 - DNAR: but accepts endotracheal intubation      Subjective:   Patient seen and examined  Constipated  Otherwise no shortness of breath  Objective:   Vitals: Blood pressure 103/54, pulse 68, temperature 97 6 °F (36 4 °C), temperature source Oral, resp  rate 19, height 5' 2" (1 575 m), weight 117 kg (258 lb 13 1 oz), SpO2 94 %  Physical Exam  Vitals reviewed  Constitutional:       General: She is not in acute distress  Appearance: She is obese  Eyes:      General: No scleral icterus  Extraocular Movements: Extraocular movements intact  Cardiovascular:      Rate and Rhythm: Regular rhythm  Heart sounds: Murmur heard  Pulmonary:      Breath sounds: Decreased breath sounds present  No wheezing  Abdominal:      General: Bowel sounds are normal       Palpations: Abdomen is soft  Tenderness: There is no guarding or rebound  Musculoskeletal:         General: Tenderness (Right leg) present  No swelling  Skin:     General: Skin is warm  Neurological:      Mental Status: She is alert and oriented to person, place, and time     Psychiatric:         Mood and Affect: Mood normal        Additional Data:   Labs:  Results from last 7 days   Lab Units 04/03/22  0548 04/02/22  0526 04/01/22  0448 03/31/22  0448 03/30/22  0441 03/29/22  0434 03/28/22  0801 03/28/22  0438 03/27/22  1604 03/27/22  1556   WBC Thousand/uL 5 47 6 97 7 66 10 75* 9 71 12 48*  --  9 42 13 46*  --    HEMOGLOBIN g/dL 8 1* 8 1* 8 3* 9 8* 10 3* 10 8*  --  9 8* 10 3*  --    HEMATOCRIT % 24 3* 24 8* 24 1* 29 7* 28 6* 30 8*  --  27 9* 29 5*  --    MCV fL 107* 106* 102* 105* 99* 100*  --  100* 100*  --    TOTAL NEUT ABS Thousand/uL 4 16  --   --  6 56  --   --   --   --   --   --    BANDS PCT %  --   --   --  3  --   --   --   --   --   --    PLATELETS Thousands/uL 129* 123* 117* 155 165 180  --  151 175  --    INR  1 83* --   --  1 80* 1 79* 1 72* 1 91*  --   --  1 85*     Results from last 7 days   Lab Units 04/03/22  0548 04/02/22  0526 04/01/22  0448 03/31/22  0448 03/30/22  0441 03/29/22  0434 03/28/22  0438 03/27/22  1557   SODIUM mmol/L 136 135* 135* 135* 138 135* 134* 133*   POTASSIUM mmol/L 4 1 4 1 4 4 4 2 3 7 3 4* 3 6 3 5   CHLORIDE mmol/L 104 102 105 104 105 102 101 101   CO2 mmol/L 27 27 28 27 27 26 25 23   ANION GAP mmol/L 5 6 2* 4 6 7 8 9   BUN mg/dL 31* 32* 28* 28* 35* 46* 57* 63*   CREATININE mg/dL 1 15 1 20 1 15 1 17 1 17 1 39* 1 53* 1 65*   CALCIUM mg/dL 7 7* 7 6* 7 7* 7 9* 7 9* 8 1* 8 1* 8 4   ALBUMIN g/dL  --   --  1 7* 1 8* 1 8* 1 9* 1 9*  --    TOTAL BILIRUBIN mg/dL  --   --  2 02* 2 43* 2 48* 4 15* 5 74*  --    ALK PHOS U/L  --   --  141* 182* 166* 146* 130*  --    ALT U/L  --   --  15 28 29 35 37  --    AST U/L  --   --  43 52* 42 48* 56*  --    EGFR ml/min/1 73sq m 44 42 44 43 43 35 31 28   GLUCOSE RANDOM mg/dL 79 78 77 87 113 104 108 132     Results from last 7 days   Lab Units 04/02/22  0526 04/01/22  0448 03/30/22  0441 03/27/22  1557 03/27/22  1347   MAGNESIUM mg/dL 1 9 1 9 2 0 2 5 2 0                  Results from last 7 days   Lab Units 03/31/22 0448   PROCALCITONIN ng/ml 0 85*     Results from last 7 days   Lab Units 04/01/22  1643 04/01/22  1114 04/01/22  0742 03/31/22  1151 03/29/22  2116 03/29/22  1616 03/29/22  1104 03/29/22  0735 03/28/22  2121 03/28/22  1625 03/28/22  1119 03/28/22  0729   POC GLUCOSE mg/dl 102 116 78 88 127 121 108 100 129 118 116 98             * I Have Reviewed All Lab Data Listed Above  Cultures:   Results from last 7 days   Lab Units 03/31/22  1340 03/28/22  0527   BLOOD CULTURE   --  No Growth After 5 Days  No Growth After 5 Days     GRAM STAIN RESULT  1+ Gram positive cocci in clusters*  No polys seen*  --    WOUND CULTURE  2+ Growth of Methicillin Resistant Staphylococcus aureus*  --                  Lines/Drains:  Invasive Devices  Report    Peripheral Intravenous Line            Peripheral IV 22 Right Antecubital 3 days    Peripheral IV 22 Left Antecubital 2 days          Drain            External Urinary Catheter 3 days              Telemetry:   Telemetry Orders (From admission, onward)             48 Hour Telemetry Monitoring  Continuous x 48 hours           References:    Telemetry Guidelines   Question:  Reason for 48 Hour Telemetry  Answer:  Arrhythmias Requiring Medical Therapy (eg  SVT, Vtach/fib, Bradycardia, Uncontrolled A-fib)                  Imaging:  Imaging Reports Reviewed Today Include:   XR chest portable    Result Date: 3/27/2022  Impression: No active pulmonary disease on examination which is somewhat limited secondary to low lung volumes  Workstation performed: [id number]     XR Trauma chest portable    Result Date: 3/26/2022  Impression: No acute cardiopulmonary disease  Workstation performed: [id number]     TRAUMA - CT head wo contrast    Result Date: 3/25/2022  Impression: Anterolateral right frontal scalp hematoma without an acute intracranial abnormality  Workstation performed: [id number]     CT facial bones without contrast    Result Date: 3/25/2022  Impression: Anterolateral right frontal scalp hematoma without acute traumatic injury to the facial bones  Workstation performed: [id number]     TRAUMA - CT spine cervical wo contrast    Result Date: 3/25/2022  Impression: No cervical spine fracture or traumatic malalignment  Workstation performed: [id number]     XR Trauma pelvis ap only 1 or 2 vw    Result Date: 3/26/2022  Impression: No acute osseous abnormality  Workstation performed: [id number]     TRAUMA - CT chest abdomen pelvis w contrast    Result Date: 3/25/2022  Impression: 1  No findings of acute thoracic or abdominopelvic injury  2   Small right and trace left pleural effusions are new since 2021  3   Persistent findings of hepatic cirrhosis new trace perihepatic ascites 4    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  Recommendation is for follow-up low radiation dose chest CT in one year  Workstation performed: [id number]     XR chest portable ICU    Result Date: 3/26/2022  Impression: Right jugular catheter in mid SVC with no pneumothorax  No acute pulmonary disease  Workstation performed: [id number]     CT recon only thoracolumbar (no charge)    Result Date: 3/25/2022  Impression: No fracture or traumatic subluxation  Scoliosis  Severe lumbar degenerative spondylosis  Workstation performed: [id number]     US right upper quadrant    Result Date: 3/26/2022  Impression: 1  Cholelithiasis and sludge with wall thickening and pericholecystic fluid  Sonographic Edin Nicholas sign is negative  Findings are equivocal for acute cholecystitis, particularly in the setting of underlying hepatic dysfunction which can also cause gallbladder wall abnormalities  If there is continued clinical concern, further evaluation with HIDA scan recommended  2   Liver cirrhosis  Workstation performed: [id number]     CT lower extremity w contrast right    Result Date: 3/28/2022  Impression: Skin thickening and inflammatory stranding subcutaneous tissues both medially and laterally greater medially compatible with cellulitis  More confluent areas of fluid are redemonstrated medially and laterally without significant rim enhancement suggestive of phlegmonous change  No well-defined discrete rim-enhancing fluid collection seen to suggest a discrete abscess  Workstation performed: [id number]     CT lower extremity wo contrast right    Result Date: 3/26/2022  Impression: Diffuse subcutaneous edema and overlying skin thickening most prominent about the medial aspect of the mid/distal thigh with superficial and deep subcutaneous fluid #3/169 appears nonloculated without a surrounding rind however abscess cannot be excluded  without IV contrast  The study was marked in EPIC for immediate notification   Workstation performed: [id number]       Scheduled Meds:  Current Facility-Administered Medications   Medication Dose Route Frequency Provider Last Rate    [START ON 4/5/2022] amiodarone  200 mg Oral Daily With Breakfast LEXIE Guzman      Followed by   Sarah Bowers amiodarone  200 mg Oral Q12H LEXIE Villagomez      furosemide  40 mg Oral Daily Elyce Nataliia Choletria, CRNP      heparin (porcine)  3-20 Units/kg/hr (Order-Specific) Intravenous Titrated NiaLEXIE Aldrich 6 1 Units/kg/hr (04/03/22 0640)    heparin (porcine)  2,000 Units Intravenous Q1H PRN [place]ia [place] kenny      heparin (porcine)  4,000 Units Intravenous Q1H PRN LEXIE Villagomez      levothyroxine  150 mcg Oral Early Morning Elyce Nataliia Choletria, CRNP      melatonin  6 mg Oral HS LEXIE Villagomez      midodrine  2 5 mg Oral TID AC Quinn Snyder DO      vancomycin  10 mg/kg (Adjusted) Intravenous Q12H Quinn Snyder  mg (04/03/22 0920)    warfarin  10 mg Oral Daily (warfarin) Gabriela Medeiros DO         Today, Patient Was Seen By: Gabriela Medeiros DO    ** Please Note: Dictation voice to text software may have been used in the creation of this document   **

## 2022-04-04 VITALS
SYSTOLIC BLOOD PRESSURE: 92 MMHG | OXYGEN SATURATION: 97 % | BODY MASS INDEX: 47.3 KG/M2 | WEIGHT: 257.06 LBS | DIASTOLIC BLOOD PRESSURE: 57 MMHG | RESPIRATION RATE: 18 BRPM | HEIGHT: 62 IN | HEART RATE: 82 BPM | TEMPERATURE: 98 F

## 2022-04-04 LAB
ALBUMIN SERPL BCP-MCNC: 1.7 G/DL (ref 3.5–5)
ALP SERPL-CCNC: 202 U/L (ref 46–116)
ALT SERPL W P-5'-P-CCNC: 14 U/L (ref 12–78)
ANION GAP SERPL CALCULATED.3IONS-SCNC: 4 MMOL/L (ref 4–13)
APTT PPP: 72 SECONDS (ref 23–37)
AST SERPL W P-5'-P-CCNC: 57 U/L (ref 5–45)
BACTERIA BLD CULT: NORMAL
BILIRUB SERPL-MCNC: 1.69 MG/DL (ref 0.2–1)
BUN SERPL-MCNC: 27 MG/DL (ref 5–25)
CALCIUM ALBUM COR SERPL-MCNC: 9.5 MG/DL (ref 8.3–10.1)
CALCIUM SERPL-MCNC: 7.7 MG/DL (ref 8.3–10.1)
CHLORIDE SERPL-SCNC: 104 MMOL/L (ref 100–108)
CO2 SERPL-SCNC: 28 MMOL/L (ref 21–32)
CREAT SERPL-MCNC: 1.15 MG/DL (ref 0.6–1.3)
ERYTHROCYTE [DISTWIDTH] IN BLOOD BY AUTOMATED COUNT: 16 % (ref 11.6–15.1)
FLUAV RNA RESP QL NAA+PROBE: NEGATIVE
FLUBV RNA RESP QL NAA+PROBE: NEGATIVE
GFR SERPL CREATININE-BSD FRML MDRD: 44 ML/MIN/1.73SQ M
GLUCOSE SERPL-MCNC: 86 MG/DL (ref 65–140)
HCT VFR BLD AUTO: 23.7 % (ref 34.8–46.1)
HGB BLD-MCNC: 8.2 G/DL (ref 11.5–15.4)
INR PPP: 2.41 (ref 0.84–1.19)
MCH RBC QN AUTO: 34.7 PG (ref 26.8–34.3)
MCHC RBC AUTO-ENTMCNC: 34.6 G/DL (ref 31.4–37.4)
MCV RBC AUTO: 100 FL (ref 82–98)
PLATELET # BLD AUTO: 141 THOUSANDS/UL (ref 149–390)
PMV BLD AUTO: 10.1 FL (ref 8.9–12.7)
POTASSIUM SERPL-SCNC: 4.3 MMOL/L (ref 3.5–5.3)
PROT SERPL-MCNC: 5.8 G/DL (ref 6.4–8.2)
PROTHROMBIN TIME: 25.7 SECONDS (ref 11.6–14.5)
RBC # BLD AUTO: 2.36 MILLION/UL (ref 3.81–5.12)
RSV RNA RESP QL NAA+PROBE: NEGATIVE
SARS-COV-2 RNA RESP QL NAA+PROBE: NEGATIVE
SODIUM SERPL-SCNC: 136 MMOL/L (ref 136–145)
WBC # BLD AUTO: 5.89 THOUSAND/UL (ref 4.31–10.16)

## 2022-04-04 PROCEDURE — 99239 HOSP IP/OBS DSCHRG MGMT >30: CPT | Performed by: FAMILY MEDICINE

## 2022-04-04 PROCEDURE — 85610 PROTHROMBIN TIME: CPT | Performed by: FAMILY MEDICINE

## 2022-04-04 PROCEDURE — 0241U HB NFCT DS VIR RESP RNA 4 TRGT: CPT | Performed by: FAMILY MEDICINE

## 2022-04-04 PROCEDURE — 80053 COMPREHEN METABOLIC PANEL: CPT | Performed by: FAMILY MEDICINE

## 2022-04-04 PROCEDURE — 85027 COMPLETE CBC AUTOMATED: CPT | Performed by: FAMILY MEDICINE

## 2022-04-04 PROCEDURE — 85730 THROMBOPLASTIN TIME PARTIAL: CPT | Performed by: INTERNAL MEDICINE

## 2022-04-04 RX ORDER — AMIODARONE HYDROCHLORIDE 200 MG/1
200 TABLET ORAL
Qty: 30 TABLET | Refills: 0 | Status: ON HOLD | OUTPATIENT
Start: 2022-04-06 | End: 2022-06-29

## 2022-04-04 RX ORDER — WARFARIN SODIUM 5 MG/1
5 TABLET ORAL
Refills: 0
Start: 2022-04-04

## 2022-04-04 RX ORDER — POLYETHYLENE GLYCOL 3350 17 G/17G
17 POWDER, FOR SOLUTION ORAL DAILY
Refills: 0
Start: 2022-04-05 | End: 2022-07-10

## 2022-04-04 RX ORDER — MIDODRINE HYDROCHLORIDE 2.5 MG/1
2.5 TABLET ORAL
Refills: 0
Start: 2022-04-04 | End: 2022-07-10

## 2022-04-04 RX ORDER — AMIODARONE HYDROCHLORIDE 200 MG/1
200 TABLET ORAL EVERY 12 HOURS
Qty: 3 TABLET | Refills: 0 | Status: ON HOLD | OUTPATIENT
Start: 2022-04-04 | End: 2022-06-29

## 2022-04-04 RX ORDER — LANOLIN ALCOHOL/MO/W.PET/CERES
6 CREAM (GRAM) TOPICAL
Refills: 0
Start: 2022-04-04 | End: 2022-07-10

## 2022-04-04 RX ORDER — FUROSEMIDE 40 MG/1
40 TABLET ORAL DAILY
Refills: 0
Start: 2022-04-05 | End: 2022-07-10

## 2022-04-04 RX ORDER — DOXYCYCLINE 100 MG/1
100 TABLET ORAL 2 TIMES DAILY
Qty: 12 TABLET | Refills: 0
Start: 2022-04-04 | End: 2022-04-10

## 2022-04-04 RX ORDER — AMOXICILLIN 250 MG
1 CAPSULE ORAL 2 TIMES DAILY
Refills: 0
Start: 2022-04-04 | End: 2022-07-10

## 2022-04-04 RX ADMIN — COLLAGENASE SANTYL: 250 OINTMENT TOPICAL at 08:03

## 2022-04-04 RX ADMIN — DOCUSATE SODIUM AND SENNOSIDES 1 TABLET: 8.6; 5 TABLET ORAL at 08:02

## 2022-04-04 RX ADMIN — LEVOTHYROXINE SODIUM 150 MCG: 150 TABLET ORAL at 04:18

## 2022-04-04 RX ADMIN — MIDODRINE HYDROCHLORIDE 2.5 MG: 5 TABLET ORAL at 08:02

## 2022-04-04 RX ADMIN — HEPARIN SODIUM 6.1 UNITS/KG/HR: 10000 INJECTION, SOLUTION INTRAVENOUS at 02:29

## 2022-04-04 RX ADMIN — POLYETHYLENE GLYCOL 3350 17 G: 17 POWDER, FOR SOLUTION ORAL at 08:01

## 2022-04-04 RX ADMIN — MIDODRINE HYDROCHLORIDE 2.5 MG: 5 TABLET ORAL at 11:24

## 2022-04-04 RX ADMIN — AMIODARONE HYDROCHLORIDE 200 MG: 200 TABLET ORAL at 08:01

## 2022-04-04 RX ADMIN — VANCOMYCIN HYDROCHLORIDE 750 MG: 750 INJECTION, SOLUTION INTRAVENOUS at 08:02

## 2022-04-04 NOTE — PLAN OF CARE
Problem: SKIN/TISSUE INTEGRITY - ADULT  Goal: Incision(s), wounds(s) or drain site(s) healing without S/S of infection  Description: INTERVENTIONS  - Assess and document dressing, incision, wound bed, drain sites and surrounding tissue  - Provide patient and family education  - Perform skin care/dressing changes as needed  Outcome: Progressing

## 2022-04-04 NOTE — DISCHARGE SUMMARY
[address]  Discharge- Jed Day 1940, 80 y o  female MRN: [id number]  Unit/Bed#: -01 Encounter: [id number]  Primary Care Provider: Helio Vee MD   Date and time admitted to hospital: 3/25/2022  3:02 PM    * Septic shock [facility])  Assessment & Plan  · Sepsis secondary to cellulitis and abscess of right thigh  · Was on cefazolin however given growth of MRSA has been changed to IV vancomycin  · Infectious disease consulted  · Positive blood cultures likely contaminant per ID  Repeat blood cultures remain negative  · Will discharge on doxycycline 100 mg twice daily until 4/10  · Septic shock has resolved her patient still requiring midodrine    Results from last 7 days   Lab Units 03/28/22  0527   BLOOD CULTURE  No Growth After 5 Days  No Growth After 5 Days  Cellulitis of right thigh  Assessment & Plan  Patient had right thigh cellulitis with abscess  Underwent debridement  Wound cultures positive for MRSA  Received few days of IV vancomycin and prior to that IV Ancef  Now clinically improving  Will discharge on a course of doxycycline and continued wound care    Atrial fibrillation with RVR ([address] )  Assessment & Plan  · Tachycardic on admission secondary to septic shock  · Metoprolol held due to hypotension  · Continue amiodarone loading  Will DC telemetry  · Okay with surgery for heparin/ warfarin bridging -- warfarin ordered at 10 mg daily for 2 days  Please note patient has never been on Eliquis in the past due to aortic stenosis and AFib  · 4/4:  Patient initially presented with supratherapeutic INR of 7 9 with Chris   Currently her medications have been changed with discontinuation of metoprolol and amiodarone added  Also being placed on doxycycline  Her previous dose of Coumadin used to be 7 5 mg daily except for 12 5 mg on Thursdays  · Will decrease Coumadin dose to 5 mg daily for now  Check INR level every 48 hours and adjust dose gradually  Patient follows with [facility]  · Recommend follow-up with Cardiology in 1 week outpatient    Results from last 7 days   Lab Units 04/04/22  0412 04/03/22  0548 03/31/22  0448 03/30/22  0441 03/29/22  0434   INR  2 41* 1 83* 1 80* 1 79* 1 72*       Aortic stenosis, severe  Assessment & Plan  · Severe AS noted  · Outpatient follow-up with Cardiology    Cirrhosis [facility])  Assessment & Plan  · Cirrhosis noted on imaging  GI was consulted  Outpatient follow-up    Results from last 7 days   Lab Units 04/04/22  0412 04/01/22  0448 03/31/22  0448 03/30/22  0441 03/29/22  0434   AST U/L 57* 43 52* 42 48*   ALT U/L 14 15 28 29 35   TOTAL BILIRUBIN mg/dL 1 69* 2 02* 2 43* 2 48* 4 15*       Moderate protein-calorie malnutrition (HCC)  Assessment & Plan  Malnutrition Findings:   Adult Malnutrition type: Chronic illness  Adult Degree of Malnutrition: Malnutrition of moderate degree  Malnutrition Characteristics: Muscle loss,Fat loss,Weight loss  360 Statement: Chronic moderate malnutrition related to decreased appetite as evidenced by 11 6% weight loss x 4 mo (11/23/21 294lb, 3/28/22 260lb, suspect partly fluid related), moderate fat loss to orbitals, moderate muscle loss to temporalis, deltoid and interroseous muscles  Treated with: Will liberalize cardiac to 4gm GLADYS, continue CCD 3 at this time  Will trial glucerna daily and ensure max protein daily, increase as tolerated/indicated  Recommend daily weights  BMI Findings: Body mass index is 47 02 kg/m²       Hypothyroidism (acquired)  Assessment & Plan  · Continue levothyroxine    Impaired skin integrity  Assessment & Plan  · Wound care consulted    Traumatic hematoma of head  Assessment & Plan  · Traumatic hematoma after fall/being found down  · No active bleeding      Discharging Physician / Practitioner: Fidelia Mcghee MD  PCP: Zeina Argueta MD  Admission Date:   Admission Orders (From admission, onward)     Ordered        03/25/22 9377 Inpatient Admission  Once                      Discharge Date: 04/04/22    Medical Problems             Resolved Problems  Date Reviewed: 4/4/2022          Resolved    LEONARDO (acute kidney injury) ([facility] [address] ) 3/30/2022     Resolved by  LEXIE Gomez    Coagulopathy ([facility] [address] ) 3/30/2022     Resolved by  Julio Cesar Doss, [facility] Stay:  · Infectious Disease, Cardiology, General surgery    Procedures Performed:   · Debridement and washout of right thigh wound  · IJ placement which has now been removed    Significant Findings / Test Results:   XR chest portable    Result Date: 3/27/2022  Impression: No active pulmonary disease on examination which is somewhat limited secondary to low lung volumes  Workstation performed: [id number]     XR Trauma chest portable    Result Date: 3/26/2022  Impression: No acute cardiopulmonary disease  Workstation performed: [id number]     TRAUMA - CT head wo contrast    Result Date: 3/25/2022  Impression: Anterolateral right frontal scalp hematoma without an acute intracranial abnormality  Workstation performed: [id number]     CT facial bones without contrast    Result Date: 3/25/2022  Impression: Anterolateral right frontal scalp hematoma without acute traumatic injury to the facial bones  Workstation performed: [id number]     TRAUMA - CT spine cervical wo contrast    Result Date: 3/25/2022  Impression: No cervical spine fracture or traumatic malalignment  Workstation performed: [id number]     XR Trauma pelvis ap only 1 or 2 vw    Result Date: 3/26/2022  Impression: No acute osseous abnormality  Workstation performed: [id number]     TRAUMA - CT chest abdomen pelvis w contrast    Result Date: 3/25/2022  Impression: 1  No findings of acute thoracic or abdominopelvic injury  2   Small right and trace left pleural effusions are new since November 14, 2021  3   Persistent findings of hepatic cirrhosis new trace perihepatic ascites 4    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  Recommendation is for follow-up low radiation dose chest CT in one year  Workstation performed: [id number]     XR chest portable ICU    Result Date: 3/26/2022  Impression: Right jugular catheter in mid SVC with no pneumothorax  No acute pulmonary disease  Workstation performed: [id number]     CT recon only thoracolumbar (no charge)    Result Date: 3/25/2022  Impression: No fracture or traumatic subluxation  Scoliosis  Severe lumbar degenerative spondylosis  Workstation performed: [id number]     US right upper quadrant    Result Date: 3/26/2022  Impression: 1  Cholelithiasis and sludge with wall thickening and pericholecystic fluid  Sonographic Hedy Foots sign is negative  Findings are equivocal for acute cholecystitis, particularly in the setting of underlying hepatic dysfunction which can also cause gallbladder wall abnormalities  If there is continued clinical concern, further evaluation with HIDA scan recommended  2   Liver cirrhosis  Workstation performed: [id number]     CT lower extremity w contrast right    Result Date: 3/28/2022  Impression: Skin thickening and inflammatory stranding subcutaneous tissues both medially and laterally greater medially compatible with cellulitis  More confluent areas of fluid are redemonstrated medially and laterally without significant rim enhancement suggestive of phlegmonous change  No well-defined discrete rim-enhancing fluid collection seen to suggest a discrete abscess  Workstation performed: [id number]     CT lower extremity wo contrast right    Result Date: 3/26/2022  Impression: Diffuse subcutaneous edema and overlying skin thickening most prominent about the medial aspect of the mid/distal thigh with superficial and deep subcutaneous fluid #3/169 appears nonloculated without a surrounding rind however abscess cannot be excluded  without IV contrast  The study was marked in EPIC for immediate notification  Workstation performed: [id number]     Incidental Findings:   · None     Test Results Pending at Discharge (will require follow up): · None     Outpatient Tests Requested:  · CBC, CMP in 5 days  · PT INR every 2 days for 6 occurrences    Complications:  None    Reason for Admission:  [facility] Course:     Sabine Escudero is a 80 y o  female patient who originally presented to the hospital on 3/25/2022 due to fall and found with multiple injuries and areas of ecchymosis  Trauma alert was called in the ED  Patient also had Chris I, supratherapeutic INR and septic shock on admission  She had a prolonged course of Hospital Physician initially in the ICU and then on the [facility] floor  Renal function is now back to baseline  INR is now 2 4  Her metoprolol was discontinued and she was placed on amiodarone for rate control and also added midodrine for hypotension  Found to have coagulase-negative Staphylococcus bacteremia  Repeat blood cultures were negative  Right thigh cellulitis with underlying subcutaneous hematoma was debrided and found to have MRSA infection  Continue with wound care treatments and complete course of oral doxycycline for that  Now being sent for rehab to SNF  Please note that her Coumadin dose has been decreased to 5 mg daily for now with frequent PT INR monitoring as she has multiple new medications that have been started recently  Will need to follow up outpatient with Cardiology and with General surgery      Please see above list of diagnoses and related plan for additional information  Condition at Discharge: good     Discharge Day Visit / Exam:     Subjective:  Patient denies any chest pain or shortness of breath or abdominal pain  Agreeable to going to rehab    No pain reported in the right thigh  Vitals: Blood Pressure: 92/57 (04/04/22 1026)  Pulse: 82 (04/04/22 1026)  Temperature: 98 °F (36 7 °C) (04/04/22 1026)  Temp Source: Oral (04/04/22 0403)  Respirations: 18 (04/04/22 1026)  Height: 5' 2" (157 5 cm) (03/31/22 1214)  Weight - Scale: 117 kg (257 lb 0 9 oz) (04/04/22 0403)  SpO2: 97 % (04/04/22 1026)  Exam:   Physical Exam  Vitals and nursing note reviewed  Constitutional:       Appearance: Normal appearance  She is obese  HENT:      Head: Normocephalic and atraumatic  Right Ear: External ear normal       Left Ear: External ear normal       Nose: Nose normal       Mouth/Throat:      Pharynx: Oropharynx is clear  Eyes:      Pupils: Pupils are equal, round, and reactive to light  Cardiovascular:      Rate and Rhythm: Normal rate and regular rhythm  Heart sounds: Normal heart sounds  Pulmonary:      Effort: Pulmonary effort is normal       Breath sounds: Normal breath sounds  Abdominal:      General: Bowel sounds are normal       Palpations: Abdomen is soft  Tenderness: There is no abdominal tenderness  Musculoskeletal:         General: Normal range of motion  Cervical back: Normal range of motion and neck supple  Right lower leg: Edema present  Left lower leg: Edema present  Comments: Right thigh wound noted with dressings with mild necrosis noted around the margins   Skin:     General: Skin is warm and dry  Capillary Refill: Capillary refill takes less than 2 seconds  Neurological:      General: No focal deficit present  Mental Status: She is alert and oriented to person, place, and time  Psychiatric:         Mood and Affect: Mood normal          Discussion with Family:  Will update family    Discharge instructions/Information to patient and family:   See after visit summary for information provided to patient and family  Provisions for Follow-Up Care:  See after visit summary for information related to follow-up care and any pertinent home health orders        Disposition:     [facility] at   [id number] [facility] to [facility] SNF:   · Not Applicable to this Patient - Not Applicable to this Patient    Planned Readmission:  None     Discharge Statement:  I spent 35 minutes discharging the patient  This time was spent on the day of discharge  I had direct contact with the patient on the day of discharge  Greater than 50% of the total time was spent examining patient, answering all patient questions, arranging and discussing plan of care with patient as well as directly providing post-discharge instructions  Additional time then spent on discharge activities  Discharge Medications:  See after visit summary for reconciled discharge medications provided to patient and family        ** Please Note: This note has been constructed using a voice recognition system **

## 2022-04-04 NOTE — CASE MANAGEMENT
Case Management Discharge Planning Note    Patient name Miriam Estrella  Location /-51 MRN [id number]  : 1940 Date 2022       Current Admission Date: 3/25/2022  Current Admission Diagnosis:Septic shock [facility])   Patient Active Problem List    Diagnosis Date Noted    Aortic stenosis, severe 2022    Pulmonary HTN ([facility] [address] )     Cholelithiases 2022    Cirrhosis ([facility] ) 2022    Cellulitis of right thigh 2022    Fusiform ectasia of ascending aorta 2022    Moderate protein-calorie malnutrition ([facility] ) 2022    Septic shock ([facility] ) 2022    Atrial fibrillation with RVR ([place] [address] ) 2022    Fall 2022    Traumatic hematoma of head 2022    Impaired skin integrity 2022    Hypothyroidism (acquired) 2022    Diabetes mellitus ([place] [address] ) 2022    HLD (hyperlipidemia) 2022    Thoracic aortic aneurysm without rupture ([place] [address] ) 2022      LOS (days): 10  Geometric Mean LOS (GMLOS) (days): 9 60  Days to GMLOS:-0 3     OBJECTIVE:  Risk of Unplanned Readmission Score: 20         Current admission status: Inpatient   Preferred Pharmacy:   [facility] [place], [address] [facility]  [address] [facility]  Phone: [phone] Fax: [phone]    [facility], [address]  [address] [place] [address]  Phone: [phone] Fax: [phone]    Primary Care Provider: Brandon John MD    Primary Insurance: MEDICARE  Secondary Insurance: [facility]    DISCHARGE DETAILS:    Confirmed  time is 1500 with [address] and Facility updated

## 2022-04-04 NOTE — PLAN OF CARE
Problem: Nutrition/Hydration-ADULT  Goal: Nutrient/Hydration intake appropriate for improving, restoring or maintaining nutritional needs  Description: Monitor and assess patient's nutrition/hydration status for malnutrition  Collaborate with interdisciplinary team and initiate plan and interventions as ordered  Monitor patient's weight and dietary intake as ordered or per policy  Utilize nutrition screening tool and intervene as necessary  Determine patient's food preferences and provide high-protein, high-caloric foods as appropriate       INTERVENTIONS:  - Monitor oral intake, urinary output, labs, and treatment plans  - Assess nutrition and hydration status and recommend course of action  - Evaluate amount of meals eaten  - Assist patient with eating if necessary   - Allow adequate time for meals  - Recommend/ encourage appropriate diets, oral nutritional supplements, and vitamin/mineral supplements  - Order, calculate, and assess calorie counts as needed  - Recommend, monitor, and adjust tube feedings and TPN/PPN based on assessed needs  - Assess need for intravenous fluids  - Provide specific nutrition/hydration education as appropriate  - Include patient/family/caregiver in decisions related to nutrition  Outcome: Adequate for Discharge     Problem: Prexisting or High Potential for Compromised Skin Integrity  Goal: Skin integrity is maintained or improved  Description: INTERVENTIONS:  - Identify patients at risk for skin breakdown  - Assess and monitor skin integrity  - Assess and monitor nutrition and hydration status  - Monitor labs   - Assess for incontinence   - Turn and reposition patient  - Assist with mobility/ambulation  - Relieve pressure over bony prominences  - Avoid friction and shearing  - Provide appropriate hygiene as needed including keeping skin clean and dry  - Evaluate need for skin moisturizer/barrier cream  - Collaborate with interdisciplinary team   - Patient/family teaching  - Consider wound care consult   Outcome: Adequate for Discharge     Problem: MOBILITY - ADULT  Goal: Maintain or return to baseline ADL function  Description: INTERVENTIONS:  -  Assess patient's ability to carry out ADLs; assess patient's baseline for ADL function and identify physical deficits which impact ability to perform ADLs (bathing, care of mouth/teeth, toileting, grooming, dressing, etc )  - Assess/evaluate cause of self-care deficits   - Assess range of motion  - Assess patient's mobility; develop plan if impaired  - Assess patient's need for assistive devices and provide as appropriate  - Encourage maximum independence but intervene and supervise when necessary  - Involve family in performance of ADLs  - Assess for home care needs following discharge   - Consider OT consult to assist with ADL evaluation and planning for discharge  - Provide patient education as appropriate  Outcome: Adequate for Discharge  Goal: Maintains/Returns to pre admission functional level  Description: INTERVENTIONS:  - Perform BMAT or MOVE assessment daily    - Set and communicate daily mobility goal to care team and patient/family/caregiver  - Collaborate with rehabilitation services on mobility goals if consulted  - Perform Range of Motion 3 times a day  - Reposition patient every 2 hours    - Record patient progress and toleration of activity level   Outcome: Adequate for Discharge     Problem: PAIN - ADULT  Goal: Verbalizes/displays adequate comfort level or baseline comfort level  Description: Interventions:  - Encourage patient to monitor pain and request assistance  - Assess pain using appropriate pain scale  - Administer analgesics based on type and severity of pain and evaluate response  - Implement non-pharmacological measures as appropriate and evaluate response  - Consider cultural and social influences on pain and pain management  - Notify physician/advanced practitioner if interventions unsuccessful or patient reports new pain  Outcome: Adequate for Discharge     Problem: INFECTION - ADULT  Goal: Absence or prevention of progression during hospitalization  Description: INTERVENTIONS:  - Assess and monitor for signs and symptoms of infection  - Monitor lab/diagnostic results  - Monitor all insertion sites, i e  indwelling lines, tubes, and drains  - Monitor endotracheal if appropriate and nasal secretions for changes in amount and color  - Garber appropriate cooling/warming therapies per order  - Administer medications as ordered  - Instruct and encourage patient and family to use good hand hygiene technique  - Identify and instruct in appropriate isolation precautions for identified infection/condition  Outcome: Adequate for Discharge     Problem: SAFETY ADULT  Goal: Maintain or return to baseline ADL function  Description: INTERVENTIONS:  -  Assess patient's ability to carry out ADLs; assess patient's baseline for ADL function and identify physical deficits which impact ability to perform ADLs (bathing, care of mouth/teeth, toileting, grooming, dressing, etc )  - Assess/evaluate cause of self-care deficits   - Assess range of motion  - Assess patient's mobility; develop plan if impaired  - Assess patient's need for assistive devices and provide as appropriate  - Encourage maximum independence but intervene and supervise when necessary  - Involve family in performance of ADLs  - Assess for home care needs following discharge   - Consider OT consult to assist with ADL evaluation and planning for discharge  - Provide patient education as appropriate  Outcome: Adequate for Discharge  Goal: Maintains/Returns to pre admission functional level  Description: INTERVENTIONS:  - Perform BMAT or MOVE assessment daily    - Set and communicate daily mobility goal to care team and patient/family/caregiver  - Collaborate with rehabilitation services on mobility goals if consulted  - Perform Range of Motion 3 times a day    - Reposition patient every 2 hours  - Record patient progress and toleration of activity level   Outcome: Adequate for Discharge  Goal: Patient will remain free of falls  Description: INTERVENTIONS:  - Educate patient/family on patient safety including physical limitations  - Instruct patient to call for assistance with activity   - Consult OT/PT to assist with strengthening/mobility   - Keep Call bell within reach  - Keep bed low and locked with side rails adjusted as appropriate  - Keep care items and personal belongings within reach  - Initiate and maintain comfort rounds  - Make Fall Risk Sign visible to staff  - Offer Toileting every 2 Hours, in advance of need  - Initiate/Maintain bed/chair alarm prn  - Apply yellow socks and bracelet for high fall risk patients  - Consider moving patient to room near nurses station  Outcome: Adequate for Discharge     Problem: DISCHARGE PLANNING  Goal: Discharge to home or other facility with appropriate resources  Description: INTERVENTIONS:  - Identify barriers to discharge w/patient and caregiver  - Arrange for needed discharge resources and transportation as appropriate  - Identify discharge learning needs (meds, wound care, etc )  - Arrange for interpretive services to assist at discharge as needed  - Refer to Case Management Department for coordinating discharge planning if the patient needs post-hospital services based on physician/advanced practitioner order or complex needs related to functional status, cognitive ability, or social support system  Outcome: Adequate for Discharge     Problem: Knowledge Deficit  Goal: Patient/family/caregiver demonstrates understanding of disease process, treatment plan, medications, and discharge instructions  Description: Complete learning assessment and assess knowledge base    Interventions:  - Provide teaching at level of understanding  - Provide teaching via preferred learning methods  Outcome: Adequate for Discharge     Problem: CARDIOVASCULAR - ADULT  Goal: Maintains optimal cardiac output and hemodynamic stability  Description: INTERVENTIONS:  - Monitor I/O, vital signs and rhythm  - Monitor for S/S and trends of decreased cardiac output  - Administer and titrate ordered vasoactive medications to optimize hemodynamic stability  - Assess quality of pulses, skin color and temperature  - Assess for signs of decreased coronary artery perfusion  - Instruct patient to report change in severity of symptoms  Outcome: Adequate for Discharge  Goal: Absence of cardiac dysrhythmias or at baseline rhythm  Description: INTERVENTIONS:  - Continuous cardiac monitoring, vital signs, obtain 12 lead EKG if ordered  - Administer antiarrhythmic and heart rate control medications as ordered  - Monitor electrolytes and administer replacement therapy as ordered  Outcome: Adequate for Discharge     Problem: METABOLIC, FLUID AND ELECTROLYTES - ADULT  Goal: Electrolytes maintained within normal limits  Description: INTERVENTIONS:  - Monitor labs and assess patient for signs and symptoms of electrolyte imbalances  - Administer electrolyte replacement as ordered  - Monitor response to electrolyte replacements, including repeat lab results as appropriate  - Instruct patient on fluid and nutrition as appropriate  Outcome: Adequate for Discharge  Goal: Fluid balance maintained  Description: INTERVENTIONS:  - Monitor labs   - Monitor I/O and WT  - Instruct patient on fluid and nutrition as appropriate  - Assess for signs & symptoms of volume excess or deficit  Outcome: Adequate for Discharge  Goal: Glucose maintained within target range  Description: INTERVENTIONS:  - Monitor Blood Glucose as ordered  - Assess for signs and symptoms of hyperglycemia and hypoglycemia  - Administer ordered medications to maintain glucose within target range  - Assess nutritional intake and initiate nutrition service referral as needed  Outcome: Adequate for Discharge     Problem: SKIN/TISSUE INTEGRITY - ADULT  Goal: Incision(s), wounds(s) or drain site(s) healing without S/S of infection  Description: INTERVENTIONS  - Assess and document dressing, incision, wound bed, drain sites and surrounding tissue  - Provide patient and family education  - Perform skin care/dressing changes as needed  Outcome: Adequate for Discharge  Goal: Pressure injury heals and does not worsen  Description: Interventions:  - Implement low air loss mattress or specialty surface (Criteria met)  - Apply silicone foam dressing  - Use special pressure reducing interventions such as waffle cushion when in chair   - Apply fecal or urinary incontinence containment device   - Perform passive or active ROM every 8  - Turn and reposition patient & offload bony prominences every 2 hours   - Utilize friction reducing device or surface for transfers   - Consider consults to  interdisciplinary teams such as wound team  - Use incontinent care products after each incontinent episode such as barrier cream  - Consider nutrition services referral as needed  Outcome: Adequate for Discharge     Problem: Potential for Falls  Goal: Patient will remain free of falls  Description: INTERVENTIONS:  - Educate patient/family on patient safety including physical limitations  - Instruct patient to call for assistance with activity   - Consult OT/PT to assist with strengthening/mobility   - Keep Call bell within reach  - Keep bed low and locked with side rails adjusted as appropriate  - Keep care items and personal belongings within reach  - Initiate and maintain comfort rounds  - Make Fall Risk Sign visible to staff  - Offer Toileting every 2 Hours, in advance of need  - Initiate/Maintain bed/chair alarm prn  - Apply yellow socks and bracelet for high fall risk patients  - Consider moving patient to room near nurses station  Outcome: Adequate for Discharge

## 2022-04-04 NOTE — WOUND OSTOMY CARE
Consult Note - Wound   Ricardo Jackson 80 y o  female MRN: [id number]  Unit/Bed#: -01 Encounter: [id number]      History and Present Illness:Patient seen today to evaluate right thigh wound for possible NPWT  Vac Placement  Not appropriated for Vac placement at this time due to necrosis adhered to wound bed, continued with Nickel size thickness of SAntly to black areas of wound  Pt is alert oriented, educated patient on healing process of wound and assessment findings  Agreeable to assessment   Able to turn and reposition in bed with minimal assist         Assessment Findings:   1)Right anterior thigh wound POD#4 s/p debridement and washout of right thigh wound, evolving DTI  Wound bed moist granular tissue present, moderate serosanguinous drainage on moist Kerlix packing when removed, packing applied this am  30% black tissue on lateral aspect of wound  Nickel thickness Santyl applied to all black areas to promote enzymatic debridement  Saline moistened Kerlix to wound bed loosely packed and covered with ABD and wrapped with Ace   2)Heels, Sacrum and buttocks pink, blanchable  3)Left foot medial aspect POA DTI , decreasing in size, non blanchable  Skin intact,no induration noted  4) DTI of left breast skin fold  parital thickness skin loss resolved  Area remains deep maroon non blanchable  5) Blister to posterior aspect of left breast, unroofed  No active drss  6) POA DTI anterior aspect 3rd and 5th toe, skin intact  Wound 03/25/22 Other (comment) Other (Comment) Thigh Anterior;Right (Active)   Wound Image   04/04/22 0920   Wound Description Beefy red;Bleeding;Black;Gangrene;Slough; Tan 04/04/22 0920        Argenis-wound Assessment Intact;Fragile; Swelling 04/04/22 0920   Wound Length (cm) 17 cm 04/04/22 0920   Wound Width (cm) 8 cm 04/04/22 0920   Wound Depth (cm) 2 8 cm 04/04/22 0920   Wound Surface Area (cm^2) 136 cm^2 04/04/22 0920   Wound Volume (cm^3) 380 8 cm^3 04/04/22 0920 Calculated Wound Volume (cm^3) 380 8 cm^3 04/04/22 0920   Change in Wound Size % 53 44 04/04/22 0920   Drainage Amount Moderate 04/04/22 0920   Drainage Description Serosanguineous 04/04/22 0920   Non-staged Wound Description     Treatments Irrigation with NSS;Site care 04/04/22 0920   Dressing ABD;Enzymatic debrider;Moist to Moist 04/04/22 0920   Packing- # removed 1 04/04/22 0920   Packing- # inserted 1 04/04/22 0920   Dressing Changed Changed 04/04/22 0920   Patient Tolerance Tolerated well 04/04/22 0920   Dressing Status Intact; Old drainage 04/04/22 0230       Patient Tolerance Tolerated well 04/03/22 0920   Dressing Status Clean;Dry; Intact 04/03/22 0920       Skin care plans:  1-Calazime to sacrum, buttocks TID and PRN  2-Hydraguard to bilateral heel BID and PRN  3-Elevate heels to offload pressure  4-Ehob cushion when out of bed  5-Turn/repoisiton q2h or when medically stable for pressure re-distribution on skin  6-Moisturize skin daily with skin nourishing cream  7-Cleanse right medial thigh wound with normal saline,nickel thickness   Santyl then saline moistented Kerlix then ABD pad change daily and prn soil or dislodgement  8-Cleanse open area under left breast with normal saline, apply Maxorb robe to open wound then pad beneath breast and intact blister on breast with an ABD pad   Change daily and prn soil or dislodgment    Call or tigertext with any questions  Wound Care will continue to follow    Sander Hamilton Rn [facility]

## 2022-04-04 NOTE — ASSESSMENT & PLAN NOTE
Patient had right thigh cellulitis with abscess  Underwent debridement  Wound cultures positive for MRSA  Received few days of IV vancomycin and prior to that IV Ancef  Now clinically improving    Will discharge on a course of doxycycline and continued wound care

## 2022-04-04 NOTE — ASSESSMENT & PLAN NOTE
· Sepsis secondary to cellulitis and abscess of right thigh  · Was on cefazolin however given growth of MRSA has been changed to IV vancomycin  · Infectious disease consulted  · Positive blood cultures likely contaminant per ID  Repeat blood cultures remain negative  · Will discharge on doxycycline 100 mg twice daily until 4/10  · Septic shock has resolved her patient still requiring midodrine    Results from last 7 days   Lab Units 03/28/22  0527   BLOOD CULTURE  No Growth After 5 Days  No Growth After 5 Days

## 2022-04-04 NOTE — PROGRESS NOTES
Progress Note - General Surgery   Ricardo Jackson 80 y o  female MRN: [id number]  Unit/Bed#: -01 Encounter: [id number]    Assessment:  - POD#4 s/p debridement and washout of right thigh wound  - Superficial necrosis mildly progressed since surgery   - Wound gram stain with staphylococcus aureus and gram positive cocci in clusters  - Afebrile, vss  - WBC 5 89 (5 47)  - Hgb 8 2 (8 1)  - Wound culture: 2+ growth of MRSA     Plan:  - Daily packing/dressing change:  Saline moistened Grayson wrap packed into the wound, cover with gauze and ABD, secured with Ace wrap  - Santyl to areas of necrosis  - Awaiting wound care nurse evaluation, possible vac placement  - Pain control prn  - Carb control diet  - Daily CBC and BMP  - Resume anticoagulation as per medicine  - Anne herring  - Medical management as per primary  Subjective/Objective   Subjective: No acute events overnight  Pt reports pain at tailbone and an Allevyn was placed by nursing  Drainage from wound through dressing  No fever or chills    Objective:     Blood pressure 109/58, pulse 72, temperature 98 °F (36 7 °C), temperature source Oral, resp  rate 18, height 5' 2" (1 575 m), weight 117 kg (257 lb 0 9 oz), SpO2 96 %  ,Body mass index is 47 02 kg/m²        Intake/Output Summary (Last 24 hours) at 4/4/2022 0800  Last data filed at 4/4/2022 0701  Gross per 24 hour   Intake 269 41 ml   Output 300 ml   Net -30 59 ml       Invasive Devices  Report    Peripheral Intravenous Line            Peripheral IV 03/31/22 Right Antecubital 4 days    Peripheral IV 04/01/22 Left Antecubital 2 days          Drain            External Urinary Catheter 4 days                Physical Exam: /58 (BP Location: Right arm)   Pulse 72   Temp 98 °F (36 7 °C) (Oral)   Resp 18   Ht 5' 2" (1 575 m)   Wt 117 kg (257 lb 0 9 oz)   SpO2 96%   BMI 47 02 kg/m²   General appearance: alert and oriented, in no acute distress  Skin: Right anteromedial thigh positive for 17x7 5cm wound, 5cm deep at its center  Necrotic tissue at the lateral portion of the wound and some other superficial areas appears to be slightly worse than yesterday  Lab, Imaging and other studies:  I have personally reviewed pertinent lab results      CBC:   Lab Results   Component Value Date    WBC 5 89 04/04/2022    HGB 8 2 (L) 04/04/2022    HCT 23 7 (L) 04/04/2022     (H) 04/04/2022     (L) 04/04/2022    MCH 34 7 (H) 04/04/2022    MCHC 34 6 04/04/2022    RDW 16 0 (H) 04/04/2022    MPV 10 1 04/04/2022   CMP:   Lab Results   Component Value Date    SODIUM 136 04/04/2022    K 4 3 04/04/2022     04/04/2022    CO2 28 04/04/2022    BUN 27 (H) 04/04/2022    CREATININE 1 15 04/04/2022    CALCIUM 7 7 (L) 04/04/2022    AST 57 (H) 04/04/2022    ALT 14 04/04/2022    ALKPHOS 202 (H) 04/04/2022    EGFR 44 04/04/2022   Coagulation:   Lab Results   Component Value Date    INR 2 41 (H) 04/04/2022     VTE Pharmacologic Prophylaxis: Warfarin (Coumadin)  VTE Mechanical Prophylaxis: sequential compression device     Arsalan Sawyer PA-C

## 2022-04-04 NOTE — CASE MANAGEMENT
Case Management Discharge Planning Note    Patient name Olga Lidia Gresham  Location /-04 MRN [id number]  : 1940 Date 2022       Current Admission Date: 3/25/2022  Current Admission Diagnosis:Septic shock [facility])   Patient Active Problem List    Diagnosis Date Noted    Aortic stenosis, severe 2022    Pulmonary HTN ([facility] )     Cholelithiases 2022    Cirrhosis ([facility] ) 2022    Cellulitis of right thigh 2022    Fusiform ectasia of ascending aorta 2022    Moderate protein-calorie malnutrition ([facility] ) 2022    Septic shock ([facility] ) 2022    Atrial fibrillation with RVR ([place] [address] ) 2022    Fall 2022    Traumatic hematoma of head 2022    Impaired skin integrity 2022    Hypothyroidism (acquired) 2022    Diabetes mellitus ([facility] ) 2022    HLD (hyperlipidemia) 2022    Thoracic aortic aneurysm without rupture ([facility] ) 2022      LOS (days): 10  Geometric Mean LOS (GMLOS) (days): 9 60  Days to GMLOS:-0 2     OBJECTIVE:  Risk of Unplanned Readmission Score: 19         Current admission status: Inpatient   Preferred Pharmacy:   [facility] [address] [facility]  [address] [facility] [place] [address]  Phone: [phone] Fax: [phone]    [facility] [address] [place] [address] [address] [address]  Phone: [phone] Fax: [phone]    Primary Care Provider: Steve Regan MD    Primary Insurance: MEDICARE  Secondary Insurance: [facility]    DISCHARGE DETAILS:    Requesting 2 PM  time  Await confirmation  Medical necessity was done                                                                                      IMM Given (Date):: 22 (done at [phone], does not intend to appeal the dc)  IMM Given to[de-identified] Patient          Accepting Facility Name, [facility] : [facility]  Receiving Facility/Agency Phone Number: [phone]  Facility/Agency Fax Number: [phone]

## 2022-04-04 NOTE — ASSESSMENT & PLAN NOTE
· Cirrhosis noted on imaging  GI was consulted    Outpatient follow-up    Results from last 7 days   Lab Units 04/04/22  0412 04/01/22  0448 03/31/22  0448 03/30/22  0441 03/29/22  0434   AST U/L 57* 43 52* 42 48*   ALT U/L 14 15 28 29 35   TOTAL BILIRUBIN mg/dL 1 69* 2 02* 2 43* 2 48* 4 15*

## 2022-04-04 NOTE — CASE MANAGEMENT
Case Management Discharge Planning Note    Patient name Zurdo Ortiz  Location /-43 MRN [id number]  : 1940 Date 2022       Current Admission Date: 3/25/2022  Current Admission Diagnosis:Septic shock [facility])   Patient Active Problem List    Diagnosis Date Noted    Aortic stenosis, severe 2022    Pulmonary HTN ([facility] )     Cholelithiases 2022    Cirrhosis ([facility] ) 2022    Cellulitis of right thigh 2022    Fusiform ectasia of ascending aorta 2022    Moderate protein-calorie malnutrition ([facility] ) 2022    Septic shock ([facility] ) 2022    Atrial fibrillation with RVR ([facility] ) 2022    Fall 2022    Traumatic hematoma of head 2022    Impaired skin integrity 2022    Hypothyroidism 2022    Diabetes mellitus ([facility] ) 2022    HLD (hyperlipidemia) 2022    Thoracic aortic aneurysm without rupture ([facility] ) 2022      LOS (days): 10  Geometric Mean LOS (GMLOS) (days): 9 60  Days to GMLOS:-0 2     OBJECTIVE:  Risk of Unplanned Readmission Score: 19         Current admission status: Inpatient   Preferred Pharmacy:   [facility] [address] [place] [address]  Phone: [phone] Fax: [phone]    [place] [address] [address]  [place] [address] [address] [address]  Phone: [phone] Fax: [phone]    Primary Care Provider: Denia Carter MD    Primary Insurance: MEDICARE  Secondary Insurance: [facility]    DISCHARGE DETAILS:    Possible dc today pending wound care evaluation  Vac is not needed  CM met with patient and reviewed the DC IMM with patient and she is aware of pending dc today, she feels ready to go, does not want to appeal the dc  [facility] can accept today  CM called and informed daughter Rose Camera of dc today pending transport  [facility] is going to call back if they can transport patient    Await call back                                                                                                  Accepting Facility Name, [facility] : [facility]  Receiving Facility/Agency Phone Number: [phone]  Facility/Agency Fax Number: [phone]

## 2022-04-04 NOTE — ASSESSMENT & PLAN NOTE
Malnutrition Findings:   Adult Malnutrition type: Chronic illness  Adult Degree of Malnutrition: Malnutrition of moderate degree  Malnutrition Characteristics: Muscle loss,Fat loss,Weight loss  360 Statement: Chronic moderate malnutrition related to decreased appetite as evidenced by 11 6% weight loss x 4 mo (11/23/21 294lb, 3/28/22 260lb, suspect partly fluid related), moderate fat loss to orbitals, moderate muscle loss to temporalis, deltoid and interroseous muscles  Treated with: Will liberalize cardiac to 4gm GLADYS, continue CCD 3 at this time  Will trial glucerna daily and ensure max protein daily, increase as tolerated/indicated  Recommend daily weights  BMI Findings: Body mass index is 47 02 kg/m²

## 2022-04-04 NOTE — PROGRESS NOTES
REQUIRED DOCUMENTATION:      1  This service was provided via Telemedicine  2  Provider located at [facility]  3  TeleMed provider: Arben Cristobal MD  4  Identify all parties in room with patient during tele consult:  Patient and virtual care assistant  5  After connecting through [employer], patient was identified by name and date of birth and assistant checked wristband  Patient was then informed that this was a Telemedicine visit and that the exam was being conducted confidentially over secure lines  My office door was closed  No one else was in the room  Patient acknowledged consent and understanding of privacy and security of the Telemedicine visit, and gave us permission to have the assistant stay in the room in order to assist with the history and to conduct the exam   I informed the patient that I have reviewed their record in Epic and presented the opportunity for them to ask any questions regarding the visit today  The patient agreed to participate  Progress Note - Infectious Disease   Betty Bolden 80 y o  female MRN: [id number]  Unit/Bed#: -01 Encounter: [id number]      Impression/Plan:    1  SIRS, with hypotension, POA   Consider sepsis/septic shock from right thigh cellulitis but patient also has multiple noninfectious etiologies for SIRS   Patient is clinically and systemically improved   Off pressors   -Antibiotic plan as below  -Monitor temperature/WBC count  -Monitor hemodynamics     2  Coagulase-negative Staphylococcus bacteremia   Although there was growth in both admission blood cultures, patient has different species of coagulase-negative Staphylococcus in each of the set of blood cultures   With patient being hypotensive on admission, suspect that she was a very difficult blood draw access   Patient has no indwelling intravascular foreign body   Repeat blood cultures have no growth thus far   Suspect this is contamination   -no antibiotics indicated for this     3  Right thigh cellulitis, with underlying subcutaneous hematoma  Cellulitis improved of Cefazolin  However patient developed right thigh wound developed more necrosis and purulent drainage, evidence of infected hematoma  Status post debridement and washout of right thigh wound and SQ abscess 3/31  Culture growing MRSA and patient switched to vancomycin  Would has some necrotic tissue but no purulence or signs of worsening infection   -continue IV vancomycin inpatient  -discharge on doxycycline 100mg PO BID  Recommend 7 full days of antibiotics from switch to Vancomycin, through 4/10/22  -surgery and wound care follow up      4  Asymptomatic bacteriuria, likely bladder colonization in this elderly patient   Antibiotic is not necessary, although previous cefazolin provided coverage for E coli and urine culture  No antibiotic needed for this     5  LEONARDO, likely secondary to prerenal state and rhabdomyolysis   Creatinine  has improved and at baseline  Antibiotic at full dose  Monitor creatinine      6  Possible cirrhosis  GI follow-up      7  Status post fall with patient being down for an unknown period of time   Fortunately, there are no significant sequela from fall  Above management plan discussed in detail with the primary team   Okay to discharge from ID perspective  I spent 30 minutes in evaluation of the patient of which 15 minutes was in counseling/coordination of care    Antibiotics:  Vancomycin day 3  Abx day 11    Subjective:  Patient feeling okay  Moderate pain in the right thigh  Denies fever, chills, chest pain, shortness of breath  Tolerating IV antibiotics       Objective:  Vitals:  Temp:  [98 °F (36 7 °C)-98 2 °F (36 8 °C)] 98 °F (36 7 °C)  HR:  [72-82] 82  Resp:  [18-20] 18  BP: ()/(57-58) 92/57  SpO2:  [96 %-99 %] 97 %  Temp (24hrs), Av 1 °F (36 7 °C), Min:98 °F (36 7 °C), Max:98 2 °F (36 8 °C)  Current: Temperature: 98 °F (36 7 °C)    Physical Exam:     Documented physical exam has been primarily done by the patient's nurse and/or the primary service due to limited examination abilities on telemedicine     General Appearance:  Alert, interactive, nontoxic, no acute distress  Throat: Oropharynx moist without lesions  Lungs:   Clear to auscultation bilaterally; no wheezes, rhonchi or rales; respirations unlabored   Heart:  RRR; no murmur, rub or gallop   Abdomen:   Soft, non-tender, non-distended, positive bowel sounds  Extremities: Bilateral lower extremity edema  Right thigh wrapped in ace bandage   Skin: No new rashes  Ecchymoses over face       Labs: All pertinent labs and imaging studies were personally reviewed  Results from last 7 days   Lab Units 04/04/22  0412 04/03/22  0548 04/02/22  0526   WBC Thousand/uL 5 89 5 47 6 97   HEMOGLOBIN g/dL 8 2* 8 1* 8 1*   PLATELETS Thousands/uL 141* 129* 123*     Results from last 7 days   Lab Units 04/04/22  0412 04/03/22  0548 04/03/22  0548 04/02/22  0526 04/02/22  0526 04/01/22  0448 04/01/22  0448 03/31/22  0448 03/31/22  0448   SODIUM mmol/L 136  --  136  --  135*   < > 135*   < > 135*   POTASSIUM mmol/L 4 3   < > 4 1   < > 4 1   < > 4 4   < > 4 2   CHLORIDE mmol/L 104   < > 104   < > 102   < > 105   < > 104   CO2 mmol/L 28   < > 27   < > 27   < > 28   < > 27   BUN mg/dL 27*   < > 31*   < > 32*   < > 28*   < > 28*   CREATININE mg/dL 1 15   < > 1 15   < > 1 20   < > 1 15   < > 1 17   EGFR ml/min/1 73sq m 44   < > 44   < > 42   < > 44   < > 43   CALCIUM mg/dL 7 7*   < > 7 7*   < > 7 6*   < > 7 7*   < > 7 9*   AST U/L 57*  --   --   --   --   --  43  --  52*   ALT U/L 14  --   --   --   --    < > 15   < > 28   ALK PHOS U/L 202*  --   --   --   --    < > 141*   < > 182*    < > = values in this interval not displayed       Results from last 7 days   Lab Units 03/31/22  0448   PROCALCITONIN ng/ml 0 85*                   Micro:  Results from last 7 days   Lab Units 03/31/22  1340   GRAM STAIN RESULT  1+ Gram positive cocci in clusters*  No polys seen*   WOUND CULTURE  2+ Growth of Methicillin Resistant Staphylococcus aureus*       Imaging:  I have personally reviewed pertinent imaging study reports and images in PACS

## 2022-04-04 NOTE — ASSESSMENT & PLAN NOTE
· Tachycardic on admission secondary to septic shock  · Metoprolol held due to hypotension  · Continue amiodarone loading  Will DC telemetry  · Okay with surgery for heparin/ warfarin bridging -- warfarin ordered at 10 mg daily for 2 days  Please note patient has never been on Eliquis in the past due to aortic stenosis and AFib  · 4/4:  Patient initially presented with supratherapeutic INR of 7 9 with Chris   Currently her medications have been changed with discontinuation of metoprolol and amiodarone added  Also being placed on doxycycline  Her previous dose of Coumadin used to be 7 5 mg daily except for 12 5 mg on Thursdays  · Will decrease Coumadin dose to 5 mg daily for now  Check INR level every 48 hours and adjust dose gradually  [facility] with [facility]    · Recommend follow-up with Cardiology in 1 week outpatient    Results from last 7 days   Lab Units 04/04/22  0412 04/03/22  0548 03/31/22  0448 03/30/22  0441 03/29/22  0434   INR  2 41* 1 83* 1 80* 1 79* 1 72*

## 2022-05-19 ENCOUNTER — HOSPITAL ENCOUNTER (EMERGENCY)
Facility: HOSPITAL | Age: 82
Discharge: HOME | End: 2022-05-19
Attending: EMERGENCY MEDICINE
Payer: MEDICARE

## 2022-05-19 ENCOUNTER — APPOINTMENT (EMERGENCY)
Dept: CT IMAGING | Facility: HOSPITAL | Age: 82
End: 2022-05-19
Payer: MEDICARE

## 2022-05-19 VITALS
BODY MASS INDEX: 54.41 KG/M2 | WEIGHT: 277.12 LBS | DIASTOLIC BLOOD PRESSURE: 60 MMHG | RESPIRATION RATE: 20 BRPM | HEART RATE: 67 BPM | SYSTOLIC BLOOD PRESSURE: 130 MMHG | TEMPERATURE: 98.1 F | HEIGHT: 60 IN | OXYGEN SATURATION: 98 %

## 2022-05-19 DIAGNOSIS — G51.0 BELL'S PALSY: Primary | ICD-10-CM

## 2022-05-19 DIAGNOSIS — R29.810 FACIAL DROOP: ICD-10-CM

## 2022-05-19 DIAGNOSIS — R25.1 SHAKINESS: ICD-10-CM

## 2022-05-19 LAB
ANION GAP SERPL CALCULATED.3IONS-SCNC: 8 MMOL/L (ref 4–13)
APTT PPP: 40 SECONDS (ref 23–37)
BUN SERPL-MCNC: 16 MG/DL (ref 5–25)
CALCIUM SERPL-MCNC: 8.4 MG/DL (ref 8.3–10.1)
CHLORIDE SERPL-SCNC: 105 MMOL/L (ref 100–108)
CO2 SERPL-SCNC: 24 MMOL/L (ref 21–32)
CREAT SERPL-MCNC: 1.4 MG/DL (ref 0.6–1.3)
ERYTHROCYTE [DISTWIDTH] IN BLOOD BY AUTOMATED COUNT: 16.9 % (ref 11.6–15.1)
GFR SERPL CREATININE-BSD FRML MDRD: 35 ML/MIN/1.73SQ M
GLUCOSE SERPL-MCNC: 103 MG/DL (ref 65–140)
GLUCOSE SERPL-MCNC: 104 MG/DL (ref 65–140)
HCT VFR BLD AUTO: 33.9 % (ref 34.8–46.1)
HGB BLD-MCNC: 11.1 G/DL (ref 11.5–15.4)
INR PPP: 4.09 (ref 0.84–1.19)
MCH RBC QN AUTO: 35.1 PG (ref 26.8–34.3)
MCHC RBC AUTO-ENTMCNC: 32.7 G/DL (ref 31.4–37.4)
MCV RBC AUTO: 107 FL (ref 82–98)
PLATELET # BLD AUTO: 177 THOUSANDS/UL (ref 149–390)
PMV BLD AUTO: 10.1 FL (ref 8.9–12.7)
POTASSIUM SERPL-SCNC: 3.8 MMOL/L (ref 3.5–5.3)
PROTHROMBIN TIME: 38.4 SECONDS (ref 11.6–14.5)
RBC # BLD AUTO: 3.16 MILLION/UL (ref 3.81–5.12)
SODIUM SERPL-SCNC: 137 MMOL/L (ref 136–145)
WBC # BLD AUTO: 6.09 THOUSAND/UL (ref 4.31–10.16)

## 2022-05-19 PROCEDURE — 80048 BASIC METABOLIC PNL TOTAL CA: CPT | Performed by: EMERGENCY MEDICINE

## 2022-05-19 PROCEDURE — 36415 COLL VENOUS BLD VENIPUNCTURE: CPT | Performed by: EMERGENCY MEDICINE

## 2022-05-19 PROCEDURE — 85610 PROTHROMBIN TIME: CPT | Performed by: EMERGENCY MEDICINE

## 2022-05-19 PROCEDURE — 86618 LYME DISEASE ANTIBODY: CPT | Performed by: EMERGENCY MEDICINE

## 2022-05-19 PROCEDURE — 70496 CT ANGIOGRAPHY HEAD: CPT

## 2022-05-19 PROCEDURE — 99284 EMERGENCY DEPT VISIT MOD MDM: CPT

## 2022-05-19 PROCEDURE — 85730 THROMBOPLASTIN TIME PARTIAL: CPT | Performed by: EMERGENCY MEDICINE

## 2022-05-19 PROCEDURE — 70498 CT ANGIOGRAPHY NECK: CPT

## 2022-05-19 PROCEDURE — 99285 EMERGENCY DEPT VISIT HI MDM: CPT | Performed by: EMERGENCY MEDICINE

## 2022-05-19 PROCEDURE — 93005 ELECTROCARDIOGRAM TRACING: CPT

## 2022-05-19 PROCEDURE — 82948 REAGENT STRIP/BLOOD GLUCOSE: CPT

## 2022-05-19 PROCEDURE — 85027 COMPLETE CBC AUTOMATED: CPT | Performed by: EMERGENCY MEDICINE

## 2022-05-19 RX ORDER — PREDNISONE 20 MG/1
20 TABLET ORAL DAILY
Qty: 5 TABLET | Refills: 0 | Status: SHIPPED | OUTPATIENT
Start: 2022-05-19 | End: 2022-05-24

## 2022-05-19 RX ORDER — PREDNISONE 20 MG/1
20 TABLET ORAL DAILY
Qty: 5 TABLET | Refills: 0 | Status: SHIPPED | OUTPATIENT
Start: 2022-05-19 | End: 2022-05-19 | Stop reason: SDUPTHER

## 2022-05-19 RX ORDER — PREDNISONE 20 MG/1
20 TABLET ORAL ONCE
Status: COMPLETED | OUTPATIENT
Start: 2022-05-19 | End: 2022-05-19

## 2022-05-19 RX ADMIN — PREDNISONE 20 MG: 20 TABLET ORAL at 15:57

## 2022-05-19 RX ADMIN — IOHEXOL 65 ML: 350 INJECTION, SOLUTION INTRAVENOUS at 14:57

## 2022-05-19 NOTE — QUICK NOTE
Stroke Alert    Called 2:38pm    79 y/o F with CHF, Afib on warfarin, HTN, HLD, and DM that presents with onset of L facial weakness  LKN 12:15pm today, then found by family and brought to the ED for evaluation  NIHSS 3 on exam for L upper and lower facial weakness, relatively slight but present  No other deficits or complaints  [place] no clear recollection of a tick bite  HCT and CTA reviewed - no evidence of acute ischemia/hemorrhage and no significant vascular abnormality  Not a tPA candidate given mild, non-disabling deficit and low suspicion for cerebral ischemia  Suspect Bell's Palsy, potentially due to Lyme disease  Check Lyme and initiate steroids with Prednisone 60mg daily x 7 days  Consider empiric treatment with doxycycline  If Lyme confirmed positive, would stop steroids as this may lead to worse outcomes in some patients  Do not feel inpatient admission for stroke work-up is necessary at this time  Call with questions

## 2022-05-19 NOTE — ED PROVIDER NOTES
History  Chief Complaint   Patient presents with    Eye Problem     Left eye drooping that started today 1200  Pt also complaining of " shakiness"  History provided by:  Medical records, patient and EMS personnel  Neurologic Problem  Location:  Left facial droop  Severity:  Mild  Onset quality:  Sudden  Duration:  2 hours  Timing:  Constant  Progression:  Unchanged  Chronicity:  New  Context:  Pt with hx of PFO, on coumadin, developed shakiness/tremor yesterday, noticed by daughter that she had left facial droop  Relieved by:  Nothing  Worsened by:  Nothing  Ineffective treatments:  None tried  Associated symptoms: fatigue    Associated symptoms: no abdominal pain, no chest pain, no cough, no ear pain, no fever, no headaches, no loss of consciousness, no rash, no shortness of breath, no sore throat and no vomiting        Prior to Admission Medications   Prescriptions Last Dose Informant Patient Reported? Taking?   amiodarone 200 mg tablet   No No   Sig: Take 1 tablet (200 mg total) by mouth every 12 (twelve) hours for 3 doses   amiodarone 200 mg tablet   No No   Sig: Take 1 tablet (200 mg total) by mouth daily with breakfast   collagenase (SANTYL) ointment   No No   Sig: Apply topically daily   fluticasone (FLONASE) 50 mcg/act nasal spray   Yes No   Si sprays into each nostril daily as needed   furosemide (LASIX) 40 mg tablet   No No   Sig: Take 1 tablet (40 mg total) by mouth daily   levothyroxine 150 mcg tablet   Yes No   Sig: TAKE (1) TABLET DAILY  FIRST THING IN THE MORNING (AT LEAST 30 MIN PRIOR TO BREAKFAST OR OTHER MEDS)     melatonin 3 mg   No No   Sig: Take 2 tablets (6 mg total) by mouth daily at bedtime   midodrine (PROAMATINE) 2 5 mg tablet   No No   Sig: Take 1 tablet (2 5 mg total) by mouth 3 (three) times a day before meals   polyethylene glycol (MIRALAX) 17 g packet   No No   Sig: Take 17 g by mouth daily   senna-docusate sodium (SENOKOT S) 8 6-50 mg per tablet   No No   Sig: Take 1 tablet by mouth 2 (two) times a day   warfarin (COUMADIN) 5 mg tablet   No No   Sig: Take 1 tablet (5 mg total) by mouth daily      Facility-Administered Medications: None       Past Medical History:   Diagnosis Date    LEONARDO (acute kidney injury) ([place] [address] )     Atrial fibrillation ([place] [address] )     CHF (congestive heart failure) (HCC)     diastolic grade 1    Cirrhosis ([place] [address] )     Diabetes mellitus ([place] [address] )     Disease of thyroid gland     hypothyroid    Endometrial ca ([place] [address] )     Hiatal hernia     Hyperlipidemia     Hypertension     Hypothyroid     Lung nodules     Periumbilical hernia     Pulmonary HTN ([place] [address] )     Thoracic aortic aneurysm without rupture ([place] [address] ) 03/25/2022    41 mm       Past Surgical History:   Procedure Laterality Date    HERNIA REPAIR      HIP ARTHROPLASTY      HYSTERECTOMY      JOINT REPLACEMENT Bilateral     knee; b/l hip    KNEE ARTHROPLASTY      OOPHORECTOMY      WOUND DEBRIDEMENT Right 3/31/2022    Procedure: DEBRIDEMENT WOUND [facility] OUT), right medial thigh;  Surgeon: Herman Villalobos DO;  Location: [facility] OR;  Service: General       History reviewed  No pertinent family history  I have reviewed and agree with the history as documented  E-Cigarette/Vaping     E-Cigarette/Vaping Substances     Social History     Tobacco Use    Smoking status: Never Smoker    Smokeless tobacco: Never Used   Substance Use Topics    Alcohol use: Never    Drug use: Never       Review of Systems   Constitutional: Positive for fatigue  Negative for activity change, appetite change, chills and fever  HENT: Negative for ear pain, sore throat, trouble swallowing and voice change  Eyes: Negative for pain and visual disturbance  Respiratory: Negative for cough and shortness of breath  Cardiovascular: Negative for chest pain and palpitations  Gastrointestinal: Negative for abdominal pain and vomiting  Genitourinary: Negative for dysuria and hematuria     Musculoskeletal: Negative for arthralgias and back pain  Skin: Negative for color change and rash  Neurological: Positive for tremors and weakness  Negative for dizziness, seizures, loss of consciousness, syncope, facial asymmetry, speech difficulty, light-headedness, numbness and headaches  All other systems reviewed and are negative  Physical Exam  Physical Exam  Vitals and nursing note reviewed  Constitutional:       General: She is not in acute distress  Appearance: Normal appearance  She is well-developed  She is obese  She is not ill-appearing, toxic-appearing or diaphoretic  HENT:      Head: Normocephalic and atraumatic  Right Ear: Tympanic membrane and external ear normal       Left Ear: Tympanic membrane and external ear normal       Nose: Nose normal  No congestion or rhinorrhea  Mouth/Throat:      Mouth: Mucous membranes are moist       Pharynx: No oropharyngeal exudate or posterior oropharyngeal erythema  Eyes:      General: No scleral icterus  Right eye: No discharge  Left eye: No discharge  Extraocular Movements: Extraocular movements intact  Conjunctiva/sclera: Conjunctivae normal       Pupils: Pupils are equal, round, and reactive to light  Neck:      Vascular: No carotid bruit  Cardiovascular:      Rate and Rhythm: Normal rate and regular rhythm  Heart sounds: Murmur heard  Comments: 3/6 systolic murmur  Pulmonary:      Effort: Pulmonary effort is normal  No respiratory distress  Breath sounds: Normal breath sounds  Abdominal:      General: There is no distension  Palpations: Abdomen is soft  There is no mass  Tenderness: There is no abdominal tenderness  There is no right CVA tenderness, left CVA tenderness, guarding or rebound  Hernia: No hernia is present  Musculoskeletal:         General: No swelling, tenderness, deformity or signs of injury  Cervical back: Normal range of motion and neck supple  No rigidity or tenderness        Right lower leg: No edema  Left lower leg: No edema  Comments: Chronic decreased ROM at right shoulder   Lymphadenopathy:      Cervical: No cervical adenopathy  Skin:     General: Skin is warm and dry  Comments: Wound vac to right inner thigh, intact   Neurological:      General: No focal deficit present  Mental Status: She is alert  Cranial Nerves: Cranial nerve deficit present  Sensory: No sensory deficit  Motor: No weakness  Coordination: Coordination normal       Gait: Gait normal       Deep Tendon Reflexes: Reflexes normal       Comments: gen weakness  Mild left upper eyelid droop, weakness of eyelids, brow strength intact   Psychiatric:         Mood and Affect: Mood normal          Behavior: Behavior normal          Thought Content: Thought content normal          Judgment: Judgment normal          Vital Signs  ED Triage Vitals [05/19/22 1424]   Temperature Pulse Respirations Blood Pressure SpO2   98 1 °F (36 7 °C) 77 16 91/59 97 %      Temp Source Heart Rate Source Patient Position - Orthostatic VS BP Location FiO2 (%)   Temporal Monitor Sitting Left arm --      Pain Score       No Pain           Vitals:    05/19/22 1515 05/19/22 1530 05/19/22 1545 05/19/22 1625   BP: 117/55 120/58 119/56 130/60   Pulse: 69 68 68 67   Patient Position - Orthostatic VS: Lying Lying Lying Sitting         Visual Acuity  Visual Acuity    Flowsheet Row Most Recent Value   L Pupil Size (mm) 3   R Pupil Size (mm) 3          ED Medications  Medications   iohexol (OMNIPAQUE) 350 MG/ML injection (MULTI-DOSE) 65 mL (65 mL Intravenous Given 5/19/22 1457)   predniSONE tablet 20 mg (20 mg Oral Given 5/19/22 1557)       Diagnostic Studies  Results Reviewed     Procedure Component Value Units Date/Time    Lyme Antibody Profile with reflex to [facility] [[id number]] Collected: 05/19/22 1502    Lab Status:  In process Specimen: Blood from Arm, Right Updated: 05/19/22 [address] [[id number]]  (Abnormal) Collected: 05/19/22 1444    Lab Status: Final result Specimen: Blood from Arm, Right Updated: 05/19/22 1504     Protime 38 4 seconds      INR 4 09    APTT [[id number]]  (Abnormal) Collected: 05/19/22 1444    Lab Status: Final result Specimen: Blood from Arm, Right Updated: 05/19/22 1504     PTT 40 seconds     Basic metabolic panel [[id number]]  (Abnormal) Collected: 05/19/22 1444    Lab Status: Final result Specimen: Blood from Arm, Right Updated: 05/19/22 1500     Sodium 137 mmol/L      Potassium 3 8 mmol/L      Chloride 105 mmol/L      CO2 24 mmol/L      ANION GAP 8 mmol/L      BUN 16 mg/dL      Creatinine 1 40 mg/dL      Glucose 103 mg/dL      Calcium 8 4 mg/dL      eGFR 35 ml/min/1 73sq m     Narrative:      [facility] guidelines for Chronic Kidney Disease (CKD):     Stage 1 with normal or high GFR (GFR > 90 mL/min/1 73 square meters)    Stage 2 Mild CKD (GFR = 60-89 mL/min/1 73 square meters)    Stage 3A Moderate CKD (GFR = 45-59 mL/min/1 73 square meters)    Stage 3B Moderate CKD (GFR = 30-44 mL/min/1 73 square meters)    Stage 4 Severe CKD (GFR = 15-29 mL/min/1 73 square meters)    Stage 5 End Stage CKD (GFR <15 mL/min/1 73 square meters)  Note: GFR calculation is accurate only with a steady state creatinine    CBC and Platelet [[id number]]  (Abnormal) Collected: 05/19/22 1444    Lab Status: Final result Specimen: Blood from Arm, Right Updated: 05/19/22 1448     WBC 6 09 Thousand/uL      RBC 3 16 Million/uL      Hemoglobin 11 1 g/dL      Hematocrit 33 9 %       fL      MCH 35 1 pg      MCHC 32 7 g/dL      RDW 16 9 %      Platelets 111 Thousands/uL      MPV 10 1 fL     Fingerstick Glucose (POCT) [[id number]]  (Normal) Collected: 05/19/22 1423    Lab Status: Final result Updated: 05/19/22 1424     POC Glucose 104 mg/dl                  CT stroke alert brain   Final Result by Mayra Manzano MD (05/19 1524)      No acute intracranial abnormality        Findings were reported to [facility] Walter Lyles via HIPAA compliant secure electronic messaging at 3:18 PM          Workstation performed: [id number]         CTA stroke alert (head/neck)   Final Result by Riki Santos MD (05/19 1524)      No significant stenosis of the cervical carotid or vertebral arteries  Left posterior inferior cerebellar artery is not seen, possible AICA/PICA complex   Otherwise no intracranial stenosis or large vessel occlusion  Findings were reported to [facility] via HIPAA compliant secure electronic messaging at 3:18 PM                Workstation performed: [id number]                    Procedures  Procedures         ED Course  ED Course as of 05/19/22 1705   Thu May 19, 2022   1421 1421: Pt appears chronically ill, VS reviewed  D/W neurology as stroke alert  Concerns for Bell's Palsy, however given associated symptoms of gen weakness plan to complete basic labs, send Lyme titers, complete CT head  Placed on monitor, check accucheck and EKG  1545 1545: CTA/CT head reviewed  No change in neuro exam   Requested patient hold coumadin tonight, recheck INR soon, d/w provider about dosing  Stable for discharge  SBIRT 20yo+    Flowsheet Row Most Recent Value   SBIRT (23 yo +)    In order to provide better care to our patients, we are screening all of our patients for alcohol and drug use  Would it be okay to ask you these screening questions? Yes Filed at: 05/19/2022 1426   Initial Alcohol Screen: US AUDIT-C     1  How often do you have a drink containing alcohol? 0 Filed at: 05/19/2022 1426   2  How many drinks containing alcohol do you have on a typical day you are drinking? 0 Filed at: 05/19/2022 1426   3a  Male UNDER 65: How often do you have five or more drinks on one occasion? 0 Filed at: 05/19/2022 1426   3b  FEMALE Any Age, or MALE 65+: How often do you have 4 or more drinks on one occassion?  0 Filed at: 05/19/2022 1426   Audit-C Score 0 Filed at: 05/19/2022 1426   JAZIEL: How many times in the past year have you    Used an illegal drug or used a prescription medication for non-medical reasons? Never Filed at: 05/19/2022 1426                    MDM    Disposition  Final diagnoses:   Facial droop   Shakiness   Bell's palsy     Time reflects when diagnosis was documented in both MDM as applicable and the Disposition within this note     Time User Action Codes Description Comment    5/19/2022  2:38 PM Preet Better [R29 810] Facial droop     5/19/2022  3:46 PM Preet Better [R25 1] Shakiness     5/19/2022  3:47 PM Recardo Gin Add [G51 0] Bell's palsy     5/19/2022  3:47 PM Recardo Gin Modify [R29 810] Facial droop     5/19/2022  3:47 PM Recardo Gin Modify [G51 0] Bell's palsy       ED Disposition     ED Disposition   Discharge    Condition   Stable    Date/Time   Thu May 19, 2022  3:46 PM    Comment   Jd Dallas discharge to home/self care  Follow-up Information     Follow up With Specialties Details Why Contact Info    Ciara Davis MD [facility] Medicine Schedule an appointment as soon as possible for a visit   [address] [place] [address]  [phone]            Discharge Medication List as of 5/19/2022  4:06 PM      CONTINUE these medications which have CHANGED    Details   predniSONE 20 mg tablet Take 1 tablet (20 mg total) by mouth in the morning for 5 days  , Starting Thu 5/19/2022, Until Tue 5/24/2022, Normal         CONTINUE these medications which have NOT CHANGED    Details   amiodarone 200 mg tablet Take 1 tablet (200 mg total) by mouth every 12 (twelve) hours for 3 doses, Starting Mon 4/4/2022, Until Wed 4/6/2022, Normal      amiodarone 200 mg tablet Take 1 tablet (200 mg total) by mouth daily with breakfast, Starting Wed 4/6/2022, Until Fri 5/6/2022, Normal      collagenase (SANTYL) ointment Apply topically daily, Starting Tue 4/5/2022, Normal      fluticasone (FLONASE) 50 mcg/act nasal spray 2 sprays into each nostril daily as needed, Historical Med      furosemide (LASIX) 40 mg tablet Take 1 tablet (40 mg total) by mouth daily, Starting Tue 4/5/2022, No Print      levothyroxine 150 mcg tablet TAKE (1) TABLET DAILY  FIRST THING IN THE MORNING (AT LEAST 30 MIN PRIOR TO BREAKFAST OR OTHER MEDS)  , Historical Med      melatonin 3 mg Take 2 tablets (6 mg total) by mouth daily at bedtime, Starting Mon 4/4/2022, No Print      midodrine (PROAMATINE) 2 5 mg tablet Take 1 tablet (2 5 mg total) by mouth 3 (three) times a day before meals, Starting Mon 4/4/2022, No Print      polyethylene glycol (MIRALAX) 17 g packet Take 17 g by mouth daily, Starting Tue 4/5/2022, No Print      senna-docusate sodium (SENOKOT S) 8 6-50 mg per tablet Take 1 tablet by mouth 2 (two) times a day, Starting Mon 4/4/2022, No Print      warfarin (COUMADIN) 5 mg tablet Take 1 tablet (5 mg total) by mouth daily, Starting Mon 4/4/2022, No Print             No discharge procedures on file      PDMP Review     None          ED Provider  Electronically Signed by           Vance Suárez MD  05/19/22 [phone]

## 2022-05-20 LAB
ATRIAL RATE: 73 BPM
B BURGDOR IGG+IGM SER-ACNC: 4
P AXIS: 61 DEGREES
PR INTERVAL: 176 MS
QRS AXIS: -11 DEGREES
QRSD INTERVAL: 70 MS
QT INTERVAL: 440 MS
QTC INTERVAL: 484 MS
T WAVE AXIS: -9 DEGREES
VENTRICULAR RATE: 73 BPM

## 2022-06-29 ENCOUNTER — HOSPITAL ENCOUNTER (INPATIENT)
Facility: HOSPITAL | Age: 82
LOS: 11 days | Discharge: HOME HEALTH | DRG: 291 | End: 2022-07-10
Attending: EMERGENCY MEDICINE | Admitting: FAMILY MEDICINE
Payer: MEDICARE

## 2022-06-29 ENCOUNTER — APPOINTMENT (EMERGENCY)
Dept: RADIOLOGY | Facility: HOSPITAL | Age: 82
DRG: 291 | End: 2022-06-29
Payer: MEDICARE

## 2022-06-29 DIAGNOSIS — I48.0 PAROXYSMAL A-FIB (HCC): ICD-10-CM

## 2022-06-29 DIAGNOSIS — N17.9 AKI (ACUTE KIDNEY INJURY) (HCC): ICD-10-CM

## 2022-06-29 DIAGNOSIS — L03.115 CELLULITIS OF RIGHT LOWER EXTREMITY: ICD-10-CM

## 2022-06-29 DIAGNOSIS — R79.1 SUPRATHERAPEUTIC INR: ICD-10-CM

## 2022-06-29 DIAGNOSIS — I50.9 CHF (CONGESTIVE HEART FAILURE) (HCC): Primary | ICD-10-CM

## 2022-06-29 DIAGNOSIS — L03.115 CELLULITIS OF RIGHT THIGH: ICD-10-CM

## 2022-06-29 DIAGNOSIS — R23.9 IMPAIRED SKIN INTEGRITY: ICD-10-CM

## 2022-06-29 PROBLEM — I50.33 ACUTE ON CHRONIC DIASTOLIC (CONGESTIVE) HEART FAILURE (HCC): Status: ACTIVE | Noted: 2022-01-01

## 2022-06-29 PROBLEM — E78.5 HLD (HYPERLIPIDEMIA): Status: RESOLVED | Noted: 2022-01-01 | Resolved: 2022-01-01

## 2022-06-29 LAB
2HR DELTA HS TROPONIN: 2 NG/L
4HR DELTA HS TROPONIN: 1 NG/L
ALBUMIN SERPL BCP-MCNC: 2 G/DL (ref 3.5–5)
ALP SERPL-CCNC: 193 U/L (ref 46–116)
ALT SERPL W P-5'-P-CCNC: 27 U/L (ref 12–78)
ANION GAP SERPL CALCULATED.3IONS-SCNC: 9 MMOL/L (ref 4–13)
AST SERPL W P-5'-P-CCNC: 48 U/L (ref 5–45)
BASOPHILS # BLD MANUAL: 0 THOUSAND/UL (ref 0–0.1)
BASOPHILS NFR MAR MANUAL: 0 % (ref 0–1)
BILIRUB SERPL-MCNC: 2.89 MG/DL (ref 0.2–1)
BUN SERPL-MCNC: 26 MG/DL (ref 5–25)
CALCIUM ALBUM COR SERPL-MCNC: 9.6 MG/DL (ref 8.3–10.1)
CALCIUM SERPL-MCNC: 8 MG/DL (ref 8.3–10.1)
CARDIAC TROPONIN I PNL SERPL HS: 15 NG/L
CARDIAC TROPONIN I PNL SERPL HS: 16 NG/L
CARDIAC TROPONIN I PNL SERPL HS: 17 NG/L
CHLORIDE SERPL-SCNC: 100 MMOL/L (ref 100–108)
CO2 SERPL-SCNC: 26 MMOL/L (ref 21–32)
CREAT SERPL-MCNC: 1.67 MG/DL (ref 0.6–1.3)
EOSINOPHIL # BLD MANUAL: 0 THOUSAND/UL (ref 0–0.4)
EOSINOPHIL NFR BLD MANUAL: 0 % (ref 0–6)
ERYTHROCYTE [DISTWIDTH] IN BLOOD BY AUTOMATED COUNT: 16.4 % (ref 11.6–15.1)
GFR SERPL CREATININE-BSD FRML MDRD: 28 ML/MIN/1.73SQ M
GLUCOSE SERPL-MCNC: 136 MG/DL (ref 65–140)
HCT VFR BLD AUTO: 31.6 % (ref 34.8–46.1)
HGB BLD-MCNC: 10.4 G/DL (ref 11.5–15.4)
INR PPP: 4.99 (ref 0.84–1.19)
LYMPHOCYTES # BLD AUTO: 1.07 THOUSAND/UL (ref 0.6–4.47)
LYMPHOCYTES # BLD AUTO: 12 % (ref 14–44)
MCH RBC QN AUTO: 32.6 PG (ref 26.8–34.3)
MCHC RBC AUTO-ENTMCNC: 32.9 G/DL (ref 31.4–37.4)
MCV RBC AUTO: 99 FL (ref 82–98)
METAMYELOCYTES NFR BLD MANUAL: 1 % (ref 0–1)
MONOCYTES # BLD AUTO: 1.07 THOUSAND/UL (ref 0–1.22)
MONOCYTES NFR BLD: 12 % (ref 4–12)
NEUTROPHILS # BLD MANUAL: 6.69 THOUSAND/UL (ref 1.85–7.62)
NEUTS SEG NFR BLD AUTO: 75 % (ref 43–75)
NT-PROBNP SERPL-MCNC: 2376 PG/ML
PLATELET # BLD AUTO: 141 THOUSANDS/UL (ref 149–390)
PLATELET BLD QL SMEAR: ADEQUATE
PMV BLD AUTO: 10 FL (ref 8.9–12.7)
POTASSIUM SERPL-SCNC: 3.5 MMOL/L (ref 3.5–5.3)
PROCALCITONIN SERPL-MCNC: 0.57 NG/ML
PROT SERPL-MCNC: 6.3 G/DL (ref 6.4–8.2)
PROTHROMBIN TIME: 44.7 SECONDS (ref 11.6–14.5)
RBC # BLD AUTO: 3.19 MILLION/UL (ref 3.81–5.12)
RBC MORPH BLD: NORMAL
SODIUM SERPL-SCNC: 135 MMOL/L (ref 136–145)
WBC # BLD AUTO: 8.92 THOUSAND/UL (ref 4.31–10.16)

## 2022-06-29 PROCEDURE — 84145 PROCALCITONIN (PCT): CPT

## 2022-06-29 PROCEDURE — 85027 COMPLETE CBC AUTOMATED: CPT | Performed by: EMERGENCY MEDICINE

## 2022-06-29 PROCEDURE — 84484 ASSAY OF TROPONIN QUANT: CPT

## 2022-06-29 PROCEDURE — 85007 BL SMEAR W/DIFF WBC COUNT: CPT | Performed by: EMERGENCY MEDICINE

## 2022-06-29 PROCEDURE — 84484 ASSAY OF TROPONIN QUANT: CPT | Performed by: EMERGENCY MEDICINE

## 2022-06-29 PROCEDURE — 99223 1ST HOSP IP/OBS HIGH 75: CPT | Performed by: FAMILY MEDICINE

## 2022-06-29 PROCEDURE — 99283 EMERGENCY DEPT VISIT LOW MDM: CPT | Performed by: EMERGENCY MEDICINE

## 2022-06-29 PROCEDURE — 85610 PROTHROMBIN TIME: CPT | Performed by: EMERGENCY MEDICINE

## 2022-06-29 PROCEDURE — 80053 COMPREHEN METABOLIC PANEL: CPT | Performed by: EMERGENCY MEDICINE

## 2022-06-29 PROCEDURE — 96374 THER/PROPH/DIAG INJ IV PUSH: CPT

## 2022-06-29 PROCEDURE — 71046 X-RAY EXAM CHEST 2 VIEWS: CPT

## 2022-06-29 PROCEDURE — 87081 CULTURE SCREEN ONLY: CPT

## 2022-06-29 PROCEDURE — 83880 ASSAY OF NATRIURETIC PEPTIDE: CPT | Performed by: EMERGENCY MEDICINE

## 2022-06-29 PROCEDURE — 85025 COMPLETE CBC W/AUTO DIFF WBC: CPT | Performed by: EMERGENCY MEDICINE

## 2022-06-29 PROCEDURE — 93005 ELECTROCARDIOGRAM TRACING: CPT

## 2022-06-29 PROCEDURE — 36415 COLL VENOUS BLD VENIPUNCTURE: CPT | Performed by: EMERGENCY MEDICINE

## 2022-06-29 PROCEDURE — 99285 EMERGENCY DEPT VISIT HI MDM: CPT

## 2022-06-29 RX ORDER — POTASSIUM CHLORIDE 20 MEQ/1
40 TABLET, EXTENDED RELEASE ORAL 2 TIMES DAILY
Status: DISCONTINUED | OUTPATIENT
Start: 2022-06-29 | End: 2022-07-10 | Stop reason: HOSPADM

## 2022-06-29 RX ORDER — AMOXICILLIN 250 MG
1 CAPSULE ORAL 2 TIMES DAILY
Status: DISCONTINUED | OUTPATIENT
Start: 2022-06-29 | End: 2022-07-10 | Stop reason: HOSPADM

## 2022-06-29 RX ORDER — POLYETHYLENE GLYCOL 3350 17 G/17G
17 POWDER, FOR SOLUTION ORAL DAILY PRN
Status: DISCONTINUED | OUTPATIENT
Start: 2022-06-29 | End: 2022-07-10 | Stop reason: HOSPADM

## 2022-06-29 RX ORDER — LEVOTHYROXINE SODIUM 0.15 MG/1
150 TABLET ORAL
Status: DISCONTINUED | OUTPATIENT
Start: 2022-06-30 | End: 2022-07-10 | Stop reason: HOSPADM

## 2022-06-29 RX ORDER — POTASSIUM CHLORIDE 750 MG/1
10 TABLET, EXTENDED RELEASE ORAL DAILY
COMMUNITY
End: 2022-10-01

## 2022-06-29 RX ORDER — FUROSEMIDE 10 MG/ML
15 SYRINGE (ML) INJECTION CONTINUOUS
Status: DISCONTINUED | OUTPATIENT
Start: 2022-06-29 | End: 2022-07-08

## 2022-06-29 RX ORDER — METOPROLOL SUCCINATE 100 MG/1
100 TABLET, EXTENDED RELEASE ORAL DAILY
COMMUNITY
End: 2022-07-10

## 2022-06-29 RX ORDER — ACETAMINOPHEN 325 MG/1
650 TABLET ORAL EVERY 6 HOURS PRN
Status: DISCONTINUED | OUTPATIENT
Start: 2022-06-29 | End: 2022-07-10 | Stop reason: HOSPADM

## 2022-06-29 RX ORDER — METOPROLOL SUCCINATE 100 MG/1
100 TABLET, EXTENDED RELEASE ORAL DAILY
Status: DISCONTINUED | OUTPATIENT
Start: 2022-06-30 | End: 2022-07-01

## 2022-06-29 RX ADMIN — Medication 10 MG/HR: at 16:45

## 2022-06-29 RX ADMIN — POTASSIUM CHLORIDE 40 MEQ: 1500 TABLET, EXTENDED RELEASE ORAL at 18:22

## 2022-06-29 NOTE — CONSULTS
Consultation - Cardiology   Jacek Talley 80 y o  female MRN: [id number]  Unit/Bed#: ED 08 Encounter: [id number]    Assessment/Plan     Assessment:  Acute on chronic HFpEF- edema, sob, weight gain of at least 20 lbs  LEONARDO on CKD- baseline Cr likely around 1 1-1 2, anticipate to improve with diuresis   Moderate AS- re ready by Dr Watson Bernard, will need monitoring annually   PAF- on coumadin, no afib by most recent zio   - did have episode of afib with rvr in the setting of septic shock recently in march 2022  Hx endometrial CA sp hysterectomy  Normal LVEF  HTN  Obesity    Plan:  1  Start lasix drip at 10 mg/hr  2  Start kdur 40 meq PO BID  3  Monitor I and O ,daily standing weights, renal function and electrolytes  4  No need to repeat echocardiogram at this time  Will continue to follow    History of Present Illness   Physician Requesting Consult: Ilya Meredith DO  Reason for Consult / Principal Problem: ACUTE ON CHRONIC HFpEF  HPI: Jacek Talley is a 80y o  year old female Patient with history of PAF, HTN, endometrial cancer sp hysterectomy in 2018, obesity who presented to the ED at my direction for worsening breathing, weight gain and edema  Patient reported over the last few years she has been having worsening shortness of breath, weight gain, and in general feeling ill  Previously we had up titrated her Lasix from 20 mg daily to 40 mg p o  twice daily  Patient had lost about 80 lb  In the interm pt was admitted for a wound on her thigh that caused her to go into septic shock  She recovered and went into a nursing home  She was eating very bad food while she was there  She went home about a month ago and continued to notice worsening swelling, weight gain and breahting issues  Her lasix was up titrated to 60 PO BID but symptoms persisted at which time she was instructed to come to the hospital   She reports currently she is comfortable but her leg is bothering her from how swollen it is  She has no chest pain  She has no palpitations  Inpatient consult to Cardiology  Consult performed by: [place], PA-C  Consult ordered by: Conrad Mcduffie DO          Review of Systems   Constitutional: Positive for unexpected weight change  Negative for chills and fever  Respiratory: Positive for shortness of breath  Negative for chest tightness and wheezing  Cardiovascular: Positive for leg swelling  Negative for chest pain and palpitations  Gastrointestinal: Negative for nausea  Skin: Negative for color change, pallor and rash  Neurological: Negative for dizziness, syncope and light-headedness  Psychiatric/Behavioral: Negative for agitation, behavioral problems and confusion         Historical Information   Past Medical History:   Diagnosis Date    LEONARDO (acute kidney injury) ([place] [address] )     Atrial fibrillation (HCC)     [facility])     diastolic grade 1    Cirrhosis ([place] [address] )     Diabetes mellitus ([place] [address] )     Disease of thyroid gland     hypothyroid    Endometrial ca ([place] [address] )     Hiatal hernia     Hyperlipidemia     Hypertension     Hypothyroid     Lung nodules     Periumbilical hernia     Pulmonary HTN ([place] [address] )     Thoracic aortic aneurysm without rupture ([place] [address] ) 03/25/2022    41 mm     Past Surgical History:   Procedure Laterality Date    HERNIA REPAIR      HIP ARTHROPLASTY      HYSTERECTOMY      JOINT REPLACEMENT Bilateral     knee; b/l hip    KNEE ARTHROPLASTY      OOPHORECTOMY      WOUND DEBRIDEMENT Right 3/31/2022    Procedure: DEBRIDEMENT WOUND [facility] OUT), right medial thigh;  Surgeon: Rodrigue Rivera DO;  Location:  MAIN OR;  Service: General     Social History     Substance and Sexual Activity   Alcohol Use Never     Social History     Substance and Sexual Activity   Drug Use Never     E-Cigarette/Vaping    E-Cigarette Use Never User      E-Cigarette/Vaping Substances     Social History     Tobacco Use   Smoking Status Never Smoker   Smokeless Tobacco Never Used     Family History: non-contributory    Meds/Allergies   all current active meds have been reviewed  Allergies   Allergen Reactions    Penicillins Hives           Sulfamethoxazole-Trimethoprim GI Intolerance       Objective   Vitals: Blood pressure 145/69, pulse 86, temperature 98 6 °F (37 °C), temperature source Temporal, resp  rate 20, height 5' (1 524 m), weight 124 kg (273 lb 13 oz), SpO2 97 %  Orthostatic Blood Pressures    Flowsheet Row Most Recent Value   Blood Pressure 145/69 filed at 06/29/2022 1450   Patient Position - Orthostatic VS Sitting filed at 06/29/2022 1450          No intake or output data in the 24 hours ending 06/29/22 1552    Invasive Devices  Report    Peripheral Intravenous Line  Duration           Peripheral IV 06/29/22 Right Antecubital <1 day          Drain  Duration           External Urinary Catheter 90 days                Physical Exam  Vitals and nursing note reviewed  Constitutional:       Appearance: Normal appearance  HENT:      Head: Normocephalic and atraumatic  Cardiovascular:      Rate and Rhythm: Normal rate  Heart sounds: Murmur heard  Pulmonary:      Comments: Decreased in bases  Abdominal:      Palpations: Abdomen is soft  Musculoskeletal:         General: Swelling present  Cervical back: Neck supple  Skin:     General: Skin is warm and dry  Capillary Refill: Capillary refill takes less than 2 seconds  Neurological:      General: No focal deficit present  Mental Status: She is alert and oriented to person, place, and time  Psychiatric:         Mood and Affect: Mood normal          Thought Content: Thought content normal          Lab Results:   I have personally reviewed pertinent lab results      CBC with diff:   Results from last 7 days   Lab Units 06/29/22  1534   WBC Thousand/uL 8 92   RBC Million/uL 3 19*   HEMOGLOBIN g/dL 10 4*   HEMATOCRIT % 31 6*   MCV fL 99*   MCH pg 32 6   MCHC g/dL 32 9   RDW % 16 4*   MPV fL 10 0 PLATELETS Thousands/uL 141*     CMP:   Results from last 7 days   Lab Units 06/29/22  1534   SODIUM mmol/L 135*   CHLORIDE mmol/L 100   CO2 mmol/L 26   BUN mg/dL 26*   CREATININE mg/dL 1 67*   CALCIUM mg/dL 8 0*   AST U/L 48*   ALT U/L 27   ALK PHOS U/L 193*   EGFR ml/min/1 73sq m 28     HS Troponin:   0   Lab Value Date/Time    HSTNI 112 (H) 03/26/2022 0503    HSTNI0 109 (H) 03/25/2022 1819    HSTNI2 106 (H) 03/25/2022 2013    HSTNI4 110 (H) 03/25/2022 2317     BNP:   Results from last 7 days   Lab Units 06/29/22  1534   POTASSIUM mmol/L 3 5   CHLORIDE mmol/L 100   CO2 mmol/L 26   BUN mg/dL 26*   CREATININE mg/dL 1 67*   CALCIUM mg/dL 8 0*   EGFR ml/min/1 73sq m 28     Coags:     TSH:     Magnesium:     Lipid Profile:     Imaging: I have personally reviewed pertinent reports        echo 3/28/22:    Left Ventricle: Left ventricular cavity size is normal  Wall thickness   is normal  The left ventricular ejection fraction is 70%  Systolic   function is vigorous  Wall motion is normal      Left Atrium: The atrium is moderately dilated    Right Atrium: The atrium is mildly dilated    Aortic Valve: The aortic valve cannot be ruled out as bicuspid  The   leaflets are moderately thickened  The leaflets are moderately calcified  There is moderately reduced mobility  Findings are overall consistent with   severe aortic stenosis    Mitral Valve: There is moderate annular calcification  There is mild   regurgitation    Tricuspid Valve: There is mild regurgitation    Prior TTE study available for comparison  Prior study date: 12/17/2019  Changes noted when compared to prior study  Changes include: Mild to   moderate aortic valve calcification and reduced mobility were noted on the   prior study  The aortic valve is better visualized on the current study   and gradients obtained on the current study demonstrate that there is   severe aortic stenosis   The prior study demonstrated mild aortic regurgitation, which is not visualized on the current study   REREAD BY DR Brown, MODERATE AS

## 2022-06-29 NOTE — ASSESSMENT & PLAN NOTE
· Creatinine 1 67 on admission pre renal secondary to volume overload 20 lb weight gain  · Baseline 1 1 to 1 2  · Initiated on Lasix infusion by Cardiology  · Avoid nephrotoxins, daily standing weight, I&O

## 2022-06-29 NOTE — ASSESSMENT & PLAN NOTE
· Noted on echo in March  · Avoid hypotension  · Aggressive diuresis, hypervolemic with 20 lb weight gain

## 2022-06-29 NOTE — ED PROVIDER NOTES
History  Chief Complaint   Patient presents with    Leg Pain     C/O LE edema  Sent by family cardiologist   C/O SOB with exertion  Denies CP      Patient is an 28-year-old female sent to the emergency department by Cardiology secondary to increased fluid in bilateral lower extremities with dyspnea on exertion, she does report some weight gain recently, in fact was noted to be "slightly up at 266 lb on the , today's weight is 273 lb, she denies any fever or chills, no chest pain, no cough, cold or congestion, she does take Lasix at home on a daily basis, denies missing any doses of this or any other medication, cardiology did recommend inpatient admission for IV diuresis, initially patient did not want to be admitted until after the  holiday weekend, however daughter encouraged her to come today for evaluation          Prior to Admission Medications   Prescriptions Last Dose Informant Patient Reported? Taking?   collagenase (SANTYL) ointment 2022 at Unknown time  No Yes   Sig: Apply topically daily   fluticasone (FLONASE) 50 mcg/act nasal spray Not Taking at Unknown time  Yes No   Si sprays into each nostril daily as needed   Patient not taking: Reported on 2022   furosemide (LASIX) 40 mg tablet 2022 at Unknown time  No Yes   Sig: Take 1 tablet (40 mg total) by mouth daily   Patient taking differently: Take 60 mg by mouth 2 (two) times a day   levothyroxine 150 mcg tablet 2022 at Unknown time  Yes Yes   Sig: TAKE (1) TABLET DAILY  FIRST THING IN THE MORNING (AT LEAST 30 MIN PRIOR TO BREAKFAST OR OTHER MEDS)     melatonin 3 mg Not Taking at Unknown time  No No   Sig: Take 2 tablets (6 mg total) by mouth daily at bedtime   Patient not taking: Reported on 2022   metoprolol succinate (TOPROL-XL) 100 mg 24 hr tablet 2022 at Unknown time  Yes Yes   Sig: Take 100 mg by mouth daily   midodrine (PROAMATINE) 2 5 mg tablet Not Taking at Unknown time  No No   Sig: Take 1 tablet (2 5 mg total) by mouth 3 (three) times a day before meals   Patient not taking: Reported on 6/29/2022   polyethylene glycol (MIRALAX) 17 g packet Unknown at Unknown time  No No   Sig: Take 17 g by mouth daily   potassium chloride (K-DUR,KLOR-CON) 10 mEq tablet 6/29/2022 at Unknown time  Yes Yes   Sig: Take 10 mEq by mouth daily   senna-docusate sodium (SENOKOT S) 8 6-50 mg per tablet Unknown at Unknown time  No No   Sig: Take 1 tablet by mouth 2 (two) times a day   warfarin (COUMADIN) 5 mg tablet 6/28/2022 at Unknown time  No Yes   Sig: Take 1 tablet (5 mg total) by mouth daily      Facility-Administered Medications: None       Past Medical History:   Diagnosis Date    LEONARDO [facility]acute [facility] injury) ([facility] )     Atrial fibrillation ([facility] )     CHF (congestive heart failure) (HCC)     diastolic grade 1    Cirrhosis ([place] [address] )     Diabetes mellitus ([facility] )     Disease of thyroid gland     hypothyroid    [facility]     Hiatal hernia     Hyperlipidemia     Hypertension     Hypothyroid     Lung nodules     Periumbilical hernia     Pulmonary HTN ([facility] )     Thoracic aortic aneurysm without rupture ([place] [address] ) 03/25/2022    41 mm       Past Surgical History:   Procedure Laterality Date    HERNIA REPAIR      HIP ARTHROPLASTY      HYSTERECTOMY      JOINT REPLACEMENT Bilateral     knee; b/l hip    KNEE ARTHROPLASTY      OOPHORECTOMY      WOUND DEBRIDEMENT Right 3/31/2022    Procedure: DEBRIDEMENT WOUND [facility] OUT), right medial thigh;  Surgeon: Jarrett Dixon DO;  Location:  MAIN OR;  Service: General       History reviewed  No pertinent family history  I have reviewed and agree with the history as documented      E-Cigarette/Vaping    E-Cigarette Use Never User      E-Cigarette/Vaping Substances     Social History     Tobacco Use    Smoking status: Never Smoker    Smokeless tobacco: Never Used   Vaping Use    Vaping Use: Never used   Substance Use Topics    Alcohol use: Never    Drug use: Never Review of Systems   Constitutional: Positive for fatigue  HENT: Negative  Eyes: Negative  Respiratory: Positive for shortness of breath  Cardiovascular: Positive for leg swelling  Gastrointestinal: Negative  Endocrine: Negative  Genitourinary: Negative  Musculoskeletal: Negative  Skin: Positive for wound  Allergic/Immunologic: Negative  Neurological: Negative  Hematological: Negative  Psychiatric/Behavioral: Negative  Physical Exam  Physical Exam  Constitutional:       Appearance: She is obese  Musculoskeletal:      Right lower leg: Edema present  Left lower leg: Edema present     Skin:            Comments: 2 cm skin ulceration, pink, with granulation tissue, no active drainage         Vital Signs  ED Triage Vitals [06/29/22 1450]   Temperature Pulse Respirations Blood Pressure SpO2   98 6 °F (37 °C) 86 20 145/69 97 %      Temp Source Heart Rate Source Patient Position - Orthostatic VS BP Location FiO2 (%)   Temporal Monitor Sitting Left arm --      Pain Score       6           Vitals:    06/29/22 1450 06/29/22 1600 06/29/22 1700 06/29/22 1753   BP: 145/69 130/51 126/56 128/60   Pulse: 86 80 89 (!) 49   Patient Position - Orthostatic VS: Sitting Lying Lying              ED Medications  Medications   furosemide (LASIX) 500 mg infusion 50 mL (10 mg/hr Intravenous New Bag 6/29/22 1645)   potassium chloride (K-DUR,KLOR-CON) CR tablet 40 mEq (has no administration in time range)   metoprolol succinate (TOPROL-XL) 24 hr tablet 100 mg (has no administration in time range)   senna-docusate sodium (SENOKOT S) 8 6-50 mg per tablet 1 tablet (has no administration in time range)   collagenase (SANTYL) ointment (has no administration in time range)   levothyroxine tablet 150 mcg (has no administration in time range)   acetaminophen (TYLENOL) tablet 650 mg (has no administration in time range)   polyethylene glycol (MIRALAX) packet 17 g (has no administration in time range) Diagnostic Studies  Results Reviewed     Procedure Component Value Units Date/Time    Manual Differential(PHLEBS Do Not Order) [[id number]]  (Abnormal) Collected: 06/29/22 1534    Lab Status: Final result Specimen: Blood from Arm, Right Updated: 06/29/22 1619     Segmented % 75 %      Lymphocytes % 12 %      Monocytes % 12 %      Eosinophils, % 0 %      Basophils % 0 %      Metamyelocytes% 1 %      Absolute Neutrophils 6 69 Thousand/uL      Lymphocytes Absolute 1 07 Thousand/uL      Monocytes Absolute 1 07 Thousand/uL      Eosinophils Absolute 0 00 Thousand/uL      Basophils Absolute 0 00 Thousand/uL      Total Counted --     RBC Morphology Normal     Platelet Estimate Adequate    HS Troponin I 2hr [[id number]]     Lab Status: No result Specimen: Blood     HS Troponin I 4hr [[id number]]     Lab Status: No result Specimen: Blood     HS Troponin 0hr (reflex protocol) [[id number]]  (Normal) Collected: 06/29/22 1534    Lab Status: Final result Specimen: Blood from Arm, Right Updated: 06/29/22 1606     hs TnI 0hr 15 ng/L     Protime-INR [[id number]]  (Abnormal) Collected: 06/29/22 1534    Lab Status: Final result Specimen: Blood from Arm, Right Updated: 06/29/22 1605     Protime 44 7 seconds      INR 4 99    NT-BNP PRO [[id number]]  (Abnormal) Collected: 06/29/22 1534    Lab Status: Final result Specimen: Blood from Arm, Right Updated: 06/29/22 1605     NT-proBNP 2,376 pg/mL     Comprehensive metabolic panel [[id number]]  (Abnormal) Collected: 06/29/22 1534    Lab Status: Final result Specimen: Blood from Arm, Right Updated: 06/29/22 1558     Sodium 135 mmol/L      Potassium 3 5 mmol/L      Chloride 100 mmol/L      CO2 26 mmol/L      ANION GAP 9 mmol/L      BUN 26 mg/dL      Creatinine 1 67 mg/dL      Glucose 136 mg/dL      Calcium 8 0 mg/dL      Corrected Calcium 9 6 mg/dL      AST 48 U/L      ALT 27 U/L      Alkaline Phosphatase 193 U/L      Total Protein 6 3 g/dL      Albumin 2 0 g/dL      Total Bilirubin 2 89 mg/dL eGFR 28 ml/min/1 73sq m     Narrative:      [place] guidelines for Chronic Kidney Disease (CKD):     Stage 1 with normal or high GFR (GFR > 90 mL/min/1 73 square meters)    Stage 2 Mild CKD (GFR = 60-89 mL/min/1 73 square meters)    Stage 3A Moderate CKD (GFR = 45-59 mL/min/1 73 square meters)    Stage 3B Moderate CKD (GFR = 30-44 mL/min/1 73 square meters)    Stage 4 Severe CKD (GFR = 15-29 mL/min/1 73 square meters)    Stage 5 End Stage CKD (GFR <15 mL/min/1 73 square meters)  Note: GFR calculation is accurate only with a steady state creatinine    CBC and differential [[id number]]  (Abnormal) Collected: 06/29/22 1534    Lab Status: Final result Specimen: Blood from Arm, Right Updated: 06/29/22 1542     WBC 8 92 Thousand/uL      RBC 3 19 Million/uL      Hemoglobin 10 4 g/dL      Hematocrit 31 6 %      MCV 99 fL      MCH 32 6 pg      MCHC 32 9 g/dL      RDW 16 4 %      MPV 10 0 fL      Platelets 665 Thousands/uL     Narrative: This is an appended report  These results have been appended to a previously verified report  X-ray chest 2 views   Final Result by Khanh Parrish MD (06/29 1651)      No acute cardiopulmonary disease                    Workstation performed: [id number]                    Procedures  ECG 12 Lead Documentation Only    Date/Time: 6/29/2022 3:30 PM  Performed by: Ami Fragoso DO  Authorized by: Ami Fragoso DO     Indications / Diagnosis:  Shortness of breath  ECG reviewed by me, the ED Provider: yes    Patient location:  ED  Previous ECG:     Previous ECG:  Compared to current    Comparison ECG info:  05/19/2022    Similarity:  Changes noted    Comparison to cardiac monitor: Yes    Interpretation:     Interpretation: non-specific    Rate:     ECG rate:  83    ECG rate assessment: normal    Rhythm:     Rhythm: sinus rhythm    Ectopy:     Ectopy: PVCs      PVCs:  Infrequent  QRS:     QRS axis:  Normal    QRS intervals: Normal  Conduction:     Conduction: normal    ST segments:     ST segments:  Normal  T waves:     T waves: flattening and inverted      Flattening:  II, aVF, V4, V5 and V6    Inverted:  III             ED Course  ED Course as of 06/29/22 1821 Wed Jun 29, 2022   1516 [place] text cardiology team to make them were patient in ED   [facility]   1603 Cardiology team recommending admission to Medicine, they placed orders which include Lasix drip   1821 Patient discussed with hospitalist who agrees to admit for further evaluation and treatment                                 VANESSA Risk Score    Flowsheet Row Most Recent Value   Age >= 72 1 Filed at: 06/29/2022 1744   Known CAD (stenosis >= 50%) 0 Filed at: 06/29/2022 1744   Recent (<=24 hrs) Service Angina 0 Filed at: 06/29/2022 1744   ST Deviation >= 0 5 mm 0 Filed at: 06/29/2022 1744   3+ CAD Risk Factors (FHx, HTN, HLP, DM, Smoker) 0 Filed at: 06/29/2022 1744   Aspirin Use Past 7 Days 0 Filed at: 06/29/2022 1744   Elevated Cardiac Markers 1 Filed at: 06/29/2022 1744   VANESSA Risk Score (Calculated) 2 Filed at: 06/29/2022 1744                    Disposition  Final diagnoses:   CHF (congestive heart failure) ([facility] )   Supratherapeutic INR   LEONARDO (acute kidney injury) ([facility] )     Time reflects when diagnosis was documented in both MDM as applicable and the Disposition within this note     Time User Action Codes Description Comment    6/29/2022  3:49 PM Jonas Chiquita Add [I50 9] CHF (congestive heart failure) ([facility] )     6/29/2022  4:56 PM Jonas Chiquita Add [R79 1] Supratherapeutic INR     6/29/2022  4:56 PM Connie Casanova Add [N17 9] LEONARDO (acute kidney injury) [facility])       ED Disposition     ED Disposition   Admit    Condition   Stable    Date/Time   Wed Jun 29, 2022  4:56 PM    Comment   Case was discussed with Dr Maricruz Jackman and the patient's admission status was agreed to be Admission Status: inpatient status to the service of Dr Maricruz Jackman             Follow-up Information    None         Current Discharge Medication List      CONTINUE these medications which have NOT CHANGED    Details   collagenase (SANTYL) ointment Apply topically daily  Qty: 15 g, Refills: 0    Associated Diagnoses: Cellulitis of right thigh      furosemide (LASIX) 40 mg tablet Take 1 tablet (40 mg total) by mouth daily  Refills: 0    Associated Diagnoses: LEONARDO (acute kidney injury) ([address] )      levothyroxine 150 mcg tablet TAKE (1) TABLET DAILY  FIRST THING IN THE MORNING (AT LEAST 30 MIN PRIOR TO BREAKFAST OR OTHER MEDS)  metoprolol succinate (TOPROL-XL) 100 mg 24 hr tablet Take 100 mg by mouth daily      potassium chloride (K-DUR,KLOR-CON) 10 mEq tablet Take 10 mEq by mouth daily      warfarin (COUMADIN) 5 mg tablet Take 1 tablet (5 mg total) by mouth daily  Refills: 0    Associated Diagnoses: Atrial fibrillation with RVR (HCC)      fluticasone (FLONASE) 50 mcg/act nasal spray 2 sprays into each nostril daily as needed      melatonin 3 mg Take 2 tablets (6 mg total) by mouth daily at bedtime  Refills: 0    Associated Diagnoses: Fall, initial encounter      midodrine (PROAMATINE) 2 5 mg tablet Take 1 tablet (2 5 mg total) by mouth 3 (three) times a day before meals  Refills: 0    Associated Diagnoses: Atrial fibrillation with RVR (HCC)      polyethylene glycol (MIRALAX) 17 g packet Take 17 g by mouth daily  Refills: 0    Associated Diagnoses: Fall, initial encounter      senna-docusate sodium (SENOKOT S) 8 6-50 mg per tablet Take 1 tablet by mouth 2 (two) times a day  Refills: 0    Associated Diagnoses: Fall, initial encounter             No discharge procedures on file      PDMP Review     None          ED Provider  Electronically Signed by           Duane Jhaveri DO  06/29/22 9245

## 2022-06-29 NOTE — ASSESSMENT & PLAN NOTE
Wt Readings from Last 3 Encounters:   06/29/22 124 kg (273 lb 13 oz)   05/19/22 126 kg (277 lb 1 9 oz)   04/04/22 117 kg (257 lb 0 9 oz)   · 3/2022 Echo 70%, moderate aortic stenosis  · Pro BNP 2376  · Weight gain of at least 20 lb, dependent edema, shortness of breath- patient/daughter approx -154 lbs  · Outpatient Lasix increased to 60 mg b i d    · Cardiology evaluated Lasix drip at 10 mg an hour  · Daily standing weight, I&O  · BMP tomorrow  · 40 mEq Kdur

## 2022-06-29 NOTE — ASSESSMENT & PLAN NOTE
· Recently hospitalized with sepsis secondary to  MRSA infection right thigh  · noted malodors drainage from right thigh, erythema to right ankle and increased warmth on right lower extremity  Patient reports pain at knee unable to bend     · Consider ultrasound- patient is on anticoagulant with elevated INR on admission

## 2022-06-29 NOTE — ASSESSMENT & PLAN NOTE
Lab Results   Component Value Date    HGBA1C 5 5 01/31/2018       No results for input(s): POCGLU in the last 72 hours      Blood Sugar Average: Last 72 hrs:  ·  repeat hemoglobin A1c last 2018 5 5  · Not on outpatient regimen

## 2022-06-29 NOTE — H&P
[address]  H&P- Tanna Fu 1940, 80 y o  female MRN: [id number]  Unit/Bed#: -Ramses Encounter: [id number]  Primary Care Provider: Pavithra Tillman MD   Date and time admitted to hospital: 6/29/2022  3:01 PM    * Acute on chronic diastolic (congestive) heart failure [facility])  Assessment & Plan  Wt Readings from Last 3 Encounters:   06/29/22 124 kg (273 lb 13 oz)   05/19/22 126 kg (277 lb 1 9 oz)   04/04/22 117 kg (257 lb 0 9 oz)   · 3/2022 Echo 70%, moderate aortic stenosis  · Pro BNP 2376  · Weight gain of at least 20 lb, dependent edema, shortness of breath- patient/daughter approx -154 lbs  · Outpatient Lasix increased to 60 mg b i d  · Cardiology evaluated Lasix drip at 10 mg an hour  · Daily standing weight, I&O  · BMP tomorrow  · 40 mEq Kdur          LEONARDO (acute kidney injury) ([facility] [address] )  Assessment & Plan  · Creatinine 1 67 on admission pre renal secondary to volume overload 20 lb weight gain  · Baseline 1 1 to 1 2  · Initiated on Lasix infusion by Cardiology  · Avoid nephrotoxins, daily standing weight, I&O    Paroxysmal A-fib (HCC)  Assessment & Plan  · On Coumadin- INR 4 99 on admission  · hold Coumadin, repeat daily INRs  · ECG normal sinus rhythm with PACs, PVCs    Aortic stenosis, moderate  Assessment & Plan  · Noted on echo in March  · Avoid hypotension  · Aggressive diuresis, hypervolemic with 20 lb weight gain    Cellulitis of right thigh  Assessment & Plan  · Recently hospitalized with sepsis secondary to  MRSA infection right thigh  · noted malodors drainage from right thigh, erythema to right ankle and increased warmth on right lower extremity  Patient reports pain at knee unable to bend  · Consider ultrasound- patient is on anticoagulant with elevated INR on admission    Diabetes mellitus [facility])  Assessment & Plan  Lab Results   Component Value Date    HGBA1C 5 5 01/31/2018       No results for input(s): POCGLU in the last 72 hours      Blood Sugar Average: Last 72 hrs:  ·  repeat hemoglobin A1c last 2018 5 5  · Not on outpatient regimen    Hypothyroidism (acquired)  Assessment & Plan  · Continue 150 mcg Synthroid  · Check TSH    Impaired skin integrity  Assessment & Plan  · Bilateral buttock open nares reported by daughter, this sanguinous drainage on sheet when patient moved into bed  · Wound care  · Pressure offloading, Q 2 turns  · Also is healing MRSA infection right thigh was following outpatient with Wound Care but has completed    HLD (hyperlipidemia)-resolved as of 6/29/2022  Assessment & Plan  · Not noted to be on outpatient therapy    VTE Pharmacologic Prophylaxis: VTE Score: 9 High Risk (Score >/= 5) - Pharmacological DVT Prophylaxis Ordered: On Coumadin hold today's dose due to elevated INR  Sequential Compression Devices Ordered  Code Status: Level 3 - DNAR and DNI   Discussion with family: Updated  (daughter) at bedside  Anticipated Length of Stay: Patient will be admitted on an inpatient basis with an anticipated length of stay of greater than 2 midnights secondary to Lasix drip aggressive fluid diuresis secondary to acute on chronic heart failure  Total Time for Visit, including Counseling / Coordination of Care: 60 minutes Greater than 50% of this total time spent on direct patient counseling and coordination of care  Chief Complaint:  Dyspnea, 20 lb weight gain, worsening lower extremity edema    History of Present Illness:  Naomie Smiley is a 80 y o  female with a PMH of recent hospitalization MRSA sepsis, CHF, paroxysmal AFib on Coumadin,  aortic stenosis who presents with worsening lower extremity edema, dyspnea, approximately 20 lb weight gain  Has been following closely with Cardiology in PCP recently increasing outpatient Lasix from 20 mg b i d , to 40 mg b i d , to 60 mg b i d  With persistent symptoms, patient was advised to come to the hospital today    Patient was recently hospitalized in March with sepsis secondary to MRSA cellulitis right thigh wound and completed outpatient antibiotics and went to SNF, has since returned home daughter assists with care needs at times but otherwise can perform most ADLs independently or with minimal assistance  Evaluated by Cardiology in ED and placed on Lasix infusion    Noted on admission right lower extremity erythema, warmth versus left, patient reports inability to bend at right knee due to pain  Indurated area below right thigh wound, tender to touch  Daughter notes it did not appear red or warm earlier today  Review of Systems:  Review of Systems   Constitutional: Positive for fatigue  Negative for fever  Respiratory: Positive for shortness of breath  Negative for chest tightness  Cardiovascular: Negative for chest pain  Gastrointestinal: Negative for abdominal distention, abdominal pain, constipation and diarrhea  Genitourinary: Positive for decreased urine volume ( concentrated yellow)  Negative for difficulty urinating and hematuria  Neurological: Negative for dizziness and light-headedness         Past Medical and Surgical History:   Past Medical History:   Diagnosis Date    [facility]     Atrial fibrillation ([facility] )     CHF (congestive heart failure) (HCC)     diastolic grade 1    Cirrhosis ([facility] )     Diabetes mellitus ([facility]     Disease of thyroid gland     hypothyroid    Endometrial ca ([facility] )     Hiatal hernia     Hyperlipidemia     Hypertension     Hypothyroid     Lung nodules     Periumbilical hernia     Pulmonary HTN ([facility] )     Thoracic aortic aneurysm without rupture ([facility] ) 03/25/2022    41 mm       Past Surgical History:   Procedure Laterality Date    HERNIA REPAIR      HIP ARTHROPLASTY      HYSTERECTOMY      JOINT REPLACEMENT Bilateral     knee; b/l hip    KNEE ARTHROPLASTY      OOPHORECTOMY      WOUND DEBRIDEMENT Right 3/31/2022    Procedure: DEBRIDEMENT WOUND ([address]), right medial thigh;  Surgeon: Quintin Riley Rodrigo Delatorre DO;  Location:  MAIN OR;  Service: General       Meds/Allergies:  Prior to Admission medications    Medication Sig Start Date End Date Taking? Authorizing Provider   collagenase (SANTYL) ointment Apply topically daily 4/5/22  Yes Hunter Phelan MD   furosemide (LASIX) 40 mg tablet Take 1 tablet (40 mg total) by mouth daily  Patient taking differently: Take 60 mg by mouth 2 (two) times a day 4/5/22  Yes Hunter Phelan MD   levothyroxine 150 mcg tablet TAKE (1) TABLET DAILY  FIRST THING IN THE MORNING (AT LEAST 30 MIN PRIOR TO BREAKFAST OR OTHER MEDS)   9/2/21  Yes Historical Provider, MD   metoprolol succinate (TOPROL-XL) 100 mg 24 hr tablet Take 100 mg by mouth daily   Yes Historical Provider, MD   potassium chloride (K-DUR,KLOR-CON) 10 mEq tablet Take 10 mEq by mouth daily   Yes Historical Provider, MD   warfarin (COUMADIN) 5 mg tablet Take 1 tablet (5 mg total) by mouth daily 4/4/22  Yes Hunter Phelan MD   fluticasone (FLONASE) 50 mcg/act nasal spray 2 sprays into each nostril daily as needed  Patient not taking: Reported on 6/29/2022    Historical Provider, MD   melatonin 3 mg Take 2 tablets (6 mg total) by mouth daily at bedtime  Patient not taking: Reported on 6/29/2022 4/4/22   Hunter Phelan MD   midodrine (PROAMATINE) 2 5 mg tablet Take 1 tablet (2 5 mg total) by mouth 3 (three) times a day before meals  Patient not taking: Reported on 6/29/2022 4/4/22   Hunter Phelan MD   polyethylene glycol (MIRALAX) 17 g packet Take 17 g by mouth daily 4/5/22   Hunter Phelan MD   senna-docusate sodium (SENOKOT S) 8 6-50 mg per tablet Take 1 tablet by mouth 2 (two) times a day 4/4/22   Hunter Phelan MD   amiodarone 200 mg tablet Take 1 tablet (200 mg total) by mouth every 12 (twelve) hours for 3 doses 4/4/22 6/29/22  Hunter Phelan MD   amiodarone 200 mg tablet Take 1 tablet (200 mg total) by mouth daily with breakfast 4/6/22 6/29/22  Hunter Phelan MD     I have reviewed home medications with patient family member  Allergies: Allergies   Allergen Reactions    Penicillins Hives           Sulfamethoxazole-Trimethoprim GI Intolerance       Social History:  Marital Status:    Occupation:    Patient Pre-hospital Living Situation: Home  Patient Pre-hospital Level of Mobility: walks with walker  Patient Pre-hospital Diet Restrictions: low sodium    Substance Use History:   Social History     Substance and Sexual Activity   Alcohol Use Never     Social History     Tobacco Use   Smoking Status Never Smoker   Smokeless Tobacco Never Used     Social History     Substance and Sexual Activity   Drug Use Never       Family History:  History reviewed  No pertinent family history  Physical Exam:     Vitals:   Blood Pressure: 128/60 (06/29/22 1753)  Pulse: (!) 49 (06/29/22 1753)  Temperature: 99 5 °F (37 5 °C) (06/29/22 1753)  Temp Source: Temporal (06/29/22 1450)  Respirations: 16 (06/29/22 1700)  Height: 5' (152 4 cm) (06/29/22 1450)  Weight - Scale: 124 kg (273 lb 13 oz) (06/29/22 1506)  SpO2: 94 % (06/29/22 1753)    Physical Exam  Vitals and nursing note reviewed  Constitutional:       General: She is not in acute distress  Appearance: She is well-developed  She is obese  She is ill-appearing  HENT:      Head: Normocephalic and atraumatic  Mouth/Throat:      Mouth: Mucous membranes are moist       Pharynx: Oropharynx is clear  Eyes:      General: No scleral icterus  Conjunctiva/sclera: Conjunctivae normal       Pupils: Pupils are equal, round, and reactive to light  Cardiovascular:      Rate and Rhythm: Normal rate  Rhythm regularly irregular  Pulses:           Dorsalis pedis pulses are 2+ on the right side and 2+ on the left side  Heart sounds: Murmur heard  Pulmonary:      Effort: Pulmonary effort is normal  No respiratory distress  Breath sounds: Decreased breath sounds present  No wheezing or rales  Abdominal:      General: Bowel sounds are normal  There is no distension  Palpations: Abdomen is soft  Tenderness: There is no abdominal tenderness  There is no guarding  Musculoskeletal:      Cervical back: Neck supple  Right lower leg: 3+ Edema present  Left lower leg: 3+ Edema present  Skin:     General: Skin is warm and dry  Capillary Refill: Capillary refill takes less than 2 seconds  Findings: Bruising, erythema and wound present  Comments: Buttock pressure injuries bilateral  Right thigh-serosanguineous drainage, odorous  Right lower extremity erythema hip to ankle, tenderness  Right posterior lateral thigh bruise    Warmth RLE>LLE   Neurological:      General: No focal deficit present  Mental Status: She is alert  Additional Data:     Lab Results:  Results from last 7 days   Lab Units 06/29/22  1534   WBC Thousand/uL 8 92   HEMOGLOBIN g/dL 10 4*   HEMATOCRIT % 31 6*   PLATELETS Thousands/uL 141*   LYMPHO PCT % 12*   MONO PCT % 12   EOS PCT % 0     Results from last 7 days   Lab Units 06/29/22  1534   SODIUM mmol/L 135*   POTASSIUM mmol/L 3 5   CHLORIDE mmol/L 100   CO2 mmol/L 26   BUN mg/dL 26*   CREATININE mg/dL 1 67*   ANION GAP mmol/L 9   CALCIUM mg/dL 8 0*   ALBUMIN g/dL 2 0*   TOTAL BILIRUBIN mg/dL 2 89*   ALK PHOS U/L 193*   ALT U/L 27   AST U/L 48*   GLUCOSE RANDOM mg/dL 136     Results from last 7 days   Lab Units 06/29/22  1534   INR  4 99*                   Imaging: Reviewed radiology reports from this admission including: chest xray  X-ray chest 2 views   Final Result by Ben Diaz MD (06/29 1651)      No acute cardiopulmonary disease  Workstation performed: [id number]             EKG and Other Studies Reviewed on Admission:   · EKG: NSR  HR 70     ** Please Note: This note has been constructed using a voice recognition system   **

## 2022-06-29 NOTE — ASSESSMENT & PLAN NOTE
· Bilateral buttock open nares reported by daughter, this sanguinous drainage on sheet when patient moved into bed  · Wound care  · Pressure offloading, Q 2 turns  · Also is healing MRSA infection right thigh was following outpatient with Wound Care but has completed

## 2022-06-29 NOTE — ASSESSMENT & PLAN NOTE
· On Coumadin- INR 4 99 on admission  · hold Coumadin, repeat daily INRs  · ECG normal sinus rhythm with PACs, PVCs

## 2022-06-30 ENCOUNTER — APPOINTMENT (INPATIENT)
Dept: CT IMAGING | Facility: HOSPITAL | Age: 82
DRG: 291 | End: 2022-06-30
Payer: MEDICARE

## 2022-06-30 LAB
ANION GAP SERPL CALCULATED.3IONS-SCNC: 6 MMOL/L (ref 4–13)
ATRIAL RATE: 83 BPM
BACTERIA UR QL AUTO: ABNORMAL /HPF
BILIRUB UR QL STRIP: ABNORMAL
BUN SERPL-MCNC: 25 MG/DL (ref 5–25)
CALCIUM SERPL-MCNC: 8.1 MG/DL (ref 8.3–10.1)
CHLORIDE SERPL-SCNC: 103 MMOL/L (ref 100–108)
CLARITY UR: ABNORMAL
CO2 SERPL-SCNC: 28 MMOL/L (ref 21–32)
COLOR UR: ABNORMAL
CREAT SERPL-MCNC: 1.38 MG/DL (ref 0.6–1.3)
ERYTHROCYTE [DISTWIDTH] IN BLOOD BY AUTOMATED COUNT: 16.9 % (ref 11.6–15.1)
GFR SERPL CREATININE-BSD FRML MDRD: 35 ML/MIN/1.73SQ M
GLUCOSE SERPL-MCNC: 86 MG/DL (ref 65–140)
GLUCOSE UR STRIP-MCNC: NEGATIVE MG/DL
HCT VFR BLD AUTO: 32 % (ref 34.8–46.1)
HGB BLD-MCNC: 10.4 G/DL (ref 11.5–15.4)
HGB UR QL STRIP.AUTO: ABNORMAL
INR PPP: 5.06 (ref 0.84–1.19)
KETONES UR STRIP-MCNC: NEGATIVE MG/DL
LEUKOCYTE ESTERASE UR QL STRIP: ABNORMAL
MAGNESIUM SERPL-MCNC: 2.4 MG/DL (ref 1.6–2.6)
MCH RBC QN AUTO: 32.5 PG (ref 26.8–34.3)
MCHC RBC AUTO-ENTMCNC: 32.5 G/DL (ref 31.4–37.4)
MCV RBC AUTO: 100 FL (ref 82–98)
NITRITE UR QL STRIP: POSITIVE
NON-SQ EPI CELLS URNS QL MICRO: ABNORMAL /HPF
P AXIS: 24 DEGREES
PH UR STRIP.AUTO: 5 [PH]
PLATELET # BLD AUTO: 139 THOUSANDS/UL (ref 149–390)
PMV BLD AUTO: 10.2 FL (ref 8.9–12.7)
POTASSIUM SERPL-SCNC: 3.7 MMOL/L (ref 3.5–5.3)
PR INTERVAL: 174 MS
PROT UR STRIP-MCNC: NEGATIVE MG/DL
PROTHROMBIN TIME: 45.2 SECONDS (ref 11.6–14.5)
QRS AXIS: -16 DEGREES
QRSD INTERVAL: 86 MS
QT INTERVAL: 434 MS
QTC INTERVAL: 509 MS
RBC # BLD AUTO: 3.2 MILLION/UL (ref 3.81–5.12)
RBC #/AREA URNS AUTO: ABNORMAL /HPF
SODIUM SERPL-SCNC: 137 MMOL/L (ref 136–145)
SP GR UR STRIP.AUTO: 1.01 (ref 1–1.03)
T WAVE AXIS: -4 DEGREES
UROBILINOGEN UR QL STRIP.AUTO: 2 E.U./DL
VENTRICULAR RATE: 83 BPM
WBC # BLD AUTO: 8.49 THOUSAND/UL (ref 4.31–10.16)
WBC #/AREA URNS AUTO: ABNORMAL /HPF

## 2022-06-30 PROCEDURE — 97167 OT EVAL HIGH COMPLEX 60 MIN: CPT

## 2022-06-30 PROCEDURE — 73700 CT LOWER EXTREMITY W/O DYE: CPT

## 2022-06-30 PROCEDURE — 87086 URINE CULTURE/COLONY COUNT: CPT

## 2022-06-30 PROCEDURE — 85610 PROTHROMBIN TIME: CPT

## 2022-06-30 PROCEDURE — G1004 CDSM NDSC: HCPCS

## 2022-06-30 PROCEDURE — 87186 SC STD MICRODIL/AGAR DIL: CPT

## 2022-06-30 PROCEDURE — 87077 CULTURE AEROBIC IDENTIFY: CPT

## 2022-06-30 PROCEDURE — 87070 CULTURE OTHR SPECIMN AEROBIC: CPT

## 2022-06-30 PROCEDURE — 87040 BLOOD CULTURE FOR BACTERIA: CPT

## 2022-06-30 PROCEDURE — 83735 ASSAY OF MAGNESIUM: CPT

## 2022-06-30 PROCEDURE — 99232 SBSQ HOSP IP/OBS MODERATE 35: CPT

## 2022-06-30 PROCEDURE — 85027 COMPLETE CBC AUTOMATED: CPT

## 2022-06-30 PROCEDURE — 81001 URINALYSIS AUTO W/SCOPE: CPT

## 2022-06-30 PROCEDURE — 97163 PT EVAL HIGH COMPLEX 45 MIN: CPT

## 2022-06-30 PROCEDURE — 80048 BASIC METABOLIC PNL TOTAL CA: CPT

## 2022-06-30 PROCEDURE — 87205 SMEAR GRAM STAIN: CPT

## 2022-06-30 RX ORDER — VANCOMYCIN HYDROCHLORIDE 500 MG/100ML
12.5 INJECTION, SOLUTION INTRAVENOUS EVERY 12 HOURS
Status: DISCONTINUED | OUTPATIENT
Start: 2022-06-30 | End: 2022-06-30

## 2022-06-30 RX ADMIN — ACETAMINOPHEN 650 MG: 325 TABLET ORAL at 09:10

## 2022-06-30 RX ADMIN — ACETAMINOPHEN 650 MG: 325 TABLET ORAL at 17:08

## 2022-06-30 RX ADMIN — POTASSIUM CHLORIDE 40 MEQ: 1500 TABLET, EXTENDED RELEASE ORAL at 17:06

## 2022-06-30 RX ADMIN — METOPROLOL SUCCINATE 100 MG: 100 TABLET, EXTENDED RELEASE ORAL at 09:10

## 2022-06-30 RX ADMIN — VANCOMYCIN HYDROCHLORIDE 1250 MG: 5 INJECTION, POWDER, LYOPHILIZED, FOR SOLUTION INTRAVENOUS at 20:34

## 2022-06-30 RX ADMIN — LEVOTHYROXINE SODIUM 150 MCG: 150 TABLET ORAL at 06:08

## 2022-06-30 RX ADMIN — DOCUSATE SODIUM AND SENNOSIDES 1 TABLET: 8.6; 5 TABLET, FILM COATED ORAL at 09:10

## 2022-06-30 RX ADMIN — POTASSIUM CHLORIDE 40 MEQ: 1500 TABLET, EXTENDED RELEASE ORAL at 09:10

## 2022-06-30 RX ADMIN — DOCUSATE SODIUM AND SENNOSIDES 1 TABLET: 8.6; 5 TABLET, FILM COATED ORAL at 17:06

## 2022-06-30 RX ADMIN — Medication 10 MG/HR: at 09:10

## 2022-06-30 NOTE — ASSESSMENT & PLAN NOTE
Lab Results   Component Value Date    HGBA1C 5 5 01/31/2018       No results for input(s): POCGLU in the last 72 hours      Blood Sugar Average: Last 72 hrs:  ·  repeat hemoglobin A1c last 2018 5 5  · Not on outpatient regimen  · Monitor off insulin AM glucose 86

## 2022-06-30 NOTE — ASSESSMENT & PLAN NOTE
· On Coumadin- INR 4 99 , 5 06  · hold Coumadin, repeat daily INRs  · ECG normal sinus rhythm with PACs, PVCs

## 2022-06-30 NOTE — PHYSICAL THERAPY NOTE
PHYSICAL THERAPY EVALUATION  NAME:  Jacek Talley  DATE: 06/30/22    AGE:   80 y o  Mrn:   [id number]  ADMIT DX:  CHF (congestive heart failure) (HCC) [I50 9]  Leg pain [M79 606]  LEONARDO (acute kidney injury) ([facility] [address] ) [N17 9]  Supratherapeutic INR [R79 1]    Past Medical History:   Diagnosis Date    LEONARDO (acute kidney injury) ([place] [address] )     Atrial fibrillation ([place] [address] )     CH[facility]congestive heart [facility])     diastolic grade 1    Cirrhosis ([place] [address] )     Diabetes mellitus ([place] [address] )     Disease of thyroid gland     hypothyroid    Endometrial ca ([place] [address] )     Hiatal hernia     Hyperlipidemia     Hypertension     Hypothyroid     Lung nodules     Periumbilical hernia     Pulmonary HTN ([place] [address] )     Thoracic aortic aneurysm without rupture ([place] [address] ) 03/25/2022    41 mm     Length Of Stay: 1  Performed at least 2 patient identifiers during session: Name and Birthday  PHYSICAL THERAPY EVALUATION :    06/30/22 1136   PT Last Visit   PT Visit Date 06/30/22   Note Type   Note type Evaluation   Pain Assessment   Pain Assessment Tool FLACC   Pain Location/Orientation Orientation: Right;Location: Leg   Pain Rating: FLACC (Rest) - Face 0   Pain Rating: FLACC (Rest) - Legs 0   Pain Rating: FLACC (Rest) - Activity 0   Pain Rating: FLACC (Rest) - Cry 0   Pain Rating: FLACC (Rest) - Consolability 0   Score: FLACC (Rest) 0   Pain Rating: FLACC (Activity) - Face 1   Pain Rating: FLACC (Activity) - Legs 1   Pain Rating: FLACC (Activity) - Activity 0   Pain Rating: FLACC (Activity) - Cry 1   Pain Rating: FLACC (Activity) - Consolability 0   Score: FLACC (Activity) 3   Restrictions/Precautions   Other Precautions Chair Alarm; Bed Alarm;Multiple lines;Telemetry; Fall Risk;Pain   Home Living   Type of Home House  (1 JAMIA)   Home Layout One level;Performs ADLs on one level; Able to live on main level with bedroom/bathroom   Bathroom Shower/Tub Walk-in shower   Bathroom Toilet Raised   Bathroom Equipment Shower chair;Grab bars in [facility];Walker   Additional Comments Reports living in a [facility] with 1 JAMIA and using RW for mobility  Prior Function   Level of [place] Independent with ADLs and functional mobility   Lives With Daughter  Leah Fierro)   [address] Help From Family   ADL Assistance Independent   IADLs Independent   Falls in the last 6 months 0   Comments Reports being independent with mobility, ADLs and IADLs  Daughter assist with community mobility  General   Additional Pertinent History Pt recently admitted from 3/25/22 to 4/4/22 with septic shock due to R thigh wound  Discharged to SNF for rehab  Returned to home approximately 1 month ago and has assistance from daughter  Cognition   Orientation Level Oriented X4   Following Commands Follows one step commands without difficulty   Subjective   Subjective "I'll try"   RLE Assessment   RLE Assessment   (AROM hip flexion limited by body habitus, knee flexion WFL, knee ext ~-15 degrees R LE, ankle DF just > neutral; strength 3-/5)   LLE Assessment   LLE Assessment   (WFL except hip flexion limited by body habitus; strength 3+/5)   Coordination   Rapid Alternating Movements Intact   Light Touch   RLE Light Touch Grossly intact   LLE Light Touch Grossly intact   Bed Mobility   Supine to Sit 4  Minimal assistance   Additional items Assist x 1; Increased time required;Verbal cues;LE management  (trunk management)   Additional Comments HOB flat without bedrails  minAx1 to acomplete with verbal cues for sequence and technique   Transfers   Sit to Stand   (minAx1 from EOB and SBA from bedside chair)   Additional items Assist x 1; Increased time required;Verbal cues   Stand to Sit   (stand by assistance)   Additional items Assist x 1; Increased time required;Verbal cues   Stand pivot   (minAx1-->steadying assistance)   Additional items Assist x 1; Increased time required;Verbal cues   Additional Comments use of RW  min cues for hand placement for safety with RW  sit to stand with minAx1 from EOB and SBA from bedside chair  min cues for hand placement  stand to sit wiht stand by assistance with cues for hand placement and controlled descent  spt wiht RW wiht miNAx1 initially progressing to steadying assistance with cues for wt shifting and RW management  Ambulation/Elevation   Gait pattern Wide KITTY; Decreased foot clearance; Excessively slow; Short stride; Antalgic;Decreased R stance  (inc lateral sway)   Gait Assistance   (minAx1-->steadying assistance)   Additional items Assist x 1;Verbal cues   Assistive Device Rolling walker   Distance 7'x1 with RW and chair follow with minx1-->steadying assistance  then ambulated 18'x1 with RW with steadying assistance with wide KITTY, decreased step length and inc lateral sway  cues for inc step length  Balance   Static Sitting Good   Dynamic Sitting Fair +   Static Standing Fair   Dynamic Standing Fair -   Ambulatory   (poor + to fair -)   Activity Tolerance   Activity Tolerance Patient limited by fatigue;Patient limited by pain   Medical Staff Made Aware Tamiko DEAN   Nurse Made Aware RNAraceli and Arti OWEN to work with patient for light funcitonal activity given INR 5 06   Assessment   Prognosis Good   Problem List Decreased strength;Decreased endurance; Impaired balance;Decreased mobility; Decreased safety awareness; Obesity; Decreased skin integrity;Pain   Barriers to Discharge None   Barriers to Discharge Comments has support form dtr prn   Goals   Patient Goals "Go home"   STG Expiration Date 07/14/22   PT Treatment Day 0   Plan   Treatment/Interventions ADL retraining;Functional transfer training;LE strengthening/ROM; Elevations; Therapeutic exercise;Patient/family training; Endurance training;Equipment eval/education; Bed mobility;Gait training; Compensatory technique education;Spoke to nursing;Spoke to case management;OT   PT Frequency 3-5x/wk   Recommendation   PT Discharge Recommendation Home with home health rehabilitation   Equipment Recommended   (walker-pt has)   AM-PAC Basic Mobility Inpatient   Turning in Bed Without Bedrails 3   Lying on Back to Sitting on Edge of Flat Bed 3   Moving Bed to Chair 3   Standing Up From Chair 3   Walk in Room 3   Climb 3-5 Stairs 2   Basic Mobility Inpatient Raw Score 17   Basic Mobility Standardized Score 39 67   Highest Level Of Mobility   JH-HLM Goal 5: Stand one or more mins   JH-HLM Achieved 7: Walk 25 feet or more   End of Consult   Patient Position at End of Consult Bedside chair;Bed/Chair alarm activated; All needs within reach     (Please find full objective findings from PT assessment regarding body systems outlined above)  Assessment: Pt is a 80 y o  female seen for PT evaluation s/p admission to [address] [place] [address] on 6/29/2022 with Acute on chronic diastolic (congestive) heart failure ([facility] [address] )  Order placed for PT services    Upon evaluation: Pt is presenting with impaired functional mobility due to pain, decreased strength, decreased ROM, decreased endurance, impaired balance, gait deviations, decreased safety awareness, impaired judgment, fall risk, LE edema, and impaired skin integrity requiring  minimal assistance for bed mobility, minimal to stand by assistance for transfers, and minimal to steadying assistance for ambulation with RW   Pt's clinical presentation is currently unpredictable given the functional mobility deficits above, especially weakness, decreased ROM, edema of extremities, decreased skin integrity, decreased endurance, gait deviations, pain, decreased activity tolerance, decreased functional mobility tolerance, and decreased safety awareness, coupled with fall risks as indicated by AM-PAC 6-Clicks: 6/74 as well as impaired balance, polypharmacy and decreased safety awareness and combined with medical complications of abnormal renal lab values, abnormal H&H, abnormal blood sugars, multiple readmissions, need for input for mobility technique/safety and abnormal BNP, elevated INR to 5 06 this date   Pt's PMHx and comorbidities that may affect physical performance and progress include: CHF, A fib, DM, HTN and cirrhosis, h/o endometrial CA, pulmonary HTN, thoracic aortic aneurysm without rupture, obesity, moderate aortic stenosis  Personal factors affecting pt at time of IE include: step(s) to enter environment, advanced age, inability to perform IADLs, inability to perform ADLs, inability to navigate level surfaces without external assistance and inability to ambulate household distances  Pt will benefit from continued skilled PT services to address deficits as defined above and to maximize level of functional mobility to facilitate return toward PLOF and improved QOL  From PT/mobility standpoint, recommendation at time of d/c would be Home PT, home with family support and with walker pending progress in order to reduce fall risk and maximize pt's functional independence and consistency with mobility in order to facilitate return to PLOF  Recommend trial with walker next 1-2 sessions and ther ex next 1-2 sessions  The patient's AM-PAC Basic Mobility Inpatient Short Form Raw Score is 17  A Raw score of greater than 16 suggests the patient may benefit from discharge to home  Please also refer to the recommendation of the Physical Therapist for safe discharge planning  Goals: Pt will: Perform bed mobility tasks with modified I to reposition in bed and prepare for transfers  Pt will perform transfers with modified I to decrease burden of care, decrease risk for falls and improve activity tolerance and prepare for ambulation  Pt will ambulate with RW for >/= 76' with  Supervision  to decrease burden of care, decrease risk for falls, improve activity tolerance and improve gait quality and to access home environment  Pt will complete >/= 1 step with RW with steadying assistance to return to home with JAMIA and decrease risk for falls   Pt will participate in objective balance assessment to determine baseline fall risk  Pt will increase B LE strength >/= 1/2 MMT grade to facilitate functional mobility        Rosie Bales, PT,DPT

## 2022-06-30 NOTE — PLAN OF CARE
Problem: Potential for Falls  Goal: Patient will remain free of falls  Description: INTERVENTIONS:  - Educate patient/family on patient safety including physical limitations  - Instruct patient to call for assistance with activity   - Consult OT/PT to assist with strengthening/mobility   - Keep Call bell within reach  - Keep bed low and locked with side rails adjusted as appropriate  - Keep care items and personal belongings within reach  - Initiate and maintain comfort rounds  - Make Fall Risk Sign visible to staff  - Offer Toileting every   Hours, in advance of need  - Initiate/Maintain   alarm  - Obtain necessary fall risk management equipment:   Liudmila Headley      - Apply yellow socks and bracelet for high fall risk patients  - Consider moving patient to room near nurses station  Outcome: Progressing     Problem: Nutrition/Hydration-ADULT  Goal: Nutrient/Hydration intake appropriate for improving, restoring or maintaining nutritional needs  Description: Monitor and assess patient's nutrition/hydration status for malnutrition  Collaborate with interdisciplinary team and initiate plan and interventions as ordered  Monitor patient's weight and dietary intake as ordered or per policy  Utilize nutrition screening tool and intervene as necessary  Determine patient's food preferences and provide high-protein, high-caloric foods as appropriate       INTERVENTIONS:  - Monitor oral intake, urinary output, labs, and treatment plans  - Assess nutrition and hydration status and recommend course of action  - Evaluate amount of meals eaten  - Assist patient with eating if necessary   - Allow adequate time for meals  - Recommend/ encourage appropriate diets, oral nutritional supplements, and vitamin/mineral supplements  - Order, calculate, and assess calorie counts as needed  - Recommend, monitor, and adjust tube feedings and TPN/PPN based on assessed needs  - Assess need for intravenous fluids  - Provide specific nutrition/hydration education as appropriate  - Include patient/family/caregiver in decisions related to nutrition  Outcome: Progressing

## 2022-06-30 NOTE — OCCUPATIONAL THERAPY NOTE
Occupational Therapy Evaluation     Patient Name: Ruby Porras  WTJYI'O Date: 6/30/2022  Problem List  Principal Problem:    Acute on chronic diastolic (congestive) heart failure (HCC)  Active Problems:    LEONARDO (acute kidney injury) ([facility] )    Paroxysmal A-fib (HCC)    Impaired skin integrity    Hypothyroidism (acquired)    Diabetes mellitus ([facility] )    Cellulitis of right thigh    Aortic stenosis, moderate    Past Medical History  Past Medical History:   Diagnosis Date    LEONARDO (acute kidney injury) ([facility] )     Atrial fibrillation ([facility] )     CH[facility]congestive [facility]     diastolic grade 1    Cirrhosis ([facility] )     Diabetes mellitus ([place] [address] )     Disease of thyroid gland     hypothyroid    Endometrial ca ([facility] )     Hiatal hernia     Hyperlipidemia     Hypertension     Hypothyroid     Lung nodules     Periumbilical hernia     Pulmonary HTN ([place] [address] )     Thoracic aortic aneurysm without rupture ([place] [address] ) 03/25/2022    41 mm     Past Surgical History  Past Surgical History:   Procedure Laterality Date    HERNIA REPAIR      HIP ARTHROPLASTY      HYSTERECTOMY      JOINT REPLACEMENT Bilateral     knee; b/l hip    KNEE ARTHROPLASTY      OOPHORECTOMY      WOUND DEBRIDEMENT Right 3/31/2022    Procedure: DEBRIDEMENT WOUND [facility] OUT), right medial thigh;  Surgeon: Tari Abbott DO;  Location:  MAIN OR;  Service: General        06/30/22 1135   Note Type   Note type Evaluation   Restrictions/Precautions   Other Precautions Chair Alarm; Bed Alarm; Fall Risk;Multiple lines;Telemetry   Pain Assessment   Pain Assessment Tool 0-10   Pain Score No Pain   Home Living   Type of Home Apartment  (1 [address])   Home Layout One level; Able to live on main level with bedroom/bathroom; Performs ADLs on one level   Bathroom Shower/Tub Walk-in shower   Bathroom Toilet Raised   Bathroom Equipment Shower chair;Grab bars in shower   Home Equipment Cane;Walker   Additional Comments Pt reports living in a [facility] with daughter   RW use at baseline  Prior Function   Level of [place] Independent with ADLs and functional mobility   Lives With Daughter  Jacque Dsouza)   [place] 68 Help From Family   ADL Assistance Independent   IADLs Independent   Falls in the last 6 months 0   Comments Pt reports I with ADLs and IADLs  Daughter assists with community mobility  ADL   UB Dressing Assistance 5  Supervision/Setup   UB Dressing Deficit Setup; Increased time to complete   LB Dressing Assistance 4  Minimal Assistance   LB Dressing Deficit Setup; Requires assistive device for steadying;Verbal cueing;Supervision/safety; Increased time to complete   Additional Comments UB ADLs @ S/set-up  LB ADLs @ Min A  Pt required assist to don socks while seated at EOB  Pt reports not wearing socks at home but sister helps with footwear PRN  Bed Mobility   Supine to Sit 4  Minimal assistance   Additional items Assist x 1; Increased time required;Verbal cues;LE management   Additional Comments Pt supine in bed at beginning of session  Supine to sit @ Min A for LE management  Transfers   Sit to Stand   (Min A -> SBA)   Additional items Increased time required;Verbal cues   Stand to Sit   (SBA)   Additional items Increased time required;Verbal cues   Stand pivot   (Min A -> Steadying assist)   Additional items Increased time required;Verbal cues   Additional Comments Initial STS from EOB to RW @ Min A  SPT to wide-base standard chair with RW @ Min A  Pt able to complete STS from chair @ SBA and functional mobility around room with RW @ Steadying assist  Pt seated in chair with alarm intact and call bell within reach  All needs met  Balance   Static Sitting Good   Dynamic Sitting Fair +   Static Standing Fair   Dynamic Standing Fair -   RUE Assessment   RUE Assessment WFL   LUE Assessment   LUE Assessment WFL   Cognition   Overall Cognitive Status WFL   Arousal/Participation Alert; Responsive; Cooperative   Attention Within functional limits   Orientation Level Oriented X4 Memory Within functional limits   Following Commands Follows one step commands without difficulty   Assessment   Limitation Decreased ADL status; Decreased UE ROM; Decreased UE strength;Decreased endurance;Decreased self-care trans;Decreased high-level ADLs   Prognosis Good   Assessment Pt is a 80 y o  female, admitted to [address] [place] [address] 6/29/2022 d/t experiencing LE edema and SOB with exertion  Dx: acute on chronic diastolic congestive heart failure  Pt with PMHx impacting their performance during ADL tasks, including: A-Fib, CHF, cirrhosis, DM, hypertension, thoracic aortic aneurysm without rupture  Prior to admission to the hospital Pt was performing ADLs without physical assistance  IADLs without physical assistance  Functional transfers/ambulation without physical assistance  Cognitive status was PTA was Intact  OT order placed to assess Pt's ADLs, cognitive status, and performance during functional tasks in order to maximize safety and independence while making most appropriate d/c recommendations  Pt's clinical presentation is currently evolving given new onset deficits that effect Pt's occupational performance and ability to safely return to [facility] including decrease activity tolerance, decrease standing balance, decrease performance during ADL tasks, decrease UB MS, decrease generalized strength, decrease activity engagement and decrease performance during functional transfers combined with medical complications of elevated INR, hypotension, abnormal renal lab values, abnormal H&H, abnormal CBC, edema/swelling and need for input for mobility technique/safety  Personal factors affecting Pt at time of initial evaluation include: step(s) to enter environment  At this time, Pt required OT/PT co-eval d/t deficits with mobility and safety concerns   Pt will benefit from continued skilled OT services to address deficits as defined above and to maximize level independence/participation during ADLs and functional tasks to facilitate return toward PLOF and improved quality of life  From an occupational therapy standpoint, recommendation at time of d/c would be home health OT services  Plan   Treatment Interventions ADL retraining;Functional transfer training;UE strengthening/ROM; Endurance training;Patient/family training;Equipment evaluation/education; Neuromuscular reeducation; Compensatory technique education;Continued evaluation; Energy conservation; Activityengagement   Goal Expiration Date 07/14/22   OT Frequency 2-3x/wk   Recommendation   OT Discharge Recommendation Home with home health rehabilitation   AM-[facility] Daily Activity Inpatient   Lower Body Dressing 3   Bathing 3   Toileting 3   Upper Body Dressing 3   Grooming 3   Eating 4   Daily Activity Raw Score 19   Daily Activity Standardized Score (Calc for Raw Score >=11) 40 22   AM-[facility] Applied Cognition Inpatient   Following a Speech/Presentation 3   Understanding Ordinary Conversation 3   Taking Medications 3   Remembering Where Things Are Placed or Put Away 3   Remembering List of 4-5 Errands 2   Taking Care of Complicated Tasks 2   Applied Cognition Raw Score 16   Applied Cognition Standardized Score 35 03     The patient's raw score on the AM-PAC Daily Activity inpatient short form is 19, standardized score is 40 22, greater than 39 4  Patients at this level are likely to benefit from DC to home  Please refer to the recommendation of the Occupational Therapist for safe DC planning  Pt goals to be met by 7/14/2022    1  Pt will demonstrate ability to complete grooming/hygiene tasks @ Mod I after set-up  2  Pt will demonstrate ability to complete supine<>sit @ Mod I in order to increase safety and independence during ADL tasks  3  Pt will demonstrate ability to complete UB ADLs including washing/dressing @ Mod I in order to increase performance and participation during meaningful tasks  4   Pt will demonstrate ability to complete LB dressing @ Mod I in order to increase safety and independence during meaningful tasks  5  Pt will demonstrate ability to complete toileting tasks including CM and pericare @ Mod I in order to increase safety and independence during meaningful tasks  6  Pt will demonstrate ability to complete EOB, chair, toilet/commode transfers @ Mod I in order to increase performance and participation during functional tasks  7  Pt will demonstrate ability to stand for 3-5 minutes while maintaining Good balance with use of RW for UB support PRN  8  Pt will demonstrate ability to tolerate 30-35 minute OT session with no vc'ing for deep breathing or use of energy conservation techniques in order to increase activity tolerance during functional tasks  9  Pt will demonstrate Good carryover of use of energy conservation/compensatory strategies during ADLs and functional tasks in order to increase safety and reduce risk for falls  10  Pt will demonstrate Good attention and participation in continued evaluation of functional ambulation house hold distances in order to assist with safe d/c planning  11  Pt will attend to continued cognitive assessments 100% of the time in order to provide most appropriate d/c recommendations  12  Pt will follow 100% simple 2-step commands and be A&O x4 consistently with environmental cues to increase participation in functional activities  13  Pt will identify 3 areas of interest/hobbies and 1 intervention on how to incorporate into daily life in order to increase interaction with environment and peers as well as increase participation in meaningful tasks  14  Pt will demonstrate 100% carryover of BUE HEP in order to increase BUE MS and increase performance during functional tasks upon d/c home      Mery Taylor OTR/L

## 2022-06-30 NOTE — MALNUTRITION/BMI
This medical record reflects one or more clinical indicators suggestive of morbid obesity  BMI Findings:  Adult BMI Classifications: Morbid Obesity 50-59 9        Body mass index is 52 54 kg/m²  See Nutrition note dated 6/30/22 for additional details  Completed nutrition assessment is viewable in the nutrition documentation

## 2022-06-30 NOTE — PROGRESS NOTES
Progress Note - Cardiology   Lopez Gomez 80 y o  female MRN: [id number]  Unit/Bed#: -01 Encounter: [id number]    Assessment:  Acute on chronic HFpEF- edema, sob, weight gain of at least 20 lbs,on lasix drip  LEONARDO on CKD- baseline Cr likely around 1 1-1 2,improving with diuresis   Moderate AS- re ready by Dr Nakia Cisneros, will need monitoring annually   PAF- on coumadin, no afib by most recent zio   - did have episode of afib with rvr in the setting of septic shock recently in march 2022  Hx endometrial CA sp hysterectomy  Normal LVEF  HTN  Obesity    Plan:  Continue lasix drip at present dose  No changes from cardiac perspective  Monitor I and O, daily standing weights, renal function and electrolytes   No need for repeat echo    Subjective/Objective      Subjective: pt reports breathing is improved  Reports trouble sleeping overnight  No chest pain  Objective: weight unchanged  I and O inaccurate  Renal function improved with diuresis     Vitals: /56   Pulse 89   Temp 100 1 °F (37 8 °C)   Resp 15   Ht 5' (1 524 m)   Wt 122 kg (269 lb)   SpO2 95%   BMI 52 54 kg/m²   Vitals:    06/29/22 1816 06/30/22 0555   Weight: 122 kg (269 lb 12 8 oz) 122 kg (269 lb)     Orthostatic Blood Pressures    Flowsheet Row Most Recent Value   Blood Pressure 126/56 filed at 06/30/2022 9972   Patient Position - Orthostatic VS Lying filed at 06/29/2022 1700          No intake or output data in the 24 hours ending 06/30/22 0934    Invasive Devices  Report    Peripheral Intravenous Line  Duration           Peripheral IV 06/29/22 Right Antecubital <1 day          Drain  Duration           Urethral Catheter Latex 16 Fr  <1 day                     Physical Exam  Vitals and nursing note reviewed  Constitutional:       Appearance: Normal appearance  HENT:      Head: Normocephalic and atraumatic  Cardiovascular:      Rate and Rhythm: Normal rate  Heart sounds: Murmur heard     Pulmonary:      Comments: Decreased in bases  Abdominal:      Palpations: Abdomen is soft  Musculoskeletal:         General: Swelling present but improved     Cervical back: Neck supple  Skin:     General: Skin is warm and dry  Capillary Refill: Capillary refill takes less than 2 seconds  Neurological:      General: No focal deficit present  Mental Status: She is alert and oriented to person, place, and time  Psychiatric:         Mood and Affect: Mood normal          Thought Content: Thought content normal      Lab Results:   I have personally reviewed pertinent lab results      CBC with diff:   Results from last 7 days   Lab Units 06/30/22  0551   WBC Thousand/uL 8 49   RBC Million/uL 3 20*   HEMOGLOBIN g/dL 10 4*   HEMATOCRIT % 32 0*   MCV fL 100*   MCH pg 32 5   MCHC g/dL 32 5   RDW % 16 9*   MPV fL 10 2   PLATELETS Thousands/uL 139*     CMP:   Results from last 7 days   Lab Units 06/30/22  0551 06/29/22  1534   SODIUM mmol/L 137 135*   CHLORIDE mmol/L 103 100   CO2 mmol/L 28 26   BUN mg/dL 25 26*   CREATININE mg/dL 1 38* 1 67*   CALCIUM mg/dL 8 1* 8 0*   AST U/L  --  48*   ALT U/L  --  27   ALK PHOS U/L  --  193*   EGFR ml/min/1 73sq m 35 28     HS Troponin:   0   Lab Value Date/Time    HSTNI 112 (H) 03/26/2022 0503    HSTNI0 15 06/29/2022 1534    HSTNI2 17 06/29/2022 1857    HSTNI4 16 06/29/2022 2148    HSTNI4 110 (H) 03/25/2022 2317     BNP:   Results from last 7 days   Lab Units 06/30/22  0551   POTASSIUM mmol/L 3 7   CHLORIDE mmol/L 103   CO2 mmol/L 28   BUN mg/dL 25   CREATININE mg/dL 1 38*   CALCIUM mg/dL 8 1*   EGFR ml/min/1 73sq m 35     Coags:   Results from last 7 days   Lab Units 06/30/22  0637   INR  5 06*     TSH:     Magnesium:   Results from last 7 days   Lab Units 06/30/22  0551   MAGNESIUM mg/dL 2 4     Lipid Profile:     Imaging: I have personally reviewed pertinent reports          echo 3/28/22:    Left Ventricle: Left ventricular cavity size is normal  Wall thickness   is normal  The left ventricular ejection fraction is 70%  Systolic   function is vigorous  Wall motion is normal      Left Atrium: The atrium is moderately dilated    Right Atrium: The atrium is mildly dilated    Aortic Valve: The aortic valve cannot be ruled out as bicuspid  The   leaflets are moderately thickened  The leaflets are moderately calcified  There is moderately reduced mobility  Findings are overall consistent with   severe aortic stenosis    Mitral Valve: There is moderate annular calcification  There is mild   regurgitation    Tricuspid Valve: There is mild regurgitation    Prior TTE study available for comparison  Prior study date: 12/17/2019  Changes noted when compared to prior study  Changes include: Mild to   moderate aortic valve calcification and reduced mobility were noted on the   prior study  The aortic valve is better visualized on the current study   and gradients obtained on the current study demonstrate that there is   severe aortic stenosis   The prior study demonstrated mild aortic   regurgitation, which is not visualized on the current study   REREAD BY DR Alejandrina Patel, MODERATE AS

## 2022-06-30 NOTE — ASSESSMENT & PLAN NOTE
· Creatinine 1 67 on admission pre renal secondary to volume overload 20 lb weight gain  · Baseline 1 1 to 1 2  · Initiated on Lasix infusion by Cardiology  · Lasix drip was found around 4:00 a m   For an unknown amount of time with no I&Os documented, patient arrived on unit from ED with administering at the end of day shift 730pm   Resumed this morning  · Avoid nephrotoxins, daily standing weight, I&O

## 2022-06-30 NOTE — PROGRESS NOTES
[address]  Progress Note - Maulik Barton 1940, 80 y o  female MRN: [id number]  Unit/Bed#: -R[facility] Encounter: [id number]  Primary Care Provider: Munira Felton MD   Date and time admitted to hospital: 6/29/2022  3:01 PM    * Acute on chronic diastolic (congestive) heart failure [facility])  Assessment & Plan  Wt Readings from Last 3 Encounters:   06/30/22 122 kg (269 lb)   05/19/22 126 kg (277 lb 1 9 oz)   04/04/22 117 kg (257 lb 0 9 oz)   · 3/2022 Echo 70%, moderate aortic stenosis  · Pro BNP 2376  · Weight gain of at least 20 lb, dependent edema, shortness of breath- patient/daughter approx -154 lbs  · Outpatient Lasix increased to 60 mg b i d  · Cardiology evaluated Lasix drip at 10 mg an hour  · Daily standing weight, strict  I&O  · BMP tomorrow  · 40 mEq Kdur          Cellulitis of right thigh  Assessment & Plan  · Recently hospitalized with sepsis secondary to  MRSA infection right thigh  · noted malodors drainage from right thigh, erythema to right ankle and increased warmth on right lower extremity  Patient reports pain at knee unable to bend  · CT right lower extremity ordered  · Negative WBC, procal 0 57, low grade fevers  · Blood cultures in process, history of MRSA and was treated with IV vancomycin, discharged with doxy  · Start IV vanco  · Foul smelling drainage from open buttock pressure injuries-cultures ordered    Paroxysmal A-fib (HCC)  Assessment & Plan  · On Coumadin- INR 4 99 , 5 06  · hold Coumadin, repeat daily INRs  · ECG normal sinus rhythm with PACs, PVCs    LEONARDO (acute kidney injury) ([facility] )  Assessment & Plan  · Creatinine 1 67 on admission pre renal secondary to volume overload 20 lb weight gain  · Baseline 1 1 to 1 2  · Initiated on Lasix infusion by Cardiology  · Lasix drip was found around 4:00 a m   For an unknown amount of time with no I&Os documented, patient arrived on unit from ED with administering at the end of day shift 730pm   Resumed this morning  · Avoid nephrotoxins, daily standing weight, I&O    Aortic stenosis, moderate  Assessment & Plan  · Noted on echo in March  · Avoid hypotension  · Aggressive diuresis, hypervolemic with 20 lb weight gain    Diabetes mellitus ([facility] )  Assessment & Plan  Lab Results   Component Value Date    HGBA1C 5 5 01/31/2018       No results for input(s): POCGLU in the last 72 hours  Blood Sugar Average: Last 72 hrs:  ·  repeat hemoglobin A1c last 2018 5 5  · Not on outpatient regimen  · Monitor off insulin AM glucose 86    Hypothyroidism (acquired)  Assessment & Plan  · Continue home 150 mcg Synthroid  ·     Impaired skin integrity  Assessment & Plan  · Bilateral buttock open nares reported by daughter, this sanguinous drainage on sheet when patient moved into bed  · Wound care  · Pressure offloading, Q 2 turns  · Also is healing MRSA infection right thigh was following outpatient with Wound Care but has completed      VTE Pharmacologic Prophylaxis: VTE Score: 9 High Risk (Score >/= 5) - Pharmacological DVT Prophylaxis Contraindicated  Sequential Compression Devices Ordered  Patient Centered Rounds: I performed bedside rounds with nursing staff today  Discussions with Specialists or Other Care Team Provider: dr Bruce Foley and Discussions with Family / Patient: Updated  (daughter) via phone  Time Spent for Care: 45 minutes  More than 50% of total time spent on counseling and coordination of care as described above  Current Length of Stay: 1 day(s)  Current Patient Status: Inpatient   Certification Statement: The patient will continue to require additional inpatient hospital stay due to Acute CHF exacerbation with IV Lasix infusion, cellulitis  Discharge Plan: Anticipate discharge in >72 hrs to home with home services      Code Status: Level 3 - DNAR and DNI    Subjective:   Patient continues to have pain in right lower extremity, increased erythema noted today versus yesterday evening  With intermittent low-grade temperatures  Nursing notes foul smell from draining bilateral buttock pressure injuries  It is Lasix infusion off for an unknown length of time last evening and no I&Os were documented  Lasix drip restarted this morning, continue strict Marija    Objective:     Vitals:   Temp (24hrs), Av 4 °F (37 4 °C), Min:98 7 °F (37 1 °C), Max:100 3 °F (37 9 °C)    Temp:  [98 7 °F (37 1 °C)-100 3 °F (37 9 °C)] 100 3 °F (37 9 °C)  HR:  [49-89] 88  Resp:  [15-19] 18  BP: ()/(41-77) 122/61  SpO2:  [92 %-97 %] 92 %  Body mass index is 52 54 kg/m²  Input and Output Summary (last 24 hours): Intake/Output Summary (Last 24 hours) at 2022 1728  Last data filed at 2022 1701  Gross per 24 hour   Intake 720 ml   Output 150 ml   Net 570 ml       Physical Exam:   Physical Exam  Vitals and nursing note reviewed  Constitutional:       General: She is not in acute distress  Appearance: She is well-developed  She is obese  She is ill-appearing  HENT:      Head: Normocephalic and atraumatic  Mouth/Throat:      Mouth: Mucous membranes are moist    Eyes:      General: No scleral icterus  Conjunctiva/sclera: Conjunctivae normal       Pupils: Pupils are equal, round, and reactive to light  Cardiovascular:      Rate and Rhythm: Normal rate and regular rhythm  Pulses: Normal pulses  Heart sounds: Murmur heard  Pulmonary:      Effort: Pulmonary effort is normal  No respiratory distress  Breath sounds: Rales present  No wheezing  Abdominal:      General: Bowel sounds are normal       Palpations: Abdomen is soft  Tenderness: There is no abdominal tenderness  Musculoskeletal:      Cervical back: Neck supple  Right lower leg: 3+ Edema present  Left lower leg: 3+ Edema present  Skin:     General: Skin is warm and dry  Capillary Refill: Capillary refill takes less than 2 seconds        Findings: Erythema and wound (Right thigh, bilateral buttock pressure injury) present  Comments: Erythema and warmth RLE> LLE   Inferior to right thigh wound firm indurated area,   Erythema to right hip with tenderness   Neurological:      Mental Status: She is alert            Additional Data:     Labs:  Results from last 7 days   Lab Units 06/30/22  0551 06/29/22  1534   WBC Thousand/uL 8 49 8 92   HEMOGLOBIN g/dL 10 4* 10 4*   HEMATOCRIT % 32 0* 31 6*   PLATELETS Thousands/uL 139* 141*   LYMPHO PCT %  --  12*   MONO PCT %  --  12   EOS PCT %  --  0     Results from last 7 days   Lab Units 06/30/22  0551 06/29/22  1534   SODIUM mmol/L 137 135*   POTASSIUM mmol/L 3 7 3 5   CHLORIDE mmol/L 103 100   CO2 mmol/L 28 26   BUN mg/dL 25 26*   CREATININE mg/dL 1 38* 1 67*   ANION GAP mmol/L 6 9   CALCIUM mg/dL 8 1* 8 0*   ALBUMIN g/dL  --  2 0*   TOTAL BILIRUBIN mg/dL  --  2 89*   ALK PHOS U/L  --  193*   ALT U/L  --  27   AST U/L  --  48*   GLUCOSE RANDOM mg/dL 86 136     Results from last 7 days   Lab Units 06/30/22  0637   INR  5 06*             Results from last 7 days   Lab Units 06/29/22  1534   PROCALCITONIN ng/ml 0 57*       Lines/Drains:  Invasive Devices  Report    Peripheral Intravenous Line  Duration           Peripheral IV 06/30/22 Right;Ventral (anterior) Wrist <1 day          Drain  Duration           Urethral Catheter Latex 16 Fr  1 day              Urinary Catheter:  Goal for removal: Continue with strict I&O and IV Lasix infusion           Telemetry:  Telemetry Orders (From admission, onward)             48 Hour Telemetry Monitoring  Continuous x 48 hours        References:    Telemetry Guidelines   Question:  Reason for 48 Hour Telemetry  Answer:  Acute Decompensated CHF (continuous diuretic infusion or total diuretic dose > 200 mg daily, associated electrolyte derangement, ionotropic drip, history of ventricular arrhythmia, or new EF <35%)                 Telemetry Reviewed: Normal Sinus Rhythm  Indication for Continued Telemetry Use: Acute CHF on >200 mg lasix/day or equivalent dose or with new reduced EF  Imaging: Reviewed radiology reports from this admission including: chest xray    Recent Cultures (last 7 days):         Last 24 Hours Medication List:   Current Facility-Administered Medications   Medication Dose Route Frequency Provider Last Rate    acetaminophen  650 mg Oral Q6H PRN Lars Linen, CRNP      furosemide  10 mg/hr Intravenous Continuous Lars Linen, CRNP 10 mg/hr (06/30/22 0910)    levothyroxine  150 mcg Oral Early Morning Lars Jennifern, TALATNP      metoprolol succinate  100 mg Oral Daily Lars Linen, CRNP      polyethylene glycol  17 g Oral Daily PRN Lars Linen, CRNP      potassium chloride  40 mEq Oral BID LEXIE Chávez      senna-docusate sodium  1 tablet Oral BID LEXIE Chávez      vancomycin  12 5 mg/kg (Ideal) Intravenous Q12H LEXIE Chávez          Today, Patient Was Seen By: LEXIE Chávez    **Please Note: This note may have been constructed using a voice recognition system  **

## 2022-06-30 NOTE — CASE MANAGEMENT
Case Management Assessment & Discharge Planning Note    Patient name Laurie Wray  Location /-80 MRN [id number]  : 1940 Date 2022       Current Admission Date: 2022  Current Admission Diagnosis:Acute on chronic diastolic (congestive) heart failure [facility])   Patient Active Problem List    Diagnosis Date Noted    [facility] on [facility]est[facility] 2022    Aortic stenosis, moderate 2022    Pulmonary HTN ([facility] )     Cholelithiases 2022    Cirrhosis ([facility] ) 2022    Cellulitis of right thigh 2022    Fusiform ectasia of ascending aorta 2022    Moderate protein-calorie malnutrition ([facility] ) 2022    Septic shock ([place] [address] ) 2022    LEONARDO (acute kidney injury) ([place] [address] ) 2022    Paroxysmal A-fib ([place] [address] ) 2022    Fall 2022    Traumatic hematoma of head 2022    Impaired skin integrity 2022    Hypothyroidism (acquired) 2022    Diabetes mellitus ([place] [address] ) 2022    Thoracic aortic aneurysm without rupture ([place] [address] ) 2022      LOS (days): 1  Geometric Mean LOS (GMLOS) (days): 3 80  Days to GMLOS:2 8     OBJECTIVE:    Risk of Unplanned Readmission Score: 21 43         Current admission status: Inpatient       Preferred Pharmacy:   [address] [facility] [address] [facility]  [address] [facility]  Phone: [phone] Fax: [phone]    [address]  [address] [facility] [address]  Phone: [phone] Fax: [facility]  [address]  [address] [facility] [address]  Phone: [phone] Fax: [phone]    Primary Care Provider: Pearl Mantilla MD    Primary Insurance: MEDICARE  Secondary Insurance: AARP    ASSESSMENT:  Adela Daniel Proxies    There are no active Health Care Proxies on file         Advance Directives  Does patient have a Health Care POA?: Yes (yes, daughter, Judge Nowak)  Does patient have Advance Directives?: Yes  Advance Directives: Power of  for health care, Power of  for finance  Primary Contact: hannah nuñez              Patient Information  Admitted from[de-identified] Home  Mental Status: Alert  During Assessment patient was accompanied by: Not accompanied during assessment  Assessment information provided by[de-identified] Patient  Primary Caregiver: Self  Support Systems: Family members, [address] [place], [place] [address]y 18 entry access options   Select all that apply : Stairs  Number of steps to enter home : 1  Type of Current Residence: [place]  In the last 12 months, was there a time when you were not able to pay the mortgage or rent on time?: No  In the last 12 months, how many places have you lived?: 1  In the last 12 months, was there a time when you did not have a steady place to sleep or slept in a shelter (including now)?: No  Living Arrangements: Lives Alone    Activities of Daily Living Prior to Admission  Functional Status: Independent  Completes ADLs independently?: Yes  Ambulates independently?: Yes  Does patient use assisted devices?: Yes  Assisted Devices (DME) used: [facility]  Does patient currently own DME?: Yes  What DME does the patient currently own?: [employer] [place]  Does patient have a history of Outpatient Therapy (PT/OT)?: No  Does the patient have a history of Short-Term Rehab?: No  Does patient have a history of HHC?: Yes (unable to recall the agency name)  Does patient currently have [facility]?: Yes (joao)    [facility]  Type of Current Home Care Services: Home OT, Home PT, Nurse visit  [address][de-identified] [facility] Provider[de-identified] PCP    Patient Information Continued  Income Source: Pension/MCC  Does patient have prescription coverage?: Yes  Within the past 12 months, you worried that your food would run out before you got the money to buy more : Never true  Within the past 12 months, the food you bought just didn't last and you didn't have money to get more : Never true  Does patient receive dialysis treatments?: No  Does patient have a history of substance abuse?: No  Does patient have a history of Mental Health Diagnosis?: No         Means of Transportation  Means of Transport to Appts[de-identified] Family transport  In the past 12 months, has lack of transportation kept you from medical appointments or from getting medications?: No  In the past 12 months, has lack of transportation kept you from meetings, work, or from getting things needed for daily living?: No        DISCHARGE DETAILS:    Discharge planning discussed with[de-identified] patient  Freedom of Choice: Yes                   Contacts  Patient Contacts: savannah, daughter  Relationship to Patient[de-identified] [address]         Is the patient interested in [facility] at discharge?: Yes  [address] requested[de-identified] Nursing, Occupational Therapy, Physical [address] Name[de-identified] [address] [facility] Provider[de-identified] PCP  [address] Needed[de-identified] Evaluate Functional Status and Safety, Gait/ADL Training, Heart Failure Management, Strengthening/Theraputic Exercises to Improve Function  Homebound Criteria Met[de-identified] Requires the Assistance of Another Person for Safe Ambulation or to Leave the Home  Supporting Clincal Findings[de-identified] Limited Endurance              Would you like to participate in our [address] service program?  : No - Declined              HHC recommended for pt at time of dc  Pt sts she is active with [facility], a ARIN has been placed in [facility]  A post acute care recommendation was made by your care team for [facility]  Discussed Freedom of Choice with patient  List of agencies given to patient via in person  patient aware the list is custom filtered for them by preference  and that [facility] post acute providers are designated             [facility] has accepted pt at time of dc

## 2022-06-30 NOTE — PROGRESS NOTES
Vancomycin Assessment    Terry Montaño is a 80 y o  female who is currently ordered vancomycin 500 mg IV q 12 hours for skin-soft tissue infection     Relevant clinical data and objective history reviewed:  Creatinine   Date Value Ref Range Status   06/30/2022 1 38 (H) 0 60 - 1 30 mg/dL Final     Comment:     Standardized to IDMS reference method   06/29/2022 1 67 (H) 0 60 - 1 30 mg/dL Final     Comment:     Standardized to IDMS reference method   05/19/2022 1 40 (H) 0 60 - 1 30 mg/dL Final     Comment:     Standardized to IDMS reference method     /61   Pulse 88   Temp 100 3 °F (37 9 °C)   Resp 18   Ht 5' (1 524 m)   Wt 122 kg (269 lb)   SpO2 92%   BMI 52 54 kg/m²   No intake/output data recorded  Lab Results   Component Value Date/Time    BUN 25 06/30/2022 05:51 AM    WBC 8 49 06/30/2022 05:51 AM    HGB 10 4 (L) 06/30/2022 05:51 AM    HCT 32 0 (L) 06/30/2022 05:51 AM     (H) 06/30/2022 05:51 AM     (L) 06/30/2022 05:51 AM     Temp Readings from Last 3 Encounters:   06/30/22 100 3 °F (37 9 °C)   05/19/22 98 1 °F (36 7 °C) (Temporal)   04/04/22 98 °F (36 7 °C)     Vancomycin Days of Therapy: 1    Assessment/Plan  The patient is currently on vancomycin utilizing scheduled dosing based on adjusted body weight (due to obesity)  Baseline risks associated with therapy include: pre-existing renal impairment and advanced age  The patient is currently ordered 500 mg IV q 12 hours and after clinical evaluation will be changed to 1250 mg IV q 24 hours  Pharmacy will also follow closely for s/sx of nephrotoxicity, infusion reactions, and appropriateness of therapy  BMP and CBC will be ordered per protocol  Plan for trough as patient approaches steady state, prior to the 4th  dose at approximately 1800 on 7/3/22  Due to infection severity, will target a trough of 10-15 (mild infection/cellulitis)     Pharmacy will continue to follow the patients culture results and clinical progress daily     Amber Moss, Pharmacist

## 2022-06-30 NOTE — PLAN OF CARE
Problem: PHYSICAL THERAPY ADULT  Goal: Performs mobility at highest level of function for planned discharge setting  See evaluation for individualized goals  Description: Treatment/Interventions: ADL retraining, Functional transfer training, LE strengthening/ROM, Elevations, Therapeutic exercise, Patient/family training, Endurance training, Equipment eval/education, Bed mobility, Gait training, Compensatory technique education, Spoke to nursing, Spoke to case management, OT  Equipment Recommended:  (walker-pt has)       See flowsheet documentation for full assessment, interventions and recommendations  8/11/5160 2208 by Juani Stafford PT  Note: Prognosis: Good  Problem List: Decreased strength, Decreased endurance, Impaired balance, Decreased mobility, Decreased safety awareness, Obesity, Decreased skin integrity, Pain  Assessment: Pt is a 80 y o  female seen for PT evaluation s/p admission to [address] [place] [address] on 6/29/2022 with Acute on chronic diastolic [facility]congestive) heart failure [facility] )  Order placed for PT services    Upon evaluation: Pt is presenting with impaired functional mobility due to pain, decreased strength, decreased ROM, decreased endurance, impaired balance, gait deviations, decreased safety awareness, impaired judgment, fall risk, LE edema, and impaired skin integrity requiring  minimal assistance for bed mobility, minimal to stand by assistance for transfers, and minimal to steadying assistance for ambulation with RW   Pt's clinical presentation is currently unpredictable given the functional mobility deficits above, especially weakness, decreased ROM, edema of extremities, decreased skin integrity, decreased endurance, gait deviations, pain, decreased activity tolerance, decreased functional mobility tolerance, and decreased safety awareness, coupled with fall risks as indicated by AM-PAC 6-Clicks: 6/61 as well as impaired balance, polypharmacy and decreased safety awareness and combined with medical complications of abnormal renal lab values, abnormal H&H, abnormal blood sugars, multiple readmissions, need for input for mobility technique/safety and abnormal BNP, elevated INR to 5 06 this date  Pt's PMHx and comorbidities that may affect physical performance and progress include: CHF, A fib, DM, HTN and cirrhosis, h/o endometrial CA, pulmonary HTN, thoracic aortic aneurysm without rupture, obesity, moderate aortic stenosis  Personal factors affecting pt at time of IE include: step(s) to enter environment, advanced age, inability to perform IADLs, inability to perform ADLs, inability to navigate level surfaces without external assistance and inability to ambulate household distances  Pt will benefit from continued skilled PT services to address deficits as defined above and to maximize level of functional mobility to facilitate return toward PLOF and improved QOL  From PT/mobility standpoint, recommendation at time of d/c would be Home PT, home with family support and with walker pending progress in order to reduce fall risk and maximize pt's functional independence and consistency with mobility in order to facilitate return to PLOF  Recommend trial with walker next 1-2 sessions and ther ex next 1-2 sessions  Barriers to Discharge: None  Barriers to Discharge Comments: has support form dtr prn     PT Discharge Recommendation: Home with home health rehabilitation          See flowsheet documentation for full assessment

## 2022-06-30 NOTE — ASSESSMENT & PLAN NOTE
Wt Readings from Last 3 Encounters:   06/30/22 122 kg (269 lb)   05/19/22 126 kg (277 lb 1 9 oz)   04/04/22 117 kg (257 lb 0 9 oz)   · 3/2022 Echo 70%, moderate aortic stenosis  · Pro BNP 2376  · Weight gain of at least 20 lb, dependent edema, shortness of breath- patient/daughter approx -154 lbs  · Outpatient Lasix increased to 60 mg b i d    · Cardiology evaluated Lasix drip at 10 mg an hour  · Daily standing weight, strict  I&O  · BMP tomorrow  · 40 mEq Kdur

## 2022-06-30 NOTE — PLAN OF CARE
Problem: OCCUPATIONAL THERAPY ADULT  Goal: Performs self-care activities at highest level of function for planned discharge setting  See evaluation for individualized goals  Description: Treatment Interventions: ADL retraining, Functional transfer training, UE strengthening/ROM, Endurance training, Patient/family training, Equipment evaluation/education, Neuromuscular reeducation, Compensatory technique education, Continued evaluation, Energy conservation, Activityengagement          See flowsheet documentation for full assessment, interventions and recommendations  Note: Limitation: Decreased ADL status, Decreased UE ROM, Decreased UE strength, Decreased endurance, Decreased self-care trans, Decreased high-level ADLs  Prognosis: Good  Assessment: Pt is a 80 y o  female, admitted to [address] [place] [address] 6/29/2022 d/t experiencing LE edema and SOB with exertion  Dx: acute on chronic diastolic congestive heart failure  Pt with PMHx impacting their performance during ADL tasks, including: A-Fib, CHF, cirrhosis, DM, hypertension, thoracic aortic aneurysm without rupture  Prior to admission to the hospital Pt was performing ADLs without physical assistance  IADLs without physical assistance  Functional transfers/ambulation without physical assistance  Cognitive status was PTA was Intact  OT order placed to assess Pt's ADLs, cognitive status, and performance during functional tasks in order to maximize safety and independence while making most appropriate d/c recommendations   Pt's clinical presentation is currently evolving given new onset deficits that effect Pt's occupational performance and ability to safely return to OF including decrease activity tolerance, decrease standing balance, decrease performance during ADL tasks, decrease UB MS, decrease generalized strength, decrease activity engagement and decrease performance during functional transfers combined with medical complications of hypotension, abnormal renal lab values, abnormal H&H, abnormal CBC, edema/swelling and need for input for mobility technique/safety  Personal factors affecting Pt at time of initial evaluation include: step(s) to enter environment  At this time, Pt required OT/PT co-eval d/t deficits with mobility and safety concerns  Pt will benefit from continued skilled OT services to address deficits as defined above and to maximize level independence/participation during ADLs and functional tasks to facilitate return toward PLOF and improved quality of life  From an occupational therapy standpoint, recommendation at time of d/c would be home health OT services       OT Discharge Recommendation: Home with home health rehabilitation

## 2022-06-30 NOTE — ASSESSMENT & PLAN NOTE
· Recently hospitalized with sepsis secondary to  MRSA infection right thigh  · noted malodors drainage from right thigh, erythema to right ankle and increased warmth on right lower extremity  Patient reports pain at knee unable to bend     · CT right lower extremity ordered  · Negative WBC, procal 0 57, low grade fevers  · Blood cultures in process, history of MRSA and was treated with IV vancomycin, discharged with doxy  · Start IV vanco  · Foul smelling drainage from open buttock pressure injuries-cultures ordered

## 2022-07-01 PROBLEM — N30.90 CYSTITIS: Status: ACTIVE | Noted: 2022-01-01

## 2022-07-01 PROBLEM — R79.1 SUPRATHERAPEUTIC INR: Status: ACTIVE | Noted: 2022-07-01

## 2022-07-01 LAB
ANION GAP SERPL CALCULATED.3IONS-SCNC: 10 MMOL/L (ref 4–13)
APTT PPP: 49 SECONDS (ref 23–37)
BLD SMEAR INTERP: NORMAL
BUN SERPL-MCNC: 29 MG/DL (ref 5–25)
CALCIUM SERPL-MCNC: 8.2 MG/DL (ref 8.3–10.1)
CHLORIDE SERPL-SCNC: 101 MMOL/L (ref 100–108)
CO2 SERPL-SCNC: 23 MMOL/L (ref 21–32)
CREAT SERPL-MCNC: 1.78 MG/DL (ref 0.6–1.3)
D DIMER PPP FEU-MCNC: 3.4 UG/ML FEU
ERYTHROCYTE [DISTWIDTH] IN BLOOD BY AUTOMATED COUNT: 16.9 % (ref 11.6–15.1)
FIBRINOGEN PPP-MCNC: 333 MG/DL (ref 227–495)
GFR SERPL CREATININE-BSD FRML MDRD: 26 ML/MIN/1.73SQ M
GLUCOSE SERPL-MCNC: 121 MG/DL (ref 65–140)
GLUCOSE SERPL-MCNC: 124 MG/DL (ref 65–140)
GLUCOSE SERPL-MCNC: 133 MG/DL (ref 65–140)
HCT VFR BLD AUTO: 33.6 % (ref 34.8–46.1)
HGB BLD-MCNC: 11.1 G/DL (ref 11.5–15.4)
INR PPP: 7.48 (ref 0.84–1.19)
LDH SERPL-CCNC: 240 U/L (ref 81–234)
MAGNESIUM SERPL-MCNC: 2.1 MG/DL (ref 1.6–2.6)
MCH RBC QN AUTO: 32.7 PG (ref 26.8–34.3)
MCHC RBC AUTO-ENTMCNC: 33 G/DL (ref 31.4–37.4)
MCV RBC AUTO: 99 FL (ref 82–98)
MRSA NOSE QL CULT: ABNORMAL
MRSA NOSE QL CULT: ABNORMAL
PLATELET # BLD AUTO: 193 THOUSANDS/UL (ref 149–390)
PMV BLD AUTO: 10.4 FL (ref 8.9–12.7)
POTASSIUM SERPL-SCNC: 3.9 MMOL/L (ref 3.5–5.3)
PROTHROMBIN TIME: 60.8 SECONDS (ref 11.6–14.5)
RBC # BLD AUTO: 3.39 MILLION/UL (ref 3.81–5.12)
SODIUM SERPL-SCNC: 134 MMOL/L (ref 136–145)
WBC # BLD AUTO: 28.77 THOUSAND/UL (ref 4.31–10.16)

## 2022-07-01 PROCEDURE — 83615 LACTATE (LD) (LDH) ENZYME: CPT

## 2022-07-01 PROCEDURE — 85610 PROTHROMBIN TIME: CPT

## 2022-07-01 PROCEDURE — 84597 ASSAY OF VITAMIN K: CPT

## 2022-07-01 PROCEDURE — 85379 FIBRIN DEGRADATION QUANT: CPT

## 2022-07-01 PROCEDURE — 85384 FIBRINOGEN ACTIVITY: CPT

## 2022-07-01 PROCEDURE — 83735 ASSAY OF MAGNESIUM: CPT

## 2022-07-01 PROCEDURE — 80048 BASIC METABOLIC PNL TOTAL CA: CPT

## 2022-07-01 PROCEDURE — 85027 COMPLETE CBC AUTOMATED: CPT

## 2022-07-01 PROCEDURE — 85730 THROMBOPLASTIN TIME PARTIAL: CPT

## 2022-07-01 PROCEDURE — 82948 REAGENT STRIP/BLOOD GLUCOSE: CPT

## 2022-07-01 PROCEDURE — 99232 SBSQ HOSP IP/OBS MODERATE 35: CPT

## 2022-07-01 RX ORDER — PHYTONADIONE 5 MG/1
5 TABLET ORAL ONCE
Status: COMPLETED | OUTPATIENT
Start: 2022-07-01 | End: 2022-07-01

## 2022-07-01 RX ORDER — FUROSEMIDE 10 MG/ML
80 INJECTION INTRAMUSCULAR; INTRAVENOUS ONCE
Status: COMPLETED | OUTPATIENT
Start: 2022-07-01 | End: 2022-07-01

## 2022-07-01 RX ORDER — METOPROLOL SUCCINATE 100 MG/1
100 TABLET, EXTENDED RELEASE ORAL DAILY
Status: DISCONTINUED | OUTPATIENT
Start: 2022-07-02 | End: 2022-07-07

## 2022-07-01 RX ORDER — CEFEPIME HYDROCHLORIDE 1 G/50ML
1000 INJECTION, SOLUTION INTRAVENOUS EVERY 12 HOURS
Status: DISCONTINUED | OUTPATIENT
Start: 2022-07-01 | End: 2022-07-04

## 2022-07-01 RX ORDER — VANCOMYCIN HYDROCHLORIDE 1 G/200ML
12.5 INJECTION, SOLUTION INTRAVENOUS EVERY 24 HOURS
Status: DISCONTINUED | OUTPATIENT
Start: 2022-07-01 | End: 2022-07-04

## 2022-07-01 RX ORDER — INSULIN LISPRO 100 [IU]/ML
1-6 INJECTION, SOLUTION INTRAVENOUS; SUBCUTANEOUS
Status: DISCONTINUED | OUTPATIENT
Start: 2022-07-01 | End: 2022-07-07

## 2022-07-01 RX ADMIN — CEFEPIME HYDROCHLORIDE 1000 MG: 1 INJECTION, SOLUTION INTRAVENOUS at 10:46

## 2022-07-01 RX ADMIN — POTASSIUM CHLORIDE 40 MEQ: 1500 TABLET, EXTENDED RELEASE ORAL at 08:38

## 2022-07-01 RX ADMIN — CEFEPIME HYDROCHLORIDE 1000 MG: 1 INJECTION, SOLUTION INTRAVENOUS at 22:37

## 2022-07-01 RX ADMIN — Medication 15 MG/HR: at 09:48

## 2022-07-01 RX ADMIN — FUROSEMIDE 80 MG: 10 INJECTION, SOLUTION INTRAMUSCULAR; INTRAVENOUS at 09:58

## 2022-07-01 RX ADMIN — LEVOTHYROXINE SODIUM 150 MCG: 150 TABLET ORAL at 06:03

## 2022-07-01 RX ADMIN — POTASSIUM CHLORIDE 40 MEQ: 1500 TABLET, EXTENDED RELEASE ORAL at 16:53

## 2022-07-01 RX ADMIN — VANCOMYCIN HYDROCHLORIDE 1000 MG: 1 INJECTION, SOLUTION INTRAVENOUS at 20:16

## 2022-07-01 RX ADMIN — PHYTONADIONE 5 MG: 5 TABLET ORAL at 17:27

## 2022-07-01 NOTE — ASSESSMENT & PLAN NOTE
· Recently hospitalized with sepsis secondary to  MRSA infection right thigh  · noted malodors drainage from right thigh, erythema to right ankle and increased warmth on right lower extremity  Patient reports pain at knee unable to bend  · CT right lower extremity ordered  · Negative WBC, procal 0 57, low grade fevers  · Blood cultures in process  · MRSA + screening- Start IV vanco 6/30   · CT lower extremity right-   · Extensive soft tissue and subcutaneous edema with areas of fluid in the right extremity extending from the hip area to the ankle may be due to cellulitis or due to edema related to cirrhosis/heart failure    · Buttock wound cultures ordered- gram-positive cocci, Gram-negative rods  · Discussed with ID:  Continue vancomycin, add cefepime 1 g Q12  7/1

## 2022-07-01 NOTE — ASSESSMENT & PLAN NOTE
· Bilateral buttock open nares reported by daughter, this sanguinous drainage on sheet when patient moved into bed  · Wound care-appreciate recommendations  · Pressure offloading, Q 2 turns  · Also is healing MRSA infection right thigh was following outpatient with Wound Care but has completed

## 2022-07-01 NOTE — PLAN OF CARE
Problem: Nutrition/Hydration-ADULT  Goal: Nutrient/Hydration intake appropriate for improving, restoring or maintaining nutritional needs  Description: Monitor and assess patient's nutrition/hydration status for malnutrition  Collaborate with interdisciplinary team and initiate plan and interventions as ordered  Monitor patient's weight and dietary intake as ordered or per policy  Utilize nutrition screening tool and intervene as necessary  Determine patient's food preferences and provide high-protein, high-caloric foods as appropriate  INTERVENTIONS:  - Monitor oral intake, urinary output, labs, and treatment plans  - Assess nutrition and hydration status and recommend course of action  - Evaluate amount of meals eaten  - Assist patient with eating if necessary   - Allow adequate time for meals  - Recommend/ encourage appropriate diets, oral nutritional supplements, and vitamin/mineral supplements  - Order, calculate, and assess calorie counts as needed  - Recommend, monitor, and adjust tube feedings and TPN/PPN based on assessed needs  - Assess need for intravenous fluids  - Provide specific nutrition/hydration education as appropriate  - Include patient/family/caregiver in decisions related to nutrition  Outcome: Progressing     Problem: Nutrition/Hydration-ADULT  Goal: Nutrient/Hydration intake appropriate for improving, restoring or maintaining nutritional needs  Description: Monitor and assess patient's nutrition/hydration status for malnutrition  Collaborate with interdisciplinary team and initiate plan and interventions as ordered  Monitor patient's weight and dietary intake as ordered or per policy  Utilize nutrition screening tool and intervene as necessary  Determine patient's food preferences and provide high-protein, high-caloric foods as appropriate       INTERVENTIONS:  - Monitor oral intake, urinary output, labs, and treatment plans  - Assess nutrition and hydration status and recommend course of action  - Evaluate amount of meals eaten  - Assist patient with eating if necessary   - Allow adequate time for meals  - Recommend/ encourage appropriate diets, oral nutritional supplements, and vitamin/mineral supplements  - Order, calculate, and assess calorie counts as needed  - Recommend, monitor, and adjust tube feedings and TPN/PPN based on assessed needs  - Assess need for intravenous fluids  - Provide specific nutrition/hydration education as appropriate  - Include patient/family/caregiver in decisions related to nutrition  7/1/2022 1105 by Cynthia Magdaleno RN  Outcome: Progressing  7/1/2022 1105 by Cynthia Magdaleno RN  Outcome: Progressing     Problem: CARDIOVASCULAR - ADULT  Goal: Maintains optimal cardiac output and hemodynamic stability  Description: INTERVENTIONS:  - Monitor I/O, vital signs and rhythm  - Monitor for S/S and trends of decreased cardiac output  - Administer and titrate ordered vasoactive medications to optimize hemodynamic stability  - Assess quality of pulses, skin color and temperature  - Assess for signs of decreased coronary artery perfusion  - Instruct patient to report change in severity of symptoms  Outcome: Progressing  Goal: Absence of cardiac dysrhythmias or at baseline rhythm  Description: INTERVENTIONS:  - Continuous cardiac monitoring, vital signs, obtain 12 lead EKG if ordered  - Administer antiarrhythmic and heart rate control medications as ordered  - Monitor electrolytes and administer replacement therapy as ordered  Outcome: Progressing     Problem: GENITOURINARY - ADULT  Goal: Maintains or returns to baseline urinary function  Description: INTERVENTIONS:  - Assess urinary function  - Encourage oral fluids to ensure adequate hydration if ordered  - Administer IV fluids as ordered to ensure adequate hydration  - Administer ordered medications as needed  - Offer frequent toileting  - Follow urinary retention protocol if ordered  Outcome: Progressing  Goal: Absence of urinary retention  Description: INTERVENTIONS:  - Assess patients ability to void and empty bladder  - Monitor I/O  - Bladder scan as needed  - Discuss with physician/AP medications to alleviate retention as needed  - Discuss catheterization for long term situations as appropriate  Outcome: Progressing  Goal: Urinary catheter remains patent  Description: INTERVENTIONS:  - Assess patency of urinary catheter  - If patient has a chronic gutierrez, consider changing catheter if non-functioning  - Follow guidelines for intermittent irrigation of non-functioning urinary catheter  Outcome: Progressing

## 2022-07-01 NOTE — ASSESSMENT & PLAN NOTE
· On Coumadin- INR 4 99 , 5 06  · hold Coumadin, repeat daily INRs  · ECG normal sinus rhythm with PACs, PVCs  ·

## 2022-07-01 NOTE — PROGRESS NOTES
Vancomycin Assessment    Ruby Porras is a 80 y o  female who is currently receiving vancomycin 1250 mg IV q 24 hrs for skin-soft tissue infection  Relevant clinical data and objective history reviewed:  Creatinine   Date Value Ref Range Status   07/01/2022 1 78 (H) 0 60 - 1 30 mg/dL Final     Comment:     Specimen Icteric; Results May be Affected   06/30/2022 1 38 (H) 0 60 - 1 30 mg/dL Final     Comment:     Standardized to IDMS reference method   06/29/2022 1 67 (H) 0 60 - 1 30 mg/dL Final     Comment:     Standardized to IDMS reference method     /55   Pulse 66   Temp 97 6 °F (36 4 °C)   Resp 18   Ht 5' (1 524 m)   Wt 122 kg (269 lb 12 8 oz)   SpO2 95%   BMI 52 69 kg/m²   I/O last 3 completed shifts: In: 1090 [P O :840; IV Piggyback:250]  Out: 150 [Urine:150]  Lab Results   Component Value Date/Time    BUN 29 (H) 07/01/2022 09:54 AM    WBC 28 77 (H) 07/01/2022 09:47 AM    HGB 11 1 (L) 07/01/2022 09:47 AM    HCT 33 6 (L) 07/01/2022 09:47 AM    MCV 99 (H) 07/01/2022 09:47 AM     07/01/2022 09:47 AM     Temp Readings from Last 3 Encounters:   07/01/22 97 6 °F (36 4 °C)   05/19/22 98 1 °F (36 7 °C) (Temporal)   04/04/22 98 °F (36 7 °C)     Vancomycin Days of Therapy: 2    Assessment/Plan  The patient is currently on vancomycin utilizing scheduled dosing  Baseline risks associated with therapy include: pre-existing renal impairment, concomitant nephrotoxic medications, and advanced age  The patient is receiving 1250 mg IV q 24 hrs  based on a goal of 10-15 (mild infection/cellulitis) ; however, after clinical evaluation will be changed to 1000 mg IV q 24 hrs   Pharmacy will continue to follow closely for s/sx of nephrotoxicity, infusion reactions, and appropriateness of therapy  BMP and CBC will be ordered per protocol  Plan for trough as patient approaches steady state, prior to the 4th  dose at approximately 2000 on 7/3/22   Pharmacy will continue to follow the patients culture results and clinical progress daily      Aviva Mims, Pharmacist

## 2022-07-01 NOTE — ASSESSMENT & PLAN NOTE
· Patient having intermittent low-grade times, UA positive for cystitis  · pending urine cultures  · Initiate cefepime 1 g q 12

## 2022-07-01 NOTE — PROGRESS NOTES
Progress Note - Cardiology   Lowell Crimes 80 y o  female MRN: [id number]  Unit/Bed#: -01 Encounter: [id number]    Assessment:  Acute on chronic HFpEF- edema, sob, weight gain of at least 20 lbs,on lasix drip, I and O not accurate   LEONARDO on CKD- baseline Cr likely around 1 1-1 2,improving with diuresis   Moderate AS- re ready by Dr Emelia Petersen, will need monitoring annually   PAF- on coumadin, no afib by most recent zio   - did have episode of afib with rvr in the setting of septic shock recently in march 2022  Hx endometrial CA sp hysterectomy  Normal LVEF  HTN  Obesity    Plan:  Continue lasix drip at present dose  No changes from cardiac perspective  Monitor I and O, daily standing weights, renal function and electrolytes   No need for repeat echo    Subjective/Objective      Subjective: pt reports breathing is improved  No palpitations and chest pain  Objective: weight unchanged  I and O inaccurate  Renal function improved with diuresis  Vitals: BP 99/52   Pulse 60   Temp 97 6 °F (36 4 °C)   Resp 18   Ht 5' (1 524 m)   Wt 122 kg (269 lb 12 8 oz)   SpO2 96%   BMI 52 69 kg/m²   Vitals:    06/30/22 0555 07/01/22 0600   Weight: 122 kg (269 lb) 122 kg (269 lb 12 8 oz)     Orthostatic Blood Pressures    Flowsheet Row Most Recent Value   Blood Pressure 99/52 filed at 07/01/2022 8894   Patient Position - Orthostatic VS Lying filed at 06/29/2022 1700            Intake/Output Summary (Last 24 hours) at 7/1/2022 1100  Last data filed at 7/1/2022 1000  Gross per 24 hour   Intake 1090 ml   Output 1050 ml   Net 40 ml       Invasive Devices  Report    Peripheral Intravenous Line  Duration           Peripheral IV 06/30/22 Distal;Left;Ventral (anterior) Forearm <1 day    Peripheral IV 06/30/22 Right;Ventral (anterior) Wrist <1 day          Drain  Duration           Urethral Catheter Latex 16 Fr  1 day                     Physical Exam  Vitals and nursing note reviewed     Constitutional:       Appearance: Normal appearance  HENT:      Head: Normocephalic and atraumatic  Cardiovascular:      Rate and Rhythm: Normal rate  Heart sounds: Murmur heard  Pulmonary:      Comments: Decreased in bases  Abdominal:      Palpations: Abdomen is soft  Musculoskeletal:         General: Swelling present but improved     Cervical back: Neck supple  Skin:     General: Skin is warm and dry  Capillary Refill: Capillary refill takes less than 2 seconds  Neurological:      General: No focal deficit present  Mental Status: She is alert and oriented to person, place, and time  Psychiatric:         Mood and Affect: Mood normal          Thought Content: Thought content normal      Lab Results:   I have personally reviewed pertinent lab results  CBC with diff:   Results from last 7 days   Lab Units 07/01/22  0947   WBC Thousand/uL 28 77*   RBC Million/uL 3 39*   HEMOGLOBIN g/dL 11 1*   HEMATOCRIT % 33 6*   MCV fL 99*   MCH pg 32 7   MCHC g/dL 33 0   RDW % 16 9*   MPV fL 10 4   PLATELETS Thousands/uL 193     CMP:   Results from last 7 days   Lab Units 07/01/22  0954 06/30/22  0551 06/29/22  1534   SODIUM mmol/L 134*   < > 135*   CHLORIDE mmol/L 101   < > 100   CO2 mmol/L 23   < > 26   BUN mg/dL 29*   < > 26*   CREATININE mg/dL 1 78*   < > 1 67*   CALCIUM mg/dL 8 2*   < > 8 0*   AST U/L  --   --  48*   ALT U/L  --   --  27   ALK PHOS U/L  --   --  193*   EGFR ml/min/1 73sq m 26   < > 28    < > = values in this interval not displayed       HS Troponin:   0   Lab Value Date/Time    HSTNI 112 (H) 03/26/2022 0503    HSTNI0 15 06/29/2022 1534    HSTNI2 17 06/29/2022 1857    HSTNI4 16 06/29/2022 2148    HSTNI4 110 (H) 03/25/2022 2317     BNP:   Results from last 7 days   Lab Units 07/01/22  0954   POTASSIUM mmol/L 3 9   CHLORIDE mmol/L 101   CO2 mmol/L 23   BUN mg/dL 29*   CREATININE mg/dL 1 78*   CALCIUM mg/dL 8 2*   EGFR ml/min/1 73sq m 26     Coags:   Results from last 7 days   Lab Units 06/30/22  0637   INR  5 06*     TSH: Magnesium:   Results from last 7 days   Lab Units 07/01/22  0954   MAGNESIUM mg/dL 2 1     Lipid Profile:     Imaging: I have personally reviewed pertinent reports          echo 3/28/22:    Left Ventricle: Left ventricular cavity size is normal  Wall thickness   is normal  The left ventricular ejection fraction is 70%  Systolic   function is vigorous  Wall motion is normal      Left Atrium: The atrium is moderately dilated    Right Atrium: The atrium is mildly dilated    Aortic Valve: The aortic valve cannot be ruled out as bicuspid  The   leaflets are moderately thickened  The leaflets are moderately calcified  There is moderately reduced mobility  Findings are overall consistent with   severe aortic stenosis    Mitral Valve: There is moderate annular calcification  There is mild   regurgitation    Tricuspid Valve: There is mild regurgitation    Prior TTE study available for comparison  Prior study date: 12/17/2019  Changes noted when compared to prior study  Changes include: Mild to   moderate aortic valve calcification and reduced mobility were noted on the   prior study  The aortic valve is better visualized on the current study   and gradients obtained on the current study demonstrate that there is   severe aortic stenosis   The prior study demonstrated mild aortic   regurgitation, which is not visualized on the current study   REREAD BY DR Balbina Ballesteros, MODERATE AS

## 2022-07-01 NOTE — PLAN OF CARE
Problem: Potential for Falls  Goal: Patient will remain free of falls  Description: INTERVENTIONS:  - Educate patient/family on patient safety including physical limitations  - Instruct patient to call for assistance with activity   - Consult OT/PT to assist with strengthening/mobility   - Keep Call bell within reach  - Keep bed low and locked with side rails adjusted as appropriate  - Keep care items and personal belongings within reach  - Initiate and maintain comfort rounds  - Make Fall Risk Sign visible to staff    - Apply yellow socks and bracelet for high fall risk patients  - Consider moving patient to room near nurses station  Outcome: Progressing     Problem: Nutrition/Hydration-ADULT  Goal: Nutrient/Hydration intake appropriate for improving, restoring or maintaining nutritional needs  Description: Monitor and assess patient's nutrition/hydration status for malnutrition  Collaborate with interdisciplinary team and initiate plan and interventions as ordered  Monitor patient's weight and dietary intake as ordered or per policy  Utilize nutrition screening tool and intervene as necessary  Determine patient's food preferences and provide high-protein, high-caloric foods as appropriate       INTERVENTIONS:  - Monitor oral intake, urinary output, labs, and treatment plans  - Assess nutrition and hydration status and recommend course of action  - Evaluate amount of meals eaten  - Assist patient with eating if necessary   - Allow adequate time for meals  - Recommend/ encourage appropriate diets, oral nutritional supplements, and vitamin/mineral supplements  - Order, calculate, and assess calorie counts as needed  - Recommend, monitor, and adjust tube feedings and TPN/PPN based on assessed needs  - Assess need for intravenous fluids  - Provide specific nutrition/hydration education as appropriate  - Include patient/family/caregiver in decisions related to nutrition  Outcome: Progressing     Problem: MOBILITY - ADULT  Goal: Maintain or return to baseline ADL function  Description: INTERVENTIONS:  -  Assess patient's ability to carry out ADLs; assess patient's baseline for ADL function and identify physical deficits which impact ability to perform ADLs (bathing, care of mouth/teeth, toileting, grooming, dressing, etc )  - Assess/evaluate cause of self-care deficits   - Assess range of motion  - Assess patient's mobility; develop plan if impaired  - Assess patient's need for assistive devices and provide as appropriate  - Encourage maximum independence but intervene and supervise when necessary  - Involve family in performance of ADLs  - Assess for home care needs following discharge   - Consider OT consult to assist with ADL evaluation and planning for discharge  - Provide patient education as appropriate  Outcome: Progressing  Goal: Maintains/Returns to pre admission functional level  Description: INTERVENTIONS:  - Perform BMAT or MOVE assessment daily    - Set and communicate daily mobility goal to care team and patient/family/caregiver     - Collaborate with rehabilitation services on mobility goals if consulted    - Out of bed for toileting  - Record patient progress and toleration of activity level   Outcome: Progressing     Problem: Prexisting or High Potential for Compromised Skin Integrity  Goal: Skin integrity is maintained or improved  Description: INTERVENTIONS:  - Identify patients at risk for skin breakdown  - Assess and monitor skin integrity  - Assess and monitor nutrition and hydration status  - Monitor labs   - Assess for incontinence   - Turn and reposition patient  - Assist with mobility/ambulation  - Relieve pressure over bony prominences  - Avoid friction and shearing  - Provide appropriate hygiene as needed including keeping skin clean and dry  - Evaluate need for skin moisturizer/barrier cream  - Collaborate with interdisciplinary team   - Patient/family teaching  - Consider wound care consult Outcome: Progressing

## 2022-07-01 NOTE — ASSESSMENT & PLAN NOTE
Wt Readings from Last 3 Encounters:   07/01/22 122 kg (269 lb 12 8 oz)   05/19/22 126 kg (277 lb 1 9 oz)   04/04/22 117 kg (257 lb 0 9 oz)   · 3/2022 Echo 70%, moderate aortic stenosis  · Pro BNP 2376  · Weight gain of at least 20 lb, dependent edema, shortness of breath- patient/daughter approx -154 lbs  · Outpatient Lasix increased to 60 mg b i d   · Continue Lasix drip @ 15 mg an hour  · Daily standing weight, strict  I&O  · BMP daily  · 40 mEq Kdur

## 2022-07-01 NOTE — ASSESSMENT & PLAN NOTE
Lab Results   Component Value Date    HGBA1C 5 5 01/31/2018       No results for input(s): POCGLU in the last 72 hours      Blood Sugar Average: Last 72 hrs:  · repeat hemoglobin A1c last 2018 5 5  · Not on outpatient regimen  · Monitor off insulin  · Will add sliding scale Humalog coverage a c  HS

## 2022-07-01 NOTE — PROGRESS NOTES
[address]  Progress Note - Laurie Wray 1940, 80 y o  female MRN: [id number]  Unit/Bed#: -[facility] Encounter: [id number]  Primary Care Provider: Pearl Mantilla MD   Date and time admitted to hospital: 6/29/2022  3:01 PM    * Acute on chronic diastolic (congestive) heart failure (HCC)  Assessment & Plan  Wt Readings from Last 3 Encounters:   07/01/22 122 kg (269 lb 12 8 oz)   05/19/22 126 kg (277 lb 1 9 oz)   04/04/22 117 kg (257 lb 0 9 oz)   · 3/2022 Echo 70%, moderate aortic stenosis  · Pro BNP 2376  · Weight gain of at least 20 lb, dependent edema, shortness of breath- patient/daughter approx -154 lbs  · Outpatient Lasix increased to 60 mg b i d   · Continue Lasix drip @ 15 mg an hour  · Daily standing weight, strict  I&O  · BMP daily  · 40 mEq Kdur          Cellulitis of right lower extremity  Assessment & Plan  · Recently hospitalized with sepsis secondary to  MRSA infection right thigh  · noted malodors drainage from right thigh, erythema to right ankle and increased warmth on right lower extremity  Patient reports pain at knee unable to bend  · CT right lower extremity ordered  · Negative WBC, procal 0 57, low grade fevers  · Blood cultures in process  · MRSA + screening- Start IV vanco 6/30   · CT lower extremity right-   · Extensive soft tissue and subcutaneous edema with areas of fluid in the right extremity extending from the hip area to the ankle may be due to cellulitis or due to edema related to cirrhosis/heart failure    · Buttock wound cultures ordered- gram-positive cocci, Gram-negative rods  · Discussed with ID:  Continue vancomycin, add cefepime 1 g Q12  7/1    Paroxysmal A-fib (HCC)  Assessment & Plan  · On Coumadin- INR 4 99 , 5 06  · hold Coumadin, repeat daily INRs  · ECG normal sinus rhythm with PACs, PVCs  ·     LEONARDO (acute kidney injury) ([facility] [address] )  Assessment & Plan  · Creatinine 1 67 on admission pre renal secondary to volume overload 20 lb weight gain  · Baseline 1 1 to 1 2  · Initiated on Lasix infusion by Cardiology  · Lasix drip was found around 4:00 a m  For an unknown amount of time with no I&Os documented, patient arrived on unit from ED with administering at the end of day shift 730pm   Resumed this morning  · Avoid nephrotoxins, daily standing weight, I&O  · Cr 1 78 continue to monitor with aggressive IV diuresis    Supratherapeutic INR  Assessment & Plan  · Coumadin held on admission with elevated INR 4 99, 5 06, 7 48  · 5 mg vitamin K given this morning INR 7 48  · Concern for DIC-  · Fibrinogen, hemolysis smear-pending  · Elevated D-dimer, , APTT 49        Aortic stenosis, moderate  Assessment & Plan  · Noted on echo in March  · Avoid hypotension  · Aggressive diuresis, hypervolemic with 20 lb weight gain    Diabetes mellitus ([facility] [address] )  Assessment & Plan  Lab Results   Component Value Date    HGBA1C 5 5 01/31/2018       No results for input(s): POCGLU in the last 72 hours  Blood Sugar Average: Last 72 hrs:  · repeat hemoglobin A1c last 2018 5 5  · Not on outpatient regimen  · Monitor off insulin  · Will add sliding scale Humalog coverage a c  HS    Hypothyroidism (acquired)  Assessment & Plan  · Continue home 150 mcg Synthroid  ·     Impaired skin integrity  Assessment & Plan  · Bilateral buttock open nares reported by daughter, this sanguinous drainage on sheet when patient moved into bed  · Wound care-appreciate recommendations  · Pressure offloading, Q 2 turns  · Also is healing MRSA infection right thigh was following outpatient with Wound Care but has completed      VTE Pharmacologic Prophylaxis: VTE Score: 9 High Risk (Score >/= 5) - Pharmacological DVT Prophylaxis Contraindicated  Sequential Compression Devices Ordered  Patient Centered Rounds: I performed bedside rounds with nursing staff today    Discussions with Specialists or Other Care Team Provider: ID,     Education and Discussions with Family / Patient: Updated  (daughter) at bedside  Time Spent for Care: 45 minutes  More than 50% of total time spent on counseling and coordination of care as described above  Current Length of Stay: 2 day(s)  Current Patient Status: Inpatient   Certification Statement: The patient will continue to require additional inpatient hospital stay due to IV diuresis with Lasix infusion, IV antibiotics cellulitis leukocytosis  Discharge Plan: Anticipate discharge in >72 hrs to home with home services  Code Status: Level 3 - DNAR and DNI    Subjective:   Patient sitting in chair this morning appears well, reports feeling good today  Remains on room air afebrile this morning  No other complaints at this time    Objective:     Vitals:   Temp (24hrs), Av °F (36 7 °C), Min:97 3 °F (36 3 °C), Max:99 °F (37 2 °C)    Temp:  [97 3 °F (36 3 °C)-99 °F (37 2 °C)] 97 3 °F (36 3 °C)  HR:  [60-72] 68  Resp:  [16-18] 18  BP: ()/(43-59) 116/43  SpO2:  [82 %-96 %] 82 %  Body mass index is 52 69 kg/m²  Input and Output Summary (last 24 hours): Intake/Output Summary (Last 24 hours) at 2022 1544  Last data filed at 2022 1248  Gross per 24 hour   Intake 1090 ml   Output 1050 ml   Net 40 ml       Physical Exam:   Physical Exam  Vitals and nursing note reviewed  Constitutional:       General: She is not in acute distress  Appearance: She is well-developed  HENT:      Head: Normocephalic and atraumatic  Mouth/Throat:      Mouth: Mucous membranes are moist    Eyes:      Extraocular Movements: Extraocular movements intact  Conjunctiva/sclera: Conjunctivae normal       Pupils: Pupils are equal, round, and reactive to light  Cardiovascular:      Rate and Rhythm: Normal rate and regular rhythm  Pulses: Normal pulses  Heart sounds: Murmur heard  Pulmonary:      Effort: Pulmonary effort is normal  No respiratory distress  Breath sounds: Normal breath sounds  No wheezing or rales     Abdominal: General: Bowel sounds are normal  There is no distension  Palpations: Abdomen is soft  Tenderness: There is no abdominal tenderness  Musculoskeletal:      Cervical back: Neck supple  Right lower le+ Edema present  Left lower le+ Edema present  Skin:     General: Skin is warm and dry  Findings: Wound present  Comments: Right thigh wound dressing CDI RLE erythema slightly improved compared to yesterday  Unable-to visualize buttock wound see wound care notes   Neurological:      Mental Status: She is alert  Additional Data:     Labs:  Results from last 7 days   Lab Units 22  0947 22  0551 22  1534   WBC Thousand/uL 28 77*   < > 8 92   HEMOGLOBIN g/dL 11 1*   < > 10 4*   HEMATOCRIT % 33 6*   < > 31 6*   PLATELETS Thousands/uL 193   < > 141*   LYMPHO PCT %  --   --  12*   MONO PCT %  --   --  12   EOS PCT %  --   --  0    < > = values in this interval not displayed  Results from last 7 days   Lab Units 22  0954 22  0551 22  1534   SODIUM mmol/L 134*   < > 135*   POTASSIUM mmol/L 3 9   < > 3 5   CHLORIDE mmol/L 101   < > 100   CO2 mmol/L 23   < > 26   BUN mg/dL 29*   < > 26*   CREATININE mg/dL 1 78*   < > 1 67*   ANION GAP mmol/L 10   < > 9   CALCIUM mg/dL 8 2*   < > 8 0*   ALBUMIN g/dL  --   --  2 0*   TOTAL BILIRUBIN mg/dL  --   --  2 89*   ALK PHOS U/L  --   --  193*   ALT U/L  --   --  27   AST U/L  --   --  48*   GLUCOSE RANDOM mg/dL 133   < > 136    < > = values in this interval not displayed       Results from last 7 days   Lab Units 22  0947   INR  7 48*             Results from last 7 days   Lab Units 22  1534   PROCALCITONIN ng/ml 0 57*       Lines/Drains:  Invasive Devices  Report    Peripheral Intravenous Line  Duration           Peripheral IV 22 Right;Ventral (anterior) Wrist 1 day    Peripheral IV 22 Distal;Left;Ventral (anterior) Forearm <1 day          Drain  Duration           Urethral Catheter Latex 16 Fr  1 day              Urinary Catheter:  Goal for removal: Continue with aggressive IV fluid diuresis and I&O monitoring           Telemetry:  Telemetry Orders (From admission, onward)             48 Hour Telemetry Monitoring  Continuous x 48 hours        References:    Telemetry Guidelines   Question:  Reason for 48 Hour Telemetry  Answer:  Acute Decompensated CHF (continuous diuretic infusion or total diuretic dose > 200 mg daily, associated electrolyte derangement, ionotropic drip, history of ventricular arrhythmia, or new EF <35%)                 Telemetry Reviewed: Normal Sinus Rhythm  Indication for Continued Telemetry Use: Acute CHF on >200 mg lasix/day or equivalent dose or with new reduced EF  Imaging: Reviewed radiology reports from this admission including: chest xray and ct RLE    Recent Cultures (last 7 days):   Results from last 7 days   Lab Units 06/30/22  1851 06/30/22  1020 06/30/22  1019   BLOOD CULTURE   --  Received in Microbiology Lab  Culture in Progress  Received in Microbiology Lab  Culture in Progress     GRAM STAIN RESULT  2+ Gram positive cocci in pairs*  1+ Gram negative rods*  No polys seen*  --   --    WOUND CULTURE  Culture too young- will reincubate  --   --        Last 24 Hours Medication List:   Current Facility-Administered Medications   Medication Dose Route Frequency Provider Last Rate    acetaminophen  650 mg Oral Q6H PRN LEXIE Chávez      cefepime  1,000 mg Intravenous Q12H LEXIE Chávez 1,000 mg (07/01/22 1046)    furosemide  15 mg/hr Intravenous Continuous [place] LILIAN Purvis 15 mg/hr (07/01/22 0948)    insulin lispro  1-6 Units Subcutaneous TID AC LEXIE Chávez      insulin lispro  1-6 Units Subcutaneous HS LEXIE Chávez      levothyroxine  150 mcg Oral Early Morning LEXIE Chávez      [START ON 7/2/2022] metoprolol succinate  100 mg Oral Daily [place], [place]      polyethylene glycol  17 g Oral Daily PRN LEXIE Bains      potassium chloride  40 mEq Oral BID LEXIE Bains      senna-docusate sodium  1 tablet Oral BID LEXIE Bains      vancomycin  12 5 mg/kg (Adjusted) Intravenous Q24H LEXIE Bains          Today, Patient Was Seen By: LEXIE Bains    **Please Note: This note may have been constructed using a voice recognition system  **

## 2022-07-01 NOTE — ASSESSMENT & PLAN NOTE
· Creatinine 1 67 on admission pre renal secondary to volume overload 20 lb weight gain  · Baseline 1 1 to 1 2  · Initiated on Lasix infusion by Cardiology  · Lasix drip was found around 4:00 a m   For an unknown amount of time with no I&Os documented, patient arrived on unit from ED with administering at the end of day shift 730pm   Resumed this morning  · Avoid nephrotoxins, daily standing weight, I&O  · Cr 1 78 continue to monitor with aggressive IV diuresis

## 2022-07-01 NOTE — ASSESSMENT & PLAN NOTE
· Coumadin held on admission with elevated INR 4 99, 5 06, 7 48  · 5 mg vitamin K given this morning INR 7 48  · Concern for DIC-  · Fibrinogen, hemolysis smear-pending  · Elevated D-dimer, , APTT 49

## 2022-07-01 NOTE — CONSULTS
Consultation - Infectious Disease   Eduin Jack 80 y o  female MRN: [id number]  Unit/Bed#: -01 Encounter: [id number]      Inpatient consult to Infectious Diseases  Consult performed by: Juana Interiano MD  Consult ordered by: LEXIE Valdez            REQUIRED DOCUMENTATION:     1  This service was provided via Telemedicine  2  Provider located at [facility]  3  TeleMed provider: Juana Interiano MD   4  Identify all parties in room with patient during tele consult:RN  5  After connecting through [employer], patient was identified by name and date of birth and assistant checked wristband  Patient was then informed that this was a Telemedicine visit and that the exam was being conducted confidentially over secure lines  My office door was closed  No one else was in the room  Patient acknowledged consent and understanding of privacy and security of the Telemedicine visit, and gave us permission to have the assistant stay in the room in order to assist with the history and to conduct the exam   I informed the patient that I have reviewed their record in Epic and presented the opportunity for them to ask any questions regarding the visit today  The patient agreed to participate  Assessment/Recommendations     1   Recurrent right thigh and leg cellulitis  - Hx infected R thigh hematoma s/p I and D on 3/31, cx with MRSA  - Recurrent infection in the setting of open thigh wound, worsening venous edema, morbid obesity, diabetes  - Has underlying R total hip and knee arthroplasty but CT without abscess/ deep infection  - Wound cx with GPC in pairs and gram negative rods  - Clinically improving, afebrile but with worsening leukocytosis , latter may be reactive     · Continue vanc, dosing per pharmacy and add cefepime 1 q12h  · FU blood and wound cx  · Recommend wound care consult  · Continue aggressive diuresis for management of CHF  · Serial extremity exams  · If patient has clinical improvement can likely transition to po abx, to be guided by cx, to complete 7-10 days of therapy  · Discussed lower extremity skin care, compression stockings, limb elevation  · Recommend weight loss and strict glycemic control  · Discussed natural history of cellulitis and discussed with patient that she is at risk for recurrent cellulitis/bacteremia if underlying risk factors cannot be modified    2  Superficial sacral wounds  - Noted to have foul smelling drainage, erythema, no evidence of abscess clinically or on imaging    · Antibiotics as above  · Recommend wound care evaluation  · Offloading, barrier cream to minimize moisture associated damage, management of edema    3  Acute on chronic diastolic CHF  - Risk factor for worsening venous edema/recurrent cellulitis    Management per primary team    4  Acute kidney injury on CKD  - creatinine improving    · Continue renal dosing of abx    5  Type 2 diabetes  - Risk factor for infection, poor wound healing    Management per primary team, repeat A1C    Thank you for involving me in the care of your patient  Please call with questions, change in clinical status or if tests recommended above are abnormal      Discussed with the primary service  History     Reason for Consult: Cellulitis  HPI: Radha Blum is a 80y o  year old female with type 2 diabetes, chronic diastolic CHF, a fib on anticoagulation, hx bilateral hip and right knee arthroplasty  She was admitted recently in March with SIRS and right thigh cellulitis, hematoma following a fall  Hematoma went on to become infected and this was debrided on 3/31, cx grew MRSA and she completed 7 days of therapy with Vanc followed by doxy  She has had a small persistent wound over her right thigh since then and has also developed superficial open areas over both buttocks    The patient presented to the ER on 6/29 with few days of increased weight gain and increased swelling in both legs with worsening shortness of breath with exertion  In the ER, vitals were stable  Labs were notable for elevated AST, t bili without leukocytosis and elevated creatinine  EKG noted no acute ST changes  CXR showed no infiltrate  She was admitted for iv diuresis  She noted worsening foul smelling dainage from right thigh wound with increased redness over right lower leg and ankle as well as foul smelling drainage from sacral decubitus ulcer  She was started on iv vanc on 6/30  Bld and wound cx are pending  Overnight has had no fever or leukocytosis  Notes slight improvement in right leg redness, continues to have drainage from buttock and right leg wounds  Denies fevers, chills, or sweats  Denies nausea, vomiting, or diarrhea  Infectious disease is being consulted for diagnostic work up and antibiotic management  Review of Systems  Pertinent positives and negatives as noted in HPI  Rest complete 12 point system-based review of systems is otherwise negative      PAST MEDICAL HISTORY:  Past Medical History:   Diagnosis Date    LEONARDO [facility]acute [facility]) ([facility] [address] [facility]     [facility]     [facility]estive [facility])     diastolic grade 1    Cir[facility]     Diabetes mellitus ([facility] [address] [facility]     Disease of thyroid gland     hypothyroid    Endometrial ca ([facility] [address] )     Hiatal hernia     Hyperlipidemia     Hypertension     Hypothyroid     Lung nodules     Periumbilical hernia     Pulmonary HTN ([facility] [address] )     Thoracic aortic aneurysm without rupture ([facility] ) 03/25/2022    41 mm     Past Surgical History:   Procedure Laterality Date    HERNIA REPAIR      HIP ARTHROPLASTY      HYSTERECTOMY      JOINT REPLACEMENT Bilateral     knee; b/l hip    KNEE ARTHROPLASTY      OOPHORECTOMY      WOUND DEBRIDEMENT Right 3/31/2022    Procedure: DEBRIDEMENT WOUND [facility] OUT), right medial thigh;  Surgeon: Rodrigue Rivera DO;  Location:  MAIN OR;  Service: General       FAMILY HISTORY:  Non-contributory    SOCIAL HISTORY:  Social History     Social History     Substance and Sexual Activity   Alcohol Use Never     Social History     Substance and Sexual Activity   Drug Use Never     Social History     Tobacco Use   Smoking Status Never Smoker   Smokeless Tobacco Never Used       ALLERGIES:  Allergies   Allergen Reactions    Penicillins Hives           Sulfamethoxazole-Trimethoprim GI Intolerance       MEDICATIONS:  All current active medications have been reviewed      Physical Exam     Temp:  [97 6 °F (36 4 °C)-100 3 °F (37 9 °C)] 97 6 °F (36 4 °C)  HR:  [60-88] 60  Resp:  [15-18] 18  BP: ()/(41-61) 101/51  SpO2:  [92 %-96 %] 96 %  Temp (24hrs), Av 9 °F (37 2 °C), Min:97 6 °F (36 4 °C), Max:100 3 °F (37 9 °C)  Current: Temperature: 97 6 °F (36 4 °C)    Intake/Output Summary (Last 24 hours) at 2022 9838  Last data filed at 2022 0801  Gross per 24 hour   Intake 1090 ml   Output 750 ml   Net 340 ml         Physical exam findings reported by bedside and primary medical team staff    General Appearance:  Appearing ill, nontoxic, and in no distress, appears stated age   Head:  Normocephalic, without obvious abnormality, atraumatic   Eyes:  PERRL, conjunctiva pink and sclera anicteric, both eyes   Nose: Nares normal, mucosa normal, no drainage   Throat: Oropharynx moist without lesions; lips, mucosa, and tongue normal; teeth and gums normal   Neck: Supple, symmetrical, trachea midline, no adenopathy, no tenderness/mass/nodules   Back:   Symmetric, no curvature, ROM normal, no CVA tenderness   Lungs:   Clear to auscultation bilaterally, no audible wheezes, rhonchi and rales, respirations unlabored   Chest Wall:  No tenderness or deformity   Heart:  Regular rate and rhythm, S1, S2 normal, no murmur, rub or gallop   Abdomen:   Soft, non-tender, non-distended, positive bowel sounds, no masses, no organomegaly    No CVA tenderness   Extremities: 3+ pitting edema R>L leg with localized edema around R ankle   Skin: Superficial R thigh wound with mild periwound erythema, mild erythema over right ankle and lower shin  Superficial wounds over both buttocks, no drainage or induration noted, tender to touch with periwound erythema   Lymph nodes: Cervical, supraclavicular, and axillary nodes normal   Neurologic: Alert and oriented times 3       Invasive Devices:   Peripheral IV 06/30/22 Right;Ventral (anterior) Wrist (Active)   Site Assessment WDL 06/30/22 2040   Dressing Type Transparent 06/30/22 2040   Line Status Infusing 06/30/22 2040   Dressing Status Dry;Clean; Intact 06/30/22 2040       Peripheral IV 06/30/22 Distal;Left;Ventral (anterior) Forearm (Active)   Site Assessment WDL 06/30/22 2040   Dressing Type Transparent 06/30/22 2040   Line Status Flushed; Infusing 06/30/22 2040   Dressing Status Clean;Dry; Intact 06/30/22 2040       Urethral Catheter Latex 16 Fr  (Active)   Ramires Care Done 06/30/22 2100   Output (mL) 150 mL 06/30/22 0910       Labs, Imaging, & Other Studies     Lab Results:    I have personally reviewed pertinent labs  Results from last 7 days   Lab Units 06/30/22  0551 06/29/22  1534   WBC Thousand/uL 8 49 8 92   HEMOGLOBIN g/dL 10 4* 10 4*   PLATELETS Thousands/uL 139* 141*     Results from last 7 days   Lab Units 06/30/22  0551 06/29/22  1534   POTASSIUM mmol/L 3 7 3 5   CHLORIDE mmol/L 103 100   CO2 mmol/L 28 26   BUN mg/dL 25 26*   CREATININE mg/dL 1 38* 1 67*   EGFR ml/min/1 73sq m 35 28   CALCIUM mg/dL 8 1* 8 0*   AST U/L  --  48*   ALT U/L  --  27   ALK PHOS U/L  --  193*     Results from last 7 days   Lab Units 06/30/22  1851 06/30/22  1020 06/30/22  1019   BLOOD CULTURE   --  Received in Microbiology Lab  Culture in Progress  Received in Microbiology Lab  Culture in Progress  GRAM STAIN RESULT  2+ Gram positive cocci in pairs*  1+ Gram negative rods*  No polys seen*  --   --        Imaging Studies:   I have personally reviewed pertinent imaging study reports and images in PACS        EKG, Pathology, and Other Studies:   I have personally reviewed pertinent reports  Counseling/Coordination of care: Total 70 minutes communication with the patient via telehealth  Labs, medical tests and imaging studies were independently and extensively reviewed by me as noted above in HPI and old records were obtained and summarized as noted above in HPI  My recommendations were discussed with the patient in detail who verbalized understanding

## 2022-07-01 NOTE — DISCHARGE INSTR - OTHER ORDERS
Skin care Plan:  1-Gently cleanse bilateral buttocks open wounds with normal saline,apply Maxorb AG to open wounds  Cover with Allevyn bordered foam  Change daily and prn soil or dislodgement  2-Turn/reposition q2h or when medically stable for pressure re-distribution on skin, use green foam wedges  3-Elevate heels to offload pressure  4-Moisturize skin daily with skin nourishing cream   5-Ehob cushion in chair when out of bed  6-Cleanse right medial thigh with normal saline, apply Maxorb AG to wound bed,cover with 4x4, ABD and secure with tap,change daily and prn soil or dislodgment  dislodgement  7-Allevyn foam to heels, romi w/P, peel foam check skin integrity q-shift   Change q3d

## 2022-07-01 NOTE — WOUND OSTOMY CARE
Consult Note - Wound   Seth Soto 80 y o  female MRN: [id number]  Unit/Bed#: -01 Encounter: [id number]        History and Present Illness:80year old female admitted to   [facility]  6/29/22  Seen today for initial wound consult  Seated out of bed in recliner on a waffle cushion  gutierrez catheter for bladder management  Ambulated to  with use of walker cont of formed bm  PMH infected right thigh hematoma,s/p I/D 3/31 22  worsening venous edema, morbid obesity,diabetes  Assessment Finding  1)Bilateral heels intact,dry callous on posterior right ankle, with blanchable periwound erythema  Allevyn heel foams bilaterally  2)chronic right thigh wound,large serous drainage on dressing when removed, wound is 50% light tan slough and 50% red tissue  Edges are not well defined  periwound is white and scarring visible  3)Bilateral buttocks are deep purple, areas all blanchable  Scattered multiple areas of denudement revealing pale pink tissue and 25% white slough  Areas cleansed and xeroform and Allevyn bordered foam applied to each buttocks  4)Right lateral abdomen erythema  Pt states her leather  at home is in contact with this area  Cleansed and skin protectant applied  Skin care Plan:  1-Gently cleanse bilateral buttocks open wounds with normal saline,apply xeroform gauze to open wounds  Cover with Allevyn bordered foam  Change daily and prn soil or dislodgement  2-Turn/reposition q2h or when medically stable for pressure re-distribution on skin, use green foam wedges  3-Elevate heels to offload pressure  4-Moisturize skin daily with skin nourishing cream   5-Ehob cushion in chair when out of bed  6-Cleanse right medial thigh with normal saline, apply Maxorb AG to wound bed,cover with 4x4, ABD and secure with tap,change daily and prn soil or dislodgment  dislodgement  7-Allevyn foam to heels, romi w/P, peel foam check skin integrity q-shift   Change q3d      Wounds:  Wound 03/25/22 Other (comment) Other (Comment) Thigh Anterior;Right (Active)   Wound Image   07/01/22 1025   Wound Description RATNA 07/01/22 1100   Argenis-wound Assessment Scar Tissue 07/01/22 1025   Wound Length (cm) 1 5 cm 07/01/22 1025   Wound Width (cm) 1 2 cm 07/01/22 1025   Wound Surface Area (cm^2) 1 8 cm^2 07/01/22 1025   Drainage Amount Large 07/01/22 1025   Drainage Description Clear;Serous; Yellow 07/01/22 1025   Treatments Cleansed;Site care 07/01/22 1025   Dressing Calcium Alginate with Silver;ABD 07/01/22 1025   Dressing Changed Changed 07/01/22 1025   Patient Tolerance Tolerated well 07/01/22 1025   Dressing Status Clean;Dry; Intact 07/01/22 1100       Wound 06/29/22 Pressure Injury Buttocks Left (Active)   Wound Image   07/01/22 1040   Wound Description RATNA 07/01/22 1100   Pressure Injury Stage DTPI 07/01/22 1040   Argenis-wound Assessment Erythema;Fragile; Hyperpigmented; Purple 07/01/22 1040   Wound Length (cm) 4 cm 07/01/22 1040   Wound Width (cm) 2 cm 07/01/22 1040   Wound Surface Area (cm^2) 8 cm^2 07/01/22 1040   Drainage Amount RATNA 07/01/22 1040   Treatments Cleansed;Site care 07/01/22 1040   Dressing Xeroform 07/01/22 1040   Dressing Changed New 07/01/22 1040   Patient Tolerance Tolerated well 07/01/22 1040   Dressing Status Clean;Dry; Intact 07/01/22 1100       Wound 06/29/22 Buttocks Right (Active)   Wound Image   07/01/22 1041   Wound Description RATNA 07/01/22 1100   Pressure Injury Stage DTPI 07/01/22 1041   Argenis-wound Assessment Fragile; Purple 07/01/22 1041   Wound Length (cm) 7 cm 07/01/22 1041   Wound Width (cm) 7 cm 07/01/22 1041   Wound Depth (cm) 0 1 cm 07/01/22 1041   Wound Surface Area (cm^2) 49 cm^2 07/01/22 1041   Wound Volume (cm^3) 4 9 cm^3 07/01/22 1041   Calculated Wound Volume (cm^3) 4 9 cm^3 07/01/22 1041   Drainage Amount RATNA 07/01/22 1041   Treatments Cleansed;Site care 07/01/22 1041   Dressing Foam, Silicon (eg  Allevyn, etc); Xeroform 07/01/22 1041   Dressing Changed New 07/01/22 1041   Patient Tolerance Tolerated well 07/01/22 1041   Dressing Status Clean;Dry; Intact 07/01/22 1100       Wound 06/29/22 Pressure Injury Heel Right (Active)   Wound Image   07/01/22 1021   Wound Description RATNA 07/01/22 1100   Argenis-wound Assessment Dry; Intact;Edema; Erythema;Fragile 07/01/22 1021   Wound Length (cm) 0 5 cm 07/01/22 1021   Wound Width (cm) 0 5 cm 07/01/22 1021   Wound Surface Area (cm^2) 0 25 cm^2 07/01/22 1021   Dressing Foam, Silicon (eg  Allevyn, etc) 07/01/22 1100   Dressing Changed New 07/01/22 1021   Patient Tolerance Tolerated well 07/01/22 1021   Dressing Status Clean;Dry; Intact 07/01/22 1100       Wound 07/01/22 Hip Anterior;Right (Active)   Wound Image   07/01/22 1039   Argenis-wound Assessment Fragile;Pink;Rash 07/01/22 1039   Treatments Cleansed 07/01/22 1039   Dressing Open to air 07/01/22 1039   Patient Tolerance Tolerated well 07/01/22 1039     Call or tigertext with any questions  Wound Care will continue to follow      Becca Donis

## 2022-07-02 LAB
ANION GAP SERPL CALCULATED.3IONS-SCNC: 7 MMOL/L (ref 4–13)
BACTERIA UR CULT: ABNORMAL
BUN SERPL-MCNC: 30 MG/DL (ref 5–25)
CALCIUM SERPL-MCNC: 7.8 MG/DL (ref 8.3–10.1)
CHLORIDE SERPL-SCNC: 103 MMOL/L (ref 100–108)
CO2 SERPL-SCNC: 26 MMOL/L (ref 21–32)
CREAT SERPL-MCNC: 1.71 MG/DL (ref 0.6–1.3)
ERYTHROCYTE [DISTWIDTH] IN BLOOD BY AUTOMATED COUNT: 16.8 % (ref 11.6–15.1)
GFR SERPL CREATININE-BSD FRML MDRD: 27 ML/MIN/1.73SQ M
GLUCOSE SERPL-MCNC: 111 MG/DL (ref 65–140)
GLUCOSE SERPL-MCNC: 167 MG/DL (ref 65–140)
GLUCOSE SERPL-MCNC: 184 MG/DL (ref 65–140)
GLUCOSE SERPL-MCNC: 81 MG/DL (ref 65–140)
GLUCOSE SERPL-MCNC: 84 MG/DL (ref 65–140)
HCT VFR BLD AUTO: 26.8 % (ref 34.8–46.1)
HGB BLD-MCNC: 9 G/DL (ref 11.5–15.4)
INR PPP: 6.85 (ref 0.84–1.19)
MAGNESIUM SERPL-MCNC: 2 MG/DL (ref 1.6–2.6)
MCH RBC QN AUTO: 32.8 PG (ref 26.8–34.3)
MCHC RBC AUTO-ENTMCNC: 33.6 G/DL (ref 31.4–37.4)
MCV RBC AUTO: 98 FL (ref 82–98)
PLATELET # BLD AUTO: 138 THOUSANDS/UL (ref 149–390)
PMV BLD AUTO: 10.3 FL (ref 8.9–12.7)
POTASSIUM SERPL-SCNC: 4.2 MMOL/L (ref 3.5–5.3)
PROTHROMBIN TIME: 56.9 SECONDS (ref 11.6–14.5)
RBC # BLD AUTO: 2.74 MILLION/UL (ref 3.81–5.12)
SODIUM SERPL-SCNC: 136 MMOL/L (ref 136–145)
WBC # BLD AUTO: 15.05 THOUSAND/UL (ref 4.31–10.16)

## 2022-07-02 PROCEDURE — 83036 HEMOGLOBIN GLYCOSYLATED A1C: CPT

## 2022-07-02 PROCEDURE — 80048 BASIC METABOLIC PNL TOTAL CA: CPT

## 2022-07-02 PROCEDURE — 85610 PROTHROMBIN TIME: CPT

## 2022-07-02 PROCEDURE — 82948 REAGENT STRIP/BLOOD GLUCOSE: CPT

## 2022-07-02 PROCEDURE — 85027 COMPLETE CBC AUTOMATED: CPT

## 2022-07-02 PROCEDURE — 99232 SBSQ HOSP IP/OBS MODERATE 35: CPT | Performed by: STUDENT IN AN ORGANIZED HEALTH CARE EDUCATION/TRAINING PROGRAM

## 2022-07-02 PROCEDURE — 83735 ASSAY OF MAGNESIUM: CPT

## 2022-07-02 RX ADMIN — INSULIN LISPRO 1 UNITS: 100 INJECTION, SOLUTION INTRAVENOUS; SUBCUTANEOUS at 16:56

## 2022-07-02 RX ADMIN — DOCUSATE SODIUM AND SENNOSIDES 1 TABLET: 8.6; 5 TABLET, FILM COATED ORAL at 17:00

## 2022-07-02 RX ADMIN — INSULIN LISPRO 1 UNITS: 100 INJECTION, SOLUTION INTRAVENOUS; SUBCUTANEOUS at 11:51

## 2022-07-02 RX ADMIN — POTASSIUM CHLORIDE 40 MEQ: 1500 TABLET, EXTENDED RELEASE ORAL at 09:17

## 2022-07-02 RX ADMIN — METOPROLOL SUCCINATE 100 MG: 100 TABLET, EXTENDED RELEASE ORAL at 09:17

## 2022-07-02 RX ADMIN — LEVOTHYROXINE SODIUM 150 MCG: 150 TABLET ORAL at 05:41

## 2022-07-02 RX ADMIN — Medication 15 MG/HR: at 11:06

## 2022-07-02 RX ADMIN — DOCUSATE SODIUM AND SENNOSIDES 1 TABLET: 8.6; 5 TABLET, FILM COATED ORAL at 09:18

## 2022-07-02 RX ADMIN — CEFEPIME HYDROCHLORIDE 1000 MG: 1 INJECTION, SOLUTION INTRAVENOUS at 11:06

## 2022-07-02 RX ADMIN — VANCOMYCIN HYDROCHLORIDE 1000 MG: 1 INJECTION, SOLUTION INTRAVENOUS at 20:47

## 2022-07-02 RX ADMIN — POTASSIUM CHLORIDE 40 MEQ: 1500 TABLET, EXTENDED RELEASE ORAL at 17:00

## 2022-07-02 RX ADMIN — CEFEPIME HYDROCHLORIDE 1000 MG: 1 INJECTION, SOLUTION INTRAVENOUS at 23:28

## 2022-07-02 NOTE — ASSESSMENT & PLAN NOTE
Wt Readings from Last 3 Encounters:   07/02/22 123 kg (270 lb 12 8 oz)   05/19/22 126 kg (277 lb 1 9 oz)   04/04/22 117 kg (257 lb 0 9 oz)     80-year-old female presenting with acute on chronic diastolic congestive heart failure  - home regimen is Lasix 60 mg b i d  Juan Carlos Medrano   - currently on a Lasix drip

## 2022-07-02 NOTE — ASSESSMENT & PLAN NOTE
Lab Results   Component Value Date    HGBA1C 5 5 01/31/2018       Recent Labs     07/01/22  1613 07/01/22  2133 07/02/22  0716 07/02/22  1118   POCGLU 121 124 81 167*       Blood Sugar Average: Last 72 hrs:  · Home regimen: Diet controlled  · Continue sliding scale   Might discontinue if persistently below 180

## 2022-07-02 NOTE — PROGRESS NOTES
[address]  Progress Note - Raymond Talley 1940, 80 y o  female MRN: [id number]  Unit/Bed#: -Ramses Encounter: [id number]  Primary Care Provider: Doyle Collet, MD   Date and time admitted to hospital: 6/29/2022  3:01 PM    * Acute on chronic diastolic (congestive) heart failure [facility])  Assessment & Plan  Wt Readings from Last 3 Encounters:   07/02/22 123 kg (270 lb 12 8 oz)   05/19/22 126 kg (277 lb 1 9 oz)   04/04/22 117 kg (257 lb 0 9 oz)     78-year-old female presenting with acute on chronic diastolic congestive heart failure  - home regimen is Lasix 60 mg b i d  Colette Montiel - currently on a Lasix drip          Cellulitis of right lower extremity  Assessment & Plan  Recurrent right thigh and leg cellulitis  Recently hospitalized with sepsis secondary to  MRSA infection right thigh  · ID recommendations appreciate  Continue Vancomycin / Cefepime  Cystitis  Assessment & Plan  Cultures growing gram negative rods  - Continue Cefepime    Supratherapeutic INR  Assessment & Plan  · No evidence of acute bleeding  Continue monitoring  Will reverse if her hemoglobin levels continue to downtrend  Recent Labs     06/30/22  0637 07/01/22  0947 07/02/22  0547   INR 5 06* 7 48* 6 85*         Diabetes mellitus [facility])  Assessment & Plan  Lab Results   Component Value Date    HGBA1C 5 5 01/31/2018       Recent Labs     07/01/22  1613 07/01/22  2133 07/02/22  0716 07/02/22  1118   POCGLU 121 124 81 167*       Blood Sugar Average: Last 72 hrs:  · Home regimen: Diet controlled  · Continue sliding scale  Might discontinue if persistently below 180    Hypothyroidism (acquired)  Assessment & Plan  · Continue home 150 mcg Synthroid    Paroxysmal A-fib (HCC)  Assessment & Plan  Rate controlled  AC: On coumadin, but supratherapeutic  Rate control: Metoprolol     LEONARDO (acute kidney injury) ([facility] [address] )  Assessment & Plan  Baseline: 1 1 to 1 2  Possibly cardiorenal   - Continue lasix drip        VTE Pharmacologic Prophylaxis:   Pharmacologic: supratherapeutic inr  Mechanical VTE Prophylaxis in Place: Yes    Discussions with Specialists or Other Care Team Provider: nursing    Education and Discussions with Family / Patient: patient, daughter    Time Spent for Care: 30 minutes  More than 50% of total time spent on counseling and coordination of care as described above  Current Length of Stay: 3 day(s)    Current Patient Status: Inpatient   Certification Statement: The patient will continue to require additional inpatient hospital stay due to iv antibiotics, lasix drip    Discharge Plan: active    Code Status: Level 3 - DNAR and DNI      Subjective:   Patient seen and examined at bedside  Feeling better  No new complaints overnight  Admits fluid restriction non-compliance at home  Objective:     Vitals:   Temp (24hrs), Av 1 °F (36 7 °C), Min:97 3 °F (36 3 °C), Max:98 6 °F (37 °C)    Temp:  [97 3 °F (36 3 °C)-98 6 °F (37 °C)] 98 6 °F (37 °C)  HR:  [67-75] 75  Resp:  [16-18] 18  BP: (103-116)/(41-44) 105/44  SpO2:  [82 %-98 %] 92 %  Body mass index is 52 89 kg/m²  Input and Output Summary (last 24 hours): Intake/Output Summary (Last 24 hours) at 2022 1410  Last data filed at 2022 1300  Gross per 24 hour   Intake 899 58 ml   Output 2150 ml   Net -1250 42 ml       Physical Exam:     Physical Exam  Vitals reviewed  HENT:      Head: Normocephalic  Nose: Nose normal       Mouth/Throat:      Mouth: Mucous membranes are moist    Eyes:      General: No scleral icterus  Cardiovascular:      Rate and Rhythm: Normal rate  Pulmonary:      Effort: Pulmonary effort is normal  No respiratory distress  Abdominal:      Palpations: Abdomen is soft  Tenderness: There is no abdominal tenderness  Musculoskeletal:      Right lower leg: Edema present  Left lower leg: Edema present  Skin:     General: Skin is warm  Neurological:      Mental Status: She is alert  Mental status is at baseline  Psychiatric:         Mood and Affect: Mood normal          Behavior: Behavior normal        Additional Data:     Labs:    Results from last 7 days   Lab Units 07/02/22  0547 06/30/22  0551 06/29/22  1534   WBC Thousand/uL 15 05*   < > 8 92   HEMOGLOBIN g/dL 9 0*   < > 10 4*   HEMATOCRIT % 26 8*   < > 31 6*   PLATELETS Thousands/uL 138*   < > 141*   LYMPHO PCT %  --   --  12*   MONO PCT %  --   --  12   EOS PCT %  --   --  0    < > = values in this interval not displayed  Results from last 7 days   Lab Units 07/02/22  0547 06/30/22  0551 06/29/22  1534   SODIUM mmol/L 136   < > 135*   POTASSIUM mmol/L 4 2   < > 3 5   CHLORIDE mmol/L 103   < > 100   CO2 mmol/L 26   < > 26   BUN mg/dL 30*   < > 26*   CREATININE mg/dL 1 71*   < > 1 67*   ANION GAP mmol/L 7   < > 9   CALCIUM mg/dL 7 8*   < > 8 0*   ALBUMIN g/dL  --   --  2 0*   TOTAL BILIRUBIN mg/dL  --   --  2 89*   ALK PHOS U/L  --   --  193*   ALT U/L  --   --  27   AST U/L  --   --  48*   GLUCOSE RANDOM mg/dL 84   < > 136    < > = values in this interval not displayed  Results from last 7 days   Lab Units 07/02/22  0547   INR  6 85*     Results from last 7 days   Lab Units 07/02/22  1118 07/02/22  0716 07/01/22  2133 07/01/22  1613   POC GLUCOSE mg/dl 167* 81 124 121         Results from last 7 days   Lab Units 06/29/22  1534   PROCALCITONIN ng/ml 0 57*           * I Have Reviewed All Lab Data Listed Above  * Additional Pertinent Lab Tests Reviewed: [place] [address] Admission Reviewed    Imaging:    Imaging Reports Reviewed Today Include: xr chest, ct lower extremity    Recent Cultures (last 7 days):     Results from last 7 days   Lab Units 06/30/22  1851 06/30/22  1839 06/30/22  1020 06/30/22  1019   BLOOD CULTURE   --   --  No Growth at 24 hrs  No Growth at 24 hrs     GRAM STAIN RESULT  2+ Gram positive cocci in pairs*  1+ Gram negative rods*  No polys seen*  --   --   --    URINE CULTURE   --  >100,000 cfu/ml Gram Negative Ryley*  --   -- WOUND CULTURE  4+ Growth of Proteus*  3+ Growth of Klebsiella-Enterobacter  group*  3+ Growth of Gamma Hemolytic Streptococcus  3+ Growth of Staphylococcus coagulase negative*  --   --   --        Last 24 Hours Medication List:   Current Facility-Administered Medications   Medication Dose Route Frequency Provider Last Rate    acetaminophen  650 mg Oral Q6H PRN Delrae Person, CRNP      cefepime  1,000 mg Intravenous Q12H Delrae Person, CRNP 1,000 mg (07/02/22 1106)    furosemide  15 mg/hr Intravenous Continuous [place] LILIAN Purvis 15 mg/hr (07/02/22 1106)    insulin lispro  1-6 Units Subcutaneous TID AC Delrae Person, CRNP      insulin lispro  1-6 Units Subcutaneous HS Delrae Person, CRNP      levothyroxine  150 mcg Oral Early Morning Delrae Person, CRNP      metoprolol succinate  100 mg Oral Daily [place], [place]      polyethylene glycol  17 g Oral Daily PRN Delrae Person, CRNP      potassium chloride  40 mEq Oral BID Delrae Person, CRNP      senna-docusate sodium  1 tablet Oral BID Delrae Person, CRNP      vancomycin  12 5 mg/kg (Adjusted) Intravenous Q24H Delrae Person, CRNP 1,000 mg (07/01/22 2016)        Today, Patient Was Seen By: Steffanie Abdi MD    ** Please Note: Dictation voice to text software may have been used in the creation of this document   **

## 2022-07-02 NOTE — ASSESSMENT & PLAN NOTE
· No evidence of acute bleeding  Continue monitoring  Will reverse if her hemoglobin levels continue to downtrend      Recent Labs     06/30/22  0637 07/01/22  0947 07/02/22  0547   INR 5 06* 7 48* 6 85*

## 2022-07-02 NOTE — ASSESSMENT & PLAN NOTE
Recurrent right thigh and leg cellulitis  Recently hospitalized with sepsis secondary to  MRSA infection right thigh  · ID recommendations appreciate  Continue Vancomycin / Cefepime

## 2022-07-02 NOTE — PROGRESS NOTES
Vancomycin IV Pharmacy-to-Dose Consultation    Eulogio Koch is a 80 y o  female who is currently receiving Vancomycin IV with management by the Pharmacy Consult service  Assessment/Plan:  The patient was reviewed  Renal function is stable and no signs or symptoms of nephrotoxicity and/or infusion reactions were documented in the chart  Based on todays assessment, continue current vancomycin (day # 3) dosing of Vancomycin 1000 mg Q24H, with a plan for trough to be drawn at 2000 on 7/4/2022  We will continue to follow the patients culture results and clinical progress daily      Symone Luis

## 2022-07-03 PROBLEM — R79.1 SUPRATHERAPEUTIC INR: Status: RESOLVED | Noted: 2022-01-01 | Resolved: 2022-01-01

## 2022-07-03 LAB
ANION GAP SERPL CALCULATED.3IONS-SCNC: 6 MMOL/L (ref 4–13)
ANISOCYTOSIS BLD QL SMEAR: PRESENT
BACTERIA WND AEROBE CULT: ABNORMAL
BASOPHILS # BLD MANUAL: 0 THOUSAND/UL (ref 0–0.1)
BASOPHILS NFR MAR MANUAL: 0 % (ref 0–1)
BUN SERPL-MCNC: 31 MG/DL (ref 5–25)
CALCIUM SERPL-MCNC: 7.5 MG/DL (ref 8.3–10.1)
CHLORIDE SERPL-SCNC: 102 MMOL/L (ref 100–108)
CO2 SERPL-SCNC: 28 MMOL/L (ref 21–32)
CREAT SERPL-MCNC: 1.62 MG/DL (ref 0.6–1.3)
EOSINOPHIL # BLD MANUAL: 0.1 THOUSAND/UL (ref 0–0.4)
EOSINOPHIL NFR BLD MANUAL: 1 % (ref 0–6)
ERYTHROCYTE [DISTWIDTH] IN BLOOD BY AUTOMATED COUNT: 16.9 % (ref 11.6–15.1)
GFR SERPL CREATININE-BSD FRML MDRD: 29 ML/MIN/1.73SQ M
GLUCOSE SERPL-MCNC: 109 MG/DL (ref 65–140)
GLUCOSE SERPL-MCNC: 176 MG/DL (ref 65–140)
GLUCOSE SERPL-MCNC: 84 MG/DL (ref 65–140)
GLUCOSE SERPL-MCNC: 85 MG/DL (ref 65–140)
GLUCOSE SERPL-MCNC: 92 MG/DL (ref 65–140)
GRAM STN SPEC: ABNORMAL
HCT VFR BLD AUTO: 26.9 % (ref 34.8–46.1)
HGB BLD-MCNC: 8.9 G/DL (ref 11.5–15.4)
INR PPP: 2.78 (ref 0.84–1.19)
LYMPHOCYTES # BLD AUTO: 1.74 THOUSAND/UL (ref 0.6–4.47)
LYMPHOCYTES # BLD AUTO: 18 % (ref 14–44)
MACROCYTES BLD QL AUTO: PRESENT
MAGNESIUM SERPL-MCNC: 2.2 MG/DL (ref 1.6–2.6)
MCH RBC QN AUTO: 32.4 PG (ref 26.8–34.3)
MCHC RBC AUTO-ENTMCNC: 33.1 G/DL (ref 31.4–37.4)
MCV RBC AUTO: 98 FL (ref 82–98)
MICROCYTES BLD QL AUTO: PRESENT
MONOCYTES # BLD AUTO: 0.29 THOUSAND/UL (ref 0–1.22)
MONOCYTES NFR BLD: 3 % (ref 4–12)
NEUTROPHILS # BLD MANUAL: 7.33 THOUSAND/UL (ref 1.85–7.62)
NEUTS SEG NFR BLD AUTO: 76 % (ref 43–75)
PLATELET # BLD AUTO: 148 THOUSANDS/UL (ref 149–390)
PLATELET BLD QL SMEAR: ADEQUATE
PMV BLD AUTO: 10.2 FL (ref 8.9–12.7)
POIKILOCYTOSIS BLD QL SMEAR: PRESENT
POLYCHROMASIA BLD QL SMEAR: PRESENT
POTASSIUM SERPL-SCNC: 3.7 MMOL/L (ref 3.5–5.3)
PROTHROMBIN TIME: 28.6 SECONDS (ref 11.6–14.5)
RBC # BLD AUTO: 2.75 MILLION/UL (ref 3.81–5.12)
RBC MORPH BLD: PRESENT
SCHISTOCYTES BLD QL SMEAR: PRESENT
SMUDGE CELLS BLD QL SMEAR: PRESENT
SODIUM SERPL-SCNC: 136 MMOL/L (ref 136–145)
TARGETS BLD QL SMEAR: PRESENT
VANCOMYCIN TROUGH SERPL-MCNC: 38.3 UG/ML (ref 10–20)
VARIANT LYMPHS # BLD AUTO: 2 %
WBC # BLD AUTO: 9.64 THOUSAND/UL (ref 4.31–10.16)

## 2022-07-03 PROCEDURE — 83735 ASSAY OF MAGNESIUM: CPT | Performed by: STUDENT IN AN ORGANIZED HEALTH CARE EDUCATION/TRAINING PROGRAM

## 2022-07-03 PROCEDURE — 85610 PROTHROMBIN TIME: CPT | Performed by: STUDENT IN AN ORGANIZED HEALTH CARE EDUCATION/TRAINING PROGRAM

## 2022-07-03 PROCEDURE — 82948 REAGENT STRIP/BLOOD GLUCOSE: CPT

## 2022-07-03 PROCEDURE — 80048 BASIC METABOLIC PNL TOTAL CA: CPT | Performed by: STUDENT IN AN ORGANIZED HEALTH CARE EDUCATION/TRAINING PROGRAM

## 2022-07-03 PROCEDURE — 85007 BL SMEAR W/DIFF WBC COUNT: CPT | Performed by: STUDENT IN AN ORGANIZED HEALTH CARE EDUCATION/TRAINING PROGRAM

## 2022-07-03 PROCEDURE — 85027 COMPLETE CBC AUTOMATED: CPT | Performed by: STUDENT IN AN ORGANIZED HEALTH CARE EDUCATION/TRAINING PROGRAM

## 2022-07-03 PROCEDURE — 80202 ASSAY OF VANCOMYCIN: CPT

## 2022-07-03 PROCEDURE — 99232 SBSQ HOSP IP/OBS MODERATE 35: CPT | Performed by: STUDENT IN AN ORGANIZED HEALTH CARE EDUCATION/TRAINING PROGRAM

## 2022-07-03 RX ORDER — WARFARIN SODIUM 3 MG/1
3 TABLET ORAL
Status: DISCONTINUED | OUTPATIENT
Start: 2022-07-03 | End: 2022-07-10

## 2022-07-03 RX ADMIN — METOPROLOL SUCCINATE 100 MG: 100 TABLET, EXTENDED RELEASE ORAL at 08:28

## 2022-07-03 RX ADMIN — LEVOTHYROXINE SODIUM 150 MCG: 150 TABLET ORAL at 05:32

## 2022-07-03 RX ADMIN — POTASSIUM CHLORIDE 40 MEQ: 1500 TABLET, EXTENDED RELEASE ORAL at 08:27

## 2022-07-03 RX ADMIN — CEFEPIME HYDROCHLORIDE 1000 MG: 1 INJECTION, SOLUTION INTRAVENOUS at 22:27

## 2022-07-03 RX ADMIN — DOCUSATE SODIUM AND SENNOSIDES 1 TABLET: 8.6; 5 TABLET, FILM COATED ORAL at 17:06

## 2022-07-03 RX ADMIN — INSULIN LISPRO 1 UNITS: 100 INJECTION, SOLUTION INTRAVENOUS; SUBCUTANEOUS at 21:42

## 2022-07-03 RX ADMIN — Medication 15 MG/HR: at 12:02

## 2022-07-03 RX ADMIN — WARFARIN SODIUM 3 MG: 3 TABLET ORAL at 17:06

## 2022-07-03 RX ADMIN — CEFEPIME HYDROCHLORIDE 1000 MG: 1 INJECTION, SOLUTION INTRAVENOUS at 10:07

## 2022-07-03 RX ADMIN — POTASSIUM CHLORIDE 40 MEQ: 1500 TABLET, EXTENDED RELEASE ORAL at 17:07

## 2022-07-03 NOTE — ASSESSMENT & PLAN NOTE
· No evidence of acute bleeding  Resolved      Recent Labs     07/01/22  0947 07/02/22  0547 07/03/22  0622   INR 7 48* 6 85* 2 78*

## 2022-07-03 NOTE — ASSESSMENT & PLAN NOTE
Wt Readings from Last 3 Encounters:   07/03/22 121 kg (267 lb)   05/19/22 126 kg (277 lb 1 9 oz)   04/04/22 117 kg (257 lb 0 9 oz)     20-year-old female presenting with acute on chronic diastolic congestive heart failure  - home regimen is Lasix 60 mg b i d  Mulugeta Wilde   - currently on a Lasix drip, still clinically overloaded

## 2022-07-03 NOTE — ASSESSMENT & PLAN NOTE
Baseline: 1 1 to 1 2  Possibly cardiorenal  Improving   - Continue lasix drip      Recent Labs     07/01/22  0954 07/02/22  0547 07/03/22  0622   BUN 29* 30* 31*   CREATININE 1 78* 1 71* 1 62*   EGFR 26 27 29

## 2022-07-03 NOTE — PROGRESS NOTES
[address]  Progress Note - Carolin Gave 1940, 80 y o  female MRN: [id number]  Unit/Bed#: -Ramses Encounter: [id number]  Primary Care Provider: Luisito Hartmann MD   Date and time admitted to hospital: 6/29/2022  3:01 PM    * Acute on chronic diastolic (congestive) heart failure [facility])  Assessment & Plan  Wt Readings from Last 3 Encounters:   07/03/22 121 kg (267 lb)   05/19/22 126 kg (277 lb 1 9 oz)   04/04/22 117 kg (257 lb 0 9 oz)     41-year-old female presenting with acute on chronic diastolic congestive heart failure  - home regimen is Lasix 60 mg b i d  Faviola Reeves - currently on a Lasix drip, still clinically overloaded          Cellulitis of right lower extremity  Assessment & Plan  Recurrent right thigh and leg cellulitis  Recently hospitalized with sepsis secondary to  MRSA infection right thigh  · ID recommendations appreciated  Continue Vancomycin / Cefepime  Cystitis  Assessment & Plan  Cultures growing Klebsiella  - Continue Cefepime will complete her three day course this evening 7/3/2022  Diabetes mellitus [facility])  Assessment & Plan  Lab Results   Component Value Date    HGBA1C 5 5 01/31/2018       Recent Labs     07/02/22  1118 07/02/22  1622 07/02/22  2109 07/03/22  0758   POCGLU 167* 184* 111 84       Blood Sugar Average: Last 72 hrs:  · Home regimen: Diet controlled  · Continue sliding scale  Might discontinue if persistently below 180    Hypothyroidism (acquired)  Assessment & Plan  · Continue home 150 mcg Synthroid    Paroxysmal A-fib (HCC)  Assessment & Plan  Rate controlled  AC: On coumadin, but supratherapeutic yesterday  Now therapeutic again  Restart coumadin at a lower home dose to 3mg  Rate control: Metoprolol     LEONARDO (acute kidney injury) ([facility] [address] )  Assessment & Plan  Baseline: 1 1 to 1 2  Possibly cardiorenal  Improving   - Continue lasix drip      Recent Labs     07/01/22  0954 07/02/22  0547 07/03/22  0622   BUN 29* 30* 31*   CREATININE 1 78* 1 71* 1 62*   EGFR 26 27 29       Supratherapeutic INR-resolved as of 7/3/2022  Assessment & Plan  · No evidence of acute bleeding  Resolved  Recent Labs     22  0947 22  0547 22  0622   INR 7 48* 6 85* 2 78*           VTE Pharmacologic Prophylaxis:   Pharmacologic: Warfarin (Coumadin)  Mechanical VTE Prophylaxis in Place: Yes    Discussions with Specialists or Other Care Team Provider: nursing    Education and Discussions with Family / Patient: patient    Time Spent for Care: 30 minutes  More than 50% of total time spent on counseling and coordination of care as described above  Current Length of Stay: 4 day(s)    Current Patient Status: Inpatient   Certification Statement: The patient will continue to require additional inpatient hospital stay due to iv antibiotics, bumex drip    Discharge Plan: active    Code Status: Level 3 - DNAR and DNI      Subjective:   Patient seen and examined at bedside  No new complaints overnight  Feeling comfortable  Objective:     Vitals:   Temp (24hrs), Av 5 °F (36 9 °C), Min:98 2 °F (36 8 °C), Max:98 7 °F (37 1 °C)    Temp:  [98 2 °F (36 8 °C)-98 7 °F (37 1 °C)] 98 2 °F (36 8 °C)  HR:  [66-77] 66  Resp:  [17-18] 18  BP: (105-119)/(36-52) 116/52  SpO2:  [92 %-98 %] 92 %  Body mass index is 52 14 kg/m²  Input and Output Summary (last 24 hours): Intake/Output Summary (Last 24 hours) at 7/3/2022 1011  Last data filed at 7/3/2022 1004  Gross per 24 hour   Intake 600 ml   Output 4275 ml   Net -3675 ml       Physical Exam:     Physical Exam  Vitals reviewed  HENT:      Head: Normocephalic  Nose: Nose normal       Mouth/Throat:      Mouth: Mucous membranes are moist    Eyes:      General: No scleral icterus  Cardiovascular:      Rate and Rhythm: Normal rate  Pulmonary:      Effort: Pulmonary effort is normal  No respiratory distress  Abdominal:      General: There is no distension  Palpations: Abdomen is soft  Tenderness:  There is no abdominal tenderness  Musculoskeletal:      Right lower leg: Edema present  Left lower leg: Edema present  Skin:     General: Skin is warm  Neurological:      Mental Status: She is alert  Mental status is at baseline  Psychiatric:         Mood and Affect: Mood normal          Behavior: Behavior normal        Additional Data:     Labs:    Results from last 7 days   Lab Units 07/03/22  0622   WBC Thousand/uL 9 64   HEMOGLOBIN g/dL 8 9*   HEMATOCRIT % 26 9*   PLATELETS Thousands/uL 148*   LYMPHO PCT % 18   MONO PCT % 3*   EOS PCT % 1     Results from last 7 days   Lab Units 07/03/22  0622 06/30/22  0551 06/29/22  1534   SODIUM mmol/L 136   < > 135*   POTASSIUM mmol/L 3 7   < > 3 5   CHLORIDE mmol/L 102   < > 100   CO2 mmol/L 28   < > 26   BUN mg/dL 31*   < > 26*   CREATININE mg/dL 1 62*   < > 1 67*   ANION GAP mmol/L 6   < > 9   CALCIUM mg/dL 7 5*   < > 8 0*   ALBUMIN g/dL  --   --  2 0*   TOTAL BILIRUBIN mg/dL  --   --  2 89*   ALK PHOS U/L  --   --  193*   ALT U/L  --   --  27   AST U/L  --   --  48*   GLUCOSE RANDOM mg/dL 85   < > 136    < > = values in this interval not displayed  Results from last 7 days   Lab Units 07/03/22  0622   INR  2 78*     Results from last 7 days   Lab Units 07/03/22  0758 07/02/22  2109 07/02/22  1622 07/02/22  1118 07/02/22  0716 07/01/22  2133 07/01/22  1613   POC GLUCOSE mg/dl 84 111 184* 167* 81 124 121         Results from last 7 days   Lab Units 06/29/22  1534   PROCALCITONIN ng/ml 0 57*           * I Have Reviewed All Lab Data Listed Above  * Additional Pertinent Lab Tests Reviewed: [place] [address] Admission Reviewed    Imaging:    Imaging Reports Reviewed Today Include: no new imaging    Recent Cultures (last 7 days):     Results from last 7 days   Lab Units 06/30/22  1851 06/30/22  1839 06/30/22  1020 06/30/22  1019   BLOOD CULTURE   --   --  No Growth at 48 hrs  No Growth at 48 hrs     GRAM STAIN RESULT  2+ Gram positive cocci in pairs*  1+ Gram negative rods*  No polys seen*  --   --   --    URINE CULTURE   --  >100,000 cfu/ml Klebsiella pneumoniae*  --   --    WOUND CULTURE  4+ Growth of Proteus mirabilis*  3+ Growth of Klebsiella pneumoniae*  3+ Growth of Enterococcus faecalis*  3+ Growth of Staphylococcus coagulase negative*  --   --   --        Last 24 Hours Medication List:   Current Facility-Administered Medications   Medication Dose Route Frequency Provider Last Rate    acetaminophen  650 mg Oral Q6H PRN Foye Wili, CRNP      cefepime  1,000 mg Intravenous Q12H Foye Wili, CRNP 1,000 mg (07/03/22 1007)    furosemide  15 mg/hr Intravenous Continuous Karo Purvis PA-C 15 mg/hr (07/02/22 1106)    insulin lispro  1-6 Units Subcutaneous TID AC Foye Wili, CRNP      insulin lispro  1-6 Units Subcutaneous HS Foye Wili, CRNP      levothyroxine  150 mcg Oral Early Morning Foye Wili, CRNP      metoprolol succinate  100 mg Oral Daily [place]      polyethylene glycol  17 g Oral Daily PRN Foye Wili, CRNP      potassium chloride  40 mEq Oral BID Foye Wili, CRNP      senna-docusate sodium  1 tablet Oral BID Foye Wili, CRNP      vancomycin  12 5 mg/kg (Adjusted) Intravenous Q24H Foye Wili, CRNP 1,000 mg (07/02/22 2047)    warfarin  3 mg Oral Daily (warfarin) Keisha Tabor MD          Today, Patient Was Seen By: Darcy Nassar MD    ** Please Note: Dictation voice to text software may have been used in the creation of this document   **

## 2022-07-03 NOTE — ASSESSMENT & PLAN NOTE
Recurrent right thigh and leg cellulitis  Recently hospitalized with sepsis secondary to  MRSA infection right thigh  · ID recommendations appreciated  Continue Vancomycin / Cefepime

## 2022-07-03 NOTE — ASSESSMENT & PLAN NOTE
Cultures growing Klebsiella  - Continue Cefepime will complete her three day course this evening 7/3/2022

## 2022-07-03 NOTE — PLAN OF CARE
Problem: Nutrition/Hydration-ADULT  Goal: Nutrient/Hydration intake appropriate for improving, restoring or maintaining nutritional needs  Description: Monitor and assess patient's nutrition/hydration status for malnutrition  Collaborate with interdisciplinary team and initiate plan and interventions as ordered  Monitor patient's weight and dietary intake as ordered or per policy  Utilize nutrition screening tool and intervene as necessary  Determine patient's food preferences and provide high-protein, high-caloric foods as appropriate       INTERVENTIONS:  - Monitor oral intake, urinary output, labs, and treatment plans  - Assess nutrition and hydration status and recommend course of action  - Evaluate amount of meals eaten  - Assist patient with eating if necessary   - Allow adequate time for meals  - Recommend/ encourage appropriate diets, oral nutritional supplements, and vitamin/mineral supplements  - Order, calculate, and assess calorie counts as needed  - Recommend, monitor, and adjust tube feedings and TPN/PPN based on assessed needs  - Assess need for intravenous fluids  - Provide specific nutrition/hydration education as appropriate  - Include patient/family/caregiver in decisions related to nutrition  Outcome: Progressing     Problem: CARDIOVASCULAR - ADULT  Goal: Maintains optimal cardiac output and hemodynamic stability  Description: INTERVENTIONS:  - Monitor I/O, vital signs and rhythm  - Monitor for S/S and trends of decreased cardiac output  - Administer and titrate ordered vasoactive medications to optimize hemodynamic stability  - Assess quality of pulses, skin color and temperature  - Assess for signs of decreased coronary artery perfusion  - Instruct patient to report change in severity of symptoms  Outcome: Progressing  Goal: Absence of cardiac dysrhythmias or at baseline rhythm  Description: INTERVENTIONS:  - Continuous cardiac monitoring, vital signs, obtain 12 lead EKG if ordered  - Administer antiarrhythmic and heart rate control medications as ordered  - Monitor electrolytes and administer replacement therapy as ordered  Outcome: Progressing     Problem: GENITOURINARY - ADULT  Goal: Maintains or returns to baseline urinary function  Description: INTERVENTIONS:  - Assess urinary function  - Encourage oral fluids to ensure adequate hydration if ordered  - Administer IV fluids as ordered to ensure adequate hydration  - Administer ordered medications as needed  - Offer frequent toileting  - Follow urinary retention protocol if ordered  Outcome: Progressing  Goal: Absence of urinary retention  Description: INTERVENTIONS:  - Assess patients ability to void and empty bladder  - Monitor I/O  - Bladder scan as needed  - Discuss with physician/AP medications to alleviate retention as needed  - Discuss catheterization for long term situations as appropriate  Outcome: Progressing  Goal: Urinary catheter remains patent  Description: INTERVENTIONS:  - Assess patency of urinary catheter  - If patient has a chronic gutierrez, consider changing catheter if non-functioning  - Follow guidelines for intermittent irrigation of non-functioning urinary catheter  Outcome: Progressing     Problem: METABOLIC, FLUID AND ELECTROLYTES - ADULT  Goal: Electrolytes maintained within normal limits  Description: INTERVENTIONS:  - Monitor labs and assess patient for signs and symptoms of electrolyte imbalances  - Administer electrolyte replacement as ordered  - Monitor response to electrolyte replacements, including repeat lab results as appropriate  - Instruct patient on fluid and nutrition as appropriate  Outcome: Progressing  Goal: Fluid balance maintained  Description: INTERVENTIONS:  - Monitor labs   - Monitor I/O and WT  - Instruct patient on fluid and nutrition as appropriate  - Assess for signs & symptoms of volume excess or deficit  Outcome: Progressing  Goal: Glucose maintained within target range  Description: INTERVENTIONS:  - Monitor Blood Glucose as ordered  - Assess for signs and symptoms of hyperglycemia and hypoglycemia  - Administer ordered medications to maintain glucose within target range  - Assess nutritional intake and initiate nutrition service referral as needed  Outcome: Progressing

## 2022-07-03 NOTE — ASSESSMENT & PLAN NOTE
Rate controlled  AC: On coumadin, but supratherapeutic yesterday  Now therapeutic again  Restart coumadin at a lower home dose to 3mg    Rate control: Metoprolol

## 2022-07-03 NOTE — ASSESSMENT & PLAN NOTE
Lab Results   Component Value Date    HGBA1C 5 5 01/31/2018       Recent Labs     07/02/22  1118 07/02/22  1622 07/02/22  2109 07/03/22  0758   POCGLU 167* 184* 111 84       Blood Sugar Average: Last 72 hrs:  · Home regimen: Diet controlled  · Continue sliding scale   Might discontinue if persistently below 180

## 2022-07-04 LAB
ANION GAP SERPL CALCULATED.3IONS-SCNC: 9 MMOL/L (ref 4–13)
BUN SERPL-MCNC: 30 MG/DL (ref 5–25)
CALCIUM SERPL-MCNC: 7.7 MG/DL (ref 8.3–10.1)
CHLORIDE SERPL-SCNC: 102 MMOL/L (ref 100–108)
CO2 SERPL-SCNC: 24 MMOL/L (ref 21–32)
CREAT SERPL-MCNC: 1.52 MG/DL (ref 0.6–1.3)
ERYTHROCYTE [DISTWIDTH] IN BLOOD BY AUTOMATED COUNT: 17.2 % (ref 11.6–15.1)
GFR SERPL CREATININE-BSD FRML MDRD: 31 ML/MIN/1.73SQ M
GLUCOSE SERPL-MCNC: 105 MG/DL (ref 65–140)
GLUCOSE SERPL-MCNC: 105 MG/DL (ref 65–140)
GLUCOSE SERPL-MCNC: 111 MG/DL (ref 65–140)
GLUCOSE SERPL-MCNC: 171 MG/DL (ref 65–140)
GLUCOSE SERPL-MCNC: 87 MG/DL (ref 65–140)
GLUCOSE SERPL-MCNC: 97 MG/DL (ref 65–140)
HCT VFR BLD AUTO: 28.4 % (ref 34.8–46.1)
HGB BLD-MCNC: 9.1 G/DL (ref 11.5–15.4)
MAGNESIUM SERPL-MCNC: 2.4 MG/DL (ref 1.6–2.6)
MCH RBC QN AUTO: 33 PG (ref 26.8–34.3)
MCHC RBC AUTO-ENTMCNC: 32 G/DL (ref 31.4–37.4)
MCV RBC AUTO: 103 FL (ref 82–98)
NRBC BLD AUTO-RTO: 0 /100 WBCS
PLATELET # BLD AUTO: 147 THOUSANDS/UL (ref 149–390)
PMV BLD AUTO: 10.6 FL (ref 8.9–12.7)
POTASSIUM SERPL-SCNC: 3.7 MMOL/L (ref 3.5–5.3)
RBC # BLD AUTO: 2.76 MILLION/UL (ref 3.81–5.12)
SODIUM SERPL-SCNC: 135 MMOL/L (ref 136–145)
WBC # BLD AUTO: 6.8 THOUSAND/UL (ref 4.31–10.16)

## 2022-07-04 PROCEDURE — 82948 REAGENT STRIP/BLOOD GLUCOSE: CPT

## 2022-07-04 PROCEDURE — 99232 SBSQ HOSP IP/OBS MODERATE 35: CPT | Performed by: STUDENT IN AN ORGANIZED HEALTH CARE EDUCATION/TRAINING PROGRAM

## 2022-07-04 PROCEDURE — 83735 ASSAY OF MAGNESIUM: CPT | Performed by: STUDENT IN AN ORGANIZED HEALTH CARE EDUCATION/TRAINING PROGRAM

## 2022-07-04 PROCEDURE — 85027 COMPLETE CBC AUTOMATED: CPT | Performed by: STUDENT IN AN ORGANIZED HEALTH CARE EDUCATION/TRAINING PROGRAM

## 2022-07-04 PROCEDURE — 80048 BASIC METABOLIC PNL TOTAL CA: CPT | Performed by: STUDENT IN AN ORGANIZED HEALTH CARE EDUCATION/TRAINING PROGRAM

## 2022-07-04 RX ADMIN — POTASSIUM CHLORIDE 40 MEQ: 1500 TABLET, EXTENDED RELEASE ORAL at 17:09

## 2022-07-04 RX ADMIN — POTASSIUM CHLORIDE 40 MEQ: 1500 TABLET, EXTENDED RELEASE ORAL at 08:54

## 2022-07-04 RX ADMIN — LEVOTHYROXINE SODIUM 150 MCG: 150 TABLET ORAL at 05:09

## 2022-07-04 RX ADMIN — DOCUSATE SODIUM AND SENNOSIDES 1 TABLET: 8.6; 5 TABLET, FILM COATED ORAL at 08:54

## 2022-07-04 RX ADMIN — INSULIN LISPRO 1 UNITS: 100 INJECTION, SOLUTION INTRAVENOUS; SUBCUTANEOUS at 11:39

## 2022-07-04 RX ADMIN — WARFARIN SODIUM 3 MG: 3 TABLET ORAL at 17:09

## 2022-07-04 RX ADMIN — SODIUM CHLORIDE 3 G: 9 INJECTION, SOLUTION INTRAVENOUS at 11:35

## 2022-07-04 RX ADMIN — SODIUM CHLORIDE 3 G: 9 INJECTION, SOLUTION INTRAVENOUS at 17:08

## 2022-07-04 RX ADMIN — METOPROLOL SUCCINATE 100 MG: 100 TABLET, EXTENDED RELEASE ORAL at 08:54

## 2022-07-04 RX ADMIN — DOCUSATE SODIUM AND SENNOSIDES 1 TABLET: 8.6; 5 TABLET, FILM COATED ORAL at 17:09

## 2022-07-04 RX ADMIN — Medication 15 MG/HR: at 11:37

## 2022-07-04 RX ADMIN — SODIUM CHLORIDE 3 G: 9 INJECTION, SOLUTION INTRAVENOUS at 23:31

## 2022-07-04 RX ADMIN — CEFEPIME HYDROCHLORIDE 1000 MG: 1 INJECTION, SOLUTION INTRAVENOUS at 09:14

## 2022-07-04 NOTE — PLAN OF CARE
Problem: Potential for Falls  Goal: Patient will remain free of falls  Description: INTERVENTIONS:  - Educate patient/family on patient safety including physical limitations  - Instruct patient to call for assistance with activity   - Consult OT/PT to assist with strengthening/mobility   - Keep Call bell within reach  - Keep bed low and locked with side rails adjusted as appropriate  - Keep care items and personal belongings within reach  - Initiate and maintain comfort rounds  - Make Fall Risk Sign visible to staff  - Offer Toileting every    Hours, in advance of need  - Initiate/Maintain   alarm  - Obtain necessary fall risk management equipment:  - Apply yellow socks and bracelet for high fall risk patients  - Consider moving patient to room near nurses station  Outcome: Progressing     Problem: Nutrition/Hydration-ADULT  Goal: Nutrient/Hydration intake appropriate for improving, restoring or maintaining nutritional needs  Description: Monitor and assess patient's nutrition/hydration status for malnutrition  Collaborate with interdisciplinary team and initiate plan and interventions as ordered  Monitor patient's weight and dietary intake as ordered or per policy  Utilize nutrition screening tool and intervene as necessary  Determine patient's food preferences and provide high-protein, high-caloric foods as appropriate       INTERVENTIONS:  - Monitor oral intake, urinary output, labs, and treatment plans  - Assess nutrition and hydration status and recommend course of action  - Evaluate amount of meals eaten  - Assist patient with eating if necessary   - Allow adequate time for meals  - Recommend/ encourage appropriate diets, oral nutritional supplements, and vitamin/mineral supplements  - Order, calculate, and assess calorie counts as needed  - Recommend, monitor, and adjust tube feedings and TPN/PPN based on assessed needs  - Assess need for intravenous fluids  - Provide specific nutrition/hydration education as appropriate  - Include patient/family/caregiver in decisions related to nutrition  Outcome: Progressing     Problem: MOBILITY - ADULT  Goal: Maintain or return to baseline ADL function  Description: INTERVENTIONS:  -  Assess patient's ability to carry out ADLs; assess patient's baseline for ADL function and identify physical deficits which impact ability to perform ADLs (bathing, care of mouth/teeth, toileting, grooming, dressing, etc )  - Assess/evaluate cause of self-care deficits   - Assess range of motion  - Assess patient's mobility; develop plan if impaired  - Assess patient's need for assistive devices and provide as appropriate  - Encourage maximum independence but intervene and supervise when necessary  - Involve family in performance of ADLs  - Assess for home care needs following discharge   - Consider OT consult to assist with ADL evaluation and planning for discharge  - Provide patient education as appropriate  Outcome: Progressing  Goal: Maintains/Returns to pre admission functional level  Description: INTERVENTIONS:  - Perform BMAT or MOVE assessment daily    - Set and communicate daily mobility goal to care team and patient/family/caregiver  - Collaborate with rehabilitation services on mobility goals if consulted  - Perform Range of Motion    times a day  - Reposition patient every    hours    - Dangle patient    times a day  - Stand patient    times a day  - Ambulate patient    times a day  - Out of bed to chair      times a day   - Out of bed for meals    times a day  - Out of bed for toileting  - Record patient progress and toleration of activity level   Outcome: Progressing     Problem: Prexisting or High Potential for Compromised Skin Integrity  Goal: Skin integrity is maintained or improved  Description: INTERVENTIONS:  - Identify patients at risk for skin breakdown  - Assess and monitor skin integrity  - Assess and monitor nutrition and hydration status  - Monitor labs   - Assess for incontinence   - Turn and reposition patient  - Assist with mobility/ambulation  - Relieve pressure over bony prominences  - Avoid friction and shearing  - Provide appropriate hygiene as needed including keeping skin clean and dry  - Evaluate need for skin moisturizer/barrier cream  - Collaborate with interdisciplinary team   - Patient/family teaching  - Consider wound care consult   Outcome: Progressing     Problem: CARDIOVASCULAR - ADULT  Goal: Maintains optimal cardiac output and hemodynamic stability  Description: INTERVENTIONS:  - Monitor I/O, vital signs and rhythm  - Monitor for S/S and trends of decreased cardiac output  - Administer and titrate ordered vasoactive medications to optimize hemodynamic stability  - Assess quality of pulses, skin color and temperature  - Assess for signs of decreased coronary artery perfusion  - Instruct patient to report change in severity of symptoms  Outcome: Progressing  Goal: Absence of cardiac dysrhythmias or at baseline rhythm  Description: INTERVENTIONS:  - Continuous cardiac monitoring, vital signs, obtain 12 lead EKG if ordered  - Administer antiarrhythmic and heart rate control medications as ordered  - Monitor electrolytes and administer replacement therapy as ordered  Outcome: Progressing     Problem: GENITOURINARY - ADULT  Goal: Maintains or returns to baseline urinary function  Description: INTERVENTIONS:  - Assess urinary function  - Encourage oral fluids to ensure adequate hydration if ordered  - Administer IV fluids as ordered to ensure adequate hydration  - Administer ordered medications as needed  - Offer frequent toileting  - Follow urinary retention protocol if ordered  Outcome: Progressing  Goal: Absence of urinary retention  Description: INTERVENTIONS:  - Assess patients ability to void and empty bladder  - Monitor I/O  - Bladder scan as needed  - Discuss with physician/AP medications to alleviate retention as needed  - Discuss catheterization for long term situations as appropriate  Outcome: Progressing  Goal: Urinary catheter remains patent  Description: INTERVENTIONS:  - Assess patency of urinary catheter  - If patient has a chronic gutierrez, consider changing catheter if non-functioning  - Follow guidelines for intermittent irrigation of non-functioning urinary catheter  Outcome: Progressing     Problem: METABOLIC, FLUID AND ELECTROLYTES - ADULT  Goal: Electrolytes maintained within normal limits  Description: INTERVENTIONS:  - Monitor labs and assess patient for signs and symptoms of electrolyte imbalances  - Administer electrolyte replacement as ordered  - Monitor response to electrolyte replacements, including repeat lab results as appropriate  - Instruct patient on fluid and nutrition as appropriate  Outcome: Progressing  Goal: Fluid balance maintained  Description: INTERVENTIONS:  - Monitor labs   - Monitor I/O and WT  - Instruct patient on fluid and nutrition as appropriate  - Assess for signs & symptoms of volume excess or deficit  Outcome: Progressing  Goal: Glucose maintained within target range  Description: INTERVENTIONS:  - Monitor Blood Glucose as ordered  - Assess for signs and symptoms of hyperglycemia and hypoglycemia  - Administer ordered medications to maintain glucose within target range  - Assess nutritional intake and initiate nutrition service referral as needed  Outcome: Progressing

## 2022-07-04 NOTE — ASSESSMENT & PLAN NOTE
Baseline: 1 1 to 1 2  Possibly cardiorenal  It continues to downtrend with diuretics  Improving   - Continue lasix drip      Recent Labs     07/02/22  0547 07/03/22  0622 07/04/22  0458   BUN 30* 31* 30*   CREATININE 1 71* 1 62* 1 52*   EGFR 27 29 31

## 2022-07-04 NOTE — PROGRESS NOTES
Vancomycin Assessment    Twan Lewis is a 80 y o  female who is currently receiving vancomycin 1000 mg IV q 24 hrs for skin-soft tissue infection, MRSA confirmed  Relevant clinical data and objective history reviewed:  Creatinine   Date Value Ref Range Status   07/03/2022 1 62 (H) 0 60 - 1 30 mg/dL Final     Comment:     Standardized to IDMS reference method   07/02/2022 1 71 (H) 0 60 - 1 30 mg/dL Final     Comment:     Standardized to IDMS reference method   07/01/2022 1 78 (H) 0 60 - 1 30 mg/dL Final     Comment:     Specimen Icteric; Results May be Affected     /52   Pulse 67   Temp 98 5 °F (36 9 °C)   Resp 17   Ht 5' (1 524 m)   Wt 121 kg (267 lb)   SpO2 93%   BMI 52 14 kg/m²   I/O last 3 completed shifts: In: 1380 [P O :1380]  Out: 4925 [Urine:4925]  Lab Results   Component Value Date/Time    BUN 31 (H) 07/03/2022 06:22 AM    WBC 9 64 07/03/2022 06:22 AM    HGB 8 9 (L) 07/03/2022 06:22 AM    HCT 26 9 (L) 07/03/2022 06:22 AM    MCV 98 07/03/2022 06:22 AM     (L) 07/03/2022 06:22 AM     Temp Readings from Last 3 Encounters:   07/03/22 98 5 °F (36 9 °C)   05/19/22 98 1 °F (36 7 °C) (Temporal)   04/04/22 98 °F (36 7 °C)     Vancomycin Days of Therapy: 4    Assessment/Plan  The patient is currently on vancomycin utilizing scheduled dosing  Baseline risks associated with therapy include: pre-existing renal impairment and concomitant nephrotoxic medications  The patient is receiving 1000 mg IV q 24 hrs based on a goal of 10-15 (mild infection/cellulitis) ; trough on 7/3/22 at 2028 drawn after dose started and will be redrawn tomorrow  Pharmacy will continue to follow closely for s/sx of nephrotoxicity, infusion reactions, and appropriateness of therapy  BMP and CBC will be ordered per protocol  Plan for trough as patient approaches steady state, prior to the 5th  dose at approximately 2000 on 7/4/22   Pharmacy will continue to follow the patients culture results and clinical progress daily      Imani Agrawal, Pharmacist

## 2022-07-04 NOTE — ASSESSMENT & PLAN NOTE
Rate controlled  AC: On coumadin, initially supratherapeutic and required Vitamin K  Now back to therapeutic range  Home regimen of 5mg coumadin was decreased during this admission    Rate control: Metoprolol     Recent Labs     07/01/22  0947 07/02/22  0547 07/03/22  0622   INR 7 48* 6 85* 2 78*

## 2022-07-04 NOTE — QUICK NOTE
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

## 2022-07-04 NOTE — ASSESSMENT & PLAN NOTE
Lab Results   Component Value Date    HGBA1C 5 5 01/31/2018       Recent Labs     07/03/22  1108 07/03/22  1556 07/03/22  2110 07/04/22  0735   POCGLU 92 109 176* 97       Blood Sugar Average: Last 72 hrs:  · Home regimen: Diet controlled  · Continue sliding scale   Might discontinue if persistently below 180

## 2022-07-04 NOTE — PROGRESS NOTES
[address]  Progress Note - Raymond Talley 1940, 80 y o  female MRN: [id number]  Unit/Bed#: -01 Encounter: [id number]  Primary Care Provider: Doyle Collet, MD   Date and time admitted to hospital: 6/29/2022  3:01 PM    * Acute on chronic diastolic (congestive) heart failure [facility])  Assessment & Plan  Wt Readings from Last 3 Encounters:   07/04/22 119 kg (261 lb 12 8 oz)   05/19/22 126 kg (277 lb 1 9 oz)   04/04/22 117 kg (257 lb 0 9 oz)     80-year-old female presenting with acute on chronic diastolic congestive heart failure  - home regimen is Lasix 60 mg b i d  Colette Montiel - currently on a Lasix drip, Clinically overloaded still  Management per cardiology  Await for further recommendations  Cellulitis of right lower extremity  Assessment & Plan  Recurrent right thigh and leg cellulitis  Recently hospitalized with sepsis secondary to  MRSA infection right thigh  · ID recommendations appreciated  Continue Vancomycin / Cefepime  · Wound cultures growing: Proteus mirabilis, Klebsiella, and Enterococcus  Will have ID comment on her current regimen especially for Enterococcus  Sent tiger text to Dr Pacheco Amend  Cystitis  Assessment & Plan  Cultures growing Klebsiella  Completed her three day course of this indication  Diabetes mellitus [facility])  Assessment & Plan  Lab Results   Component Value Date    HGBA1C 5 5 01/31/2018       Recent Labs     07/03/22  1108 07/03/22  1556 07/03/22  2110 07/04/22  0735   POCGLU 92 109 176* 97       Blood Sugar Average: Last 72 hrs:  · Home regimen: Diet controlled  · Continue sliding scale  Might discontinue if persistently below 180    Hypothyroidism (acquired)  Assessment & Plan  · Continue home 150 mcg Synthroid    Paroxysmal A-fib (HCC)  Assessment & Plan  Rate controlled  AC: On coumadin, initially supratherapeutic and required Vitamin K  Now back to therapeutic range   Home regimen of 5mg coumadin was decreased during this admission  Rate control: Metoprolol     Recent Labs     22  0947 22  0547 22  0622   INR 7 48* 6 85* 2 78*         LEONARDO (acute kidney injury) (HCC)  Assessment & Plan  Baseline: 1 1 to 1 2  Possibly cardiorenal  It continues to downtrend with diuretics  Improving   - Continue lasix drip  Recent Labs     22  0547 22  0622 22  0458   BUN 30* 31* 30*   CREATININE 1 71* 1 62* 1 52*   EGFR 27 29 31     VTE Pharmacologic Prophylaxis:   Pharmacologic: Warfarin (Coumadin)  Mechanical VTE Prophylaxis in Place: Yes    Discussions with Specialists or Other Care Team Provider: nursing, ID    Education and Discussions with Family / Patient: patient    Time Spent for Care: 30 minutes  More than 50% of total time spent on counseling and coordination of care as described above  Current Length of Stay: 5 day(s)    Current Patient Status: Inpatient   Certification Statement: The patient will continue to require additional inpatient hospital stay due to iv antibiotics, lasix drip    Discharge Plan: active    Code Status: Level 3 - DNAR and DNI      Subjective:   Patient seen and examined at bedside  Feeling well  No new complaints overnight  Objective:     Vitals:   Temp (24hrs), Av 2 °F (36 8 °C), Min:97 7 °F (36 5 °C), Max:98 6 °F (37 °C)    Temp:  [97 7 °F (36 5 °C)-98 6 °F (37 °C)] 98 3 °F (36 8 °C)  HR:  [65-87] 65  Resp:  [17-19] 18  BP: ()/(52-59) 106/53  SpO2:  [93 %-95 %] 95 %  Body mass index is 51 13 kg/m²  Input and Output Summary (last 24 hours): Intake/Output Summary (Last 24 hours) at 2022 0940  Last data filed at 2022 0700  Gross per 24 hour   Intake 600 ml   Output 3350 ml   Net -2750 ml       Physical Exam:     Physical Exam  Vitals reviewed  Constitutional:       General: She is not in acute distress  HENT:      Head: Normocephalic        Nose: Nose normal       Mouth/Throat:      Mouth: Mucous membranes are moist    Eyes:      General: No scleral icterus  Cardiovascular:      Rate and Rhythm: Normal rate  Rhythm irregular  Heart sounds: Murmur heard  Pulmonary:      Effort: Pulmonary effort is normal  No respiratory distress  Breath sounds: No wheezing or rales  Abdominal:      Palpations: Abdomen is soft  Tenderness: There is no abdominal tenderness  Musculoskeletal:      Right lower leg: Edema present  Left lower leg: Edema present  Skin:     General: Skin is warm  Neurological:      Mental Status: She is alert  Mental status is at baseline  Psychiatric:         Mood and Affect: Mood normal          Behavior: Behavior normal        Additional Data:     Labs:    Results from last 7 days   Lab Units 07/04/22  0458 07/03/22  0622   WBC Thousand/uL 6 80 9 64   HEMOGLOBIN g/dL 9 1* 8 9*   HEMATOCRIT % 28 4* 26 9*   PLATELETS Thousands/uL 147* 148*   LYMPHO PCT %  --  18   MONO PCT %  --  3*   EOS PCT %  --  1     Results from last 7 days   Lab Units 07/04/22 0458 06/30/22  0551 06/29/22  1534   SODIUM mmol/L 135*   < > 135*   POTASSIUM mmol/L 3 7   < > 3 5   CHLORIDE mmol/L 102   < > 100   CO2 mmol/L 24   < > 26   BUN mg/dL 30*   < > 26*   CREATININE mg/dL 1 52*   < > 1 67*   ANION GAP mmol/L 9   < > 9   CALCIUM mg/dL 7 7*   < > 8 0*   ALBUMIN g/dL  --   --  2 0*   TOTAL BILIRUBIN mg/dL  --   --  2 89*   ALK PHOS U/L  --   --  193*   ALT U/L  --   --  27   AST U/L  --   --  48*   GLUCOSE RANDOM mg/dL 87   < > 136    < > = values in this interval not displayed       Results from last 7 days   Lab Units 07/03/22  0622   INR  2 78*     Results from last 7 days   Lab Units 07/04/22  0735 07/03/22  2110 07/03/22  1556 07/03/22  1108 07/03/22  0758 07/02/22  2109 07/02/22  1622 07/02/22  1118 07/02/22  0716 07/01/22  2133 07/01/22  1613   POC GLUCOSE mg/dl 97 176* 109 92 84 111 184* 167* 81 124 121         Results from last 7 days   Lab Units 06/29/22  1534   PROCALCITONIN ng/ml 0 57*           * I Have Reviewed All Lab Data Listed Above  * Additional Pertinent Lab Tests Reviewed: [place] [facility] Admission Reviewed    Imaging:    Imaging Reports Reviewed Today Include: no new imaging    Recent Cultures (last 7 days):     Results from last 7 days   Lab Units 06/30/22  1851 06/30/22  1839 06/30/22  1020 06/30/22  1019   BLOOD CULTURE   --   --  No Growth at 72 hrs  No Growth at 72 hrs     GRAM STAIN RESULT  2+ Gram positive cocci in pairs*  1+ Gram negative rods*  No polys seen*  --   --   --    URINE CULTURE   --  >100,000 cfu/ml Klebsiella pneumoniae*  --   --    WOUND CULTURE  4+ Growth of Proteus mirabilis*  3+ Growth of Klebsiella pneumoniae*  3+ Growth of Enterococcus faecalis*  3+ Growth of Staphylococcus coagulase negative*  --   --   --        Last 24 Hours Medication List:   Current Facility-Administered Medications   Medication Dose Route Frequency Provider Last Rate    acetaminophen  650 mg Oral Q6H PRN LEXIE Resenedz      cefepime  1,000 mg Intravenous Q12H TALAT ResendezNP 1,000 mg (07/04/22 0914)    furosemide  15 mg/hr Intravenous Continuous [facility] Janene Purvis PA-C 15 mg/hr (07/03/22 1202)    insulin lispro  1-6 Units Subcutaneous TID AC LEXIE Resendez      insulin lispro  1-6 Units Subcutaneous HS LEXIE Resendez      levothyroxine  150 mcg Oral Early Morning LEXIE Resendez      metoprolol succinate  100 mg Oral Daily [place], [place]      polyethylene glycol  17 g Oral Daily PRN LEXIE Resendez      potassium chloride  40 mEq Oral BID LEXIE Resendez      senna-docusate sodium  1 tablet Oral BID LEXIE Resendez      vancomycin  12 5 mg/kg (Adjusted) Intravenous Q24H Ashkan Meraz, TALATNP 200 mL/hr at 07/03/22 2103    warfarin  3 mg Oral Daily (warfarin) Padma Monae MD          Today, Patient Was Seen By: Aryan Lund MD    ** Please Note: Dictation voice to text software may have been used in the creation of this document   **

## 2022-07-04 NOTE — ASSESSMENT & PLAN NOTE
Recurrent right thigh and leg cellulitis  Recently hospitalized with sepsis secondary to  MRSA infection right thigh  · ID recommendations appreciated  Continue Vancomycin / Cefepime  · Wound cultures growing: Proteus mirabilis, Klebsiella, and Enterococcus  Will have ID comment on her current regimen especially for Enterococcus  Sent tiger text to Dr Srinivas Farrell

## 2022-07-04 NOTE — ASSESSMENT & PLAN NOTE
Wt Readings from Last 3 Encounters:   07/04/22 119 kg (261 lb 12 8 oz)   05/19/22 126 kg (277 lb 1 9 oz)   04/04/22 117 kg (257 lb 0 9 oz)     35-year-old female presenting with acute on chronic diastolic congestive heart failure  - home regimen is Lasix 60 mg b i d  Harry Harper - currently on a Lasix drip, Clinically overloaded still  Management per cardiology  Await for further recommendations

## 2022-07-04 NOTE — PLAN OF CARE
Problem: MOBILITY - ADULT  Goal: Maintain or return to baseline ADL function  Description: INTERVENTIONS:  -  Assess patient's ability to carry out ADLs; assess patient's baseline for ADL function and identify physical deficits which impact ability to perform ADLs (bathing, care of mouth/teeth, toileting, grooming, dressing, etc )  - Assess/evaluate cause of self-care deficits   - Assess range of motion  - Assess patient's mobility; develop plan if impaired  - Assess patient's need for assistive devices and provide as appropriate  - Encourage maximum independence but intervene and supervise when necessary  - Involve family in performance of ADLs  - Assess for home care needs following discharge   - Consider OT consult to assist with ADL evaluation and planning for discharge  - Provide patient education as appropriate  Outcome: Progressing  Goal: Maintains/Returns to pre admission functional level  Description: INTERVENTIONS:  - Perform BMAT or MOVE assessment daily    - Set and communicate daily mobility goal to care team and patient/family/caregiver  - Collaborate with rehabilitation services on mobility goals if consulted  - Perform Range of Motion  times a day  - Reposition patient every  hours    - Dangle patient  times a day  - Stand patient  times a day  - Ambulate patient times a day  - Out of bed to chair  times a day   - Out of bed for meals  times a day  - Out of bed for toileting  - Record patient progress and toleration of activity level   Outcome: Progressing

## 2022-07-05 LAB
ANION GAP SERPL CALCULATED.3IONS-SCNC: 8 MMOL/L (ref 4–13)
BACTERIA BLD CULT: NORMAL
BACTERIA BLD CULT: NORMAL
BUN SERPL-MCNC: 31 MG/DL (ref 5–25)
CALCIUM SERPL-MCNC: 8 MG/DL (ref 8.3–10.1)
CHLORIDE SERPL-SCNC: 102 MMOL/L (ref 100–108)
CO2 SERPL-SCNC: 29 MMOL/L (ref 21–32)
CREAT SERPL-MCNC: 1.6 MG/DL (ref 0.6–1.3)
ERYTHROCYTE [DISTWIDTH] IN BLOOD BY AUTOMATED COUNT: 17.1 % (ref 11.6–15.1)
GFR SERPL CREATININE-BSD FRML MDRD: 29 ML/MIN/1.73SQ M
GLUCOSE SERPL-MCNC: 101 MG/DL (ref 65–140)
GLUCOSE SERPL-MCNC: 166 MG/DL (ref 65–140)
GLUCOSE SERPL-MCNC: 90 MG/DL (ref 65–140)
GLUCOSE SERPL-MCNC: 90 MG/DL (ref 65–140)
GLUCOSE SERPL-MCNC: 95 MG/DL (ref 65–140)
HCT VFR BLD AUTO: 31.9 % (ref 34.8–46.1)
HGB BLD-MCNC: 10.5 G/DL (ref 11.5–15.4)
INR PPP: 1.57 (ref 0.84–1.19)
MAGNESIUM SERPL-MCNC: 2.3 MG/DL (ref 1.6–2.6)
MCH RBC QN AUTO: 32.7 PG (ref 26.8–34.3)
MCHC RBC AUTO-ENTMCNC: 32.9 G/DL (ref 31.4–37.4)
MCV RBC AUTO: 99 FL (ref 82–98)
PLATELET # BLD AUTO: 166 THOUSANDS/UL (ref 149–390)
PMV BLD AUTO: 10 FL (ref 8.9–12.7)
POTASSIUM SERPL-SCNC: 3.4 MMOL/L (ref 3.5–5.3)
PROTHROMBIN TIME: 18.5 SECONDS (ref 11.6–14.5)
RBC # BLD AUTO: 3.21 MILLION/UL (ref 3.81–5.12)
SODIUM SERPL-SCNC: 139 MMOL/L (ref 136–145)
WBC # BLD AUTO: 7.27 THOUSAND/UL (ref 4.31–10.16)

## 2022-07-05 PROCEDURE — 82948 REAGENT STRIP/BLOOD GLUCOSE: CPT

## 2022-07-05 PROCEDURE — 97535 SELF CARE MNGMENT TRAINING: CPT

## 2022-07-05 PROCEDURE — 85610 PROTHROMBIN TIME: CPT | Performed by: STUDENT IN AN ORGANIZED HEALTH CARE EDUCATION/TRAINING PROGRAM

## 2022-07-05 PROCEDURE — 80048 BASIC METABOLIC PNL TOTAL CA: CPT | Performed by: STUDENT IN AN ORGANIZED HEALTH CARE EDUCATION/TRAINING PROGRAM

## 2022-07-05 PROCEDURE — 83735 ASSAY OF MAGNESIUM: CPT | Performed by: STUDENT IN AN ORGANIZED HEALTH CARE EDUCATION/TRAINING PROGRAM

## 2022-07-05 PROCEDURE — 85027 COMPLETE CBC AUTOMATED: CPT | Performed by: STUDENT IN AN ORGANIZED HEALTH CARE EDUCATION/TRAINING PROGRAM

## 2022-07-05 PROCEDURE — 99233 SBSQ HOSP IP/OBS HIGH 50: CPT | Performed by: NURSE PRACTITIONER

## 2022-07-05 RX ADMIN — LEVOTHYROXINE SODIUM 150 MCG: 150 TABLET ORAL at 05:19

## 2022-07-05 RX ADMIN — POTASSIUM CHLORIDE 40 MEQ: 1500 TABLET, EXTENDED RELEASE ORAL at 08:19

## 2022-07-05 RX ADMIN — SODIUM CHLORIDE 3 G: 9 INJECTION, SOLUTION INTRAVENOUS at 16:31

## 2022-07-05 RX ADMIN — SODIUM CHLORIDE 3 G: 9 INJECTION, SOLUTION INTRAVENOUS at 11:35

## 2022-07-05 RX ADMIN — Medication 15 MG/HR: at 22:02

## 2022-07-05 RX ADMIN — POTASSIUM CHLORIDE 40 MEQ: 1500 TABLET, EXTENDED RELEASE ORAL at 16:32

## 2022-07-05 RX ADMIN — INSULIN LISPRO 1 UNITS: 100 INJECTION, SOLUTION INTRAVENOUS; SUBCUTANEOUS at 11:33

## 2022-07-05 RX ADMIN — DOCUSATE SODIUM AND SENNOSIDES 1 TABLET: 8.6; 5 TABLET, FILM COATED ORAL at 16:32

## 2022-07-05 RX ADMIN — WARFARIN SODIUM 3 MG: 3 TABLET ORAL at 16:32

## 2022-07-05 RX ADMIN — DOCUSATE SODIUM AND SENNOSIDES 1 TABLET: 8.6; 5 TABLET, FILM COATED ORAL at 08:19

## 2022-07-05 RX ADMIN — SODIUM CHLORIDE 3 G: 9 INJECTION, SOLUTION INTRAVENOUS at 05:19

## 2022-07-05 NOTE — ASSESSMENT & PLAN NOTE
Recent Labs     07/03/22  0622 07/04/22  0458 07/05/22  0736   BUN 31* 30* 31*   CREATININE 1 62* 1 52* 1 60*   EGFR 29 31 29     · Baseline: 1 1 to 1 2   Possibly cardiorenal  · Continue Lasix infusion  · Monitor creatinine  · Avoid nephrotoxins and hypotension

## 2022-07-05 NOTE — ASSESSMENT & PLAN NOTE
Recent Labs     07/03/22  0622 07/05/22  0736   INR 2 78* 1 57*     · Rate controlled with metoprolol   · Anticoagulation with coumadin, initially supratherapeutic and required Vitamin K  Now back to therapeutic range   Home regimen of 5mg coumadin was decreased during this admission  · Daily INR

## 2022-07-05 NOTE — PLAN OF CARE
Problem: OCCUPATIONAL THERAPY ADULT  Goal: Performs self-care activities at highest level of function for planned discharge setting  See evaluation for individualized goals  Description: Treatment Interventions: ADL retraining, Functional transfer training, UE strengthening/ROM, Endurance training, Patient/family training, Equipment evaluation/education, Neuromuscular reeducation, Compensatory technique education, Continued evaluation, Energy conservation, Activityengagement          See flowsheet documentation for full assessment, interventions and recommendations  Outcome: Progressing  Note: Limitation: Decreased ADL status, Decreased UE ROM, Decreased UE strength, Decreased endurance, Decreased self-care trans, Decreased high-level ADLs  Prognosis: Good  Assessment: Pt seen for OT treatment day 1  Pt willing and able to participate in treatment focused on bed mobility, ADLs and functional transfers  Pt with decreased assistance to complete bed mobility this date requiring SBA  Pt completing UB bathing Arnoldo and dressing SUP  Pt requiring Arnoldo for LB bathing due to slight bowel incontinence  Pt completing all transfers with SBA and use of RW  Pt tolerating treatment well taking rest breaks PRN  Pt requires continued OT treatment to increase strength, endurance and balance for improved IND and safety during ADLs and transfers  Discharge recommendation continues to be home with [facility] and family support  Pt sitting in recliner with chair alarm activated, call bell placed within reach and all needs met following session       OT Discharge Recommendation: Home with home health rehabilitation

## 2022-07-05 NOTE — ASSESSMENT & PLAN NOTE
Wt Readings from Last 3 Encounters:   07/05/22 119 kg (262 lb 6 4 oz)   05/19/22 126 kg (277 lb 1 9 oz)   04/04/22 117 kg (257 lb 0 9 oz)     · Presents with acute on chronic CHF  · Home regime is Lasix 60 mg twice daily  · On Lasix infusion per cardiology   · Daily weights and I & Os

## 2022-07-05 NOTE — PLAN OF CARE
Problem: Potential for Falls  Goal: Patient will remain free of falls  Description: INTERVENTIONS:  - Educate patient/family on patient safety including physical limitations  - Instruct patient to call for assistance with activity   - Consult OT/PT to assist with strengthening/mobility   - Keep Call bell within reach  - Keep bed low and locked with side rails adjusted as appropriate  - Keep care items and personal belongings within reach  - Initiate and maintain comfort rounds  - Make Fall Risk Sign visible to staff  - Apply yellow socks and bracelet for high fall risk patients  - Consider moving patient to room near nurses station  Outcome: Progressing     Problem: Nutrition/Hydration-ADULT  Goal: Nutrient/Hydration intake appropriate for improving, restoring or maintaining nutritional needs  Description: Monitor and assess patient's nutrition/hydration status for malnutrition  Collaborate with interdisciplinary team and initiate plan and interventions as ordered  Monitor patient's weight and dietary intake as ordered or per policy  Utilize nutrition screening tool and intervene as necessary  Determine patient's food preferences and provide high-protein, high-caloric foods as appropriate       INTERVENTIONS:  - Monitor oral intake, urinary output, labs, and treatment plans  - Assess nutrition and hydration status and recommend course of action  - Evaluate amount of meals eaten  - Assist patient with eating if necessary   - Allow adequate time for meals  - Recommend/ encourage appropriate diets, oral nutritional supplements, and vitamin/mineral supplements  - Order, calculate, and assess calorie counts as needed  - Recommend, monitor, and adjust tube feedings and TPN/PPN based on assessed needs  - Assess need for intravenous fluids  - Provide specific nutrition/hydration education as appropriate  - Include patient/family/caregiver in decisions related to nutrition  Outcome: Progressing     Problem: MOBILITY - ADULT  Goal: Maintain or return to baseline ADL function  Description: INTERVENTIONS:  -  Assess patient's ability to carry out ADLs; assess patient's baseline for ADL function and identify physical deficits which impact ability to perform ADLs (bathing, care of mouth/teeth, toileting, grooming, dressing, etc )  - Assess/evaluate cause of self-care deficits   - Assess range of motion  - Assess patient's mobility; develop plan if impaired  - Assess patient's need for assistive devices and provide as appropriate  - Encourage maximum independence but intervene and supervise when necessary  - Involve family in performance of ADLs  - Assess for home care needs following discharge   - Consider OT consult to assist with ADL evaluation and planning for discharge  - Provide patient education as appropriate  Outcome: Progressing  Goal: Maintains/Returns to pre admission functional level  Description: INTERVENTIONS:  - Perform BMAT or MOVE assessment daily    - Set and communicate daily mobility goal to care team and patient/family/caregiver     - Collaborate with rehabilitation services on mobility goals if consulted  - Out of bed for toileting  - Record patient progress and toleration of activity level   Outcome: Progressing     Problem: Prexisting or High Potential for Compromised Skin Integrity  Goal: Skin integrity is maintained or improved  Description: INTERVENTIONS:  - Identify patients at risk for skin breakdown  - Assess and monitor skin integrity  - Assess and monitor nutrition and hydration status  - Monitor labs   - Assess for incontinence   - Turn and reposition patient  - Assist with mobility/ambulation  - Relieve pressure over bony prominences  - Avoid friction and shearing  - Provide appropriate hygiene as needed including keeping skin clean and dry  - Evaluate need for skin moisturizer/barrier cream  - Collaborate with interdisciplinary team   - Patient/family teaching  - Consider wound care consult   Outcome: Progressing     Problem: CARDIOVASCULAR - ADULT  Goal: Maintains optimal cardiac output and hemodynamic stability  Description: INTERVENTIONS:  - Monitor I/O, vital signs and rhythm  - Monitor for S/S and trends of decreased cardiac output  - Administer and titrate ordered vasoactive medications to optimize hemodynamic stability  - Assess quality of pulses, skin color and temperature  - Assess for signs of decreased coronary artery perfusion  - Instruct patient to report change in severity of symptoms  Outcome: Progressing  Goal: Absence of cardiac dysrhythmias or at baseline rhythm  Description: INTERVENTIONS:  - Continuous cardiac monitoring, vital signs, obtain 12 lead EKG if ordered  - Administer antiarrhythmic and heart rate control medications as ordered  - Monitor electrolytes and administer replacement therapy as ordered  Outcome: Progressing     Problem: GENITOURINARY - ADULT  Goal: Maintains or returns to baseline urinary function  Description: INTERVENTIONS:  - Assess urinary function  - Encourage oral fluids to ensure adequate hydration if ordered  - Administer IV fluids as ordered to ensure adequate hydration  - Administer ordered medications as needed  - Offer frequent toileting  - Follow urinary retention protocol if ordered  Outcome: Progressing  Goal: Absence of urinary retention  Description: INTERVENTIONS:  - Assess patients ability to void and empty bladder  - Monitor I/O  - Bladder scan as needed  - Discuss with physician/AP medications to alleviate retention as needed  - Discuss catheterization for long term situations as appropriate  Outcome: Progressing  Goal: Urinary catheter remains patent  Description: INTERVENTIONS:  - Assess patency of urinary catheter  - If patient has a chronic gutierrez, consider changing catheter if non-functioning  - Follow guidelines for intermittent irrigation of non-functioning urinary catheter  Outcome: Progressing     Problem: METABOLIC, FLUID AND ELECTROLYTES - ADULT  Goal: Electrolytes maintained within normal limits  Description: INTERVENTIONS:  - Monitor labs and assess patient for signs and symptoms of electrolyte imbalances  - Administer electrolyte replacement as ordered  - Monitor response to electrolyte replacements, including repeat lab results as appropriate  - Instruct patient on fluid and nutrition as appropriate  Outcome: Progressing  Goal: Fluid balance maintained  Description: INTERVENTIONS:  - Monitor labs   - Monitor I/O and WT  - Instruct patient on fluid and nutrition as appropriate  - Assess for signs & symptoms of volume excess or deficit  Outcome: Progressing  Goal: Glucose maintained within target range  Description: INTERVENTIONS:  - Monitor Blood Glucose as ordered  - Assess for signs and symptoms of hyperglycemia and hypoglycemia  - Administer ordered medications to maintain glucose within target range  - Assess nutritional intake and initiate nutrition service referral as needed  Outcome: Progressing

## 2022-07-05 NOTE — PROGRESS NOTES
REQUIRED DOCUMENTATION:      1  This service was provided via Telemedicine  2  Provider located at [facility]  3  TeleMed provider: Aris Petersen MD  4  Identify all parties in room with patient during tele consult:  RN  5  After connecting through [employer] by [employer], patient was identified by name and date of birth and assistant checked wristband  Patient was then informed that this was a Telemedicine visit and that the exam was being conducted confidentially over secure lines  My office door was closed  No one else was in the room  Patient acknowledged consent and understanding of privacy and security of the Telemedicine visit, and gave us permission to have the assistant stay in the room in order to assist with the history and to conduct the exam   I informed the patient that I have reviewed their record in Epic and presented the opportunity for them to ask any questions regarding the visit today  The patient agreed to participate  Progress Note - Infectious Disease   Evans Proctor 80 y o  female MRN: [id number]  Unit/Bed#: -01 Encounter: [id number]    Impression/Plan:    1   Recurrent right thigh and leg cellulitis  - Hx infected R thigh hematoma s/p I and D on 3/31, cx with MRSA  - Recurrent infection in the setting of open thigh wound, worsening venous edema, morbid obesity, diabetes  - Has underlying R total hip and knee arthroplasty but CT without abscess/ deep infection  - Wound cx with growth of Proteus mirabilis, Klebsiella pneumoniae, coagulase negative staph, Enterococcus faecalis  - Clinically improving, resolving cellulitis, afebrile with resolution of leukocytosis   -7/4 discussed with SLIM, switch from vancomycin/Cefepime to Unasyn     · Continue IV Unasyn, dosed 3g IV q8hr for renal function  · Continue antibiotics for a 7 day total course, through 7/7/22  · If discharged prior to completion of antibiotics, recommend Augmentin 875-125mg PO BID to complete course  · Continue aggressive diuresis for management of CHF  · Local wound care  · Discussed lower extremity skin care, compression stockings, limb elevation  · Recommend weight loss and strict glycemic control  · Discussed natural history of cellulitis and discussed with patient that she is at risk for recurrent cellulitis/bacteremia if underlying risk factors cannot be modified     2  Superficial sacral wounds  - Noted to have foul smelling drainage, erythema, no evidence of abscess clinically or on imaging     · Antibiotics as above  · Recommend wound care evaluation  · Offloading, barrier cream to minimize moisture associated damage, management of edema     3  Acute on chronic diastolic CHF  - Risk factor for worsening venous edema/recurrent cellulitis     · Management per primary team     4  Acute kidney injury on CKD  - creatinine stable     · Continue renal dosing of abx     5  Type 2 diabetes  - Risk factor for infection, poor wound healing     · Management per primary team, repeat A1C    6  Penicillin allergy   -40 years ago, possible hives  She is currently tolerating Ampicillin-sulbactam  Allergy list updated      Above management plan discussed in detail with the primary team   Okay for discharge from ID perspective  ID will sign off, please call with questions  I spent 35 minutes in evaluation of the patient of which 20 minutes was in counseling/coordination of care    Antibiotics:  Unasyn day 2  Abx day 5    Subjective:  Patient feeling well, no fever or chills  Pain in hip is resolved  No acute complaints  Tolerating antibiotics, no nausea, vomiting, rash      Objective:  Vitals:  Temp:  [97 6 °F (36 4 °C)-98 2 °F (36 8 °C)] 98 1 °F (36 7 °C)  HR:  [59-72] 72  Resp:  [15-16] 16  BP: ()/(55-76) 92/55  SpO2:  [92 %-95 %] 93 %  Temp (24hrs), Av 9 °F (36 6 °C), Min:97 6 °F (36 4 °C), Max:98 2 °F (36 8 °C)  Current: Temperature: 98 1 °F (36 7 °C)    Physical Exam:     Documented physical exam has been primarily done by the patient's nurse and/or the primary service due to limited examination abilities on telemedicine     General Appearance:  Alert, interactive, nontoxic, no acute distress  Throat: Oropharynx moist without lesions  Lungs:   Clear to auscultation bilaterally; no wheezes, rhonchi or rales; respirations unlabored   Heart:  RRR; no murmur, rub or gallop   Abdomen:   Soft, non-tender, non-distended, positive bowel sounds  Extremities: No clubbing, cyanosis or edema   Skin: Superficial right thigh wound, no surrounding erythema or purulent drainage       Labs: All pertinent labs and imaging studies were personally reviewed  Results from last 7 days   Lab Units 07/05/22  0736 07/04/22  0458 07/03/22  0622   WBC Thousand/uL 7 27 6 80 9 64   HEMOGLOBIN g/dL 10 5* 9 1* 8 9*   PLATELETS Thousands/uL 166 147* 148*     Results from last 7 days   Lab Units 07/05/22  0736 07/04/22  0458 07/03/22  0622 06/30/22  0551 06/29/22  1534   SODIUM mmol/L 139 135* 136   < > 135*   POTASSIUM mmol/L 3 4* 3 7 3 7   < > 3 5   CHLORIDE mmol/L 102 102 102   < > 100   CO2 mmol/L 29 24 28   < > 26   BUN mg/dL 31* 30* 31*   < > 26*   CREATININE mg/dL 1 60* 1 52* 1 62*   < > 1 67*   EGFR ml/min/1 73sq m 29 31 29   < > 28   CALCIUM mg/dL 8 0* 7 7* 7 5*   < > 8 0*   AST U/L  --   --   --   --  48*   ALT U/L  --   --   --   --  27   ALK PHOS U/L  --   --   --   --  193*    < > = values in this interval not displayed  Results from last 7 days   Lab Units 06/29/22  1534   PROCALCITONIN ng/ml 0 57*             Results from last 7 days   Lab Units 07/01/22  0947   D-DIMER QUANTITATIVE ug/ml FEU 3 40*       Micro:  Results from last 7 days   Lab Units 06/30/22  1851 06/30/22  1839 06/30/22  1020 06/30/22  1019 06/29/22  1857   BLOOD CULTURE   --   --  No Growth After 4 Days  No Growth After 4 Days    --    GRAM STAIN RESULT  2+ Gram positive cocci in pairs*  1+ Gram negative rods*  No polys seen*  --   --   --   --    URINE CULTURE --  >100,000 cfu/ml Klebsiella pneumoniae*  --   --   --    WOUND CULTURE  4+ Growth of Proteus mirabilis*  3+ Growth of Klebsiella pneumoniae*  3+ Growth of Enterococcus faecalis*  3+ Growth of Staphylococcus coagulase negative*  --   --   --   --    MRSA CULTURE ONLY   --   --   --   --  Methicillin Resistant Staphylococcus aureus isolated*  This patient requires contact isolation precautions per [place] law  Contact precautions are not required in [place] for nasal surveillance cultures

## 2022-07-05 NOTE — ASSESSMENT & PLAN NOTE
Wt Readings from Last 3 Encounters:   07/05/22 119 kg (262 lb 6 4 oz)   05/19/22 126 kg (277 lb 1 9 oz)   04/04/22 117 kg (257 lb 0 9 oz)     · Presents with acute on chronic CHF  · Home regime is  Lasix 60 mg twice daily  · On Lasix infusion  · Daily weights and I & Os

## 2022-07-05 NOTE — ASSESSMENT & PLAN NOTE
· Bilateral buttock open areas reported by daughter, there was sanguinous drainage on sheet when patient moved into bed  · Wound care-appreciate recommendations  · Pressure offloading, Q 2 hour turns  · Also present is healing MRSA infection right thigh   Was following outpatient with Wound Care but has completed treatment  · Serial assessments

## 2022-07-05 NOTE — ASSESSMENT & PLAN NOTE
Recent Labs     07/03/22  0622 07/05/22  0736   INR 2 78* 1 57*     · Coumadin held on admission due to supratherapeutic INR, received vitamin K  · Now resolved

## 2022-07-05 NOTE — PLAN OF CARE
Problem: Nutrition/Hydration-ADULT  Goal: Nutrient/Hydration intake appropriate for improving, restoring or maintaining nutritional needs  Description: Monitor and assess patient's nutrition/hydration status for malnutrition  Collaborate with interdisciplinary team and initiate plan and interventions as ordered  Monitor patient's weight and dietary intake as ordered or per policy  Utilize nutrition screening tool and intervene as necessary  Determine patient's food preferences and provide high-protein, high-caloric foods as appropriate       INTERVENTIONS:  - Monitor oral intake, urinary output, labs, and treatment plans  - Assess nutrition and hydration status and recommend course of action  - Evaluate amount of meals eaten  - Assist patient with eating if necessary   - Allow adequate time for meals  - Recommend/ encourage appropriate diets, oral nutritional supplements, and vitamin/mineral supplements  - Order, calculate, and assess calorie counts as needed  - Recommend, monitor, and adjust tube feedings and TPN/PPN based on assessed needs  - Assess need for intravenous fluids  - Provide specific nutrition/hydration education as appropriate  - Include patient/family/caregiver in decisions related to nutrition  Outcome: Progressing     Problem: MOBILITY - ADULT  Goal: Maintain or return to baseline ADL function  Description: INTERVENTIONS:  -  Assess patient's ability to carry out ADLs; assess patient's baseline for ADL function and identify physical deficits which impact ability to perform ADLs (bathing, care of mouth/teeth, toileting, grooming, dressing, etc )  - Assess/evaluate cause of self-care deficits   - Assess range of motion  - Assess patient's mobility; develop plan if impaired  - Assess patient's need for assistive devices and provide as appropriate  - Encourage maximum independence but intervene and supervise when necessary  - Involve family in performance of ADLs  - Assess for home care needs following discharge   - Consider OT consult to assist with ADL evaluation and planning for discharge  - Provide patient education as appropriate  Outcome: Progressing     Problem: Prexisting or High Potential for Compromised Skin Integrity  Goal: Skin integrity is maintained or improved  Description: INTERVENTIONS:  - Identify patients at risk for skin breakdown  - Assess and monitor skin integrity  - Assess and monitor nutrition and hydration status  - Monitor labs   - Assess for incontinence   - Turn and reposition patient  - Assist with mobility/ambulation  - Relieve pressure over bony prominences  - Avoid friction and shearing  - Provide appropriate hygiene as needed including keeping skin clean and dry  - Evaluate need for skin moisturizer/barrier cream  - Collaborate with interdisciplinary team   - Patient/family teaching  - Consider wound care consult   Outcome: Progressing     Problem: CARDIOVASCULAR - ADULT  Goal: Maintains optimal cardiac output and hemodynamic stability  Description: INTERVENTIONS:  - Monitor I/O, vital signs and rhythm  - Monitor for S/S and trends of decreased cardiac output  - Administer and titrate ordered vasoactive medications to optimize hemodynamic stability  - Assess quality of pulses, skin color and temperature  - Assess for signs of decreased coronary artery perfusion  - Instruct patient to report change in severity of symptoms  Outcome: Progressing  Goal: Absence of cardiac dysrhythmias or at baseline rhythm  Description: INTERVENTIONS:  - Continuous cardiac monitoring, vital signs, obtain 12 lead EKG if ordered  - Administer antiarrhythmic and heart rate control medications as ordered  - Monitor electrolytes and administer replacement therapy as ordered  Outcome: Progressing     Problem: GENITOURINARY - ADULT  Goal: Maintains or returns to baseline urinary function  Description: INTERVENTIONS:  - Assess urinary function  - Encourage oral fluids to ensure adequate hydration if ordered  - Administer IV fluids as ordered to ensure adequate hydration  - Administer ordered medications as needed  - Offer frequent toileting  - Follow urinary retention protocol if ordered  Outcome: Progressing  Goal: Absence of urinary retention  Description: INTERVENTIONS:  - Assess patients ability to void and empty bladder  - Monitor I/O  - Bladder scan as needed  - Discuss with physician/AP medications to alleviate retention as needed  - Discuss catheterization for long term situations as appropriate  Outcome: Progressing  Goal: Urinary catheter remains patent  Description: INTERVENTIONS:  - Assess patency of urinary catheter  - If patient has a chronic gutierrez, consider changing catheter if non-functioning  - Follow guidelines for intermittent irrigation of non-functioning urinary catheter  Outcome: Progressing     Problem: METABOLIC, FLUID AND ELECTROLYTES - ADULT  Goal: Electrolytes maintained within normal limits  Description: INTERVENTIONS:  - Monitor labs and assess patient for signs and symptoms of electrolyte imbalances  - Administer electrolyte replacement as ordered  - Monitor response to electrolyte replacements, including repeat lab results as appropriate  - Instruct patient on fluid and nutrition as appropriate  Outcome: Progressing  Goal: Fluid balance maintained  Description: INTERVENTIONS:  - Monitor labs   - Monitor I/O and WT  - Instruct patient on fluid and nutrition as appropriate  - Assess for signs & symptoms of volume excess or deficit  Outcome: Progressing  Goal: Glucose maintained within target range  Description: INTERVENTIONS:  - Monitor Blood Glucose as ordered  - Assess for signs and symptoms of hyperglycemia and hypoglycemia  - Administer ordered medications to maintain glucose within target range  - Assess nutritional intake and initiate nutrition service referral as needed  Outcome: Progressing

## 2022-07-05 NOTE — ASSESSMENT & PLAN NOTE
Lab Results   Component Value Date    HGBA1C 5 5 01/31/2018       Recent Labs     07/04/22  2118 07/04/22  2132 07/05/22  0751 07/05/22  1115   POCGLU 111 105 90 166*       Blood Sugar Average: Last 72 hrs:  · Home regimen: Diet controlled  · Continue sliding scale   Might discontinue if persistently below 180

## 2022-07-05 NOTE — OCCUPATIONAL THERAPY NOTE
Occupational Therapy Progress Note     Patient Name: Ruby Porras  [id number] Date: 7/5/2022  Problem List  Principal Problem:    Acute on chronic diastolic (congestive) heart failure (HCC)  Active Problems:    [facility] )    Paroxysmal A-fib (HCC)    Impaired skin integrity    Hypothyroidism (acquired)    Diabetes mellitus ([facility] )    Cellulitis of right lower extremity    Aortic stenosis, moderate    Cystitis        07/05/22 0726   Note Type   Note Type Treatment   Restrictions/Precautions   Other Precautions Chair Alarm; Bed Alarm;Multiple lines;Telemetry; Fall Risk   Pain Assessment   Pain Assessment Tool 0-10   Pain Score No Pain   ADL   UB Bathing Assistance 4  Minimal Assistance   UB Bathing Deficit Setup;Verbal cueing;Supervision/safety; Increased time to complete; Chest;Right arm;Left arm; Abdomen   UB Bathing Comments Pt completed UB bathing while sitting EOB  Pt requiring Arnoldo for thoroughness under RUE due to decreased ROM and pain in R shoulder  LB Bathing Assistance 4  Minimal Assistance   LB Bathing Deficit Setup;Steadying;Verbal cueing;Supervision/safety; Increased time to complete;Perineal area; Buttocks;Right upper leg;Left upper leg   LB Bathing Comments Pt required Arnoldo for thoroughness during buttocks hygiene while standing EOB with BUE support on RW  UB Dressing Assistance 5  Supervision/Setup   UB Dressing Deficit Setup;Verbal cueing;Supervision/safety; Increased time to complete   UB Dressing Comments Pt able to don hospital gown with SUP besides management around IV which is not typical at baseline  LB Dressing Assistance 2  Maximal Assistance   LB Dressing Deficit Setup;Steadying; Requires assistive device for steadying;Verbal cueing;Supervision/safety; Increased time to complete   LB Dressing Comments Pt requiring maxA to don brief  Bed Mobility   Supine to Sit   (SBA)   Additional items Assist x 1; Increased time required;Verbal cues   Additional Comments HOB elevated to ~30 deg and use of bed rails  Pt completed supine to sit bed mobiltiy with SBA and cuing for sequencing  pt sitting in recliner following session  Sit to supine not assessed this date  Transfers   Sit to Stand   (SBA)   Additional items Assist x 1; Increased time required;Verbal cues   Stand to Sit   (SBA)   Additional items Assist x 1; Increased time required;Verbal cues   Stand pivot   (SBA)   Additional items Assist x 1; Increased time required;Verbal cues   Additional Comments Pt completed all transfers with use of RW  STS from EOB and recliner completed with SBA  SPT with SBA and cuing for safe sequencing and hand placement  Cognition   Overall Cognitive Status WFL   Arousal/Participation Alert; Responsive; Cooperative   Attention Within functional limits   Orientation Level Oriented X4   Memory Within functional limits   Following Commands Follows one step commands without difficulty   Activity Tolerance   Activity Tolerance Patient limited by fatigue   Assessment   Assessment Pt seen for OT treatment day 1  Pt willing and able to participate in treatment focused on bed mobility, ADLs and functional transfers  Pt with decreased assistance to complete bed mobility this date requiring SBA  Pt completing UB bathing Arnoldo and dressing SUP  Pt requiring Arnoldo for LB bathing due to slight bowel incontinence  Pt completing all transfers with SBA and use of RW  Pt tolerating treatment well taking rest breaks PRN  Pt requires continued OT treatment to increase strength, endurance and balance for improved IND and safety during ADLs and transfers  Discharge recommendation continues to be home with [address] [place] [address] and family support  Pt sitting in recliner with chair alarm activated, call bell placed within reach and all needs met following session  Plan   Treatment Interventions ADL retraining;Functional transfer training;UE strengthening/ROM; Endurance training;Patient/family training;Neuromuscular reeducation; Energy conservation; Activityengagement   Goal Expiration Date 07/14/22   OT Treatment Day 1   OT Frequency 2-3x/wk   Recommendation   OT Discharge Recommendation Home with home health rehabilitation   AM-PAC Daily Activity Inpatient   Lower Body Dressing 2   Bathing 3   Toileting 3   Upper Body Dressing 3   Grooming 3   Eating 4   Daily Activity Raw Score 18   Daily Activity Standardized Score (Calc for Raw Score >=11) 38 66   AM-PAC Applied Cognition Inpatient   Following a Speech/Presentation 3   Understanding Ordinary Conversation 3   Taking Medications 3   Remembering Where Things Are Placed or Put Away 3   Remembering List of 4-5 Errands 2   Taking Care of Complicated Tasks 2   Applied Cognition Raw Score 16   Applied Cognition Standardized Score 35 03     Pt goals to be met by 7/14/2022     Pt will demonstrate ability to complete grooming/hygiene tasks @ Mod I after set-up  Pt will demonstrate ability to complete supine<>sit @ Mod I in order to increase safety and independence during ADL tasks  Pt will demonstrate ability to complete UB ADLs including washing/dressing @ Mod I in order to increase performance and participation during meaningful tasks  Pt will demonstrate ability to complete LB dressing @ Mod I in order to increase safety and independence during meaningful tasks  Pt will demonstrate ability to complete toileting tasks including CM and pericare @ Mod I in order to increase safety and independence during meaningful tasks  Pt will demonstrate ability to complete EOB, chair, toilet/commode transfers @ Mod I in order to increase performance and participation during functional tasks  Pt will demonstrate ability to stand for 3-5 minutes while maintaining Good balance with use of RW for UB support PRN  Pt will demonstrate ability to tolerate 30-35 minute OT session with no vc'ing for deep breathing or use of energy conservation techniques in order to increase activity tolerance during functional tasks  Pt will demonstrate Good carryover of use of energy conservation/compensatory strategies during ADLs and functional tasks in order to increase safety and reduce risk for falls  Pt will demonstrate Good attention and participation in continued evaluation of functional ambulation house hold distances in order to assist with safe d/c planning  Pt will attend to continued cognitive assessments 100% of the time in order to provide most appropriate d/c recommendations  Pt will follow 100% simple 2-step commands and be A&O x4 consistently with environmental cues to increase participation in functional activities  Pt will identify 3 areas of interest/hobbies and 1 intervention on how to incorporate into daily life in order to increase interaction with environment and peers as well as increase participation in meaningful tasks  Pt will demonstrate 100% carryover of BUE HEP in order to increase BUE MS and increase performance during functional tasks upon d/c home      Lisa Potts OTR/L

## 2022-07-05 NOTE — PROGRESS NOTES
[address]  Progress Note - Mariel Bangura 1940, 80 y o  female MRN: [id number]  Unit/Bed#: [facility] Encounter: [id number]  Primary Care Provider: Dylan Cantu MD   Date and time admitted to hospital: 6/29/2022  3:01 PM    * Acute on chronic diastolic (congestive) heart failure (HCC)  Assessment & Plan  Wt Readings from Last 3 Encounters:   07/05/22 119 kg (262 lb 6 4 oz)   05/19/22 126 kg (277 lb 1 9 oz)   04/04/22 117 kg (257 lb 0 9 oz)     · Presents with acute on chronic CHF  · Home regime is  Lasix 60 mg twice daily  · On Lasix infusion  · Daily weights and I & Os      Cellulitis of right lower extremity  Assessment & Plan  · Recurrent right thigh and leg cellulitis  Recently hospitalized with sepsis secondary to MRSA infection right thigh  · Wound cultures growing: Proteus mirabilis, Klebsiella, and Enterococcus  · Infectious Disease recommendations appreciated  Complete 7 days of antibiotics (Unasyn) through 07/07/2022, can transition to Augmentin upon discharge      LEONARDO (acute kidney injury) [facility])  Assessment & Plan    Recent Labs     07/03/22  0622 07/04/22  0458 07/05/22  0736   BUN 31* 30* 31*   CREATININE 1 62* 1 52* 1 60*   EGFR 29 31 29     · Baseline: 1 1 to 1 2  Possibly cardiorenal  · Continue Lasix infusion  · Monitor creatinine  · Avoid nephrotoxins and hypotension    Aortic stenosis, moderate  Assessment & Plan  · Noted on echo in March  · Avoid hypotension  · Aggressive diuresis, hypervolemic with 20 lb weight gain-on IV Lasix  · I & O    Cystitis  Assessment & Plan  · Cultures growing Klebsiella   Completed the three day course of antibiotics for this indication    Diabetes mellitus [facility])  Assessment & Plan  Lab Results   Component Value Date    HGBA1C 5 5 01/31/2018       Recent Labs     07/04/22  2118 07/04/22  2132 07/05/22  0751 07/05/22  1115   POCGLU 111 105 90 166*       Blood Sugar Average: Last 72 hrs:  · Home regimen: Diet controlled  · Continue sliding scale  Might discontinue if persistently below 180    Hypothyroidism (acquired)  Assessment & Plan  · Continue home 150 mcg Synthroid  · Continue outpatient follow-up    Impaired skin integrity  Assessment & Plan  · Bilateral buttock open areas reported by daughter, there was sanguinous drainage on sheet when patient moved into bed  · Wound care-appreciate recommendations  · Pressure offloading, Q 2 hour turns  · Also present is healing MRSA infection right thigh  Was following outpatient with Wound Care but has completed treatment  · Serial assessments    [facility]  Assessment & Plan    Recent Labs     07/03/22  0622 07/05/22  0736   INR 2 78* 1 57*     · Rate controlled with metoprolol   · Anticoagulation with coumadin, initially supratherapeutic and required Vitamin K  Now back to therapeutic range  Home regimen of 5mg coumadin was decreased during this admission  · Daily INR      Supratherapeutic INR-resolved as of 7/3/2022  Assessment & Plan    Recent Labs     07/03/22  0622 07/05/22  0736   INR 2 78* 1 57*     · Coumadin held on admission due to supratherapeutic INR, received vitamin K  · Now resolved      HLD (hyperlipidemia)-resolved as of 6/29/2022  Assessment & Plan  · Not noted to be on outpatient therapy    VTE Pharmacologic Prophylaxis:   Pharmacologic: Warfarin (Coumadin)  Patient Centered Rounds: I have performed bedside rounds with nursing staff today  Discussions with Specialists or Other Care Team Provider:  Infectious Disease    Education and Discussions with Family / Patient:  Patient    Time Spent for Care: 30 minutes  More than 50% of total time spent on counseling and coordination of care as described above  Current Length of Stay: 6 day(s)    Current Patient Status: Inpatient   Certification Statement: The patient will continue to require additional inpatient hospital stay due to per plan above  Discharge Plan: To be determined        Code Status: Level 3 - DNAR and DNI      Subjective:   Patient seen and examined  No complaints offered  Denies fever or chills, shortness of breath, chest pain, nausea, abdominal pain, dizziness, or focal weakness  Objective:     Vitals:   Temp (24hrs), Av 9 °F (36 6 °C), Min:97 6 °F (36 4 °C), Max:98 2 °F (36 8 °C)    Temp:  [97 6 °F (36 4 °C)-98 2 °F (36 8 °C)] 98 1 °F (36 7 °C)  HR:  [59-72] 72  Resp:  [15-16] 16  BP: ()/(55-76) 92/55  SpO2:  [92 %-95 %] 93 %  Body mass index is 51 25 kg/m²  Input and Output Summary (last 24 hours): Intake/Output Summary (Last 24 hours) at 2022 1441  Last data filed at 2022 1135  Gross per 24 hour   Intake 100 ml   Output 2750 ml   Net -2650 ml       Physical Exam:     Physical Exam  Vitals and nursing note reviewed  Constitutional:       General: She is not in acute distress  Appearance: She is obese  HENT:      Head: Normocephalic and atraumatic  Mouth/Throat:      Pharynx: Oropharynx is clear  Eyes:      Pupils: Pupils are equal, round, and reactive to light  Cardiovascular:      Rate and Rhythm: Normal rate  Rhythm irregular  Heart sounds: Murmur heard  Pulmonary:      Effort: Pulmonary effort is normal  No respiratory distress  Breath sounds: Decreased breath sounds present  Abdominal:      General: Bowel sounds are normal       Palpations: Abdomen is soft  Tenderness: There is no abdominal tenderness  Musculoskeletal:      Cervical back: Neck supple  Right lower leg: Edema present  Left lower leg: Edema present  Skin:     General: Skin is warm and dry  Capillary Refill: Capillary refill takes less than 2 seconds  Comments: Right medial thigh lesion with dressing clean dry and intact   Neurological:      General: No focal deficit present  Mental Status: She is alert         Additional Data:     Labs:    Results from last 7 days   Lab Units 22  0736 22  0453 07/03/22  0622   WBC Thousand/uL 7 27   < > 9 64   HEMOGLOBIN g/dL 10 5*   < > 8 9*   HEMATOCRIT % 31 9*   < > 26 9*   PLATELETS Thousands/uL 166   < > 148*   LYMPHO PCT %  --   --  18   MONO PCT %  --   --  3*   EOS PCT %  --   --  1    < > = values in this interval not displayed  Results from last 7 days   Lab Units 07/05/22  0736 06/30/22  0551 06/29/22  1534   SODIUM mmol/L 139   < > 135*   POTASSIUM mmol/L 3 4*   < > 3 5   CHLORIDE mmol/L 102   < > 100   CO2 mmol/L 29   < > 26   BUN mg/dL 31*   < > 26*   CREATININE mg/dL 1 60*   < > 1 67*   ANION GAP mmol/L 8   < > 9   CALCIUM mg/dL 8 0*   < > 8 0*   ALBUMIN g/dL  --   --  2 0*   TOTAL BILIRUBIN mg/dL  --   --  2 89*   ALK PHOS U/L  --   --  193*   ALT U/L  --   --  27   AST U/L  --   --  48*   GLUCOSE RANDOM mg/dL 90   < > 136    < > = values in this interval not displayed  Results from last 7 days   Lab Units 07/05/22  0736   INR  1 57*     Results from last 7 days   Lab Units 07/05/22  1115 07/05/22  0751 07/04/22  2132 07/04/22  2118 07/04/22  1622 07/04/22  1130 07/04/22  0735 07/03/22  2110 07/03/22  1556 07/03/22  1108 07/03/22  0758 07/02/22  2109   POC GLUCOSE mg/dl 166* 90 105 111 105 171* 97 176* 109 92 84 111         Results from last 7 days   Lab Units 06/29/22  1534   PROCALCITONIN ng/ml 0 57*           * I Have Reviewed All Lab Data Listed Above  * Additional Pertinent Lab Tests Reviewed: [place] [facility] Admission Reviewed    Imaging:    Imaging Reports Reviewed Today Include:  Chest x-ray, CT lower extremity    Recent Cultures (last 7 days):     Results from last 7 days   Lab Units 06/30/22  1851 06/30/22  1839 06/30/22  1020 06/30/22  1019   BLOOD CULTURE   --   --  No Growth After 4 Days  No Growth After 4 Days     GRAM STAIN RESULT  2+ Gram positive cocci in pairs*  1+ Gram negative rods*  No polys seen*  --   --   --    URINE CULTURE   --  >100,000 cfu/ml Klebsiella pneumoniae*  --   --    WOUND CULTURE  4+ Growth of Proteus mirabilis*  3+ Growth of Klebsiella pneumoniae*  3+ Growth of Enterococcus faecalis*  3+ Growth of Staphylococcus coagulase negative*  --   --   --        Last 24 Hours Medication List:   Current Facility-Administered Medications   Medication Dose Route Frequency Provider Last Rate    acetaminophen  650 mg Oral Q6H PRN Tiajuana Beans, CRNP      ampicillin-sulbactam  3 g Intravenous Q6H Carrie Garcia MD 3 g (07/05/22 1135)    furosemide  15 mg/hr Intravenous Continuous [place] LILIAN Purvis 15 mg/hr (07/04/22 1137)    insulin lispro  1-6 Units Subcutaneous TID AC Tiajuana Beans, CRNP      insulin lispro  1-6 Units Subcutaneous HS Tiajuana Beans, CRNP      levothyroxine  150 mcg Oral Early Morning Tiajuana Beans, CRNP      metoprolol succinate  100 mg Oral Daily [place], [place]      polyethylene glycol  17 g Oral Daily PRN Tiajuana Beans, CRNP      potassium chloride  40 mEq Oral BID Tiajuana Beans, CRNP      senna-docusate sodium  1 tablet Oral BID Tiajuana Beans, CRNP      warfarin  3 mg Oral Daily (warfarin) Carrie Garcia MD          Today, Patient Was Seen By: LEXIE Ni    ** Please Note: Dictation voice to text software may have been used in the creation of this document   **

## 2022-07-05 NOTE — ASSESSMENT & PLAN NOTE
· Recurrent right thigh and leg cellulitis  Recently hospitalized with sepsis secondary to MRSA infection right thigh  · Wound cultures growing: Proteus mirabilis, Klebsiella, and Enterococcus  · Infectious Disease recommendations appreciated    Complete 7 days of antibiotics (Unasyn) through 07/07/2022, can transition to Augmentin upon discharge

## 2022-07-05 NOTE — ASSESSMENT & PLAN NOTE
· Noted on echo in March  · Avoid hypotension  · Aggressive diuresis, hypervolemic with 20 lb weight gain-on IV Lasix  · I & O

## 2022-07-06 PROBLEM — N18.9 CKD (CHRONIC KIDNEY DISEASE): Status: ACTIVE | Noted: 2022-07-06

## 2022-07-06 LAB
ANION GAP SERPL CALCULATED.3IONS-SCNC: 5 MMOL/L (ref 4–13)
ANISOCYTOSIS BLD QL SMEAR: PRESENT
BASOPHILS # BLD MANUAL: 0.14 THOUSAND/UL (ref 0–0.1)
BASOPHILS NFR MAR MANUAL: 2 % (ref 0–1)
BLASTS NFR BLD MANUAL: 1 %
BUN SERPL-MCNC: 33 MG/DL (ref 5–25)
CALCIUM SERPL-MCNC: 7.9 MG/DL (ref 8.3–10.1)
CHLORIDE SERPL-SCNC: 103 MMOL/L (ref 100–108)
CO2 SERPL-SCNC: 29 MMOL/L (ref 21–32)
CREAT SERPL-MCNC: 1.57 MG/DL (ref 0.6–1.3)
EOSINOPHIL # BLD MANUAL: 0.35 THOUSAND/UL (ref 0–0.4)
EOSINOPHIL NFR BLD MANUAL: 5 % (ref 0–6)
ERYTHROCYTE [DISTWIDTH] IN BLOOD BY AUTOMATED COUNT: 16.9 % (ref 11.6–15.1)
GFR SERPL CREATININE-BSD FRML MDRD: 30 ML/MIN/1.73SQ M
GLUCOSE SERPL-MCNC: 110 MG/DL (ref 65–140)
GLUCOSE SERPL-MCNC: 120 MG/DL (ref 65–140)
GLUCOSE SERPL-MCNC: 123 MG/DL (ref 65–140)
GLUCOSE SERPL-MCNC: 85 MG/DL (ref 65–140)
GLUCOSE SERPL-MCNC: 93 MG/DL (ref 65–140)
HCT VFR BLD AUTO: 30.1 % (ref 34.8–46.1)
HGB BLD-MCNC: 9.8 G/DL (ref 11.5–15.4)
INR PPP: 1.99 (ref 0.84–1.19)
LYMPHOCYTES # BLD AUTO: 2.83 THOUSAND/UL (ref 0.6–4.47)
LYMPHOCYTES # BLD AUTO: 40 % (ref 14–44)
MCH RBC QN AUTO: 32.1 PG (ref 26.8–34.3)
MCHC RBC AUTO-ENTMCNC: 32.6 G/DL (ref 31.4–37.4)
MCV RBC AUTO: 99 FL (ref 82–98)
MONOCYTES # BLD AUTO: 0.85 THOUSAND/UL (ref 0–1.22)
MONOCYTES NFR BLD: 12 % (ref 4–12)
MYELOCYTES NFR BLD MANUAL: 2 % (ref 0–1)
NEUTROPHILS # BLD MANUAL: 2.69 THOUSAND/UL (ref 1.85–7.62)
NEUTS BAND NFR BLD MANUAL: 1 % (ref 0–8)
NEUTS SEG NFR BLD AUTO: 37 % (ref 43–75)
PLATELET # BLD AUTO: 173 THOUSANDS/UL (ref 149–390)
PLATELET BLD QL SMEAR: ADEQUATE
PMV BLD AUTO: 10 FL (ref 8.9–12.7)
POTASSIUM SERPL-SCNC: 3.7 MMOL/L (ref 3.5–5.3)
PROTHROMBIN TIME: 22.1 SECONDS (ref 11.6–14.5)
RBC # BLD AUTO: 3.05 MILLION/UL (ref 3.81–5.12)
RBC MORPH BLD: PRESENT
SMUDGE CELLS BLD QL SMEAR: PRESENT
SODIUM SERPL-SCNC: 137 MMOL/L (ref 136–145)
TARGETS BLD QL SMEAR: PRESENT
WBC # BLD AUTO: 7.07 THOUSAND/UL (ref 4.31–10.16)

## 2022-07-06 PROCEDURE — 97110 THERAPEUTIC EXERCISES: CPT

## 2022-07-06 PROCEDURE — 85027 COMPLETE CBC AUTOMATED: CPT | Performed by: NURSE PRACTITIONER

## 2022-07-06 PROCEDURE — 99232 SBSQ HOSP IP/OBS MODERATE 35: CPT | Performed by: PHYSICIAN ASSISTANT

## 2022-07-06 PROCEDURE — 97116 GAIT TRAINING THERAPY: CPT

## 2022-07-06 PROCEDURE — 80048 BASIC METABOLIC PNL TOTAL CA: CPT | Performed by: NURSE PRACTITIONER

## 2022-07-06 PROCEDURE — 85007 BL SMEAR W/DIFF WBC COUNT: CPT | Performed by: NURSE PRACTITIONER

## 2022-07-06 PROCEDURE — 82948 REAGENT STRIP/BLOOD GLUCOSE: CPT

## 2022-07-06 PROCEDURE — 85610 PROTHROMBIN TIME: CPT | Performed by: NURSE PRACTITIONER

## 2022-07-06 PROCEDURE — 85025 COMPLETE CBC W/AUTO DIFF WBC: CPT | Performed by: NURSE PRACTITIONER

## 2022-07-06 RX ADMIN — LEVOTHYROXINE SODIUM 150 MCG: 150 TABLET ORAL at 05:27

## 2022-07-06 RX ADMIN — DOCUSATE SODIUM AND SENNOSIDES 1 TABLET: 8.6; 5 TABLET, FILM COATED ORAL at 08:27

## 2022-07-06 RX ADMIN — WARFARIN SODIUM 3 MG: 3 TABLET ORAL at 16:59

## 2022-07-06 RX ADMIN — POTASSIUM CHLORIDE 40 MEQ: 1500 TABLET, EXTENDED RELEASE ORAL at 16:59

## 2022-07-06 RX ADMIN — SODIUM CHLORIDE 3 G: 9 INJECTION, SOLUTION INTRAVENOUS at 16:59

## 2022-07-06 RX ADMIN — SODIUM CHLORIDE 3 G: 9 INJECTION, SOLUTION INTRAVENOUS at 08:27

## 2022-07-06 RX ADMIN — METOPROLOL SUCCINATE 100 MG: 100 TABLET, EXTENDED RELEASE ORAL at 08:27

## 2022-07-06 RX ADMIN — DOCUSATE SODIUM AND SENNOSIDES 1 TABLET: 8.6; 5 TABLET, FILM COATED ORAL at 16:59

## 2022-07-06 RX ADMIN — SODIUM CHLORIDE 3 G: 9 INJECTION, SOLUTION INTRAVENOUS at 00:51

## 2022-07-06 RX ADMIN — POTASSIUM CHLORIDE 40 MEQ: 1500 TABLET, EXTENDED RELEASE ORAL at 08:27

## 2022-07-06 NOTE — PLAN OF CARE
Problem: Potential for Falls  Goal: Patient will remain free of falls  Description: INTERVENTIONS:  - Educate patient/family on patient safety including physical limitations  - Instruct patient to call for assistance with activity   - Consult OT/PT to assist with strengthening/mobility   - Keep Call bell within reach  - Keep bed low and locked with side rails adjusted as appropriate  - Keep care items and personal belongings within reach  - Initiate and maintain comfort rounds  - Make Fall Risk Sign visible to staff  - Apply yellow socks and bracelet for high fall risk patients  - Consider moving patient to room near nurses station  7/5/2022 2209 by Lanie Almeida, RN  Outcome: Progressing  7/5/2022 2209 by Lanie Almeida, RN  Outcome: Progressing     Problem: Nutrition/Hydration-ADULT  Goal: Nutrient/Hydration intake appropriate for improving, restoring or maintaining nutritional needs  Description: Monitor and assess patient's nutrition/hydration status for malnutrition  Collaborate with interdisciplinary team and initiate plan and interventions as ordered  Monitor patient's weight and dietary intake as ordered or per policy  Utilize nutrition screening tool and intervene as necessary  Determine patient's food preferences and provide high-protein, high-caloric foods as appropriate       INTERVENTIONS:  - Monitor oral intake, urinary output, labs, and treatment plans  - Assess nutrition and hydration status and recommend course of action  - Evaluate amount of meals eaten  - Assist patient with eating if necessary   - Allow adequate time for meals  - Recommend/ encourage appropriate diets, oral nutritional supplements, and vitamin/mineral supplements  - Order, calculate, and assess calorie counts as needed  - Recommend, monitor, and adjust tube feedings and TPN/PPN based on assessed needs  - Assess need for intravenous fluids  - Provide specific nutrition/hydration education as appropriate  - Include patient/family/caregiver in decisions related to nutrition  7/5/2022 2209 by Barbara Deleon RN  Outcome: Progressing  7/5/2022 2209 by Barbara Deleon RN  Outcome: Progressing     Problem: MOBILITY - ADULT  Goal: Maintain or return to baseline ADL function  Description: INTERVENTIONS:  -  Assess patient's ability to carry out ADLs; assess patient's baseline for ADL function and identify physical deficits which impact ability to perform ADLs (bathing, care of mouth/teeth, toileting, grooming, dressing, etc )  - Assess/evaluate cause of self-care deficits   - Assess range of motion  - Assess patient's mobility; develop plan if impaired  - Assess patient's need for assistive devices and provide as appropriate  - Encourage maximum independence but intervene and supervise when necessary  - Involve family in performance of ADLs  - Assess for home care needs following discharge   - Consider OT consult to assist with ADL evaluation and planning for discharge  - Provide patient education as appropriate  7/5/2022 2209 by Barbara Deleon RN  Outcome: Progressing  7/5/2022 2209 by Barbara Deleon RN  Outcome: Progressing  Goal: Maintains/Returns to pre admission functional level  Description: INTERVENTIONS:  - Perform BMAT or MOVE assessment daily    - Set and communicate daily mobility goal to care team and patient/family/caregiver     - Collaborate with rehabilitation services on mobility goals if consulted  - Perform Range of Motion  - Out of bed for toileting  - Record patient progress and toleration of activity level   7/5/2022 2209 by Barbara Deleon RN  Outcome: Progressing  7/5/2022 2209 by Barbara Deleon RN  Outcome: Progressing     Problem: Prexisting or High Potential for Compromised Skin Integrity  Goal: Skin integrity is maintained or improved  Description: INTERVENTIONS:  - Identify patients at risk for skin breakdown  - Assess and monitor skin integrity  - Assess and monitor nutrition and hydration status  - Monitor labs   - Assess for incontinence   - Turn and reposition patient  - Assist with mobility/ambulation  - Relieve pressure over bony prominences  - Avoid friction and shearing  - Provide appropriate hygiene as needed including keeping skin clean and dry  - Evaluate need for skin moisturizer/barrier cream  - Collaborate with interdisciplinary team   - Patient/family teaching  - Consider wound care consult   7/5/2022 2209 by Napoleon Bonilla RN  Outcome: Progressing  7/5/2022 2209 by Napoleon Bonilla RN  Outcome: Progressing     Problem: CARDIOVASCULAR - ADULT  Goal: Maintains optimal cardiac output and hemodynamic stability  Description: INTERVENTIONS:  - Monitor I/O, vital signs and rhythm  - Monitor for S/S and trends of decreased cardiac output  - Administer and titrate ordered vasoactive medications to optimize hemodynamic stability  - Assess quality of pulses, skin color and temperature  - Assess for signs of decreased coronary artery perfusion  - Instruct patient to report change in severity of symptoms  7/5/2022 2209 by Napoleon Bonilla RN  Outcome: Progressing  7/5/2022 2209 by Napoleon Bonilla RN  Outcome: Progressing  Goal: Absence of cardiac dysrhythmias or at baseline rhythm  Description: INTERVENTIONS:  - Continuous cardiac monitoring, vital signs, obtain 12 lead EKG if ordered  - Administer antiarrhythmic and heart rate control medications as ordered  - Monitor electrolytes and administer replacement therapy as ordered  7/5/2022 2209 by Napoleon Bonilla RN  Outcome: Progressing  7/5/2022 2209 by Napoleon Bonilla RN  Outcome: Progressing     Problem: GENITOURINARY - ADULT  Goal: Maintains or returns to baseline urinary function  Description: INTERVENTIONS:  - Assess urinary function  - Encourage oral fluids to ensure adequate hydration if ordered  - Administer IV fluids as ordered to ensure adequate hydration  - Administer ordered medications as needed  - Offer frequent toileting  - Follow urinary retention protocol if ordered  7/5/2022 2209 by Jordy Valle RN  Outcome: Progressing  7/5/2022 2209 by Jordy Valle RN  Outcome: Progressing  Goal: Absence of urinary retention  Description: INTERVENTIONS:  - Assess patients ability to void and empty bladder  - Monitor I/O  - Bladder scan as needed  - Discuss with physician/AP medications to alleviate retention as needed  - Discuss catheterization for long term situations as appropriate  7/5/2022 2209 by Jordy Valle RN  Outcome: Progressing  7/5/2022 2209 by Jordy Valle RN  Outcome: Progressing  Goal: Urinary catheter remains patent  Description: INTERVENTIONS:  - Assess patency of urinary catheter  - If patient has a chronic gutierrez, consider changing catheter if non-functioning  - Follow guidelines for intermittent irrigation of non-functioning urinary catheter  7/5/2022 2209 by Jordy Valle RN  Outcome: Progressing  7/5/2022 2209 by Jordy Valle RN  Outcome: Progressing     Problem: METABOLIC, FLUID AND ELECTROLYTES - ADULT  Goal: Electrolytes maintained within normal limits  Description: INTERVENTIONS:  - Monitor labs and assess patient for signs and symptoms of electrolyte imbalances  - Administer electrolyte replacement as ordered  - Monitor response to electrolyte replacements, including repeat lab results as appropriate  - Instruct patient on fluid and nutrition as appropriate  7/5/2022 2209 by Jordy Valle RN  Outcome: Progressing  7/5/2022 2209 by oJrdy Valle RN  Outcome: Progressing  Goal: Fluid balance maintained  Description: INTERVENTIONS:  - Monitor labs   - Monitor I/O and WT  - Instruct patient on fluid and nutrition as appropriate  - Assess for signs & symptoms of volume excess or deficit  7/5/2022 2209 by Jordy Valle RN  Outcome: Progressing  7/5/2022 2209 by Jordy Valle RN  Outcome: Progressing  Goal: Glucose maintained within target range  Description: INTERVENTIONS:  - Monitor Blood Glucose as ordered  - Assess for signs and symptoms of hyperglycemia and hypoglycemia  - Administer ordered medications to maintain glucose within target range  - Assess nutritional intake and initiate nutrition service referral as needed  7/5/2022 2209 by Liu Quinteros, RN  Outcome: Progressing  7/5/2022 2209 by Liu Quinteros, RN  Outcome: Progressing

## 2022-07-06 NOTE — ASSESSMENT & PLAN NOTE
Lab Results   Component Value Date    EGFR 30 07/06/2022    EGFR 29 07/05/2022    EGFR 31 07/04/2022    CREATININE 1 57 (H) 07/06/2022    CREATININE 1 60 (H) 07/05/2022    CREATININE 1 52 (H) 07/04/2022     · Baseline seems to go up to 1 4, possibly cardiorenal component   · Stable with diuresis, continue   · Monitor creatinine  · Avoid nephrotoxins and hypotension

## 2022-07-06 NOTE — PLAN OF CARE
Problem: PHYSICAL THERAPY ADULT  Goal: Performs mobility at highest level of function for planned discharge setting  See evaluation for individualized goals  Description: Treatment/Interventions: ADL retraining, Functional transfer training, LE strengthening/ROM, Elevations, Therapeutic exercise, Patient/family training, Endurance training, Equipment eval/education, Bed mobility, Gait training, Compensatory technique education, Spoke to nursing, Spoke to case management, OT  Equipment Recommended:  (walker-pt has)       See flowsheet documentation for full assessment, interventions and recommendations  Outcome: Progressing  Note: Prognosis: Good  Problem List: Decreased strength, Decreased endurance, Impaired balance, Decreased mobility, Obesity, Decreased skin integrity, Pain  Assessment: Pt seen for PT treatment session this date with interventions consisting of gait training w/ emphasis on improving pt's ability to ambulate level surfaces x 50' with SBA provided by therapist with RW and Therapeutic exercise consisting of: AROM 10 reps B LE in sitting position  Pt agreeable to PT treatment session upon arrival, pt found supine in bed w/ HOB elevated, in no apparent distress  In comparison to previous session, pt with improvements in pt able to ambulate increased distance with decreased support as well as complete assigned therex however pt required increased assistance for bed mobility this session   Post session: pt returned back to recliner, chair alarm engaged, all needs in reach, and RN notified of session findings/recommendations Continue to recommend Home PT at time of d/c in order to maximize pt's functional independence and safety w/ mobility  Pt continues to be functioning below baseline level, and remains limited 2* factors listed above and including decreased strength,balance, mobility, and activity tolerance   PT will continue to see pt while here in order to address the deficits listed above and provide interventions consistent w/ POC in effort to achieve STGs  Barriers to Discharge: None  Barriers to Discharge Comments: Pt's dtr can assist PRN     PT Discharge Recommendation: Home with home health rehabilitation          See flowsheet documentation for full assessment

## 2022-07-06 NOTE — PHYSICAL THERAPY NOTE
PHYSICAL THERAPY TREATMENT NOTE  NAME:  Loly Razo  DATE: 07/06/22    Length Of Stay: 7  Performed at least 2 patient identifiers during session: Name and Birthday  Treatment time: 390-004  Treatment length: 27 min     07/06/22 0916   Note Type   Note Type Treatment   Pain Assessment   Pain Assessment Tool 0-10   Pain Score 7   Pain Location/Orientation Location: Buttocks   Restrictions/Precautions   Other Precautions Chair Alarm; Bed Alarm;Pain; Fall Risk;Multiple lines   General   Chart Reviewed Yes   Response to Previous Treatment Patient with no complaints from previous session  Cognition   Overall Cognitive Status WFL   Orientation Level Oriented X4   Following Commands Follows one step commands without difficulty   Bed Mobility   Supine to Sit 3  Moderate assistance   Additional items Assist x 1; Increased time required;Verbal cues;LE management   Additional Comments Mod A for LE management as pt unable to progress RLE without assistance, HOB flat, bedrails PRN, Pt reports sleeping in recliner PRN   Transfers   Sit to Stand   (SBA)   Additional items Increased time required;Verbal cues   Stand to Sit   (SBA)   Additional items Increased time required;Verbal cues   Stand pivot   (SBA)   Additional items Increased time required;Verbal cues   Additional Comments RW used for transfers  VC for hand placement and safety   Ambulation/Elevation   Gait pattern Wide KITTY; Decreased foot clearance; Short stride; Excessively slow   Gait Assistance   (SBA)   Additional items Verbal cues   Assistive Device Rolling walker   Distance 50' with RW, increased lateral sway, VC for increased step length, no LOB   Balance   Static Sitting Good   Dynamic Sitting Fair +   Static Standing Fair   Dynamic Standing Fair -   Ambulatory Fair -   Endurance Deficit   Endurance Deficit Yes   Endurance Deficit Description fatigue   Activity Tolerance   Activity Tolerance Patient limited by fatigue   Medical Staff Made Aware Amauri DORSEY   Nurse Made Aware Duyen PARK   Exercises   Hip Flexion Sitting;10 reps;AROM; Bilateral   Knee AROM Long Arc Quad Sitting;10 reps;AROM; Bilateral   Ankle Pumps Sitting;10 reps;AROM; Bilateral   Assessment   Prognosis Good   Problem List Decreased strength;Decreased endurance; Impaired balance;Decreased mobility;Obesity; Decreased skin integrity;Pain   Barriers to Discharge None   Barriers to Discharge Comments Pt's dtr can assist PRN   Goals   PT Treatment Day 1   Plan   Treatment/Interventions Functional transfer training;LE strengthening/ROM; Elevations; Therapeutic exercise; Endurance training;Patient/family training;Equipment eval/education;Gait training; Compensatory technique education; Bed mobility;Spoke to nursing   Progress Progressing toward goals   PT Frequency 3-5x/wk   Recommendation   PT Discharge Recommendation Home with home health rehabilitation   Equipment Recommended   (pt owns RW)   AM-PAC Basic Mobility Inpatient   Turning in Bed Without Bedrails 3   Lying on Back to Sitting on Edge of Flat Bed 2   Moving Bed to Chair 3   Standing Up From Chair 3   Walk in Room 3   Climb 3-5 Stairs 2   Basic Mobility Inpatient Raw Score 16   Basic Mobility Standardized Score 38 32   Highest Level Of Mobility   -HLM Goal 5: Stand one or more mins   JH-HLM Achieved 7: Walk 25 feet or more   Education   Education Provided Mobility training;Home exercise program   Patient Demonstrates acceptance/verbal understanding   End of Consult   Patient Position at End of Consult Bedside chair;Bed/Chair alarm activated; All needs within reach     The patient's AM-PAC Basic Mobility Inpatient Short Form Raw Score is 16  A Raw score of less than or equal to 16 suggests the patient may benefit from discharge to post-acute rehabilitation services however pt has assistance from dtr, should benefit from return home with HHPT  Please also refer to the recommendation of the Physical Therapist for safe discharge planning      Assessment: Pt seen for PT treatment session this date with interventions consisting of gait training w/ emphasis on improving pt's ability to ambulate level surfaces x 50' with SBA provided by therapist with RW and Therapeutic exercise consisting of: AROM 10 reps B LE in sitting position  Pt agreeable to PT treatment session upon arrival, pt found supine in bed w/ HOB elevated, in no apparent distress  In comparison to previous session, pt with improvements in pt able to ambulate increased distance with decreased support as well as complete assigned therex however pt required increased assistance for bed mobility this session   Post session: pt returned back to recliner, chair alarm engaged, all needs in reach, and RN notified of session findings/recommendations Continue to recommend Home PT at time of d/c in order to maximize pt's functional independence and safety w/ mobility  Pt continues to be functioning below baseline level, and remains limited 2* factors listed above and including decreased strength,balance, mobility, and activity tolerance  PT will continue to see pt while here in order to address the deficits listed above and provide interventions consistent w/ POC in effort to achieve STGs       Roosevelt Cantu, PT,DPT

## 2022-07-06 NOTE — ASSESSMENT & PLAN NOTE
Recent Labs     07/05/22  0736 07/06/22  0650   INR 1 57* 1 99*     · Rate controlled with metoprolol   · Anticoagulation with coumadin, initially supratherapeutic and required Vitamin K   · Home regimen of 5mg coumadin was decreased during this admission  · Daily INR Adequate: hears normal conversation without difficulty

## 2022-07-06 NOTE — PROGRESS NOTES
[address]  Progress Note - Loly Razo 1940, 80 y o  female MRN: [id number]  Unit/Bed#: -[facility] Encounter: [id number]  Primary Care Provider: Rukhsana Etienne MD   Date and time admitted to hospital: 6/29/2022  3:01 PM    * Acute on chronic diastolic (congestive) heart failure (HCC)  Assessment & Plan  Wt Readings from Last 3 Encounters:   07/05/22 119 kg (262 lb 6 4 oz)   05/19/22 126 kg (277 lb 1 9 oz)   04/04/22 117 kg (257 lb 0 9 oz)     · Presents with acute on chronic CHF  · Home regime is Lasix 60 mg twice daily  · On Lasix infusion per cardiology   · Daily weights and I & Os    CKD (chronic kidney disease)  Assessment & Plan  Lab Results   Component Value Date    EGFR 30 07/06/2022    EGFR 29 07/05/2022    EGFR 31 07/04/2022    CREATININE 1 57 (H) 07/06/2022    CREATININE 1 60 (H) 07/05/2022    CREATININE 1 52 (H) 07/04/2022     · Baseline seems to go up to 1 4, possibly cardiorenal component   · Stable with diuresis, continue   · Monitor creatinine  · Avoid nephrotoxins and hypotension     Paroxysmal [facility])  Assessment & Plan    Recent Labs     07/05/22  0736 07/06/22  0650   INR 1 57* 1 99*     · Rate controlled with metoprolol   · Anticoagulation with coumadin, initially supratherapeutic and required Vitamin K   · Home regimen of 5mg coumadin was decreased during this admission  · Daily INR    Cystitis  Assessment & Plan  · Cultures growing Klebsiella  Completed the three day course of antibiotics for this indication    Aortic stenosis, moderate  Assessment & Plan  · Noted on echo in March  · Avoid hypotension  · Aggressive diuresis, hypervolemic with 20 lb weight gain-on IV Lasix  · I & O    Cellulitis of right lower extremity  Assessment & Plan  · Recurrent right thigh and leg cellulitis  Recently hospitalized with sepsis secondary to MRSA infection right thigh  · Wound cultures growing: Proteus mirabilis, Klebsiella, and Enterococcus    · Infectious Disease recommendations appreciated  Complete 7 days of antibiotics (Unasyn) through 2022, can transition to Augmentin upon discharge    Hypothyroidism (acquired)  Assessment & Plan  · Continue home 150 mcg Synthroid  · Continue outpatient follow-up      VTE Pharmacologic Prophylaxis: VTE Score: 9 High Risk (Score >/= 5) - Pharmacological DVT Prophylaxis Ordered: warfarin (Coumadin)  Sequential Compression Devices Ordered  Patient Centered Rounds: I performed bedside rounds with nursing staff today  Discussions with Specialists or Other Care Team Provider: cm     Education and Discussions with Family / Patient: Patient declined call to   Time Spent for Care: 30 minutes  More than 50% of total time spent on counseling and coordination of care as described above  Current Length of Stay: 7 day(s)  Current Patient Status: Inpatient   Certification Statement: The patient will continue to require additional inpatient hospital stay due to pending improvement in diuresis on IV lasis drip   Discharge Plan: Anticipate discharge in 48 hrs to home with home services  Code Status: Level 3 - DNAR and DNI    Subjective:   Pt seen and examined at bedside  Think she is going to the bathroom more often than normally does at home  Denies shortness of Breath  No chest pain  Objective:     Vitals:   Temp (24hrs), Av 8 °F (36 6 °C), Min:97 4 °F (36 3 °C), Max:98 3 °F (36 8 °C)    Temp:  [97 4 °F (36 3 °C)-98 3 °F (36 8 °C)] 98 2 °F (36 8 °C)  HR:  [64-80] 64  Resp:  [12-20] 16  BP: ()/(50-54) 107/51  SpO2:  [93 %-96 %] 96 %  Body mass index is 50 93 kg/m²  Input and Output Summary (last 24 hours): Intake/Output Summary (Last 24 hours) at 2022 1353  Last data filed at 2022 1256  Gross per 24 hour   Intake 420 ml   Output 1300 ml   Net -880 ml       Physical Exam:   Physical Exam  Constitutional:       Appearance: Normal appearance  She is obese     HENT:      Head: Normocephalic and atraumatic  Mouth/Throat:      Mouth: Mucous membranes are moist    Eyes:      Extraocular Movements: Extraocular movements intact  Cardiovascular:      Rate and Rhythm: Normal rate  Rhythm irregular  Heart sounds: Murmur heard  Pulmonary:      Effort: Pulmonary effort is normal       Breath sounds: Normal breath sounds  Abdominal:      General: Abdomen is flat  Palpations: Abdomen is soft  Musculoskeletal:         General: Normal range of motion  Cervical back: Normal range of motion and neck supple  Right lower leg: Edema present  Left lower leg: Edema present  Skin:     General: Skin is warm and dry  Neurological:      General: No focal deficit present  Mental Status: She is alert  Psychiatric:         Mood and Affect: Mood normal          Behavior: Behavior normal        Additional Data:     Labs:  Results from last 7 days   Lab Units 07/06/22  0650   WBC Thousand/uL 7 07   HEMOGLOBIN g/dL 9 8*   HEMATOCRIT % 30 1*   PLATELETS Thousands/uL 173   BANDS PCT % 1   LYMPHO PCT % 40   MONO PCT % 12   EOS PCT % 5     Results from last 7 days   Lab Units 07/06/22  0650 06/30/22  0551 06/29/22  1534   SODIUM mmol/L 137   < > 135*   POTASSIUM mmol/L 3 7   < > 3 5   CHLORIDE mmol/L 103   < > 100   CO2 mmol/L 29   < > 26   BUN mg/dL 33*   < > 26*   CREATININE mg/dL 1 57*   < > 1 67*   ANION GAP mmol/L 5   < > 9   CALCIUM mg/dL 7 9*   < > 8 0*   ALBUMIN g/dL  --   --  2 0*   TOTAL BILIRUBIN mg/dL  --   --  2 89*   ALK PHOS U/L  --   --  193*   ALT U/L  --   --  27   AST U/L  --   --  48*   GLUCOSE RANDOM mg/dL 85   < > 136    < > = values in this interval not displayed       Results from last 7 days   Lab Units 07/06/22  0650   INR  1 99*     Results from last 7 days   Lab Units 07/06/22  1100 07/06/22  0739 07/05/22  2116 07/05/22  1626 07/05/22  1115 07/05/22  0751 07/04/22  2132 07/04/22  2118 07/04/22  1622 07/04/22  1130 07/04/22  0735 07/03/22  2110   POC GLUCOSE mg/dl 110 93 101 95 166* 90 105 111 105 171* 97 176*         Results from last 7 days   Lab Units 06/29/22  1534   PROCALCITONIN ng/ml 0 57*       Lines/Drains:  Invasive Devices  Report    Peripheral Intravenous Line  Duration           Peripheral IV 07/03/22 Right;Ventral (anterior) Forearm 3 days          Drain  Duration           Urethral Catheter Latex 16 Fr  6 days              Urinary Catheter:  Goal for removal: Remove after 48 hrs of I/O monitoring           Telemetry:  Telemetry Orders (From admission, onward)             48 Hour Telemetry Monitoring  Continuous x 48 hours        References:    Telemetry Guidelines   Question:  Reason for 48 Hour Telemetry  Answer:  Acute Decompensated CHF (continuous diuretic infusion or total diuretic dose > 200 mg daily, associated electrolyte derangement, ionotropic drip, history of ventricular arrhythmia, or new EF <35%)                 Telemetry Reviewed: Normal Sinus Rhythm  Indication for Continued Telemetry Use: Acute CHF on >200 mg lasix/day or equivalent dose or with new reduced EF  Imaging: Reviewed radiology reports from this admission including: chest xray    Recent Cultures (last 7 days):   Results from last 7 days   Lab Units 06/30/22  1851 06/30/22  1839 06/30/22  1020 06/30/22  1019   BLOOD CULTURE   --   --  No Growth After 5 Days  No Growth After 5 Days     GRAM STAIN RESULT  2+ Gram positive cocci in pairs*  1+ Gram negative rods*  No polys seen*  --   --   --    URINE CULTURE   --  >100,000 cfu/ml Klebsiella pneumoniae*  --   --    WOUND CULTURE  4+ Growth of Proteus mirabilis*  3+ Growth of Klebsiella pneumoniae*  3+ Growth of Enterococcus faecalis*  3+ Growth of Staphylococcus coagulase negative*  --   --   --        Last 24 Hours Medication List:   Current Facility-Administered Medications   Medication Dose Route Frequency Provider Last Rate    acetaminophen  650 mg Oral Q6H PRN LEXIE Cruz      ampicillin-sulbactam  3 g Intravenous Q8H Maggie Gross MD 3 g (07/06/22 0827)    furosemide  15 mg/hr Intravenous Continuous [facility] Elmo [place] Deep Purvis Hipp[place] 15 mg/hr (07/05/22 2202)    insulin lispro  1-6 Units Subcutaneous TID AC Eze Tickfaw, CRNP      insulin lispro  1-6 Units Subcutaneous HS Zee Tickfaw, CRNP      levothyroxine  150 mcg Oral Early Morning Zee Baljeet, CRNP      metoprolol succinate  100 mg Oral Daily [place], Colen Hippo      polyethylene glycol  17 g Oral Daily PRN Zee Tickfaw, CRNP      potassium chloride  40 mEq Oral BID Zee Baljeet, CRNP      senna-docusate sodium  1 tablet Oral BID Zee Baljeet, CRNP      warfarin  3 mg Oral Daily (warfarin) Sofie Tan MD          Today, Patient Was Seen By: Louis Angeles PA-C    **Please Note: This note may have been constructed using a voice recognition system  **

## 2022-07-07 LAB
ANION GAP SERPL CALCULATED.3IONS-SCNC: 5 MMOL/L (ref 4–13)
BUN SERPL-MCNC: 32 MG/DL (ref 5–25)
CALCIUM SERPL-MCNC: 7.7 MG/DL (ref 8.3–10.1)
CHLORIDE SERPL-SCNC: 103 MMOL/L (ref 100–108)
CO2 SERPL-SCNC: 31 MMOL/L (ref 21–32)
CREAT SERPL-MCNC: 1.6 MG/DL (ref 0.6–1.3)
ERYTHROCYTE [DISTWIDTH] IN BLOOD BY AUTOMATED COUNT: 17.2 % (ref 11.6–15.1)
GFR SERPL CREATININE-BSD FRML MDRD: 29 ML/MIN/1.73SQ M
GLUCOSE SERPL-MCNC: 117 MG/DL (ref 65–140)
GLUCOSE SERPL-MCNC: 145 MG/DL (ref 65–140)
GLUCOSE SERPL-MCNC: 55 MG/DL (ref 65–140)
GLUCOSE SERPL-MCNC: 83 MG/DL (ref 65–140)
GLUCOSE SERPL-MCNC: 91 MG/DL (ref 65–140)
GLUCOSE SERPL-MCNC: 95 MG/DL (ref 65–140)
HBA1C MFR BLD: 4.8 %
HCT VFR BLD AUTO: 27.9 % (ref 34.8–46.1)
HGB BLD-MCNC: 9 G/DL (ref 11.5–15.4)
INR PPP: 2.26 (ref 0.84–1.19)
MCH RBC QN AUTO: 32 PG (ref 26.8–34.3)
MCHC RBC AUTO-ENTMCNC: 32.3 G/DL (ref 31.4–37.4)
MCV RBC AUTO: 99 FL (ref 82–98)
PLATELET # BLD AUTO: 181 THOUSANDS/UL (ref 149–390)
PMV BLD AUTO: 10.3 FL (ref 8.9–12.7)
POTASSIUM SERPL-SCNC: 4.3 MMOL/L (ref 3.5–5.3)
PROTHROMBIN TIME: 24.4 SECONDS (ref 11.6–14.5)
RBC # BLD AUTO: 2.81 MILLION/UL (ref 3.81–5.12)
SODIUM SERPL-SCNC: 139 MMOL/L (ref 136–145)
WBC # BLD AUTO: 7.02 THOUSAND/UL (ref 4.31–10.16)

## 2022-07-07 PROCEDURE — 99232 SBSQ HOSP IP/OBS MODERATE 35: CPT | Performed by: INTERNAL MEDICINE

## 2022-07-07 PROCEDURE — 99232 SBSQ HOSP IP/OBS MODERATE 35: CPT

## 2022-07-07 PROCEDURE — 85610 PROTHROMBIN TIME: CPT | Performed by: FAMILY MEDICINE

## 2022-07-07 PROCEDURE — 85027 COMPLETE CBC AUTOMATED: CPT | Performed by: FAMILY MEDICINE

## 2022-07-07 PROCEDURE — 80048 BASIC METABOLIC PNL TOTAL CA: CPT | Performed by: FAMILY MEDICINE

## 2022-07-07 PROCEDURE — 82948 REAGENT STRIP/BLOOD GLUCOSE: CPT

## 2022-07-07 RX ORDER — METOPROLOL SUCCINATE 50 MG/1
50 TABLET, EXTENDED RELEASE ORAL DAILY
Status: DISCONTINUED | OUTPATIENT
Start: 2022-07-08 | End: 2022-07-10

## 2022-07-07 RX ADMIN — SODIUM CHLORIDE 3 G: 9 INJECTION, SOLUTION INTRAVENOUS at 08:49

## 2022-07-07 RX ADMIN — DOCUSATE SODIUM AND SENNOSIDES 1 TABLET: 8.6; 5 TABLET, FILM COATED ORAL at 08:50

## 2022-07-07 RX ADMIN — LEVOTHYROXINE SODIUM 150 MCG: 150 TABLET ORAL at 05:42

## 2022-07-07 RX ADMIN — METOPROLOL SUCCINATE 100 MG: 100 TABLET, EXTENDED RELEASE ORAL at 08:21

## 2022-07-07 RX ADMIN — Medication 15 MG/HR: at 08:23

## 2022-07-07 RX ADMIN — POTASSIUM CHLORIDE 40 MEQ: 1500 TABLET, EXTENDED RELEASE ORAL at 08:21

## 2022-07-07 RX ADMIN — SODIUM CHLORIDE 3 G: 9 INJECTION, SOLUTION INTRAVENOUS at 00:29

## 2022-07-07 RX ADMIN — WARFARIN SODIUM 3 MG: 3 TABLET ORAL at 16:16

## 2022-07-07 RX ADMIN — POTASSIUM CHLORIDE 40 MEQ: 1500 TABLET, EXTENDED RELEASE ORAL at 16:16

## 2022-07-07 RX ADMIN — SODIUM CHLORIDE 3 G: 9 INJECTION, SOLUTION INTRAVENOUS at 16:16

## 2022-07-07 NOTE — PROGRESS NOTES
[address]  Progress Note - [place] 1940, 80 y o  female MRN: [id number]  Unit/Bed#: -01 Encounter: [id number]  Primary Care Provider: Joyce Borja MD   Date and time admitted to hospital: 6/29/2022  3:01 PM    * Acute on chronic diastolic (congestive) heart failure (HCC)  Assessment & Plan  Wt Readings from Last 3 Encounters:   07/07/22 119 kg (261 lb 12 8 oz)   05/19/22 126 kg (277 lb 1 9 oz)   04/04/22 117 kg (257 lb 0 9 oz)     · Presents with acute on chronic CHF  · Home regime is Lasix 60 mg twice daily  · On Lasix infusion per cardiology   · Down 8 lb since admit net -10 L since admission  · Appreciate cardiology recommendations  · Daily weights and I & Os    Cellulitis of right lower extremity  Assessment & Plan  · Recurrent right thigh and leg cellulitis  Recently hospitalized with sepsis secondary to MRSA infection right thigh  · Wound cultures growing: Proteus mirabilis, Klebsiella, and Enterococcus  · Infectious Disease recommendations appreciated  · Complete 7 days of antibiotics (Unasyn) through 07/07/2022  · No additional ID follow-up needed  Risk for recurrent infection with DM2, edema      Paroxysmal A-fib [facility])  Assessment & Plan    Recent Labs     07/05/22  0736 07/06/22  0650 07/07/22  0304   INR 1 57* 1 99* 2 26*     · Rate controlled with metoprolol   · Anticoagulation with coumadin, initially supratherapeutic and required Vitamin K   · Home regimen of 5mg coumadin was decreased during this admission  · Now theraputic  · Daily INR    LEONARDO (acute kidney injury) [facility])  Assessment & Plan    Recent Labs     07/05/22  0736 07/06/22  0650 07/07/22  0304   BUN 31* 33* 32*   CREATININE 1 60* 1 57* 1 60*   EGFR 29 30 29     · Baseline: 1 1 to 1 2   Possibly cardiorenal  · Continue Lasix infusion  · Monitor creatinine  · Avoid nephrotoxins and hypotension    CKD (chronic kidney disease)  Assessment & Plan  Lab Results   Component Value Date    EGFR 29 07/07/2022    EGFR 30 07/06/2022    EGFR 29 07/05/2022    CREATININE 1 60 (H) 07/07/2022    CREATININE 1 57 (H) 07/06/2022    CREATININE 1 60 (H) 07/05/2022     · Baseline seems to go up to 1 4, possibly cardiorenal component   · lasix gtt for diuresis, continue down 8lbs since admit  · Monitor creatinine  · Avoid nephrotoxins and hypotension     Cystitis  Assessment & Plan  · Cultures growing Klebsiella  Completed the three day course of antibiotics for this indication  · Continue routine gutierrez care  resolved    Aortic stenosis, moderate  Assessment & Plan  · Noted on echo in March  · Avoid hypotension  · Aggressive diuresis, hypervolemic with 20 lb weight gain-on Lasix gtt  · I & O    Diabetes mellitus [facility])  Assessment & Plan  Lab Results   Component Value Date    HGBA1C 5 5 01/31/2018       Recent Labs     07/06/22  1614 07/06/22  2109 07/07/22  0733 07/07/22  1122   POCGLU 123 120 117 145*       Blood Sugar Average: Last 72 hrs:  · Home regimen: Diet controlled  · Continue sliding scale  Might discontinue if persistently below 180  · Will d/c accu checks and ISS    Hypothyroidism (acquired)  Assessment & Plan  · Continue home 150 mcg Synthroid  · Continue outpatient follow-up    Impaired skin integrity  Assessment & Plan  · Bilateral buttock open areas reported by daughter, there was sanguinous drainage on sheet when patient moved into bed  · Wound care-appreciate recommendations  · Pressure offloading, Q 2 hour turns  · Also present is healing MRSA infection right thigh  Was following outpatient with Wound Care but has completed treatment  · Continue pressure offloading and monitoring         VTE Pharmacologic Prophylaxis: VTE Score: 9 High Risk (Score >/= 5) - Pharmacological DVT Prophylaxis Ordered: warfarin (Coumadin)  Sequential Compression Devices Ordered  Patient Centered Rounds: I performed bedside rounds with nursing staff today    Discussions with Specialists or Other Care Team Provider: cards    Education and Discussions with Family / Patient: Updated  (daughter) via phone  Time Spent for Care: 45 minutes  More than 50% of total time spent on counseling and coordination of care as described above  Current Length of Stay: 8 day(s)  Current Patient Status: Inpatient   Certification Statement: The patient will continue to require additional inpatient hospital stay due to lasix infusion with CHF volume overload, cellulitis  Discharge Plan: Anticipate discharge in 24-48 hrs to home with home services  Code Status: Level 3 - DNAR and DNI    Subjective:   Patient of bed and chair having lunch denies any complaints at this time  Improved lower extremity edema, denies shortness of breath  Objective:     Vitals:   Temp (24hrs), Av 9 °F (36 6 °C), Min:97 4 °F (36 3 °C), Max:98 2 °F (36 8 °C)    Temp:  [97 4 °F (36 3 °C)-98 2 °F (36 8 °C)] 97 9 °F (36 6 °C)  HR:  [63-71] 65  Resp:  [18] 18  BP: ()/(37-52) 106/45  SpO2:  [90 %-100 %] 96 %  Body mass index is 51 13 kg/m²  Input and Output Summary (last 24 hours): Intake/Output Summary (Last 24 hours) at 2022 1246  Last data filed at 2022 0850  Gross per 24 hour   Intake 900 ml   Output 1500 ml   Net -600 ml       Physical Exam:   Physical Exam  Vitals and nursing note reviewed  Constitutional:       General: She is not in acute distress  Appearance: She is well-developed  She is obese  She is not ill-appearing  HENT:      Head: Normocephalic and atraumatic  Mouth/Throat:      Mouth: Mucous membranes are moist       Pharynx: Oropharynx is clear  Eyes:      General: No scleral icterus  Conjunctiva/sclera: Conjunctivae normal       Pupils: Pupils are equal, round, and reactive to light  Cardiovascular:      Rate and Rhythm: Normal rate and regular rhythm  Pulses: Normal pulses  Heart sounds: Normal heart sounds  No murmur heard    Pulmonary:      Effort: Pulmonary effort is normal  No respiratory distress  Breath sounds: Normal breath sounds  No wheezing  Abdominal:      General: Bowel sounds are normal  There is no distension  Palpations: Abdomen is soft  Tenderness: There is no abdominal tenderness  Musculoskeletal:      Cervical back: Neck supple  Right lower leg: Edema present  Left lower leg: Edema present  Skin:     General: Skin is warm and dry  Capillary Refill: Capillary refill takes less than 2 seconds  Neurological:      General: No focal deficit present  Mental Status: She is alert  Psychiatric:         Mood and Affect: Mood normal          Thought Content:  Thought content normal           Additional Data:     Labs:  Results from last 7 days   Lab Units 07/07/22  0304 07/06/22  0650   WBC Thousand/uL 7 02 7 07   HEMOGLOBIN g/dL 9 0* 9 8*   HEMATOCRIT % 27 9* 30 1*   PLATELETS Thousands/uL 181 173   BANDS PCT %  --  1   LYMPHO PCT %  --  40   MONO PCT %  --  12   EOS PCT %  --  5     Results from last 7 days   Lab Units 07/07/22  0304   SODIUM mmol/L 139   POTASSIUM mmol/L 4 3   CHLORIDE mmol/L 103   CO2 mmol/L 31   BUN mg/dL 32*   CREATININE mg/dL 1 60*   ANION GAP mmol/L 5   CALCIUM mg/dL 7 7*   GLUCOSE RANDOM mg/dL 91     Results from last 7 days   Lab Units 07/07/22  0304   INR  2 26*     Results from last 7 days   Lab Units 07/07/22  1122 07/07/22  0733 07/06/22  2109 07/06/22  1614 07/06/22  1100 07/06/22  0739 07/05/22  2116 07/05/22  1626 07/05/22  1115 07/05/22  0751 07/04/22  2132 07/04/22  2118   POC GLUCOSE mg/dl 145* 117 120 123 110 93 101 95 166* 90 105 111               Lines/Drains:  Invasive Devices  Report    Peripheral Intravenous Line  Duration           Peripheral IV 07/07/22 Right Antecubital <1 day    Peripheral IV 07/07/22 Right Antecubital <1 day          Drain  Duration           Urethral Catheter Latex 16 Fr  7 days              Urinary Catheter:  Goal for removal: lasix gtt, remove when d/c Telemetry:  Telemetry Orders (From admission, onward)             48 Hour Telemetry Monitoring  Continuous x 48 hours        Expiring   References:    Telemetry Guidelines   Question:  Reason for 48 Hour Telemetry  Answer:  Acute Decompensated CHF (continuous diuretic infusion or total diuretic dose > 200 mg daily, associated electrolyte derangement, ionotropic drip, history of ventricular arrhythmia, or new EF <35%)                 Telemetry Reviewed: Atrial fibrillation  HR averaging 60s  Indication for Continued Telemetry Use: Acute CHF on >200 mg lasix/day or equivalent dose or with new reduced EF  Imaging: No pertinent imaging reviewed      Recent Cultures (last 7 days):   Results from last 7 days   Lab Units 06/30/22  1851 06/30/22  1839   GRAM STAIN RESULT  2+ Gram positive cocci in pairs*  1+ Gram negative rods*  No polys seen*  --    URINE CULTURE   --  >100,000 cfu/ml Klebsiella pneumoniae*   WOUND CULTURE  4+ Growth of Proteus mirabilis*  3+ Growth of Klebsiella pneumoniae*  3+ Growth of Enterococcus faecalis*  3+ Growth of Staphylococcus coagulase negative*  --        Last 24 Hours Medication List:   Current Facility-Administered Medications   Medication Dose Route Frequency Provider Last Rate    acetaminophen  650 mg Oral Q6H PRN Scout Bumpers, CRNP      ampicillin-sulbactam  3 g Intravenous Q8H Nba Hidalgo MD 3 g (07/07/22 9686)    furosemide  15 mg/hr Intravenous Continuous [facility] [place] LILIAN Purvis 15 mg/hr (07/07/22 2333)    levothyroxine  150 mcg Oral Early Morning Scout Bumpers, CRNP      metoprolol succinate  100 mg Oral Daily [place], [place]      polyethylene glycol  17 g Oral Daily PRN Scout Bumpers, CRNP      potassium chloride  40 mEq Oral BID Scout Bumpers, CRNP      senna-docusate sodium  1 tablet Oral BID Scout Bumpers, CRNP      warfarin  3 mg Oral Daily (warfarin) George Dominguez MD          Today, Patient Was Seen By: Nicci Ibarra Preeti House    **Please Note: This note may have been constructed using a voice recognition system  **

## 2022-07-07 NOTE — ASSESSMENT & PLAN NOTE
Wt Readings from Last 3 Encounters:   07/07/22 119 kg (261 lb 12 8 oz)   05/19/22 126 kg (277 lb 1 9 oz)   04/04/22 117 kg (257 lb 0 9 oz)     · Presents with acute on chronic CHF  · Home regime is Lasix 60 mg twice daily  · On Lasix infusion per cardiology   · Down 8 lb since admit net -10 L since admission  · Appreciate cardiology recommendations  · Daily weights and I & Os

## 2022-07-07 NOTE — CASE MANAGEMENT
Case Management Progress Note    Patient name Tanna Fu  Location /-40 MRN [id number]  : 1940 Date 2022       LOS (days): 8  Geometric Mean LOS (GMLOS) (days): 3 80  Days to GMLOS:-4 1        OBJECTIVE:        Current admission status: Inpatient  Preferred Pharmacy:   [address]  [address] [facility]  Phone: [phone] Fax: [phone]    [phone] [facility] [address]  Phone: [phone] Fax: [address] [address]  Phone: [phone] Fax: [phone]    Primary Care Provider: Pavithra Tillman MD    Primary Insurance: MEDICARE  Secondary Insurance: AARP    PROGRESS NOTE:      Current LOS 8 days  Pt here with CHF, on lasix gtt, plan to transition to PO tomorrow  Cellulitis of RLE, IV antibiotics completed today  A fib, INR 2 26  LEONARDO, BUN 32, Creatinine 1 60  Anticipated dc in 1-2 days, home with [facility]

## 2022-07-07 NOTE — ASSESSMENT & PLAN NOTE
· Cultures growing Klebsiella   Completed the three day course of antibiotics for this indication  · Continue routine gutierrez care  resolved

## 2022-07-07 NOTE — ASSESSMENT & PLAN NOTE
Recent Labs     07/05/22  0736 07/06/22  0650 07/07/22  0304   INR 1 57* 1 99* 2 26*     · Rate controlled with metoprolol   · Anticoagulation with coumadin, initially supratherapeutic and required Vitamin K   · Home regimen of 5mg coumadin was decreased during this admission  · Now theraputic  · Daily INR

## 2022-07-07 NOTE — PLAN OF CARE
Problem: Potential for Falls  Goal: Patient will remain free of falls  Description: INTERVENTIONS:  - Educate patient/family on patient safety including physical limitations  - Instruct patient to call for assistance with activity   - Consult OT/PT to assist with strengthening/mobility   - Keep Call bell within reach  - Keep bed low and locked with side rails adjusted as appropriate  - Keep care items and personal belongings within reach  - Initiate and maintain comfort rounds  - Make Fall Risk Sign visible to staff  - Offer Toileting every 2 Hours, in advance of need  - Initiate/Maintain bed alarm=  - Apply yellow socks and bracelet for high fall risk patients  - Consider moving patient to room near nurses station  Outcome: Progressing     Problem: Nutrition/Hydration-ADULT  Goal: Nutrient/Hydration intake appropriate for improving, restoring or maintaining nutritional needs  Description: Monitor and assess patient's nutrition/hydration status for malnutrition  Collaborate with interdisciplinary team and initiate plan and interventions as ordered  Monitor patient's weight and dietary intake as ordered or per policy  Utilize nutrition screening tool and intervene as necessary  Determine patient's food preferences and provide high-protein, high-caloric foods as appropriate       INTERVENTIONS:  - Monitor oral intake, urinary output, labs, and treatment plans  - Assess nutrition and hydration status and recommend course of action  - Evaluate amount of meals eaten  - Assist patient with eating if necessary   - Allow adequate time for meals  - Recommend/ encourage appropriate diets, oral nutritional supplements, and vitamin/mineral supplements  - Order, calculate, and assess calorie counts as needed  - Recommend, monitor, and adjust tube feedings and TPN/PPN based on assessed needs  - Assess need for intravenous fluids  - Provide specific nutrition/hydration education as appropriate  - Include patient/family/caregiver in decisions related to nutrition  Outcome: Progressing     Problem: MOBILITY - ADULT  Goal: Maintain or return to baseline ADL function  Description: INTERVENTIONS:  -  Assess patient's ability to carry out ADLs; assess patient's baseline for ADL function and identify physical deficits which impact ability to perform ADLs (bathing, care of mouth/teeth, toileting, grooming, dressing, etc )  - Assess/evaluate cause of self-care deficits   - Assess range of motion  - Assess patient's mobility; develop plan if impaired  - Assess patient's need for assistive devices and provide as appropriate  - Encourage maximum independence but intervene and supervise when necessary  - Involve family in performance of ADLs  - Assess for home care needs following discharge   - Consider OT consult to assist with ADL evaluation and planning for discharge  - Provide patient education as appropriate  Outcome: Progressing  Goal: Maintains/Returns to pre admission functional level  Description: INTERVENTIONS:  - Perform BMAT or MOVE assessment daily    - Set and communicate daily mobility goal to care team and patient/family/caregiver     - Collaborate with rehabilitation services on mobility goals if consulted  - Out of bed for toileting  - Record patient progress and toleration of activity level   Outcome: Progressing     Problem: Prexisting or High Potential for Compromised Skin Integrity  Goal: Skin integrity is maintained or improved  Description: INTERVENTIONS:  - Identify patients at risk for skin breakdown  - Assess and monitor skin integrity  - Assess and monitor nutrition and hydration status  - Monitor labs   - Assess for incontinence   - Turn and reposition patient  - Assist with mobility/ambulation  - Relieve pressure over bony prominences  - Avoid friction and shearing  - Provide appropriate hygiene as needed including keeping skin clean and dry  - Evaluate need for skin moisturizer/barrier cream  - Collaborate with interdisciplinary team   - Patient/family teaching  - Consider wound care consult   Outcome: Progressing     Problem: CARDIOVASCULAR - ADULT  Goal: Maintains optimal cardiac output and hemodynamic stability  Description: INTERVENTIONS:  - Monitor I/O, vital signs and rhythm  - Monitor for S/S and trends of decreased cardiac output  - Administer and titrate ordered vasoactive medications to optimize hemodynamic stability  - Assess quality of pulses, skin color and temperature  - Assess for signs of decreased coronary artery perfusion  - Instruct patient to report change in severity of symptoms  Outcome: Progressing  Goal: Absence of cardiac dysrhythmias or at baseline rhythm  Description: INTERVENTIONS:  - Continuous cardiac monitoring, vital signs, obtain 12 lead EKG if ordered  - Administer antiarrhythmic and heart rate control medications as ordered  - Monitor electrolytes and administer replacement therapy as ordered  Outcome: Progressing     Problem: GENITOURINARY - ADULT  Goal: Maintains or returns to baseline urinary function  Description: INTERVENTIONS:  - Assess urinary function  - Encourage oral fluids to ensure adequate hydration if ordered  - Administer IV fluids as ordered to ensure adequate hydration  - Administer ordered medications as needed  - Offer frequent toileting  - Follow urinary retention protocol if ordered  Outcome: Progressing  Goal: Absence of urinary retention  Description: INTERVENTIONS:  - Assess patients ability to void and empty bladder  - Monitor I/O  - Bladder scan as needed  - Discuss with physician/AP medications to alleviate retention as needed  - Discuss catheterization for long term situations as appropriate  Outcome: Progressing  Goal: Urinary catheter remains patent  Description: INTERVENTIONS:  - Assess patency of urinary catheter  - If patient has a chronic gutierrez, consider changing catheter if non-functioning  - Follow guidelines for intermittent irrigation of non-functioning urinary catheter  Outcome: Progressing     Problem: METABOLIC, FLUID AND ELECTROLYTES - ADULT  Goal: Electrolytes maintained within normal limits  Description: INTERVENTIONS:  - Monitor labs and assess patient for signs and symptoms of electrolyte imbalances  - Administer electrolyte replacement as ordered  - Monitor response to electrolyte replacements, including repeat lab results as appropriate  - Instruct patient on fluid and nutrition as appropriate  Outcome: Progressing  Goal: Fluid balance maintained  Description: INTERVENTIONS:  - Monitor labs   - Monitor I/O and WT  - Instruct patient on fluid and nutrition as appropriate  - Assess for signs & symptoms of volume excess or deficit  Outcome: Progressing  Goal: Glucose maintained within target range  Description: INTERVENTIONS:  - Monitor Blood Glucose as ordered  - Assess for signs and symptoms of hyperglycemia and hypoglycemia  - Administer ordered medications to maintain glucose within target range  - Assess nutritional intake and initiate nutrition service referral as needed  Outcome: Progressing

## 2022-07-07 NOTE — ASSESSMENT & PLAN NOTE
Recent Labs     07/05/22  0736 07/06/22  0650 07/07/22  0304   BUN 31* 33* 32*   CREATININE 1 60* 1 57* 1 60*   EGFR 29 30 29     · Baseline: 1 1 to 1 2   Possibly cardiorenal  · Continue Lasix infusion  · Monitor creatinine  · Avoid nephrotoxins and hypotension

## 2022-07-07 NOTE — ASSESSMENT & PLAN NOTE
Lab Results   Component Value Date    EGFR 29 07/07/2022    EGFR 30 07/06/2022    EGFR 29 07/05/2022    CREATININE 1 60 (H) 07/07/2022    CREATININE 1 57 (H) 07/06/2022    CREATININE 1 60 (H) 07/05/2022     · Baseline seems to go up to 1 4, possibly cardiorenal component   · lasix gtt for diuresis, continue down 8lbs since admit  · Monitor creatinine  · Avoid nephrotoxins and hypotension

## 2022-07-07 NOTE — ASSESSMENT & PLAN NOTE
· Bilateral buttock open areas reported by daughter, there was sanguinous drainage on sheet when patient moved into bed  · Wound care-appreciate recommendations  · Pressure offloading, Q 2 hour turns  · Also present is healing MRSA infection right thigh   Was following outpatient with Wound Care but has completed treatment  · Continue pressure offloading and monitoring

## 2022-07-07 NOTE — ASSESSMENT & PLAN NOTE
· Noted on echo in March  · Avoid hypotension  · Aggressive diuresis, hypervolemic with 20 lb weight gain-on Lasix gtt  · I & O

## 2022-07-07 NOTE — PROGRESS NOTES
Progress Note:Cardiology  [facility] 1940, 80 y o  female MRN: [id number]    Unit/Bed#: -01 Encounter: [id number]  Attending Physician: Pooja Shore MD   Primary Care Provider: Rhys Glass MD   Date admitted to hospital: 6/29/2022  Length of stay: [address] 80YEAR-OLD [address] on her echocardiogram from 3 months ago which likely does contribute to her congestive heart failure as well as renal insufficiency  Given that she has only had AFib once in the setting of sepsis and she is relatively bradycardic without any tachycardia whatsoever, I am going to cut her Toprol dose in half to 50 mg daily in hopes of improving and increasing her cardiac output  Her LV function is preserved  It would be reasonable to continue the IV Lasix through tomorrow and then attempt to transition to high-dose oral Lasix possibly Augmentin with a low dose of metolazone  I do not think we would be able to completely get rid of her edema but getting it down a little bit further would be a reasonable goal     I discussed all the above with Dereck Rai as well as need for close follow-up her valve for valve and I brought up the idea of at some point aortic valve replacement with a TAVR procedure which can be discussed with her primary cardiologist I reviewed all the above with her nurse as well  Subjective:   Patient seen and examined  No significant events overnight  She denies any chest discomfort or shortness of breath  She says she really did not have breathing or chest pain symptoms on admission either only the significant lower extremity edema  She rarely gets lightheaded with standing  She says the diuretics were started 6 months ago  She knowsShe has a valve problem and murmur          Objective:     Vitals: Blood pressure (!) 106/45, pulse 65, temperature 97 9 °F (36 6 °C), resp   rate 18, height 5' (1 524 m), weight 119 kg (261 lb 12 8 oz), SpO2 96 %  , Body mass index is 51 13 kg/m² ,     Orthostatic Blood Pressures    Flowsheet Row Most Recent Value   Blood Pressure 106/45 filed at 07/07/2022 1124   Patient Position - Orthostatic VS Lying filed at 06/29/2022 1700               Intake/Output Summary (Last 24 hours) at 7/7/2022 1356  Last data filed at 7/7/2022 0850  Gross per 24 hour   Intake 660 ml   Output 1500 ml   Net -840 ml       Weight (last 2 days)     Date/Time Weight    07/07/22 0600 119 (261 8)    07/06/22 0605 118 (260 8)    07/05/22 1046 119 (262 4)    07/05/22 0600 119 (262 4)           Physical Exam  She is alert and pleasant sitting up in the chair  She appears her stated age or older  She is alert and oriented and appropriate  Carotid upstrokes are slightly delayed  Lungs are clear  Heart is regular with grade 2/6 late-peaking systolic ejection type murmur an S4 is present  Lower extremities are large  There appears to be 1 to 2+ pitting edema to the knees    Urine output has decreased a little bit the last couple of days in urine in the [place] looks fairly concentrated  She says she does drink a lot of fluids at home but they are restricting it here    She says her mouth has been dry for a few years    Medications:      Current Facility-Administered Medications:     acetaminophen (TYLENOL) tablet 650 mg, 650 mg, Oral, Q6H PRN, Sherrilee Deep, CRNP, 650 mg at 06/30/22 1708    ampicillin-sulbactam (UNASYN) 3 g in sodium chloride 0 9 % 100 mL IVPB, 3 g, Intravenous, Q8H, Stephanie Hernandez MD, Last Rate: 200 mL/hr at 07/07/22 0849, 3 g at 07/07/22 0849    furosemide (LASIX) 500 mg infusion 50 mL, 15 mg/hr, Intravenous, Continuous, [place], PA-C, Last Rate: 1 5 mL/hr at 07/07/22 0823, 15 mg/hr at 07/07/22 0823    levothyroxine tablet 150 mcg, 150 mcg, Oral, Early Morning, Sherrilee Deep, CRNP, 150 mcg at 07/07/22 0542    [START ON 7/8/2022] metoprolol succinate (TOPROL-XL) 24 hr tablet 50 mg, 50 mg, Oral, Daily, Vivi Frazier MD    polyethylene glycol [facility]) packet 17 g, 17 g, Oral, Daily PRN, Spero Handler, CRNP    potassium chloride (K-DUR,KLOR-CON) CR tablet 40 mEq, 40 mEq, Oral, BID, Spero Handler, CRNP, 40 mEq at 07/07/22 5182    senna-docusate sodium (SENOKOT S) 8 6-50 mg per tablet 1 tablet, 1 tablet, Oral, BID, Spero Handler, CRNP, 1 tablet at 07/07/22 0850    warfarin (COUMADIN) tablet 3 mg, 3 mg, Oral, Daily (warfarin), Oksana Castano MD, 3 mg at 07/06/22 1659     Labs & Results:        Results from last 7 days   Lab Units 07/07/22  0304 07/06/22  0650 07/05/22  0736   WBC Thousand/uL 7 02 7 07 7 27   HEMOGLOBIN g/dL 9 0* 9 8* 10 5*   HEMATOCRIT % 27 9* 30 1* 31 9*   PLATELETS Thousands/uL 181 173 166         Results from last 7 days   Lab Units 07/07/22  0304 07/06/22  0650 07/05/22  0736   POTASSIUM mmol/L 4 3 3 7 3 4*   CHLORIDE mmol/L 103 103 102   CO2 mmol/L 31 29 29   BUN mg/dL 32* 33* 31*   CREATININE mg/dL 1 60* 1 57* 1 60*   CALCIUM mg/dL 7 7* 7 9* 8 0*     Results from last 7 days   Lab Units 07/07/22  0304 07/06/22  0650 07/05/22  0736 07/02/22  0547 07/01/22  0947   INR  2 26* 1 99* 1 57*   < > 7 48*   PTT seconds  --   --   --   --  49*    < > = values in this interval not displayed       Results from last 7 days   Lab Units 07/05/22  0736 07/04/22  0458 07/03/22  0622   MAGNESIUM mg/dL 2 3 2 4 2 2                Telemetry reviewed by myself shows sinus rhythm and sinus bradycardia with PVCs but no sustained tachycardia overall heart rates typically in the 50s to 70s

## 2022-07-07 NOTE — ASSESSMENT & PLAN NOTE
Lab Results   Component Value Date    HGBA1C 5 5 01/31/2018       Recent Labs     07/06/22  1614 07/06/22  2109 07/07/22  0733 07/07/22  1122   POCGLU 123 120 117 145*       Blood Sugar Average: Last 72 hrs:  · Home regimen: Diet controlled  · Continue sliding scale   Might discontinue if persistently below 180  · Will d/c accu checks and ISS

## 2022-07-07 NOTE — ASSESSMENT & PLAN NOTE
Patient C/O chest pain. · Recurrent right thigh and leg cellulitis  Recently hospitalized with sepsis secondary to MRSA infection right thigh  · Wound cultures growing: Proteus mirabilis, Klebsiella, and Enterococcus  · Infectious Disease recommendations appreciated  · Complete 7 days of antibiotics (Unasyn) through 07/07/2022  · No additional ID follow-up needed   Risk for recurrent infection with DM2, edema

## 2022-07-08 LAB
ANION GAP SERPL CALCULATED.3IONS-SCNC: 5 MMOL/L (ref 4–13)
ATRIAL RATE: 62 BPM
BUN SERPL-MCNC: 31 MG/DL (ref 5–25)
CALCIUM SERPL-MCNC: 8.2 MG/DL (ref 8.3–10.1)
CHLORIDE SERPL-SCNC: 103 MMOL/L (ref 100–108)
CO2 SERPL-SCNC: 29 MMOL/L (ref 21–32)
CREAT SERPL-MCNC: 1.73 MG/DL (ref 0.6–1.3)
ERYTHROCYTE [DISTWIDTH] IN BLOOD BY AUTOMATED COUNT: 17.4 % (ref 11.6–15.1)
GFR SERPL CREATININE-BSD FRML MDRD: 27 ML/MIN/1.73SQ M
GLUCOSE SERPL-MCNC: 110 MG/DL (ref 65–140)
GLUCOSE SERPL-MCNC: 72 MG/DL (ref 65–140)
GLUCOSE SERPL-MCNC: 79 MG/DL (ref 65–140)
GLUCOSE SERPL-MCNC: 85 MG/DL (ref 65–140)
GLUCOSE SERPL-MCNC: 88 MG/DL (ref 65–140)
GLUCOSE SERPL-MCNC: 95 MG/DL (ref 65–140)
HCT VFR BLD AUTO: 27.2 % (ref 34.8–46.1)
HGB BLD-MCNC: 8.8 G/DL (ref 11.5–15.4)
INR PPP: 2.1 (ref 0.84–1.19)
MCH RBC QN AUTO: 32.2 PG (ref 26.8–34.3)
MCHC RBC AUTO-ENTMCNC: 32.4 G/DL (ref 31.4–37.4)
MCV RBC AUTO: 100 FL (ref 82–98)
P AXIS: 28 DEGREES
PLATELET # BLD AUTO: 157 THOUSANDS/UL (ref 149–390)
PMV BLD AUTO: 9.7 FL (ref 8.9–12.7)
POTASSIUM SERPL-SCNC: 3.9 MMOL/L (ref 3.5–5.3)
PR INTERVAL: 182 MS
PROTHROMBIN TIME: 23.1 SECONDS (ref 11.6–14.5)
QRS AXIS: -11 DEGREES
QRSD INTERVAL: 80 MS
QT INTERVAL: 494 MS
QTC INTERVAL: 501 MS
RBC # BLD AUTO: 2.73 MILLION/UL (ref 3.81–5.12)
SODIUM SERPL-SCNC: 137 MMOL/L (ref 136–145)
T WAVE AXIS: -1 DEGREES
VENTRICULAR RATE: 62 BPM
WBC # BLD AUTO: 6.98 THOUSAND/UL (ref 4.31–10.16)

## 2022-07-08 PROCEDURE — 82948 REAGENT STRIP/BLOOD GLUCOSE: CPT

## 2022-07-08 PROCEDURE — 97530 THERAPEUTIC ACTIVITIES: CPT

## 2022-07-08 PROCEDURE — 85027 COMPLETE CBC AUTOMATED: CPT | Performed by: FAMILY MEDICINE

## 2022-07-08 PROCEDURE — 99232 SBSQ HOSP IP/OBS MODERATE 35: CPT

## 2022-07-08 PROCEDURE — 80048 BASIC METABOLIC PNL TOTAL CA: CPT | Performed by: FAMILY MEDICINE

## 2022-07-08 PROCEDURE — 85610 PROTHROMBIN TIME: CPT

## 2022-07-08 PROCEDURE — 99232 SBSQ HOSP IP/OBS MODERATE 35: CPT | Performed by: INTERNAL MEDICINE

## 2022-07-08 PROCEDURE — 93010 ELECTROCARDIOGRAM REPORT: CPT | Performed by: INTERNAL MEDICINE

## 2022-07-08 PROCEDURE — 93005 ELECTROCARDIOGRAM TRACING: CPT

## 2022-07-08 RX ORDER — METOLAZONE 5 MG/1
2.5 TABLET ORAL DAILY
Status: DISCONTINUED | OUTPATIENT
Start: 2022-07-08 | End: 2022-07-08

## 2022-07-08 RX ORDER — ALBUMIN, HUMAN INJ 5% 5 %
12.5 SOLUTION INTRAVENOUS ONCE
Status: COMPLETED | OUTPATIENT
Start: 2022-07-08 | End: 2022-07-08

## 2022-07-08 RX ADMIN — ALBUMIN (HUMAN) 12.5 G: 12.5 INJECTION, SOLUTION INTRAVENOUS at 04:29

## 2022-07-08 RX ADMIN — DOCUSATE SODIUM AND SENNOSIDES 1 TABLET: 8.6; 5 TABLET, FILM COATED ORAL at 17:14

## 2022-07-08 RX ADMIN — SODIUM CHLORIDE 3 G: 9 INJECTION, SOLUTION INTRAVENOUS at 00:13

## 2022-07-08 RX ADMIN — WARFARIN SODIUM 3 MG: 3 TABLET ORAL at 17:14

## 2022-07-08 RX ADMIN — POTASSIUM CHLORIDE 40 MEQ: 1500 TABLET, EXTENDED RELEASE ORAL at 08:15

## 2022-07-08 RX ADMIN — SODIUM CHLORIDE 3 G: 9 INJECTION, SOLUTION INTRAVENOUS at 08:16

## 2022-07-08 RX ADMIN — DOCUSATE SODIUM AND SENNOSIDES 1 TABLET: 8.6; 5 TABLET, FILM COATED ORAL at 08:15

## 2022-07-08 RX ADMIN — POTASSIUM CHLORIDE 40 MEQ: 1500 TABLET, EXTENDED RELEASE ORAL at 17:14

## 2022-07-08 RX ADMIN — LEVOTHYROXINE SODIUM 150 MCG: 150 TABLET ORAL at 05:12

## 2022-07-08 RX ADMIN — ALBUMIN (HUMAN) 12.5 G: 12.5 INJECTION, SOLUTION INTRAVENOUS at 08:15

## 2022-07-08 NOTE — ASSESSMENT & PLAN NOTE
Echocardiogram 3/28/2022 reveals LVEF 70% with severe aortic stenosis  Severe aortic stenosis is contributing to volume issues

## 2022-07-08 NOTE — ASSESSMENT & PLAN NOTE
· Cultures growing Klebsiella   Completed the three day course of antibiotics for this indication  · Continue routine gutierrez care  · DC catheter  resolved

## 2022-07-08 NOTE — PLAN OF CARE
Problem: OCCUPATIONAL THERAPY ADULT  Goal: Performs self-care activities at highest level of function for planned discharge setting  See evaluation for individualized goals  Description: Treatment Interventions: ADL retraining, Functional transfer training, UE strengthening/ROM, Endurance training, Patient/family training, Equipment evaluation/education, Neuromuscular reeducation, Compensatory technique education, Continued evaluation, Energy conservation, Activityengagement          See flowsheet documentation for full assessment, interventions and recommendations  Outcome: Progressing  Note: Limitation: Decreased ADL status, Decreased UE ROM, Decreased UE strength, Decreased endurance, Decreased self-care trans, Decreased high-level ADLs  Prognosis: Good  Assessment: Pt completed OT tx session #2 focused on functional mobility and endurance  Pt alert and agreeable to participate  Pt with hypotensive BP all morning, spoke with RN Oleg Shannon who gave verbal okay to treat pt  Vitals monitered throughout  Pt demonstrating improvements in endurance this session  Pt declined ADLs but is currently performing UB ADLs @ S/set-up and LB ADLs @ Min A  Pt demonstrates Good potential to achieve goals but is currently limited by decreased activity tolerance, decreased standing balance/tolerance, decreased safety awareness and decreased LB ADL performance  Continue to recommend [address] [place] [address] services at this time to increase safety and independence       OT Discharge Recommendation: Home with home health rehabilitation

## 2022-07-08 NOTE — PROGRESS NOTES
[address]  Progress Note - Eduin Jack 1940, 80 y o  female MRN: [id number]  Unit/Bed#: -01 Encounter: [id number]  Primary Care Provider: Keegan Hagan MD   Date and time admitted to hospital: 6/29/2022  3:01 PM    * Acute on chronic diastolic (congestive) heart failure (HCC)  Assessment & Plan  Wt Readings from Last 3 Encounters:   07/08/22 120 kg (264 lb 6 4 oz)   05/19/22 126 kg (277 lb 1 9 oz)   04/04/22 117 kg (257 lb 0 9 oz)     · Presents with acute on chronic CHF  · Home regime is Lasix 60 mg twice daily  · On Lasix infusion per cardiology   · Down 8 lb since admit net -10 L since admission  · D/C lasix- cards recommending 80mg lasix BID or torsemide will initiate when hypotension resolved  · Appreciate cardiology recommendations  · Daily weights and I & Os    Cellulitis of right lower extremity  Assessment & Plan  · Recurrent right thigh and leg cellulitis  Recently hospitalized with sepsis secondary to MRSA infection right thigh  · Wound cultures growing: Proteus mirabilis, Klebsiella, and Enterococcus  · Infectious Disease recommendations appreciated  · Completed 7 days of antibiotics (Unasyn) through 07/07/2022  · No additional ID follow-up needed  Risk for recurrent infection with DM2, edema      Paroxysmal A-fib [facility])  Assessment & Plan    Recent Labs     07/06/22  0650 07/07/22  0304 07/08/22  0518   INR 1 99* 2 26* 2 10*     · Rate controlled with metoprolol   · Anticoagulation with coumadin, initially supratherapeutic and required Vitamin K   · Home regimen of 5mg coumadin was decreased during this admission  · Now theraputic 2-3  · Daily INR  · Metoprolol succinate decreased from 100 mg to 50mg with low HR and aortic stenosis    LEONARDO (acute kidney injury) [facility])  Assessment & Plan    Recent Labs     07/06/22  0650 07/07/22  0304 07/08/22  0518   BUN 33* 32* 31*   CREATININE 1 57* 1 60* 1 73*   EGFR 30 29 27     · Baseline: 1 1 to 1 2   Possibly cardiorenal  · Lasix infusion stopped with hypotension, x2 albumin  · Monitor creatinine  · Avoid nephrotoxins and hypotension    CKD (chronic kidney disease)  Assessment & Plan  Lab Results   Component Value Date    EGFR 27 07/08/2022    EGFR 29 07/07/2022    EGFR 30 07/06/2022    CREATININE 1 73 (H) 07/08/2022    CREATININE 1 60 (H) 07/07/2022    CREATININE 1 57 (H) 07/06/2022     · Baseline seems to go up to 1 4, possibly cardiorenal component   · lasix gtt for diuresis,  down 8lbs since admit,net -10 L  · Monitor creatinine  · Avoid nephrotoxins and hypotension   · Cr increasing likely 2/2 diuresing vs hypoperfusion Lasix gtt stopped for hypertension, x2 albumin with improvement    Cystitis  Assessment & Plan  · Cultures growing Klebsiella  Completed the three day course of antibiotics for this indication  · Continue routine gutierrez care  · DC catheter  resolved    Aortic stenosis, moderate  Assessment & Plan  · Noted on echo in March EF 70%  · Avoid hypotension  · Aggressive diuresis, hypervolemic with 20 lb weight gain-on Lasix gtt  · Lasix stopped with hypotension requiring albumin- consider p o  Lasix when hypotension resolves  · I & O    Diabetes mellitus [facility])  Assessment & Plan  Lab Results   Component Value Date    HGBA1C 4 8 07/02/2022       Recent Labs     07/07/22  2140 07/08/22  0422 07/08/22  0705 07/08/22  1126   POCGLU 83 72 79 110       Blood Sugar Average: Last 72 hrs:  · Home regimen: Diet controlled  · Continue sliding scale   Might discontinue if persistently below 180  · Will d/c accu checks and ISS    Hypothyroidism (acquired)  Assessment & Plan  · Continue home 150 mcg Synthroid  · Continue outpatient follow-up    Impaired skin integrity  Assessment & Plan  · Bilateral buttock open areas reported by daughter, there was sanguinous drainage on sheet when patient moved into bed  · Wound care-appreciate recommendations  · Pressure offloading, Q 2 hour turns  · Also present is healing MRSA infection right thigh  Was following outpatient with Wound Care but has completed treatment  · Continue pressure offloading and monitoring         VTE Pharmacologic Prophylaxis: VTE Score: 9 High Risk (Score >/= 5) - Pharmacological DVT Prophylaxis Ordered: warfarin (Coumadin)  Sequential Compression Devices Ordered  Patient Centered Rounds: I performed bedside rounds with nursing staff today  Discussions with Specialists or Other Care Team Provider:     Education and Discussions with Family / Patient: Updated  (daughter) at bedside  Time Spent for Care: 45 minutes  More than 50% of total time spent on counseling and coordination of care as described above  Current Length of Stay: 9 day(s)  Current Patient Status: Inpatient   Certification Statement: The patient will continue to require additional inpatient hospital stay due to Hypotension  Discharge Plan: Anticipate discharge tomorrow to home with home services  Code Status: Level 3 - DNAR and DNI    Subjective:   Asymptomatic hypotension this morning requiring albumin with improvement  Discussed with Cardiology Lasix versus torsemide at discharge  Asymptomatic with hypertension denies lightheaded or dizziness, shortness of breath or chest pain  Objective:     Vitals:   Temp (24hrs), Av °F (36 7 °C), Min:97 7 °F (36 5 °C), Max:98 3 °F (36 8 °C)    Temp:  [97 7 °F (36 5 °C)-98 3 °F (36 8 °C)] 97 8 °F (36 6 °C)  HR:  [] 100  Resp:  [17-18] 18  BP: ()/(35-82) 95/81  SpO2:  [79 %-97 %] 96 %  Body mass index is 51 64 kg/m²  Input and Output Summary (last 24 hours): Intake/Output Summary (Last 24 hours) at 2022 1222  Last data filed at 2022 1215  Gross per 24 hour   Intake 540 ml   Output 800 ml   Net -260 ml       Physical Exam:   Physical Exam  Vitals and nursing note reviewed  Constitutional:       General: She is not in acute distress  Appearance: She is well-developed     HENT:      Head: Normocephalic and atraumatic  Mouth/Throat:      Mouth: Mucous membranes are moist       Pharynx: Oropharynx is clear  Eyes:      Extraocular Movements: Extraocular movements intact  Conjunctiva/sclera: Conjunctivae normal       Pupils: Pupils are equal, round, and reactive to light  Cardiovascular:      Rate and Rhythm: Normal rate  Rhythm irregular  Heart sounds: Murmur heard  Pulmonary:      Effort: Pulmonary effort is normal  No respiratory distress  Breath sounds: Normal breath sounds  No wheezing, rhonchi or rales  Abdominal:      General: Bowel sounds are normal  There is no distension  Palpations: Abdomen is soft  Tenderness: There is no abdominal tenderness  Musculoskeletal:      Cervical back: Neck supple  Right lower leg: Edema present  Left lower leg: Edema present  Skin:     General: Skin is warm and dry  Capillary Refill: Capillary refill takes less than 2 seconds  Neurological:      Mental Status: She is alert and oriented to person, place, and time  Mental status is at baseline  Psychiatric:         Mood and Affect: Mood normal          Thought Content: Thought content normal           Additional Data:     Labs:  Results from last 7 days   Lab Units 07/08/22  0518 07/07/22  0304 07/06/22  0650   WBC Thousand/uL 6 98   < > 7 07   HEMOGLOBIN g/dL 8 8*   < > 9 8*   HEMATOCRIT % 27 2*   < > 30 1*   PLATELETS Thousands/uL 157   < > 173   BANDS PCT %  --   --  1   LYMPHO PCT %  --   --  40   MONO PCT %  --   --  12   EOS PCT %  --   --  5    < > = values in this interval not displayed       Results from last 7 days   Lab Units 07/08/22  0518   SODIUM mmol/L 137   POTASSIUM mmol/L 3 9   CHLORIDE mmol/L 103   CO2 mmol/L 29   BUN mg/dL 31*   CREATININE mg/dL 1 73*   ANION GAP mmol/L 5   CALCIUM mg/dL 8 2*   GLUCOSE RANDOM mg/dL 85     Results from last 7 days   Lab Units 07/08/22  0518   INR  2 10*     Results from last 7 days   Lab Units 07/08/22  1126 07/08/22  0705 07/08/22  0422 07/07/22  2140 07/07/22  2119 07/07/22  1623 07/07/22  1122 07/07/22  0733 07/06/22  2109 07/06/22  1614 07/06/22  1100 07/06/22  0739   POC GLUCOSE mg/dl 110 79 72 83 55* 95 145* 117 120 123 110 93     Results from last 7 days   Lab Units 07/02/22  0547   HEMOGLOBIN A1C % 4 8           Lines/Drains:  Invasive Devices  Report    Peripheral Intravenous Line  Duration           Peripheral IV 07/07/22 Right Antecubital 1 day    Peripheral IV 07/07/22 Right Antecubital 1 day          Drain  Duration           Urethral Catheter Latex 16 Fr  8 days              Urinary Catheter:  Goal for removal: discharge           Telemetry:  Telemetry Orders (From admission, onward)             48 Hour Telemetry Monitoring  Continuous x 48 hours        References:    Telemetry Guidelines   Question:  Reason for 48 Hour Telemetry  Answer:  Acute Decompensated CHF (continuous diuretic infusion or total diuretic dose > 200 mg daily, associated electrolyte derangement, ionotropic drip, history of ventricular arrhythmia, or new EF <35%)                 Telemetry Reviewed: Normal Sinus Rhythm  Indication for Continued Telemetry Use: No indication for continued use  Will discontinue  Imaging: No pertinent imaging reviewed      Recent Cultures (last 7 days):         Last 24 Hours Medication List:   Current Facility-Administered Medications   Medication Dose Route Frequency Provider Last Rate    acetaminophen  650 mg Oral Q6H PRN LEXIE Crowell      levothyroxine  150 mcg Oral Early Morning LEXIE Crowell      metoprolol succinate  50 mg Oral Daily Pao James MD      polyethylene glycol  17 g Oral Daily PRN LEXIE Crowell      potassium chloride  40 mEq Oral BID LEXIE Crowell      senna-docusate sodium  1 tablet Oral BID LEXIE Crowell      warfarin  3 mg Oral Daily (warfarin) Micaela Reis MD          Today, Patient Was Seen By: Gayl Angelucci, CRNP    **Please Note: This note may have been constructed using a voice recognition system  **

## 2022-07-08 NOTE — ASSESSMENT & PLAN NOTE
· Noted on echo in March EF 70%  · Avoid hypotension  · Aggressive diuresis, hypervolemic with 20 lb weight gain-on Lasix gtt  · Lasix stopped with hypotension requiring albumin- consider p o   Lasix when hypotension resolves  · I & O

## 2022-07-08 NOTE — QUICK NOTE
· Called to evaluate patient with hypotension 75/35  Denies chest pain, dyspnea  · Furosemide infusion discontinued  · Albumin 12 5 g 5% given - BP improved 105/82  · Continue to hold furosemide infusion and trend blood pressures pending cardiology evaluation

## 2022-07-08 NOTE — PLAN OF CARE
Problem: Potential for Falls  Goal: Patient will remain free of falls  Description: INTERVENTIONS:  - Educate patient/family on patient safety including physical limitations  - Instruct patient to call for assistance with activity   - Consult OT/PT to assist with strengthening/mobility   - Keep Call bell within reach  - Keep bed low and locked with side rails adjusted as appropriate  - Keep care items and personal belongings within reach  - Initiate and maintain comfort rounds  - Make Fall Risk Sign visible to staff  - Offer Toileting every 2 Hours, in advance of need  - Initiate/Maintain bed alarm  - Apply yellow socks and bracelet for high fall risk patients  - Consider moving patient to room near nurses station  Outcome: Progressing     Problem: Nutrition/Hydration-ADULT  Goal: Nutrient/Hydration intake appropriate for improving, restoring or maintaining nutritional needs  Description: Monitor and assess patient's nutrition/hydration status for malnutrition  Collaborate with interdisciplinary team and initiate plan and interventions as ordered  Monitor patient's weight and dietary intake as ordered or per policy  Utilize nutrition screening tool and intervene as necessary  Determine patient's food preferences and provide high-protein, high-caloric foods as appropriate       INTERVENTIONS:  - Monitor oral intake, urinary output, labs, and treatment plans  - Assess nutrition and hydration status and recommend course of action  - Evaluate amount of meals eaten  - Assist patient with eating if necessary   - Allow adequate time for meals  - Recommend/ encourage appropriate diets, oral nutritional supplements, and vitamin/mineral supplements  - Order, calculate, and assess calorie counts as needed  - Recommend, monitor, and adjust tube feedings and TPN/PPN based on assessed needs  - Assess need for intravenous fluids  - Provide specific nutrition/hydration education as appropriate  - Include patient/family/caregiver in decisions related to nutrition  Outcome: Progressing     Problem: MOBILITY - ADULT  Goal: Maintain or return to baseline ADL function  Description: INTERVENTIONS:  -  Assess patient's ability to carry out ADLs; assess patient's baseline for ADL function and identify physical deficits which impact ability to perform ADLs (bathing, care of mouth/teeth, toileting, grooming, dressing, etc )  - Assess/evaluate cause of self-care deficits   - Assess range of motion  - Assess patient's mobility; develop plan if impaired  - Assess patient's need for assistive devices and provide as appropriate  - Encourage maximum independence but intervene and supervise when necessary  - Involve family in performance of ADLs  - Assess for home care needs following discharge   - Consider OT consult to assist with ADL evaluation and planning for discharge  - Provide patient education as appropriate  Outcome: Progressing  Goal: Maintains/Returns to pre admission functional level  Description: INTERVENTIONS:  - Perform BMAT or MOVE assessment daily    - Set and communicate daily mobility goal to care team and patient/family/caregiver     - Collaborate with rehabilitation services on mobility goals if consulted  - Out of bed for toileting  - Record patient progress and toleration of activity level   Outcome: Progressing     Problem: Prexisting or High Potential for Compromised Skin Integrity  Goal: Skin integrity is maintained or improved  Description: INTERVENTIONS:  - Identify patients at risk for skin breakdown  - Assess and monitor skin integrity  - Assess and monitor nutrition and hydration status  - Monitor labs   - Assess for incontinence   - Turn and reposition patient  - Assist with mobility/ambulation  - Relieve pressure over bony prominences  - Avoid friction and shearing  - Provide appropriate hygiene as needed including keeping skin clean and dry  - Evaluate need for skin moisturizer/barrier cream  - Collaborate with interdisciplinary team   - Patient/family teaching  - Consider wound care consult   Outcome: Progressing     Problem: CARDIOVASCULAR - ADULT  Goal: Maintains optimal cardiac output and hemodynamic stability  Description: INTERVENTIONS:  - Monitor I/O, vital signs and rhythm  - Monitor for S/S and trends of decreased cardiac output  - Administer and titrate ordered vasoactive medications to optimize hemodynamic stability  - Assess quality of pulses, skin color and temperature  - Assess for signs of decreased coronary artery perfusion  - Instruct patient to report change in severity of symptoms  Outcome: Progressing  Goal: Absence of cardiac dysrhythmias or at baseline rhythm  Description: INTERVENTIONS:  - Continuous cardiac monitoring, vital signs, obtain 12 lead EKG if ordered  - Administer antiarrhythmic and heart rate control medications as ordered  - Monitor electrolytes and administer replacement therapy as ordered  Outcome: Progressing     Problem: GENITOURINARY - ADULT  Goal: Maintains or returns to baseline urinary function  Description: INTERVENTIONS:  - Assess urinary function  - Encourage oral fluids to ensure adequate hydration if ordered  - Administer IV fluids as ordered to ensure adequate hydration  - Administer ordered medications as needed  - Offer frequent toileting  - Follow urinary retention protocol if ordered  Outcome: Progressing  Goal: Absence of urinary retention  Description: INTERVENTIONS:  - Assess patients ability to void and empty bladder  - Monitor I/O  - Bladder scan as needed  - Discuss with physician/AP medications to alleviate retention as needed  - Discuss catheterization for long term situations as appropriate  Outcome: Progressing  Goal: Urinary catheter remains patent  Description: INTERVENTIONS:  - Assess patency of urinary catheter  - If patient has a chronic gutierrez, consider changing catheter if non-functioning  - Follow guidelines for intermittent irrigation of non-functioning urinary catheter  Outcome: Progressing     Problem: METABOLIC, FLUID AND ELECTROLYTES - ADULT  Goal: Electrolytes maintained within normal limits  Description: INTERVENTIONS:  - Monitor labs and assess patient for signs and symptoms of electrolyte imbalances  - Administer electrolyte replacement as ordered  - Monitor response to electrolyte replacements, including repeat lab results as appropriate  - Instruct patient on fluid and nutrition as appropriate  Outcome: Progressing  Goal: Fluid balance maintained  Description: INTERVENTIONS:  - Monitor labs   - Monitor I/O and WT  - Instruct patient on fluid and nutrition as appropriate  - Assess for signs & symptoms of volume excess or deficit  Outcome: Progressing  Goal: Glucose maintained within target range  Description: INTERVENTIONS:  - Monitor Blood Glucose as ordered  - Assess for signs and symptoms of hyperglycemia and hypoglycemia  - Administer ordered medications to maintain glucose within target range  - Assess nutritional intake and initiate nutrition service referral as needed  Outcome: Progressing

## 2022-07-08 NOTE — ASSESSMENT & PLAN NOTE
Recent Labs     07/06/22  0650 07/07/22  0304 07/08/22  0518   INR 1 99* 2 26* 2 10*     · Rate controlled with metoprolol   · Anticoagulation with coumadin, initially supratherapeutic and required Vitamin K   · Home regimen of 5mg coumadin was decreased during this admission  · Now theraputic 2-3  · Daily INR  · Metoprolol succinate decreased from 100 mg to 50mg with low HR and aortic stenosis

## 2022-07-08 NOTE — ASSESSMENT & PLAN NOTE
Recent Labs     07/06/22  0650 07/07/22  0304 07/08/22  0518   BUN 33* 32* 31*   CREATININE 1 57* 1 60* 1 73*   EGFR 30 29 27     · Baseline: 1 1 to 1 2   Possibly cardiorenal  · Lasix infusion stopped with hypotension, x2 albumin  · Monitor creatinine  · Avoid nephrotoxins and hypotension

## 2022-07-08 NOTE — PLAN OF CARE
Problem: Potential for Falls  Goal: Patient will remain free of falls  Description: INTERVENTIONS:  - Educate patient/family on patient safety including physical limitations  - Instruct patient to call for assistance with activity   - Consult OT/PT to assist with strengthening/mobility   - Keep Call bell within reach  - Keep bed low and locked with side rails adjusted as appropriate  - Keep care items and personal belongings within reach  - Initiate and maintain comfort rounds  - Make Fall Risk Sign visible to staff  - Offer Toileting every 2 Hours, in advance of need  - Initiate/Maintain alarm  - Obtain necessary fall risk management equipment:   - Apply yellow socks and bracelet for high fall risk patients  - Consider moving patient to room near nurses station  Outcome: Progressing     Problem: Nutrition/Hydration-ADULT  Goal: Nutrient/Hydration intake appropriate for improving, restoring or maintaining nutritional needs  Description: Monitor and assess patient's nutrition/hydration status for malnutrition  Collaborate with interdisciplinary team and initiate plan and interventions as ordered  Monitor patient's weight and dietary intake as ordered or per policy  Utilize nutrition screening tool and intervene as necessary  Determine patient's food preferences and provide high-protein, high-caloric foods as appropriate       INTERVENTIONS:  - Monitor oral intake, urinary output, labs, and treatment plans  - Assess nutrition and hydration status and recommend course of action  - Evaluate amount of meals eaten  - Assist patient with eating if necessary   - Allow adequate time for meals  - Recommend/ encourage appropriate diets, oral nutritional supplements, and vitamin/mineral supplements  - Order, calculate, and assess calorie counts as needed  - Recommend, monitor, and adjust tube feedings and TPN/PPN based on assessed needs  - Assess need for intravenous fluids  - Provide specific nutrition/hydration education as appropriate  - Include patient/family/caregiver in decisions related to nutrition  Outcome: Progressing     Problem: MOBILITY - ADULT  Goal: Maintain or return to baseline ADL function  Description: INTERVENTIONS:  -  Assess patient's ability to carry out ADLs; assess patient's baseline for ADL function and identify physical deficits which impact ability to perform ADLs (bathing, care of mouth/teeth, toileting, grooming, dressing, etc )  - Assess/evaluate cause of self-care deficits   - Assess range of motion  - Assess patient's mobility; develop plan if impaired  - Assess patient's need for assistive devices and provide as appropriate  - Encourage maximum independence but intervene and supervise when necessary  - Involve family in performance of ADLs  - Assess for home care needs following discharge   - Consider OT consult to assist with ADL evaluation and planning for discharge  - Provide patient education as appropriate  Outcome: Progressing    Problem: Prexisting or High Potential for Compromised Skin Integrity  Goal: Skin integrity is maintained or improved  Description: INTERVENTIONS:  - Identify patients at risk for skin breakdown  - Assess and monitor skin integrity  - Assess and monitor nutrition and hydration status  - Monitor labs   - Assess for incontinence   - Turn and reposition patient  - Assist with mobility/ambulation  - Relieve pressure over bony prominences  - Avoid friction and shearing  - Provide appropriate hygiene as needed including keeping skin clean and dry  - Evaluate need for skin moisturizer/barrier cream  - Collaborate with interdisciplinary team   - Patient/family teaching  - Consider wound care consult   Outcome: Progressing     Problem: CARDIOVASCULAR - ADULT  Goal: Maintains optimal cardiac output and hemodynamic stability  Description: INTERVENTIONS:  - Monitor I/O, vital signs and rhythm  - Monitor for S/S and trends of decreased cardiac output  - Administer and titrate ordered vasoactive medications to optimize hemodynamic stability  - Assess quality of pulses, skin color and temperature  - Assess for signs of decreased coronary artery perfusion  - Instruct patient to report change in severity of symptoms  Outcome: Progressing  Goal: Absence of cardiac dysrhythmias or at baseline rhythm  Description: INTERVENTIONS:  - Continuous cardiac monitoring, vital signs, obtain 12 lead EKG if ordered  - Administer antiarrhythmic and heart rate control medications as ordered  - Monitor electrolytes and administer replacement therapy as ordered  Outcome: Progressing     Problem: GENITOURINARY - ADULT  Goal: Maintains or returns to baseline urinary function  Description: INTERVENTIONS:  - Assess urinary function  - Encourage oral fluids to ensure adequate hydration if ordered  - Administer IV fluids as ordered to ensure adequate hydration  - Administer ordered medications as needed  - Offer frequent toileting  - Follow urinary retention protocol if ordered  Outcome: Progressing  Goal: Absence of urinary retention  Description: INTERVENTIONS:  - Assess patients ability to void and empty bladder  - Monitor I/O  - Bladder scan as needed  - Discuss with physician/AP medications to alleviate retention as needed  - Discuss catheterization for long term situations as appropriate  Outcome: Progressing  Goal: Urinary catheter remains patent  Description: INTERVENTIONS:  - Assess patency of urinary catheter  - If patient has a chronic gutierrez, consider changing catheter if non-functioning  - Follow guidelines for intermittent irrigation of non-functioning urinary catheter  Outcome: Progressing     Problem: METABOLIC, FLUID AND ELECTROLYTES - ADULT  Goal: Electrolytes maintained within normal limits  Description: INTERVENTIONS:  - Monitor labs and assess patient for signs and symptoms of electrolyte imbalances  - Administer electrolyte replacement as ordered  - Monitor response to electrolyte replacements, including repeat lab results as appropriate  - Instruct patient on fluid and nutrition as appropriate  Outcome: Progressing  Goal: Fluid balance maintained  Description: INTERVENTIONS:  - Monitor labs   - Monitor I/O and WT  - Instruct patient on fluid and nutrition as appropriate  - Assess for signs & symptoms of volume excess or deficit  Outcome: Progressing  Goal: Glucose maintained within target range  Description: INTERVENTIONS:  - Monitor Blood Glucose as ordered  - Assess for signs and symptoms of hyperglycemia and hypoglycemia  - Administer ordered medications to maintain glucose within target range  - Assess nutritional intake and initiate nutrition service referral as needed  Outcome: Progressing

## 2022-07-08 NOTE — ASSESSMENT & PLAN NOTE
Recent history of a fib with RVR while admitted for sepsis in March 2022  Currently in sinus rhythm during this admission  On warfarin with goal INR 2-3  Continue metoprolol succinate  This was reduced from 100 mg to 50 mg daily during this admission due to lower BP/HR's  Optimize electrolytes for K+ > 4, Mag > 2

## 2022-07-08 NOTE — WOUND OSTOMY CARE
Progress Note - Wound   Mary Jo Haddad 80 y o  female MRN: [id number]  Unit/Bed#: -01 Encounter: [id number]    History and Present Illness 80year old female seen for follow up wound assessment today  PMH significant for infected right thigh hematoma,s/p I/D 3/31 22  worsening venous edema, morbid obesity,diabetes  Seated out of bed in recliner on waffle cushion, uses bedside commode  Continent of bowel and bladder, wears adult brief due to urgency  Assessment:   1)Chronic wound to right thigh, wound bed red with granulation tissue present  periwound with scaring visible  2) Evolving DTI POA bilateral buttocks  Left buttock is Irregular shaped partial thickness skin loss  Wound edges are well defined with epithelial tissue present  Periwound is purple blanchable and non blanchable tissue  3)Right buttock evolving DTI  Distal open wound has pink wound bed with 30% yellow slough  Lateral edges of this wound has epibole present  Ramy Kip Periwound dark purple with dry flaky skin  Wound 03/25/22 Other (comment) Other (Comment) Thigh Anterior;Right (Active)   Wound Image   07/08/22 0925   Wound Description Beefy red;Drainage 07/08/22 0925   Argenis-wound Assessment Scar Tissue 07/08/22 0925   Wound Length (cm) 0 5 cm 07/08/22 0925   Wound Width (cm) 3 cm 07/08/22 0925   Wound Surface Area (cm^2) 1 5 cm^2 07/08/22 0925   Drainage Amount Scant 07/08/22 0925   Drainage Description Serosanguineous 07/08/22 0925   Treatments Site care 07/08/22 0925   Dressing Calcium Alginate with Silver;ABD 07/08/22 0925   Dressing Changed Changed 07/08/22 0925   Patient Tolerance Tolerated well 07/08/22 0925   Dressing Status Clean;Dry; Intact 07/08/22 0925       Wound 06/29/22 Pressure Injury Buttocks Left (Active)     Wound Image   07/08/22 0930   Wound Description Beefy red;Bleeding;Drainage;Fragile;Pink 07/08/22 0930   Pressure Injury Stage DTPI 07/08/22 0930   Argenis-wound Assessment Jaydon Daron; Purple;Scar Tissue 07/08/22 0930   Wound Length (cm) 4 cm 07/08/22 0930   Wound Width (cm) 2 cm 07/08/22 0930   Wound Depth (cm) 0 5 cm 07/08/22 0930   Wound Surface Area (cm^2) 8 cm^2 07/08/22 0930   Wound Volume (cm^3) 4 cm^3 07/08/22 0930   Calculated Wound Volume (cm^3) 4 cm^3 07/08/22 0930   Drainage Amount Scant 07/08/22 0930   Drainage Description Serosanguineous 07/08/22 0930   Treatments Cleansed;Site care 07/08/22 0930   Dressing Calcium Alginate with Silver; Foam, Silicon (eg  Allevyn, etc) 07/08/22 0930   Dressing Changed Changed 07/08/22 0930   Patient Tolerance Tolerated well 07/08/22 0930   Dressing Status Clean;Dry; Intact 07/08/22 0930       Wound 06/29/22 Buttocks Right (Active)   Wound Image   07/08/22 0930   Wound Description Beefy red;Dark edges;Drainage;Fragile;Light purple 07/08/22 0930   Pressure Injury Stage DTPI 07/08/22 0930   Argenis-wound Assessment Erythema; Hyperpigmented; Purple;Scaly 07/08/22 0930   Wound Length (cm) 7 cm 07/08/22 0930   Wound Width (cm) 7 cm 07/08/22 0930   Wound Depth (cm) 0 1 cm 07/08/22 0930   Wound Surface Area (cm^2) 49 cm^2 07/08/22 0930   Wound Volume (cm^3) 4 9 cm^3 07/08/22 0930   Calculated Wound Volume (cm^3) 4 9 cm^3 07/08/22 0930   Change in Wound Size % 0 07/08/22 0930   Drainage Amount Scant 07/08/22 0930   Drainage Description Serosanguineous 07/08/22 0930   Treatments Cleansed;Site care 07/08/22 0930   Dressing Calcium Alginate with Silver; Foam, Silicon (eg  Allevyn, etc) 07/08/22 0930   Dressing Changed Changed 07/08/22 0930   Patient Tolerance Tolerated well 07/08/22 0930   Dressing Status Clean;Dry; Intact 07/08/22 0930       Skin care Plan:  1-Gently cleanse bilateral buttocks open wounds with normal saline,apply Maxorb AG to open wounds  Cover with Allevyn bordered foam  Change daily and prn soil or dislodgement  2-Turn/reposition q2h or when medically stable for pressure re-distribution on skin, use green foam wedges  3-Elevate heels to offload pressure    4-Moisturize skin daily with skin nourishing cream   5-Ehob cushion in chair when out of bed  6-Cleanse right medial thigh with normal saline, apply Maxorb AG to wound bed,cover with 4x4, ABD and secure with tap,change daily and prn soil or dislodgment  dislodgement  7-Allevyn foam to heels, romi w/P, peel foam check skin integrity q-shift   Change q3d

## 2022-07-08 NOTE — ASSESSMENT & PLAN NOTE
· Recurrent right thigh and leg cellulitis  Recently hospitalized with sepsis secondary to MRSA infection right thigh  · Wound cultures growing: Proteus mirabilis, Klebsiella, and Enterococcus  · Infectious Disease recommendations appreciated  · Completed 7 days of antibiotics (Unasyn) through 07/07/2022  · No additional ID follow-up needed   Risk for recurrent infection with DM2, edema

## 2022-07-08 NOTE — ASSESSMENT & PLAN NOTE
Wt Readings from Last 3 Encounters:   07/08/22 120 kg (264 lb 6 4 oz)   05/19/22 126 kg (277 lb 1 9 oz)   04/04/22 117 kg (257 lb 0 9 oz)     Patient directed to the hospital for IV diuresis by her primary cardiologist due to increasing volume overload despite increase in oral diuretics  She has been on a furosemide drip since 6/30/2022 and diuresed about 10 liters  Echocardiogram 3/2022 with LVEF 70% and severe aortic stenosis  At this time she has hypotension and currently receiving albumin  Furosemide drip stopped  As blood pressures improve would add furosemide 80 mg PO BID  Can consider one time dose of metolazone 2 5 mg if BP allows  Strict I's/O's, standing daily weights  2 g sodium restriction  Optimize electrolytes for K + > 4, Mag > 2

## 2022-07-08 NOTE — PROGRESS NOTES
Progress Note:Cardiology  [facility] 1940, 80 y o  female MRN: [id number]    Unit/Bed#: -01 Encounter: [id number]  Attending Physician: Pooja Shore MD   Primary Care Provider: Rhys Glass MD   Date admitted to hospital: 6/29/2022  Length of stay: 9         Aortic stenosis, moderate  Assessment & Plan  Echocardiogram 3/28/2022 reveals LVEF 70% with severe aortic stenosis  Severe aortic stenosis is contributing to volume issues  Paroxysmal A-fib [facility])  Assessment & Plan  Recent history of a fib with RVR while admitted for sepsis in March 2022  Currently in sinus rhythm during this admission  On warfarin with goal INR 2-3  Continue metoprolol succinate  This was reduced from 100 mg to 50 mg daily during this admission due to lower BP/HR's  Optimize electrolytes for K+ > 4, Mag > 2  LEONARDO (acute kidney injury) ([facility] [address] )  Assessment & Plan  Baseline creatinine 1 1- 1 2  Creatinine has improved somewhat with diuresis    * Acute on chronic diastolic (congestive) heart failure (HCC)  Assessment & Plan  Wt Readings from Last 3 Encounters:   07/08/22 120 kg (264 lb 6 4 oz)   05/19/22 126 kg (277 lb 1 9 oz)   04/04/22 117 kg (257 lb 0 9 oz)     Patient directed to the hospital for IV diuresis by her primary cardiologist due to increasing volume overload despite increase in oral diuretics  She has been on a furosemide drip since 6/30/2022 and diuresed about 10 liters  Echocardiogram 3/2022 with LVEF 70% and severe aortic stenosis  At this time she has hypotension and currently receiving albumin  Furosemide drip stopped  As blood pressures improve would add furosemide 80 mg PO BID  Can consider one time dose of metolazone 2 5 mg if BP allows  Strict I's/O's, standing daily weights  2 g sodium restriction  Optimize electrolytes for K + > 4, Mag > 2  Subjective:   Patient seen and examined  No significant events overnight   Blood pressure hypotensive this morning, although she denies lightheadedness  Sitting up eating breakfast  No complaints this morning  Review of Systems   Constitutional: Negative  HENT: Negative  Cardiovascular: Positive for dyspnea on exertion and leg swelling  Negative for chest pain, irregular heartbeat, near-syncope, orthopnea and palpitations  Respiratory: Negative for cough, shortness of breath and snoring  Endocrine: Negative  Skin: Negative  Musculoskeletal: Negative  Gastrointestinal: Negative  Genitourinary: Negative  Neurological: Negative  Psychiatric/Behavioral: Negative  Objective:     Vitals: Blood pressure (!) 87/44, pulse 100, temperature 97 8 °F (36 6 °C), resp  rate 18, height 5' (1 524 m), weight 120 kg (264 lb 6 4 oz), SpO2 96 %  , Body mass index is 51 64 kg/m² ,     Orthostatic Blood Pressures    Flowsheet Row Most Recent Value   Blood Pressure 87/44 filed at 07/08/2022 0710   Patient Position - Orthostatic VS Lying filed at 07/08/2022 0419          Physical Exam  Vitals and nursing note reviewed  Constitutional:       General: She is not in acute distress  Appearance: She is well-developed  HENT:      Head: Normocephalic and atraumatic  Eyes:      Conjunctiva/sclera: Conjunctivae normal    Neck:      Vascular: No JVD  Cardiovascular:      Rate and Rhythm: Normal rate and regular rhythm  No extrasystoles are present  Heart sounds: Murmur heard  Systolic (harsh) murmur is present  Comments: Significant lower extremity edema  Pulmonary:      Effort: Pulmonary effort is normal  No respiratory distress  Breath sounds: Normal breath sounds  Comments: Breathing comfortably on room air  Abdominal:      Palpations: Abdomen is soft  Tenderness: There is no abdominal tenderness  Musculoskeletal:      Cervical back: Neck supple  Right lower leg: Edema present  Left lower leg: Edema present  Skin:     General: Skin is warm and dry     Neurological:      Mental Status: She is alert  Psychiatric:         Mood and Affect: Mood normal          Speech: Speech normal          Behavior: Behavior normal  Behavior is cooperative           Cognition and Memory: Cognition normal             Intake/Output Summary (Last 24 hours) at 7/8/2022 1022  Last data filed at 7/8/2022 0419  Gross per 24 hour   Intake 240 ml   Output 800 ml   Net -560 ml       Weight (last 2 days)     Date/Time Weight    07/08/22 0647 120 (264 4)    07/08/22 0600 120 (264 4)    07/07/22 0600 119 (261 8)    07/06/22 0605 118 (260 8)             Medications:      Current Facility-Administered Medications:     acetaminophen (TYLENOL) tablet 650 mg, 650 mg, Oral, Q6H PRN, Claudia Perch, CRNP, 650 mg at 06/30/22 1708    ampicillin-sulbactam (UNASYN) 3 g in sodium chloride 0 9 % 100 mL IVPB, 3 g, Intravenous, Q8H, Stephanie Hernandez MD, Last Rate: 200 mL/hr at 07/08/22 0816, 3 g at 07/08/22 0816    levothyroxine tablet 150 mcg, 150 mcg, Oral, Early Morning, Claudia Perch, CRNP, 150 mcg at 07/08/22 8781    metoprolol succinate (TOPROL-XL) 24 hr tablet 50 mg, 50 mg, Oral, Daily, Yuliana Mckeon MD    polyethylene glycol (MIRALAX) packet 17 g, 17 g, Oral, Daily PRN, Claudia Perch, CRNP    potassium chloride (K-DUR,KLOR-CON) CR tablet 40 mEq, 40 mEq, Oral, BID, Claudia Perch, CRNP, 40 mEq at 07/08/22 0815    senna-docusate sodium (SENOKOT S) 8 6-50 mg per tablet 1 tablet, 1 tablet, Oral, BID, Claudia Perch, CRNP, 1 tablet at 07/08/22 0815    warfarin (COUMADIN) tablet 3 mg, 3 mg, Oral, Daily (warfarin), Breann Morales MD, 3 mg at 07/07/22 1616     Labs & Results:        Results from last 7 days   Lab Units 07/08/22  0518 07/07/22  0304 07/06/22  0650   WBC Thousand/uL 6 98 7 02 7 07   HEMOGLOBIN g/dL 8 8* 9 0* 9 8*   HEMATOCRIT % 27 2* 27 9* 30 1*   PLATELETS Thousands/uL 157 181 173         Results from last 7 days   Lab Units 07/08/22  0518 07/07/22  0304 07/06/22  0650   POTASSIUM mmol/L 3 9 4 3 3  7   CHLORIDE mmol/L 103 103 103   CO2 mmol/L 29 31 29   BUN mg/dL 31* 32* 33*   CREATININE mg/dL 1 73* 1 60* 1 57*   CALCIUM mg/dL 8 2* 7 7* 7 9*     Results from last 7 days   Lab Units 07/08/22  0518 07/07/22  0304 07/06/22  0650   INR  2 10* 2 26* 1 99*     Results from last 7 days   Lab Units 07/05/22  0736 07/04/22  0458 07/03/22  0622   MAGNESIUM mg/dL 2 3 2 4 2 2            Telemetry: sinus rhythm, rate 60-80's    Counseling / Coordination of Care  Total floor / unit time spent today 25 minutes  Greater than 50% of total time was spent with the patient and / or family counseling and / or coordination of care  A description of the counseling / coordination of care: [facility]

## 2022-07-08 NOTE — ASSESSMENT & PLAN NOTE
Lab Results   Component Value Date    HGBA1C 4 8 07/02/2022       Recent Labs     07/07/22  2140 07/08/22  0422 07/08/22  0705 07/08/22  1126   POCGLU 83 72 79 110       Blood Sugar Average: Last 72 hrs:  · Home regimen: Diet controlled  · Continue sliding scale   Might discontinue if persistently below 180  · Will d/c accu checks and ISS Vital Signs Last 24 Hrs  T(C): 36.6 (08 May 2022 13:12), Max: 36.6 (08 May 2022 13:12)  T(F): 97.8 (08 May 2022 13:12), Max: 97.8 (08 May 2022 13:12)  HR: 68 (08 May 2022 18:18) (66 - 68)  BP: 116/62 (08 May 2022 18:18) (110/63 - 116/62)  BP(mean): --  RR: 19 (08 May 2022 13:12) (19 - 19)  SpO2: 98% (08 May 2022 18:18) (97% - 98%)    Physical Exam:  Gen: Alert, well-developed, NAD  HEENT: NCAT, PERRL, EOMI, clear conjunctiva, no scleral icterus, no erythema or exudates in the oropharynx, mmm  Neck: Supple, no JVD, no LAD  CV: RRR, S1S2, no m/r/g  Resp: CTAB, normal respiratory effort  Abd: Soft, NT, ND, normal bowel sounds  Ext: no edema, no clubbing or cyanosis  Neuro: AOx3, CN2-12 grossly intact, FERRER  Skin: warm, perfused Vital Signs Last 24 Hrs  T(C): 36.6 (08 May 2022 13:12), Max: 36.6 (08 May 2022 13:12)  T(F): 97.8 (08 May 2022 13:12), Max: 97.8 (08 May 2022 13:12)  HR: 68 (08 May 2022 18:18) (66 - 68)  BP: 116/62 (08 May 2022 18:18) (110/63 - 116/62)  BP(mean): --  RR: 19 (08 May 2022 13:12) (19 - 19)  SpO2: 98% (08 May 2022 18:18) (97% - 98%)    Physical Exam:  Gen: Alert, well-developed, NAD  HEENT: NCAT, EOMI, clear conjunctiva, no scleral icterus, no erythema or exudates in the oropharynx, mmm  Neck: Supple  CV: RRR, S1S2  Resp: Mild atelectatic crackles in bases, otherwise clear. normal respiratory effort  Abd: Soft, NT, ND  Ext: Mild TTP in calves R>L and in thighs. Mild ecchymosis on L upper tibial region. No edema, no clubbing or cyanosis  Neuro: AOx3, CN2-12 grossly intact, FERRER  Skin: warm, perfused Vital Signs Last 24 Hrs  T(C): 36.6 (08 May 2022 13:12), Max: 36.6 (08 May 2022 13:12)  T(F): 97.8 (08 May 2022 13:12), Max: 97.8 (08 May 2022 13:12)  HR: 68 (08 May 2022 18:18) (66 - 68)  BP: 116/62 (08 May 2022 18:18) (110/63 - 116/62)  BP(mean): --  RR: 19 (08 May 2022 13:12) (19 - 19)  SpO2: 98% (08 May 2022 18:18) (97% - 98%)    Physical Exam:  Gen: Alert, well-developed, NAD  HEENT: NCAT, EOMI, clear conjunctiva, no scleral icterus, no erythema or exudates in the oropharynx, mmm  Neck: Supple  CV: RRR, S1S2  Resp: Mild atelectatic crackles in bases, otherwise clear. normal respiratory effort  Abd: Soft, NT, ND  : escobar in place draining light yellow urine  Ext: Mild TTP in calves R>L and in thighs. Mild ecchymosis on L upper tibial region. No edema, no clubbing or cyanosis  Neuro: AOx3, CN2-12 grossly intact, FERRER  Skin: warm, perfused Vital Signs Last 24 Hrs  T(C): 36.6 (08 May 2022 13:12), Max: 36.6 (08 May 2022 13:12)  T(F): 97.8 (08 May 2022 13:12), Max: 97.8 (08 May 2022 13:12)  HR: 68 (08 May 2022 18:18) (66 - 68)  BP: 116/62 (08 May 2022 18:18) (110/63 - 116/62)  RR: 19 (08 May 2022 13:12) (19 - 19)  SpO2: 98% (08 May 2022 18:18) (97% - 98%)    Physical Exam:  Gen: Alert, well-developed, NAD  HEENT: NCAT, EOMI, clear conjunctiva, no scleral icterus, no erythema or exudates in the oropharynx, mmm  Neck: Supple  CV: RRR, S1S2  Resp: Mild atelectatic crackles in bases, otherwise clear. normal respiratory effort  Abd: Soft, NT, ND  : escobar in place draining light yellow urine  Ext: Mild TTP in calves R>L and in thighs. Mild ecchymosis on L upper tibial region. No edema, no clubbing or cyanosis  Neuro: AOx3, CN2-12 grossly intact, FERRER  Skin: warm, perfused

## 2022-07-08 NOTE — OCCUPATIONAL THERAPY NOTE
Occupational Therapy Progress Note     Patient Name: Harry Morrison  [id number] Date: 7/8/2022  Problem List  Principal Problem:    Acute on chronic diastolic (congestive) heart failure (HCC)  Active Problems:    LEONARDO (acute kidney injury) ([facility] [address] )    Paroxysmal A-fib (HCC)    Impaired skin integrity    Hypothyroidism (acquired)    Diabetes mellitus ([facility] )    Cellulitis of right lower extremity    Aortic stenosis, moderate    Cystitis    CKD (chronic kidney disease)       07/08/22 1343   Note Type   Note Type Treatment   Restrictions/Precautions   Other Precautions Chair Alarm; Bed Alarm;O2;Pain;Multiple lines   Pain Assessment   Pain Assessment Tool 0-10   Pain Score No Pain   Transfers   Sit to Stand   (SBA)   Additional items Increased time required;Verbal cues   Stand to Sit   (SBA)   Additional items Increased time required;Verbal cues   Stand pivot   (SBA)   Additional items Increased time required;Verbal cues   Additional Comments Pt seated in chair at beginning of session  Spoke with XAVIER Sainz prior to session regarding hypotension this AM  RN gave approval to see pt for therapy, stating that cardiology wanted pt up and moving to increase HR  Vitals monitored closely throughout session, see chart below  O2 maintained 70-80bpm and increased no higher than 105bpm during functonal mobility  STS from chair to RW @ SBA  Pt able to complete 20ft + 40ft functional mobility in room with RW @ SBA  SBA required d/t decreased standing balance/tolerance and medical concerns  Pt asymptomatic throughout  Pt seated in chair at end of session with chair alarm intact and call bell within reach  All needs met  Cognition   Overall Cognitive Status WFL   Arousal/Participation Alert; Responsive; Cooperative   Attention Within functional limits   Orientation Level Oriented X4   Memory Within functional limits   Following Commands Follows one step commands without difficulty   Comments Cues required for safety awareness throughout Activity Tolerance   Activity Tolerance Patient limited by fatigue;Treatment limited secondary to medical complications (Comment)   Medical Staff Made Aware Spoke with XAVIER Linda   Assessment   Assessment Pt completed OT tx session #2 focused on functional mobility and endurance  Pt alert and agreeable to participate  Pt with hypotensive BP all morning, spoke with XAVIER Becerra who gave verbal okay to treat pt  Vitals monitered throughout  Pt demonstrating improvements in endurance this session  Pt declined ADLs but is currently performing UB ADLs @ S/set-up and LB ADLs @ Min A  Pt demonstrates Good potential to achieve goals but is currently limited by decreased activity tolerance, decreased standing balance/tolerance, decreased safety awareness and decreased LB ADL performance  Continue to recommend [address] [place] [address] at this time to increase safety and independence  Plan   Treatment Interventions Functional transfer training; Endurance training   Goal Expiration Date 07/14/22   OT Treatment Day 2   OT Frequency 2-3x/wk   Recommendation   OT Discharge Recommendation Home with home health rehabilitation   AM-PAC Daily Activity Inpatient   Lower Body Dressing 2   Bathing 3   Toileting 3   Upper Body Dressing 3   Grooming 3   Eating 4   Daily Activity Raw Score 18   Daily Activity Standardized Score (Calc for Raw Score >=11) 38 66   AM-PAC Applied Cognition Inpatient   Following a Speech/Presentation 3   Understanding Ordinary Conversation 3   Taking Medications 3   Remembering Where Things Are Placed or Put Away 3   Remembering List of 4-5 Errands 3   Taking Care of Complicated Tasks 3   Applied Cognition Raw Score 18   Applied Cognition Standardized Score 38 07        07/08/22 1347 07/08/22 1350 07/08/22 1353   Vitals   Pulse  --  78 76   Blood Pressure (!) 89/48 96/58 98/60   MAP (mmHg) (!) 62 71 73   BP Location Left arm Left arm Left arm   BP Method Manual Manual Manual   Patient Position - Orthostatic VS Sitting - Orthostatic VS Standing - Orthostatic VS Standing for 3 minutes - Orthostatic VS      07/08/22 1358 07/08/22 1406   Vitals   Pulse 76 62   Blood Pressure 102/61 109/68   MAP (mmHg) 75 82   BP Location Left arm Left arm   BP Method Manual Manual   Patient Position - Orthostatic VS Standing  (after 20ft functional mobility) Sitting  (after 40ft functional mobility)     Pt goals to be met by 7/14/2022     1  Pt will demonstrate ability to complete grooming/hygiene tasks @ Mod I after set-up  2  Pt will demonstrate ability to complete supine<>sit @ Mod I in order to increase safety and independence during ADL tasks  3  Pt will demonstrate ability to complete UB ADLs including washing/dressing @ Mod I in order to increase performance and participation during meaningful tasks  4  Pt will demonstrate ability to complete LB dressing @ Mod I in order to increase safety and independence during meaningful tasks  5  Pt will demonstrate ability to complete toileting tasks including CM and pericare @ Mod I in order to increase safety and independence during meaningful tasks  6  Pt will demonstrate ability to complete EOB, chair, toilet/commode transfers @ Mod I in order to increase performance and participation during functional tasks  7  Pt will demonstrate ability to stand for 3-5 minutes while maintaining Good balance with use of RW for UB support PRN  8  Pt will demonstrate ability to tolerate 30-35 minute OT session with no vc'ing for deep breathing or use of energy conservation techniques in order to increase activity tolerance during functional tasks  9  Pt will demonstrate Good carryover of use of energy conservation/compensatory strategies during ADLs and functional tasks in order to increase safety and reduce risk for falls  10  Pt will demonstrate Good attention and participation in continued evaluation of functional ambulation house hold distances in order to assist with safe d/c planning    11  Pt will attend to continued cognitive assessments 100% of the time in order to provide most appropriate d/c recommendations  12  Pt will follow 100% simple 2-step commands and be A&O x4 consistently with environmental cues to increase participation in functional activities  13  Pt will identify 3 areas of interest/hobbies and 1 intervention on how to incorporate into daily life in order to increase interaction with environment and peers as well as increase participation in meaningful tasks  14  Pt will demonstrate 100% carryover of BUE HEP in order to increase BUE MS and increase performance during functional tasks upon d/c home      Abhay Mclean OTR/L

## 2022-07-08 NOTE — ASSESSMENT & PLAN NOTE
Lab Results   Component Value Date    EGFR 27 07/08/2022    EGFR 29 07/07/2022    EGFR 30 07/06/2022    CREATININE 1 73 (H) 07/08/2022    CREATININE 1 60 (H) 07/07/2022    CREATININE 1 57 (H) 07/06/2022     · Baseline seems to go up to 1 4, possibly cardiorenal component   · lasix gtt for diuresis,  down 8lbs since admit,net -10 L  · Monitor creatinine  · Avoid nephrotoxins and hypotension   · Cr increasing likely 2/2 diuresing vs hypoperfusion Lasix gtt stopped for hypertension, x2 albumin with improvement

## 2022-07-08 NOTE — ASSESSMENT & PLAN NOTE
Wt Readings from Last 3 Encounters:   07/08/22 120 kg (264 lb 6 4 oz)   05/19/22 126 kg (277 lb 1 9 oz)   04/04/22 117 kg (257 lb 0 9 oz)     · Presents with acute on chronic CHF  · Home regime is Lasix 60 mg twice daily  · On Lasix infusion per cardiology   · Down 8 lb since admit net -10 L since admission  · D/C lasix- cards recommending 80mg lasix BID or torsemide will initiate when hypotension resolved  · Appreciate cardiology recommendations  · Daily weights and I & Os

## 2022-07-09 LAB
ANION GAP SERPL CALCULATED.3IONS-SCNC: 5 MMOL/L (ref 4–13)
BUN SERPL-MCNC: 31 MG/DL (ref 5–25)
CALCIUM SERPL-MCNC: 8 MG/DL (ref 8.3–10.1)
CHLORIDE SERPL-SCNC: 105 MMOL/L (ref 100–108)
CO2 SERPL-SCNC: 28 MMOL/L (ref 21–32)
CREAT SERPL-MCNC: 1.78 MG/DL (ref 0.6–1.3)
ERYTHROCYTE [DISTWIDTH] IN BLOOD BY AUTOMATED COUNT: 17.8 % (ref 11.6–15.1)
GFR SERPL CREATININE-BSD FRML MDRD: 26 ML/MIN/1.73SQ M
GLUCOSE SERPL-MCNC: 81 MG/DL (ref 65–140)
GLUCOSE SERPL-MCNC: 93 MG/DL (ref 65–140)
HCT VFR BLD AUTO: 26.1 % (ref 34.8–46.1)
HGB BLD-MCNC: 8.5 G/DL (ref 11.5–15.4)
INR PPP: 2.3 (ref 0.84–1.19)
MCH RBC QN AUTO: 32.1 PG (ref 26.8–34.3)
MCHC RBC AUTO-ENTMCNC: 32.6 G/DL (ref 31.4–37.4)
MCV RBC AUTO: 99 FL (ref 82–98)
PHYTONADIONE SERPL-MCNC: 0.18 NG/ML (ref 0.1–2.2)
PLATELET # BLD AUTO: 135 THOUSANDS/UL (ref 149–390)
PMV BLD AUTO: 9.8 FL (ref 8.9–12.7)
POTASSIUM SERPL-SCNC: 4.7 MMOL/L (ref 3.5–5.3)
PROTHROMBIN TIME: 24.7 SECONDS (ref 11.6–14.5)
RBC # BLD AUTO: 2.65 MILLION/UL (ref 3.81–5.12)
SODIUM SERPL-SCNC: 138 MMOL/L (ref 136–145)
WBC # BLD AUTO: 5.43 THOUSAND/UL (ref 4.31–10.16)

## 2022-07-09 PROCEDURE — 82948 REAGENT STRIP/BLOOD GLUCOSE: CPT

## 2022-07-09 PROCEDURE — 80048 BASIC METABOLIC PNL TOTAL CA: CPT

## 2022-07-09 PROCEDURE — 85610 PROTHROMBIN TIME: CPT

## 2022-07-09 PROCEDURE — 99232 SBSQ HOSP IP/OBS MODERATE 35: CPT

## 2022-07-09 PROCEDURE — 85027 COMPLETE CBC AUTOMATED: CPT

## 2022-07-09 RX ORDER — ALBUMIN, HUMAN INJ 5% 5 %
12.5 SOLUTION INTRAVENOUS ONCE
Status: COMPLETED | OUTPATIENT
Start: 2022-07-09 | End: 2022-07-10

## 2022-07-09 RX ADMIN — LEVOTHYROXINE SODIUM 150 MCG: 150 TABLET ORAL at 05:30

## 2022-07-09 RX ADMIN — WARFARIN SODIUM 3 MG: 3 TABLET ORAL at 17:10

## 2022-07-09 RX ADMIN — POTASSIUM CHLORIDE 40 MEQ: 1500 TABLET, EXTENDED RELEASE ORAL at 17:10

## 2022-07-09 RX ADMIN — METOPROLOL SUCCINATE 50 MG: 50 TABLET, EXTENDED RELEASE ORAL at 08:25

## 2022-07-09 RX ADMIN — POTASSIUM CHLORIDE 40 MEQ: 1500 TABLET, EXTENDED RELEASE ORAL at 08:25

## 2022-07-09 RX ADMIN — ALBUMIN (HUMAN) 12.5 G: 12.5 INJECTION, SOLUTION INTRAVENOUS at 09:15

## 2022-07-09 NOTE — ASSESSMENT & PLAN NOTE
Wt Readings from Last 3 Encounters:   07/09/22 120 kg (265 lb 6 4 oz)   05/19/22 126 kg (277 lb 1 9 oz)   04/04/22 117 kg (257 lb 0 9 oz)     · Presents with acute on chronic CHF  · Home regime is Lasix 60 mg twice daily  · On Lasix infusion per cardiology   · Down 8 lb since admit net -10 L since admission  · D/C lasix- cards recommending 80mg lasix BID or torsemide will initiate when hypotension resolved  · Appreciate cardiology recommendations  · Recommending 80mg lasix BID, one time dose of metolazine 2 5 mg if BP allows  · Continue to hold diuretics, additional albumin given  · Daily weights and I & Os

## 2022-07-09 NOTE — ASSESSMENT & PLAN NOTE
· Cultures growing Klebsiella   Completed the three day course of antibiotics  · DC catheter  resolved

## 2022-07-09 NOTE — PROGRESS NOTES
[address]  Progress Note - Eduin Jack 1940, 80 y o  female MRN: [id number]  Unit/Bed#: -01 Encounter: [id number]  Primary Care Provider: Keegan Hagan MD   Date and time admitted to hospital: 6/29/2022  3:01 PM    * Acute on chronic diastolic (congestive) heart failure (HCC)  Assessment & Plan  Wt Readings from Last 3 Encounters:   07/09/22 120 kg (265 lb 6 4 oz)   05/19/22 126 kg (277 lb 1 9 oz)   04/04/22 117 kg (257 lb 0 9 oz)     · Presents with acute on chronic CHF  · Home regime is Lasix 60 mg twice daily  · On Lasix infusion per cardiology   · Down 8 lb since admit net -10 L since admission  · D/C lasix- cards recommending 80mg lasix BID or torsemide will initiate when hypotension resolved  · Appreciate cardiology recommendations  · Recommending 80mg lasix BID, one time dose of metolazine 2 5 mg if BP allows  · Continue to hold diuretics, additional albumin given  · Daily weights and I & Os    Cellulitis of right lower extremity  Assessment & Plan  · Recurrent right thigh and leg cellulitis  Recently hospitalized with sepsis secondary to MRSA infection right thigh  · Wound cultures growing: Proteus mirabilis, Klebsiella, and Enterococcus  · Infectious Disease recommendations appreciated  · Completed 7 days of antibiotics (Unasyn) through 07/07/2022  · No additional ID follow-up needed   Risk for recurrent infection with DM2, edema      Paroxysmal A-fib [facility])  Assessment & Plan    Recent Labs     07/07/22  0304 07/08/22  0518 07/09/22  0503   INR 2 26* 2 10* 2 30*     · Rate controlled with metoprolol   · Anticoagulation with coumadin, initially supratherapeutic and required Vitamin K   · Home regimen of 5mg coumadin was decreased to 3 mg during this admission  · Now theraputic 2-3  · Daily INR  · Metoprolol succinate decreased from 100 mg to 50mg with low HR and aortic stenosis    LEONARDO (acute kidney injury) [facility])  Assessment & Plan    Recent Labs 07/07/22  0304 07/08/22  0518 07/09/22  0503   BUN 32* 31* 31*   CREATININE 1 60* 1 73* 1 78*   EGFR 29 27 26     · Baseline: 1 1 to 1 4  Possibly cardiorenal  · Lasix infusion stopped with hypotension, x2 albumin  · Cr increasing likely with hypovolemia x1 albumin continue to hold diuretics  · Avoid nephrotoxins and hypotension    CKD (chronic kidney disease)  Assessment & Plan  Lab Results   Component Value Date    EGFR 26 07/09/2022    EGFR 27 07/08/2022    EGFR 29 07/07/2022    CREATININE 1 78 (H) 07/09/2022    CREATININE 1 73 (H) 07/08/2022    CREATININE 1 60 (H) 07/07/2022     · Baseline seems to go up to 1 4, possibly cardiorenal component   · lasix gtt for diuresis,  down 8lbs since admit,net -10 L  · Monitor creatinine  · Avoid nephrotoxins and hypotension   · Cr increasing likely 2/2 diuresing vs hypoperfusion Lasix gtt stopped for hyotension, x2 albumin with improvement  · Additional albumin with soft BP and rising Cr    Cystitis  Assessment & Plan  · Cultures growing Klebsiella  Completed the three day course of antibiotics  · DC catheter  resolved    Aortic stenosis, moderate  Assessment & Plan  · Noted on echo in March EF 70%  · Avoid hypotension  · Aggressive diuresis, hypervolemic with 20 lb weight gain-on Lasix gtt on admission  · Lasix stopped 7/8 with hypotension requiring albumin- consider p o  Lasix when hypotension resolves  · I & O  · Additional albumin x1 with soft BP    Diabetes mellitus [facility])  Assessment & Plan  Lab Results   Component Value Date    HGBA1C 4 8 07/02/2022       Recent Labs     07/08/22  1126 07/08/22  1617 07/08/22  2101 07/09/22  0735   POCGLU 110 88 95 93       Blood Sugar Average: Last 72 hrs:  · Home regimen: Diet controlled  · Continue sliding scale   Might discontinue if persistently below 180  · Will d/c accu checks and ISS    Hypothyroidism (acquired)  Assessment & Plan  · Continue home 150 mcg Synthroid  · Continue outpatient follow-up    Impaired skin integrity  Assessment & Plan  · Bilateral buttock open areas reported by daughter, there was sanguinous drainage on sheet when patient moved into bed  · Wound care-appreciate recommendations  · Pressure offloading, Q 2 hour turns  · Also present is healing MRSA infection right thigh  Was following outpatient with Wound Care but has completed treatment  · Continue pressure offloading and monitoring           VTE Pharmacologic Prophylaxis: VTE Score: 9 High Risk (Score >/= 5) - Pharmacological DVT Prophylaxis Ordered: warfarin (Coumadin)  Sequential Compression Devices Ordered  Patient Centered Rounds: I performed bedside rounds with nursing staff today  Discussions with Specialists or Other Care Team Provider:     Education and Discussions with Family / Patient: will update at bedside on arrival this afternoon  Time Spent for Care: 45 minutes  More than 50% of total time spent on counseling and coordination of care as described above  Current Length of Stay: 10 day(s)  Current Patient Status: Inpatient   Certification Statement: The patient will continue to require additional inpatient hospital stay due to LEONARDO, hypotension  Discharge Plan: Anticipate discharge in 24-48 hrs to home with home services  Code Status: Level 3 - DNAR and DNI    Subjective:   Patient resting at bedside feels well denies shortness of breath or chest pain  Discussed soft blood pressures this morning and will give additional albumin dose and continue to hold diuretics today    Objective:     Vitals:   Temp (24hrs), Av 6 °F (36 4 °C), Min:97 5 °F (36 4 °C), Max:97 7 °F (36 5 °C)    Temp:  [97 5 °F (36 4 °C)-97 7 °F (36 5 °C)] 97 7 °F (36 5 °C)  HR:  [62-78] 62  Resp:  [18-19] 18  BP: ()/(48-81) 114/56  SpO2:  [79 %-98 %] 98 %  Body mass index is 51 83 kg/m²  Input and Output Summary (last 24 hours):      Intake/Output Summary (Last 24 hours) at 2022 1045  Last data filed at 2022 0536  Gross per 24 hour Intake 660 ml   Output 550 ml   Net 110 ml       Physical Exam:   Physical Exam  Vitals and nursing note reviewed  Constitutional:       General: She is not in acute distress  Appearance: She is well-developed  HENT:      Head: Normocephalic and atraumatic  Mouth/Throat:      Mouth: Mucous membranes are dry  Eyes:      General: No scleral icterus  Conjunctiva/sclera: Conjunctivae normal       Pupils: Pupils are equal, round, and reactive to light  Cardiovascular:      Rate and Rhythm: Normal rate  Rhythm irregular  Pulses: Normal pulses  Heart sounds: Murmur heard  Pulmonary:      Effort: Pulmonary effort is normal  No respiratory distress  Breath sounds: Normal breath sounds  No wheezing  Abdominal:      General: Bowel sounds are normal  There is no distension  Palpations: Abdomen is soft  Tenderness: There is no abdominal tenderness  Musculoskeletal:      Cervical back: Neck supple  Comments: Improved bilateral lower extremity edema   Skin:     General: Skin is warm and dry  Capillary Refill: Capillary refill takes less than 2 seconds  Neurological:      General: No focal deficit present  Mental Status: She is alert  Mental status is at baseline  Psychiatric:         Mood and Affect: Mood normal          Thought Content: Thought content normal           Additional Data:     Labs:  Results from last 7 days   Lab Units 07/09/22  0503 07/07/22  0304 07/06/22  0650   WBC Thousand/uL 5 43   < > 7 07   HEMOGLOBIN g/dL 8 5*   < > 9 8*   HEMATOCRIT % 26 1*   < > 30 1*   PLATELETS Thousands/uL 135*   < > 173   BANDS PCT %  --   --  1   LYMPHO PCT %  --   --  40   MONO PCT %  --   --  12   EOS PCT %  --   --  5    < > = values in this interval not displayed       Results from last 7 days   Lab Units 07/09/22  0503   SODIUM mmol/L 138   POTASSIUM mmol/L 4 7   CHLORIDE mmol/L 105   CO2 mmol/L 28   BUN mg/dL 31*   CREATININE mg/dL 1 78*   ANION GAP mmol/L 5   CALCIUM mg/dL 8 0*   GLUCOSE RANDOM mg/dL 81     Results from last 7 days   Lab Units 07/09/22  0503   INR  2 30*     Results from last 7 days   Lab Units 07/09/22  0735 07/08/22  2101 07/08/22  1617 07/08/22  1126 07/08/22  0705 07/08/22  0422 07/07/22  2140 07/07/22  2119 07/07/22  1623 07/07/22  1122 07/07/22  0733 07/06/22  2109   POC GLUCOSE mg/dl 93 95 88 110 79 72 83 55* 95 145* 117 120               Lines/Drains:  Invasive Devices  Report    Peripheral Intravenous Line  Duration           Peripheral IV 07/07/22 Right Antecubital 2 days    Peripheral IV 07/07/22 Right Antecubital 2 days                      Imaging: No pertinent imaging reviewed  Recent Cultures (last 7 days):         Last 24 Hours Medication List:   Current Facility-Administered Medications   Medication Dose Route Frequency Provider Last Rate    acetaminophen  650 mg Oral Q6H PRN LEXIE Aldridge      levothyroxine  150 mcg Oral Early Morning LEXIE Aldridge      metoprolol succinate  50 mg Oral Daily Ivan Cavanaugh MD      polyethylene glycol  17 g Oral Daily PRN LEXIE Aldridge      potassium chloride  40 mEq Oral BID LEXIE Aldridge      senna-docusate sodium  1 tablet Oral BID LEXIE Aldridge      warfarin  3 mg Oral Daily (warfarin) Eileen Winslow MD          Today, Patient Was Seen By: LEXIE Aldridge    **Please Note: This note may have been constructed using a voice recognition system  **

## 2022-07-09 NOTE — ASSESSMENT & PLAN NOTE
Lab Results   Component Value Date    EGFR 26 07/09/2022    EGFR 27 07/08/2022    EGFR 29 07/07/2022    CREATININE 1 78 (H) 07/09/2022    CREATININE 1 73 (H) 07/08/2022    CREATININE 1 60 (H) 07/07/2022     · Baseline seems to go up to 1 4, possibly cardiorenal component   · lasix gtt for diuresis,  down 8lbs since admit,net -10 L  · Monitor creatinine  · Avoid nephrotoxins and hypotension   · Cr increasing likely 2/2 diuresing vs hypoperfusion Lasix gtt stopped for hyotension, x2 albumin with improvement  · Additional albumin with soft BP and rising Cr

## 2022-07-09 NOTE — ASSESSMENT & PLAN NOTE
Recent Labs     07/07/22  0304 07/08/22  0518 07/09/22  0503   INR 2 26* 2 10* 2 30*     · Rate controlled with metoprolol   · Anticoagulation with coumadin, initially supratherapeutic and required Vitamin K   · Home regimen of 5mg coumadin was decreased to 3 mg during this admission  · Now theraputic 2-3  · Daily INR  · Metoprolol succinate decreased from 100 mg to 50mg with low HR and aortic stenosis

## 2022-07-09 NOTE — ASSESSMENT & PLAN NOTE
Recent Labs     07/07/22  0304 07/08/22  0518 07/09/22  0503   BUN 32* 31* 31*   CREATININE 1 60* 1 73* 1 78*   EGFR 29 27 26     · Baseline: 1 1 to 1 4   Possibly cardiorenal  · Lasix infusion stopped with hypotension, x2 albumin  · Cr increasing likely with hypovolemia x1 albumin continue to hold diuretics  · Avoid nephrotoxins and hypotension

## 2022-07-09 NOTE — ASSESSMENT & PLAN NOTE
Lab Results   Component Value Date    HGBA1C 4 8 07/02/2022       Recent Labs     07/08/22  1126 07/08/22  1617 07/08/22  2101 07/09/22  0735   POCGLU 110 88 95 93       Blood Sugar Average: Last 72 hrs:  · Home regimen: Diet controlled  · Continue sliding scale   Might discontinue if persistently below 180  · Will d/c accu checks and ISS

## 2022-07-09 NOTE — ASSESSMENT & PLAN NOTE
· Noted on echo in March EF 70%  · Avoid hypotension  · Aggressive diuresis, hypervolemic with 20 lb weight gain-on Lasix gtt on admission  · Lasix stopped 7/8 with hypotension requiring albumin- consider p o   Lasix when hypotension resolves  · I & O  · Additional albumin x1 with soft BP

## 2022-07-10 VITALS
HEIGHT: 60 IN | RESPIRATION RATE: 18 BRPM | BODY MASS INDEX: 52.54 KG/M2 | OXYGEN SATURATION: 96 % | SYSTOLIC BLOOD PRESSURE: 103 MMHG | HEART RATE: 68 BPM | WEIGHT: 267.6 LBS | TEMPERATURE: 97.1 F | DIASTOLIC BLOOD PRESSURE: 47 MMHG

## 2022-07-10 LAB
ALBUMIN SERPL BCP-MCNC: 1.8 G/DL (ref 3.5–5)
ALP SERPL-CCNC: 243 U/L (ref 46–116)
ALT SERPL W P-5'-P-CCNC: 38 U/L (ref 12–78)
ANION GAP SERPL CALCULATED.3IONS-SCNC: 4 MMOL/L (ref 4–13)
AST SERPL W P-5'-P-CCNC: 78 U/L (ref 5–45)
BILIRUB SERPL-MCNC: 1.79 MG/DL (ref 0.2–1)
BUN SERPL-MCNC: 32 MG/DL (ref 5–25)
CALCIUM ALBUM COR SERPL-MCNC: 10 MG/DL (ref 8.3–10.1)
CALCIUM SERPL-MCNC: 8.2 MG/DL (ref 8.3–10.1)
CHLORIDE SERPL-SCNC: 105 MMOL/L (ref 100–108)
CO2 SERPL-SCNC: 28 MMOL/L (ref 21–32)
CREAT SERPL-MCNC: 1.69 MG/DL (ref 0.6–1.3)
ERYTHROCYTE [DISTWIDTH] IN BLOOD BY AUTOMATED COUNT: 18.4 % (ref 11.6–15.1)
GFR SERPL CREATININE-BSD FRML MDRD: 27 ML/MIN/1.73SQ M
GLUCOSE SERPL-MCNC: 85 MG/DL (ref 65–140)
HCT VFR BLD AUTO: 28.6 % (ref 34.8–46.1)
HGB BLD-MCNC: 9.1 G/DL (ref 11.5–15.4)
INR PPP: 1.99 (ref 0.84–1.19)
MAGNESIUM SERPL-MCNC: 2.4 MG/DL (ref 1.6–2.6)
MCH RBC QN AUTO: 32.2 PG (ref 26.8–34.3)
MCHC RBC AUTO-ENTMCNC: 31.8 G/DL (ref 31.4–37.4)
MCV RBC AUTO: 101 FL (ref 82–98)
PLATELET # BLD AUTO: 158 THOUSANDS/UL (ref 149–390)
PMV BLD AUTO: 10.2 FL (ref 8.9–12.7)
POTASSIUM SERPL-SCNC: 4.9 MMOL/L (ref 3.5–5.3)
PROT SERPL-MCNC: 5.8 G/DL (ref 6.4–8.2)
PROTHROMBIN TIME: 22.1 SECONDS (ref 11.6–14.5)
RBC # BLD AUTO: 2.83 MILLION/UL (ref 3.81–5.12)
SODIUM SERPL-SCNC: 137 MMOL/L (ref 136–145)
WBC # BLD AUTO: 7.21 THOUSAND/UL (ref 4.31–10.16)

## 2022-07-10 PROCEDURE — 85610 PROTHROMBIN TIME: CPT

## 2022-07-10 PROCEDURE — 83735 ASSAY OF MAGNESIUM: CPT

## 2022-07-10 PROCEDURE — 99239 HOSP IP/OBS DSCHRG MGMT >30: CPT

## 2022-07-10 PROCEDURE — 80053 COMPREHEN METABOLIC PANEL: CPT

## 2022-07-10 PROCEDURE — 85027 COMPLETE CBC AUTOMATED: CPT

## 2022-07-10 RX ORDER — METOPROLOL SUCCINATE 25 MG/1
25 TABLET, EXTENDED RELEASE ORAL DAILY
Qty: 14 TABLET | Refills: 0 | Status: SHIPPED | OUTPATIENT
Start: 2022-07-11 | End: 2022-10-01

## 2022-07-10 RX ORDER — FUROSEMIDE 40 MG/1
60 TABLET ORAL DAILY
Qty: 14 TABLET | Refills: 0 | Status: SHIPPED | OUTPATIENT
Start: 2022-07-11 | End: 2022-10-01

## 2022-07-10 RX ORDER — WARFARIN SODIUM 5 MG/1
5 TABLET ORAL
Status: DISCONTINUED | OUTPATIENT
Start: 2022-07-10 | End: 2022-07-10 | Stop reason: HOSPADM

## 2022-07-10 RX ORDER — ALBUMIN (HUMAN) 12.5 G/50ML
25 SOLUTION INTRAVENOUS ONCE
Status: DISCONTINUED | OUTPATIENT
Start: 2022-07-10 | End: 2022-07-10

## 2022-07-10 RX ORDER — METOPROLOL SUCCINATE 25 MG/1
25 TABLET, EXTENDED RELEASE ORAL DAILY
Status: DISCONTINUED | OUTPATIENT
Start: 2022-07-10 | End: 2022-07-10 | Stop reason: HOSPADM

## 2022-07-10 RX ADMIN — LEVOTHYROXINE SODIUM 150 MCG: 150 TABLET ORAL at 04:28

## 2022-07-10 RX ADMIN — POTASSIUM CHLORIDE 40 MEQ: 1500 TABLET, EXTENDED RELEASE ORAL at 08:12

## 2022-07-10 RX ADMIN — METOPROLOL SUCCINATE 25 MG: 25 TABLET, EXTENDED RELEASE ORAL at 08:12

## 2022-07-10 NOTE — DISCHARGE SUMMARY
[address]  Discharge- Eulogio Koch 1940, 80 y o  female MRN: [id number]  Unit/Bed#: -01 Encounter: [id number]  Primary Care Provider: Amarilys Schmidt MD   Date and time admitted to hospital: 6/29/2022  3:01 PM    * Acute on chronic diastolic (congestive) heart failure (HCC)  Assessment & Plan  Wt Readings from Last 3 Encounters:   07/10/22 121 kg (267 lb 9 6 oz)   05/19/22 126 kg (277 lb 1 9 oz)   04/04/22 117 kg (257 lb 0 9 oz)     · Presents with acute on chronic CHF  · Home regime is Lasix 60 mg twice daily  · On Lasix infusion per cardiology   · since admit net -10 L since admission  · D/C lasix with hypotension  · Appreciate cardiology recommendations  · Continue to hold diuretics, additional albumin given  · Discussed with Dr Ayaz Stout with lowering Toprol to 25 mg daily, which discharge with 60 mg Lasix daily  · Daily weights and I & Os    Cellulitis of right lower extremity  Assessment & Plan  · Recurrent right thigh and leg cellulitis  Recently hospitalized with sepsis secondary to MRSA infection right thigh  · Wound cultures growing: Proteus mirabilis, Klebsiella, and Enterococcus  · Infectious Disease recommendations appreciated  · Completed 7 days of antibiotics (Unasyn) through 07/07/2022  · No additional ID follow-up needed   Risk for recurrent infection with DM2, edema      Paroxysmal A-fib [facility])  Assessment & Plan    Recent Labs     07/08/22  0518 07/09/22  0503 07/10/22  0428   INR 2 10* 2 30* 1 99*     · Rate controlled with metoprolol   · Anticoagulation with coumadin, initially supratherapeutic and required Vitamin K   · Home regimen of 5mg coumadin was decreased to 3 mg during this admission  · Now theraputic 2-3  · Daily INR  · Metoprolol succinate decreased from 100 mg to 25mg with low HR and aortic stenosis    LEONARDO (acute kidney injury) [facility])  Assessment & Plan    Recent Labs     07/08/22  0518 07/09/22  0503 07/10/22  0428   BUN 31* 31* 32* CREATININE 1 73* 1 78* 1 69*   EGFR 27 26 27     · Baseline: 1 1 to 1 4  Possibly cardiorenal  · Lasix infusion stopped with hypotension, x2 albumin  · Cr increasing likely with hypovolemia x1 albumin continue to hold diuretics  · Avoid nephrotoxins and hypotension    CKD (chronic kidney disease)  Assessment & Plan  Lab Results   Component Value Date    EGFR 27 07/10/2022    EGFR 26 07/09/2022    EGFR 27 07/08/2022    CREATININE 1 69 (H) 07/10/2022    CREATININE 1 78 (H) 07/09/2022    CREATININE 1 73 (H) 07/08/2022     · Baseline seems to go up to 1 4, possibly cardiorenal component   · Monitor creatinine  · Avoid nephrotoxins and hypotension   · Cr increasing likely 2/2 diuresing vs hypoperfusion Lasix gtt stopped for hyotension, x2 albumin with improvement  · Additional albumin with soft BP and rising Cr  · Lasix held      Cystitis  Assessment & Plan  · Cultures growing Klebsiella  Completed the three day course of antibiotics  · DC catheter  resolved    Aortic stenosis, moderate  Assessment & Plan  · Noted on echo in March EF 70%  · Avoid hypotension  · Aggressive diuresis, hypervolemic with 20 lb weight gain-on Lasix gtt on admission  · Lasix stopped 7/8 with hypotension requiring albumin  · I & O  · Additional albumin and lower metoprolol     Diabetes mellitus [facility])  Assessment & Plan  Lab Results   Component Value Date    HGBA1C 4 8 07/02/2022       Recent Labs     07/08/22  1126 07/08/22  1617 07/08/22  2101 07/09/22  0735   POCGLU 110 88 95 93       Blood Sugar Average: Last 72 hrs:  · Home regimen: Diet controlled  · Continue sliding scale    · Will d/c accu checks and ISS  · Continue lifestyle and diet modifications outpatient    Hypothyroidism (acquired)  Assessment & Plan  · Continue home 150 mcg Synthroid  · Continue outpatient follow-up    Impaired skin integrity  Assessment & Plan  · Bilateral buttock open areas reported by daughter, there was sanguinous drainage on sheet when patient moved into bed  · Wound care-appreciate recommendations  · Pressure offloading, Q 2 hour turns  · Also present is healing MRSA infection right thigh  Was following outpatient with Wound Care but has completed treatment  · Continue pressure offloading and monitoring       Medical Problems             Resolved Problems  Date Reviewed: 7/10/2022          Resolved    HLD (hyperlipidemia) 6/29/2022     Resolved by  LEXIE Stoll    Supratherapeutic INR 7/3/2022     Resolved by  Brooks Melvin MD              Discharging Physician / Practitioner: LEXIE tSoll  PCP: Pavithra Tillman MD  Admission Date:   Admission Orders (From admission, onward)     Ordered        06/29/22 1657  Inpatient Admission  Once                      Discharge Date: 07/10/22    Consultations During Hospital Stay:  · Cardiology  · Wound care  · PT/OT  · Infectious disease    Procedures Performed:   · X-ray no acute cardiopulmonary disease  · CT lower extremity soft tissue and subcutaneous edema with areas of fluid in the right extremity extending from hip to ankle may be due to cellulitis or edema related to cirrhosis/CHF, no deep intramuscular fluid collection seen, cirrhotic liver with ascites    Significant Findings / Test Results:   · BNP 2376  · Procalcitonin 0 57  · Troponin 15, 17, 16  · WBC 28  · INR 7 48  · Wound culture-Proteus, Klebsiella, Enterococcus  · Urine culture Klebsiella  · MRSA screening positive    Incidental Findings:   · None    Test Results Pending at Discharge (will require follow up):    · None     Outpatient Tests Requested:  BMP    Complications:  None    Reason for Admission:  CHF exacerbation    Hospital Course:   Tanna Fu is a 80 y o  female patient who originally presented to the hospital on 6/29/2022 due to CHF exacerbation reported 20 lb weight gain, edema and shortness of breath was sent from cardiologist's office be placed on Lasix infusion Lasix was stopped 7/8 due to hypotension received x3 doses of albumin during admission but remained asymptomatic of hypotension during the episodes  Toprol XL was decreased from 100 mg daily to 25 mg daily at discharge due to mild bradycardia and soft blood pressures with moderate aortic stenosis  Discussed with Dr Kenna Casey prior to discharge diuretic plan of 60 mg Lasix daily and agrees with lowering of beta-blocker to support aortic stenosis and cardiac outputs as heart rate has remained controlled  LEONARDO with cardiorenal syndrome likely with hypervolemia  Creatinine 1 3-1 7 during admission  Will follow with BMP, daily weights  Additionally treated for cellulitis right lower extremity patient had a history of MRSA right thigh wound was placed on vancomycin and cefepime times 4 days and then transitioned to Unasyn with positive wound cultures for total are 7 day antibiotic course  Antibiotics directed by ID  Patient required close interval follow-up with PCP and cardiologist after discharge within 1 week for blood pressure check and management of diuretics  Recommended sodium restriction along with fluid restriction as patient reported she is not very compliant with fluid restriction at home  Daily weights  Please see above list of diagnoses and related plan for additional information  Condition at Discharge: stable    Discharge Day Visit / Exam:   Subjective:  Patient reports feeling well this morning denies lightheaded, dizziness, shortness of breath or chest pain    Feels ready to return home at this time discussed recommendations cardiologist and need for close interval follow-up this week with PCP and cardiologist  Vitals: Blood Pressure: (!) 104/47 (07/10/22 0708)  Pulse: 68 (07/10/22 0812)  Temperature: (!) 97 1 °F (36 2 °C) (07/10/22 0708)  Temp Source: Oral (07/08/22 2216)  Respirations: 18 (07/10/22 0708)  Height: 5' (152 4 cm) (06/30/22 1357)  Weight - Scale: 121 kg (267 lb 9 6 oz) (07/10/22 0425)  SpO2: 96 % (07/09/22 1525)  Exam:   Physical Exam  Vitals and nursing note reviewed  Constitutional:       General: She is not in acute distress  Appearance: She is well-developed  HENT:      Head: Normocephalic and atraumatic  Mouth/Throat:      Mouth: Mucous membranes are moist       Pharynx: Oropharynx is clear  Eyes:      Extraocular Movements: Extraocular movements intact  Conjunctiva/sclera: Conjunctivae normal       Pupils: Pupils are equal, round, and reactive to light  Cardiovascular:      Rate and Rhythm: Normal rate and regular rhythm  Pulses: Normal pulses  Heart sounds: Murmur heard  Pulmonary:      Effort: Pulmonary effort is normal  No respiratory distress  Breath sounds: Normal breath sounds  No wheezing or rales  Abdominal:      General: Bowel sounds are normal  There is no distension  Palpations: Abdomen is soft  Tenderness: There is no abdominal tenderness  Musculoskeletal:      Cervical back: Neck supple  Comments: Plus one lower extremity edema   Skin:     General: Skin is warm and dry  Capillary Refill: Capillary refill takes less than 2 seconds  Neurological:      General: No focal deficit present  Mental Status: She is alert  Psychiatric:         Mood and Affect: Mood normal          Thought Content: Thought content normal           Discussion with Family: Updated  (daughter) via phone  Discharge instructions/Information to patient and family:   See after visit summary for information provided to patient and family  Provisions for Follow-Up Care:  See after visit summary for information related to follow-up care and any pertinent home health orders  Disposition:   Home    Planned Readmission:      Discharge Statement:  I spent * minutes discharging the patient  This time was spent on the day of discharge  I had direct contact with the patient on the day of discharge   Greater than 50% of the total time was spent examining patient, answering all patient questions, arranging and discussing plan of care with patient as well as directly providing post-discharge instructions  Additional time then spent on discharge activities  Discharge Medications:  See after visit summary for reconciled discharge medications provided to patient and/or family        **Please Note: This note may have been constructed using a voice recognition system**

## 2022-07-10 NOTE — DISCHARGE INSTRUCTIONS
Record daily weight every morning  Please take blood pressure prior to taking Lasix dose in the morning continue taking if greater than 100 (top number)  Will place order for lab work- BMP, INR  Please schedule appointment with your primary care doctor and cardiologist this week  If at all feeling lightheaded or dizzy when changing positions, sit for 5-10 minutes before going from sitting to standing position to avoid drop in blood pressure

## 2022-07-10 NOTE — PLAN OF CARE
Problem: Potential for Falls  Goal: Patient will remain free of falls  Description: INTERVENTIONS:  - Educate patient/family on patient safety including physical limitations  - Instruct patient to call for assistance with activity   - Consult OT/PT to assist with strengthening/mobility   - Keep Call bell within reach  - Keep bed low and locked with side rails adjusted as appropriate  - Keep care items and personal belongings within reach  - Initiate and maintain comfort rounds  - Make Fall Risk Sign visible to staff  - Offer Toileting every 2 Hours, in advance of need  - Initiate/Maintain bed/chair alarm  - Apply yellow socks and bracelet for high fall risk patients  - Consider moving patient to room near nurses station  Outcome: Progressing     Problem: Prexisting or High Potential for Compromised Skin Integrity  Goal: Skin integrity is maintained or improved  Description: INTERVENTIONS:  - Identify patients at risk for skin breakdown  - Assess and monitor skin integrity  - Assess and monitor nutrition and hydration status  - Monitor labs   - Assess for incontinence   - Turn and reposition patient  - Assist with mobility/ambulation  - Relieve pressure over bony prominences  - Avoid friction and shearing  - Provide appropriate hygiene as needed including keeping skin clean and dry  - Evaluate need for skin moisturizer/barrier cream  - Collaborate with interdisciplinary team   - Patient/family teaching  - Consider wound care consult   Outcome: Progressing     Problem: CARDIOVASCULAR - ADULT  Goal: Maintains optimal cardiac output and hemodynamic stability  Description: INTERVENTIONS:  - Monitor I/O, vital signs and rhythm  - Monitor for S/S and trends of decreased cardiac output  - Administer and titrate ordered vasoactive medications to optimize hemodynamic stability  - Assess quality of pulses, skin color and temperature  - Assess for signs of decreased coronary artery perfusion  - Instruct patient to report change in severity of symptoms  Outcome: Progressing  Goal: Absence of cardiac dysrhythmias or at baseline rhythm  Description: INTERVENTIONS:  - Continuous cardiac monitoring, vital signs, obtain 12 lead EKG if ordered  - Administer antiarrhythmic and heart rate control medications as ordered  - Monitor electrolytes and administer replacement therapy as ordered  Outcome: Progressing     Problem: METABOLIC, FLUID AND ELECTROLYTES - ADULT  Goal: Electrolytes maintained within normal limits  Description: INTERVENTIONS:  - Monitor labs and assess patient for signs and symptoms of electrolyte imbalances  - Administer electrolyte replacement as ordered  - Monitor response to electrolyte replacements, including repeat lab results as appropriate  - Instruct patient on fluid and nutrition as appropriate  Outcome: Progressing  Goal: Fluid balance maintained  Description: INTERVENTIONS:  - Monitor labs   - Monitor I/O and WT  - Instruct patient on fluid and nutrition as appropriate  - Assess for signs & symptoms of volume excess or deficit  Outcome: Progressing  Goal: Glucose maintained within target range  Description: INTERVENTIONS:  - Monitor Blood Glucose as ordered  - Assess for signs and symptoms of hyperglycemia and hypoglycemia  - Administer ordered medications to maintain glucose within target range  - Assess nutritional intake and initiate nutrition service referral as needed  Outcome: Progressing     Problem: DISCHARGE PLANNING  Goal: Discharge to home or other facility with appropriate resources  Description: INTERVENTIONS:  - Identify barriers to discharge w/patient and caregiver  - Arrange for needed discharge resources and transportation as appropriate  - Identify discharge learning needs (meds, wound care, etc )  - Arrange for interpretive services to assist at discharge as needed  - Refer to Case Management Department for coordinating discharge planning if the patient needs post-hospital services based on physician/advanced practitioner order or complex needs related to functional status, cognitive ability, or social support system  Outcome: Progressing

## 2022-07-10 NOTE — ASSESSMENT & PLAN NOTE
Recent Labs     07/08/22  0518 07/09/22  0503 07/10/22  0428   INR 2 10* 2 30* 1 99*     · Rate controlled with metoprolol   · Anticoagulation with coumadin, initially supratherapeutic and required Vitamin K   · Home regimen of 5mg coumadin was decreased to 3 mg during this admission  · Now theraputic 2-3  · Daily INR  · Metoprolol succinate decreased from 100 mg to 25mg with low HR and aortic stenosis

## 2022-07-10 NOTE — ASSESSMENT & PLAN NOTE
Lab Results   Component Value Date    HGBA1C 4 8 07/02/2022       Recent Labs     07/08/22  1126 07/08/22  1617 07/08/22  2101 07/09/22  0735   POCGLU 110 88 95 93       Blood Sugar Average: Last 72 hrs:  · Home regimen: Diet controlled  · Continue sliding scale    · Will d/c accu checks and ISS  · Continue lifestyle and diet modifications outpatient

## 2022-07-10 NOTE — ASSESSMENT & PLAN NOTE
· Noted on echo in March EF 70%  · Avoid hypotension  · Aggressive diuresis, hypervolemic with 20 lb weight gain-on Lasix gtt on admission  · Lasix stopped 7/8 with hypotension requiring albumin  · I & O  · Additional albumin and lower metoprolol

## 2022-07-10 NOTE — ASSESSMENT & PLAN NOTE
Recent Labs     07/08/22  0518 07/09/22  0503 07/10/22  0428   BUN 31* 31* 32*   CREATININE 1 73* 1 78* 1 69*   EGFR 27 26 27     · Baseline: 1 1 to 1 4   Possibly cardiorenal  · Lasix infusion stopped with hypotension, x2 albumin  · Cr increasing likely with hypovolemia x1 albumin continue to hold diuretics  · Avoid nephrotoxins and hypotension

## 2022-07-10 NOTE — ASSESSMENT & PLAN NOTE
Lab Results   Component Value Date    EGFR 27 07/10/2022    EGFR 26 07/09/2022    EGFR 27 07/08/2022    CREATININE 1 69 (H) 07/10/2022    CREATININE 1 78 (H) 07/09/2022    CREATININE 1 73 (H) 07/08/2022     · Baseline seems to go up to 1 4, possibly cardiorenal component   · Monitor creatinine  · Avoid nephrotoxins and hypotension   · Cr increasing likely 2/2 diuresing vs hypoperfusion Lasix gtt stopped for hyotension, x2 albumin with improvement  · Additional albumin with soft BP and rising Cr  · Lasix held

## 2022-07-10 NOTE — ASSESSMENT & PLAN NOTE
Wt Readings from Last 3 Encounters:   07/10/22 121 kg (267 lb 9 6 oz)   05/19/22 126 kg (277 lb 1 9 oz)   04/04/22 117 kg (257 lb 0 9 oz)     · Presents with acute on chronic CHF  · Home regime is Lasix 60 mg twice daily  · On Lasix infusion per cardiology   · since admit net -10 L since admission  · D/C lasix with hypotension  · Appreciate cardiology recommendations  · Continue to hold diuretics, additional albumin given  · Discussed with Dr Manpreet Harvey with lowering Toprol to 25 mg daily, which discharge with 60 mg Lasix daily  · Daily weights and I & Os

## 2022-07-11 ENCOUNTER — TELEPHONE (OUTPATIENT)
Dept: CARDIOLOGY CLINIC | Facility: CLINIC | Age: 82
End: 2022-07-11

## 2022-09-23 PROBLEM — N30.01 ACUTE CYSTITIS WITH HEMATURIA: Status: ACTIVE | Noted: 2022-01-01

## 2022-09-23 PROBLEM — N18.31 STAGE 3A CHRONIC KIDNEY DISEASE (HCC): Status: ACTIVE | Noted: 2022-01-01

## 2022-09-23 PROBLEM — R18.8 CIRRHOSIS OF LIVER WITH ASCITES (HCC): Status: ACTIVE | Noted: 2022-03-29

## 2022-09-23 NOTE — MALNUTRITION/BMI
This medical record reflects one or more clinical indicators suggestive of malnutrition and morbid obesity  Malnutrition Findings:   Adult Malnutrition type: Chronic illness  Adult Degree of Malnutrition: Malnutrition of moderate degree  Malnutrition Characteristics: Muscle loss, Inadequate energy                  360 Statement: Chronic moderate malnutrition related to inadequate intake of and dislike of protein rich foods, increased PRO needs with cirrhosis as evidenced by < 75% estimated PRO intake > 1 mo, moderate muscle loss to temporalis, pectoralis, deltoid muscles  Treated with: Continue cardiac diet given CHF, fluid restriction per MD  Will trial magic cup daily and ensure max protein daily, increase supplements as tolerated noting fluid restriction  Provided diet ed for increasing PRO in diet  Continue to monitor weights daily  BMI Findings: Body mass index is 69 9 kg/m²  See Nutrition note dated 9/23/22 for additional details  Completed nutrition assessment is viewable in the nutrition documentation

## 2022-09-23 NOTE — PLAN OF CARE
Problem: Potential for Falls  Goal: Patient will remain free of falls  Description: INTERVENTIONS:  - Educate patient/family on patient safety including physical limitations  - Instruct patient to call for assistance with activity   - Consult OT/PT to assist with strengthening/mobility   - Keep Call bell within reach  - Keep bed low and locked with side rails adjusted as appropriate  - Keep care items and personal belongings within reach  - Initiate and maintain comfort rounds  - Make Fall Risk Sign visible to staff  - Offer Toileting every 2 Hours, in advance of need  - Initiate/Maintain alarm  - Obtain necessary fall risk management equipment:   - Apply yellow socks and bracelet for high fall risk patients  - Consider moving patient to room near nurses station  Outcome: Progressing     Problem: Nutrition/Hydration-ADULT  Goal: Nutrient/Hydration intake appropriate for improving, restoring or maintaining nutritional needs  Description: Monitor and assess patient's nutrition/hydration status for malnutrition  Collaborate with interdisciplinary team and initiate plan and interventions as ordered  Monitor patient's weight and dietary intake as ordered or per policy  Utilize nutrition screening tool and intervene as necessary  Determine patient's food preferences and provide high-protein, high-caloric foods as appropriate       INTERVENTIONS:  - Monitor oral intake, urinary output, labs, and treatment plans  - Assess nutrition and hydration status and recommend course of action  - Evaluate amount of meals eaten  - Assist patient with eating if necessary   - Allow adequate time for meals  - Recommend/ encourage appropriate diets, oral nutritional supplements, and vitamin/mineral supplements  - Order, calculate, and assess calorie counts as needed  - Recommend, monitor, and adjust tube feedings and TPN/PPN based on assessed needs  - Assess need for intravenous fluids  - Provide specific nutrition/hydration education as appropriate  - Include patient/family/caregiver in decisions related to nutrition  Outcome: Progressing     Problem: PAIN - ADULT  Goal: Verbalizes/displays adequate comfort level or baseline comfort level  Description: Interventions:  - Encourage patient to monitor pain and request assistance  - Assess pain using appropriate pain scale  - Administer analgesics based on type and severity of pain and evaluate response  - Implement non-pharmacological measures as appropriate and evaluate response  - Consider cultural and social influences on pain and pain management  - Notify physician/advanced practitioner if interventions unsuccessful or patient reports new pain  Outcome: Progressing     Problem: INFECTION - ADULT  Goal: Absence or prevention of progression during hospitalization  Description: INTERVENTIONS:  - Assess and monitor for signs and symptoms of infection  - Monitor lab/diagnostic results  - Monitor all insertion sites, i e  indwelling lines, tubes, and drains  - Monitor endotracheal if appropriate and nasal secretions for changes in amount and color  - Industry appropriate cooling/warming therapies per order  - Administer medications as ordered  - Instruct and encourage patient and family to use good hand hygiene technique  - Identify and instruct in appropriate isolation precautions for identified infection/condition  Outcome: Progressing    Goal: Maintain or return to baseline ADL function  Description: INTERVENTIONS:  -  Assess patient's ability to carry out ADLs; assess patient's baseline for ADL function and identify physical deficits which impact ability to perform ADLs (bathing, care of mouth/teeth, toileting, grooming, dressing, etc )  - Assess/evaluate cause of self-care deficits   - Assess range of motion  - Assess patient's mobility; develop plan if impaired  - Assess patient's need for assistive devices and provide as appropriate  - Encourage maximum independence but intervene and supervise when necessary  - Involve family in performance of ADLs  - Assess for home care needs following discharge   - Consider OT consult to assist with ADL evaluation and planning for discharge  - Provide patient education as appropriate  Outcome: Progressing     Problem: DISCHARGE PLANNING  Goal: Discharge to home or other facility with appropriate resources  Description: INTERVENTIONS:  - Identify barriers to discharge w/patient and caregiver  - Arrange for needed discharge resources and transportation as appropriate  - Identify discharge learning needs (meds, wound care, etc )  - Arrange for interpretive services to assist at discharge as needed  - Refer to Case Management Department for coordinating discharge planning if the patient needs post-hospital services based on physician/advanced practitioner order or complex needs related to functional status, cognitive ability, or social support system  Outcome: Progressing     Problem: Knowledge Deficit  Goal: Patient/family/caregiver demonstrates understanding of disease process, treatment plan, medications, and discharge instructions  Description: Complete learning assessment and assess knowledge base    Interventions:  - Provide teaching at level of understanding  - Provide teaching via preferred learning methods  Outcome: Progressing     Problem: CARDIOVASCULAR - ADULT  Goal: Maintains optimal cardiac output and hemodynamic stability  Description: INTERVENTIONS:  - Monitor I/O, vital signs and rhythm  - Monitor for S/S and trends of decreased cardiac output  - Administer and titrate ordered vasoactive medications to optimize hemodynamic stability  - Assess quality of pulses, skin color and temperature  - Assess for signs of decreased coronary artery perfusion  - Instruct patient to report change in severity of symptoms  Outcome: Progressing  Goal: Absence of cardiac dysrhythmias or at baseline rhythm  Description: INTERVENTIONS:  - Continuous cardiac monitoring, vital signs, obtain 12 lead EKG if ordered  - Administer antiarrhythmic and heart rate control medications as ordered  - Monitor electrolytes and administer replacement therapy as ordered  Outcome: Progressing     Problem: METABOLIC, FLUID AND ELECTROLYTES - ADULT  Goal: Electrolytes maintained within normal limits  Description: INTERVENTIONS:  - Monitor labs and assess patient for signs and symptoms of electrolyte imbalances  - Administer electrolyte replacement as ordered  - Monitor response to electrolyte replacements, including repeat lab results as appropriate  - Instruct patient on fluid and nutrition as appropriate  Outcome: Progressing  Goal: Fluid balance maintained  Description: INTERVENTIONS:  - Monitor labs   - Monitor I/O and WT  - Instruct patient on fluid and nutrition as appropriate  - Assess for signs & symptoms of volume excess or deficit  Outcome: Progressing  Goal: Glucose maintained within target range  Description: INTERVENTIONS:  - Monitor Blood Glucose as ordered  - Assess for signs and symptoms of hyperglycemia and hypoglycemia  - Administer ordered medications to maintain glucose within target range  - Assess nutritional intake and initiate nutrition service referral as needed  Outcome: Progressing

## 2022-09-23 NOTE — ASSESSMENT & PLAN NOTE
Lab Results   Component Value Date    EGFR 23 09/23/2022    EGFR 27 07/10/2022    EGFR 26 07/09/2022    CREATININE 1 98 (H) 09/23/2022    CREATININE 1 69 (H) 07/10/2022    CREATININE 1 78 (H) 07/09/2022   Baseline creatinine is 1 6-1  78  Currently creatinine is elevated to 1 8  Patient appears to be volume overloaded   will monitor how creatinine responds to diuresis

## 2022-09-23 NOTE — ASSESSMENT & PLAN NOTE
Patient noted to have acute cystitis with microscopic hematuria present on admission  Placed on IV Rocephin await urine culture results

## 2022-09-23 NOTE — ED NOTES
Patient transported to [facility] [address], RN  09/23/22 8685

## 2022-09-23 NOTE — ED PROVIDER NOTES
History  Chief Complaint   Patient presents with    Weakness - Generalized     Daughter called EMS due to increased SOB, weakness, fatigue for the past few days  Febrile for EMS, hx of UTIs  Sent to the emergency room for evaluation of generalized weakness as reported by daughter  There was reported increased shortness of breath and fatigue for the last few days  EMS reported that the daughter was concerned about the possibility of urinary tract infection  Patient denies any urinary symptoms at this time  Patient denies any symptomatology at this time other than some generalized weakness  She appears to be resting comfortably  History provided by:  Patient  Fatigue  Severity:  Unable to specify  Onset quality:  Unable to specify  Timing:  Unable to specify  Progression:  Unable to specify  Relieved by:  None tried  Worsened by:  Nothing  Ineffective treatments:  None tried  Associated symptoms: arthralgias ( chronic)    Associated symptoms: no chest pain, no cough, no diarrhea, no dizziness, no fever, no foul-smelling urine, no frequency, no lethargy, no nausea and no shortness of breath        Prior to Admission Medications   Prescriptions Last Dose Informant Patient Reported? Taking?   furosemide (LASIX) 40 mg tablet Not Taking at Unknown time  No No   Sig: Take 1 5 tablets (60 mg total) by mouth daily   Patient not taking: Reported on 9/23/2022   levothyroxine 150 mcg tablet   Yes Yes   Sig: TAKE (1) TABLET DAILY  FIRST THING IN THE MORNING (AT LEAST 30 MIN PRIOR TO BREAKFAST OR OTHER MEDS)     metoprolol succinate (TOPROL-XL) 25 mg 24 hr tablet   No Yes   Sig: Take 1 tablet (25 mg total) by mouth daily for 14 days   potassium chloride (K-DUR,KLOR-CON) 10 mEq tablet   Yes Yes   Sig: Take 10 mEq by mouth daily   rivaroxaban (XARELTO) 15 mg tablet   Yes Yes   Sig: Take 15 mg by mouth in the morning   torsemide (DEMADEX) 20 mg tablet   Yes Yes   Sig: Take 40 mg by mouth daily   warfarin (COUMADIN) 5 mg tablet Not Taking at Unknown time  No No   Sig: Take 1 tablet (5 mg total) by mouth daily   Patient not taking: Reported on 9/23/2022      Facility-Administered Medications: None       Past Medical History:   Diagnosis Date    LEONARDO (acute kidney injury) ([place] [address] )     Atrial fibrillation ([place] [address] )     CHF (congestive heart failure) (HCC)     diastolic grade 1    Cirrhosis ([place] [address] )     Diabetes mellitus ([place] [address] )     Disease of thyroid gland     hypothyroid    Endometrial ca ([place] [address] )     Hiatal hernia     Hyperlipidemia     Hypertension     Hypothyroid     Lung nodules     Periumbilical hernia     Pulmonary HTN ([place] [address] )     Thoracic aortic aneurysm without rupture ([place] [address] ) 03/25/2022    41 mm       Past Surgical History:   Procedure Laterality Date    HERNIA REPAIR      HIP ARTHROPLASTY      HYSTERECTOMY      JOINT REPLACEMENT Bilateral     knee; b/l hip    KNEE ARTHROPLASTY      OOPHORECTOMY      WOUND DEBRIDEMENT Right 3/31/2022    Procedure: DEBRIDEMENT WOUND [facility] OUT), right medial thigh;  Surgeon: Belkis Hoyos DO;  Location:  MAIN OR;  Service: General       History reviewed  No pertinent family history  I have reviewed and agree with the history as documented  E-Cigarette/Vaping    E-Cigarette Use Never User      E-Cigarette/Vaping Substances     Social History     Tobacco Use    Smoking status: Never Smoker    Smokeless tobacco: Never Used   Vaping Use    Vaping Use: Never used   Substance Use Topics    Alcohol use: Never    Drug use: Never       Review of Systems   Unable to perform ROS: Mental status change (Review of systems partially limited by patient's confusion)   Constitutional: Negative  Negative for diaphoresis, fatigue and fever  HENT: Negative  Negative for congestion, ear pain and sore throat  Respiratory: Negative  Negative for cough, chest tightness and shortness of breath  Cardiovascular: Positive for leg swelling (Chronic per patient)   Negative for chest pain and palpitations  Gastrointestinal: Negative  Negative for abdominal distention, constipation, diarrhea and nausea  Genitourinary: Negative  Negative for frequency  Musculoskeletal: Positive for arthralgias ( chronic)  Neurological: Positive for weakness  Negative for dizziness and light-headedness  All other systems reviewed and are negative  Physical Exam  Physical Exam  Vitals and nursing note reviewed  Constitutional:       General: She is awake  She is not in acute distress  Appearance: Normal appearance  She is well-developed  She is obese  She is not ill-appearing or toxic-appearing  HENT:      Head: Normocephalic and atraumatic  Right Ear: External ear normal       Left Ear: External ear normal       Nose: Nose normal       Mouth/Throat:      Mouth: Mucous membranes are moist    Eyes:      General: Lids are normal  No scleral icterus  Extraocular Movements: Extraocular movements intact  Pupils: Pupils are equal, round, and reactive to light  Cardiovascular:      Rate and Rhythm: Normal rate and regular rhythm  Heart sounds: Murmur heard  Systolic murmur is present  Pulmonary:      Effort: Pulmonary effort is normal  No respiratory distress  Breath sounds: Normal breath sounds  No wheezing, rhonchi or rales  Abdominal:      General: Abdomen is flat  There is distension  Palpations: Abdomen is soft  Tenderness: There is no abdominal tenderness  There is no guarding or rebound  Musculoskeletal:         General: No swelling, tenderness or deformity  Normal range of motion  Cervical back: Normal range of motion and neck supple  Right lower leg: Edema present  Left lower leg: Edema present  Skin:     General: Skin is warm and dry  Coloration: Skin is not jaundiced or pale  Findings: No rash  Neurological:      Mental Status: She is alert and oriented to person, place, and time  Mental status is at baseline   She is confused  Cranial Nerves: No cranial nerve deficit  Motor: No weakness  Psychiatric:         Attention and Perception: Attention normal          Mood and Affect: Mood normal          Speech: Speech normal          Behavior: Behavior normal          Vital Signs  ED Triage Vitals [09/23/22 0651]   Temperature Pulse Respirations Blood Pressure SpO2   99 1 °F (37 3 °C) 85 20 102/51 95 %      Temp Source Heart Rate Source Patient Position - Orthostatic VS BP Location FiO2 (%)   Oral Monitor Lying Right arm --      Pain Score       No Pain           Vitals:    09/23/22 0830 09/23/22 0845 09/23/22 0900 09/23/22 0915   BP: 93/50 95/51 108/55 107/55   Pulse: 91 83 78 82   Patient Position - Orthostatic VS:             Visual Acuity      ED Medications  Medications   cefTRIAXone (ROCEPHIN) IVPB (premix in dextrose) 1,000 mg 50 mL (0 mg Intravenous Stopped 9/23/22 0910)       Diagnostic Studies  Results Reviewed     Procedure Component Value Units Date/Time    HS Troponin I 2hr [[id number]] Collected: 09/23/22 0918    Lab Status: In process Specimen: Blood from Arm, Left Updated: 09/23/22 0924    Lactic acid 2 Hours [[id number]] Collected: 09/23/22 0918    Lab Status:  In process Specimen: Blood from Arm, Left Updated: 09/23/22 0924    Manual Differential(PHLEBS Do Not Order) [[id number]]  (Abnormal) Collected: 09/23/22 0736    Lab Status: Final result Specimen: Blood from Arm, Right Updated: 09/23/22 0857     Segmented % 82 %      Bands % 1 %      Lymphocytes % 9 %      Monocytes % 2 %      Eosinophils, % 0 %      Basophils % 0 %      Myelocytes % 1 %      Atypical Lymphocytes % 5 %      Absolute Neutrophils 10 64 Thousand/uL      Lymphocytes Absolute 1 15 Thousand/uL      Monocytes Absolute 0 26 Thousand/uL      Eosinophils Absolute 0 00 Thousand/uL      Basophils Absolute 0 00 Thousand/uL      Total Counted --     RBC Morphology Present     Macrocytes Present     Platelet Estimate Adequate     Giant PLTs Present FLU/RSV/COVID - if FLU/RSV clinically relevant [[id number]]  (Normal) Collected: 09/23/22 0736    Lab Status: Final result Specimen: Nares from Nasopharyngeal Swab Updated: 09/23/22 0849     SARS-CoV-2 Negative     INFLUENZA A PCR Negative     INFLUENZA B PCR Negative     RSV PCR Negative    Narrative:      FOR PEDIATRIC PATIENTS - copy/paste COVID Guidelines URL to browser: [email or web address]    SARS-CoV-2 assay is a Nucleic Acid Amplification assay intended for the  qualitative detection of nucleic acid from SARS-CoV-2 in nasopharyngeal  swabs  Results are for the presumptive identification of SARS-CoV-2 RNA  Positive results are indicative of infection with SARS-CoV-2, the virus  causing COVID-19, but do not rule out bacterial infection or co-infection  with other viruses  Laboratories within the [place] and its  territories are required to report all positive results to the appropriate  public health authorities  Negative results do not preclude SARS-CoV-2  infection and should not be used as the sole basis for treatment or other  patient management decisions  Negative results must be combined with  clinical observations, patient history, and epidemiological information  This test has not been FDA cleared or approved  This test has been authorized by FDA under an Emergency Use Authorization  (EUA)  This test is only authorized for the duration of time the  declaration that circumstances exist justifying the authorization of the  emergency use of an in vitro diagnostic tests for detection of SARS-CoV-2  virus and/or diagnosis of COVID-19 infection under section 564(b)(1) of  the Act, 21 U  S C  602OVA-0(B)(2), unless the authorization is terminated  or revoked sooner  The test has been validated but independent review by FDA  and CLIA is pending  Test performed using [employer]:  This RT-PCR assay targets N2,  a region unique to SARS-CoV-2  A conserved region in the E-gene was chosen  for pan-Sarbecovirus detection which includes SARS-CoV-2  According to CMS-2020-01-R, this platform meets the definition of high-throughput technology  Urine Microscopic [[id number]]  (Abnormal) Collected: 09/23/22 0750    Lab Status: Final result Specimen: Urine, Straight Cath Updated: 09/23/22 0824     RBC, UA 20-30 /hpf      WBC, UA Innumerable /hpf      Epithelial Cells Occasional /hpf      Bacteria, UA Innumerable /hpf      Hyaline Casts, UA 2-4 /lpf     Urine culture [[id number]] Collected: 09/23/22 0750    Lab Status: In process Specimen: Urine, Straight Cath Updated: 09/23/22 0823    Ammonia [[id number]]  (Normal) Collected: 09/23/22 0739    Lab Status: Final result Specimen: Blood from Arm, Left Updated: 09/23/22 0823     Ammonia 11 umol/L     TSH [[id number]]  (Abnormal) Collected: 09/23/22 0736    Lab Status: Final result Specimen: Blood from Arm, Right Updated: 09/23/22 0820     TSH 3RD GENERATON 0 362 uIU/mL     Narrative:      Patients undergoing fluorescein dye angiography may retain small amounts of fluorescein in the body for 48-72 hours post procedure  Samples containing fluorescein can produce falsely depressed TSH values  If the patient had this procedure,a specimen should be resubmitted post fluorescein clearance  Lactic acid [[id number]]  (Abnormal) Collected: 09/23/22 0736    Lab Status: Final result Specimen: Blood from Arm, Right Updated: 09/23/22 0817     LACTIC ACID 2 4 mmol/L     Narrative:      Result may be elevated if tourniquet was used during collection      Comprehensive metabolic panel [[id number]]  (Abnormal) Collected: 09/23/22 0736    Lab Status: Final result Specimen: Blood from Arm, Right Updated: 09/23/22 0816     Sodium 137 mmol/L      Potassium 4 0 mmol/L      Chloride 103 mmol/L      CO2 26 mmol/L      ANION GAP 8 mmol/L      BUN 25 mg/dL      Creatinine 1 98 mg/dL      Glucose 113 mg/dL      Calcium 8 5 mg/dL Corrected Calcium 10 4 mg/dL      AST 32 U/L      ALT 15 U/L      Alkaline Phosphatase 167 U/L      Total Protein 5 5 g/dL      Albumin 1 6 g/dL      Total Bilirubin 3 18 mg/dL      eGFR 23 ml/min/1 73sq m     Narrative:      National Kidney Disease Foundation guidelines for Chronic Kidney Disease (CKD):     Stage 1 with normal or high GFR (GFR > 90 mL/min/1 73 square meters)    Stage 2 Mild CKD (GFR = 60-89 mL/min/1 73 square meters)    Stage 3A Moderate CKD (GFR = 45-59 mL/min/1 73 square meters)    Stage 3B Moderate CKD (GFR = 30-44 mL/min/1 73 square meters)    Stage 4 Severe CKD (GFR = 15-29 mL/min/1 73 square meters)    Stage 5 End Stage CKD (GFR <15 mL/min/1 73 square meters)  Note: GFR calculation is accurate only with a steady state creatinine    NT-BNP PRO [[id number]]  (Abnormal) Collected: 09/23/22 0736    Lab Status: Final result Specimen: Blood from Arm, Right Updated: 09/23/22 0816     NT-proBNP 1,531 pg/mL     HS Troponin I 4hr [[id number]]     Lab Status: No result Specimen: Blood     HS Troponin 0hr (reflex protocol) [[id number]]  (Normal) Collected: 09/23/22 0736    Lab Status: Final result Specimen: Blood from Arm, Right Updated: 09/23/22 0816     hs TnI 0hr 17 ng/L     UA w Reflex to Microscopic w Reflex to Culture [[id number]]  (Abnormal) Collected: 09/23/22 0750    Lab Status: Final result Specimen: Urine, Straight Cath Updated: 09/23/22 0812     Color, UA Yellow     Clarity, UA Slightly Cloudy     Specific [place], UA 1 020     pH, UA 5 5     Leukocytes, UA Large     Nitrite, UA Positive     Protein, UA Negative mg/dl      Glucose, UA Negative mg/dl      Ketones, UA Negative mg/dl      Urobilinogen, UA 0 2 E U /dl      Bilirubin, UA Small     Occult Blood, UA Large    CBC and differential [[id number]]  (Abnormal) Collected: 09/23/22 0736    Lab Status: Final result Specimen: Blood from Arm, Right Updated: 09/23/22 0802     WBC 12 82 Thousand/uL      RBC 2 49 Million/uL      Hemoglobin 8 6 g/dL      Hematocrit 25 8 %       fL      MCH 34 5 pg      MCHC 33 3 g/dL      RDW 17 0 %      MPV 10 1 fL      Platelets 130 Thousands/uL     Narrative: This is an appended report  These results have been appended to a previously verified report  Blood culture #2 [[id number]] Collected: 09/23/22 0736    Lab Status: In process Specimen: Blood from Arm, Right Updated: 09/23/22 0745    Blood culture #1 [[id number]] Collected: 09/23/22 0739    Lab Status: In process Specimen: Blood from Arm, Left Updated: 09/23/22 0745                 CT chest abdomen pelvis wo contrast   Final Result by Lazaro Amor MD (09/23 0901)      1  Small left pleural effusion with atelectasis  2   Stable lung nodules measuring up to 6 mm  3   Stable ectasia of the ascending aorta for which continued low-dose CT surveillance is recommended annually  4   Cirrhosis with large amount of ascites  5   Midline ventral hernia without obstruction  6   Cholelithiasis  7   Moderate hiatal hernia  Workstation performed: [id number]                    Procedures  ECG 12 Lead Documentation Only    Date/Time: 9/23/2022 7:39 AM  Performed by: Hiral Riley DO  Authorized by: Hiral Riley DO     ECG reviewed by me, the ED Provider: yes    Patient location:  ED  Previous ECG:     Previous ECG:  Compared to current    Comparison ECG info:  Appears fairly similar to July 8, 2022  QTC is longer than that EKG performed on July 8th  There are now PACs present      Interpretation:     Interpretation: normal    Quality:     Tracing quality:  Limited by artifact  Rate:     ECG rate assessment: normal    Rhythm:     Rhythm: sinus rhythm    Ectopy:     Ectopy: PAC    QRS:     QRS axis:  Normal  Conduction:     Conduction: abnormal      Abnormal conduction: 1st degree    ST segments:     ST segments:  Non-specific  T waves:     T waves: non-specific    Other findings:     Other findings: prolonged qTc interval    Comments:      Possibly second-degree type 1  Confounded by artifact             ED Course  ED Course as of 09/23/22 0925   Fri Sep 23, 2022   0836 WBC, UA(!): Innumerable   0836 Bacteria, UA(!): Innumerable   0836 Nitrite, UA(!): Positive   0836 Leukocytes, UA(!): Large  Urinalysis positive for urinary tract infection   0837 Will start antibiotics  CT pending   0902 NT-proBNP(!): 1,531  BNP is improved from 2 months ago, however, patient had also been admitted at that time for congestive heart failure    0906 CT shows:  "  1  Small left pleural effusion with atelectasis      2   Stable lung nodules measuring up to 6 mm      3   Stable ectasia of the ascending aorta for which continued low-dose CT surveillance is recommended annually      4   Cirrhosis with large amount of ascites      5   Midline ventral hernia without obstruction      6  Cholelithiasis      7  Moderate hiatal hernia "   0910 Discussed case with Hospital Medicine  Plan for admission  2521 Attempted to contact daughter to update her on cellphone number that was provided  Unable to reach  SBIRT 22yo+    Flowsheet Row Most Recent Value   SBIRT (23 yo +)    In order to provide better care to our patients, we are screening all of our patients for alcohol and drug use  Would it be okay to ask you these screening questions? No Filed at: 09/23/2022 0702                    MDM  Number of Diagnoses or Management Options  Altered mental status: new and requires workup  Ascites: established and worsening  CHF (congestive heart failure) ([facility] [address] ): established and worsening  Cirrhosis ([facility] [address] ): established and worsening  Urinary tract infection: new and requires workup  Weakness: established and worsening  Diagnosis management comments:   Patient presented to the emergency department and a MSE was performed   The patient was evaluated and diagnosed with acute exacerbation of altered mental status with urinary tract infection and evidence of ascites secondary to cirrhosis   This is an acute exacerbation of a chronic disease process that will require additional planned work-up and treatment in a hospitalized setting  As may have been required as part of this evaluation, clinical laboratory test, radiology imaging and medical testing (I e  EKG) were ordered as necessitated by the patient's presentation  I independently reviewed these studies, imaging and testing  This patient's case is considered to be a considerable risk secondary to the above listed disease process and poses a threat to the patient's well-being and baseline function  Further in-patient diagnostic testing and management, which may include the administration of parenteral medications, is required              Amount and/or Complexity of Data Reviewed  Clinical lab tests: ordered and reviewed  Tests in the radiology section of CPT®: ordered and reviewed  Decide to obtain previous medical records or to obtain history from someone other than the patient: yes  Discuss the patient with other providers: yes [facility]  Independent visualization of images, tracings, or specimens: yes    Risk of Complications, Morbidity, and/or Mortality  Presenting problems: high  Diagnostic procedures: moderate  Management options: moderate        Disposition  Final diagnoses:   Weakness   Altered mental status   Urinary tract infection   Ascites   Cirrhosis ([facility] [address] )   CHF (congestive heart failure) ([facility] [address] )     Time reflects when diagnosis was documented in both MDM as applicable and the Disposition within this note     Time User Action Codes Description Comment    9/23/2022  9:07 AM Ann Brake Add [R53 1] Weakness     9/23/2022  9:07 AM Ann Brake Add [R41 82] Altered mental status     9/23/2022  9:07 AM Ann Brake Add [N39 0] Urinary tract infection     9/23/2022  9:07 AM Ann Brake Add [R18 8] Ascites     9/23/2022  9:08 AM Prisca Del Cid Add [K74 60] Cirrhosis ([facility] [address] )     2022  9:08 AM Tara  Add [I50 9] CHF (congestive heart failure) [facility])       ED Disposition     ED Disposition   Admit    Condition   Stable    Date/Time   Fri Sep 23, 2022  9:07 AM    Comment              Follow-up Information    None         Patient's Medications   Discharge Prescriptions    No medications on file       No discharge procedures on file      PDMP Review     None          ED Provider  Electronically Signed by           Em Colon,   [address] [facility] [place],   [address] [facility]

## 2022-09-23 NOTE — ASSESSMENT & PLAN NOTE
CT abdomen pelvis shows liver cirrhosis  No prior history of cirrhosis as per the patient  Noted to have large volume ascites will plan for paracentesis  Also order acute hepatitis panel    Denies any alcohol abuse history

## 2022-09-23 NOTE — ASSESSMENT & PLAN NOTE
Wt Readings from Last 3 Encounters:   09/23/22 (!) 162 kg (357 lb 12 9 oz)   07/10/22 121 kg (267 lb 9 6 oz)   05/19/22 126 kg (277 lb 1 9 oz)   Patient presents with anasarca and acute on chronic diastolic CHF exacerbation  2D echo done 03/20/2022 showed EF of 60% +along with moderate aortic stenosis  Repeat 2D echo ordered  Will place on Lasix 40 mg IV b i d  for diuresis and monitor renal function closely  Also ask Critical Care for evaluation for paracentesis as patient noted to have large volume ascites on CT abdomen pelvis  Ordered PT INR  Hold Coumadin for now  Avoid hypotension    Placed on midodrine as patient's blood pressures are low normal   Consult placed to Cardiology

## 2022-09-23 NOTE — CONSULTS
Consult received for CHF ed  Pt reports fair appetite at home  Very low protein intake  Reports having corn flakes with milk for breakfast, sandwich with turkey at lunch  No meat or other protein at dinner typically  May have chicken noodle soup that is homemade, low sodium  Pt has moderate muscle loss  Meets criteria for chronic moderate malnutrition  Continue cardiac diet given CHF, fluid restriction per MD    Will trial magic cup daily and ensure max protein daily, increase supplements as tolerated noting fluid restriction  Provided diet ed for increasing PRO in diet  Continue to monitor weights daily

## 2022-09-23 NOTE — H&P
[address]  H&P- [place] 1940, 80 y o  female MRN: [id number]  Unit/Bed#: -01 Encounter: [id number]  Primary Care Provider: Jonas Ruiz MD   Date and time admitted to hospital: 9/23/2022  6:49 AM    * Acute on chronic diastolic (congestive) heart failure [facility])  Assessment & Plan  Wt Readings from Last 3 Encounters:   09/23/22 (!) 162 kg (357 lb 12 9 oz)   07/10/22 121 kg (267 lb 9 6 oz)   05/19/22 126 kg (277 lb 1 9 oz)   Patient presents with anasarca and acute on chronic diastolic CHF exacerbation  2D echo done 03/20/2022 showed EF of 60% +along with moderate aortic stenosis  Repeat 2D echo ordered  Will place on Lasix 40 mg IV b i d  for diuresis and monitor renal function closely  Also ask Critical Care for evaluation for paracentesis as patient noted to have large volume ascites on CT abdomen pelvis  Ordered PT INR  Hold Coumadin for now  Avoid hypotension  Placed on midodrine as patient's blood pressures are low normal   Consult placed to Cardiology          Acute cystitis with hematuria  Assessment & Plan  Patient noted to have acute cystitis with microscopic hematuria present on admission  Placed on IV Rocephin await urine culture results  Cirrhosis of liver with ascites ([facility] [address] )  Assessment & Plan  CT abdomen pelvis shows liver cirrhosis  No prior history of cirrhosis as per the patient  Noted to have large volume ascites will plan for paracentesis  Also order acute hepatitis panel  Denies any alcohol abuse history    Paroxysmal A-fib (HCC)  Assessment & Plan  Currently rate controlled  Continue Toprol-XL  Hold Coumadin for now    Moderate aortic stenosis  Assessment & Plan  As per prior echo  Repeat 2D echo ordered    Hypothyroidism (acquired)  Assessment & Plan  TSH is low at 0 33    Check free T4 and free T3  Continue levothyroxine as per home regimen    Stage 3a chronic kidney disease [facility])  Assessment & Plan  Lab Results   Component Value Date    EGFR 23 09/23/2022    EGFR 27 07/10/2022    EGFR 26 07/09/2022    CREATININE 1 98 (H) 09/23/2022    CREATININE 1 69 (H) 07/10/2022    CREATININE 1 78 (H) 07/09/2022   Baseline creatinine is 1 6-1  78  Currently creatinine is elevated to 1 8  Patient appears to be volume overloaded  will monitor how creatinine responds to diuresis    Morbid obesity:  BMI 69 8  Patient has fluid overload along with obesity  Needs diuresis and counseling on diet exercise and lifestyle modification  VTE Prophylaxis: Warfarin (Coumadin)  / sequential compression device   Code Status: full code  POLST: There is no POLST form on file for this patient (pre-hospital)  Discussion with family:  Discussed with daughter at bedside    Anticipated Length of Stay:  Patient will be admitted on an Inpatient basis with an anticipated length of stay of  more than 2 midnights  Justification for Hospital Stay:  Acute on chronic diastolic CHF exacerbation    Total Time for Visit, including Counseling / Coordination of Care: 60 minutes  Greater than 50% of this total time spent on direct patient counseling and coordination of care  Chief Complaint:   Shortness of breath    History of Present Illness:    Carolin Wu is a 80 y o  female who presents with shortness of breath and generalized weakness  Patient denies any recent falls  Patient states that she has been gaining weight and having abdominal distension  Complaining of worsening shortness of breath and fatigue  Family was concerned that she might have a urinary tract infection and sent her to the hospital to be evaluated  Review of Systems:    Review of Systems   Constitutional: Positive for appetite change and fatigue  Negative for chills and fever  HENT: Negative for hearing loss, sore throat and trouble swallowing  Eyes: Negative for photophobia, discharge and visual disturbance  Respiratory: Positive for shortness of breath  Negative for chest tightness  Cardiovascular: Negative for chest pain and palpitations  Gastrointestinal: Positive for abdominal distention  Negative for abdominal pain, blood in stool and vomiting  Endocrine: Negative for polydipsia and polyuria  Genitourinary: Negative for difficulty urinating, dysuria, flank pain and hematuria  Musculoskeletal: Negative for back pain and gait problem  Skin: Negative for rash  Allergic/Immunologic: Negative for environmental allergies and food allergies  Neurological: Positive for weakness  Negative for dizziness, seizures, syncope and headaches  Hematological: Does not bruise/bleed easily  Psychiatric/Behavioral: Negative for behavioral problems  All other systems reviewed and are negative  Past Medical and Surgical History:     Past Medical History:   Diagnosis Date    LEONARDO (acute kidney injury) ([place] [address] )     Atrial fibrillation ([place] [address] )     CHF (congestive heart failure) ([facility])     diastolic grade 1    Cirrhosis ([place] [address] )     Diabetes mellitus ([place] [address] )     Disease of thyroid gland     hypothyroid    Endometrial ca ([place] [address] )     Hiatal hernia     Hyperlipidemia     Hypertension     Hypothyroid     Lung nodules     Periumbilical hernia     Pulmonary HTN ([place] [address] )     Thoracic aortic aneurysm without rupture ([place] [address] ) 03/25/2022    41 mm       Past Surgical History:   Procedure Laterality Date    HERNIA REPAIR      HIP ARTHROPLASTY      HYSTERECTOMY      JOINT REPLACEMENT Bilateral     knee; b/l hip    KNEE ARTHROPLASTY      OOPHORECTOMY      WOUND DEBRIDEMENT Right 3/31/2022    Procedure: DEBRIDEMENT WOUND [facility] OUT), right medial thigh;  Surgeon: June Martines DO;  Location:  MAIN OR;  Service: General       Meds/Allergies:    Prior to Admission medications    Medication Sig Start Date End Date Taking? Authorizing Provider   levothyroxine 150 mcg tablet TAKE (1) TABLET DAILY  FIRST THING IN THE MORNING (AT LEAST 30 MIN PRIOR TO BREAKFAST OR OTHER MEDS)   9/2/21  Yes Historical Provider, MD   metoprolol succinate (TOPROL-XL) 25 mg 24 hr tablet Take 1 tablet (25 mg total) by mouth daily for 14 days 7/11/22 9/23/22 Yes LEIXE Miguel   potassium chloride (K-DUR,KLOR-CON) 10 mEq tablet Take 10 mEq by mouth daily   Yes Historical Provider, MD   rivaroxaban (XARELTO) 15 mg tablet Take 15 mg by mouth in the morning 8/12/22  Yes Historical Provider, MD   torsemide (DEMADEX) 20 mg tablet Take 40 mg by mouth daily 7/19/22  Yes Historical Provider, MD   furosemide (LASIX) 40 mg tablet Take 1 5 tablets (60 mg total) by mouth daily  Patient not taking: Reported on 9/23/2022 7/11/22   LEXIE Miguel   warfarin (COUMADIN) 5 mg tablet Take 1 tablet (5 mg total) by mouth daily  Patient not taking: Reported on 9/23/2022 4/4/22   Davis Recinos MD     I have reviewed home medications with patient personally  Allergies: Allergies   Allergen Reactions    Penicillins Hives     Tolerated unasyn 7/2022    Sulfamethoxazole-Trimethoprim GI Intolerance       Social History:     Marital Status:      Social History     Substance and Sexual Activity   Alcohol Use Never     Social History     Tobacco Use   Smoking Status Never Smoker   Smokeless Tobacco Never Used     Social History     Substance and Sexual Activity   Drug Use Never       Family History:    Family History   Problem Relation Age of Onset    Hypertension Father        Physical Exam:     Vitals:   Blood Pressure: 104/60 (09/23/22 1510)  Pulse: 89 (09/23/22 1510)  Temperature: 98 8 °F (37 1 °C) (09/23/22 1510)  Temp Source: Oral (09/23/22 0651)  Respirations: 21 (09/23/22 1510)  Height: 5' (152 4 cm) (09/23/22 0651)  Weight - Scale: (!) 162 kg (357 lb 12 9 oz) (09/23/22 0651)  SpO2: 94 % (09/23/22 1510)    Physical Exam  Vitals and nursing note reviewed  Constitutional:       Appearance: Normal appearance  She is obese  HENT:      Head: Normocephalic and atraumatic        Right Ear: External ear normal       Left Ear: External ear normal       Nose: Nose normal       Mouth/Throat:      Pharynx: Oropharynx is clear  Eyes:      Pupils: Pupils are equal, round, and reactive to light  Cardiovascular:      Rate and Rhythm: Normal rate and regular rhythm  Heart sounds: Murmur heard  Pulmonary:      Effort: Pulmonary effort is normal       Comments: Moderate air entry bilaterally with decreased breath sounds bilateral bases  Abdominal:      General: Bowel sounds are normal  There is distension  Palpations: Abdomen is soft  Tenderness: There is no abdominal tenderness  Musculoskeletal:         General: Normal range of motion  Cervical back: Normal range of motion and neck supple  Right lower leg: Edema present  Left lower leg: Edema present  Skin:     General: Skin is warm and dry  Capillary Refill: Capillary refill takes less than 2 seconds  Neurological:      General: No focal deficit present  Mental Status: She is alert and oriented to person, place, and time  Psychiatric:         Mood and Affect: Mood normal            Additional Data:     Lab Results: I have personally reviewed pertinent reports  Results from last 7 days   Lab Units 09/23/22  0736   WBC Thousand/uL 12 82*   HEMOGLOBIN g/dL 8 6*   HEMATOCRIT % 25 8*   PLATELETS Thousands/uL 154   BANDS PCT % 1   LYMPHO PCT % 9*   MONO PCT % 2*   EOS PCT % 0     Results from last 7 days   Lab Units 09/23/22  0736   SODIUM mmol/L 137   POTASSIUM mmol/L 4 0   CHLORIDE mmol/L 103   CO2 mmol/L 26   BUN mg/dL 25   CREATININE mg/dL 1 98*   ANION GAP mmol/L 8   CALCIUM mg/dL 8 5   ALBUMIN g/dL 1 6*   TOTAL BILIRUBIN mg/dL 3 18*   ALK PHOS U/L 167*   ALT U/L 15   AST U/L 32   GLUCOSE RANDOM mg/dL 113                 Results from last 7 days   Lab Units 09/23/22  0918 09/23/22  0736   LACTIC ACID mmol/L 2 3* 2 4*       Imaging: I have personally reviewed pertinent reports        CT chest abdomen pelvis wo contrast   Final Result by Patrick Lezama MD (09/23 0901)      1  Small left pleural effusion with atelectasis  2   Stable lung nodules measuring up to 6 mm  3   Stable ectasia of the ascending aorta for which continued low-dose CT surveillance is recommended annually  4   Cirrhosis with large amount of ascites  5   Midline ventral hernia without obstruction  6   Cholelithiasis  7   Moderate hiatal hernia  Workstation performed: [id number]             EKG, Pathology, and Other Studies Reviewed on Admission:   · EKG:  Sinus rhythm with PACs    Allscripts / Epic Records Reviewed: Yes     ** Please Note: This note has been constructed using a voice recognition system   **

## 2022-09-23 NOTE — CONSULTS
Consultation - Cardiology   Juliana Gao 80 y o  female MRN: [id number]  Unit/Bed#: -01 Encounter: [id number]    Assessment/Plan     Assessment:  Acute on chronic HFpEF- LVEF 70%,noted to have weight gain, SOB and anasarca, on lasix 40 IV BID but symptoms likely related to significant ascites  PAF- on coumadin but currently on hold, currently in afib, rate controlled  Moderate AS  Cirrhosis w large volume ascites planning for paracentesis  Acute cystitis - on abx     Plan:  Patient does NOT have severe AS  She has moderate AS  Increase lasix to 80 mg IV BID  Continue with treatment for underlying ascites - paracentesis   Resume coumadin when clinically appropriate  Repeat echo pending     History of Present Illness   Physician Requesting Consult: Kayla Lowe MD  Reason for Consult / Principal Problem: CHF, mod AS  HPI: Juliana Gao is a 80y o  year old female with history of CHFpEF, Afib on coumadin , DM2, pHTN presented to the hospital for sob and weakness  She reports she has been gaining weight at home and noting abdominal distention  Family was concerned she may have a UTI  She was found to have cirrhosis and large amount of ascites  her weight is significantly increased since her last hospital visit  She was also noted to have pleural effusions  She was started on IV diuresis  Coumadin is on hold for planning for paracentesis  Overall she reports no specific concerns at the bedside  Inpatient consult to Cardiology  Consult performed by: Betty Tapia PA-C  Consult ordered by: Kayla Lowe MD          Review of Systems   Constitutional: Positive for fatigue and unexpected weight change  Negative for chills  Respiratory: Positive for shortness of breath  Negative for chest tightness and wheezing  Cardiovascular: Positive for leg swelling  Negative for chest pain and palpitations  Gastrointestinal: Positive for abdominal distention     Skin: Negative for color change, pallor and rash    Neurological: Negative for dizziness, weakness and light-headedness  Psychiatric/Behavioral: Negative for agitation, behavioral problems and confusion  Historical Information   Past Medical History:   Diagnosis Date    LEONARDO (acute kidney injury) ([place] [address] )     Atrial fibrillation (HCC)     CHF (congestive heart failure) (HCC)     diastolic grade 1    Cirrhosis ([place] [address] )     Diabetes mellitus ([place] [address] )     Disease of thyroid gland     hypothyroid    Endometrial ca ([place] [address] )     Hiatal hernia     Hyperlipidemia     Hypertension     Hypothyroid     Lung nodules     Periumbilical hernia     Pulmonary HTN ([place] [address] )     Thoracic aortic aneurysm without rupture ([place] [address] ) 03/25/2022    41 mm     Past Surgical History:   Procedure Laterality Date    HERNIA REPAIR      HIP ARTHROPLASTY      HYSTERECTOMY      JOINT REPLACEMENT Bilateral     knee; b/l hip    KNEE ARTHROPLASTY      OOPHORECTOMY      WOUND DEBRIDEMENT Right 3/31/2022    Procedure: DEBRIDEMENT WOUND [facility] OUT), right medial thigh;  Surgeon: Dariela Carroll DO;  Location:  MAIN OR;  Service: General     Social History     Substance and Sexual Activity   Alcohol Use Never     Social History     Substance and Sexual Activity   Drug Use Never     E-Cigarette/Vaping    E-Cigarette Use Never User      E-Cigarette/Vaping Substances     Social History     Tobacco Use   Smoking Status Never Smoker   Smokeless Tobacco Never Used     Family History: non-contributory    Meds/Allergies   all current active meds have been reviewed  Allergies   Allergen Reactions    Penicillins Hives     Tolerated unasyn 7/2022    Sulfamethoxazole-Trimethoprim GI Intolerance       Objective   Vitals: Blood pressure 104/60, pulse 89, temperature 98 8 °F (37 1 °C), resp  rate 21, height 5' (1 524 m), weight (!) 162 kg (357 lb 12 9 oz), SpO2 94 %    Orthostatic Blood Pressures    Flowsheet Row Most Recent Value   Blood Pressure 104/60 filed at 09/23/2022 1510   Patient Position - Orthostatic VS Lying filed at 09/23/2022 0651            Intake/Output Summary (Last 24 hours) at 9/23/2022 1523  Last data filed at 9/23/2022 0910  Gross per 24 hour   Intake 50 ml   Output 50 ml   Net 0 ml       Invasive Devices  Report    Peripheral Intravenous Line  Duration           Peripheral IV 09/23/22 Left Forearm <1 day                Physical Exam  Constitutional:       Appearance: Normal appearance  She is obese  HENT:      Head: Normocephalic and atraumatic  Neck:      Comments: + JVD  Cardiovascular:      Rate and Rhythm: Regular rhythm  Heart sounds: Murmur heard  Pulmonary:      Effort: Pulmonary effort is normal    Abdominal:      General: There is distension  Musculoskeletal:         General: Swelling present  Skin:     General: Skin is warm and dry  Capillary Refill: Capillary refill takes less than 2 seconds  Neurological:      General: No focal deficit present  Mental Status: She is alert and oriented to person, place, and time  Psychiatric:         Mood and Affect: Mood normal          Thought Content: Thought content normal          Lab Results:   I have personally reviewed pertinent lab results      CBC with diff:   Results from last 7 days   Lab Units 09/23/22  0736   WBC Thousand/uL 12 82*   RBC Million/uL 2 49*   HEMOGLOBIN g/dL 8 6*   HEMATOCRIT % 25 8*   MCV fL 104*   MCH pg 34 5*   MCHC g/dL 33 3   RDW % 17 0*   MPV fL 10 1   PLATELETS Thousands/uL 154     CMP:   Results from last 7 days   Lab Units 09/23/22  0736   SODIUM mmol/L 137   CHLORIDE mmol/L 103   CO2 mmol/L 26   BUN mg/dL 25   CREATININE mg/dL 1 98*   CALCIUM mg/dL 8 5   AST U/L 32   ALT U/L 15   ALK PHOS U/L 167*   EGFR ml/min/1 73sq m 23     HS Troponin:   0   Lab Value Date/Time    HSTNI 112 (H) 03/26/2022 0503    HSTNI0 17 09/23/2022 0736    HSTNI2 15 09/23/2022 0918    HSTNI4 16 06/29/2022 2148    HSTNI4 110 (H) 03/25/2022 2317     BNP:   Results from last 7 days   Lab Units 09/23/22  0736   POTASSIUM mmol/L 4 0   CHLORIDE mmol/L 103   CO2 mmol/L 26   BUN mg/dL 25   CREATININE mg/dL 1 98*   CALCIUM mg/dL 8 5   EGFR ml/min/1 73sq m 23     Coags:     TSH:   Results from last 7 days   Lab Units 09/23/22  0736   TSH 3RD GENERATON uIU/mL 0 330*  0 362*     Magnesium:     Lipid Profile:     Imaging: I have personally reviewed pertinent reports  Echo 3/28/22:    Left Ventricle: Left ventricular cavity size is normal  Wall thickness is normal  The left ventricular ejection fraction is 70%  Systolic function is vigorous  Wall motion is normal     Left Atrium: The atrium is moderately dilated    Right Atrium: The atrium is mildly dilated    Aortic Valve: The aortic valve cannot be ruled out as bicuspid  The leaflets are moderately thickened  The leaflets are moderately calcified  There is moderately reduced mobility  Findings are overall consistent with severe aortic stenosis    Mitral Valve: There is moderate annular calcification  There is mild regurgitation    Tricuspid Valve: There is mild regurgitation    Prior TTE study available for comparison  Prior study date: 12/17/2019  Changes noted when compared to prior study  Changes include: Mild to moderate aortic valve calcification and reduced mobility were noted on the prior study  The aortic valve is better visualized on the current study and gradients obtained on the current study demonstrate that there is severe aortic stenosis   The prior study demonstrated mild aortic regurgitation, which is not visualized on the current study      EKG: Sinus rhythm with Premature atrial complexes  Low voltage QRS  Possible Anterolateral infarct (cited on or before 25-MAR-2022)  When compared with ECG of 08-JUL-2022 09:10,  Premature atrial complexes are now Present

## 2022-09-24 NOTE — ASSESSMENT & PLAN NOTE
TSH is low at 0 33  Low free T3 and elevated free T4  Hold levothyroxine for now as patient appears to be over compensated    Probably restart a lower dose after 1 week

## 2022-09-24 NOTE — ASSESSMENT & PLAN NOTE
Lab Results   Component Value Date    EGFR 22 09/24/2022    EGFR 23 09/23/2022    EGFR 27 07/10/2022    CREATININE 2 03 (H) 09/24/2022    CREATININE 1 98 (H) 09/23/2022    CREATININE 1 69 (H) 07/10/2022   Baseline creatinine is 1 6-1  78  Currently creatinine is elevated to 2  Patient appears to be volume overloaded   will monitor how creatinine responds to diuresis

## 2022-09-24 NOTE — PLAN OF CARE
Problem: Nutrition/Hydration-ADULT  Goal: Nutrient/Hydration intake appropriate for improving, restoring or maintaining nutritional needs  Description: Monitor and assess patient's nutrition/hydration status for malnutrition  Collaborate with interdisciplinary team and initiate plan and interventions as ordered  Monitor patient's weight and dietary intake as ordered or per policy  Utilize nutrition screening tool and intervene as necessary  Determine patient's food preferences and provide high-protein, high-caloric foods as appropriate       INTERVENTIONS:  - Monitor oral intake, urinary output, labs, and treatment plans  - Assess nutrition and hydration status and recommend course of action  - Evaluate amount of meals eaten  - Assist patient with eating if necessary   - Allow adequate time for meals  - Recommend/ encourage appropriate diets, oral nutritional supplements, and vitamin/mineral supplements  - Order, calculate, and assess calorie counts as needed  - Recommend, monitor, and adjust tube feedings and TPN/PPN based on assessed needs  - Assess need for intravenous fluids  - Provide specific nutrition/hydration education as appropriate  - Include patient/family/caregiver in decisions related to nutrition  Outcome: Progressing     Problem: PAIN - ADULT  Goal: Verbalizes/displays adequate comfort level or baseline comfort level  Description: Interventions:  - Encourage patient to monitor pain and request assistance  - Assess pain using appropriate pain scale  - Administer analgesics based on type and severity of pain and evaluate response  - Implement non-pharmacological measures as appropriate and evaluate response  - Consider cultural and social influences on pain and pain management  - Notify physician/advanced practitioner if interventions unsuccessful or patient reports new pain  Outcome: Progressing     Problem: INFECTION - ADULT  Goal: Absence or prevention of progression during hospitalization  Description: INTERVENTIONS:  - Assess and monitor for signs and symptoms of infection  - Monitor lab/diagnostic results  - Monitor all insertion sites, i e  indwelling lines, tubes, and drains  - Monitor endotracheal if appropriate and nasal secretions for changes in amount and color  - [place] appropriate cooling/warming therapies per order  - Administer medications as ordered  - Instruct and encourage patient and family to use good hand hygiene technique  - Identify and instruct in appropriate isolation precautions for identified infection/condition  Outcome: Progressing     Goal: Maintain or return to baseline ADL function  Description: INTERVENTIONS:  -  Assess patient's ability to carry out ADLs; assess patient's baseline for ADL function and identify physical deficits which impact ability to perform ADLs (bathing, care of mouth/teeth, toileting, grooming, dressing, etc )  - Assess/evaluate cause of self-care deficits   - Assess range of motion  - Assess patient's mobility; develop plan if impaired  - Assess patient's need for assistive devices and provide as appropriate  - Encourage maximum independence but intervene and supervise when necessary  - Involve family in performance of ADLs  - Assess for home care needs following discharge   - Consider OT consult to assist with ADL evaluation and planning for discharge  - Provide patient education as appropriate  Outcome: Progressing     Problem: DISCHARGE PLANNING  Goal: Discharge to home or other facility with appropriate resources  Description: INTERVENTIONS:  - Identify barriers to discharge w/patient and caregiver  - Arrange for needed discharge resources and transportation as appropriate  - Identify discharge learning needs (meds, wound care, etc )  - Arrange for interpretive services to assist at discharge as needed  - Refer to Case Management Department for coordinating discharge planning if the patient needs post-hospital services based on physician/advanced practitioner order or complex needs related to functional status, cognitive ability, or social support system  Outcome: Progressing     Problem: Knowledge Deficit  Goal: Patient/family/caregiver demonstrates understanding of disease process, treatment plan, medications, and discharge instructions  Description: Complete learning assessment and assess knowledge base    Interventions:  - Provide teaching at level of understanding  - Provide teaching via preferred learning methods  Outcome: Progressing     Problem: CARDIOVASCULAR - ADULT  Goal: Maintains optimal cardiac output and hemodynamic stability  Description: INTERVENTIONS:  - Monitor I/O, vital signs and rhythm  - Monitor for S/S and trends of decreased cardiac output  - Administer and titrate ordered vasoactive medications to optimize hemodynamic stability  - Assess quality of pulses, skin color and temperature  - Assess for signs of decreased coronary artery perfusion  - Instruct patient to report change in severity of symptoms  Outcome: Progressing  Goal: Absence of cardiac dysrhythmias or at baseline rhythm  Description: INTERVENTIONS:  - Continuous cardiac monitoring, vital signs, obtain 12 lead EKG if ordered  - Administer antiarrhythmic and heart rate control medications as ordered  - Monitor electrolytes and administer replacement therapy as ordered  Outcome: Progressing     Problem: METABOLIC, FLUID AND ELECTROLYTES - ADULT  Goal: Electrolytes maintained within normal limits  Description: INTERVENTIONS:  - Monitor labs and assess patient for signs and symptoms of electrolyte imbalances  - Administer electrolyte replacement as ordered  - Monitor response to electrolyte replacements, including repeat lab results as appropriate  - Instruct patient on fluid and nutrition as appropriate  Outcome: Progressing  Goal: Fluid balance maintained  Description: INTERVENTIONS:  - Monitor labs   - Monitor I/O and WT  - Instruct patient on fluid and nutrition as appropriate  - Assess for signs & symptoms of volume excess or deficit  Outcome: Progressing  Goal: Glucose maintained within target range  Description: INTERVENTIONS:  - Monitor Blood Glucose as ordered  - Assess for signs and symptoms of hyperglycemia and hypoglycemia  - Administer ordered medications to maintain glucose within target range  - Assess nutritional intake and initiate nutrition service referral as needed  Outcome: Progressing     Problem: Prexisting or High Potential for Compromised Skin Integrity  Goal: Skin integrity is maintained or improved  Description: INTERVENTIONS:  - Identify patients at risk for skin breakdown  - Assess and monitor skin integrity  - Assess and monitor nutrition and hydration status  - Monitor labs   - Assess for incontinence   - Turn and reposition patient  - Assist with mobility/ambulation  - Relieve pressure over bony prominences  - Avoid friction and shearing  - Provide appropriate hygiene as needed including keeping skin clean and dry  - Evaluate need for skin moisturizer/barrier cream  - Collaborate with interdisciplinary team   - Patient/family teaching  - Consider wound care consult   Outcome: Progressing     Problem: MOBILITY - ADULT  Goal: Maintain or return to baseline ADL function  Description: INTERVENTIONS:  -  Assess patient's ability to carry out ADLs; assess patient's baseline for ADL function and identify physical deficits which impact ability to perform ADLs (bathing, care of mouth/teeth, toileting, grooming, dressing, etc )  - Assess/evaluate cause of self-care deficits   - Assess range of motion  - Assess patient's mobility; develop plan if impaired  - Assess patient's need for assistive devices and provide as appropriate  - Encourage maximum independence but intervene and supervise when necessary  - Involve family in performance of ADLs  - Assess for home care needs following discharge   - Consider OT consult to assist with ADL evaluation and planning for discharge  - Provide patient education as appropriate  Outcome: Progressing

## 2022-09-24 NOTE — PROGRESS NOTES
[address]  Progress Note - Arleene Pallas 1940, 80 y o  female MRN: [id number]  Unit/Bed#: -[facility] Encounter: [id number]  Primary Care Provider: Clotilde Bailon MD   Date and time admitted to hospital: 9/23/2022  6:49 AM    * Acute on chronic diastolic (congestive) heart failure [facility])  Assessment & Plan  Wt Readings from Last 3 Encounters:   09/24/22 131 kg (289 lb 3 9 oz)   07/10/22 121 kg (267 lb 9 6 oz)   05/19/22 126 kg (277 lb 1 9 oz)   Patient presents with anasarca and acute on chronic diastolic CHF exacerbation  2D echo 9/22 shows EF more than 70% and moderate aortic stenosis  Placed on Lasix 40 mg IV b i d  Along with midodrine due to hypotension with minimal response for will switch to Lasix drip at 10 milligrams/hour  Also ask Critical Care for evaluation for paracentesis as patient noted to have large volume ascites on CT abdomen pelvis  Ordered PT INR daily for now  INR initially 5 and now down to 3 3  Hold Coumadin for now and repeat oral vitamin K  Hopefully can plan paracentesis for tomorrow  Appreciate cardiology input          Acute cystitis with hematuria  Assessment & Plan  Patient noted to have acute cystitis with microscopic hematuria present on admission  Placed on IV Rocephin await urine culture results  1/2 blood cultures positive for possible coagulase negative staph possible contaminant  Await final results  Cirrhosis of liver with ascites ([address] )  Assessment & Plan  CT abdomen pelvis shows liver cirrhosis  No prior history of cirrhosis as per the patient  Noted to have large volume ascites will plan for paracentesis  Also order acute hepatitis panel  Denies any alcohol abuse history    Paroxysmal A-fib (HCC)  Assessment & Plan  Currently rate controlled  Continue Toprol-XL  Hold Coumadin for now    Moderate aortic stenosis  Assessment & Plan  As per echo    Avoid hypotension    Hypothyroidism (acquired)  Assessment & Plan  TSH is low at 0 33  Low free T3 and elevated free T4  Hold levothyroxine for now as patient appears to be over compensated  Probably restart a lower dose after 1 week    Stage 3a chronic kidney disease [facility])  Assessment & Plan  Lab Results   Component Value Date    EGFR 22 2022    EGFR 23 2022    EGFR 27 07/10/2022    CREATININE 2 03 (H) 2022    CREATININE 1 98 (H) 2022    CREATININE 1 69 (H) 07/10/2022   Baseline creatinine is 1 6-1  78  Currently creatinine is elevated to 2  Patient appears to be volume overloaded  will monitor how creatinine responds to diuresis    Moderate protein calorie malnutrition:  BMI is 56 49 however patient noted to have moderate generalized muscle wasting noted and 3rd spacing due to liver cirrhosis and CHF exacerbation  Continue diuresis and increase protein intake  VTE Pharmacologic Prophylaxis:   Pharmacologic: Pharmacologic VTE Prophylaxis contraindicated due to Elevated INR  Mechanical VTE Prophylaxis in Place: Yes    Patient Centered Rounds: I have performed bedside rounds with nursing staff today  Discussions with Specialists or Other Care Team Provider:  Discussed with Cardiology and Critical Care    Education and Discussions with Family / Patient:  Discussed with patient bedside will update family    Time Spent for Care: 45 minutes  More than 50% of total time spent on counseling and coordination of care as described above  Current Length of Stay: 1 day(s)    Current Patient Status: Inpatient   Certification Statement: The patient will continue to require additional inpatient hospital stay due to Acute on chronic diastolic CHF exacerbation    Discharge Plan: Will be in the hospital at least another 3-4 days    Code Status: Level 1 - Full Code      Subjective:   Patient states that she still complains of generalized weakness and distension  Dysuria is improving      Objective:     Vitals:   Temp (24hrs), Av 1 °F (36 7 °C), Min:97 3 °F (36 3 °C), Max:98 8 °F (37 1 °C)    Temp:  [97 3 °F (36 3 °C)-98 8 °F (37 1 °C)] 97 3 °F (36 3 °C)  HR:  [74-89] 80  Resp:  [18-21] 18  BP: ()/(39-74) 106/66  SpO2:  [93 %-97 %] 96 %  Body mass index is 56 49 kg/m²  Input and Output Summary (last 24 hours): Intake/Output Summary (Last 24 hours) at 9/24/2022 1159  Last data filed at 9/24/2022 1043  Gross per 24 hour   Intake 1080 ml   Output 500 ml   Net 580 ml       Physical Exam:     Physical Exam  Vitals and nursing note reviewed  Constitutional:       Appearance: She is obese  She is ill-appearing  HENT:      Head: Normocephalic and atraumatic  Right Ear: External ear normal       Left Ear: External ear normal       Nose: Nose normal       Mouth/Throat:      Pharynx: Oropharynx is clear  Eyes:      Pupils: Pupils are equal, round, and reactive to light  Cardiovascular:      Rate and Rhythm: Normal rate and regular rhythm  Heart sounds: Normal heart sounds  Pulmonary:      Effort: Pulmonary effort is normal       Comments: Moderate air entry bilaterally decreased breath sounds on the bases  Abdominal:      General: Bowel sounds are normal  There is distension  Palpations: Abdomen is soft  Tenderness: There is no abdominal tenderness  Musculoskeletal:         General: Normal range of motion  Cervical back: Normal range of motion and neck supple  Right lower leg: Edema present  Left lower leg: Edema present  Skin:     General: Skin is warm and dry  Capillary Refill: Capillary refill takes less than 2 seconds  Neurological:      General: No focal deficit present  Mental Status: She is alert and oriented to person, place, and time     Psychiatric:         Mood and Affect: Mood normal            Additional Data:     Labs:    Results from last 7 days   Lab Units 09/24/22  0604 09/23/22  0736   WBC Thousand/uL 12 18* 12 82*   HEMOGLOBIN g/dL 8 0* 8 6*   HEMATOCRIT % 24 6* 25 8*   PLATELETS Thousands/uL 132* 154   BANDS PCT %  --  1   LYMPHO PCT %  --  9*   MONO PCT %  --  2*   EOS PCT %  --  0     Results from last 7 days   Lab Units 09/24/22  0604   SODIUM mmol/L 135   POTASSIUM mmol/L 3 9   CHLORIDE mmol/L 103   CO2 mmol/L 27   BUN mg/dL 29*   CREATININE mg/dL 2 03*   ANION GAP mmol/L 5   CALCIUM mg/dL 8 6   ALBUMIN g/dL 1 5*   TOTAL BILIRUBIN mg/dL 3 13*   ALK PHOS U/L 159*   ALT U/L 13   AST U/L 32   GLUCOSE RANDOM mg/dL 96     Results from last 7 days   Lab Units 09/24/22  0743   INR  3 39*             Results from last 7 days   Lab Units 09/23/22  0918 09/23/22  0736   LACTIC ACID mmol/L 2 3* 2 4*           * I Have Reviewed All Lab Data Listed Above  * Additional Pertinent Lab Tests Reviewed: [place] [address] Admission Reviewed    Imaging:    Imaging Reports Reviewed Today Include:  2D echo and chest x-ray  Imaging Personally Reviewed by Myself Includes:  Chest x-ray    Recent Cultures (last 7 days):     Results from last 7 days   Lab Units 09/23/22  0750 09/23/22  0739 09/23/22  0736   BLOOD CULTURE   --  Received in Microbiology Lab  Culture in Progress  --    GRAM STAIN RESULT   --   --  Gram positive cocci in clusters*   URINE CULTURE  >100,000 cfu/ml Gram Negative [place] Enteric Like*  --   --        Last 24 Hours Medication List:   Current Facility-Administered Medications   Medication Dose Route Frequency Provider Last Rate    acetaminophen  650 mg Oral Q4H PRN Sabina Alonso MD      cefTRIAXone  1,000 mg Intravenous Q24H Sabina Alonso MD 1,000 mg (09/24/22 0857)    furosemide  10 mg/hr Intravenous Continuous Sabina Alonso MD      metoprolol succinate  12 5 mg Oral Daily Sabina Alonso MD      midodrine  10 mg Oral TID AC Sabina Alonso MD      phytonadione  5 mg Oral Once Sabina Alonso MD          Today, Patient Was Seen By: Sabina Alonso MD    ** Please Note: Dictation voice to text software may have been used in the creation of this document   **

## 2022-09-24 NOTE — ASSESSMENT & PLAN NOTE
Patient noted to have acute cystitis with microscopic hematuria present on admission  Placed on IV Rocephin await urine culture results  1/2 blood cultures positive for possible coagulase negative staph possible contaminant  Await final results

## 2022-09-24 NOTE — ASSESSMENT & PLAN NOTE
Wt Readings from Last 3 Encounters:   09/24/22 131 kg (289 lb 3 9 oz)   07/10/22 121 kg (267 lb 9 6 oz)   05/19/22 126 kg (277 lb 1 9 oz)   Patient presents with anasarca and acute on chronic diastolic CHF exacerbation  2D echo 9/22 shows EF more than 70% and moderate aortic stenosis  Placed on Lasix 40 mg IV b i d  Along with midodrine due to hypotension with minimal response for will switch to Lasix drip at 10 milligrams/hour  Also ask Critical Care for evaluation for paracentesis as patient noted to have large volume ascites on CT abdomen pelvis  Ordered PT INR daily for now  INR initially 5 and now down to 3 3  Hold Coumadin for now and repeat oral vitamin K    Hopefully can plan paracentesis for tomorrow  Appreciate cardiology input

## 2022-09-24 NOTE — PLAN OF CARE
Problem: Potential for Falls  Goal: Patient will remain free of falls  Description: INTERVENTIONS:  - Educate patient/family on patient safety including physical limitations  - Instruct patient to call for assistance with activity   - Consult OT/PT to assist with strengthening/mobility   - Keep Call bell within reach  - Keep bed low and locked with side rails adjusted as appropriate  - Keep care items and personal belongings within reach  - Initiate and maintain comfort rounds  - Make Fall Risk Sign visible to staff  - Apply yellow socks and bracelet for high fall risk patients  - Consider moving patient to room near nurses station  Outcome: Progressing     Problem: Nutrition/Hydration-ADULT  Goal: Nutrient/Hydration intake appropriate for improving, restoring or maintaining nutritional needs  Description: Monitor and assess patient's nutrition/hydration status for malnutrition  Collaborate with interdisciplinary team and initiate plan and interventions as ordered  Monitor patient's weight and dietary intake as ordered or per policy  Utilize nutrition screening tool and intervene as necessary  Determine patient's food preferences and provide high-protein, high-caloric foods as appropriate       INTERVENTIONS:  - Monitor oral intake, urinary output, labs, and treatment plans  - Assess nutrition and hydration status and recommend course of action  - Evaluate amount of meals eaten  - Assist patient with eating if necessary   - Allow adequate time for meals  - Recommend/ encourage appropriate diets, oral nutritional supplements, and vitamin/mineral supplements  - Order, calculate, and assess calorie counts as needed  - Recommend, monitor, and adjust tube feedings and TPN/PPN based on assessed needs  - Assess need for intravenous fluids  - Provide specific nutrition/hydration education as appropriate  - Include patient/family/caregiver in decisions related to nutrition  Outcome: Progressing     Problem: PAIN - ADULT  Goal: Verbalizes/displays adequate comfort level or baseline comfort level  Description: Interventions:  - Encourage patient to monitor pain and request assistance  - Assess pain using appropriate pain scale  - Administer analgesics based on type and severity of pain and evaluate response  - Implement non-pharmacological measures as appropriate and evaluate response  - Consider cultural and social influences on pain and pain management  - Notify physician/advanced practitioner if interventions unsuccessful or patient reports new pain  Outcome: Progressing     Problem: INFECTION - ADULT  Goal: Absence or prevention of progression during hospitalization  Description: INTERVENTIONS:  - Assess and monitor for signs and symptoms of infection  - Monitor lab/diagnostic results  - Monitor all insertion sites, i e  indwelling lines, tubes, and drains  - Monitor endotracheal if appropriate and nasal secretions for changes in amount and color  - [place] appropriate cooling/warming therapies per order  - Administer medications as ordered  - Instruct and encourage patient and family to use good hand hygiene technique  - Identify and instruct in appropriate isolation precautions for identified infection/condition  Outcome: Progressing     Problem: SAFETY ADULT  Goal: Patient will remain free of falls  Description: INTERVENTIONS:  - Educate patient/family on patient safety including physical limitations  - Instruct patient to call for assistance with activity   - Consult OT/PT to assist with strengthening/mobility   - Keep Call bell within reach  - Keep bed low and locked with side rails adjusted as appropriate  - Keep care items and personal belongings within reach  - Initiate and maintain comfort rounds  - Make Fall Risk Sign visible to staff  - Apply yellow socks and bracelet for high fall risk patients  - Consider moving patient to room near nurses station  Outcome: Progressing  Goal: Maintain or return to baseline ADL function  Description: INTERVENTIONS:  -  Assess patient's ability to carry out ADLs; assess patient's baseline for ADL function and identify physical deficits which impact ability to perform ADLs (bathing, care of mouth/teeth, toileting, grooming, dressing, etc )  - Assess/evaluate cause of self-care deficits   - Assess range of motion  - Assess patient's mobility; develop plan if impaired  - Assess patient's need for assistive devices and provide as appropriate  - Encourage maximum independence but intervene and supervise when necessary  - Involve family in performance of ADLs  - Assess for home care needs following discharge   - Consider OT consult to assist with ADL evaluation and planning for discharge  - Provide patient education as appropriate  Outcome: Progressing  Goal: Maintains/Returns to pre admission functional level  Description: INTERVENTIONS:  - Perform BMAT or MOVE assessment daily    - Set and communicate daily mobility goal to care team and patient/family/caregiver     - Collaborate with rehabilitation services on mobility goals if consulted  - Out of bed for toileting  - Record patient progress and toleration of activity level   Outcome: Progressing     Problem: DISCHARGE PLANNING  Goal: Discharge to home or other facility with appropriate resources  Description: INTERVENTIONS:  - Identify barriers to discharge w/patient and caregiver  - Arrange for needed discharge resources and transportation as appropriate  - Identify discharge learning needs (meds, wound care, etc )  - Arrange for interpretive services to assist at discharge as needed  - Refer to Case Management Department for coordinating discharge planning if the patient needs post-hospital services based on physician/advanced practitioner order or complex needs related to functional status, cognitive ability, or social support system  Outcome: Progressing     Problem: Knowledge Deficit  Goal: Patient/family/caregiver demonstrates understanding of disease process, treatment plan, medications, and discharge instructions  Description: Complete learning assessment and assess knowledge base    Interventions:  - Provide teaching at level of understanding  - Provide teaching via preferred learning methods  Outcome: Progressing     Problem: CARDIOVASCULAR - ADULT  Goal: Maintains optimal cardiac output and hemodynamic stability  Description: INTERVENTIONS:  - Monitor I/O, vital signs and rhythm  - Monitor for S/S and trends of decreased cardiac output  - Administer and titrate ordered vasoactive medications to optimize hemodynamic stability  - Assess quality of pulses, skin color and temperature  - Assess for signs of decreased coronary artery perfusion  - Instruct patient to report change in severity of symptoms  Outcome: Progressing  Goal: Absence of cardiac dysrhythmias or at baseline rhythm  Description: INTERVENTIONS:  - Continuous cardiac monitoring, vital signs, obtain 12 lead EKG if ordered  - Administer antiarrhythmic and heart rate control medications as ordered  - Monitor electrolytes and administer replacement therapy as ordered  Outcome: Progressing     Problem: METABOLIC, FLUID AND ELECTROLYTES - ADULT  Goal: Electrolytes maintained within normal limits  Description: INTERVENTIONS:  - Monitor labs and assess patient for signs and symptoms of electrolyte imbalances  - Administer electrolyte replacement as ordered  - Monitor response to electrolyte replacements, including repeat lab results as appropriate  - Instruct patient on fluid and nutrition as appropriate  Outcome: Progressing  Goal: Fluid balance maintained  Description: INTERVENTIONS:  - Monitor labs   - Monitor I/O and WT  - Instruct patient on fluid and nutrition as appropriate  - Assess for signs & symptoms of volume excess or deficit  Outcome: Progressing  Goal: Glucose maintained within target range  Description: INTERVENTIONS:  - Monitor Blood Glucose as ordered  - Assess for signs and symptoms of hyperglycemia and hypoglycemia  - Administer ordered medications to maintain glucose within target range  - Assess nutritional intake and initiate nutrition service referral as needed  Outcome: Progressing     Problem: Prexisting or High Potential for Compromised Skin Integrity  Goal: Skin integrity is maintained or improved  Description: INTERVENTIONS:  - Identify patients at risk for skin breakdown  - Assess and monitor skin integrity  - Assess and monitor nutrition and hydration status  - Monitor labs   - Assess for incontinence   - Turn and reposition patient  - Assist with mobility/ambulation  - Relieve pressure over bony prominences  - Avoid friction and shearing  - Provide appropriate hygiene as needed including keeping skin clean and dry  - Evaluate need for skin moisturizer/barrier cream  - Collaborate with interdisciplinary team   - Patient/family teaching  - Consider wound care consult   Outcome: Progressing     Problem: MOBILITY - ADULT  Goal: Maintain or return to baseline ADL function  Description: INTERVENTIONS:  -  Assess patient's ability to carry out ADLs; assess patient's baseline for ADL function and identify physical deficits which impact ability to perform ADLs (bathing, care of mouth/teeth, toileting, grooming, dressing, etc )  - Assess/evaluate cause of self-care deficits   - Assess range of motion  - Assess patient's mobility; develop plan if impaired  - Assess patient's need for assistive devices and provide as appropriate  - Encourage maximum independence but intervene and supervise when necessary  - Involve family in performance of ADLs  - Assess for home care needs following discharge   - Consider OT consult to assist with ADL evaluation and planning for discharge  - Provide patient education as appropriate  Outcome: Progressing  Goal: Maintains/Returns to pre admission functional level  Description: INTERVENTIONS:  - Perform BMAT or MOVE assessment daily     - Set and communicate daily mobility goal to care team and patient/family/caregiver     - Collaborate with rehabilitation services on mobility goals if consulted  - Out of bed for toileting  - Record patient progress and toleration of activity level   Outcome: Progressing

## 2022-09-25 PROBLEM — D64.9 ACUTE ON CHRONIC ANEMIA: Status: ACTIVE | Noted: 2022-01-01

## 2022-09-25 NOTE — PROCEDURES
Paracentesis    Date/Time: 9/25/2022 10:55 AM  Performed by: Bay Panda MD  Authorized by: Bay Panda MD     Patient location:  Bedside  Consent:     Consent obtained:  Written    Consent given by:  Patient    Risks discussed:  Bleeding, bowel perforation, infection and pain  Universal protocol:     Procedure explained and questions answered to patient or proxy's satisfaction: yes      Patient identity confirmed:  Verbally with patient  Pre-procedure details:     Initial or Subsequent Exam:  Initial    Procedure purpose:  Diagnostic    Indications: new onet ascites      Preparation: Patient was prepped and draped in usual sterile fashion    Anesthesia (see MAR for exact dosages): Anesthesia method:  Local infiltration    Local anesthetic:  Lidocaine 1% w/o epi  Procedure details:     Needle gauge:  18    Equipment: Paracentesis kit used      Ultrasound guidance: yes      Ultrasound image availability:  Not saved    Sterile ultrasound techniques: Sterile gel and sterile probe covers were used      Approach:  Percutaneous    Puncture site: RUQ  Fluid removed amount:  5 2L    Fluid appearance:  Yellow    Dressing:  Adhesive bandage  Post-procedure details:     Patient tolerance of procedure: Tolerated well, no immediate complications  Post-procedure medication (see MAR for dosing):      Albumin replacement: No

## 2022-09-25 NOTE — PROGRESS NOTES
[address]  Progress Note - Nai Moran 1940, 80 y o  female MRN: [id number]  Unit/Bed#: -01 Encounter: [id number]  Primary Care Provider: Garry Galdamez MD   Date and time admitted to hospital: 9/23/2022  6:49 AM    * Acute on chronic diastolic (congestive) heart failure [facility])  Assessment & Plan  Wt Readings from Last 3 Encounters:   09/25/22 130 kg (287 lb 6 4 oz)   07/10/22 121 kg (267 lb 9 6 oz)   05/19/22 126 kg (277 lb 1 9 oz)   Patient presents with anasarca and acute on chronic diastolic CHF exacerbation  2D echo 9/22 shows EF more than 70% and moderate aortic stenosis  Placed on Lasix 40 mg IV b i d  Along with midodrine due to hypotension with minimal response for will switch to Lasix drip at 10 milligrams/hour  Despite his Lasix drip not diuresing much  Continue albumin b i d  Also ask Critical Care for evaluation for paracentesis as patient noted to have large volume ascites on CT abdomen pelvis  Ordered PT INR daily for now  INR initially 5 and now down to 2  Plan for paracentesis today  Continue to hold Coumadin due to drop in hemoglobin          Acute cystitis with hematuria  Assessment & Plan  Patient noted to have acute cystitis with microscopic hematuria present on admission  Placed on IV Rocephin   Urine culture shows Klebsiella  Continue IV Rocephin for now and transition to oral antibiotics upon discharge  1/2 blood cultures positive for possible coagulase negative staph possible contaminant  Acute on chronic anemia  Assessment & Plan  Baseline hemoglobin is around 9  Currently down to 7 3  No overt signs of bleeding reported  Check iron panel B12 occult blood  Transfuse 1 unit PRBC and recheck CBC tomorrow   Continue to hold Coumadin    Cirrhosis of liver with ascites ([facility] [address] )  Assessment & Plan  CT abdomen pelvis shows liver cirrhosis  No prior history of cirrhosis as per the patient    Noted to have large volume ascites will plan for paracentesis today  Also order acute hepatitis panel  Denies any alcohol abuse history    Paroxysmal A-fib (HCC)  Assessment & Plan  Currently rate controlled  Continue Toprol-XL  Hold Coumadin for now    Moderate aortic stenosis  Assessment & Plan  As per echo  Avoid hypotension    Hypothyroidism (acquired)  Assessment & Plan  TSH is low at 0 33  Low free T3 and elevated free T4  Hold levothyroxine for now as patient appears to be over compensated  Probably restart a lower dose after 1 week    Stage 3a chronic kidney disease [facility])  Assessment & Plan  Lab Results   Component Value Date    EGFR 22 09/25/2022    EGFR 21 09/24/2022    EGFR 22 09/24/2022    CREATININE 2 05 (H) 09/25/2022    CREATININE 2 07 (H) 09/24/2022    CREATININE 2 03 (H) 09/24/2022   Baseline creatinine is 1 6-1  78  Currently creatinine is elevated to 2  Patient appears to be volume overloaded  will monitor how creatinine responds to diuresis    VTE Pharmacologic Prophylaxis:   Pharmacologic: Pharmacologic VTE Prophylaxis contraindicated due to Acute on chronic anemia  Mechanical VTE Prophylaxis in Place: Yes    Patient Centered Rounds: I have performed bedside rounds with nursing staff today  Discussions with Specialists or Other Care Team Provider:  Discussed with critical care    Education and Discussions with Family / Patient:  Discussed with patient bedside will update daughter    Time Spent for Care: 45 minutes  More than 50% of total time spent on counseling and coordination of care as described above  Current Length of Stay: 2 day(s)    Current Patient Status: Inpatient   Certification Statement: The patient will continue to require additional inpatient hospital stay due to Acute on chronic diastolic CHF exacerbation/anasarca    Discharge Plan:  Pending progress  Will probably require rehab    Code Status: Level 1 - Full Code      Subjective:   Patient feels very fatigued and tired today    Denies any chest pain or shortness of breath  Denies any bleeding  Objective:     Vitals:   Temp (24hrs), Av 7 °F (37 1 °C), Min:98 1 °F (36 7 °C), Max:99 3 °F (37 4 °C)    Temp:  [98 1 °F (36 7 °C)-99 3 °F (37 4 °C)] 98 8 °F (37 1 °C)  HR:  [] 80  Resp:  [17-20] 20  BP: ()/(40-65) 108/59  SpO2:  [93 %-97 %] 95 %  Body mass index is 56 13 kg/m²  Input and Output Summary (last 24 hours): Intake/Output Summary (Last 24 hours) at 2022 1104  Last data filed at 2022 1018  Gross per 24 hour   Intake 489 73 ml   Output 1300 ml   Net -810 27 ml       Physical Exam:     Physical Exam  Vitals and nursing note reviewed  Constitutional:       Appearance: She is ill-appearing  HENT:      Head: Normocephalic and atraumatic  Right Ear: External ear normal       Left Ear: External ear normal       Nose: Nose normal       Mouth/Throat:      Pharynx: Oropharynx is clear  Eyes:      Pupils: Pupils are equal, round, and reactive to light  Cardiovascular:      Rate and Rhythm: Normal rate and regular rhythm  Heart sounds: Normal heart sounds  Pulmonary:      Effort: Pulmonary effort is normal       Comments: Moderate air entry bilaterally mild decreased breath sounds bilateral bases  Abdominal:      General: Bowel sounds are normal  There is distension  Palpations: Abdomen is soft  Tenderness: There is no abdominal tenderness  Musculoskeletal:         General: Normal range of motion  Cervical back: Normal range of motion and neck supple  Right lower leg: Edema present  Left lower leg: Edema present  Skin:     General: Skin is warm and dry  Capillary Refill: Capillary refill takes less than 2 seconds  Neurological:      General: No focal deficit present  Mental Status: She is alert        Comments: Oriented to person and place   Psychiatric:         Mood and Affect: Mood normal             Additional Data:     Labs:    Results from last 7 days   Lab Units 09/25/22  0526 09/24/22  0604 09/23/22  0736   WBC Thousand/uL 9 66   < > 12 82*   HEMOGLOBIN g/dL 7 3*   < > 8 6*   HEMATOCRIT % 22 8*   < > 25 8*   PLATELETS Thousands/uL 131*   < > 154   BANDS PCT %  --   --  1   LYMPHO PCT %  --   --  9*   MONO PCT %  --   --  2*   EOS PCT %  --   --  0    < > = values in this interval not displayed  Results from last 7 days   Lab Units 09/25/22  0526 09/24/22  1901 09/24/22  0604   SODIUM mmol/L 134*   < > 135   POTASSIUM mmol/L 3 7   < > 3 9   CHLORIDE mmol/L 102   < > 103   CO2 mmol/L 27   < > 27   BUN mg/dL 31*   < > 29*   CREATININE mg/dL 2 05*   < > 2 03*   ANION GAP mmol/L 5   < > 5   CALCIUM mg/dL 8 2*   < > 8 6   ALBUMIN g/dL  --   --  1 5*   TOTAL BILIRUBIN mg/dL  --   --  3 13*   ALK PHOS U/L  --   --  159*   ALT U/L  --   --  13   AST U/L  --   --  32   GLUCOSE RANDOM mg/dL 99   < > 96    < > = values in this interval not displayed  Results from last 7 days   Lab Units 09/25/22  0526   INR  2 08*             Results from last 7 days   Lab Units 09/23/22  0918 09/23/22  0736   LACTIC ACID mmol/L 2 3* 2 4*           * I Have Reviewed All Lab Data Listed Above  * Additional Pertinent Lab Tests Reviewed: [facility] 66 Admission Reviewed    Imaging:    Imaging Reports Reviewed Today Include:  CT chest abd pelvis  Imaging Personally Reviewed by Myself Includes:  CT chest abdomen pelvis    Recent Cultures (last 7 days):     Results from last 7 days   Lab Units 09/23/22  0750 09/23/22  0739 09/23/22  0736   BLOOD CULTURE   --  No Growth at 24 hrs   Staphylococcus coagulase negative*   GRAM STAIN RESULT   --   --  Gram positive cocci in clusters*   URINE CULTURE  >100,000 cfu/ml Klebsiella pneumoniae*  --   --        Last 24 Hours Medication List:   Current Facility-Administered Medications   Medication Dose Route Frequency Provider Last Rate    acetaminophen  650 mg Oral Q4H PRN Kayla Lowe MD      albumin human  12 5 g Intravenous Once [facility] MD Ruslan      albumin human  12 5 g Intravenous BID Kayla Lowe MD      cefTRIAXone  1,000 mg Intravenous Q24H Kayla Lowe MD 1,000 mg (09/25/22 0925)    furosemide  10 mg/hr Intravenous Continuous Kayla Lowe MD 10 mg/hr (09/25/22 0107)    lidocaine (PF)  10 mL Infiltration Once LEXIE Shabazz      metoprolol succinate  12 5 mg Oral Daily Kayla Lowe MD      midodrine  10 mg Oral TID AC Kayla Lowe MD          Today, Patient Was Seen By: Kayla Lowe MD    ** Please Note: Dictation voice to text software may have been used in the creation of this document   **

## 2022-09-25 NOTE — ASSESSMENT & PLAN NOTE
Patient noted to have acute cystitis with microscopic hematuria present on admission  Placed on IV Rocephin   Urine culture shows Klebsiella  Continue IV Rocephin for now and transition to oral antibiotics upon discharge  1/2 blood cultures positive for possible coagulase negative staph possible contaminant

## 2022-09-25 NOTE — ASSESSMENT & PLAN NOTE
CT abdomen pelvis shows liver cirrhosis  No prior history of cirrhosis as per the patient  Noted to have large volume ascites will plan for paracentesis today  Also order acute hepatitis panel    Denies any alcohol abuse history

## 2022-09-25 NOTE — PLAN OF CARE
Problem: Potential for Falls  Goal: Patient will remain free of falls  Description: INTERVENTIONS:  - Educate patient/family on patient safety including physical limitations  - Instruct patient to call for assistance with activity   - Consult OT/PT to assist with strengthening/mobility   - Keep Call bell within reach  - Keep bed low and locked with side rails adjusted as appropriate  - Keep care items and personal belongings within reach  - Initiate and maintain comfort rounds  - Make Fall Risk Sign visible to staff  - Apply yellow socks and bracelet for high fall risk patients  - Consider moving patient to room near nurses station  Outcome: Progressing     Problem: Nutrition/Hydration-ADULT  Goal: Nutrient/Hydration intake appropriate for improving, restoring or maintaining nutritional needs  Description: Monitor and assess patient's nutrition/hydration status for malnutrition  Collaborate with interdisciplinary team and initiate plan and interventions as ordered  Monitor patient's weight and dietary intake as ordered or per policy  Utilize nutrition screening tool and intervene as necessary  Determine patient's food preferences and provide high-protein, high-caloric foods as appropriate       INTERVENTIONS:  - Monitor oral intake, urinary output, labs, and treatment plans  - Assess nutrition and hydration status and recommend course of action  - Evaluate amount of meals eaten  - Assist patient with eating if necessary   - Allow adequate time for meals  - Recommend/ encourage appropriate diets, oral nutritional supplements, and vitamin/mineral supplements  - Order, calculate, and assess calorie counts as needed  - Recommend, monitor, and adjust tube feedings and TPN/PPN based on assessed needs  - Assess need for intravenous fluids  - Provide specific nutrition/hydration education as appropriate  - Include patient/family/caregiver in decisions related to nutrition  Outcome: Progressing     Problem: PAIN - ADULT  Goal: Verbalizes/displays adequate comfort level or baseline comfort level  Description: Interventions:  - Encourage patient to monitor pain and request assistance  - Assess pain using appropriate pain scale  - Administer analgesics based on type and severity of pain and evaluate response  - Implement non-pharmacological measures as appropriate and evaluate response  - Consider cultural and social influences on pain and pain management  - Notify physician/advanced practitioner if interventions unsuccessful or patient reports new pain  Outcome: Progressing     Problem: INFECTION - ADULT  Goal: Absence or prevention of progression during hospitalization  Description: INTERVENTIONS:  - Assess and monitor for signs and symptoms of infection  - Monitor lab/diagnostic results  - Monitor all insertion sites, i e  indwelling lines, tubes, and drains  - Monitor endotracheal if appropriate and nasal secretions for changes in amount and color  - [place] appropriate cooling/warming therapies per order  - Administer medications as ordered  - Instruct and encourage patient and family to use good hand hygiene technique  - Identify and instruct in appropriate isolation precautions for identified infection/condition  Outcome: Progressing     Problem: SAFETY ADULT  Goal: Patient will remain free of falls  Description: INTERVENTIONS:  - Educate patient/family on patient safety including physical limitations  - Instruct patient to call for assistance with activity   - Consult OT/PT to assist with strengthening/mobility   - Keep Call bell within reach  - Keep bed low and locked with side rails adjusted as appropriate  - Keep care items and personal belongings within reach  - Initiate and maintain comfort rounds  - Make Fall Risk Sign visible to staff  - Apply yellow socks and bracelet for high fall risk patients  - Consider moving patient to room near nurses station  Outcome: Progressing  Goal: Maintain or return to baseline ADL function  Description: INTERVENTIONS:  -  Assess patient's ability to carry out ADLs; assess patient's baseline for ADL function and identify physical deficits which impact ability to perform ADLs (bathing, care of mouth/teeth, toileting, grooming, dressing, etc )  - Assess/evaluate cause of self-care deficits   - Assess range of motion  - Assess patient's mobility; develop plan if impaired  - Assess patient's need for assistive devices and provide as appropriate  - Encourage maximum independence but intervene and supervise when necessary  - Involve family in performance of ADLs  - Assess for home care needs following discharge   - Consider OT consult to assist with ADL evaluation and planning for discharge  - Provide patient education as appropriate  Outcome: Progressing  Goal: Maintains/Returns to pre admission functional level  Description: INTERVENTIONS:  - Perform BMAT or MOVE assessment daily    - Set and communicate daily mobility goal to care team and patient/family/caregiver     - Collaborate with rehabilitation services on mobility goals if consulted  - Out of bed for toileting  - Record patient progress and toleration of activity level   Outcome: Progressing     Problem: DISCHARGE PLANNING  Goal: Discharge to home or other facility with appropriate resources  Description: INTERVENTIONS:  - Identify barriers to discharge w/patient and caregiver  - Arrange for needed discharge resources and transportation as appropriate  - Identify discharge learning needs (meds, wound care, etc )  - Arrange for interpretive services to assist at discharge as needed  - Refer to Case Management Department for coordinating discharge planning if the patient needs post-hospital services based on physician/advanced practitioner order or complex needs related to functional status, cognitive ability, or social support system  Outcome: Progressing     Problem: Knowledge Deficit  Goal: Patient/family/caregiver demonstrates understanding of disease process, treatment plan, medications, and discharge instructions  Description: Complete learning assessment and assess knowledge base    Interventions:  - Provide teaching at level of understanding  - Provide teaching via preferred learning methods  Outcome: Progressing     Problem: CARDIOVASCULAR - ADULT  Goal: Maintains optimal cardiac output and hemodynamic stability  Description: INTERVENTIONS:  - Monitor I/O, vital signs and rhythm  - Monitor for S/S and trends of decreased cardiac output  - Administer and titrate ordered vasoactive medications to optimize hemodynamic stability  - Assess quality of pulses, skin color and temperature  - Assess for signs of decreased coronary artery perfusion  - Instruct patient to report change in severity of symptoms  Outcome: Progressing  Goal: Absence of cardiac dysrhythmias or at baseline rhythm  Description: INTERVENTIONS:  - Continuous cardiac monitoring, vital signs, obtain 12 lead EKG if ordered  - Administer antiarrhythmic and heart rate control medications as ordered  - Monitor electrolytes and administer replacement therapy as ordered  Outcome: Progressing     Problem: METABOLIC, FLUID AND ELECTROLYTES - ADULT  Goal: Electrolytes maintained within normal limits  Description: INTERVENTIONS:  - Monitor labs and assess patient for signs and symptoms of electrolyte imbalances  - Administer electrolyte replacement as ordered  - Monitor response to electrolyte replacements, including repeat lab results as appropriate  - Instruct patient on fluid and nutrition as appropriate  Outcome: Progressing  Goal: Fluid balance maintained  Description: INTERVENTIONS:  - Monitor labs   - Monitor I/O and WT  - Instruct patient on fluid and nutrition as appropriate  - Assess for signs & symptoms of volume excess or deficit  Outcome: Progressing  Goal: Glucose maintained within target range  Description: INTERVENTIONS:  - Monitor Blood Glucose as ordered  - Assess for signs and symptoms of hyperglycemia and hypoglycemia  - Administer ordered medications to maintain glucose within target range  - Assess nutritional intake and initiate nutrition service referral as needed  Outcome: Progressing     Problem: Prexisting or High Potential for Compromised Skin Integrity  Goal: Skin integrity is maintained or improved  Description: INTERVENTIONS:  - Identify patients at risk for skin breakdown  - Assess and monitor skin integrity  - Assess and monitor nutrition and hydration status  - Monitor labs   - Assess for incontinence   - Turn and reposition patient  - Assist with mobility/ambulation  - Relieve pressure over bony prominences  - Avoid friction and shearing  - Provide appropriate hygiene as needed including keeping skin clean and dry  - Evaluate need for skin moisturizer/barrier cream  - Collaborate with interdisciplinary team   - Patient/family teaching  - Consider wound care consult   Outcome: Progressing     Problem: MOBILITY - ADULT  Goal: Maintain or return to baseline ADL function  Description: INTERVENTIONS:  -  Assess patient's ability to carry out ADLs; assess patient's baseline for ADL function and identify physical deficits which impact ability to perform ADLs (bathing, care of mouth/teeth, toileting, grooming, dressing, etc )  - Assess/evaluate cause of self-care deficits   - Assess range of motion  - Assess patient's mobility; develop plan if impaired  - Assess patient's need for assistive devices and provide as appropriate  - Encourage maximum independence but intervene and supervise when necessary  - Involve family in performance of ADLs  - Assess for home care needs following discharge   - Consider OT consult to assist with ADL evaluation and planning for discharge  - Provide patient education as appropriate  Outcome: Progressing  Goal: Maintains/Returns to pre admission functional level  Description: INTERVENTIONS:  - Perform BMAT or MOVE assessment daily     - Set and communicate daily mobility goal to care team and patient/family/caregiver     - Collaborate with rehabilitation services on mobility goals if consulted  - Out of bed for toileting  - Record patient progress and toleration of activity level   Outcome: Progressing

## 2022-09-25 NOTE — ASSESSMENT & PLAN NOTE
Lab Results   Component Value Date    EGFR 22 09/25/2022    EGFR 21 09/24/2022    EGFR 22 09/24/2022    CREATININE 2 05 (H) 09/25/2022    CREATININE 2 07 (H) 09/24/2022    CREATININE 2 03 (H) 09/24/2022   Baseline creatinine is 1 6-1  78  Currently creatinine is elevated to 2  Patient appears to be volume overloaded   will monitor how creatinine responds to diuresis

## 2022-09-25 NOTE — ASSESSMENT & PLAN NOTE
Wt Readings from Last 3 Encounters:   09/25/22 130 kg (287 lb 6 4 oz)   07/10/22 121 kg (267 lb 9 6 oz)   05/19/22 126 kg (277 lb 1 9 oz)   Patient presents with anasarca and acute on chronic diastolic CHF exacerbation  2D echo 9/22 shows EF more than 70% and moderate aortic stenosis  Placed on Lasix 40 mg IV b i d  Along with midodrine due to hypotension with minimal response for will switch to Lasix drip at 10 milligrams/hour  Despite his Lasix drip not diuresing much  Continue albumin b i d  Also ask Critical Care for evaluation for paracentesis as patient noted to have large volume ascites on CT abdomen pelvis  Ordered PT INR daily for now  INR initially 5 and now down to 2  Plan for paracentesis today    Continue to hold Coumadin due to drop in hemoglobin

## 2022-09-25 NOTE — CONSULTS
Consultation - Nephrology   Evans Proctor 80 y o  female MRN: [id number]  Unit/Bed#: -01 Encounter: [id number]      A/P:  1  Acute kidney injury superimposed on chronic kidney disease present on admission   - creatinine was 2 05 mg/dL    - nonoliguric via a pure Jonathan Jarrod   - had an excellent response to Lasix with little change in her creatinine   - Kidney ultrasound is pending    - check urinary protein studies and urine electrolytes   - check complements due to hematuria or which may just be related to urinary tract infection   -is receiving albumin infusions to Lasix to promote improved renal perfusion pressure in view of hypoalbuminemia and cirrhosis    2  Chronic kidney disease stage IIIB   - baseline creatinine 1 7-1 9 mg/dL   - no prior renal work up data   - will need outpatient follow-up   - may be related to cardiorenal syndrome and increased right-sided forces due to cirrhosis and ascites  Has severe dilation of the left atrium    3  Paroxysmal atrial fibrillation   - Coumadin has been held and she has good rate controlled    4  Acute on chronic diastolic heart failure preserved ejection fraction - as above    5  Klebsiella urinary tract infection   - receiving ceftriaxone    6  Watch out for worsening kidney function to avoid precipitation of hepatoorenal syndrome   - in this case octreotide would be added albumin and midodrine   - check urine electrolytes         Thank you for allowing us to participate in the care of your patient  Please feel free to contact us regarding the care of this patient, or any other questions/concerns that may be applicable      Patient Active Problem List   Diagnosis    Septic shock (HCC)    Stage 3a chronic kidney disease ([address] )    [facility]ox[facility]fib [facility]    Fall    Traumatic hematoma of head    Impaired skin integrity    Hypothyroidism (acquired)    Diabetes mellitus ([address] )    Fusiform ectasia of ascending aorta    Moderate protein-calorie malnutrition ([address] )  Cholelithiases    Cirrhosis of liver with ascites (HCC)    Cellulitis of right lower extremity    Moderate aortic stenosis    Pulmonary HTN ([address] )    Thoracic aortic aneurysm without rupture (HCC)    Acute on chronic diastolic (congestive) heart failure (HCC)    Acute cystitis with hematuria    CKD (chronic kidney disease)    Acute on chronic anemia       History of Present Illness   Physician Requesting Consult: Pacheco Mitchell MD  Reason for Consult / Principal Problem:  Acute kidney injury superimposed on chronic kidney disease stage IIIB  Hx and PE limited by:   HPI: Michael Pond is a 80y o  year old female who presents with shortness of breath on September 23rd associated with increasing weakness and weight gain  The patient was admitted and placed on Lasix a 40 mg IV b i d  with marked urinary response  She states that she did have decreased urine output prior to that  She also underwent a paracentesis today for 5 2 L of yellow fluid  She still has significant ascites  She has a history of acute on chronic heart failure preserved ejection fraction which an left ventricular ejection fraction of 70% and grade 1 diastolic dysfunction  She has a history of atrial fibrillation rate controlled moderate aortic stenosis  Lasix was increased by Cardiology to 80 mg IV b i d  She has cirrhosis recently diagnosed  She denies alcohol use  Will need a cirrhotic workup by GI  History obtained from chart review and the patient    Constitutional ROS- Denies fatigue, fever, chills, night sweats, weight changes  HEENT ROS- Denies history of eye surgeries, glaucoma, headaches or history of trauma, blurred vision     Endocrine ROS- No history diabetes mellitus or thyroid disease  Cardiovascular ROS- Denies chest pain, palpitation, dyspnea exertion, orthopnea, claudication  Pulmonary ROS- Denies history of COPD, asthma  Denies cough, hemoptysis, shortness of breath    GI ROS- Denies abdominal pain, diarrhea, nausea, swallowing problems, vomiting, constipation, blood in stools, fecal incontinence  Hematological ROS- Denies history of easy bruising, blood clots, bleeding or blood transfusions  Genitourinary ROS- Denies recent hematuria, pyuria, flank pain, change in urinary stream, decreased urinary output, increased urinary frequency, nocturia, foamy urine, or urinary incontinence  Lymphatic ROS- Denies lymphadenopathy  Musculoskeletal ROS- Denies history of muscle weakness, joint pain  Dermatological ROS- Denies rash, wounds, ulcers, itching, jaundice  Psychiatric ROS- Denies anxiety, depression, hallucinations, disorientation  Neurological ROS- No stroke or TIA symptoms        Historical Information   Past Medical History:   Diagnosis Date    LEONARDO (acute kidney injury) ([facility] )     Atrial fibrillation (HCC)     CHF (congestive heart failure) (HCC)     diastolic grade 1    Cirrhosis ([place] [address] )     Diabetes mellitus ([facility] )     Disease of thyroid gland     hypothyroid    Endometrial ca ([facility] )     Hiatal hernia     Hyperlipidemia     Hypertension     Hypothyroid     Lung nodules     Periumbilical hernia     Pulmonary HTN ([place] [address] )     Thoracic aortic aneurysm without rupture ([place] [address] ) 03/25/2022    41 mm     Past Surgical History:   Procedure Laterality Date    HERNIA REPAIR      HIP ARTHROPLASTY      HYSTERECTOMY      JOINT REPLACEMENT Bilateral     knee; b/l hip    KNEE ARTHROPLASTY      OOPHORECTOMY      WOUND DEBRIDEMENT Right 3/31/2022    Procedure: DEBRIDEMENT WOUND [facility] OUT), right medial thigh;  Surgeon: Jarrett Dixon DO;  Location:  MAIN OR;  Service: General     Social History   Social History     Substance and Sexual Activity   Alcohol Use Never     Social History     Substance and Sexual Activity   Drug Use Never     Social History     Tobacco Use   Smoking Status Never Smoker   Smokeless Tobacco Never Used     Family History   Problem Relation Age of Onset    Hypertension Father Meds/Allergies   all current active meds have been reviewed, current meds:   Current Facility-Administered Medications   Medication Dose Route Frequency    acetaminophen (TYLENOL) tablet 650 mg  650 mg Oral Q4H PRN    albumin human (FLEXBUMIN) 25 % injection 12 5 g  12 5 g Intravenous BID    cefTRIAXone (ROCEPHIN) IVPB (premix in dextrose) 1,000 mg 50 mL  1,000 mg Intravenous Q24H    furosemide (LASIX) 500 mg infusion 50 mL  10 mg/hr Intravenous Continuous    metoprolol succinate (TOPROL-XL) 24 hr tablet 12 5 mg  12 5 mg Oral Daily    midodrine (PROAMATINE) tablet 10 mg  10 mg Oral TID AC    and PTA meds:    Medications Prior to Admission   Medication    levothyroxine 150 mcg tablet    metoprolol succinate (TOPROL-XL) 25 mg 24 hr tablet    potassium chloride (K-DUR,KLOR-CON) 10 mEq tablet    rivaroxaban (XARELTO) 15 mg tablet    torsemide (DEMADEX) 20 mg tablet    furosemide (LASIX) 40 mg tablet    warfarin (COUMADIN) 5 mg tablet         Allergies   Allergen Reactions    Penicillins Hives     Tolerated unasyn 7/2022    Sulfamethoxazole-Trimethoprim GI Intolerance       Objective     Intake/Output Summary (Last 24 hours) at 9/25/2022 1708  Last data filed at 9/25/2022 1434  Gross per 24 hour   Intake 369 73 ml   Output 1500 ml   Net -1130 27 ml       Invasive Devices:        Physical Exam      I/O last 3 completed shifts: In: 1089 7 [P O :1080; I V :9 7]  Out: 750 [Urine:750]    Vitals:    09/25/22 1500   BP: 105/58   Pulse: 83   Resp: 20   Temp: 99 1 °F (37 3 °C)   SpO2: 96%       General Appearance:    No acute distress  Cooperative  Appears stated age  Head:    Normocephalic  Atraumatic  Normal jaw occlusion  Eyes:    Lids, conjunctiva normal  No scleral icterus  Ears:    Normal external ears  Nose:   Nares normal  No drainage  Mouth:   Lips, tongue normal  Mucosa normal  Phonation normal    Neck:   Supple  Symmetrical    Back:     Symmetric  No CVA tenderness     Lungs:     Normal respiratory effort  Diminished to auscultation bilaterally  Chest wall:    No tenderness or deformity  Heart:    Regular rate and rhythm  Normal S1 and S2  No murmur  No JVD  No edema  Abdomen:     Distended and firm  Genitourinary:   No Ramires catheter present  Pure wick draining dark yellow urine   Extremities:   Extremities has edema  Skin:   Warm and dry  Has pallor, ecchymoses  Neurologic:   Alert and oriented to person, place, time  No focal deficit  Current Weight: Weight - Scale: 130 kg (287 lb 6 4 oz)  First Weight: Weight - Scale: (!) 162 kg (357 lb 12 9 oz)    Lab Results:  I have personally reviewed pertinent labs      CBC:   Lab Results   Component Value Date    WBC 9 66 09/25/2022    HGB 7 3 (L) 09/25/2022    HCT 22 8 (L) 09/25/2022     (H) 09/25/2022     (L) 09/25/2022    MCH 33 2 09/25/2022    MCHC 32 0 09/25/2022    RDW 16 9 (H) 09/25/2022    MPV 10 0 09/25/2022     CMP:   Lab Results   Component Value Date    K 3 7 09/25/2022     09/25/2022    CO2 27 09/25/2022    BUN 31 (H) 09/25/2022    CREATININE 2 05 (H) 09/25/2022    CALCIUM 8 2 (L) 09/25/2022    EGFR 22 09/25/2022     Phosphorus: No results found for: PHOS  Magnesium: No results found for: MG  Urinalysis: No results found for: [place], SPECGRAV, PHUR, LEUKOCYTESUR, NITRITE, PROTEINUA, GLUCOSEU, KETONESU, BILIRUBINUR, BLOODU  Ionized Calcium: No results found for: CAION  Coagulation:   Lab Results   Component Value Date    INR 2 08 (H) 09/25/2022     Troponin: No results found for: TROPONINI  ABG: No results found for: PHART, VLF9NPS, PO2ART, YWE6NXZ, U7TCRMAH, BEART, SOURCE    Results from last 7 days   Lab Units 09/25/22  0526 09/24/22  1901 09/24/22  0604 09/23/22  0736   POTASSIUM mmol/L 3 7 4 1 3 9 4 0   CHLORIDE mmol/L 102 102 103 103   CO2 mmol/L 27 29 27 26   BUN mg/dL 31* 30* 29* 25   CREATININE mg/dL 2 05* 2 07* 2 03* 1 98*   CALCIUM mg/dL 8 2* 8 2* 8 6 8 5   ALK PHOS U/L  --   --  159* 167*   ALT U/L  --   --  13 15   AST U/L  --   --  32 32       Radiology review:  Procedure: CT chest abdomen pelvis wo contrast    Result Date: 9/23/2022  Narrative: CT CHEST, ABDOMEN AND PELVIS WITHOUT IV CONTRAST INDICATION:   weakness and diffuse edema  COMPARISON:  3/25/2022, 11/14/2021 TECHNIQUE: CT examination of the chest, abdomen and pelvis was performed without intravenous contrast  Axial, sagittal, and coronal 2D reformatted images were created from the source data and submitted for interpretation  Radiation dose length product (DLP) for this visit:  2136 52 mGy-cm   This examination, like all CT scans performed in the [facility], was performed utilizing techniques to minimize radiation dose exposure, including the use of iterative reconstruction and automated exposure control  Enteric contrast was not administered in accordance with physician request  FINDINGS: CHEST LUNGS:  There are multiple lung nodules, stable from 2021 including a right lower lobe nodule measuring 6 mm on series 3 image 63, a right middle lobe nodule measuring 2 mm on series 3 image 56, a 5 mm right middle lobe nodule on series 3 image 54, a 2 mm right upper lobe on series 3 image 41, a 3 mm right upper lobe on series 3 image 23, and a 2 mm right upper lobe nodule on series 3 image 30  There is left basilar atelectasis  There is no tracheal or endobronchial lesion  PLEURA:  There is a small left pleural effusion  HEART/GREAT VESSELS: Heart is unremarkable for patient's age  There is stable fusiform ectasia of the ascending thoracic aorta measuring up to 41 mm  Recommendation is for follow-up low radiation dose chest CT in one year  MEDIASTINUM AND SILVER:  Moderate hiatal hernia noted  No mediastinal or hilar lymphadenopathy  CHEST WALL AND LOWER NECK:  Unremarkable  ABDOMEN LIVER/BILIARY TREE:  The liver demonstrates cirrhotic morphology    Within the limitations of this examination there is no evidence of suspicious hepatic mass  No biliary dilatation  GALLBLADDER:  There are gallstone(s) within the gallbladder, without pericholecystic inflammatory changes  SPLEEN:  Unremarkable  PANCREAS:  Unremarkable  ADRENAL GLANDS:  Unremarkable  KIDNEYS/URETERS:  Small right renal cyst   No hydronephrosis  STOMACH AND BOWEL:  Unremarkable  APPENDIX:  No findings to suggest appendicitis  ABDOMINOPELVIC CAVITY:  Large amount of abdominopelvic ascites  No pneumoperitoneum  No lymphadenopathy  VESSELS:  Unremarkable for patient's age  PELVIS Evaluation of the pelvis is limited by beam hardening artifact from bilateral hip arthroplasties  REPRODUCTIVE ORGANS:  Not visualized  URINARY BLADDER:  Not well visualized  ABDOMINAL WALL/INGUINAL REGIONS:  There is a midline ventral hernia containing nonobstructed transverse colon  OSSEOUS STRUCTURES:  No acute fracture or destructive osseous lesion  Impression: 1  Small left pleural effusion with atelectasis  2   Stable lung nodules measuring up to 6 mm  3   Stable ectasia of the ascending aorta for which continued low-dose CT surveillance is recommended annually  4   Cirrhosis with large amount of ascites  5   Midline ventral hernia without obstruction  6   Cholelithiasis  7   Moderate hiatal hernia   Workstation performed: [id number]     Procedure: Paracentesis    Result Date: 8/75/3680  Narrative: Tiana Lenz MD     9/25/2022 11:31 AM Paracentesis Date/Time: 9/25/2022 10:55 AM Performed by: Tiana Lenz MD Authorized by: Tiana Lenz MD Patient location:  Bedside Consent:   Consent obtained:  Written   Consent given by:  Patient   Risks discussed:  Bleeding, bowel perforation, infection and pain Universal protocol:   Procedure explained and questions answered to patient or proxy's satisfaction: yes    Patient identity confirmed:  Verbally with patient Pre-procedure details:   Initial or Subsequent Exam:  Initial   Procedure purpose:  Diagnostic   Indications: new onet ascites    Preparation: Patient was prepped and draped in usual sterile fashion  Anesthesia (see MAR for exact dosages): Anesthesia method:  Local infiltration   Local anesthetic:  Lidocaine 1% w/o epi Procedure details:   Needle gauge:  18   Equipment: Paracentesis kit used    Ultrasound guidance: yes    Ultrasound image availability:  Not saved   Sterile ultrasound techniques: Sterile gel and sterile probe covers were used    Approach:  Percutaneous   Puncture site: RUQ  Fluid removed amount:  5 2L   Fluid appearance:  Yellow   Dressing:  Adhesive bandage Post-procedure details:   Patient tolerance of procedure: Tolerated well, no immediate complications Post-procedure medication (see MAR for dosing): Albumin replacement: No      Procedure: Echo follow up/limited w/ contrast if indicated    Result Date: 9/23/2022  Narrative: Britany Fortune  Limited study to assess LEVF and aortic stenosis    Left Ventricle: The left ventricular ejection fraction is >70%  Systolic function is hyperdynamic  Wall motion is normal  Diastolic function is mildly abnormal, consistent with grade I (abnormal) relaxation    Right Ventricle: Right ventricular cavity size is mildly dilated  Systolic function is normal    Left Atrium: The atrium is severely dilated    Aortic Valve: The leaflets are moderately calcified  There is moderate stenosis    IVC/SVC: The inferior vena cava size is 15 0 mm  The right atrial pressure is estimated at 3 0 mmHg  The inferior vena cava is normal in size  Respirophasic changes were normal    I have reviewed all labs and notes       EKG, Pathology, and Other Studies:  A check Care everywhere for additional data including possible prior renal consultation of which I could not find  I reviewed her CT scan  Kidney he denies history hydronephrosis and small right renal cyst      Counseling / Coordination of Care  Total ADDITIONAL floor / unit time spent today 40 minutes   Greater than 50% of total time was spent with the patient and / or family counseling and / or coordination of care  A description of the counseling / coordination of care: Will discuss with primary    Stevo Rothman MD      This consultation note was produced in part using a dictation device which may document imprecise wording from author's original intent

## 2022-09-26 NOTE — ASSESSMENT & PLAN NOTE
Wt Readings from Last 3 Encounters:   09/26/22 126 kg (277 lb 9 6 oz)   07/10/22 121 kg (267 lb 9 6 oz)   05/19/22 126 kg (277 lb 1 9 oz)   Patient presents with anasarca and acute on chronic diastolic CHF exacerbation  2D echo 9/22 shows EF more than 70% and moderate aortic stenosis  Placed on Lasix 40 mg IV b i d  Along with midodrine due to hypotension with minimal response for will switch to Lasix drip at 10 milligrams/hour  Despite Lasix drip not diuresing much  Continue albumin b i d  also added metolazone 2 5 mg daily to assist with diuresis  Monitor electrolytes during diuresis  Patient underwent paracentesis on 09/25/2022 and 5 2 L removed

## 2022-09-26 NOTE — ASSESSMENT & PLAN NOTE
Lab Results   Component Value Date    EGFR 24 09/26/2022    EGFR 22 09/25/2022    EGFR 22 09/25/2022    CREATININE 1 91 (H) 09/26/2022    CREATININE 2 03 (H) 09/25/2022    CREATININE 2 05 (H) 09/25/2022   Baseline creatinine is 1 6-1  78  Currently creatinine is elevated to 1 9  Patient appears to be volume overloaded   will monitor how creatinine responds to diuresis

## 2022-09-26 NOTE — PLAN OF CARE
Problem: Potential for Falls  Goal: Patient will remain free of falls  Description: INTERVENTIONS:  - Educate patient/family on patient safety including physical limitations  - Instruct patient to call for assistance with activity   - Consult OT/PT to assist with strengthening/mobility   - Keep Call bell within reach  - Keep bed low and locked with side rails adjusted as appropriate  - Keep care items and personal belongings within reach  - Initiate and maintain comfort rounds  - Make Fall Risk Sign visible to staff  - Apply yellow socks and bracelet for high fall risk patients  - Consider moving patient to room near nurses station  Outcome: Progressing     Problem: Nutrition/Hydration-ADULT  Goal: Nutrient/Hydration intake appropriate for improving, restoring or maintaining nutritional needs  Description: Monitor and assess patient's nutrition/hydration status for malnutrition  Collaborate with interdisciplinary team and initiate plan and interventions as ordered  Monitor patient's weight and dietary intake as ordered or per policy  Utilize nutrition screening tool and intervene as necessary  Determine patient's food preferences and provide high-protein, high-caloric foods as appropriate       INTERVENTIONS:  - Monitor oral intake, urinary output, labs, and treatment plans  - Assess nutrition and hydration status and recommend course of action  - Evaluate amount of meals eaten  - Assist patient with eating if necessary   - Allow adequate time for meals  - Recommend/ encourage appropriate diets, oral nutritional supplements, and vitamin/mineral supplements  - Order, calculate, and assess calorie counts as needed  - Recommend, monitor, and adjust tube feedings and TPN/PPN based on assessed needs  - Assess need for intravenous fluids  - Provide specific nutrition/hydration education as appropriate  - Include patient/family/caregiver in decisions related to nutrition  Outcome: Progressing     Problem: PAIN - ADULT  Goal: Verbalizes/displays adequate comfort level or baseline comfort level  Description: Interventions:  - Encourage patient to monitor pain and request assistance  - Assess pain using appropriate pain scale  - Administer analgesics based on type and severity of pain and evaluate response  - Implement non-pharmacological measures as appropriate and evaluate response  - Consider cultural and social influences on pain and pain management  - Notify physician/advanced practitioner if interventions unsuccessful or patient reports new pain  Outcome: Progressing     Problem: INFECTION - ADULT  Goal: Absence or prevention of progression during hospitalization  Description: INTERVENTIONS:  - Assess and monitor for signs and symptoms of infection  - Monitor lab/diagnostic results  - Monitor all insertion sites, i e  indwelling lines, tubes, and drains  - Monitor endotracheal if appropriate and nasal secretions for changes in amount and color  - [place] appropriate cooling/warming therapies per order  - Administer medications as ordered  - Instruct and encourage patient and family to use good hand hygiene technique  - Identify and instruct in appropriate isolation precautions for identified infection/condition  Outcome: Progressing     Problem: SAFETY ADULT  Goal: Patient will remain free of falls  Description: INTERVENTIONS:  - Educate patient/family on patient safety including physical limitations  - Instruct patient to call for assistance with activity   - Consult OT/PT to assist with strengthening/mobility   - Keep Call bell within reach  - Keep bed low and locked with side rails adjusted as appropriate  - Keep care items and personal belongings within reach  - Initiate and maintain comfort rounds  - Make Fall Risk Sign visible to staff  - Apply yellow socks and bracelet for high fall risk patients  - Consider moving patient to room near nurses station  Outcome: Progressing  Goal: Maintain or return to baseline ADL function  Description: INTERVENTIONS:  - Educate patient/family on patient safety including physical limitations  - Instruct patient to call for assistance with activity   - Consult OT/PT to assist with strengthening/mobility   - Keep Call bell within reach  - Keep bed low and locked with side rails adjusted as appropriate  - Keep care items and personal belongings within reach  - Initiate and maintain comfort rounds  - Make Fall Risk Sign visible to staff  - Apply yellow socks and bracelet for high fall risk patients  - Consider moving patient to room near nurses station  Outcome: Progressing  Goal: Maintains/Returns to pre admission functional level  Description: INTERVENTIONS:  -  Assess patient's ability to carry out ADLs; assess patient's baseline for ADL function and identify physical deficits which impact ability to perform ADLs (bathing, care of mouth/teeth, toileting, grooming, dressing, etc )  - Assess/evaluate cause of self-care deficits   - Assess range of motion  - Assess patient's mobility; develop plan if impaired  - Assess patient's need for assistive devices and provide as appropriate  - Encourage maximum independence but intervene and supervise when necessary  - Involve family in performance of ADLs  - Assess for home care needs following discharge   - Consider OT consult to assist with ADL evaluation and planning for discharge  - Provide patient education as appropriate  Outcome: Progressing     Problem: SAFETY ADULT  Goal: Patient will remain free of falls  Description: INTERVENTIONS:  - Educate patient/family on patient safety including physical limitations  - Instruct patient to call for assistance with activity   - Consult OT/PT to assist with strengthening/mobility   - Keep Call bell within reach  - Keep bed low and locked with side rails adjusted as appropriate  - Keep care items and personal belongings within reach  - Initiate and maintain comfort rounds  - Make Fall Risk Sign visible to staff  - Apply yellow socks and bracelet for high fall risk patients  - Consider moving patient to room near nurses station  Outcome: Progressing  Goal: Maintain or return to baseline ADL function  Description: INTERVENTIONS:  - Educate patient/family on patient safety including physical limitations  - Instruct patient to call for assistance with activity   - Consult OT/PT to assist with strengthening/mobility   - Keep Call bell within reach  - Keep bed low and locked with side rails adjusted as appropriate  - Keep care items and personal belongings within reach  - Initiate and maintain comfort rounds  - Make Fall Risk Sign visible to staff  - Apply yellow socks and bracelet for high fall risk patients  - Consider moving patient to room near nurses station  Outcome: Progressing  Goal: Maintains/Returns to pre admission functional level  Description: INTERVENTIONS:  -  Assess patient's ability to carry out ADLs; assess patient's baseline for ADL function and identify physical deficits which impact ability to perform ADLs (bathing, care of mouth/teeth, toileting, grooming, dressing, etc )  - Assess/evaluate cause of self-care deficits   - Assess range of motion  - Assess patient's mobility; develop plan if impaired  - Assess patient's need for assistive devices and provide as appropriate  - Encourage maximum independence but intervene and supervise when necessary  - Involve family in performance of ADLs  - Assess for home care needs following discharge   - Consider OT consult to assist with ADL evaluation and planning for discharge  - Provide patient education as appropriate  Outcome: Progressing     Problem: DISCHARGE PLANNING  Goal: Discharge to home or other facility with appropriate resources  Description: INTERVENTIONS:  - Identify barriers to discharge w/patient and caregiver  - Arrange for needed discharge resources and transportation as appropriate  - Identify discharge learning needs (meds, wound care, etc )  - Arrange for interpretive services to assist at discharge as needed  - Refer to Case Management Department for coordinating discharge planning if the patient needs post-hospital services based on physician/advanced practitioner order or complex needs related to functional status, cognitive ability, or social support system  Outcome: Progressing     Problem: Knowledge Deficit  Goal: Patient/family/caregiver demonstrates understanding of disease process, treatment plan, medications, and discharge instructions  Description: Complete learning assessment and assess knowledge base    Interventions:  - Provide teaching at level of understanding  - Provide teaching via preferred learning methods  Outcome: Progressing     Problem: CARDIOVASCULAR - ADULT  Goal: Maintains optimal cardiac output and hemodynamic stability  Description: INTERVENTIONS:  - Monitor I/O, vital signs and rhythm  - Monitor for S/S and trends of decreased cardiac output  - Administer and titrate ordered vasoactive medications to optimize hemodynamic stability  - Assess quality of pulses, skin color and temperature  - Assess for signs of decreased coronary artery perfusion  - Instruct patient to report change in severity of symptoms  Outcome: Progressing  Goal: Absence of cardiac dysrhythmias or at baseline rhythm  Description: INTERVENTIONS:  - Continuous cardiac monitoring, vital signs, obtain 12 lead EKG if ordered  - Administer antiarrhythmic and heart rate control medications as ordered  - Monitor electrolytes and administer replacement therapy as ordered  Outcome: Progressing     Problem: METABOLIC, FLUID AND ELECTROLYTES - ADULT  Goal: Electrolytes maintained within normal limits  Description: INTERVENTIONS:  - Monitor labs and assess patient for signs and symptoms of electrolyte imbalances  - Administer electrolyte replacement as ordered  - Monitor response to electrolyte replacements, including repeat lab results as appropriate  - Instruct patient on fluid and nutrition as appropriate  Outcome: Progressing  Goal: Fluid balance maintained  Description: INTERVENTIONS:  - Monitor labs   - Monitor I/O and WT  - Instruct patient on fluid and nutrition as appropriate  - Assess for signs & symptoms of volume excess or deficit  Outcome: Progressing  Goal: Glucose maintained within target range  Description: INTERVENTIONS:  - Monitor Blood Glucose as ordered  - Assess for signs and symptoms of hyperglycemia and hypoglycemia  - Administer ordered medications to maintain glucose within target range  - Assess nutritional intake and initiate nutrition service referral as needed  Outcome: Progressing     Problem: Prexisting or High Potential for Compromised Skin Integrity  Goal: Skin integrity is maintained or improved  Description: INTERVENTIONS:  - Identify patients at risk for skin breakdown  - Assess and monitor skin integrity  - Assess and monitor nutrition and hydration status  - Monitor labs   - Assess for incontinence   - Turn and reposition patient  - Assist with mobility/ambulation  - Relieve pressure over bony prominences  - Avoid friction and shearing  - Provide appropriate hygiene as needed including keeping skin clean and dry  - Evaluate need for skin moisturizer/barrier cream  - Collaborate with interdisciplinary team   - Patient/family teaching  - Consider wound care consult   Outcome: Progressing     Problem: MOBILITY - ADULT  Goal: Maintain or return to baseline ADL function  Description: INTERVENTIONS:  - Educate patient/family on patient safety including physical limitations  - Instruct patient to call for assistance with activity   - Consult OT/PT to assist with strengthening/mobility   - Keep Call bell within reach  - Keep bed low and locked with side rails adjusted as appropriate  - Keep care items and personal belongings within reach  - Initiate and maintain comfort rounds  - Make Fall Risk Sign visible to staff  - Apply yellow socks and bracelet for high fall risk patients  - Consider moving patient to room near nurses station  Outcome: Progressing  Goal: Maintains/Returns to pre admission functional level  Description: INTERVENTIONS:  -  Assess patient's ability to carry out ADLs; assess patient's baseline for ADL function and identify physical deficits which impact ability to perform ADLs (bathing, care of mouth/teeth, toileting, grooming, dressing, etc )  - Assess/evaluate cause of self-care deficits   - Assess range of motion  - Assess patient's mobility; develop plan if impaired  - Assess patient's need for assistive devices and provide as appropriate  - Encourage maximum independence but intervene and supervise when necessary  - Involve family in performance of ADLs  - Assess for home care needs following discharge   - Consider OT consult to assist with ADL evaluation and planning for discharge  - Provide patient education as appropriate  Outcome: Progressing

## 2022-09-26 NOTE — ASSESSMENT & PLAN NOTE
CT abdomen pelvis shows liver cirrhosis  No prior history of cirrhosis as per the patient  Noted to have large volume ascites on admission underwent paracentesis on 09/25/2022 for 5 2 L    neg acute hepatitis panel  Denies any alcohol abuse history  Patient was unaware of her diagnosis of cirrhosis even though it was present on prior imaging  Will refer her outpatient to GI and also need monthly paracentesis to try to keep her euvolemic in addition to being on diuretics

## 2022-09-26 NOTE — ASSESSMENT & PLAN NOTE
Currently rate controlled  Continue Toprol-XL    Resume Xarelto but at lower regimen of 15 mg once daily

## 2022-09-26 NOTE — ASSESSMENT & PLAN NOTE
Patient noted to have acute cystitis with microscopic hematuria present on admission  Placed on IV Rocephin   Urine culture shows Klebsiella  Discontinue IV Rocephin and placed on oral Keflex to complete a total 7 day course  Ascitic fluid is negative for infection  1/2 blood cultures positive for possible coagulase negative staph possible contaminant

## 2022-09-26 NOTE — CASE MANAGEMENT
Case Management Assessment & Discharge Planning Note    Patient name Baldev Oakley  Location /-64 MRN [id number]  : 1940 Date 2022       Current Admission Date: 2022  Current Admission Diagnosis:Acute on chronic diastolic (congestive) heart failure [facility])   Patient Active Problem List    Diagnosis Date Noted    Acute on chronic anemia 2022    CKD (chronic kidney disease) 2022    Acute cystitis with hematuria 2022    Acute on chronic diastolic (congestive) heart failure ([facility] [address] ) 2022    Moderate aortic stenosis 2022    Pulmonary HTN ([facility] )     Cholelithiases 2022    Cirrhosis of liver with ascites [facility] ) 2022    Cellulitis of right lower extremity 2022    Fusiform ectasia of ascending aorta 2022    Moderate protein-calorie malnutrition ([facility] ) 2022    Septic shock ([facility] ) 2022    Stage 3a chronic kidney disease ([facility] ) 2022    Paroxysmal A-fib ([facility] ) 2022    Fall 2022    Traumatic hematoma of head 2022    Impaired skin integrity 2022    Hypothyroidism (acquired) 2022    Diabetes mellitus ([facility] ) 2022    Thoracic aortic aneurysm without rupture ([facility] ) 2022      LOS (days): 3  Geometric Mean LOS (GMLOS) (days): 3 80  Days to GMLOS:0 6     OBJECTIVE:    Risk of Unplanned Readmission Score: 27 01         Current admission status: Inpatient       Preferred Pharmacy:   [facility]  [address] [facility] [address]  Phone: [phone] Fax: [phone]    [address] [facility] [address]  Phone: [phone] Fax: [facility]  [address]  [address] [address]  Phone: [phone] Fax: [phone]    Primary Care Provider: Chavez Abebe MD    Primary Insurance: MEDICARE  Secondary Insurance: AARP    ASSESSMENT:  Active Health Care Proxies    There are no active Health Care Proxies on file  Advance Directives  Does patient have a [address]?: Yes  Does patient have Advance Directives?: Yes  Advance Directives: Living will  Primary Contact: Kane Ramos is POA but Alix Lancaster is presently living with patient         Readmission Root Cause  30 Day Readmission: No    Patient Information  Admitted from[de-identified] Home  Mental Status: Other (Comment)  During Assessment patient was accompanied by: Other-Comment  Assessment information provided by[de-identified] Daughter  Primary Caregiver: Other (Comment)  Caregiver's Name[de-identified] Aly Rosales, daughter  Kaye Noun Relationship to Patient[de-identified] Family Member  Caregiver's Telephone Number[de-identified] see face sheet  Support Systems: Daughter, Family members  [place] of Residence: [facility] do you live in?: [address] entry access options  Select all that apply : Stairs  Number of steps to enter home  : 1  Do the steps have railings?: No  Type of Current Residence: [place]  In the last 12 months, was there a time when you were not able to pay the mortgage or rent on time?: No  In the last 12 months, how many places have you lived?: 1  In the last 12 months, was there a time when you did not have a steady place to sleep or slept in a shelter (including now)?: No  Homeless/housing insecurity resource given?: N/A  Living Arrangements: Lives w/ Daughter  Is patient a ?: No    Activities of Daily Living Prior to Admission  Functional Status: Assistance  Completes ADLs independently?: No  Level of ADL dependence: Assistance  Ambulates independently?: No  Level of ambulatory dependence: Assistance  Does patient use assisted devices?: Yes  Assisted Devices (DME) used: [place] [employer] Shower Chair  Does patient currently own DME?: Yes  What DME does the patient currently own?: [employer]  Does patient have a history of Outpatient Therapy (PT/OT)?: No  Does the patient have a history of Short-Term Rehab?: Yes [facility])  Does patient have a history of HHC?: Yes ([facility] [address])  Does patient currently have [address]?: Yes    Current Home Health Care  Type of Current Home Care Services: Home OT, Home PT, Nurse visit  [address][de-identified] [facility] Provider[de-identified] PCP    Patient Information Continued  Income Source: Pension/senior care  Does patient have prescription coverage?: Yes  Within the past 12 months, you worried that your food would run out before you got the money to buy more : Never true  Within the past 12 months, the food you bought just didn't last and you didn't have money to get more : Never true  Food insecurity resource given?: N/A  Does patient receive dialysis treatments?: No  Does patient have a history of substance abuse?: No  Does patient have a history of Mental Health Diagnosis?: No         Means of Transportation  Means of Transport to Appts[de-identified] Family transport  In the past 12 months, has lack of transportation kept you from medical appointments or from getting medications?: No  In the past 12 months, has lack of transportation kept you from meetings, work, or from getting things needed for daily living?: No        DISCHARGE DETAILS:    Discharge planning discussed with[de-identified] daughterNora of Choice: Yes     CM contacted family/caregiver?: Yes             Contacts  Patient Contacts: Rosalio Jo, daughter  Relationship to Patient[de-identified] Family  Contact Method: Phone  Phone Number: see face sheet  Reason/Outcome: Continuity of Care, Discharge Planning    Requested [address]         Is the patient interested in [address] at discharge?: Yes  [address] requested[de-identified] Nursing, Occupational Therapy, Physical [address] Name[de-identified] [address] contacted daughter, Rosalio Mckeon, baseline information was obtained   CM discussed the role of CM in helping the patient develop a discharge plan and assist the patient in carry out their plan  Patient lives in ran home, Lincoln Coltlamelissamathew moved in to assist in caregiving of patient  Patient spends majority of time in her lift chair, if patient does not use lift chair she is 1 assist to get from sit to stand position  Renetta Atkinson assists patient with ADLS in home, cooking cleaning and bathing  Patient can ambualte with RW but when she has extra weight refuses to move at times  CM explained PT/OT will eval and recommendations will be reviewed with family  POA is daughter, Cisco Cedeño, but Renetta Atkinson is living with patient  Renetta Atkinson indicated patient active with [facility] for VN, PT and OT and fells patient will be reluctant to STR, if recommended  CM completed AIDIN referral to [facility] as daughter indicates patient active  CM will follow for PT/OT recommendations

## 2022-09-26 NOTE — PROGRESS NOTES
Progress Note - Nephrology   Eulogio Zee 80 y o  female MRN: [id number]  Unit/Bed#: -01 Encounter: [id number]    A/P:  1  LEONARDO on CKD, Cr trends stable on diuretic at 1 9  UOP not at goal, would target 2-2 5L/24 hr      Weight slowly trending down  279 lb ->277 lb /24 hours  Down from 285 lb standing weight on 9/23  Pt did have paracentesis on 9/25  US pending, CT no obstructive uropathy    Okay to continue albumin infusions to promote transient intravascular volume and aid in diuresis  Metolazone added, may need lasix gtt increased to 20mg/hr to achieve UOP goal  Will assess UOP tomorrow with addition of thiazide  Monitor for HRS-- urine Na 21, usually more avid Na retention in cases of prerenal/HRS  No significant proteinuria  Complements ordered, can be low in states of liver disease  2  CKD3b baseline 1 7-1 9mg/dL  at baseline  Potentially due to cardiorenal, increased venous congestion    3  Klebsiella UTI    4  Hypokalemia due to diuresis  5  Macrocytic anemia, eval for folate,B12 deficiencies  Trend improving to 8 9      6  Sp paracentesis 9/25  5 2 L removed  Follow up reason for today's visit:     LEONARDO     Subjective:   Patient seen and examined today  Denies cp/sob  Reports unchanged LE edema  Weight is down 2 lb  BP stable  UOP 1 5 L/24 hr      Objective:     Vitals: Blood pressure 113/68, pulse 74, temperature 98 6 °F (37 °C), resp  rate 19, height 5' (1 524 m), weight 126 kg (277 lb 9 6 oz), SpO2 98 %  ,Body mass index is 54 22 kg/m²  Weight (last 2 days)     Date/Time Weight    09/26/22 0531 126 (277 6)    09/25/22 1723 127 (279 4)    09/25/22 0552 130 (287 4)    09/24/22 0602 131 (289 24)            Intake/Output Summary (Last 24 hours) at 9/26/2022 1411  Last data filed at 9/26/2022 1300  Gross per 24 hour   Intake 942 75 ml   Output 2250 ml   Net -1307 25 ml     I/O last 3 completed shifts: In: 712 5 [P O :360; I V :33 7;  Blood:318 8]  Out: 2100 [Urine:2100] Physical Exam: /68   Pulse 74   Temp 98 6 °F (37 °C)   Resp 19   Ht 5' (1 524 m)   Wt 126 kg (277 lb 9 6 oz)   SpO2 98%   BMI 54 22 kg/m²     General Appearance:    Alert, cooperative, no distress, appears older than stated age, morbid obese    Head:    Normocephalic, without obvious abnormality, atraumatic   Eyes:    Conjunctiva/corneas clear   Ears:    Normal external ears   Nose:   Nares normal, septum midline, mucosa normal, no drainage    or sinus tenderness   Throat:   Lips, mucosa, and tongue normal; teeth and gums normal   Neck:  supple    Back:      Lungs:     Clear to auscultation bilaterally, respirations unlabored   Chest wall:    No tenderness or deformity   Heart:    Regular rate and rhythm, S1 and S2 normal, no murmur, rub   or gallop   Abdomen:     Soft, non-tender, bowel sounds active   Extremities:  +2-3 L BL LE edema    Skin:   Skin color, texture, turgor normal, no rashes or lesions   Lymph nodes:   Cervical normal   Neurologic:   CNII-XII intact            Lab, Imaging and other studies: I have personally reviewed pertinent labs  CBC:   Lab Results   Component Value Date    WBC 7 57 09/26/2022    HGB 8 9 (L) 09/26/2022    HCT 26 9 (L) 09/26/2022     (H) 09/26/2022     (L) 09/26/2022    MCH 33 7 09/26/2022    MCHC 33 1 09/26/2022    RDW 18 1 (H) 09/26/2022    MPV 10 0 09/26/2022     CMP:   Lab Results   Component Value Date    K 3 3 (L) 09/26/2022     09/26/2022    CO2 28 09/26/2022    BUN 31 (H) 09/26/2022    CREATININE 1 91 (H) 09/26/2022    CALCIUM 8 3 09/26/2022    EGFR 24 09/26/2022           Results from last 7 days   Lab Units 09/26/22  0531 09/25/22  1723 09/25/22  0526 09/24/22  1901 09/24/22  0604 09/23/22  0736   POTASSIUM mmol/L 3 3* 3 4* 3 7   < > 3 9 4 0   CHLORIDE mmol/L 102 101 102   < > 103 103   CO2 mmol/L 28 28 27   < > 27 26   BUN mg/dL 31* 32* 31*   < > 29* 25   CREATININE mg/dL 1 91* 2 03* 2 05*   < > 2 03* 1 98*   CALCIUM mg/dL 8 3 8 4 8 2*   < > 8 6 8 5   ALK PHOS U/L  --   --   --   --  159* 167*   ALT U/L  --   --   --   --  13 15   AST U/L  --   --   --   --  32 32    < > = values in this interval not displayed  Phosphorus: No results found for: PHOS  Magnesium: No results found for: MG  Urinalysis: No results found for: Hieu Levans, SPECGRAV, PHUR, LEUKOCYTESUR, NITRITE, PROTEINUA, GLUCOSEU, KETONESU, BILIRUBINUR, BLOODU  Ionized Calcium: No results found for: CAION  Coagulation:   Lab Results   Component Value Date    INR 1 59 (H) 09/26/2022     Troponin: No results found for: TROPONINI  ABG: No results found for: PHART, UVV6JLY, PO2ART, XRE0PVD, M3PAYXZJ, BEART, SOURCE  Radiology review:     IMAGING  Procedure: Paracentesis    Result Date: 2/95/2457  Narrative: Isabela Leiva MD     9/25/2022 11:31 AM Paracentesis Date/Time: 9/25/2022 10:55 AM Performed by: Isabela Leiva MD Authorized by: Isabela Leiva MD Patient location:  Bedside Consent:   Consent obtained:  Written   Consent given by:  Patient   Risks discussed:  Bleeding, bowel perforation, infection and pain Universal protocol:   Procedure explained and questions answered to patient or proxy's satisfaction: yes    Patient identity confirmed:  Verbally with patient Pre-procedure details:   Initial or Subsequent Exam:  Initial   Procedure purpose:  Diagnostic   Indications: new onet ascites    Preparation: Patient was prepped and draped in usual sterile fashion  Anesthesia (see MAR for exact dosages): Anesthesia method:  Local infiltration   Local anesthetic:  Lidocaine 1% w/o epi Procedure details:   Needle gauge:  18   Equipment: Paracentesis kit used    Ultrasound guidance: yes    Ultrasound image availability:  Not saved   Sterile ultrasound techniques: Sterile gel and sterile probe covers were used    Approach:  Percutaneous   Puncture site: RUQ     Fluid removed amount:  5 2L   Fluid appearance:  Yellow   Dressing:  Adhesive bandage Post-procedure details: Patient tolerance of procedure: Tolerated well, no immediate complications Post-procedure medication (see MAR for dosing): Albumin replacement: No      Procedure: Echo follow up/limited w/ contrast if indicated    Result Date: 9/23/2022  Narrative: Donovan Stevens  Limited study to assess LEVF and aortic stenosis    Left Ventricle: The left ventricular ejection fraction is >70%  Systolic function is hyperdynamic  Wall motion is normal  Diastolic function is mildly abnormal, consistent with grade I (abnormal) relaxation    Right Ventricle: Right ventricular cavity size is mildly dilated  Systolic function is normal    Left Atrium: The atrium is severely dilated    Aortic Valve: The leaflets are moderately calcified  There is moderate stenosis    IVC/SVC: The inferior vena cava size is 15 0 mm  The right atrial pressure is estimated at 3 0 mmHg  The inferior vena cava is normal in size   Respirophasic changes were normal        Current Facility-Administered Medications   Medication Dose Route Frequency    acetaminophen (TYLENOL) tablet 650 mg  650 mg Oral Q4H PRN    albumin human (FLEXBUMIN) 25 % injection 12 5 g  12 5 g Intravenous BID    cephalexin (KEFLEX) capsule 250 mg  250 mg Oral Q8H [address]    furosemide (LASIX) 500 mg infusion 50 mL  10 mg/hr Intravenous Continuous    metolazone (ZAROXOLYN) tablet 2 5 mg  2 5 mg Oral Daily    metoprolol succinate (TOPROL-XL) 24 hr tablet 12 5 mg  12 5 mg Oral Daily    midodrine (PROAMATINE) tablet 10 mg  10 mg Oral TID AC    rivaroxaban (XARELTO) tablet 15 mg  15 mg Oral Daily With Breakfast     Medications Discontinued During This Encounter   Medication Reason    midodrine (PROAMATINE) tablet 5 mg     metoprolol succinate (TOPROL-XL) 24 hr tablet 25 mg     furosemide (LASIX) injection 40 mg     furosemide (LASIX) injection 80 mg     levothyroxine tablet 150 mcg     midodrine (PROAMATINE) tablet 5 mg     cefTRIAXone (ROCEPHIN) IVPB (premix in dextrose) 1,000 mg 50 mL Christine Hermosillo,       This progress note was produced in part using a dictation device which may document imprecise wording from author's original intent

## 2022-09-26 NOTE — PROGRESS NOTES
[address]  Progress Note - Radha Pouch 1940, 80 y o  female MRN: [id number]  Unit/Bed#: -01 Encounter: [id number]  Primary Care Provider: Nathalie Moreno MD   Date and time admitted to hospital: 9/23/2022  6:49 AM    * Acute on chronic diastolic (congestive) heart failure [facility])  Assessment & Plan  Wt Readings from Last 3 Encounters:   09/26/22 126 kg (277 lb 9 6 oz)   07/10/22 121 kg (267 lb 9 6 oz)   05/19/22 126 kg (277 lb 1 9 oz)   Patient presents with anasarca and acute on chronic diastolic CHF exacerbation  2D echo 9/22 shows EF more than 70% and moderate aortic stenosis  Placed on Lasix 40 mg IV b i d  Along with midodrine due to hypotension with minimal response for will switch to Lasix drip at 10 milligrams/hour  Despite Lasix drip not diuresing much  Continue albumin b i d  also added metolazone 2 5 mg daily to assist with diuresis  Monitor electrolytes during diuresis  Patient underwent paracentesis on 09/25/2022 and 5 2 L removed  Acute cystitis with hematuria  Assessment & Plan  Patient noted to have acute cystitis with microscopic hematuria present on admission  Placed on IV Rocephin   Urine culture shows Klebsiella  Discontinue IV Rocephin and placed on oral Keflex to complete a total 7 day course  Ascitic fluid is negative for infection  1/2 blood cultures positive for possible coagulase negative staph possible contaminant  Acute on chronic anemia  Assessment & Plan  Baseline hemoglobin is around 9  down to 7 3  No overt signs of bleeding reported  Iron and B12 levels are normal   Received 1 unit PRBC and hemoglobin improved to 8 9 today  Restart Xarelto at lower dose 15 mg daily    Cirrhosis of liver with ascites ([facility] [address] )  Assessment & Plan  CT abdomen pelvis shows liver cirrhosis  No prior history of cirrhosis as per the patient    Noted to have large volume ascites on admission underwent paracentesis on 09/25/2022 for 5 2 L    neg acute hepatitis panel  Denies any alcohol abuse history  Patient was unaware of her diagnosis of cirrhosis even though it was present on prior imaging  Will refer her outpatient to GI and also need monthly paracentesis to try to keep her euvolemic in addition to being on diuretics  Paroxysmal A-fib [facility])  Assessment & Plan  Currently rate controlled  Continue Toprol-XL  Resume Xarelto but at lower regimen of 15 mg once daily    Moderate aortic stenosis  Assessment & Plan  As per echo  Avoid hypotension    Hypothyroidism (acquired)  Assessment & Plan  TSH is low at 0 33  Low free T3 and elevated free T4  Hold levothyroxine for now as patient appears to be over compensated  Probably restart a lower dose after 1 week    Stage 3a chronic kidney disease [facility])  Assessment & Plan  Lab Results   Component Value Date    EGFR 24 09/26/2022    EGFR 22 09/25/2022    EGFR 22 09/25/2022    CREATININE 1 91 (H) 09/26/2022    CREATININE 2 03 (H) 09/25/2022    CREATININE 2 05 (H) 09/25/2022   Baseline creatinine is 1 6-1  78  Currently creatinine is elevated to 1 9  Patient appears to be volume overloaded  will monitor how creatinine responds to diuresis      VTE Pharmacologic Prophylaxis:   Pharmacologic: Rivaroxaban (Xarelto)  Mechanical VTE Prophylaxis in Place: Yes    Patient Centered Rounds: I have performed bedside rounds with nursing staff today  Discussions with Specialists or Other Care Team Provider:  Discussed with nephrology    Education and Discussions with Family / Patient:  Discussed with patient at bedside and with daughter over the phone    Time Spent for Care: 45 minutes  More than 50% of total time spent on counseling and coordination of care as described above      Current Length of Stay: 3 day(s)    Current Patient Status: Inpatient   Certification Statement: The patient will continue to require additional inpatient hospital stay due to Acute on chronic diastolic CHF exacerbation    Discharge Plan: Anticipate discharge after 24-48 hours with oral diuretic regimen    Code Status: Level 1 - Full Code      Subjective:   Patient states that she is feeling much better today eating a little bit better and also promises that she will drink 1 ensure daily as she understands she has low protein problems  Denies any nausea vomiting or abdominal pain today    Objective:     Vitals:   Temp (24hrs), Av 8 °F (37 1 °C), Min:98 6 °F (37 °C), Max:99 1 °F (37 3 °C)    Temp:  [98 6 °F (37 °C)-99 1 °F (37 3 °C)] 98 6 °F (37 °C)  HR:  [74-83] 74  Resp:  [17-20] 19  BP: (105-125)/(46-68) 113/68  SpO2:  [93 %-99 %] 98 %  Body mass index is 54 22 kg/m²  Input and Output Summary (last 24 hours): Intake/Output Summary (Last 24 hours) at 2022 1408  Last data filed at 2022 1201  Gross per 24 hour   Intake 822 75 ml   Output 2250 ml   Net -1427 25 ml       Physical Exam:     Physical Exam  Vitals and nursing note reviewed  Constitutional:       Appearance: Normal appearance  She is obese  HENT:      Head: Normocephalic and atraumatic  Right Ear: External ear normal       Left Ear: External ear normal       Nose: Nose normal       Mouth/Throat:      Pharynx: Oropharynx is clear  Cardiovascular:      Rate and Rhythm: Normal rate and regular rhythm  Heart sounds: Normal heart sounds  Pulmonary:      Effort: Pulmonary effort is normal       Comments: Moderate air entry bilaterally mild decreased breath sounds on the bases  Abdominal:      General: Bowel sounds are normal  There is distension  Palpations: Abdomen is soft  Tenderness: There is no abdominal tenderness  Musculoskeletal:         General: Normal range of motion  Cervical back: Normal range of motion and neck supple  Right lower leg: Edema present  Left lower leg: Edema present  Skin:     General: Skin is warm and dry  Capillary Refill: Capillary refill takes less than 2 seconds     Neurological: General: No focal deficit present  Mental Status: She is alert and oriented to person, place, and time  Psychiatric:         Mood and Affect: Mood normal            Additional Data:     Labs:    Results from last 7 days   Lab Units 09/26/22  0531 09/24/22  0604 09/23/22  0736   WBC Thousand/uL 7 57   < > 12 82*   HEMOGLOBIN g/dL 8 9*   < > 8 6*   HEMATOCRIT % 26 9*   < > 25 8*   PLATELETS Thousands/uL 122*   < > 154   BANDS PCT %  --   --  1   LYMPHO PCT %  --   --  9*   MONO PCT %  --   --  2*   EOS PCT %  --   --  0    < > = values in this interval not displayed  Results from last 7 days   Lab Units 09/26/22  0531 09/24/22  1901 09/24/22  0604   SODIUM mmol/L 137   < > 135   POTASSIUM mmol/L 3 3*   < > 3 9   CHLORIDE mmol/L 102   < > 103   CO2 mmol/L 28   < > 27   BUN mg/dL 31*   < > 29*   CREATININE mg/dL 1 91*   < > 2 03*   ANION GAP mmol/L 7   < > 5   CALCIUM mg/dL 8 3   < > 8 6   ALBUMIN g/dL  --   --  1 5*   TOTAL BILIRUBIN mg/dL  --   --  3 13*   ALK PHOS U/L  --   --  159*   ALT U/L  --   --  13   AST U/L  --   --  32   GLUCOSE RANDOM mg/dL 90   < > 96    < > = values in this interval not displayed  Results from last 7 days   Lab Units 09/26/22  0531   INR  1 59*             Results from last 7 days   Lab Units 09/23/22  0918 09/23/22  0736   LACTIC ACID mmol/L 2 3* 2 4*           * I Have Reviewed All Lab Data Listed Above  * Additional Pertinent Lab Tests Reviewed: [place] [address] Admission Reviewed    Imaging:    Imaging Reports Reviewed Today Include:  Ultrasound kidney  Imaging Personally Reviewed by Myself Includes:  None    Recent Cultures (last 7 days):     Results from last 7 days   Lab Units 09/25/22  1157 09/23/22  0750 09/23/22  0739 09/23/22  0736   BLOOD CULTURE   --   --  No Growth at 72 hrs   Staphylococcus coagulase negative*   GRAM STAIN RESULT  No Polys  No organisms seen  --   --  Gram positive cocci in clusters*   URINE CULTURE   --  >100,000 cfu/ml Klebsiella pneumoniae*  --   --    BODY FLUID CULTURE, STERILE  No growth  --   --   --        Last 24 Hours Medication List:   Current Facility-Administered Medications   Medication Dose Route Frequency Provider Last Rate    acetaminophen  650 mg Oral Q4H PRN Bryon Huynh MD      albumin human  12 5 g Intravenous BID Bryon Huynh MD      cefTRIAXone  1,000 mg Intravenous Q24H Bryon Huynh MD 1,000 mg (09/26/22 0908)    furosemide  10 mg/hr Intravenous Continuous Bryon Huynh MD 10 mg/hr (09/25/22 0107)    metolazone  2 5 mg Oral Daily Bryon Huynh MD      metoprolol succinate  12 5 mg Oral Daily Bryon Huynh MD      midodrine  10 mg Oral TID AC Bryon Huynh MD      rivaroxaban  15 mg Oral Daily With Breakfast Bryon Huynh MD          Today, Patient Was Seen By: Bryon Huynh MD    ** Please Note: Dictation voice to text software may have been used in the creation of this document   **

## 2022-09-26 NOTE — ASSESSMENT & PLAN NOTE
Baseline hemoglobin is around 9  down to 7 3  No overt signs of bleeding reported  Iron and B12 levels are normal   Received 1 unit PRBC and hemoglobin improved to 8 9 today    Restart Xarelto at lower dose 15 mg daily

## 2022-09-27 NOTE — NURSING NOTE
Recheck of patient's bp-102/55  Ana torres  Continue to hold lasix gtt until bp reaches systolic of 819 or higher  Recheck patient's blood pressure with morning labs

## 2022-09-27 NOTE — OCCUPATIONAL THERAPY NOTE
Occupational Therapy Evaluation     Evaluation: 8818-6292  Treatment: 6425-6768    Patient Name: Tiffani FERNANDEZ'S Date: 9/27/2022  Problem List  Principal Problem:    Acute on chronic diastolic (congestive) heart failure (HCC)  Active Problems:    Stage 3a chronic kidney disease (HCC)    Paroxysmal A-fib (HCC)    Hypothyroidism (acquired)    Cirrhosis of liver with ascites (HCC)    Moderate aortic stenosis    Acute cystitis with hematuria    Acute on chronic anemia    Past Medical History  Past Medical History:   Diagnosis Date    LEONARDO (acute kidney injury) ([facility] )     Atrial fibrillation (HCC)     CHF (congestive heart failure) (HCC)     diastolic grade 1    Cirrhosis (HCC)     Diabetes mellitus ([facility] )     Disease of thyroid gland     hypothyroid    [facility] ([facility] )     Hiatal hernia     Hyperlipidemia     Hypertension     Hypothyroid     Lung nodules     Periumbilical hernia     Pulmonary HTN (HCC)     Thoracic aortic aneurysm without rupture ([facility] ) 03/25/2022    41 mm     Past Surgical History  Past Surgical History:   Procedure Laterality Date    HERNIA REPAIR      HIP ARTHROPLASTY      HYSTERECTOMY      JOINT REPLACEMENT Bilateral     knee; b/l hip    KNEE ARTHROPLASTY      OOPHORECTOMY      WOUND DEBRIDEMENT Right 3/31/2022    Procedure: DEBRIDEMENT WOUND [facility] OUT), right medial thigh;  Surgeon: Jarrett Dixon DO;  Location:  MAIN OR;  Service: General         09/27/22 0720   Note Type   Note type Evaluation   Restrictions/Precautions   Other Precautions Chair Alarm; Bed Alarm; Fall Risk;Multiple lines   Pain Assessment   Pain Assessment Tool 0-10   Pain Score No Pain   Home Living   Type of Home House  (1 JAMIA)   Home Layout One level;Performs ADLs on one level; Able to live on main level with bedroom/bathroom   Bathroom Shower/Tub Walk-in shower   Bathroom Toilet Raised   Bathroom Equipment Shower chair;Grab bars in shower;Grab bars around toilet   [employer]  (uses RW  sleeps in recliner chair)   Additional Comments Pt reports living in a 1 story home with her daughter  Pt ambulates short distances with RW  Prior Function   Lives With Daughter   Receives Help From Family   ADL Assistance Needs assistance   IADLs Needs assistance   Falls in the last 6 months 1 to 4   Comments Pt reports "sometimes i do it, sometimes my daughter helps me" in regards to ADLs  However, pt's daughter reports she assist Pt with ADLs and all IADL tasks   ADL   Where Assessed Edge of bed   Grooming Assistance 5  Supervision/Setup   Grooming Deficit Setup   UB Dressing Assistance 4  Minimal Assistance   UB Dressing Deficit Verbal cueing;Supervision/safety; Increased time to complete; Thread RUE;Pull over head;Pull around back   LB Dressing Assistance 3  Moderate Assistance   LB Dressing Deficit Steadying; Requires assistive device for steadying;Verbal cueing;Supervision/safety; Increased time to complete; Don/doff R sock; Don/doff L sock; Thread LLE into pants; Thread RLE into pants;Pull up over hips   Additional Comments Pt completing ADL takss while seated at EOB  UB Dressing @ min A for threading RUE through shirt and donning over head  Pt reports "i have bad arthritis"  LB Dressing @ Min A for donning pants around feet and Mod A for CM around waist with UB supported from RW PRN  Pt reports having a sock aide at home for donning socks, although "if i have trouble my daughter helps me"  Bed Mobility   Supine to Sit 3  Moderate assistance   Additional items Assist x 1;HOB elevated; Increased time required;Verbal cues;LE management   Additional Comments HOB elevated  use of bedrails PRN   Pt reprts sleeping in recliner chair at baseline   Transfers   Sit to Stand   [facility] Assist)   Additional items Increased time required;Verbal cues   Stand to Sit   (Steadying Assist)   Additional items Increased time required;Verbal cues   Stand pivot   (Steadying Assist)   Additional items Increased time required;Verbal cues   Additional Comments Pt completing functional transfers with us eof RW for UB support  Steadying Assist for STS and SPT with increased time and vc'ing for safety/technique, RW management, and sequencing   Balance   Static Sitting Good   Dynamic Sitting Fair +   Static Standing Fair   Dynamic Standing Poor +   Activity Tolerance   Activity Tolerance Patient limited by fatigue   Nurse Made Aware Spoke with RN   LUE Assessment   LUE Assessment WFL   Hand Function   Gross Motor Coordination Functional   Fine Motor Coordination Functional   Sensation   Light Touch No apparent deficits   Additional Comments Pt's AROM R shoulder impaired d/t weakness and "arthritis"  Pt able to achieve full ROM with AAROM and increased time  Pt appears to slightly guard RUE during functional tasks although continues to utilize during functional transfers and ADLs PRN   Cognition   Overall Cognitive Status [facility]   Arousal/Participation Alert; Responsive; Cooperative   Attention Attends with cues to redirect   Orientation Level Oriented to person;Oriented to place;Oriented to situation;Disoriented to time   Memory Within functional limits   Following Commands Follows one step commands without difficulty   Assessment   Limitation Decreased ADL status; Decreased UE strength;Decreased Safe judgement during ADL;Decreased endurance;Decreased self-care trans;Decreased high-level ADLs   Prognosis Good   Assessment Pt is a 80 y o  female, admitted to [address] [place] [address] 9/23/2022 d/t experiencing increased SOB and generalized weakness  Dx: acute on chronic diastolic CHF  Pt with PMHx impacting their performance during ADL tasks, including: LEONARDO, a-fib, CHF, cirrhosis, DM, hypothyroid, hyperlipidemia, thoracic aortic aneurysm without rupture  Prior to admission to the hospital Pt was performing ADLs with physical assistance  IADLs with physical assistance  Functional transfers/ambulation without physical assistance  Cognitive status was PTA was intact   OT order placed to assess Pt's ADLs, cognitive status, and performance during functional tasks in order to maximize safety and independence while making most appropriate d/c recommendations  Pt's clinical presentation is currently unstable/unpredictable given new onset deficits that effect Pt's occupational performance and ability to safely return to PLOF including decrease activity tolerance, decrease standing balance, decrease performance during ADL tasks, decrease safety awareness , decrease UB MS, decrease generalized strength, decrease activity engagement, decrease performance during functional transfers, high fall risk and limited insight to deficits combined with medical complications of hypotension, abnormal renal lab values, abnormal H&H, abnormal CBC, edema/swelling, elevated BNP, multiple readmissions, incontinence, fear/retreat, need for input for mobility technique/safety and elevated INR  Personal factors affecting Pt at time of initial evaluation include: advanced age, past experience, inability to perform current job functions, inability to perform IADLs, inability to perform ADLs, inability to ambulate household distances, inability to navigate community distances, limited insight into impairments, decreased initiation and engagement and questionable non-compliance  Pt will benefit from continued skilled OT services to address deficits as defined above and to maximize level independence/participation during ADLs and functional tasks to facilitate return toward PLOF and improved quality of life  From an occupational therapy standpoint, recommendation at time of d/c would be [address] with continued family support  Plan   Treatment Interventions ADL retraining;Functional transfer training;UE strengthening/ROM; Endurance training;Patient/family training;Equipment evaluation/education; Neuromuscular reeducation; Compensatory technique education;Continued evaluation; Energy conservation; Activityengagement   Goal Expiration Date 10/11/22   OT Treatment Day 1   OT Frequency 3-5x/wk   Additional Treatment Session   Start Time 0388   End Time 5876   Treatment Assessment Pt competing BUE HEP while seated upright in recliner chair  Pt completing 2 sets of 10 with exercises focusing on biceps, triceps, and shoulder/chest  Pt tolerating exercises well with Min vc'ing for maintaining proper movements and speed along with AAROM for R shoulder to achieve full ROM  Exercises completed this date to maximize overall BUE MS and increased safety and independence during ADLs and functional transfers  Pt verbalizes understanding and reports they will complete daily  Pt would benefit from continued review to ensure proper carryover upon d/c from [facility]  At end of session, Pt seated OOB in recliner chair with call bell in reach, chair alarm intact and all needs met at this time  Additional Treatment Day 1   Recommendation   OT Discharge Recommendation Home with home health rehabilitation  (and continued family support)   [facility] Daily Activity Inpatient   Lower Body Dressing 2   Bathing 2   Toileting 3   Upper Body Dressing 3   Grooming 3   Eating 4   Daily Activity Raw Score 17   Daily Activity Standardized Score (Calc for Raw Score >=11) 37 26   AM-PAC Applied Cognition Inpatient   Following a Speech/Presentation 3   Understanding Ordinary Conversation 4   Taking Medications 2   Remembering Where Things Are Placed or Put Away 3   Remembering List of 4-5 Errands 3   Taking Care of Complicated Tasks 3   Applied Cognition Raw Score 18   Applied Cognition Standardized Score 38 07       The patient's raw score on the AM-PAC Daily Activity inpatient short form is 17, standardized score is 37 26, less than 39 4  Patients at this level are likely to benefit from DC to post-acute rehabilitation services  Please refer to the recommendation of the Occupational Therapist for safe DC planning   Although AM-PAC recommends post acute rehab, Pt has good support at home from her daughter and     Pt goals to be met by 10/11/2022    Pt will demonstrate ability to complete LB dressing @ S in order to increase safety and independence during meaningful tasks  Pt will demonstrate ability to luis/doff socks/shoes while sitting EOB @ S with AD PRN in order to increase safety and independence during meaningful tasks  Pt will demonstrate ability to complete toileting tasks including CM and pericare @ S in order to increase safety and independence during meaningful tasks  Pt will demonstrate ability to complete EOB, chair, toilet/commode transfers @ S in order to increase performance and participation during functional tasks  Pt will demonstrate ability to stand for 4-5 minutes while maintaining G balance with use of RW for UB support PRN  Pt will demonstrate ability to tolerate 30-35 minute OT session with no vc'ing for deep breathing or use of energy conservation techniques in order to increase activity tolerance during functional tasks  Pt will demonstrate Good carryover of use of energy conservation/compensatory strategies during ADLs and functional tasks in order to increase safety and reduce risk for falls  Pt will demonstrate Good attention and participation in continued evaluation of functional ambulation house hold distances in order to assist with safe d/c planning  Pt will attend to continued cognitive assessments 100% of the time in order to provide most appropriate d/c recommendations  Pt will follow 100% simple 2-step commands and be A&O x4 consistently with environmental cues to increase participation in functional activities  Pt will identify 3 areas of interest/hobbies and 1 intervention on how to incorporate into daily life in order to increase interaction with environment and peers as well as increase participation in meaningful tasks     Pt will demonstrate 100% carryover of BUE HEP in order to increase BUE MS and increase performance during functional tasks upon d/c home      Charlotte Rivera OTR/L

## 2022-09-27 NOTE — PROGRESS NOTES
NEPHROLOGY PROGRESS NOTE   Jena Salazar 80 y o  female MRN: [id number]  Unit/Bed#: -01 Encounter: [id number]    Assessment/Plan:    59-year-old female with HFpEF, liver cirrhosis, PAF, CKD 3b admitted 09/23 with chief complaint generalized weakness and shortness of breath being treated for decompensated heart failure on Lasix infusion  Status post paracentesis 9/25  1  Acute kidney injury (POA) atop chronic kidney disease  · Presented with a creatinine 2 0 mg/dL with modest improvement today 1 77 mg/dL  Etiology likely prerenal azotemia from renovascular congestion, cardiorenal syndrome  There is modest improvement noted with aggressive diuresis  No obstruction on ultrasound  · Continue diuresis as outlined below  · Continue to maximize hemodynamics with mean arterial pressure goal greater than 65 mmHg  2  Stage IIIB chronic kidney disease with baseline creatinine around 1 7-1 9 mg/dL  3  Volume overload, acute on chronic diastolic CHF exacerbation, suspected cardiorenal syndrome  · -4 5 L in 24 hours  Continue Lasix 10 mg/hour and metolazone 2 5 mg daily  Consider monitoring off of albumin  4  Diuretic induced hypokalemia  · Give 120 mEq oral potassium today  5  Cirrhosis of the liver with ascites  · Status post paracentesis 9/25  Eventually recommend initiation of spironolactone however defer for now due to acute kidney injury  6  Hypotension  · Wean midodrine as able, monitor for supine hypertension      ROS  Seen and examined sitting in chair  Continues to complain of lower extremity edema  A complete 10 point review of systems have been performed and are otherwise negative         Historical Information   Past Medical History:   Diagnosis Date    [facility]     Atrial fibrillation ([facility] )     CHF (congestive heart failure) (HCC)     diastolic grade 1    Cir[facility]osis [facility]     Diabetes mellitus [facility]     Disease of thyroid gland     hypothyroid    Endometrial ca [facility]  Hiatal hernia     Hyperlipidemia     Hypertension     Hypothyroid     Lung nodules     Periumbilical hernia     [facility] HTN [facility] )     [facility] 03/25/2022    41 mm     Past Surgical History:   Procedure Laterality Date    HERNIA REPAIR      HIP ARTHROPLASTY      HYSTERECTOMY      JOINT REPLACEMENT Bilateral     knee; b/l hip    KNEE ARTHROPLASTY      OOPHORECTOMY      WOUND DEBRIDEMENT Right 3/31/2022    Procedure: DEBRIDEMENT WOUND [facility] OUT), right medial thigh;  Surgeon: Carmela Vincent DO;  Location:  MAIN OR;  Service: General     Social History   Social History     Substance and Sexual Activity   Alcohol Use Never     Social History     Substance and Sexual Activity   Drug Use Never     Social History     Tobacco Use   Smoking Status Never Smoker   Smokeless Tobacco Never Used       Family History:   Family History   Problem Relation Age of Onset    Hypertension Father        Medications:  Pertinent medications were reviewed  Current Facility-Administered Medications   Medication Dose Route Frequency Provider Last Rate    acetaminophen  650 mg Oral Q4H PRN Vernell Morales MD      albumin human  12 5 g Intravenous BID Vernell Morales MD      cephalexin  250 mg Oral Q8H [address] Vernell Morales MD      furosemide  10 mg/hr Intravenous Continuous Vernell Morales MD Stopped (09/27/22 0045)    metolazone  2 5 mg Oral Daily Vernell Morales MD      metoprolol succinate  12 5 mg Oral Daily Vernell Morales MD      midodrine  10 mg Oral TID AC Vernell Morales MD      rivaroxaban  15 mg Oral Daily With Breakfast Vernell Morales MD           Allergies   Allergen Reactions    Penicillins Hives     Tolerated unasyn 7/2022    Sulfamethoxazole-Trimethoprim GI Intolerance         Vitals:   /51   Pulse 71   Temp 98 6 °F (37 °C)   Resp 20   Ht 5' (1 524 m)   Wt 123 kg (270 lb 9 6 oz)   SpO2 96%   BMI 52 85 kg/m²   Body mass index is 52 85 kg/m²    SpO2: 96 %,   SpO2 Activity: At Rest,   O2 Device: None (Room air)      Intake/Output Summary (Last 24 hours) at 9/27/2022 0858  Last data filed at 9/27/2022 0848  Gross per 24 hour   Intake 1080 ml   Output 5442 ml   Net -4362 ml     Invasive Devices  Report    Peripheral Intravenous Line  Duration           Peripheral IV 09/23/22 Left Forearm 4 days    Peripheral IV 09/25/22 Proximal;Right;Ventral (anterior) Forearm 1 day          Drain  Duration           External Urinary Catheter 3 days                Physical Exam  General: conscious, cooperative, in no acute distress  Eyes: conjunctivae pink, anicteric sclerae  ENT: lips and mucous membranes moist  Neck: supple, no JVD, no masses  Chest: clear breath sounds bilaterally, no crackles, ronchus or wheezings  CVS: distinct S1 & S2, normal rate, regular rhythm  Abdomen: soft, non-tender, non-distended, normoactive bowel sounds obese  Extremities:  Moderate to severe edema of both legs  Skin: no rash  Neuro: awake, alert, oriented      Diagnostic Data:  Lab: I have personally reviewed pertinent lab results  ,   CBC:  Results from last 7 days   Lab Units 09/26/22  0531   WBC Thousand/uL 7 57   HEMOGLOBIN g/dL 8 9*   HEMATOCRIT % 26 9*   PLATELETS Thousands/uL 122*      CMP:   Lab Results   Component Value Date    SODIUM 137 09/27/2022    K 3 1 (L) 09/27/2022     09/27/2022    CO2 30 09/27/2022    BUN 33 (H) 09/27/2022    CREATININE 1 77 (H) 09/27/2022    CALCIUM 8 0 (L) 09/27/2022    EGFR 26 09/27/2022   ,   PT/INR: No results found for: PT, INR,   Magnesium: No components found for: MAG,  Phosphorous: No results found for: PHOS    Microbiology:  @LABRCNTIP,(urinecx:7)@        LEXIE Hudson    Portions of the record may have been created with voice recognition software  Occasional wrong word or "sound a like" substitutions may have occurred due to the inherent limitations of voice recognition software   Read the chart carefully and recognize, using context, where substitutions have occurred

## 2022-09-27 NOTE — PROGRESS NOTES
[address]  Progress Note - Mary Jeniffer 1940, 80 y o  female MRN: [id number]  Unit/Bed#: -01 Encounter: [id number]  Primary Care Provider: Jonas Ruiz MD   Date and time admitted to hospital: 9/23/2022  6:49 AM    * Acute on chronic diastolic (congestive) heart failure [facility])  Assessment & Plan  Wt Readings from Last 3 Encounters:   09/27/22 123 kg (270 lb 9 6 oz)   07/10/22 121 kg (267 lb 9 6 oz)   05/19/22 126 kg (277 lb 1 9 oz)   Patient presents with anasarca and acute on chronic diastolic CHF exacerbation  2D echo 9/22 shows EF more than 70% and moderate aortic stenosis  Placed on Lasix 40 mg IV b i d  Along with midodrine due to hypotension with minimal response  Currently on Lasix GTT at 10 milligrams/hour  Also supplemented with metolazone 2 5 mg daily  Has had good diuretic response in the last 24 hours  -4 5 L  Continue current diuretic regimen  Monitor electrolytes during diuresis  Potassium replaced  Patient underwent paracentesis on 09/25/2022 and 5 2 L removed  Acute on chronic anemia  Assessment & Plan  Baseline hemoglobin is around 9  down to 7 3  No overt signs of bleeding reported  Iron and B12 levels are normal   Received 1 unit PRBC and hemoglobin improved to 8 9 today  Restart Xarelto at lower dose 15 mg daily    Acute cystitis with hematuria  Assessment & Plan  Patient noted to have acute cystitis with microscopic hematuria present on admission  Placed on IV Rocephin   Urine culture shows Klebsiella  Discontinue IV Rocephin and placed on oral Keflex to complete a total 7 day course  Ascitic fluid is negative for infection  1/2 blood cultures positive for possible coagulase negative staph possible contaminant  Moderate aortic stenosis  Assessment & Plan  As per echo  Avoid hypotension    Cirrhosis of liver with ascites (HCC)  Assessment & Plan  CT abdomen pelvis shows liver cirrhosis    No prior history of cirrhosis as per the patient  Noted to have large volume ascites on admission underwent paracentesis on 09/25/2022 for 5 2 L    neg acute hepatitis panel  Denies any alcohol abuse history  Patient was unaware of her diagnosis of cirrhosis even though it was present on prior imaging  Will refer her outpatient to GI and also need monthly paracentesis to try to keep her euvolemic in addition to being on diuretics  Hypothyroidism (acquired)  Assessment & Plan  TSH is low at 0 33  Low free T3 and elevated free T4  Hold levothyroxine for now as patient appears to be over compensated  Probably restart a lower dose after 1 week    Paroxysmal A-fib ([address] )  Assessment & Plan  Currently rate controlled  Continue Toprol-XL  Resume Xarelto but at lower regimen of 15 mg once daily    Stage 3a chronic kidney disease [facility])  Assessment & Plan  Lab Results   Component Value Date    EGFR 26 09/27/2022    EGFR 24 09/26/2022    EGFR 22 09/25/2022    CREATININE 1 77 (H) 09/27/2022    CREATININE 1 91 (H) 09/26/2022    CREATININE 2 03 (H) 09/25/2022   Baseline creatinine is 1 6-1  78  Currently creatinine is at baseline Patient appears to be volume overloaded  Continue with Lasix GTT and continue to follow BMP closely  Avoid hypotension  VTE Pharmacologic Prophylaxis:   High Risk (Score >/= 5) - Pharmacological DVT Prophylaxis Ordered: rivaroxaban (Xarelto)  Sequential Compression Devices Ordered  Patient Centered Rounds: I performed bedside rounds with nursing staff today  Discussions with Specialists or Other Care Team Provider:  Discussed with Nephrology    Education and Discussions with Family / Patient: Patient declined call to   Time Spent for Care: 30 minutes  More than 50% of total time spent on counseling and coordination of care as described above      Current Length of Stay: 4 day(s)  Current Patient Status: Inpatient   Certification Statement: The patient will continue to require additional inpatient hospital stay due to Ongoing IV diuresis  Discharge Plan: Anticipate discharge in 48 hrs to home with home services  Code Status: Level 1 - Full Code    Subjective:   Patient seen and examined  Denies any worsening shortness of breath  No acute events overnight  Objective:     Vitals:   Temp (24hrs), Av 7 °F (37 1 °C), Min:98 6 °F (37 °C), Max:99 °F (37 2 °C)    Temp:  [98 6 °F (37 °C)-99 °F (37 2 °C)] 98 6 °F (37 °C)  HR:  [71-95] 78  Resp:  [19-20] 20  BP: ()/(51-69) 125/60  SpO2:  [95 %-98 %] 97 %  Body mass index is 52 85 kg/m²  Input and Output Summary (last 24 hours): Intake/Output Summary (Last 24 hours) at 2022 1520  Last data filed at 2022 1501  Gross per 24 hour   Intake 1040 ml   Output 5942 ml   Net -4902 ml       Physical Exam:   Physical Exam  Constitutional:       General: She is not in acute distress  Appearance: She is obese  HENT:      Head: Normocephalic  Nose: Nose normal       Mouth/Throat:      Mouth: Mucous membranes are moist    Eyes:      Extraocular Movements: Extraocular movements intact  Pupils: Pupils are equal, round, and reactive to light  Cardiovascular:      Rate and Rhythm: Normal rate and regular rhythm  Pulmonary:      Effort: Pulmonary effort is normal       Comments: Diminished breath sounds bilaterally  Abdominal:      General: Bowel sounds are normal  There is distension  Palpations: Abdomen is soft  Tenderness: There is no abdominal tenderness  Musculoskeletal:      Cervical back: Neck supple  Right lower leg: Edema present  Left lower leg: Edema present  Skin:     Comments: Small hematoma in the left forearm noted   Neurological:      General: No focal deficit present  Mental Status: She is alert     Psychiatric:         Mood and Affect: Mood normal          Additional Data:     Labs:  Results from last 7 days   Lab Units 22  0531 22  0604 22  0736   WBC Thousand/uL 7 57   < > 12 82*   HEMOGLOBIN g/dL 8 9*   < > 8 6*   HEMATOCRIT % 26 9*   < > 25 8*   PLATELETS Thousands/uL 122*   < > 154   BANDS PCT %  --   --  1   LYMPHO PCT %  --   --  9*   MONO PCT %  --   --  2*   EOS PCT %  --   --  0    < > = values in this interval not displayed  Results from last 7 days   Lab Units 09/27/22  0458 09/24/22  1901 09/24/22  0604   SODIUM mmol/L 137   < > 135   POTASSIUM mmol/L 3 1*   < > 3 9   CHLORIDE mmol/L 101   < > 103   CO2 mmol/L 30   < > 27   BUN mg/dL 33*   < > 29*   CREATININE mg/dL 1 77*   < > 2 03*   ANION GAP mmol/L 6   < > 5   CALCIUM mg/dL 8 0*   < > 8 6   ALBUMIN g/dL  --   --  1 5*   TOTAL BILIRUBIN mg/dL  --   --  3 13*   ALK PHOS U/L  --   --  159*   ALT U/L  --   --  13   AST U/L  --   --  32   GLUCOSE RANDOM mg/dL 97   < > 96    < > = values in this interval not displayed  Results from last 7 days   Lab Units 09/26/22  0531   INR  1 59*             Results from last 7 days   Lab Units 09/27/22  0458 09/23/22  0918 09/23/22  0736   LACTIC ACID mmol/L 0 7 2 3* 2 4*       Lines/Drains:  Invasive Devices  Report    Peripheral Intravenous Line  Duration           Peripheral IV 09/25/22 Proximal;Right;Ventral (anterior) Forearm 2 days          Drain  Duration           External Urinary Catheter 3 days                      Imaging: Reviewed radiology reports from this admission including: chest xray    Recent Cultures (last 7 days):   Results from last 7 days   Lab Units 09/25/22  1157 09/23/22  0750 09/23/22  0739 09/23/22  0736   BLOOD CULTURE   --   --  No Growth After 4 Days   Staphylococcus coagulase negative*   GRAM STAIN RESULT  No Polys  No organisms seen  --   --  Gram positive cocci in clusters*   URINE CULTURE   --  >100,000 cfu/ml Klebsiella pneumoniae*  --   --    BODY FLUID CULTURE, STERILE  No growth  --   --   --        Last 24 Hours Medication List:   Current Facility-Administered Medications   Medication Dose Route Frequency Provider Last Rate    acetaminophen  650 mg Oral Q4H PRN Sabina Alonso MD      albumin human  12 5 g Intravenous BID Sabina Alonso MD      furosemide  10 mg/hr Intravenous Continuous Sabina Alonso MD Stopped (09/27/22 0045)    metolazone  2 5 mg Oral Daily Sabina Alonso MD      metoprolol succinate  12 5 mg Oral Daily Sabina Alonso MD      midodrine  10 mg Oral TID AC Sabina Alonso MD      rivaroxaban  15 mg Oral Daily With Breakfast Sabina Alonso MD          Today, Patient Was Seen By: Rayshawn Gabriel MD    **Please Note: This note may have been constructed using a voice recognition system  **

## 2022-09-27 NOTE — PLAN OF CARE
Problem: PHYSICAL THERAPY ADULT  Goal: Performs mobility at highest level of function for planned discharge setting  See evaluation for individualized goals  Description: Treatment/Interventions: ADL retraining, Functional transfer training, LE strengthening/ROM, Therapeutic exercise, Endurance training, Elevations, Cognitive reorientation, Patient/family training, Equipment eval/education, Bed mobility, Gait training, Compensatory technique education, Spoke to nursing, Spoke to case management, OT  Equipment Recommended:  (walker-pt has)       See flowsheet documentation for full assessment, interventions and recommendations  Note: Prognosis: Good  Problem List: Decreased strength, Decreased endurance, Impaired balance, Decreased mobility, Decreased range of motion, Decreased safety awareness, Obesity  Assessment: Pt is a 80 y o  female seen for PT evaluation s/p admission to [address] [place] [address] on 9/23/2022 with Acute on chronic diastolic (congestive) heart failure ([facility] )  Order placed for PT services    Upon evaluation: Pt is presenting with impaired functional mobility due to decreased strength, decreased ROM, decreased endurance, impaired balance, gait deviations, decreased safety awareness, fall risk, and LE edema requiring  minimal assistance for bed mobility, stand by assistance for transfers, and steadying to stand by assistance for ambulation with RW   Pt's clinical presentation is currently unpredictable given the functional mobility deficits above, especially weakness, edema of extremities, decreased endurance, gait deviations, decreased activity tolerance, decreased functional mobility tolerance, and decreased safety awareness, coupled with fall risks as indicated by AM-PAC 6-Clicks: 01/97 as well as impaired balance and decreased safety awareness and combined with medical complications of abnormal renal lab values, abnormal H&H, abnormal WBCs, abnormal potassium values, multiple readmissions and Small left pleural effusion with atelectasis on CT chest abdomen and pelvis, acute CHF, cirrhosis of liver with ascites s/p paracentesis 9/25/22, moderate aortic stenosis, acute anemia, acute cystitis with hematuria  Pt's PMHx and comorbidities that may affect physical performance and progress include: CHF, CKD, A fib and anemia  Personal factors affecting pt at time of IE include: step(s) to enter environment, advanced age, inability to perform IADLs, inability to perform ADLs and inability to navigate level surfaces without external assistance  Pt will benefit from continued skilled PT services to address deficits as defined above and to maximize level of functional mobility to facilitate return toward PLOF and improved QOL  From PT/mobility standpoint, recommendation at time of d/c would be Home PT, home with family support and with walker pending progress in order to reduce fall risk and maximize pt's functional independence and consistency with mobility in order to facilitate return to PLOF  Recommend ther ex next 1-2 sessions  Barriers to Discharge: None  Barriers to Discharge Comments: pt has support form dtr prn  PT Discharge Recommendation: Home with home health rehabilitation    See flowsheet documentation for full assessment

## 2022-09-27 NOTE — ASSESSMENT & PLAN NOTE
Wt Readings from Last 3 Encounters:   09/27/22 123 kg (270 lb 9 6 oz)   07/10/22 121 kg (267 lb 9 6 oz)   05/19/22 126 kg (277 lb 1 9 oz)   Patient presents with anasarca and acute on chronic diastolic CHF exacerbation  2D echo 9/22 shows EF more than 70% and moderate aortic stenosis  Placed on Lasix 40 mg IV b i d  Along with midodrine due to hypotension with minimal response  Currently on Lasix GTT at 10 milligrams/hour  Also supplemented with metolazone 2 5 mg daily  Has had good diuretic response in the last 24 hours  -4 5 L  Continue current diuretic regimen  Monitor electrolytes during diuresis  Potassium replaced  Patient underwent paracentesis on 09/25/2022 and 5 2 L removed

## 2022-09-27 NOTE — NURSING NOTE
Patient's blood pressure 90/51  Shirley Oshea aware- order to hold lasix gtt x 2 hours and recheck blood pressure  Lasix drip stopped 2951

## 2022-09-27 NOTE — PHYSICAL THERAPY NOTE
PHYSICAL THERAPY EVALUATION  NAME:  Arleene Pallas  DATE: 09/27/22    AGE:   80 y o  Mrn:   [id number]  ADMIT DX:  Cirrhosis ([facility] ) [K74 60]  CHF (congestive heart failure) (HCC) [I50 9]  Altered mental status [R41 82]  Weakness [R53 1]  Urinary tract infection [N39 0]  Ascites [R18 8]  Acute on chronic diastolic (congestive) heart failure (HCC) [I50 33]  Type 2 diabetes mellitus without complication, without long-term current use of insulin (HCC) [E11 9]    Past Medical History:   Diagnosis Date    LEONARDO (acute kidney injury) ([facility] )     Atrial fibrillation ([facility] )     CHF [facility]congestive heart failure) ([facility]     diastolic grade 1    Cirrhosis ([place] [address] )     Diabetes mellitus ([facility] )     Disease of thyroid gland     hypothyroid    [facility]     Hiatal hernia     Hyperlipidemia     Hypertension     Hypothyroid     Lung nodules     Periumbilical hernia     Pulmonary HTN ([place] [address] )     Thoracic aortic aneurysm without rupture ([place] [address] ) 03/25/2022    41 mm     Length Of Stay: 4  Performed at least 2 patient identifiers during session: Name and Birthday  PHYSICAL THERAPY EVALUATION :    09/27/22 1508   PT Last Visit   PT Visit Date 09/27/22   Note Type   Note type Evaluation   Pain Assessment   Pain Assessment Tool 0-10   Pain Score No Pain   Restrictions/Precautions   Other Precautions Chair Alarm; Bed Alarm; Fall Risk;Multiple lines   Home Living   Type of Home House  (1 JAMIA)   Home Layout One level;Performs ADLs on one level; Able to live on main level with bedroom/bathroom   Bathroom Shower/Tub Walk-in shower   Bathroom Toilet Raised   Bathroom Equipment Shower chair;Grab bars in shower;Grab bars around toilet   Home Equipment Walker;Cane  (uses RW, sleeps in recliner chair)   Additional Comments Reports living in a [facility] with 1 JAMIA and uses RW for mobility  Prior Function   Level of [place] Needs assistance with IADLs; Needs assistance with ADLs and functional mobility   Lives With Daughter   Receives Help From Family   ADL Assistance Needs assistance   IADLs Needs assistance   Falls in the last 6 months 1 to 4   Comments Reports having assistance with mobility, ADLs and IADLs  At times, she reports ambulating to the bathroom with RW without assistance from dtr  Then reports her dtr doesn't stay with her when she is ambulating but is available prn  General   Additional Pertinent History increased edema B LEs   Cognition   Orientation Level Oriented to person;Oriented to place; Disoriented to time;Oriented to situation  (increased cues for time)   Following Commands Follows one step commands without difficulty   Subjective   Subjective "I walk around my house "   RLE Assessment   RLE Assessment WFL  (except hip flexion limited by body habitus; strength 3/5 at knee and ankle, 2/5 at hip)   LLE Assessment   LLE Assessment WFL  (except hip flexion limited by body habitus  strength 3-/5 at knee and 3/5 at ankle  2/5 at hip)   Coordination   Rapid Alternating Movements Intact   Light Touch   RLE Light Touch Grossly intact   LLE Light Touch Grossly intact   Bed Mobility   Supine to Sit 4  Minimal assistance   Additional items Assist x 1; Increased time required;Verbal cues;LE management   Additional Comments HOB elevated > 30 degrees, use of bedrail  required minAx1 to complete with LE management  verbal cues for tehcnique and sequence  Transfers   Sit to Stand   (SBA)   Additional items Increased time required;Verbal cues   Stand to Sit   (SBA)   Additional items Increased time required;Verbal cues   Stand pivot   (SBA)   Additional items Increased time required;Verbal cues   Additional Comments use of RW  increased time and effort to achieve standing with RW wiht min cues for hand placement and technique  achiewved standing wiht SBA  spt with RW with SBA with inc time with min cues for turnign completely prior to sitting  Ambulation/Elevation   Gait pattern Wide KITTY; Forward Flexion; Short stride; Excessively slow;Decreased foot clearance   Gait Assistance   (steadying-->SBA)   Additional items Verbal cues   Assistive Device Rolling walker   Distance ambulated 70'x1 with RW with steadying-->SBA with slow mely, decreased step length and foot clearance and inc trunk flexion  min cues for inc step length and staying taurus KITTY of RW  Stair Management Assistance   (pt reports having assistance from her dtr for completion of 1 JAMIA home )   Balance   Static Sitting Good   Dynamic Sitting Fair +   Static Standing Fair   Dynamic Standing [address], & 5 -   Activity Tolerance   Activity Tolerance Patient limited by fatigue   Nurse Made Aware Duyen PARK   Assessment   Prognosis Good   Problem List Decreased strength;Decreased endurance; Impaired balance;Decreased mobility; Decreased range of motion;Decreased safety awareness; Obesity   Barriers to Discharge None   Barriers to Discharge Comments pt has support form dtr prn   Goals   Patient Goals "Go home"   RUST Expiration Date 10/11/22   PT Treatment Day 0   Plan   Treatment/Interventions ADL retraining;Functional transfer training;LE strengthening/ROM; Therapeutic exercise; Endurance training;Elevations;Cognitive reorientation;Patient/family training;Equipment eval/education; Bed mobility;Gait training; Compensatory technique education;Spoke to nursing;Spoke to case management;OT   PT Frequency 3-5x/wk   Recommendation   PT Discharge Recommendation Home with home health rehabilitation   Equipment Recommended   (walker-pt has)   Additional Comments recommend increased support prn from family   Tor 8 in Bed Without Bedrails 2   Lying on Back to Sitting on Edge of Flat Bed 2   Moving Bed to Chair 3   Standing Up From Chair 3   Walk in Room 3   Climb 3-5 Stairs 2   Basic Mobility Inpatient Raw Score 15   Basic Mobility Standardized Score 36 97   Highest Level Of Mobility   JH-HLM Goal 4: Move to chair/commode   JH-HLM Achieved 7: Walk 25 feet or more End of Consult   Patient Position at End of Consult Bedside chair;Bed/Chair alarm activated; All needs within reach     (Please find full objective findings from PT assessment regarding body systems outlined above)  Assessment: Pt is a 80 y o  female seen for PT evaluation s/p admission to [address] [place] [address] on 9/23/2022 with Acute on chronic diastolic (congestive) heart failure ([facility] )  Order placed for PT services  Upon evaluation: Pt is presenting with impaired functional mobility due to decreased strength, decreased ROM, decreased endurance, impaired balance, gait deviations, decreased safety awareness, fall risk, and LE edema requiring  minimal assistance for bed mobility, stand by assistance for transfers, and steadying to stand by assistance for ambulation with RW   Pt's clinical presentation is currently unpredictable given the functional mobility deficits above, especially weakness, edema of extremities, decreased endurance, gait deviations, decreased activity tolerance, decreased functional mobility tolerance, and decreased safety awareness, coupled with fall risks as indicated by AM-PAC 6-Clicks: 63/96 as well as impaired balance and decreased safety awareness and combined with medical complications of abnormal renal lab values, abnormal H&H, abnormal WBCs, abnormal potassium values, multiple readmissions and Small left pleural effusion with atelectasis on CT chest abdomen and pelvis, acute CHF, cirrhosis of liver with ascites s/p paracentesis 9/25/22, moderate aortic stenosis, acute anemia, acute cystitis with hematuria  Pt's PMHx and comorbidities that may affect physical performance and progress include: CHF, CKD, A fib and anemia  Personal factors affecting pt at time of IE include: step(s) to enter environment, advanced age, inability to perform IADLs, inability to perform ADLs and inability to navigate level surfaces without external assistance   Pt will benefit from continued skilled PT services to address deficits as defined above and to maximize level of functional mobility to facilitate return toward PLOF and improved QOL  From PT/mobility standpoint, recommendation at time of d/c would be Home PT, home with family support and with walker pending progress in order to reduce fall risk and maximize pt's functional independence and consistency with mobility in order to facilitate return to PLOF  Recommend ther ex next 1-2 sessions  The patient's AM-PAC Basic Mobility Inpatient Short Form Raw Score is 15  A Raw score of less than or equal to 16 suggests the patient may benefit from discharge to post-acute rehabilitation services  Please also refer to the recommendation of the Physical Therapist for safe discharge planning  However, patient sleeps in a recliner chair and has assistance from dtr prn  Recommendation is home with HHPT  Goals: Pt will: Perform bed mobility tasks with Supervision supine to sit and minAx1 sit to supine to reposition in bed and prepare for transfers  Pt will perform transfers with modified I to decrease burden of care, decrease risk for falls and improve activity tolerance and prepare for ambulation  Pt will ambulate with RW for >/= 125' with  modified I  to decrease risk for falls, improve activity tolerance and improve gait quality and to access home environment  Pt will complete 1 step with RW with consistent min A of 1 to decrease burden of care, return to home with JAMIA, decrease risk for falls and improve activity tolerance  Pt will participate in objective balance assessment to determine baseline fall risk  Pt will increase B LE strength >/= 1/2 MMT grade to facilitate functional mobility        Homero Farley, PT,DPT

## 2022-09-27 NOTE — PLAN OF CARE
Problem: SAFETY ADULT  Goal: Patient will remain free of falls  Description: INTERVENTIONS:  - Educate patient/family on patient safety including physical limitations  - Instruct patient to call for assistance with activity   - Consult OT/PT to assist with strengthening/mobility   - Keep Call bell within reach  - Keep bed low and locked with side rails adjusted as appropriate  - Keep care items and personal belongings within reach  - Initiate and maintain comfort rounds  - Make Fall Risk Sign visible to staff  - Offer Toileting every    Hours, in advance of need  - Initiate/Maintain   alarm  - Obtain necessary fall risk management equipment:   - Apply yellow socks and bracelet for high fall risk patients  - Consider moving patient to room near nurses station  Outcome: Progressing

## 2022-09-27 NOTE — ASSESSMENT & PLAN NOTE
Lab Results   Component Value Date    EGFR 26 09/27/2022    EGFR 24 09/26/2022    EGFR 22 09/25/2022    CREATININE 1 77 (H) 09/27/2022    CREATININE 1 91 (H) 09/26/2022    CREATININE 2 03 (H) 09/25/2022   Baseline creatinine is 1 6-1  78  Currently creatinine is at baseline Patient appears to be volume overloaded  Continue with Lasix GTT and continue to follow BMP closely  Avoid hypotension

## 2022-09-27 NOTE — PLAN OF CARE
Problem: Potential for Falls  Goal: Patient will remain free of falls  Description: INTERVENTIONS:  - Educate patient/family on patient safety including physical limitations  - Instruct patient to call for assistance with activity   - Consult OT/PT to assist with strengthening/mobility   - Keep Call bell within reach  - Keep bed low and locked with side rails adjusted as appropriate  - Keep care items and personal belongings within reach  - Initiate and maintain comfort rounds  - Make Fall Risk Sign visible to staff  - Offer Toileting every 2  Hours, in advance of need  - Initiate/Maintain  bed alarm  - Obtain necessary fall risk management equipment: q shift   - Apply yellow socks and bracelet for high fall risk patients  - Consider moving patient to room near nurses station  Outcome: Progressing     Problem: Nutrition/Hydration-ADULT  Goal: Nutrient/Hydration intake appropriate for improving, restoring or maintaining nutritional needs  Description: Monitor and assess patient's nutrition/hydration status for malnutrition  Collaborate with interdisciplinary team and initiate plan and interventions as ordered  Monitor patient's weight and dietary intake as ordered or per policy  Utilize nutrition screening tool and intervene as necessary  Determine patient's food preferences and provide high-protein, high-caloric foods as appropriate       INTERVENTIONS:  - Monitor oral intake, urinary output, labs, and treatment plans  - Assess nutrition and hydration status and recommend course of action  - Evaluate amount of meals eaten  - Assist patient with eating if necessary   - Allow adequate time for meals  - Recommend/ encourage appropriate diets, oral nutritional supplements, and vitamin/mineral supplements  - Order, calculate, and assess calorie counts as needed  - Recommend, monitor, and adjust tube feedings and TPN/PPN based on assessed needs  - Assess need for intravenous fluids  - Provide specific nutrition/hydration education as appropriate  - Include patient/family/caregiver in decisions related to nutrition  Outcome: Progressing     Problem: PAIN - ADULT  Goal: Verbalizes/displays adequate comfort level or baseline comfort level  Description: Interventions:  - Encourage patient to monitor pain and request assistance  - Assess pain using appropriate pain scale  - Administer analgesics based on type and severity of pain and evaluate response  - Implement non-pharmacological measures as appropriate and evaluate response  - Consider cultural and social influences on pain and pain management  - Notify physician/advanced practitioner if interventions unsuccessful or patient reports new pain  Outcome: Progressing

## 2022-09-28 NOTE — NURSING NOTE
Patient's blood pressure 99/44  Lasix gtt continues to run  Em Solorzano notified, continue lasix gtt and recheck blood pressure in an hour  Will continue to monitor

## 2022-09-28 NOTE — NURSING NOTE
Unsuccessful attempts to obtain new IV to run patient's albumin  Spoke to Kee Waite to put lasix gtt on hold to run albumin  Will resume lasix gtt when albumin completed

## 2022-09-28 NOTE — ASSESSMENT & PLAN NOTE
Wt Readings from Last 3 Encounters:   09/28/22 119 kg (262 lb 12 8 oz)   07/10/22 121 kg (267 lb 9 6 oz)   05/19/22 126 kg (277 lb 1 9 oz)   Patient presents with anasarca and acute on chronic diastolic CHF exacerbation  2D echo 9/22 shows EF more than 70% and moderate aortic stenosis  Placed on Lasix 40 mg IV b i d  Along with midodrine due to hypotension with minimal response  Currently on Lasix GTT at 10 milligrams/hour  Also supplemented with metolazone 2 5 mg daily  Has had good diuretic response in the last 24 hours   -4 2 L  Continue current diuretic regimen  Patient had episode of hypotension requiring IV albumin  Monitor electrolytes during diuresis  Potassium replaced  Patient underwent paracentesis on 09/25/2022 and 5 2 L removed

## 2022-09-28 NOTE — ASSESSMENT & PLAN NOTE
CT abdomen pelvis shows liver cirrhosis  No prior history of cirrhosis as per the patient  Noted to have large volume ascites on admission underwent paracentesis on 09/25/2022 for 5 2 L    neg acute hepatitis panel  Denies any alcohol abuse history  Patient was unaware of her diagnosis of cirrhosis even though it was present on prior imaging  Will refer her outpatient to GI and also may need monthly paracentesis to try to keep her euvolemic in addition to being on diuretics  To consider adding spironolactone if kidney functions continue to improve

## 2022-09-28 NOTE — ASSESSMENT & PLAN NOTE
Lab Results   Component Value Date    EGFR 28 09/28/2022    EGFR 26 09/27/2022    EGFR 24 09/26/2022    CREATININE 1 67 (H) 09/28/2022    CREATININE 1 77 (H) 09/27/2022    CREATININE 1 91 (H) 09/26/2022   Baseline creatinine is 1 6-1  78  Currently creatinine is at baseline Patient appears to be volume overloaded  Continue with Lasix GTT and continue to follow BMP closely  Avoid hypotension

## 2022-09-28 NOTE — PLAN OF CARE
Problem: Potential for Falls  Goal: Patient will remain free of falls  Description: INTERVENTIONS:  - Educate patient/family on patient safety including physical limitations  - Instruct patient to call for assistance with activity   - Consult OT/PT to assist with strengthening/mobility   - Keep Call bell within reach  - Keep bed low and locked with side rails adjusted as appropriate  - Keep care items and personal belongings within reach  - Initiate and maintain comfort rounds  - Make Fall Risk Sign visible to staff  - Apply yellow socks and bracelet for high fall risk patients  - Consider moving patient to room near nurses station  Outcome: Progressing     Problem: Nutrition/Hydration-ADULT  Goal: Nutrient/Hydration intake appropriate for improving, restoring or maintaining nutritional needs  Description: Monitor and assess patient's nutrition/hydration status for malnutrition  Collaborate with interdisciplinary team and initiate plan and interventions as ordered  Monitor patient's weight and dietary intake as ordered or per policy  Utilize nutrition screening tool and intervene as necessary  Determine patient's food preferences and provide high-protein, high-caloric foods as appropriate       INTERVENTIONS:  - Monitor oral intake, urinary output, labs, and treatment plans  - Assess nutrition and hydration status and recommend course of action  - Evaluate amount of meals eaten  - Assist patient with eating if necessary   - Allow adequate time for meals  - Recommend/ encourage appropriate diets, oral nutritional supplements, and vitamin/mineral supplements  - Order, calculate, and assess calorie counts as needed  - Recommend, monitor, and adjust tube feedings and TPN/PPN based on assessed needs  - Assess need for intravenous fluids  - Provide specific nutrition/hydration education as appropriate  - Include patient/family/caregiver in decisions related to nutrition  Outcome: Progressing     Problem: PAIN - ADULT  Goal: Verbalizes/displays adequate comfort level or baseline comfort level  Description: Interventions:  - Encourage patient to monitor pain and request assistance  - Assess pain using appropriate pain scale  - Administer analgesics based on type and severity of pain and evaluate response  - Implement non-pharmacological measures as appropriate and evaluate response  - Consider cultural and social influences on pain and pain management  - Notify physician/advanced practitioner if interventions unsuccessful or patient reports new pain  Outcome: Progressing     Problem: INFECTION - ADULT  Goal: Absence or prevention of progression during hospitalization  Description: INTERVENTIONS:  - Assess and monitor for signs and symptoms of infection  - Monitor lab/diagnostic results  - Monitor all insertion sites, i e  indwelling lines, tubes, and drains  - Monitor endotracheal if appropriate and nasal secretions for changes in amount and color  - [place] appropriate cooling/warming therapies per order  - Administer medications as ordered  - Instruct and encourage patient and family to use good hand hygiene technique  - Identify and instruct in appropriate isolation precautions for identified infection/condition  Outcome: Progressing     Problem: SAFETY ADULT  Goal: Patient will remain free of falls  Description: INTERVENTIONS:  - Educate patient/family on patient safety including physical limitations  - Instruct patient to call for assistance with activity   - Consult OT/PT to assist with strengthening/mobility   - Keep Call bell within reach  - Keep bed low and locked with side rails adjusted as appropriate  - Keep care items and personal belongings within reach  - Initiate and maintain comfort rounds  - Make Fall Risk Sign visible to staff  - Apply yellow socks and bracelet for high fall risk patients  - Consider moving patient to room near nurses station  Outcome: Progressing  Goal: Maintain or return to baseline ADL function  Description: INTERVENTIONS:  -  Assess patient's ability to carry out ADLs; assess patient's baseline for ADL function and identify physical deficits which impact ability to perform ADLs (bathing, care of mouth/teeth, toileting, grooming, dressing, etc )  - Assess/evaluate cause of self-care deficits   - Assess range of motion  - Assess patient's mobility; develop plan if impaired  - Assess patient's need for assistive devices and provide as appropriate  - Encourage maximum independence but intervene and supervise when necessary  - Involve family in performance of ADLs  - Assess for home care needs following discharge   - Consider OT consult to assist with ADL evaluation and planning for discharge  - Provide patient education as appropriate  Outcome: Progressing  Goal: Maintains/Returns to pre admission functional level  Description: INTERVENTIONS:  - Perform BMAT or MOVE assessment daily    - Set and communicate daily mobility goal to care team and patient/family/caregiver     - Collaborate with rehabilitation services on mobility goals if consulted  - Out of bed for toileting  - Record patient progress and toleration of activity level   Outcome: Progressing     Problem: DISCHARGE PLANNING  Goal: Discharge to home or other facility with appropriate resources  Description: INTERVENTIONS:  - Identify barriers to discharge w/patient and caregiver  - Arrange for needed discharge resources and transportation as appropriate  - Identify discharge learning needs (meds, wound care, etc )  - Arrange for interpretive services to assist at discharge as needed  - Refer to Case Management Department for coordinating discharge planning if the patient needs post-hospital services based on physician/advanced practitioner order or complex needs related to functional status, cognitive ability, or social support system  Outcome: Progressing     Problem: Knowledge Deficit  Goal: Patient/family/caregiver demonstrates understanding of disease process, treatment plan, medications, and discharge instructions  Description: Complete learning assessment and assess knowledge base    Interventions:  - Provide teaching at level of understanding  - Provide teaching via preferred learning methods  Outcome: Progressing     Problem: CARDIOVASCULAR - ADULT  Goal: Maintains optimal cardiac output and hemodynamic stability  Description: INTERVENTIONS:  - Monitor I/O, vital signs and rhythm  - Monitor for S/S and trends of decreased cardiac output  - Administer and titrate ordered vasoactive medications to optimize hemodynamic stability  - Assess quality of pulses, skin color and temperature  - Assess for signs of decreased coronary artery perfusion  - Instruct patient to report change in severity of symptoms  Outcome: Progressing  Goal: Absence of cardiac dysrhythmias or at baseline rhythm  Description: INTERVENTIONS:  - Continuous cardiac monitoring, vital signs, obtain 12 lead EKG if ordered  - Administer antiarrhythmic and heart rate control medications as ordered  - Monitor electrolytes and administer replacement therapy as ordered  Outcome: Progressing     Problem: METABOLIC, FLUID AND ELECTROLYTES - ADULT  Goal: Electrolytes maintained within normal limits  Description: INTERVENTIONS:  - Monitor labs and assess patient for signs and symptoms of electrolyte imbalances  - Administer electrolyte replacement as ordered  - Monitor response to electrolyte replacements, including repeat lab results as appropriate  - Instruct patient on fluid and nutrition as appropriate  Outcome: Progressing  Goal: Fluid balance maintained  Description: INTERVENTIONS:  - Monitor labs   - Monitor I/O and WT  - Instruct patient on fluid and nutrition as appropriate  - Assess for signs & symptoms of volume excess or deficit  Outcome: Progressing  Goal: Glucose maintained within target range  Description: INTERVENTIONS:  - Monitor Blood Glucose as ordered  - Assess for signs and symptoms of hyperglycemia and hypoglycemia  - Administer ordered medications to maintain glucose within target range  - Assess nutritional intake and initiate nutrition service referral as needed  Outcome: Progressing     Problem: Prexisting or High Potential for Compromised Skin Integrity  Goal: Skin integrity is maintained or improved  Description: INTERVENTIONS:  - Identify patients at risk for skin breakdown  - Assess and monitor skin integrity  - Assess and monitor nutrition and hydration status  - Monitor labs   - Assess for incontinence   - Turn and reposition patient  - Assist with mobility/ambulation  - Relieve pressure over bony prominences  - Avoid friction and shearing  - Provide appropriate hygiene as needed including keeping skin clean and dry  - Evaluate need for skin moisturizer/barrier cream  - Collaborate with interdisciplinary team   - Patient/family teaching  - Consider wound care consult   Outcome: Progressing     Problem: MOBILITY - ADULT  Goal: Maintain or return to baseline ADL function  Description: INTERVENTIONS:  -  Assess patient's ability to carry out ADLs; assess patient's baseline for ADL function and identify physical deficits which impact ability to perform ADLs (bathing, care of mouth/teeth, toileting, grooming, dressing, etc )  - Assess/evaluate cause of self-care deficits   - Assess range of motion  - Assess patient's mobility; develop plan if impaired  - Assess patient's need for assistive devices and provide as appropriate  - Encourage maximum independence but intervene and supervise when necessary  - Involve family in performance of ADLs  - Assess for home care needs following discharge   - Consider OT consult to assist with ADL evaluation and planning for discharge  - Provide patient education as appropriate  Outcome: Progressing  Goal: Maintains/Returns to pre admission functional level  Description: INTERVENTIONS:  - Perform BMAT or MOVE assessment daily     - Set and communicate daily mobility goal to care team and patient/family/caregiver     - Collaborate with rehabilitation services on mobility goals if consulted  - Out of bed for toileting  - Record patient progress and toleration of activity level   Outcome: Progressing

## 2022-09-28 NOTE — PROGRESS NOTES
[address]  Progress Note - Theron Herman 1940, 80 y o  female MRN: [id number]  Unit/Bed#: -[facility] Encounter: [id number]  Primary Care Provider: Agustín Geurra MD   Date and time admitted to hospital: 9/23/2022  6:49 AM    * Acute on chronic diastolic (congestive) heart failure [facility])  Assessment & Plan  Wt Readings from Last 3 Encounters:   09/28/22 119 kg (262 lb 12 8 oz)   07/10/22 121 kg (267 lb 9 6 oz)   05/19/22 126 kg (277 lb 1 9 oz)   Patient presents with anasarca and acute on chronic diastolic CHF exacerbation  2D echo 9/22 shows EF more than 70% and moderate aortic stenosis  Placed on Lasix 40 mg IV b i d  Along with midodrine due to hypotension with minimal response  Currently on Lasix GTT at 10 milligrams/hour  Also supplemented with metolazone 2 5 mg daily  Has had good diuretic response in the last 24 hours   -4 2 L  Continue current diuretic regimen  Patient had episode of hypotension requiring IV albumin  Monitor electrolytes during diuresis  Potassium replaced  Patient underwent paracentesis on 09/25/2022 and 5 2 L removed  Acute on chronic anemia  Assessment & Plan  Baseline hemoglobin is around 9  down to 7 3  No overt signs of bleeding reported  Iron and B12 levels are normal   Received 1 unit PRBC and hemoglobin improved to 8 5 today  Restart Xarelto at lower dose 15 mg daily    Acute cystitis with hematuria  Assessment & Plan  Patient noted to have acute cystitis with microscopic hematuria present on admission  Placed on IV Rocephin   Urine culture shows Klebsiella  Discontinue IV Rocephin and placed on oral Keflex to complete a total 7 day course  Ascitic fluid is negative for infection  1/2 blood cultures positive for possible coagulase negative staph possible contaminant  Moderate aortic stenosis  Assessment & Plan  As per echo    Avoid hypotension    Cirrhosis of liver with ascites [facility])  Assessment & Plan  CT abdomen pelvis shows liver cirrhosis  No prior history of cirrhosis as per the patient  Noted to have large volume ascites on admission underwent paracentesis on 09/25/2022 for 5 2 L    neg acute hepatitis panel  Denies any alcohol abuse history  Patient was unaware of her diagnosis of cirrhosis even though it was present on prior imaging  Will refer her outpatient to GI and also may need monthly paracentesis to try to keep her euvolemic in addition to being on diuretics  To consider adding spironolactone if kidney functions continue to improve  Hypothyroidism (acquired)  Assessment & Plan  TSH is low at 0 33  Low free T3 and elevated free T4  Hold levothyroxine for now as patient appears to be over compensated  Probably restart a lower dose after 1 week    Paroxysmal A-fib ([address] )  Assessment & Plan  Currently rate controlled  Continue Toprol-XL  Resume Xarelto but at lower regimen of 15 mg once daily    Stage 3a chronic kidney disease [facility])  Assessment & Plan  Lab Results   Component Value Date    EGFR 28 09/28/2022    EGFR 26 09/27/2022    EGFR 24 09/26/2022    CREATININE 1 67 (H) 09/28/2022    CREATININE 1 77 (H) 09/27/2022    CREATININE 1 91 (H) 09/26/2022   Baseline creatinine is 1 6-1  78  Currently creatinine is at baseline Patient appears to be volume overloaded  Continue with Lasix GTT and continue to follow BMP closely  Avoid hypotension  VTE Pharmacologic Prophylaxis:   High Risk (Score >/= 5) - Pharmacological DVT Prophylaxis Ordered: rivaroxaban (Xarelto)  Sequential Compression Devices Ordered  Patient Centered Rounds: I performed bedside rounds with nursing staff today  Discussions with Specialists or Other Care Team Provider:  Discussed with nephrology    Education and Discussions with Family / Patient: Updated  (daughter) via phone  Time Spent for Care: 30 minutes   More than 50% of total time spent on counseling and coordination of care as described above     Current Length of Stay: 5 day(s)  Current Patient Status: Inpatient   Certification Statement: The patient will continue to require additional inpatient hospital stay due to Ongoing IV diuresis  Discharge Plan: Anticipate discharge in 48 hrs to home with home services  Code Status: Level 1 - Full Code    Subjective:   Patient seen and examined at bedside  Reports improvement and leg swelling and is able to mobilize legs better  Denies any shortness of breath chest discomfort lightheadedness or dizziness  Objective:     Vitals:   Temp (24hrs), Av 1 °F (36 7 °C), Min:98 1 °F (36 7 °C), Max:98 2 °F (36 8 °C)    Temp:  [98 1 °F (36 7 °C)-98 2 °F (36 8 °C)] 98 2 °F (36 8 °C)  HR:  [] 97  Resp:  [18-19] 19  BP: ()/(44-71) 116/71  SpO2:  [96 %-98 %] 98 %  Body mass index is 51 32 kg/m²  Input and Output Summary (last 24 hours): Intake/Output Summary (Last 24 hours) at 2022 1429  Last data filed at 2022 1322  Gross per 24 hour   Intake 535 12 ml   Output 3910 ml   Net -3374 88 ml       Physical Exam:   Physical Exam  Constitutional:       General: She is not in acute distress  Appearance: She is obese  HENT:      Head: Normocephalic  Nose: Nose normal       Mouth/Throat:      Mouth: Mucous membranes are moist    Eyes:      Pupils: Pupils are equal, round, and reactive to light  Cardiovascular:      Rate and Rhythm: Normal rate and regular rhythm  Pulses: Normal pulses  Pulmonary:      Effort: Pulmonary effort is normal       Breath sounds: Normal breath sounds  Abdominal:      General: Bowel sounds are normal  There is distension  Palpations: Abdomen is soft  Musculoskeletal:      Cervical back: Neck supple  Right lower leg: Edema present  Left lower leg: Edema present  Comments: Improving bilateral lower extremity edema   Skin:     General: Skin is warm  Coloration: Skin is pale     Neurological:      Mental Status: She is alert and oriented to person, place, and time  Mental status is at baseline  Cranial Nerves: No cranial nerve deficit  Additional Data:     Labs:  Results from last 7 days   Lab Units 09/28/22  0532 09/24/22  0604 09/23/22  0736   WBC Thousand/uL 6 36   < > 12 82*   HEMOGLOBIN g/dL 8 5*   < > 8 6*   HEMATOCRIT % 26 2*   < > 25 8*   PLATELETS Thousands/uL 116*   < > 154   BANDS PCT %  --   --  1   LYMPHO PCT % 40  --  9*   MONO PCT % 14*  --  2*   EOS PCT % 5  --  0    < > = values in this interval not displayed  Results from last 7 days   Lab Units 09/28/22  0532 09/24/22  1901 09/24/22  0604   SODIUM mmol/L 137   < > 135   POTASSIUM mmol/L 3 1*   < > 3 9   CHLORIDE mmol/L 100   < > 103   CO2 mmol/L 28   < > 27   BUN mg/dL 35*   < > 29*   CREATININE mg/dL 1 67*   < > 2 03*   ANION GAP mmol/L 9   < > 5   CALCIUM mg/dL 8 8   < > 8 6   ALBUMIN g/dL  --   --  1 5*   TOTAL BILIRUBIN mg/dL  --   --  3 13*   ALK PHOS U/L  --   --  159*   ALT U/L  --   --  13   AST U/L  --   --  32   GLUCOSE RANDOM mg/dL 91   < > 96    < > = values in this interval not displayed  Results from last 7 days   Lab Units 09/26/22  0531   INR  1 59*             Results from last 7 days   Lab Units 09/27/22  0458 09/23/22  0918 09/23/22  0736   LACTIC ACID mmol/L 0 7 2 3* 2 4*       Lines/Drains:  Invasive Devices  Report    Peripheral Intravenous Line  Duration           Peripheral IV 09/25/22 Proximal;Right;Ventral (anterior) Forearm 2 days          Drain  Duration           External Urinary Catheter 4 days                      Imaging: No pertinent imaging reviewed  Recent Cultures (last 7 days):   Results from last 7 days   Lab Units 09/25/22  1157 09/23/22  0750 09/23/22  0739 09/23/22  0736   BLOOD CULTURE   --   --  No Growth After 5 Days   Staphylococcus coagulase negative*   GRAM STAIN RESULT  No Polys  No organisms seen  --   --  Gram positive cocci in clusters*   URINE CULTURE   --  >100,000 cfu/ml Klebsiella pneumoniae*  --   --    BODY FLUID CULTURE, STERILE  No growth  --   --   --        Last 24 Hours Medication List:   Current Facility-Administered Medications   Medication Dose Route Frequency Provider Last Rate    acetaminophen  650 mg Oral Q4H PRN Cherry Olivo MD      albumin human  12 5 g Intravenous BID Cherry Olivo MD      furosemide  10 mg/hr Intravenous Continuous Cherry Olivo MD 10 mg/hr (09/28/22 7802)    metolazone  2 5 mg Oral Daily Cherry Olivo MD      metoprolol succinate  12 5 mg Oral Daily Cherry Olivo MD      midodrine  10 mg Oral TID AC Cherry Olivo MD      potassium chloride  40 mEq Oral BID LEXIE Valle      rivaroxaban  15 mg Oral Daily With Breakfast Cherry Olivo MD          Today, Patient Was Seen By: Margeret Bence    **Please Note: This note may have been constructed using a voice recognition system  **

## 2022-09-28 NOTE — ASSESSMENT & PLAN NOTE
Baseline hemoglobin is around 9  down to 7 3  No overt signs of bleeding reported  Iron and B12 levels are normal   Received 1 unit PRBC and hemoglobin improved to 8 5 today    Restart Xarelto at lower dose 15 mg daily

## 2022-09-28 NOTE — PROGRESS NOTES
NEPHROLOGY PROGRESS NOTE   Jacek Talley 80 y o  female MRN: [id number]  Unit/Bed#: -01 Encounter: [id number]    Assessment/Plan:    20-year-old female with HFpEF, liver cirrhosis, PAF, CKD 3b admitted 09/23 with chief complaint generalized weakness and shortness of breath being treated for decompensated heart failure on Lasix infusion  Status post paracentesis 9/25      1  Acute kidney injury (POA) atop chronic kidney disease  ? Creatinine continues to improve  Etiology is decreased effective arterial volume from volume overloaded state  See below regarding diuresis  Maintain mean arterial pressure greater than 65 mmHg  Continue to avoid potential nephrotoxins  2  Stage IIIB chronic kidney disease with baseline creatinine around 1 7-1 9 mg/dL  3  Volume overload, acute on chronic diastolic CHF exacerbation, suspected cardiorenal syndrome  ? -4 2 L in 24 hours  Continue Lasix 10 milligrams/hour and metolazone 2 5 mg daily  Reviewed outpatient cardiology note  Estimated dry weight is around 252 lb  When she saw her cardiologist in July, weight was 264 lb and she was in acute heart failure  Recommend continuation of diuresis to dry weight if she remains stable  Multiple admissions for heart failure noted  Discussing with patient and hospitalist   Piper Adrian for nursing to hold Lasix for hypotension and resume once blood pressure normalizes  Continue albumin infusions and midodrine  Continue low-sodium diet, daily weight, strict I&O  4  Diuretic induced hypokalemia  ? One hundred twenty mEq oral potassium  5  Cirrhosis of the liver with ascites  ? Status post paracentesis 9/25  If kidney function continues to improve will consider addition of low-dose spironolactone  6  Hypotension  ? Continue midodrine 10 mg t i d   Okay to hold Lasix infusion if SBP less than 95 mmHg or symptomatic           ROS  Seen and examined sitting in bed    States she still feels swollen and endorses wanting more weight removed  A complete 10 point review of systems have been performed and are otherwise negative         Historical Information   Past Medical History:   Diagnosis Date    [place]ARDO (acute kidney injury) ([place] [address] )     Atrial fibrillation ([place] [address] )     CHF (congestive heart failure) (HCC)     diastolic grade 1    Cirrhosis ([place] [address] )     Diabetes mellitus ([place] [address] )     Disease of thyroid gland     hypothyroid    Endometrial ca ([place] [address] )     Hiatal hernia     Hyperlipidemia     Hypertension     Hypothyroid     Lung nodules     Periumbilical hernia     Pulmonary HTN ([place] [address] )     Thoracic aortic aneurysm without rupture ([place] [address] ) 03/25/2022    41 mm     Past Surgical History:   Procedure Laterality Date    HERNIA REPAIR      HIP ARTHROPLASTY      HYSTERECTOMY      JOINT REPLACEMENT Bilateral     knee; b/l hip    KNEE ARTHROPLASTY      OOPHORECTOMY      WOUND DEBRIDEMENT Right 3/31/2022    Procedure: DEBRIDEMENT WOUND [facility] OUT), right medial thigh;  Surgeon: Shireen Sanchez DO;  Location:  MAIN OR;  Service: General     Social History   Social History     Substance and Sexual Activity   Alcohol Use Never     Social History     Substance and Sexual Activity   Drug Use Never     Social History     Tobacco Use   Smoking Status Never Smoker   Smokeless Tobacco Never Used       Family History:   Family History   Problem Relation Age of Onset    Hypertension Father        Medications:  Pertinent medications were reviewed  Current Facility-Administered Medications   Medication Dose Route Frequency Provider Last Rate    acetaminophen  650 mg Oral Q4H PRN Sabina Alonso MD      albumin human  12 5 g Intravenous BID Sabina Alonso MD      [facility]] furosemide  10 mg/hr Intravenous Continuous Sabina Alonso MD 10 mg/hr (09/27/22 2300)    metolazone  2 5 mg Oral Daily Sabina Alonso MD      metoprolol succinate  12 5 mg Oral Daily Sabina Alonso MD      midodrine  10 mg Oral TID AC Sabina Alonso MD      potassium chloride  40 mEq Oral BID Jordy Bilberry, CRNP      rivaroxaban  15 mg Oral Daily With Breakfast Pacheco Mitchell MD           Allergies   Allergen Reactions    Penicillins Hives     Tolerated unasyn 7/2022    Sulfamethoxazole-Trimethoprim GI Intolerance         Vitals:   /69   Pulse 103   Temp 98 2 °F (36 8 °C)   Resp 19   Ht 5' (1 524 m)   Wt 119 kg (262 lb 12 8 oz)   SpO2 96%   BMI 51 32 kg/m²   Body mass index is 51 32 kg/m²  SpO2: 96 %,   SpO2 Activity: At Rest,   O2 Device: None (Room air)      Intake/Output Summary (Last 24 hours) at 9/28/2022 0949  Last data filed at 9/28/2022 0953  Gross per 24 hour   Intake 440 ml   Output 4250 ml   Net -3810 ml     Invasive Devices  Report    Peripheral Intravenous Line  Duration           Peripheral IV 09/25/22 Proximal;Right;Ventral (anterior) Forearm 2 days          Drain  Duration           External Urinary Catheter 4 days                Physical Exam  General: conscious, cooperative, in no acute distress  Eyes: conjunctivae pink, anicteric sclerae  ENT: lips and mucous membranes moist  Neck: supple, no JVD, no masses  Chest: clear breath sounds bilaterally, no crackles, ronchus or wheezings  CVS: distinct S1 & S2, normal rate, regular rhythm  Abdomen: soft, non-tender, non-distended, normoactive bowel sounds obese  Extremities:  Severe edema of both legs  Skin: no rash  Neuro: awake, alert, oriented      Diagnostic Data:  Lab: I have personally reviewed pertinent lab results  ,   CBC:  Results from last 7 days   Lab Units 09/28/22  0532   WBC Thousand/uL 6 36   HEMOGLOBIN g/dL 8 5*   HEMATOCRIT % 26 2*   PLATELETS Thousands/uL 116*      CMP:   Lab Results   Component Value Date    SODIUM 137 09/28/2022    K 3 1 (L) 09/28/2022     09/28/2022    CO2 28 09/28/2022    BUN 35 (H) 09/28/2022    CREATININE 1 67 (H) 09/28/2022    CALCIUM 8 8 09/28/2022    EGFR 28 09/28/2022   ,   PT/INR: No results found for: PT, INR,   Magnesium: No components found for: MAG,  Phosphorous: No results found for: PHOS    Microbiology:  @LAB[facility],(urinecx:7)@        LEXIE Cortes    Portions of the record may have been created with voice recognition software  Occasional wrong word or "sound a like" substitutions may have occurred due to the inherent limitations of voice recognition software  Read the chart carefully and recognize, using context, where substitutions have occurred

## 2022-09-29 PROBLEM — E87.6 HYPOKALEMIA: Status: ACTIVE | Noted: 2022-01-01

## 2022-09-29 NOTE — CASE MANAGEMENT
Case Management Discharge Planning Note    Patient name Maulik Barton  Location /-68 MRN [id number]  : 1940 Date 2022       Current Admission Date: 2022  Current Admission Diagnosis:Acute on chronic diastolic (congestive) heart failure [facility])   Patient Active Problem List    Diagnosis Date Noted    Hypokalemia 2022    Acute on chronic anemia 2022    CKD (chronic kidney disease) 2022    Acute cystitis with hematuria 2022    Acute on chronic diastolic (congestive) heart failure ([facility] ) 2022    Moderate aortic stenosis 2022    Pulmonary HTN ([facility] )     Cholelithiases 2022    Cirrhosis of liver with ascites ([facility] ) 2022    Cellulitis of right lower extremity 2022    Fusiform ectasia of ascending aorta 2022    Moderate protein-calorie malnutrition ([facility] ) 2022    Septic shock ([facility] ) 2022    Stage 3a chronic kidney disease ([facility] ) 2022    Paroxysmal A-fib ([facility] ) 2022    Fall 2022    Traumatic hematoma of head 2022    Impaired skin integrity 2022    Hypothyroidism (acquired) 2022    Diabetes mellitus ([facility] ) 2022    [facility] 2022      LOS (days): 6  Geometric Mean LOS (GMLOS) (days): 3 80  Days to GMLOS:-2 5     OBJECTIVE:  Risk of Unplanned Readmission Score: 25 47         Current admission status: Inpatient   Preferred Pharmacy:   [facility], [address] [facility]  [address] [facility] [place]  Phone: [phone] Fax: [phone]    [facility] [address]  [address] [facility] [address] [address]  Phone: [phone] Fax: [address] [address]  Phone: [phone] Fax: [phone]    Primary Care Provider: Munira Felton MD    Primary Insurance: MEDICARE  Secondary Insurance: [facility]    DISCHARGE DETAILS:        Chart reviewed aware of medical management  Case was discussed in multidisciplinary discharge meeting  Clinical information supporting hospitalization: congestive heart failure, patient remains on lasix drip    Barriers to discharge:  - medical management  Discharge plan:  home with resumption [facility] CM will continue to follow plan of care

## 2022-09-29 NOTE — PLAN OF CARE
Problem: Potential for Falls  Goal: Patient will remain free of falls  Description: INTERVENTIONS:  - Educate patient/family on patient safety including physical limitations  - Instruct patient to call for assistance with activity   - Consult OT/PT to assist with strengthening/mobility   - Keep Call bell within reach  - Keep bed low and locked with side rails adjusted as appropriate  - Keep care items and personal belongings within reach  - Initiate and maintain comfort rounds  - Make Fall Risk Sign visible to staff  - Apply yellow socks and bracelet for high fall risk patients  - Consider moving patient to room near nurses station  Outcome: Progressing     Problem: Nutrition/Hydration-ADULT  Goal: Nutrient/Hydration intake appropriate for improving, restoring or maintaining nutritional needs  Description: Monitor and assess patient's nutrition/hydration status for malnutrition  Collaborate with interdisciplinary team and initiate plan and interventions as ordered  Monitor patient's weight and dietary intake as ordered or per policy  Utilize nutrition screening tool and intervene as necessary  Determine patient's food preferences and provide high-protein, high-caloric foods as appropriate       INTERVENTIONS:  - Monitor oral intake, urinary output, labs, and treatment plans  - Assess nutrition and hydration status and recommend course of action  - Evaluate amount of meals eaten  - Assist patient with eating if necessary   - Allow adequate time for meals  - Recommend/ encourage appropriate diets, oral nutritional supplements, and vitamin/mineral supplements  - Order, calculate, and assess calorie counts as needed  - Recommend, monitor, and adjust tube feedings and TPN/PPN based on assessed needs  - Assess need for intravenous fluids  - Provide specific nutrition/hydration education as appropriate  - Include patient/family/caregiver in decisions related to nutrition  Outcome: Progressing     Problem: PAIN - ADULT  Goal: Verbalizes/displays adequate comfort level or baseline comfort level  Description: Interventions:  - Encourage patient to monitor pain and request assistance  - Assess pain using appropriate pain scale  - Administer analgesics based on type and severity of pain and evaluate response  - Implement non-pharmacological measures as appropriate and evaluate response  - Consider cultural and social influences on pain and pain management  - Notify physician/advanced practitioner if interventions unsuccessful or patient reports new pain  Outcome: Progressing     Problem: INFECTION - ADULT  Goal: Absence or prevention of progression during hospitalization  Description: INTERVENTIONS:  - Assess and monitor for signs and symptoms of infection  - Monitor lab/diagnostic results  - Monitor all insertion sites, i e  indwelling lines, tubes, and drains  - Monitor endotracheal if appropriate and nasal secretions for changes in amount and color  - [place] appropriate cooling/warming therapies per order  - Administer medications as ordered  - Instruct and encourage patient and family to use good hand hygiene technique  - Identify and instruct in appropriate isolation precautions for identified infection/condition  Outcome: Progressing     Problem: SAFETY ADULT  Goal: Patient will remain free of falls  Description: INTERVENTIONS:  - Educate patient/family on patient safety including physical limitations  - Instruct patient to call for assistance with activity   - Consult OT/PT to assist with strengthening/mobility   - Keep Call bell within reach  - Keep bed low and locked with side rails adjusted as appropriate  - Keep care items and personal belongings within reach  - Initiate and maintain comfort rounds  - Make Fall Risk Sign visible to staff  - Apply yellow socks and bracelet for high fall risk patients  - Consider moving patient to room near nurses station  Outcome: Progressing  Goal: Maintain or return to baseline ADL function  Description: INTERVENTIONS:  - Educate patient/family on patient safety including physical limitations  - Instruct patient to call for assistance with activity   - Consult OT/PT to assist with strengthening/mobility   - Keep Call bell within reach  - Keep bed low and locked with side rails adjusted as appropriate  - Keep care items and personal belongings within reach  - Initiate and maintain comfort rounds  - Make Fall Risk Sign visible to staff  - Apply yellow socks and bracelet for high fall risk patients  - Consider moving patient to room near nurses station  Outcome: Progressing  Goal: Maintains/Returns to pre admission functional level  Description: INTERVENTIONS:  - Perform BMAT or MOVE assessment daily    - Set and communicate daily mobility goal to care team and patient/family/caregiver  - Collaborate with rehabilitation services on mobility goals if consulted  - Out of bed for toileting  - Record patient progress and toleration of activity level   Outcome: Progressing     Problem: DISCHARGE PLANNING  Goal: Discharge to home or other facility with appropriate resources  Description: INTERVENTIONS:  - Identify barriers to discharge w/patient and caregiver  - Arrange for needed discharge resources and transportation as appropriate  - Identify discharge learning needs (meds, wound care, etc )  - Arrange for interpretive services to assist at discharge as needed  - Refer to Case Management Department for coordinating discharge planning if the patient needs post-hospital services based on physician/advanced practitioner order or complex needs related to functional status, cognitive ability, or social support system  Outcome: Progressing     Problem: Knowledge Deficit  Goal: Patient/family/caregiver demonstrates understanding of disease process, treatment plan, medications, and discharge instructions  Description: Complete learning assessment and assess knowledge base    Interventions:  - Provide teaching at level of understanding  - Provide teaching via preferred learning methods  Outcome: Progressing     Problem: CARDIOVASCULAR - ADULT  Goal: Maintains optimal cardiac output and hemodynamic stability  Description: INTERVENTIONS:  - Monitor I/O, vital signs and rhythm  - Monitor for S/S and trends of decreased cardiac output  - Administer and titrate ordered vasoactive medications to optimize hemodynamic stability  - Assess quality of pulses, skin color and temperature  - Assess for signs of decreased coronary artery perfusion  - Instruct patient to report change in severity of symptoms  Outcome: Progressing  Goal: Absence of cardiac dysrhythmias or at baseline rhythm  Description: INTERVENTIONS:  - Continuous cardiac monitoring, vital signs, obtain 12 lead EKG if ordered  - Administer antiarrhythmic and heart rate control medications as ordered  - Monitor electrolytes and administer replacement therapy as ordered  Outcome: Progressing     Problem: METABOLIC, FLUID AND ELECTROLYTES - ADULT  Goal: Electrolytes maintained within normal limits  Description: INTERVENTIONS:  - Monitor labs and assess patient for signs and symptoms of electrolyte imbalances  - Administer electrolyte replacement as ordered  - Monitor response to electrolyte replacements, including repeat lab results as appropriate  - Instruct patient on fluid and nutrition as appropriate  Outcome: Progressing  Goal: Fluid balance maintained  Description: INTERVENTIONS:  - Monitor labs   - Monitor I/O and WT  - Instruct patient on fluid and nutrition as appropriate  - Assess for signs & symptoms of volume excess or deficit  Outcome: Progressing  Goal: Glucose maintained within target range  Description: INTERVENTIONS:  - Monitor Blood Glucose as ordered  - Assess for signs and symptoms of hyperglycemia and hypoglycemia  - Administer ordered medications to maintain glucose within target range  - Assess nutritional intake and initiate nutrition service referral as needed  Outcome: Progressing     Problem: Prexisting or High Potential for Compromised Skin Integrity  Goal: Skin integrity is maintained or improved  Description: INTERVENTIONS:  - Identify patients at risk for skin breakdown  - Assess and monitor skin integrity  - Assess and monitor nutrition and hydration status  - Monitor labs   - Assess for incontinence   - Turn and reposition patient  - Assist with mobility/ambulation  - Relieve pressure over bony prominences  - Avoid friction and shearing  - Provide appropriate hygiene as needed including keeping skin clean and dry  - Evaluate need for skin moisturizer/barrier cream  - Collaborate with interdisciplinary team   - Patient/family teaching  - Consider wound care consult   Outcome: Progressing     Problem: MOBILITY - ADULT  Goal: Maintain or return to baseline ADL function  Description: INTERVENTIONS:  - Educate patient/family on patient safety including physical limitations  - Instruct patient to call for assistance with activity   - Consult OT/PT to assist with strengthening/mobility   - Keep Call bell within reach  - Keep bed low and locked with side rails adjusted as appropriate  - Keep care items and personal belongings within reach  - Initiate and maintain comfort rounds  - Make Fall Risk Sign visible to staff  - Apply yellow socks and bracelet for high fall risk patients  - Consider moving patient to room near nurses station  Outcome: Progressing  Goal: Maintains/Returns to pre admission functional level  Description: INTERVENTIONS:  - Perform BMAT or MOVE assessment daily    - Set and communicate daily mobility goal to care team and patient/family/caregiver     - Collaborate with rehabilitation services on mobility goals if consulted  - Out of bed for toileting  - Record patient progress and toleration of activity level   Outcome: Progressing

## 2022-09-29 NOTE — ASSESSMENT & PLAN NOTE
Wt Readings from Last 3 Encounters:   09/29/22 119 kg (261 lb 11 oz)   07/10/22 121 kg (267 lb 9 6 oz)   05/19/22 126 kg (277 lb 1 9 oz)   Patient presents with anasarca and acute on chronic diastolic CHF exacerbation  2D echo 9/22 shows EF more than 70% and moderate aortic stenosis  Placed on Lasix 40 mg IV b i d  Along with midodrine due to hypotension with minimal response  Currently on Lasix GTT at 10 milligrams/hour  Also supplemented with metolazone 2 5 mg daily  Continues to have good diuretic response in the last 24 hours  Continue current diuretic regimen  Patient had episode of hypotension requiring IV albumin   lb  Patient at 261 lb today  Monitor electrolytes during diuresis  Potassium replaced  Patient underwent paracentesis on 09/25/2022 and 5 2 L removed

## 2022-09-29 NOTE — PROGRESS NOTES
Progress Note - Nephrology   Seth Peguero 80 y o  female MRN: [id number]  Unit/Bed#: -01 Encounter: [id number]    A/P:  1  LEONARDO on CKD  Cr trend stable at 1 7  Etiology venous congestion/decreased effective arterial volume  Continue diuresis, Cr tolerating at present  Weight down 26 lb  Follow daily weight/BP  If hemodynamics worsen would hold IV lasix gtt  Continue albumin infusions and midodrine 10mg TID  Trying to establish EDW, previously felt to be 252 lb  2  CKD4 baseline 1 7-1 9  needs op fu at discharge  3  Hypokalemia due to aggressive diuresis, mild, continue to replenish to goal 3 5-4  Mag WNL on 9/28  4  Elevated bicarbonate likely metabolic alkalosis from diuresis  If trend > 35 may need diamox  Would check vbg first to assess acid/base status    5  Volume overload due to cirrhosis, acute on chronic diastolic CHF   Continue lasix gtt and metolazone as we are for now  EDW previously around 252 lb  Weight today 261 lb  Standing weight 287 at highest here  Follow up reason for today's visit:     LEONARDO on CKD     Subjective:   Patient seen and examined today  Denies lightheadedness/dizziness/cp/sob/n/v/d  She reports walking around without SOB  She is frustrated that she is leaking around [address]  Her weight is down 26 lb from 9/25  Weight 261 lb from 262 lb yesterday  BP trend soft but stable  A complete 10 point review of systems was performed and is otherwise negative  Objective:     Vitals: Blood pressure 104/67, pulse 69, temperature 98 2 °F (36 8 °C), temperature source Temporal, resp  rate 15, height 5' (1 524 m), weight 119 kg (261 lb 11 oz), SpO2 95 %  ,Body mass index is 51 11 kg/m²      Weight (last 2 days)     Date/Time Weight    09/29/22 0600 119 (261 69)    09/28/22 0537 119 (262 8)    09/27/22 0508 123 (270 6)            Intake/Output Summary (Last 24 hours) at 9/29/2022 1412  Last data filed at 9/29/2022 0928  Gross per 24 hour   Intake 246 15 ml   Output 700 ml   Net -453 85 ml     I/O last 3 completed shifts: In: 301 3 [P O :240; I V :61 3]  Out: 3310 [Urine:3310]         Physical Exam: /67 (BP Location: Right arm)   Pulse 69   Temp 98 2 °F (36 8 °C) (Temporal)   Resp 15   Ht 5' (1 524 m)   Wt 119 kg (261 lb 11 oz)   SpO2 95%   BMI 51 11 kg/m²     General Appearance:    Alert, cooperative, no distress, appears stated age, obese   Head:    Normocephalic, without obvious abnormality, atraumatic   Eyes:    Conjunctiva/corneas clear   Ears:    Normal external ears   Nose:   Nares normal, septum midline, mucosa normal, no drainage    or sinus tenderness   Throat:   Lips, mucosa, and tongue normal; teeth and gums normal   Neck:   Supple       Lungs:     Clear to auscultation bilaterally, respirations unlabored   Chest wall:    No tenderness or deformity   Heart:    Regular rate and rhythm, S1 and S2 normal, no murmur, rub   or gallop   Abdomen:     Soft, non-tender, bowel sounds active   Extremities:   + 2 edema BL LE     Skin:   Skin color, texture, turgor normal, no rashes or lesions   Lymph nodes:   Cervical normal   Neurologic:   CNII-XII intact            Lab, Imaging and other studies: I have personally reviewed pertinent labs  CBC: No results found for: WBC, HGB, HCT, MCV, PLT, ADJUSTEDWBC, MCH, MCHC, RDW, MPV, NRBC  CMP:   Lab Results   Component Value Date    K 3 2 (L) 09/29/2022    CL 99 09/29/2022    CO2 35 (H) 09/29/2022    BUN 36 (H) 09/29/2022    CREATININE 1 75 (H) 09/29/2022    CALCIUM 9 0 09/29/2022    EGFR 26 09/29/2022           Results from last 7 days   Lab Units 09/29/22  0840 09/28/22  0532 09/27/22  0458 09/24/22  1901 09/24/22  0604 09/23/22  0736   POTASSIUM mmol/L 3 2* 3 1* 3 1*   < > 3 9 4 0   CHLORIDE mmol/L 99 100 101   < > 103 103   CO2 mmol/L 35* 28 30   < > 27 26   BUN mg/dL 36* 35* 33*   < > 29* 25   CREATININE mg/dL 1 75* 1 67* 1 77*   < > 2 03* 1 98*   CALCIUM mg/dL 9 0 8 8 8 0*   < > 8 6 8 5   ALK PHOS U/L  --   --   --   -- 159* 167*   ALT U/L  --   --   --   --  13 15   AST U/L  --   --   --   --  32 32    < > = values in this interval not displayed  Phosphorus: No results found for: PHOS  Magnesium: No results found for: MG  Urinalysis: No results found for: Ismael Brill, SPECGRAV, PHUR, LEUKOCYTESUR, NITRITE, PROTEINUA, GLUCOSEU, KETONESU, BILIRUBINUR, BLOODU  Ionized Calcium: No results found for: CAION  Coagulation: No results found for: PT, INR, APTT  Troponin: No results found for: TROPONINI  ABG: No results found for: PHART, WZT9NTB, PO2ART, CRN3CFV, A8FKMIRU, BEART, SOURCE  Radiology review:     IMAGING  No results found  Current Facility-Administered Medications   Medication Dose Route Frequency    acetaminophen (TYLENOL) tablet 650 mg  650 mg Oral Q4H PRN    albumin human (FLEXBUMIN) 25 % injection 12 5 g  12 5 g Intravenous BID    furosemide (LASIX) 500 mg infusion 50 mL  10 mg/hr Intravenous Continuous    metolazone (ZAROXOLYN) tablet 2 5 mg  2 5 mg Oral Daily    metoprolol succinate (TOPROL-XL) 24 hr tablet 12 5 mg  12 5 mg Oral Daily    midodrine (PROAMATINE) tablet 10 mg  10 mg Oral TID AC    potassium chloride (K-DUR,KLOR-CON) CR tablet 40 mEq  40 mEq Oral BID    rivaroxaban (XARELTO) tablet 15 mg  15 mg Oral Daily With Breakfast     Medications Discontinued During This Encounter   Medication Reason    midodrine (PROAMATINE) tablet 5 mg     metoprolol succinate (TOPROL-XL) 24 hr tablet 25 mg     furosemide (LASIX) injection 40 mg     furosemide (LASIX) injection 80 mg     levothyroxine tablet 150 mcg     midodrine (PROAMATINE) tablet 5 mg     cefTRIAXone (ROCEPHIN) IVPB (premix in dextrose) 1,000 mg 50 mL        [facility]      This progress note was produced in part using a dictation device which may document imprecise wording from author's original intent

## 2022-09-29 NOTE — ASSESSMENT & PLAN NOTE
Lab Results   Component Value Date    EGFR 26 09/29/2022    EGFR 28 09/28/2022    EGFR 26 09/27/2022    CREATININE 1 75 (H) 09/29/2022    CREATININE 1 67 (H) 09/28/2022    CREATININE 1 77 (H) 09/27/2022   Baseline creatinine is 1 6-1  78  Currently creatinine is at baseline Patient appears to be volume overloaded  Continue with Lasix GTT and continue to follow BMP closely  Avoid hypotension

## 2022-09-29 NOTE — PROGRESS NOTES
[address]  Progress Note - Mary Jo Haddad 1940, 80 y o  female MRN: [id number]  Unit/Bed#: -[facility] Encounter: [id number]  Primary Care Provider: Rhiannon Samuel MD   Date and time admitted to hospital: 9/23/2022  6:49 AM    * Acute on chronic diastolic (congestive) heart failure [facility])  Assessment & Plan  Wt Readings from Last 3 Encounters:   09/29/22 119 kg (261 lb 11 oz)   07/10/22 121 kg (267 lb 9 6 oz)   05/19/22 126 kg (277 lb 1 9 oz)   Patient presents with anasarca and acute on chronic diastolic CHF exacerbation  2D echo 9/22 shows EF more than 70% and moderate aortic stenosis  Placed on Lasix 40 mg IV b i d  Along with midodrine due to hypotension with minimal response  Currently on Lasix GTT at 10 milligrams/hour  Also supplemented with metolazone 2 5 mg daily  Continues to have good diuretic response in the last 24 hours  Continue current diuretic regimen  Patient had episode of hypotension requiring IV albumin   lb  Patient at 261 lb today  Monitor electrolytes during diuresis  Potassium replaced  Patient underwent paracentesis on 09/25/2022 and 5 2 L removed  Hypokalemia  Assessment & Plan  Replaced  Follow-up BMP daily while on current diuretics    Acute on chronic anemia  Assessment & Plan  Baseline hemoglobin is around 9  down to 7 3  No overt signs of bleeding reported  Iron and B12 levels are normal   Received 1 unit PRBC and hemoglobin improved to 8 5 today  Restart Xarelto at lower dose 15 mg daily    Acute cystitis with hematuria  Assessment & Plan  Patient noted to have acute cystitis with microscopic hematuria present on admission  Placed on IV Rocephin   Urine culture shows Klebsiella  Discontinue IV Rocephin and placed on oral Keflex to complete a total 7 day course  Ascitic fluid is negative for infection  1/2 blood cultures positive for possible coagulase negative staph possible contaminant       Moderate aortic stenosis  Assessment & Plan  As per echo  Avoid hypotension    Cirrhosis of liver with ascites (HCC)  Assessment & Plan  CT abdomen pelvis shows liver cirrhosis  No prior history of cirrhosis as per the patient  Noted to have large volume ascites on admission underwent paracentesis on 09/25/2022 for 5 2 L    neg acute hepatitis panel  Denies any alcohol abuse history  Patient was unaware of her diagnosis of cirrhosis even though it was present on prior imaging  Will refer her outpatient to GI and also may need monthly paracentesis to try to keep her euvolemic in addition to being on diuretics  To consider adding spironolactone if kidney functions continue to improve  Hypothyroidism (acquired)  Assessment & Plan  TSH is low at 0 33  Low free T3 and elevated free T4  Hold levothyroxine for now as patient appears to be over compensated  Probably restart a lower dose after 1 week    Paroxysmal A-fib ([address] )  Assessment & Plan  Currently rate controlled  Continue Toprol-XL  Resume Xarelto but at lower regimen of 15 mg once daily    Stage 3a chronic kidney disease [facility])  Assessment & Plan  Lab Results   Component Value Date    EGFR 26 09/29/2022    EGFR 28 09/28/2022    EGFR 26 09/27/2022    CREATININE 1 75 (H) 09/29/2022    CREATININE 1 67 (H) 09/28/2022    CREATININE 1 77 (H) 09/27/2022   Baseline creatinine is 1 6-1  78  Currently creatinine is at baseline Patient appears to be volume overloaded  Continue with Lasix GTT and continue to follow BMP closely  Avoid hypotension  VTE Pharmacologic Prophylaxis:   High Risk (Score >/= 5) - Pharmacological DVT Prophylaxis Ordered: rivaroxaban (Xarelto)  Sequential Compression Devices Ordered  Patient Centered Rounds: I performed bedside rounds with nursing staff today  Discussions with Specialists or Other Care Team Provider:  Discussed with Nephrology    Education and Discussions with Family / Patient: Patient declined call to        Time Spent for Care: 30 minutes  More than 50% of total time spent on counseling and coordination of care as described above  Current Length of Stay: 6 day(s)  Current Patient Status: Inpatient   Certification Statement: The patient will continue to require additional inpatient hospital stay due to Ongoing IV diuresis  Discharge Plan: Anticipate discharge in 24-48 hrs to home with home services  Code Status: Level 1 - Full Code    Subjective:   Patient seen and examined at bedside  She denies any lightheadedness or dizziness  Denies any shortness of breath  Continues to diurese well on current diuretic regimen    Objective:     Vitals:   Temp (24hrs), Av 2 °F (36 8 °C), Min:98 1 °F (36 7 °C), Max:98 2 °F (36 8 °C)    Temp:  [98 1 °F (36 7 °C)-98 2 °F (36 8 °C)] 98 2 °F (36 8 °C)  HR:  [68-74] 69  Resp:  [15] 15  BP: (100-116)/(47-67) 104/67  SpO2:  [95 %-98 %] 95 %  Body mass index is 51 11 kg/m²  Input and Output Summary (last 24 hours): Intake/Output Summary (Last 24 hours) at 2022 1437  Last data filed at 2022 5447  Gross per 24 hour   Intake 246 15 ml   Output 700 ml   Net -453 85 ml       Physical Exam:   Physical Exam  Constitutional:       General: She is not in acute distress  Appearance: She is obese  HENT:      Head: Normocephalic  Nose: Nose normal       Mouth/Throat:      Mouth: Mucous membranes are moist    Eyes:      Extraocular Movements: Extraocular movements intact  Pupils: Pupils are equal, round, and reactive to light  Cardiovascular:      Rate and Rhythm: Normal rate and regular rhythm  Pulses: Normal pulses  Pulmonary:      Effort: Pulmonary effort is normal       Breath sounds: Normal breath sounds  Abdominal:      General: Abdomen is flat  Palpations: Abdomen is soft  Musculoskeletal:      Cervical back: Neck supple  Right lower leg: Edema present  Left lower leg: Edema present        Comments: Chronic lymphedema   Skin: General: Skin is warm  Neurological:      General: No focal deficit present  Mental Status: She is alert  Mental status is at baseline  Psychiatric:         Mood and Affect: Mood normal          Additional Data:     Labs:  Results from last 7 days   Lab Units 09/28/22  0532 09/24/22  0604 09/23/22  0736   WBC Thousand/uL 6 36   < > 12 82*   HEMOGLOBIN g/dL 8 5*   < > 8 6*   HEMATOCRIT % 26 2*   < > 25 8*   PLATELETS Thousands/uL 116*   < > 154   BANDS PCT %  --   --  1   LYMPHO PCT % 40  --  9*   MONO PCT % 14*  --  2*   EOS PCT % 5  --  0    < > = values in this interval not displayed  Results from last 7 days   Lab Units 09/29/22  0840 09/24/22  1901 09/24/22  0604   SODIUM mmol/L 139   < > 135   POTASSIUM mmol/L 3 2*   < > 3 9   CHLORIDE mmol/L 99   < > 103   CO2 mmol/L 35*   < > 27   BUN mg/dL 36*   < > 29*   CREATININE mg/dL 1 75*   < > 2 03*   ANION GAP mmol/L 5   < > 5   CALCIUM mg/dL 9 0   < > 8 6   ALBUMIN g/dL  --   --  1 5*   TOTAL BILIRUBIN mg/dL  --   --  3 13*   ALK PHOS U/L  --   --  159*   ALT U/L  --   --  13   AST U/L  --   --  32   GLUCOSE RANDOM mg/dL 117   < > 96    < > = values in this interval not displayed  Results from last 7 days   Lab Units 09/26/22  0531   INR  1 59*             Results from last 7 days   Lab Units 09/27/22  0458 09/23/22  0918 09/23/22  0736   LACTIC ACID mmol/L 0 7 2 3* 2 4*       Lines/Drains:  Invasive Devices  Report    Peripheral Intravenous Line  Duration           Peripheral IV 09/29/22 Distal;Left;Ventral (anterior) Forearm <1 day          Drain  Duration           External Urinary Catheter 5 days                      Imaging: No pertinent imaging reviewed  Recent Cultures (last 7 days):   Results from last 7 days   Lab Units 09/25/22  1157 09/23/22  0750 09/23/22  0739 09/23/22  0736   BLOOD CULTURE   --   --  No Growth After 5 Days   Staphylococcus coagulase negative*   GRAM STAIN RESULT  No Polys  No organisms seen  --   --  Gram positive cocci in clusters*   URINE CULTURE   --  >100,000 cfu/ml Klebsiella pneumoniae*  --   --    BODY FLUID CULTURE, STERILE  No growth  --   --   --        Last 24 Hours Medication List:   Current Facility-Administered Medications   Medication Dose Route Frequency Provider Last Rate    acetaminophen  650 mg Oral Q4H PRN Sabina Alonso MD      albumin human  12 5 g Intravenous BID Sabina Alonso MD      furosemide  10 mg/hr Intravenous Continuous Sabina Alonso MD 10 mg/hr (09/28/22 8397)    metolazone  2 5 mg Oral Daily Sabina Alonso MD      metoprolol succinate  12 5 mg Oral Daily Sabina Alonso MD      midodrine  10 mg Oral TID AC Sabina Alonso MD      potassium chloride  40 mEq Oral BID Rayshawn Gabriel MD      rivaroxaban  15 mg Oral Daily With Breakfast Sabina Alonso MD          Today, Patient Was Seen By: Rayshawn Gabriel    **Please Note: This note may have been constructed using a voice recognition system  **

## 2022-09-30 PROBLEM — G93.41 ACUTE METABOLIC ENCEPHALOPATHY: Status: ACTIVE | Noted: 2022-01-01

## 2022-09-30 PROBLEM — N17.9 AKI (ACUTE KIDNEY INJURY) (HCC): Status: ACTIVE | Noted: 2022-01-01

## 2022-09-30 NOTE — ASSESSMENT & PLAN NOTE
Lab Results   Component Value Date    EGFR 27 09/30/2022    EGFR 26 09/29/2022    EGFR 28 09/28/2022    CREATININE 1 69 (H) 09/30/2022    CREATININE 1 75 (H) 09/29/2022    CREATININE 1 67 (H) 09/28/2022   Baseline creatinine is 1 6-1  78  Currently creatinine is at baseline Lasix GTT on hold secondary to patient meeting severe sepsis criteria  Silvia Grace Avoid hypotension and nephrotoxic medications  Follow-up BMP in [place]

## 2022-09-30 NOTE — QUICK NOTE
I went to evaluate the patient for a paracentesis per Elois Foots consult and on my examination the patient appeared obtunded, very lethargic, not answering questions appropriately  She had a HR in the 120s, febrile to 101 and hypotensive with SBP 80-90s  I discussed this with Dr Jennyfer Viveros who informed me that she had already updated the patient's daughter on patient's deteriorating clinical condition  I called the daughter again Victor M Alejandra who is the POA) to discuss code status as patient has been a level 3 DNR/DNI in the past  After discussion with the daughter and her consulting with her siblings, they agree that they wanted the patient to be a full code  I discussed that entailed CPR (shocks and compressions which involves a pressure load large enough to break bones) and intubation/mechanical ventilation  All risks were discussed and questions were answered  Since the patient is becoming more hypotensive with an elevated lactate I discussed placement of an arterial line and central line  Patient's daughter consents to both procedures  Risks/benefits were discussed with the daughter and all questions were answered

## 2022-09-30 NOTE — ASSESSMENT & PLAN NOTE
Secondary to sepsis    Continue with serial neuro checks  Ammonia level within normal limits  If mentation does not improve, may need CT head

## 2022-09-30 NOTE — ASSESSMENT & PLAN NOTE
CT abdomen pelvis shows liver cirrhosis  No prior history of cirrhosis as per the patient  Noted to have large volume ascites on admission underwent paracentesis on 09/25/2022 for 5 2 L    neg acute hepatitis panel  Denies any alcohol abuse history  Patient was unaware of her diagnosis of cirrhosis even though it was present on prior imaging      Will refer her outpatient to GI

## 2022-09-30 NOTE — ASSESSMENT & PLAN NOTE
Wt Readings from Last 3 Encounters:   09/30/22 117 kg (257 lb 3 2 oz)   07/10/22 121 kg (267 lb 9 6 oz)   05/19/22 126 kg (277 lb 1 9 oz)       duiresing well during the admission per chart review     Seems to be still volume overloaded per physical exam    + ascites  +2 ANA     Refer to [facility] for septic shock

## 2022-09-30 NOTE — ASSESSMENT & PLAN NOTE
Wt Readings from Last 3 Encounters:   09/30/22 117 kg (257 lb 3 2 oz)   07/10/22 121 kg (267 lb 9 6 oz)   05/19/22 126 kg (277 lb 1 9 oz)   Patient presents with anasarca and acute on chronic diastolic CHF exacerbation  2D echo 9/22 shows EF more than 70% and moderate aortic stenosis    Patient was maintained on Lasix GTT at 10 milligrams/hour  Also supplemented with metolazone 2 5 mg daily  She was kept on midodrine t i d  For hypotension  Continue to have good diuretic response with negative 11 5 L since admission and weight decreased from 289-->257 lb Patient also underwent paracentesis on 09/25/2022 and 5 2 L removed    Lasix GTT will be placed on hold today secondary to patient meeting sepsis criteria and with hypotension  Nephrology input appreciated

## 2022-09-30 NOTE — PROGRESS NOTES
[address]  Progress Note - Lopez Gomez 1940, 80 y o  female MRN: [id number]  Unit/Bed#: -01 Encounter: [id number]  Primary Care Provider: Renata Gao MD   Date and time admitted to hospital: 9/23/2022  6:49 AM    Acute on chronic diastolic (congestive) heart failure [facility])  Assessment & Plan  Wt Readings from Last 3 Encounters:   09/30/22 117 kg (257 lb 3 2 oz)   07/10/22 121 kg (267 lb 9 6 oz)   05/19/22 126 kg (277 lb 1 9 oz)   Patient presents with anasarca and acute on chronic diastolic CHF exacerbation  2D echo 9/22 shows EF more than 70% and moderate aortic stenosis    Patient was maintained on Lasix GTT at 10 milligrams/hour  Also supplemented with metolazone 2 5 mg daily  She was kept on midodrine t i d  For hypotension  Continue to have good diuretic response with negative 11 5 L since admission and weight decreased from 289-->257 lb Patient also underwent paracentesis on 09/25/2022 and 5 2 L removed  Lasix GTT will be placed on hold today secondary to patient meeting sepsis criteria and with hypotension  Nephrology input appreciated          * Sepsis [facility])  Assessment & Plan  Patient meeting SIRS criteria today with fever, tachycardia, lactic acidosis  Concern for intra-abdominal infection? SBP  Recently completed antibiotics for Klebsiella UTI for 7 days  Patient has some mild abdominal discomfort but denies any nausea, vomiting, diarrhea, cough, shortness of breath or urinary complaints  Has a purewick catheter in place  Continue to trend lactic acid and procalcitonin  Lasix GTT has been discontinued  Patient will receive normal saline 1 L bolus and albumin today  Follow-up on CT scan of the chest abdomen and pelvis  Follow-up on COVID 19 PCR  Follow-up on blood cultures  Empiric ceftriaxone started for?   SBP  If patient's abdominal CT is unremarkable, she will need diagnostic paracentesis today      Acute metabolic encephalopathy  Assessment & How Severe Is Your Acne?: mild Females Only: When Was Your Last Menstrual Period?: 01/06/2022 Plan  Secondary to sepsis  Continue with serial neuro checks  Ammonia level within normal limits  If mentation does not improve, may need CT head    Cirrhosis of liver with ascites (HCC)  Assessment & Plan  CT abdomen pelvis shows liver cirrhosis  No prior history of cirrhosis as per the patient  Noted to have large volume ascites on admission underwent paracentesis on 09/25/2022 for 5 2 L    neg acute hepatitis panel  Denies any alcohol abuse history  Patient was unaware of her diagnosis of cirrhosis even though it was present on prior imaging  Will refer her outpatient to GI and also may need monthly paracentesis to try to keep her euvolemic in addition to being on diuretics  To consider adding spironolactone if kidney functions continue to improve  Patient has increased confusion and meetings sepsis criteria  Has some mild abdominal tenderness  Will get CT scan of the abdomen today  Ammonia is within normal limits  May need diagnostic paracentesis to rule out SBP  Empirically started on ceftriaxone  Hypothyroidism (acquired)  Assessment & Plan  TSH is low at 0 33  Low free T3 and elevated free T4  Hold levothyroxine for now as patient appears to be over compensated  Probably restart a lower dose after 1 week    Paroxysmal A-fib ([address] )  Assessment & Plan  Currently rate controlled  Continue Toprol-XL  Resume Xarelto but at lower regimen of 15 mg once daily    Stage 3a chronic kidney disease [facility])  Assessment & Plan  Lab Results   Component Value Date    EGFR 27 09/30/2022    EGFR 26 09/29/2022    EGFR 28 09/28/2022    CREATININE 1 69 (H) 09/30/2022    CREATININE 1 75 (H) 09/29/2022    CREATININE 1 67 (H) 09/28/2022   Baseline creatinine is 1 6-1  78  Currently creatinine is at baseline Lasix GTT on hold secondary to patient meeting severe sepsis criteria  Eduardo Key Avoid hypotension and nephrotoxic medications  Follow-up BMP in a m          VTE Pharmacologic Prophylaxis:   High Risk (Score >/= 5) - Additional Comments (Use Complete Sentences): Pt is tolerating treatment well with exception of dryness. She states her nose is very dry. Pharmacological DVT Prophylaxis Ordered: rivaroxaban (Xarelto)  Sequential Compression Devices Ordered  Patient Centered Rounds: I performed bedside rounds with nursing staff today  Discussions with Specialists or Other Care Team Provider:  Discussed with nephrology    Education and Discussions with Family / Patient: Updated  (daughter) via phone  Time Spent for Care: 45 minutes  More than 50% of total time spent on counseling and coordination of care as described above  Current Length of Stay: 7 day(s)  Current Patient Status: Inpatient   Certification Statement: The patient will continue to require additional inpatient hospital stay due to Ongoing management of sepsis  Discharge Plan: Currently not medically stable for discharge    Code Status: Level 1 - Full Code    Subjective:   Patient seen and examined at bedside  T-max 101°  Denies any cough, shortness of breath, nausea vomiting diarrhea or urinary symptoms  Objective:     Vitals:   Temp (24hrs), Av 1 °F (37 8 °C), Min:98 1 °F (36 7 °C), Max:101 5 °F (38 6 °C)    Temp:  [98 1 °F (36 7 °C)-101 5 °F (38 6 °C)] 99 9 °F (37 7 °C)  HR:  [] 104  Resp:  [2-18] 18  BP: ()/(49-68) 102/50  SpO2:  [82 %-98 %] 93 %  Body mass index is 50 23 kg/m²  Input and Output Summary (last 24 hours): Intake/Output Summary (Last 24 hours) at 2022 1233  Last data filed at 2022 1059  Gross per 24 hour   Intake 974 ml   Output 2450 ml   Net -1476 ml       Physical Exam:   Physical Exam  Constitutional:       Appearance: She is obese  She is ill-appearing  Comments: Lethargic but arousable   HENT:      Head: Normocephalic  Mouth/Throat:      Mouth: Mucous membranes are dry  Eyes:      Extraocular Movements: Extraocular movements intact  Pupils: Pupils are equal, round, and reactive to light  Cardiovascular:      Rate and Rhythm: Regular rhythm  Tachycardia present     Pulmonary:      Comments: Diminished breath sounds at base  Abdominal:      Comments: Mild right lower quadrant abdominal tenderness  Abdominal distension without rebound or guarding  Bowel sounds audible   Musculoskeletal:         General: No swelling  Cervical back: Neck supple  Right lower leg: Edema present  Left lower leg: Edema present  Neurological:      Mental Status: She is oriented to person, place, and time  Comments: Lethargic but arousable         Additional Data:     Labs:  Results from last 7 days   Lab Units 09/30/22  1005 09/28/22  0532   WBC Thousand/uL 12 58* 6 36   HEMOGLOBIN g/dL 8 7* 8 5*   HEMATOCRIT % 27 0* 26 2*   PLATELETS Thousands/uL 150 116*   LYMPHO PCT %  --  40   MONO PCT %  --  14*   EOS PCT %  --  5     Results from last 7 days   Lab Units 09/30/22  0507 09/24/22  1901 09/24/22  0604   SODIUM mmol/L 140   < > 135   POTASSIUM mmol/L 3 3*   < > 3 9   CHLORIDE mmol/L 99   < > 103   CO2 mmol/L 35*   < > 27   BUN mg/dL 39*   < > 29*   CREATININE mg/dL 1 69*   < > 2 03*   ANION GAP mmol/L 6   < > 5   CALCIUM mg/dL 8 9   < > 8 6   ALBUMIN g/dL  --   --  1 5*   TOTAL BILIRUBIN mg/dL  --   --  3 13*   ALK PHOS U/L  --   --  159*   ALT U/L  --   --  13   AST U/L  --   --  32   GLUCOSE RANDOM mg/dL 96   < > 96    < > = values in this interval not displayed  Results from last 7 days   Lab Units 09/26/22  0531   INR  1 59*             Results from last 7 days   Lab Units 09/30/22  1005 09/27/22  0458   LACTIC ACID mmol/L 2 2* 0 7   PROCALCITONIN ng/ml 0 93*  --        Lines/Drains:  Invasive Devices  Report    Peripheral Intravenous Line  Duration           Peripheral IV 09/29/22 Distal;Left;Ventral (anterior) Forearm 1 day          Drain  Duration           External Urinary Catheter 6 days                      Imaging: No pertinent imaging reviewed      Recent Cultures (last 7 days):   Results from last 7 days   Lab Units 09/25/22  1157   GRAM STAIN RESULT  No Polys  No organisms seen   BODY FLUID CULTURE, STERILE  No growth       Last 24 Hours Medication List:   Current Facility-Administered Medications   Medication Dose Route Frequency Provider Last Rate    acetaminophen  650 mg Oral Q4H PRN Rodríguez Nino MD      albumin human  25 g Intravenous BID DO Felipe      cefTRIAXone  1,000 mg Intravenous Q24H Stevie Pelaez MD Stopped (09/30/22 1125)    metoprolol succinate  12 5 mg Oral Daily Rodríguez Nino MD      midodrine  12 5 mg Oral TID AC DO Felipe      rivaroxaban  15 mg Oral Daily With Breakfast Rodríguez Nino MD      sodium chloride  1,000 mL Intravenous Once DO Felipe 1,000 mL (09/30/22 1150)        Today, Patient Was Seen By: Stevie Pelaez    **Please Note: This note may have been constructed using a voice recognition system  **

## 2022-09-30 NOTE — ASSESSMENT & PLAN NOTE
On top of CKD stage 3  Pre renal versus renal   Patient is septic shock and CHF  Also possibly with renal tubular injury  -send urine electrolytes    -calculate a FeNA  -trend BMP per protocol  -I/O  -daily weight

## 2022-09-30 NOTE — PROGRESS NOTES
Vancomycin Assessment    Dustin Torres is a 80 y o  female who is currently receiving vancomycin 1000 mg IV q 24 hrs for bacteremia     Relevant clinical data and objective history reviewed:  Creatinine   Date Value Ref Range Status   09/30/2022 1 69 (H) 0 60 - 1 30 mg/dL Final     Comment:     Standardized to IDMS reference method   09/29/2022 1 75 (H) 0 60 - 1 30 mg/dL Final     Comment:     Standardized to IDMS reference method   09/28/2022 1 67 (H) 0 60 - 1 30 mg/dL Final     Comment:     Standardized to IDMS reference method     BP (!) 98/36 (BP Location: Right arm)   Pulse (!) 110   Temp 98 8 °F (37 1 °C) (Tympanic)   Resp 22   Ht 5' (1 524 m)   Wt 117 kg (257 lb 3 2 oz)   SpO2 95%   BMI 50 23 kg/m²   I/O last 3 completed shifts: In: 840 [P O :840]  Out: 2700 [Urine:2700]  Lab Results   Component Value Date/Time    BUN 39 (H) 09/30/2022 05:07 AM    WBC 12 58 (H) 09/30/2022 10:05 AM    HGB 8 7 (L) 09/30/2022 10:05 AM    HCT 27 0 (L) 09/30/2022 10:05 AM     (H) 09/30/2022 10:05 AM     09/30/2022 10:05 AM     Temp Readings from Last 3 Encounters:   09/30/22 98 8 °F (37 1 °C) (Tympanic)   07/10/22 (!) 97 1 °F (36 2 °C)   05/19/22 98 1 °F (36 7 °C) (Temporal)     Vancomycin Days of Therapy: 1    Assessment/Plan  The patient is currently on vancomycin utilizing scheduled dosing based on adjusted body weight (due to obesity)  Baseline risks associated with therapy include: pre-existing renal impairment, concomitant nephrotoxic medications, and advanced age  The patient is currently receiving 1000 mg IV q 24 hrs and is clinically appropriate and dose will be continued  Pharmacy will also follow closely for s/sx of nephrotoxicity, infusion reactions, and appropriateness of therapy  BMP and CBC will be ordered per protocol  Plan for trough as patient approaches steady state, prior to the 4th  dose at approximately 1600 on 10/03/2022    Due to infection severity, will target a trough of 15-20 (appropriate for most indications)   Pharmacy will continue to follow the patients culture results and clinical progress daily      Janette Pascal, Pharmacist

## 2022-09-30 NOTE — CONSULTS
[address] [facility] 1940, 80 y o  female MRN: [id number]  Unit/Bed#: -01 Encounter: [id number]  Primary Care Provider: Agustín Guerra MD   Date and time admitted to hospital: 9/23/2022  6:49 AM          Inpatient consult to Dilma Florin     Date/Time 9/30/2022 6:27 PM     Performed by  Veda Eckert MD     Authorized by Veda Eckert MD              * Septic Shock  Assessment & Plan  Background:  Admission date 9/23 with acute cystitis  Cultures growing Klebsiella  Patient was treated with 2 days of ceftriaxone and 2 days of Keflex  Moreover she was noted to be volume overloaded with AE CHF  Diuresed over 30 lb during admission with Lasix drip  Today patient was noted to be in altered mental state, vitally unstable, paradoxical breathing pattern, labs showing leukocytosis, progressing acute anemia  Patient was transferred to the critical care unit on 09/30/2022 for further acute care  Plan:  Unclear source of infection at this time; will consider urinary tract as a source  · Started on dual antibiotic therapy including cefepime and vancomycin  · Currently on triple pressors including phenylephrine, norepinephrine, vasopressin  Wean and titrate drip per protocol  · Started on fentanyl drip for sedation  · Fentanyl p r n  For agitation  · Sepsis panel ordered including blood culturesx2  Pending  No growth to date  · Trend lactic acid, serum electrolytes including sodium potassium magnesium phosphate  · CBC, BMP trend  · Patient is currently intubated and mechanical ventilated on /14/60  · Airway protocol, respiratory protocol  · Follow-up with cultures and modify antibiotics accordingly  · Consult to Pharmacy; vancomycin trough  · Monitor I/OS  · Strict daily weight  · Re-evaluate and recheck per protocol      Acute cystitis with hematuria  Assessment & Plan  Patient noted to have acute cystitis with microscopic hematuria present on admission  Placed on IV Rocephin   Urine culture shows Klebsiella  Discontinue IV Rocephin and placed on oral Keflex to complete a total 7 day course  Ascitic fluid is negative for infection  1/2 blood cultures positive for possible coagulase negative staph possible contaminant  Refer to [facility] for septic shock   LEONARDO (acute kidney injury) ([facility] [address] )  Assessment & Plan  On top of CKD stage 3  Pre renal versus renal   Patient is septic shock and CHF  Also possibly with renal tubular injury  -send urine electrolytes  -calculate a FeNA  -trend BMP per protocol  -I/O  -daily weight        Paroxysmal A-fib (HCC)  Assessment & Plan  Chronically on Xarelto and metoprolol  Hold xarelto and metoprolol due to patient being septic shock and with suspected active GI bleed  Continue to evaluate recheck accordingly  Fall according    Acute metabolic encephalopathy  Assessment & Plan  Background: Oriented to person only  Otherwise minimally arousable to vocal and painful stimuli  Noted to be in septic shock  Refer to [facility] & P for septic shock       Hypokalemia  Assessment & Plan  Replacing per protocol     Trend BMP accordingly   telemetry    Acute on chronic anemia  Assessment & Plan  Anemia of chronic disease ontop of septic shock   Most likely exacerbation due to homolysis as a result of sepsis  Will continue to trend H & H and BMP per protocol   Transfuse PRBC <7 HgB   Transfuse Plts <10,000 Plts or active bleeding when <50 k       Acute on chronic diastolic (congestive) heart failure (HCC)  Assessment & Plan  Wt Readings from Last 3 Encounters:   09/30/22 117 kg (257 lb 3 2 oz)   07/10/22 121 kg (267 lb 9 6 oz)   05/19/22 126 kg (277 lb 1 9 oz)       duiresing well during the admission per chart review     Seems to be still volume overloaded per physical exam    + ascites  +2 ANA     Refer to [facility] for septic shock       Moderate aortic stenosis  Assessment & Plan  F/u with cardiology accordingly       Cirrhosis of liver with ascites [facility])  Assessment & Plan  CT abdomen pelvis shows liver cirrhosis  No prior history of cirrhosis as per the patient  Noted to have large volume ascites on admission underwent paracentesis on 09/25/2022 for 5 2 L    neg acute hepatitis panel  Denies any alcohol abuse history  Patient was unaware of her diagnosis of cirrhosis even though it was present on prior imaging  Will refer her outpatient to GI     Hypothyroidism (acquired)  Assessment & Plan  Currently intubated  administer medication if OG tube in place other wise hold for now  Stage 3a chronic kidney disease [facility])  Assessment & Plan  Lab Results   Component Value Date    EGFR 18 09/30/2022    EGFR 18 09/30/2022    EGFR 27 09/30/2022    CREATININE 2 37 (H) 09/30/2022    CREATININE 2 39 (H) 09/30/2022    CREATININE 1 69 (H) 09/30/2022   pre renal vs renal at this time  Extravascular volume expansion due to septic shock vs CHF on top of RTI   Trend BMP   Urine electrolytes      ----------------------------------------------------------------------------------------  HPI/24hr events:  Acute encephalopathy, vital signs instability    Patient appropriate for transfer out of the ICU today?: No  Disposition: Transfer to Critical Care   Code Status: Level 1 - Full Code  ---------------------------------------------------------------------------------------  SUBJECTIVE  Patient was seen at bedside  Minimally responsive to verbal painful stimuli  Opens her eyes to both types of stimuli  Oriented to person only  Ill-appearing and in acute respiratory distress  Patient is currently maintained on mechanical ventilation due to concerns about protecting her airway  R IJ central line in place  Arterial line in the radial artery in place  Pewick catheter in place      Review of Systems   Unable to perform ROS: Acuity of condition   Constitutional: Positive for activity change, appetite change and diaphoresis  Respiratory: Positive for apnea and shortness of breath  Cardiovascular: Positive for leg swelling  Review of systems was reviewed and negative unless stated above in HPI/24-hour events   ---------------------------------------------------------------------------------------  OBJECTIVE    Vitals   Vitals:    22 1830 22 1845 22 1900 22 1915   BP:       BP Location:       Pulse: 97 102 98 93   Resp: 14 14 15 17   Temp: 98 06 °F (36 7 °C) 98 78 °F (37 1 °C) 98 78 °F (37 1 °C) 98 42 °F (36 9 °C)   TempSrc:       SpO2: 94% 91% 91% 100%   Weight:       Height:         Temp (24hrs), Av 7 °F (37 6 °C), Min:98 06 °F (36 7 °C), Max:101 5 °F (38 6 °C)  Current: Temperature: 98 42 °F (36 9 °C)          Respiratory:  SpO2: SpO2: 100 %       Invasive/non-invasive ventilation settings   Respiratory  Report   Lab Data (Last 4 hours)       1638  1846          pH, Arterial       7 416            7 281            pCO2, Arterial       30 5            37 6            pO2, Arterial       85 4            140 7            HCO3, Arterial       19 2            17 3            Base Excess, Arterial       -4 6            -8 7                 O2/Vent Data          1744   Most Recent      Patient safety screen outcome:  Failed  Failed                 Physical Exam  Vitals and nursing note reviewed  Exam conducted with a chaperone present  Constitutional:       General: She is in acute distress  Appearance: She is obese  She is ill-appearing, toxic-appearing and diaphoretic  Cardiovascular:      Rate and Rhythm: Tachycardia present  Chest Wall: PMI is displaced  Pulses:           Radial pulses are 1+ on the right side and 1+ on the left side  Dorsalis pedis pulses are 1+ on the right side and 1+ on the left side  Heart sounds: Heart sounds are distant  Murmur heard  Systolic murmur is present with a grade of 3/6  Gallop present     Pulmonary: Effort: Tachypnea, accessory muscle usage, respiratory distress and retractions present  Breath sounds: Decreased air movement present  Decreased breath sounds and rales present  Abdominal:      General: Abdomen is flat  Bowel sounds are normal       Palpations: Abdomen is soft  Tenderness: There is abdominal tenderness  Musculoskeletal:      Cervical back: Normal range of motion  Right lower le+ Pitting Edema present  Left lower le+ Pitting Edema present  Skin:     Capillary Refill: Capillary refill takes 2 to 3 seconds  Neurological:      Mental Status: She is disoriented                 Laboratory and Diagnostics:  Results from last 7 days   Lab Units 22  1839 22  1623 22  1005 22  0532 22  0531 22  0526 22  0604   WBC Thousand/uL  --  13 22* 12 58* 6 36 7 57 9 66 12 18*   HEMOGLOBIN g/dL 7 8* 7 8* 8 7* 8 5* 8 9* 7 3* 8 0*   HEMATOCRIT % 24 8* 24 4* 27 0* 26 2* 26 9* 22 8* 24 6*   PLATELETS Thousands/uL  --  141* 150 116* 122* 131* 132*   MONO PCT %  --   --   --  14*  --   --   --      Results from last 7 days   Lab Units 22  1844 22  1623 22  0507 22  0840 22  0532 22  0458 22  0531 22  1901 22  0604   SODIUM mmol/L 139 138 140 139 137 137 137   < > 135   POTASSIUM mmol/L 2 8* 2 9* 3 3* 3 2* 3 1* 3 1* 3 3*   < > 3 9   CHLORIDE mmol/L 97 98 99 99 100 101 102   < > 103   CO2 mmol/L 20* 20* 35* 35* 28 30 28   < > 27   ANION GAP mmol/L 22* 20* 6 5 9 6 7   < > 5   BUN mg/dL 41* 44* 39* 36* 35* 33* 31*   < > 29*   CREATININE mg/dL 2 37* 2 39* 1 69* 1 75* 1 67* 1 77* 1 91*   < > 2 03*   CALCIUM mg/dL 8 0* 8 1* 8 9 9 0 8 8 8 0* 8 3   < > 8 6   GLUCOSE RANDOM mg/dL 56* 65 96 117 91 97 90   < > 96   ALT U/L 17 15  --   --   --   --   --   --  13   AST U/L 46* 47*  --   --   --   --   --   --  32   ALK PHOS U/L 118* 129*  --   --   --   --   --   --  159*   ALBUMIN g/dL 2 6* 2 5*  --   -- --   --   --   --  1 5*   TOTAL BILIRUBIN mg/dL 4 07* 3 54*  --   --   --   --   --   --  3 13*    < > = values in this interval not displayed  Results from last 7 days   Lab Units 09/30/22  1844 09/30/22  1623 09/28/22  0532 09/27/22  0458   MAGNESIUM mg/dL 1 9 1 9 2 3 2 2      Results from last 7 days   Lab Units 09/30/22  1839 09/26/22  0531 09/25/22  0526 09/24/22  0743   INR  8 39* 1 59* 2 08* 3 39*   PTT seconds 51*  --   --   --           Results from last 7 days   Lab Units 09/30/22  1839 09/30/22  1623 09/30/22  1247 09/30/22  1005 09/27/22  0458   LACTIC ACID mmol/L 12 9* 11 1* 3 7* 2 2* 0 7     ABG:  Results from last 7 days   Lab Units 09/30/22  1846   PH ART  7 281*   PCO2 ART mm Hg 37 6   PO2 ART mm Hg 140 7*   HCO3 ART mmol/L 17 3*   BASE EXC ART mmol/L -8 7   ABG SOURCE  Line, Arterial     VBG:  Results from last 7 days   Lab Units 09/30/22  1846 09/30/22  1839   PH JOANN   --  7 288*   PCO2 JOANN mm Hg  --  40 3*   PO2 JOANN mm Hg  --  94 1*   HCO3 JOANN mmol/L  --  18 9*   BASE EXC JOANN mmol/L  --  -7 2   ABG SOURCE  Line, Arterial  --      Results from last 7 days   Lab Units 09/30/22  1005   PROCALCITONIN ng/ml 0 93*       Micro  Results from last 7 days   Lab Units 09/30/22  1021 09/30/22  1005 09/25/22  1157   BLOOD CULTURE  Received in Microbiology Lab  Culture in Progress  Received in Microbiology Lab  Culture in Progress  --    GRAM STAIN RESULT   --   --  No Polys  No organisms seen   BODY FLUID CULTURE, STERILE   --   --  No growth       EKG:  Sinus tachycardia  No acute changes  Imaging: I have personally reviewed pertinent reports  Intake and Output  I/O       09/28 0701 09/29 0700 09/29 0701 09/30 0700 09/30 0701  10/01 0700    P  O  240 840 540    I V  (mL/kg) 61 3 (0 5)  14 (0 1)    Total Intake(mL/kg) 301 3 (2 5) 840 (7 2) 554 (4 7)    Urine (mL/kg/hr) 760 (0 3) 2700 (1) 450 (0 3)    Stool 0      Total Output 760 2700 450    Net -458 7 -1860 +104           Unmeasured Stool Occurrence 1 x            Height and Weights   Height: 5' (152 4 cm)     Body mass index is 50 23 kg/m²  Weight (last 2 days)     Date/Time Weight    09/30/22 05:26:45 117 (257 2)    09/29/22 0600 119 (261 69)    09/28/22 0537 119 (262 8)            Nutrition       Diet Orders   (From admission, onward)             Start     Ordered    09/30/22 1715  Diet NPO  Diet effective now        References:    Nutrtion Support Algorithm Enteral vs  Parenteral   Question Answer Comment   Diet Type NPO    RD to adjust diet per protocol?  Yes        09/30/22 1714    09/23/22 1547  Dietary nutrition supplements  Once        Question Answer Comment   Select Supplement: Magic Cup Benny    Frequency Lunch        09/23/22 1546                  Active Medications  Scheduled Meds:  Current Facility-Administered Medications   Medication Dose Route Frequency Provider Last Rate    acetaminophen  650 mg Oral Q4H PRN Sindhu Rios MD      albumin human  25 g Intravenous BID Sindhu Rios MD      albumin human  25 g Intravenous Once Sindhu Rios MD      bisacodyl  10 mg Rectal Daily Sindhu Rios MD      calcium gluconate  3 g Intravenous Once Hadley Simmons PA-C 3 g (09/30/22 2005)    cefepime  2,000 mg Intravenous Q12H Pearl Meneses PA-C 2,000 mg (09/30/22 1906)    epinephrine  1-10 mcg/min Intravenous Titrated Henga I LILIAN Simmons 10 mcg/min (09/30/22 2002)    fentaNYL  50 mcg/hr Intravenous Continuous Sean ABDON Alas-C 50 mcg/hr (09/30/22 1852)    fentanyl citrate (PF)  50 mcg Intravenous Q30 Min PRN Pearl Meneses PA-C      hydrocortisone sodium succinate  50 mg Intravenous Q6H [address] Heidemarie I LILIAN Simmons      lactated ringers  1,000 mL Intravenous Once Sindhu Rios MD      Followed by   [facility] lactated ringers  1,000 mL Intravenous Once Sindhu Rios MD      levothyroxine  150 mcg Oral Early Morning Sindhu Rios MD      methylene blue IVPB  1 mg/kg Intravenous Once Hadley JO Simmons PA-C      norepinephrine  1-30 mcg/min Intravenous Titrated Rey Vargas MD 30 mcg/min (09/30/22 1915)    [START ON 10/1/2022] pantoprazole  40 mg Intravenous Q12H [address] Pearl Meneses PA-C      phenylephine   mcg/min Intravenous Titrated Leah Ramírez PA-C 180 mcg/min (09/1940)    potassium chloride  40 mEq Intravenous Once Hadley Simmons PA-C 40 mEq (09/30/22 1916)    potassium chloride  40 mEq Intravenous Once Hadley Simmons PA-C      senna-docusate sodium  1 tablet Oral BID Rey Vargas MD      vancomycin  15 mg/kg (Adjusted) Intravenous Q24H Rey Vargas MD 1,000 mg (09/30/22 1915)    vasopressin  0 04 Units/min Intravenous Continuous Leahsean Ramírez PA-C 0 04 Units/min (09/30/22 1905)     Continuous Infusions:  epinephrine, 1-10 mcg/min, Last Rate: 10 mcg/min (09/30/22 2002)  fentaNYL, 50 mcg/hr, Last Rate: 50 mcg/hr (09/30/22 1852)  norepinephrine, 1-30 mcg/min, Last Rate: 30 mcg/min (09/30/22 1915)  phenylephine,  mcg/min, Last Rate: 180 mcg/min (09/1940)  vasopressin, 0 04 Units/min, Last Rate: 0 04 Units/min (09/30/22 1905)      PRN Meds:   acetaminophen, 650 mg, Q4H PRN  fentanyl citrate (PF), 50 mcg, Q30 Min PRN        Invasive Devices Review  Invasive Devices  Report    Central Venous Catheter Line  Duration           CVC Central Lines 09/30/22 Triple Right Internal jugular <1 day          Peripheral Intravenous Line  Duration           Peripheral IV 09/29/22 Distal;Left;Ventral (anterior) Forearm 1 day    Peripheral IV 09/30/22 Distal;Right;Upper;Ventral (anterior) Arm <1 day          Arterial Line  Duration           Arterial Line 09/30/22 Radial <1 day          Drain  Duration           External Urinary Catheter 7 days    Urethral Catheter <1 day                ---------------------------------------------------------------------------------------  Advance Directive and Living Will:      Power of :    POLST: ---------------------------------------------------------------------------------------  Care Time Delivered:   Upon my evaluation, this patient had a high probability of imminent or life-threatening deterioration due to Septic shock, which required my direct attention, intervention, and personal management  I have personally provided 120 minutes (-- to --) of critical care time, exclusive of procedures, teaching, family meetings, and any prior time recorded by providers other than myself  Ben Ratliff MD      Portions of the record may have been created with voice recognition software  Occasional wrong word or "sound a like" substitutions may have occurred due to the inherent limitations of voice recognition software    Read the chart carefully and recognize, using context, where substitutions have occurred

## 2022-09-30 NOTE — PROCEDURES
Central Venous Catheter Insertion Procedure Note      Indications:  Septic shock and need for vascular access     Procedure Details   Informed consent was obtained for the procedure, including sedation  Risks of lung perforation, hemorrhage, arrhythmia, and adverse drug reaction were discussed  Maximum sterile technique was used including antiseptics, cap, gloves, gown, hand hygiene, mask and sheet  Under sterile conditions the skin above the midline chest internal jugular vein was prepped with betadine and covered with a sterile drape  Local anesthesia was applied to the skin and subcutaneous tissues  A 22-gauge needle was used to identify the vein  An 18-gauge needle was then inserted into the vein  A guide wire was then passed easily through the catheter  There were no arrhythmias  The catheter was then withdrawn  A 8 0 Bulgarian triple-lumen was then inserted into the vessel over the guide wire  The catheter was sutured into place  Findings: There were no changes to vital signs  Catheter was flushed with 10 cc NS  Patient did tolerate procedure well      Recommendations:  CXR ordered; confirmed line in place

## 2022-09-30 NOTE — PROCEDURES
Arterial Line Insertion    Date/Time: 9/30/2022 4:55 PM  Performed by: Claire Ambrose PA-C  Authorized by: Claire Ambrose PA-C     Patient location:  Bedside  Consent:     Consent obtained:  Verbal    Consent given by:  Healthcare agent    Risks discussed:  Bleeding, infection, pain, ischemia and repeat procedure  Universal protocol:     Procedure explained and questions answered to patient or proxy's satisfaction: yes      Relevant documents present and verified: yes      Test results available and properly labeled: yes      Radiology Images displayed and confirmed  If images not available, report reviewed: yes      Required blood products, implants, devices, and special equipment available: yes      Site/side marked: yes      Immediately prior to procedure a time out was called: yes      Patient identity confirmed:  Hospital-assigned identification number, arm band and anonymous protocol, patient vented/unresponsive  Indications:     Indications: hemodynamic monitoring, multiple ABGs and continuous blood pressure monitoring    Pre-procedure details:     Skin preparation:  Chlorhexidine    Preparation: Patient was prepped and draped in sterile fashion    Anesthesia (see MAR for exact dosages): Anesthesia method:  None  Procedure details:     Location / Tip of Catheter:  Radial    Laterality:  Right    Zen's test performed: yes      Needle gauge:  20 G    Placement technique:  Percutaneous    Number of attempts:  1    Successful placement: yes      Transducer: waveform confirmed    Post-procedure details:     Post-procedure:  Biopatch applied, sterile dressing applied, sutured and wrist guard applied    CMS:  Normal    Patient tolerance of procedure:   Tolerated well, no immediate complications

## 2022-09-30 NOTE — ASSESSMENT & PLAN NOTE
Background:  Admission date 9/23 with acute cystitis  Cultures growing Klebsiella  Patient was treated with 2 days of ceftriaxone and 2 days of Keflex  Moreover she was noted to be volume overloaded with AE CHF  Diuresed over 30 lb during admission with Lasix drip  Today patient was noted to be in altered mental state, vitally unstable, paradoxical breathing pattern, labs showing leukocytosis, progressing acute anemia  Patient was transferred to the critical care unit on 09/30/2022 for further acute care  Plan:  Unclear source of infection at this time; will consider urinary tract as a source  · Started on dual antibiotic therapy including cefepime and vancomycin  · Currently on triple pressors including phenylephrine, norepinephrine, vasopressin  Wean and titrate drip per protocol  · Started on fentanyl drip for sedation  · Fentanyl p r n  For agitation  · Sepsis panel ordered including blood cultures, urine cultures  Pending  No growth to date  · Trend lactic acid, serum electrolytes including sodium potassium magnesium phosphate  · CBC, BMP trend  · Patient is currently intubated and mechanical ventilated on /14/60  · Airway protocol, respiratory protocol  · Follow-up with cultures and modify antibiotics accordingly  · Consult to Pharmacy; vancomycin trough  · Monitor I/OS  · Strict daily weight  · Re-evaluate and recheck per protocol

## 2022-09-30 NOTE — ASSESSMENT & PLAN NOTE
Patient meeting SIRS criteria today with fever, tachycardia, lactic acidosis  Concern for intra-abdominal infection? SBP  Recently completed antibiotics for Klebsiella UTI for 7 days  Patient has some mild abdominal discomfort but denies any nausea, vomiting, diarrhea, cough, shortness of breath or urinary complaints  Has a purewick catheter in place  Continue to trend lactic acid and procalcitonin  Lasix GTT has been discontinued  Patient will receive normal saline 1 L bolus and albumin today  Follow-up on CT scan of the chest abdomen and pelvis  Follow-up on COVID 19 PCR  Follow-up on blood cultures  Empiric ceftriaxone started for?   SBP  If patient's abdominal CT is unremarkable, she will need diagnostic paracentesis today

## 2022-09-30 NOTE — PROGRESS NOTES
Progress Note - Nephrology   Mary Swift 80 y o  female MRN: [id number]  Unit/Bed#: -01 Encounter: [id number]    A/P:  1  LEONARDO on CKD, Cr 1 6, at baseline  Previous etiology venous congestion/decreased effective arterial volume  Pt having fevers this AM with soft BP (asymptomatic)  Recommend to stop all diuretics for today  Increase albumin to 25 gram at 25% q12  If IVF needed use colloid like albumin or small volume of isotonic  Albumin is a better volume expander in cirrhotic  She meets sepsis criteria with lactic acidosis,fever, hypotension,leukoctyosis  1 L NSS and albumin 25 gram x 1 recommended  Midodrine dose can be increased to 12 5mg Q 8, maintain MAP>70    2  CKD4 baseline 1 7-1 9 need op fu at discharge  3  Sepsis question SBP in cirrhotic with ascites  Check CT/blood /urine cx  Empiric abx per primary  Albumin 25 gram x1 now and NSS 1 L      4  Macrocytic anemia, trend stable  Follow  5  Elevated bicarbonate likely met alkalosis from diuresis  Hold diuresis with sepsis protocol/need for IVF  6 Volume overload due to cirrhosis,acute on choric diastolic CHF  Hold diuretic today with sepsis eval  Weight down to 257 lb from 261 lb on 9/29  Highest weight 287 standing scale, down 30 lb  Follow up reason for today's visit:     LEONARDO on CKD     Subjective:   Patient seen and examined today  She is having persistent fevers this AM which is new  Reports chills  Unclear source  BP on soft side  Denies abd pain  Denies cp/sob/n/v/d  Denies cough  A complete 10 point review of systems was performed and is otherwise negative  Objective:     Vitals: Blood pressure (!) 89/53, pulse 103, temperature (!) 101 1 °F (38 4 °C), temperature source Temporal, resp  rate 18, height 5' (1 524 m), weight 117 kg (257 lb 3 2 oz), SpO2 95 %  ,Body mass index is 50 23 kg/m²      Weight (last 2 days)     Date/Time Weight    09/30/22 05:26:45 117 (257 2)    09/29/22 0600 119 (261 69)    09/28/22 0537 119 (262 8)            Intake/Output Summary (Last 24 hours) at 9/30/2022 1047  Last data filed at 9/30/2022 0914  Gross per 24 hour   Intake 614 ml   Output 2000 ml   Net -1386 ml     I/O last 3 completed shifts: In: 840 [P O :840]  Out: 2700 [Urine:2700]         Physical Exam: BP (!) 89/53 (BP Location: Right arm)   Pulse 103   Temp (!) 101 1 °F (38 4 °C) (Temporal)   Resp 18   Ht 5' (1 524 m)   Wt 117 kg (257 lb 3 2 oz)   SpO2 95%   BMI 50 23 kg/m²     General Appearance:    Alert, cooperative, no distress, appears stated age, obese    Head:    Normocephalic, without obvious abnormality, atraumatic   Eyes:    Conjunctiva/corneas clear   Ears:    Normal external ears   Nose:   Nares normal, septum midline, mucosa normal, no drainage    or sinus tenderness   Throat:   Lips, mucosa, and tongue normal; teeth and gums normal   Neck:   Supple       Lungs:     Decrease BS bilaterally    Chest wall:    No tenderness or deformity   Heart:    Regular rate and rhythm, S1 and S2 normal, no murmur, rub   or gallop   Abdomen:     Soft, non-tender, bowel sounds active   Extremities:   +2 edema BL LE    Skin:   Skin color, texture, turgor normal, no rashes or lesions   Lymph nodes:   Cervical normal   Neurologic:   CNII-XII intact            Lab, Imaging and other studies: I have personally reviewed pertinent labs  CBC:   Lab Results   Component Value Date    WBC 12 58 (H) 09/30/2022    HGB 8 7 (L) 09/30/2022    HCT 27 0 (L) 09/30/2022     (H) 09/30/2022     09/30/2022    MCH 33 1 09/30/2022    MCHC 32 2 09/30/2022    RDW 17 7 (H) 09/30/2022    MPV 9 7 09/30/2022     CMP:   Lab Results   Component Value Date    K 3 3 (L) 09/30/2022    CL 99 09/30/2022    CO2 35 (H) 09/30/2022    BUN 39 (H) 09/30/2022    CREATININE 1 69 (H) 09/30/2022    CALCIUM 8 9 09/30/2022    EGFR 27 09/30/2022           Results from last 7 days   Lab Units 09/30/22  0507 09/29/22  0840 09/28/22  0532 09/24/22  1901 09/24/22  5446 POTASSIUM mmol/L 3 3* 3 2* 3 1*   < > 3 9   CHLORIDE mmol/L 99 99 100   < > 103   CO2 mmol/L 35* 35* 28   < > 27   BUN mg/dL 39* 36* 35*   < > 29*   CREATININE mg/dL 1 69* 1 75* 1 67*   < > 2 03*   CALCIUM mg/dL 8 9 9 0 8 8   < > 8 6   ALK PHOS U/L  --   --   --   --  159*   ALT U/L  --   --   --   --  13   AST U/L  --   --   --   --  32    < > = values in this interval not displayed  Phosphorus: No results found for: PHOS  Magnesium: No results found for: MG  Urinalysis: No results found for: Naaman Gowers, SPECGRAV, PHUR, LEUKOCYTESUR, NITRITE, PROTEINUA, GLUCOSEU, KETONESU, BILIRUBINUR, BLOODU  Ionized Calcium: No results found for: CAION  Coagulation: No results found for: PT, INR, APTT  Troponin: No results found for: TROPONINI  ABG: No results found for: PHART, KTC3UOC, PO2ART, VOS9ZKJ, F0QUQLAI, BEART, SOURCE  Radiology review:     IMAGING  No results found      Current Facility-Administered Medications   Medication Dose Route Frequency    acetaminophen (TYLENOL) tablet 650 mg  650 mg Oral Q4H PRN    albumin human (FLEXBUMIN) 25 % injection 12 5 g  12 5 g Intravenous BID    cefTRIAXone (ROCEPHIN) IVPB (premix in dextrose) 1,000 mg 50 mL  1,000 mg Intravenous Q24H    furosemide (LASIX) 500 mg infusion 50 mL  10 mg/hr Intravenous Continuous    metolazone (ZAROXOLYN) tablet 2 5 mg  2 5 mg Oral Daily    metoprolol succinate (TOPROL-XL) 24 hr tablet 12 5 mg  12 5 mg Oral Daily    midodrine (PROAMATINE) tablet 10 mg  10 mg Oral TID AC    rivaroxaban (XARELTO) tablet 15 mg  15 mg Oral Daily With Breakfast     Medications Discontinued During This Encounter   Medication Reason    midodrine (PROAMATINE) tablet 5 mg     metoprolol succinate (TOPROL-XL) 24 hr tablet 25 mg     furosemide (LASIX) injection 40 mg     furosemide (LASIX) injection 80 mg     levothyroxine tablet 150 mcg     midodrine (PROAMATINE) tablet 5 mg     cefTRIAXone (ROCEPHIN) IVPB (premix in dextrose) 1,000 mg 50 mL [facility]      This progress note was produced in part using a dictation device which may document imprecise wording from author's original intent

## 2022-09-30 NOTE — ASSESSMENT & PLAN NOTE
Anemia of chronic disease ontop of septic shock   Most likely exacerbation due to homolysis as a result of sepsis       Will continue to trend H & H and BMP per protocol   Transfuse PRBC <7 HgB   Transfuse Plts <10,000 Plts or active bleeding when <50 k

## 2022-09-30 NOTE — ASSESSMENT & PLAN NOTE
Patient noted to have acute cystitis with microscopic hematuria present on admission  Placed on IV Rocephin   Urine culture shows Klebsiella  Discontinue IV Rocephin and placed on oral Keflex to complete a total 7 day course  Ascitic fluid is negative for infection  1/2 blood cultures positive for possible coagulase negative staph possible contaminant  Refer to A & P for septic shock

## 2022-09-30 NOTE — ASSESSMENT & PLAN NOTE
CT abdomen pelvis shows liver cirrhosis  No prior history of cirrhosis as per the patient  Noted to have large volume ascites on admission underwent paracentesis on 09/25/2022 for 5 2 L    neg acute hepatitis panel  Denies any alcohol abuse history  Patient was unaware of her diagnosis of cirrhosis even though it was present on prior imaging  Will refer her outpatient to GI and also may need monthly paracentesis to try to keep her euvolemic in addition to being on diuretics  To consider adding spironolactone if kidney functions continue to improve  Patient has increased confusion and meetings sepsis criteria  Has some mild abdominal tenderness  Will get CT scan of the abdomen today  Ammonia is within normal limits  May need diagnostic paracentesis to rule out SBP  Empirically started on ceftriaxone

## 2022-09-30 NOTE — ASSESSMENT & PLAN NOTE
Background: Oriented to person only  Otherwise minimally arousable to vocal and painful stimuli  Noted to be in septic shock         Refer to A & P for septic shock

## 2022-09-30 NOTE — QUICK NOTE
Discussed with critical care attending and PA regarding patient's declining status  They will evaluate the patient and discuss with family regarding goals of care  If patient continues to remain a full code, she will be upgraded to Critical Care level of care  Continue with supportive care for sepsis in the interim

## 2022-09-30 NOTE — ASSESSMENT & PLAN NOTE
Lab Results   Component Value Date    EGFR 18 09/30/2022    EGFR 27 09/30/2022    EGFR 26 09/29/2022    CREATININE 2 39 (H) 09/30/2022    CREATININE 1 69 (H) 09/30/2022    CREATININE 1 75 (H) 09/29/2022   pre renal vs renal at this time     Extravascular volume expansion due to septic shock vs CHF on top of RTI   Trend BMP   Urine electrolytes

## 2022-09-30 NOTE — ASSESSMENT & PLAN NOTE
Chronically on Xarelto and metoprolol  Hold xarelto and metoprolol due to patient being septic shock and with suspected active GI bleed  Continue to evaluate recheck accordingly    Fall according

## 2022-10-01 NOTE — PROGRESS NOTES
Critical Care Services- Interval Progress Note   Loly Razo 80 y o  female MRN: [id number]  Unit/Bed#: -01 Encounter: [id number]  Assessment and Plan  Patient is an 70-year-old female with an extensive medical history, congestive heart failure with an EF of 60-70% with moderate aortic stenosis, SORIA- last paracentesis 5 2 liters which reaccumulated today, anemia, UTI, AFib on Xarelto, stage 3 chronic kidney disease and hypothyroidism patient had a consult for paracentesis in critical care evaluated her at that point patient had increased fever and low blood pressure, she was transferred to the ICU intubated and a central line was placed she was started on Levophed vasopressin and Marshal-Synephrine,   Marshal-Synephrine was maxed out at 180 and Solu-Cortef 50 q 6 was added, started epinephrine drip maxed out quickly at 10, ordered methylene blue and administered per her body weight, no change in blood pressure  Lactic acids continues to increase last value was 13 4/ INR is at 8 9 with increasing bilirubin appears to be going into liver failure   Diagnosis:  Cirrhosis of the liver with ascites progressing to liver failure     Diagnosis:  Shock  o Plan:  Continue pressors  o Continue Solu-Cortef  o Continue antibiotic  o Repeat ABG, INR, CMP at 11:00 p m   o Patient is status methylene blue administration with no improvement in blood pressure  o Continue to check lactic acid          Upon my evaluation, this patient had a high probability of imminent or life-threatening deterioration due to Liver failure and shock, which required my direct attention, intervention, and personal management  I have personally provided 120 minutes (1830 to 2030) on 09/30/2022 of critical care time, exclusive of procedures, teaching, family meetings, and any prior time recorded by providers other than myself   Time includes review of laboratory data, review of imaging/radiology results, monitoring for potential decompensation  Interventions were performed as documented above    -------------------------------------------------------------------------------------------------------------------------------------  Hemodynamic Monitoring:  Vital Signs:   HR:   BP: 60-25 tp 96/50  MAP: 35-60  RR: 16  SpO2: 95 on 100 oxygen    Laboratory   Results from last 7 days   Lab Units 09/30/22  1839 09/30/22  1623 09/30/22  1005 09/28/22  0532 09/26/22  0531 09/25/22  0526 09/24/22  0604   WBC Thousand/uL  --  13 22* 12 58* 6 36 7 57 9 66 12 18*   HEMOGLOBIN g/dL 7 8* 7 8* 8 7* 8 5* 8 9* 7 3* 8 0*   HEMATOCRIT % 24 8* 24 4* 27 0* 26 2* 26 9* 22 8* 24 6*   PLATELETS Thousands/uL  --  141* 150 116* 122* 131* 132*   MONO PCT %  --   --   --  14*  --   --   --      Results from last 7 days   Lab Units 09/30/22  1844 09/30/22  1623 09/30/22  0507 09/29/22  0840 09/28/22  0532 09/27/22  0458 09/26/22  0531 09/24/22  1901 09/24/22  0604   SODIUM mmol/L 139 138 140 139 137 137 137   < > 135   POTASSIUM mmol/L 2 8* 2 9* 3 3* 3 2* 3 1* 3 1* 3 3*   < > 3 9   CHLORIDE mmol/L 97 98 99 99 100 101 102   < > 103   CO2 mmol/L 20* 20* 35* 35* 28 30 28   < > 27   ANION GAP mmol/L 22* 20* 6 5 9 6 7   < > 5   BUN mg/dL 41* 44* 39* 36* 35* 33* 31*   < > 29*   CREATININE mg/dL 2 37* 2 39* 1 69* 1 75* 1 67* 1 77* 1 91*   < > 2 03*   CALCIUM mg/dL 8 0* 8 1* 8 9 9 0 8 8 8 0* 8 3   < > 8 6   GLUCOSE RANDOM mg/dL 56* 65 96 117 91 97 90   < > 96   ALT U/L 17 15  --   --   --   --   --   --  13   AST U/L 46* 47*  --   --   --   --   --   --  32   ALK PHOS U/L 118* 129*  --   --   --   --   --   --  159*   ALBUMIN g/dL 2 6* 2 5*  --   --   --   --   --   --  1 5*   TOTAL BILIRUBIN mg/dL 4 07* 3 54*  --   --   --   --   --   --  3 13*    < > = values in this interval not displayed       Results from last 7 days   Lab Units 09/30/22  1844 09/30/22  1623 09/28/22  0532 09/27/22  0458   MAGNESIUM mg/dL 1 9 1 9 2 3 2 2      Results from last 7 days   Lab Units 09/30/22  1839 09/26/22  0531 09/25/22  0526 09/24/22  0743   INR  8 39* 1 59* 2 08* 3 39*   PTT seconds 51*  --   --   --           Results from last 7 days   Lab Units 09/30/22 2032 09/30/22 1839 09/30/22  1623 09/30/22  1247 09/30/22  1005 09/27/22  0458   LACTIC ACID mmol/L 13 4* 12 9* 11 1* 3 7* 2 2* 0 7     ABG:  Results from last 7 days   Lab Units 09/30/22  1846   PH ART  7 281*   PCO2 ART mm Hg 37 6   PO2 ART mm Hg 140 7*   HCO3 ART mmol/L 17 3*   BASE EXC ART mmol/L -8 7   ABG SOURCE  Line, Arterial     VBG:  Results from last 7 days   Lab Units 09/30/22 1846 09/30/22  1839   PH JOANN   --  7 288*   PCO2 JOANN mm Hg  --  40 3*   PO2 JOANN mm Hg  --  94 1*   HCO3 JOANN mmol/L  --  18 9*   BASE EXC JOANN mmol/L  --  -7 2   ABG SOURCE  Line, Arterial  --      Results from last 7 days   Lab Units 09/30/22  1005   PROCALCITONIN ng/ml 0 93*       Micro  Results from last 7 days   Lab Units 09/30/22  1021 09/30/22  1005 09/25/22  1157   BLOOD CULTURE  Received in Microbiology Lab  Culture in Progress  Received in Microbiology Lab  Culture in Progress  --    GRAM STAIN RESULT   --   --  No Polys  No organisms seen   BODY FLUID CULTURE, STERILE   --   --  No growth       Diagnostic Imaging / Data: I have personally reviewed pertinent reports        Medications:  Current Facility-Administered Medications   Medication Dose Route Frequency    acetaminophen (TYLENOL) tablet 650 mg  650 mg Oral Q4H PRN    albumin human (FLEXBUMIN) 25 % injection 25 g  25 g Intravenous BID    albumin human (FLEXBUMIN) 5 % injection 25 g  25 g Intravenous Once    cefepime (MAXIPIME) IVPB (premix in dextrose) 2,000 mg 50 mL  2,000 mg Intravenous Q12H    EPINEPHrine 3000 mcg (STANDARD CONCENTRATION) IV in sodium chloride 0 9% 250 mL  1-10 mcg/min Intravenous Titrated    fentaNYL 1000 mcg in sodium chloride 0 9% 100mL infusion  50 mcg/hr Intravenous Continuous    fentanyl citrate (PF) 100 MCG/2ML 25 mcg  25 mcg Intravenous Once    fentanyl citrate (PF) 100 MCG/2ML 50 mcg  50 mcg Intravenous Q30 Min PRN    hydrocortisone (Solu-CORTEF) injection 50 mg  50 mg Intravenous Q6H [facility]    lactated ringers bolus 1,000 mL  1,000 mL Intravenous Once    Followed by    lactated ringers bolus 1,000 mL  1,000 mL Intravenous Once    NOREPINEPHRINE 4 MG  ML NSS (CMPD ORDER) infusion  1-30 mcg/min Intravenous Titrated    [START ON 10/1/2022] pantoprazole (PROTONIX) injection 40 mg  40 mg Intravenous Q12H [facility]    phenylephrine (MARCIA-SYNEPHRINE) 50 mg (STANDARD CONCENTRATION) in sodium chloride 0 9% 250 mL   mcg/min Intravenous Titrated    potassium chloride 40 mEq IVPB (premix)  40 mEq Intravenous Once    senna-docusate sodium (SENOKOT S) 8 6-50 mg per tablet 1 tablet  1 tablet Oral BID    vancomycin (VANCOCIN) IVPB (premix in dextrose) 1,000 mg 200 mL  15 mg/kg (Adjusted) Intravenous Q24H    vasopressin (PITRESSIN) 20 Units in sodium chloride 0 9 % 100 mL infusion  0 04 Units/min Intravenous Continuous       SIGNATURE: Hadley Simmons PA-C    Portions of the record may have been created with voice recognition software  Occasional wrong word or "sound a like" substitutions may have occurred due to the inherent limitations of voice recognition software    Read the chart carefully and recognize, using context, where substitutions have occurred

## 2022-10-01 NOTE — SEPSIS NOTE
Sepsis Note   Lowell Francis 80 y o  female MRN: [id number]  Unit/Bed#: -01 Encounter: [id number]     Sepsis Reassessment Alert for /-01  Ensure Sepsis Reassessment has been completed     qSOFA     [facility] Name 09/30/22 2015 09/30/22 1915 09/30/22 1900 09/30/22 1845 09/30/22 1830    Altered mental status GCS < 15 -- -- -- -- --    Respiratory Rate > / =22 0 0 0 0 0    Systolic BP < / =351 -- -- -- -- --    Q Sofa Score -- 1 1 1 2    [facility] Name 09/30/22 1815 09/30/22 1800 09/30/22 1745 09/30/22 1730 09/30/22 1715    Altered mental status GCS < 15 -- -- -- -- --    Respiratory Rate > / =22 0 0 0 1 1    Systolic BP < / =685 -- -- -- -- --    Q Sofa Score 2 2 2 3 3    Row Name 09/30/22 1700 09/30/22 1645 09/30/22 1630 09/30/22 15:35:17 09/30/22 1525    Altered mental status GCS < 15 -- -- -- -- --    Respiratory Rate > / =22 1 1 1 1 1    Systolic BP < / =314 -- -- -- 1 1    Q Sofa Score 3 3 3 3 3    Row Name 09/30/22 1441 09/30/22 14:32:08 09/30/22 14:08:56 09/30/22 11:55:37 09/30/22 10:41:24    Altered mental status GCS < 15 1 -- -- -- --    Respiratory Rate > / =22 -- 0 0 0 0    Systolic BP < / =176 -- 1 -- 0 1    Q Sofa Score 2 1 1 1 1    Row Name 09/30/22 1031 09/30/22 1030 09/30/22 0914 09/30/22 09:04:09 09/30/22 06:34:33    Altered mental status GCS < 15 -- 1 1 -- --    Respiratory Rate > / =22 0 -- -- 0 --    Systolic BP < / =543 -- -- -- 1 0    Q Sofa Score 1 2 2 1 --    Row Name 09/29/22 22:16:18 09/29/22 2000 09/29/22 15:17:21 09/29/22 1055 09/29/22 0900    Altered mental status GCS < 15 -- 0 -- -- 0    Respiratory Rate > / =22 0 -- 0 -- --    Systolic BP < / =210 0 -- 0 0 --    Q Sofa Score 0 -- 0 0 0    Row Name 09/29/22 08:08:32 09/29/22 0802 09/28/22 21:28:53          Altered mental status GCS < 15 -- -- --      Respiratory Rate > / =22 0 -- --      Systolic BP < / =778 0 0 1      Q Sofa Score 0 -- --                      Default Flowsheet Data (last 720 hours)     Sepsis Reassess     Row Name 09/30/22 2023                   Repeat Volume Status and Tissue Perfusion Assessment Performed    Repeat Volume Status and Tissue Perfusion Assessment Performed Yes  -AA                  Volume Status and Tissue Perfusion Post Fluid Resuscitation * Must Document All *    Vital Signs Reviewed (HR, RR, BP, T) Yes  -AA        Shock Index Reviewed Yes  -AA        Arterial Oxygen Saturation Reviewed (POx, SaO2 or SpO2) Yes (comment %)  -AA        Cardio Tachycardia  -AA        Pulmonary Respiratory distress  -AA        Capillary Refill Sluggish  -AA        Peripheral Pulses Radial;Dorsalis Pedis  -AA        Peripheral Pulse +1  -AA        Dorsalis Pedis +1  -AA        Skin Cool;Diaphoretic;Pale  -AA        Urine output assessed --                  *OR*   Intensive Monitoring- Must Document One of the Following Four *:    Vital Signs Reviewed Yes  -AA        * Central Venous Pressure (CVP or RAP) --        * Central Venous Oxygen (SVO2, ScvO2 or Oxygen saturation via central catheter) 92 %  -AA        * Bedside Cardiovascular US in IVC diameter and % collapse --        * Passive Leg Raise OR Crystalloid Challenge --        Passive leg exam --              User Key  (r) = Recorded By, (t) = Taken By, (c) = Cosigned By    [address] Name Provider Type    AA Alena Pressley MD Resident

## 2022-10-01 NOTE — DISCHARGE SUMMARY
[address]  Discharge- Tanna Fu 1940, 80 y o  female MRN: [id number]  Unit/Bed#: -01 Encounter: [id number]  Primary Care Provider: Pavithra Tillman MD   Date and time admitted to hospital: 9/23/2022  6:49 AM    * Septic Shock  Assessment & Plan  Background:  Admission date 9/23 with acute cystitis  Cultures growing Klebsiella  Patient was treated with 2 days of ceftriaxone and 2 days of Keflex  Moreover she was noted to be volume overloaded with AE CHF  Diuresed over 30 lb during admission with Lasix drip  Today patient was noted to be in altered mental state, vitally unstable, paradoxical breathing pattern, labs showing leukocytosis, progressing acute anemia  Patient was transferred to the critical care unit on 09/30/2022 for further acute care  Plan:  Unclear source of infection at this time; will consider urinary tract as a source  · Started on dual antibiotic therapy including cefepime and vancomycin  · Currently on triple pressors including phenylephrine, norepinephrine, vasopressin  Wean and titrate drip per protocol  · Started on fentanyl drip for sedation  · Fentanyl p r n  For agitation  · Sepsis panel ordered including blood culturesx2  Pending  No growth to date  · Trend lactic acid, serum electrolytes including sodium potassium magnesium phosphate  · CBC, BMP trend  · Patient is currently intubated and mechanical ventilated on /14/60  · Airway protocol, respiratory protocol  · Follow-up with cultures and modify antibiotics accordingly  · Consult to Pharmacy; vancomycin trough  · Monitor I/OS  · Strict daily weight  · Re-evaluate and recheck per protocol  Moderate aortic stenosis  Assessment & Plan  F/u with cardiology accordingly       [facility]  Assessment & Plan  Chronically on Xarelto and metoprolol      Hold xarelto and metoprolol due to patient being septic shock and with suspected active GI bleed   Continue to evaluate recheck accordingly  Fall according    Cirrhosis of liver with ascites [facility])  Assessment & Plan  CT abdomen pelvis shows liver cirrhosis  No prior history of cirrhosis as per the patient  Noted to have large volume ascites on admission underwent paracentesis on 09/25/2022 for 5 2 L    neg acute hepatitis panel  Denies any alcohol abuse history  Patient was unaware of her diagnosis of cirrhosis even though it was present on prior imaging  Will refer her outpatient to GI     LEONARDO (acute kidney injury) ([facility] [address] )  Assessment & Plan  On top of CKD stage 3  Pre renal versus renal   Patient is septic shock and CHF  Also possibly with renal tubular injury  -send urine electrolytes  -calculate a FeNA  -trend BMP per protocol  -I/O  -daily weight        Acute metabolic encephalopathy  Assessment & Plan  Background: Oriented to person only  Otherwise minimally arousable to vocal and painful stimuli  Noted to be in septic shock  Refer to [facility] for septic shock       Hypokalemia  Assessment & Plan  Replacing per protocol     Trend BMP accordingly   telemetry    Acute on chronic anemia  Assessment & Plan  Anemia of chronic disease ontop of septic shock   Most likely exacerbation due to homolysis as a result of sepsis  Will continue to trend H & H and BMP per protocol   Transfuse PRBC <7 HgB   Transfuse Plts <10,000 Plts or active bleeding when <50 k       Acute cystitis with hematuria  Assessment & Plan  Patient noted to have acute cystitis with microscopic hematuria present on admission  Placed on IV Rocephin   Urine culture shows Klebsiella  Discontinue IV Rocephin and placed on oral Keflex to complete a total 7 day course  Ascitic fluid is negative for infection  1/2 blood cultures positive for possible coagulase negative staph possible contaminant  Refer to [facility] for septic shock        Acute on chronic diastolic (congestive) heart failure (HCC)  Assessment & Plan  Wt Readings from Last 3 Encounters:   09/30/22 117 kg (257 lb 3 2 oz)   07/10/22 121 kg (267 lb 9 6 oz)   05/19/22 126 kg (277 lb 1 9 oz)       duiresing well during the admission per chart review  Seems to be still volume overloaded per physical exam    + ascites  +2 ANA     Refer to [facility] for septic shock       Hypothyroidism (acquired)  Assessment & Plan  Currently intubated  administer medication if OG tube in place other wise hold for now  Stage 3a chronic kidney disease [facility])  Assessment & Plan  Lab Results   Component Value Date    EGFR 18 09/30/2022    EGFR 18 09/30/2022    EGFR 27 09/30/2022    CREATININE 2 37 (H) 09/30/2022    CREATININE 2 39 (H) 09/30/2022    CREATININE 1 69 (H) 09/30/2022   pre renal vs renal at this time  Extravascular volume expansion due to septic shock vs CHF on top of RTI   Trend BMP   Urine electrolytes          Medical Problems             Resolved Problems  Date Reviewed: 9/30/2022   None                 Admission Date:   Admission Orders (From admission, onward)     Ordered        09/23/22 0919  INPATIENT ADMISSION  Once                        Admitting Diagnosis: Cirrhosis ([address] ) [K74 60]  CHF (congestive heart failure) ([address] ) [I50 9]  Altered mental status [R41 82]  Weakness [R53 1]  Urinary tract infection [N39 0]  Ascites [R18 8]  Acute on chronic diastolic (congestive) heart failure (HCC) [I50 33]  Type 2 diabetes mellitus without complication, without long-term current use of insulin ([address] ) [E11 9]    HPI: admission date 9/23 for acute cystitis    Procedures Performed:   Orders Placed This Encounter   Procedures    ED ECG Documentation Only       Summary of Hospital Course:   Patient is an 49-year-old female who was admitted to the hospital at 1st was treated for UTI the ceftriaxone x7 days    Also noted to be volume overloaded she was started on Lasix 40 b i d  along with midodrine due to hypotension with minimal responses was switched to a Lasix drip at 10 started on albumin b i d  patient was also evaluated for paracentesis and thoracentesis was performed for 5 1 L, the ascites fluid was negative for infection  Patient had an elevated INR of 5 from Coumadin use which was discontinued and she was switched to Xarelto, during this stay her hemoglobin dropped to 7 3 which normally at 9  She received 1 unit of RBCs which improved to 8 9  Patient was restarted on Xarelto at 15 mg daily  Patient had a CT of the abdomen chest and pelvis done which showed liver cirrhosis patient had no history of liver cirrhosis and a negative acute hepatitis panel and does not use any alcohol  Patient responded well to the diuresis after Zaroxolyn 2 5 mg was added  Patient was -4 5 L  Patient tolerated diuresis well received 1 dose of IV albumin  On 930 patient started to get febrile and hypotensive critical care at 1st was consulate for paracentesis  Patient met sepsis criteria with hypotension and support was quickly escalated  After talking to patient's daughter patient was intubated, central line was placed and multiple pressors were started  Patient remain maxed out on all pressors with methylene blue administration and Solu-Cortef, family came to bedside and the decision was made to make her comfort care and terminally extubated         Significant Findings, Care, Treatment and Services Provided:   Central line placement 9/29  Intubation 9/29  Paracentesis x1 9/25  CT chest abdomen and pelvis without contrast 9/30  Ultrasound kidney and bladder 9/26  CT chest abdomen pelvis without contrast 9/16      Complications: none    Condition at Time of Death: terminal     Final Diagnosis:  Acute liver failure with shock    Date, Time and Cause of Death    Date of Death: 10/1/22  Time of Death:  4:40 AM  Preliminary Cause of Death: [facility] )  Entered by: Zaki alvarez[HO1 1]     Attribution     HO1 1 Nisreen Somers PA-C 10/01/22 04:51          PCP: Betty Barker Carmen Jha MD    Disposition:

## 2022-10-01 NOTE — DEATH NOTE
INPATIENT DEATH NOTE  Seth Soto 80 y o  female MRN: [id number]  Unit/Bed#: -01 Encounter: [id number]    Date, Time and Cause of Death    Date of Death: 10/1/22  Time of Death:  4:40 AM  Preliminary Cause of Death: Liver failure ([facility] [address] )  Entered by: Kaitlin alvarez[HO1 1]     Attribution     HO1 1 Hadley Alvarez PA-C 10/01/22 04:51               PHYSICAL EXAM:  Unresponsive to noxious stimuli, Spontaneous respirations absent, Breath sounds absent, Carotid pulse absent, Heart sounds absent, Pupillary light reflex absent and Corneal blink reflex absent    Medical Examiner notification criteria:  NONE APPLICABLE   Medical Examiner's office notified?:  Yes   Medical Examiner accepted case?:  No  Name of Medical Examiner:          Autopsy Options:  Post-mortem examination declined by next of kin    Primary Service Attending Physician notified?:  yes - Attending:  Gasper Arreola, DO    Physician/Resident responsible for completing Discharge Summary:  kim

## 2022-10-01 NOTE — NURSING NOTE
Patient placed on comfort care per discussion with family  Pressors remain at maximum doses  Ativan and Fentanyl doses used prn for patient comfort per orders

## 2022-10-01 NOTE — NURSING NOTE
Restraints removed , family at the bedside  Fentanyl 50mcg IV given  5975 Patient terminally extubated

## 2022-10-01 NOTE — SEPSIS NOTE
Sepsis Note   Laurie Wray 80 y o  female MRN: [id number]  Unit/Bed#: -01 Encounter: [id number]       qSOFA     Row Name 09/30/22 2202 09/30/22 2200 09/30/22 2145 09/30/22 2130 09/30/22 2115    Altered mental status GCS < 15 -- -- -- -- --    Respiratory Rate > / =22 0 0 1 0 0    Systolic BP < / =185 -- -- -- -- --    Q Sofa Score 1 1 2 1 1    Row Name 09/30/22 2110 09/30/22 2105 09/30/22 2101 09/30/22 2100 09/30/22 2050    Altered mental status GCS < 15 -- -- -- -- --    Respiratory Rate > / =22 1 1 1 1 1    Systolic BP < / =302 -- -- -- -- --    Q Sofa Score 2 2 2 2 2    Row Name 09/30/22 2045 09/30/22 2040 09/30/22 2035 09/30/22 2030 09/30/22 2025    Altered mental status GCS < 15 -- -- -- -- --    Respiratory Rate > / =22 0 0 0 1 0    Systolic BP < / =446 -- 1 -- -- 1    Q Sofa Score 1 1 1 2 1    Row Name 09/30/22 2020 09/30/22 2015 09/30/22 1915 09/30/22 1900 09/30/22 1845    Altered mental status GCS < 15 -- -- -- -- --    Respiratory Rate > / =22 0 0 0 0 0    Systolic BP < / =659 -- -- -- -- --    Q Sofa Score -- -- 1 1 1    Row Name 09/30/22 1830 09/30/22 1815 09/30/22 1800 09/30/22 1745 09/30/22 1730    Altered mental status GCS < 15 -- -- -- -- --    Respiratory Rate > / =22 0 0 0 0 1    Systolic BP < / =974 -- -- -- -- --    Q Sofa Score 2 2 2 2 3    Row Name 09/30/22 1715 09/30/22 1700 09/30/22 1645 09/30/22 1630 09/30/22 15:35:17    Altered mental status GCS < 15 -- -- -- -- --    Respiratory Rate > / =22 1 1 1 1 1    Systolic BP < / =697 -- -- -- -- 1    Q Sofa Score 3 3 3 3 3    Row Name 09/30/22 1525 09/30/22 1441 09/30/22 14:32:08 09/30/22 14:08:56 09/30/22 11:55:37    Altered mental status GCS < 15 -- 1 -- -- --    Respiratory Rate > / =22 1 -- 0 0 0    Systolic BP < / =641 1 -- 1 -- 0    Q Sofa Score 3 2 1 1 1    Toney Name 09/30/22 10:41:24 09/30/22 1031 09/30/22 1030 09/30/22 0914 09/30/22 09:04:09    Altered mental status GCS < 15 -- -- 1 1 --    Respiratory Rate > / =22 0 0 -- -- 0 Systolic BP < / =449 1 -- -- -- 1    Q Sofa Score 1 1 2 2 1    Row Name 09/30/22 06:34:33 09/29/22 22:16:18 09/29/22 2000 09/29/22 15:17:21 09/29/22 1055    Altered mental status GCS < 15 -- -- 0 -- --    Respiratory Rate > / =22 -- 0 -- 0 --    Systolic BP < / =219 0 0 -- 0 0    Q Sofa Score -- 0 -- 0 0    Row Name 09/29/22 0900 09/29/22 08:08:32 09/29/22 0802          Altered mental status GCS < 15 0 -- --      Respiratory Rate > / =22 -- 0 --      Systolic BP < / =139 -- 0 0      Q Sofa Score 0 0 --                      Default Flowsheet Data (last 720 hours)     Sepsis Reassess     Row Name 09/30/22 2223 09/30/22 2023                Repeat Volume Status and Tissue Perfusion Assessment Performed    Repeat Volume Status and Tissue Perfusion Assessment Performed Yes  -HO Yes  -AA                Volume Status and Tissue Perfusion Post Fluid Resuscitation * Must Document All *    Vital Signs Reviewed (HR, RR, BP, T) Yes  -HO Yes  -AA       Shock Index Reviewed Yes  -HO Yes  -AA       Arterial Oxygen Saturation Reviewed (POx, SaO2 or SpO2) Yes (comment %)  -HO Yes (comment %)  -AA       Cardio Regular rate and rhythm  -HO Tachycardia  -AA       Pulmonary Rhonchi  -HO Respiratory distress  -AA       Capillary Refill Sluggish  -HO Sluggish  -AA       Peripheral Pulses Radial;Dorsalis Pedis  -HO Radial;Dorsalis Pedis  -AA       Peripheral Pulse +1  -HO +1  -AA       Dorsalis Pedis +1  -HO +1  -AA       Skin Cool;Dry;Pale; Mottled  -HO Cool;Diaphoretic;Pale  -AA       Urine output assessed Adequate  -HO --                *OR*   Intensive Monitoring- Must Document One of the Following Four *:    Vital Signs Reviewed Yes  -HO Yes  -AA       * Central Venous Pressure (CVP or RAP) -- --       * Central Venous Oxygen (SVO2, ScvO2 or Oxygen saturation via central catheter) 92 %  -HO 92 %  -AA       * Bedside Cardiovascular US in IVC diameter and % collapse -- --       * Passive Leg Raise OR Crystalloid Challenge -- -- Passive leg exam -- --             User Key  (r) = Recorded By, (t) = Taken By, (c) = Cosigned By    [address] Name Provider Type    Soraida Silva PA-C Physician Assistant    JULIO Wesley MD Resident                Patient did not respond to fluid boluses, 1 unit of RBCs, maxed out on Levophed epinephrine Marshal-Synephrine and vasopressin also received methylene blue and Solu-Cortef started on vancomycin and cefepime

## 2022-10-01 NOTE — NURSING NOTE
Family at bedside, condition updated by Ricke Lennox PA  BP maintained by pressors at maximum doses  Patient opens eyes to command and squeezes hands, Shakes head no to pain  OGT to LCS draining dark red , Imelda Simmons aware   2026 PRBCS hung  2202 PRBCS infused

## 2022-10-01 NOTE — ASSESSMENT & PLAN NOTE
Background:  Admission date 9/23 with acute cystitis  Cultures growing Klebsiella  Patient was treated with 2 days of ceftriaxone and 2 days of Keflex  Moreover she was noted to be volume overloaded with AE CHF  Diuresed over 30 lb during admission with Lasix drip  Today patient was noted to be in altered mental state, vitally unstable, paradoxical breathing pattern, labs showing leukocytosis, progressing acute anemia  Patient was transferred to the critical care unit on 09/30/2022 for further acute care  Plan:  Unclear source of infection at this time; will consider urinary tract as a source  · Started on dual antibiotic therapy including cefepime and vancomycin  · Currently on triple pressors including phenylephrine, norepinephrine, vasopressin  Wean and titrate drip per protocol  · Started on fentanyl drip for sedation  · Fentanyl p r n  For agitation  · Sepsis panel ordered including blood culturesx2  Pending  No growth to date  · Trend lactic acid, serum electrolytes including sodium potassium magnesium phosphate  · CBC, BMP trend  · Patient is currently intubated and mechanical ventilated on /14/60  · Airway protocol, respiratory protocol  · Follow-up with cultures and modify antibiotics accordingly  · Consult to Pharmacy; vancomycin trough  · Monitor I/OS  · Strict daily weight  · Re-evaluate and recheck per protocol

## 2022-10-01 NOTE — ACP (ADVANCE CARE PLANNING)
Critical Care Advanced Care Planning Note  Evans Proctor 80 y o  female MRN: [id number]  Unit/Bed#: -01 Encounter: [id number]    Evans Proctor is a 80 y o  female requiring critical care evaluation and advanced care planning  The patient has chronic comorbidities, including but not limited to shock and liver failure, which is now further complicated by the following acute conditions:  Acute renal failure  Due to the severity of the patient's acute condition and/or the extent of chronic conditions, additional conversations pertaining to advanced care planning were required  Today's discussion, which was held in a face-to-face meeting, included Sherice Hagan , and it was established that all stake holders understood the rationale for the advanced care planning  The patient was unable to participate in the discussion due to Severity of illness  Summary of Discussion:  Discussed with daughter that patient is in shock/ appears to have liver failure/ chronic kidney disease within aspect of acute kidney injury, she is maxed out on all pressors and also received methylene blue, steroids with no response to treatment  If her heart would stop date would be no CPR  Discussion of further progression to comfort care will be continued after family visit is completed  Per family comfort care, but waiting for one of the sons and daughter to arrive       Total time spent, (30) minutes (2220 to [phone])        CODE STATUS: DNR, accepts intubation - Level 2  POA:  Sherice Hagan   POLST:        Dorene Solares PA-C  DATE: September 30, 2022  TIME: 10:38 PM

## 2022-10-01 NOTE — ASSESSMENT & PLAN NOTE
Lab Results   Component Value Date    EGFR 18 09/30/2022    EGFR 18 09/30/2022    EGFR 27 09/30/2022    CREATININE 2 37 (H) 09/30/2022    CREATININE 2 39 (H) 09/30/2022    CREATININE 1 69 (H) 09/30/2022   pre renal vs renal at this time     Extravascular volume expansion due to septic shock vs CHF on top of RTI   Trend BMP   Urine electrolytes

## 2022-10-02 LAB — CORTIS SERPL-MCNC: 133.1 UG/DL

## 2022-10-03 NOTE — CLINICAL RISK MANAGEMENT
Progress Note - Death in [address] [facility] [address] 80 y o  female MRN: [id number]  Unit/Bed#: -01 Encounter: [id number]      Patient  within 24 hours of restraint  with Soft restraint right wrist and Soft restraint left wrist  Death unrelated to use of restraints  This situation was tracked internally  CMS and PA-GARFIELD  notification not required

## 2022-10-05 LAB
BACTERIA BLD CULT: NORMAL
BACTERIA BLD CULT: NORMAL

## 2022-10-13 NOTE — ASSESSMENT & PLAN NOTE
Baseline hemoglobin is around 9  Currently down to 7 3  No overt signs of bleeding reported  Check iron panel B12 occult blood  Transfuse 1 unit PRBC and recheck CBC tomorrow     Continue to hold Coumadin no

## 2023-03-01 NOTE — PLAN OF CARE
Physical Therapy Daily Treatment Note  [address] [facility], [address] [place] [address]        Patient: Estee Xie   : 2006  Diagnosis/ICD-10 Code:  Acute pain of left knee [M25.562]  Referring practitioner: No ref. provider found  Date of Initial Visit: Type: THERAPY  Noted: 2022  Today's Date: 3/1/2023  Patient seen for 12 sessions             Subjective     Estee Xie denies having any pain upon arrival.  She reports that she has been doing most of her normal activities at home and school without left knee or ankle pain.  She expresses desire to resume softball activities, once released by Orthopedic.    Subjective Questionnaire: LEFS: 77/80       Objective     Active Range of Motion   Left Knee   Flexion: 125 degrees   Extension: 0         Left Ankle/Foot   Normal active range of motion         Passive Range of Motion   Left Knee   Flexion: 130 degrees with pain  Extension: 0 degrees      Left Ankle/Foot  Normal passive range of motion     Strength/Myotome Testing      Left Hip Strength:  Planes of Motion   Flexion: 5/5  Extension: 4/5  Abduction: 4+/5     Right Hip Strength:  Planes of Motion   Flexion: 5/5  Extension: 4/5  Abduction: 4+/5       Left Knee Strength:  Flexion: 5/5  Extension: 5/5  Quadriceps contraction: Good  (Pt denied pain with MMT)       Right Knee Strength:  Flexion: 5/5  Extension: 5/5         Left Ankle/Foot Strength:  Normal strength     Right Ankle/Foot Strength:  Normal strength       Ankle/Foot Comments   No ankle swelling noted         See Exercise Logs for complete treatment.       Assessment/Plan     Pt tolerated therapy session well - with performance of therapeutic exercises and  CKC-Functional activities. She has made notable improvements in all areas.   She now exhibits Left Knee/Ankle ROM and Strength that is WNLs and denies any pain during her normal daily home and school activities.  She is Independent with progressive HEP and has currently met or nearly met  Problem: OCCUPATIONAL THERAPY ADULT  Goal: Performs self-care activities at highest level of function for planned discharge setting  See evaluation for individualized goals  Description: Treatment Interventions: ADL retraining, Functional transfer training, UE strengthening/ROM, Endurance training, Patient/family training, Equipment evaluation/education, Neuromuscular reeducation, Compensatory technique education, Continued evaluation, Energy conservation, Activityengagement          See flowsheet documentation for full assessment, interventions and recommendations  Note: Limitation: Decreased ADL status, Decreased UE strength, Decreased Safe judgement during ADL, Decreased endurance, Decreased self-care trans, Decreased high-level ADLs  Prognosis: Good  Assessment: Pt is a 80 y o  female, admitted to [address] [place] [address] 9/23/2022 d/t experiencing increased SOB and generalized weakness  Dx: acute on chronic diastolic CHF  Pt with PMHx impacting their performance during ADL tasks, including: LEONARDO, a-fib, CHF, cirrhosis, DM, hypothyroid, hyperlipidemia, thoracic aortic aneurysm without rupture  Prior to admission to the hospital Pt was performing ADLs with physical assistance  IADLs with physical assistance  Functional transfers/ambulation without physical assistance  Cognitive status was PTA was intact  OT order placed to assess Pt's ADLs, cognitive status, and performance during functional tasks in order to maximize safety and independence while making most appropriate d/c recommendations   Pt's clinical presentation is currently unstable/unpredictable given new onset deficits that effect Pt's occupational performance and ability to safely return to OF including decrease activity tolerance, decrease standing balance, decrease performance during ADL tasks, decrease safety awareness , decrease UB MS, decrease generalized strength, decrease activity engagement, decrease performance during functional transfers, high fall risk and all therapy goals.      Plan:    Pt to continue with HEP on her own and to follow-up with Orthopedic as scheduled on 3/13/23.   Anticipate discharge at this time.  Pt plans to discuss possible returning to sports at next Orthopedic appointment.        .                  Timed:  Manual Therapy:    0     mins  89333;  Therapeutic Exercise:    28     mins  78155;     Neuromuscular Ernesto:    0    mins  53216;    Therapeutic Activity:     10     mins  19592;     Gait Trainin     mins  97625;     Ultrasound:     0     mins  63309;    Electrical Stimulation:    0     mins  05289;  Iontophoresis     0     mins  84687    Untimed:  Electrical Stimulation:    0     mins  29064 ( );  Mechanical Traction:    0     mins  11086;   Fluidotherapy     0     mins  03319  Hot pack     0     mins  31481  Cold pack     0     mins  38495    Timed Treatment:   38   mins   Total Treatment:     38   mins        Josefa Garcia PTA  Physical Therapist Assistant   limited insight to deficits combined with medical complications of hypotension, abnormal renal lab values, abnormal H&H, abnormal CBC, edema/swelling, elevated BNP, multiple readmissions, incontinence, fear/retreat, need for input for mobility technique/safety and elevated INR  Personal factors affecting Pt at time of initial evaluation include: advanced age, past experience, inability to perform current job functions, inability to perform IADLs, inability to perform ADLs, inability to ambulate household distances, inability to navigate community distances, limited insight into impairments, decreased initiation and engagement and questionable non-compliance  Pt will benefit from continued skilled OT services to address deficits as defined above and to maximize level independence/participation during ADLs and functional tasks to facilitate return toward PLOF and improved quality of life  From an occupational therapy standpoint, recommendation at time of d/c would be [facility] [address] with continued family support       OT Discharge Recommendation: Home with home health rehabilitation (and continued family support)

## 2024-07-15 NOTE — PLAN OF CARE
Problem: Nutrition/Hydration-ADULT  Goal: Nutrient/Hydration intake appropriate for improving, restoring or maintaining nutritional needs  Description: Monitor and assess patient's nutrition/hydration status for malnutrition  Collaborate with interdisciplinary team and initiate plan and interventions as ordered  Monitor patient's weight and dietary intake as ordered or per policy  Utilize nutrition screening tool and intervene as necessary  Determine patient's food preferences and provide high-protein, high-caloric foods as appropriate       INTERVENTIONS:  - Monitor oral intake, urinary output, labs, and treatment plans  - Assess nutrition and hydration status and recommend course of action  - Evaluate amount of meals eaten  - Assist patient with eating if necessary   - Allow adequate time for meals  - Recommend/ encourage appropriate diets, oral nutritional supplements, and vitamin/mineral supplements  - Order, calculate, and assess calorie counts as needed  - Recommend, monitor, and adjust tube feedings and TPN/PPN based on assessed needs  - Assess need for intravenous fluids  - Provide specific nutrition/hydration education as appropriate  - Include patient/family/caregiver in decisions related to nutrition  Outcome: Progressing     Problem: Prexisting or High Potential for Compromised Skin Integrity  Goal: Skin integrity is maintained or improved  Description: INTERVENTIONS:  - Identify patients at risk for skin breakdown  - Assess and monitor skin integrity  - Assess and monitor nutrition and hydration status  - Monitor labs   - Assess for incontinence   - Turn and reposition patient  - Assist with mobility/ambulation  - Relieve pressure over bony prominences  - Avoid friction and shearing  - Provide appropriate hygiene as needed including keeping skin clean and dry  - Evaluate need for skin moisturizer/barrier cream  - Collaborate with interdisciplinary team   - Patient/family teaching  - Consider wound care consult   Outcome: Progressing     Problem: MOBILITY - ADULT  Goal: Maintain or return to baseline ADL function  Description: INTERVENTIONS:  -  Assess patient's ability to carry out ADLs; assess patient's baseline for ADL function and identify physical deficits which impact ability to perform ADLs (bathing, care of mouth/teeth, toileting, grooming, dressing, etc )  - Assess/evaluate cause of self-care deficits   - Assess range of motion  - Assess patient's mobility; develop plan if impaired  - Assess patient's need for assistive devices and provide as appropriate  - Encourage maximum independence but intervene and supervise when necessary  - Involve family in performance of ADLs  - Assess for home care needs following discharge   - Consider OT consult to assist with ADL evaluation and planning for discharge  - Provide patient education as appropriate  Outcome: Progressing  Goal: Maintains/Returns to pre admission functional level  Description: INTERVENTIONS:  - Perform BMAT or MOVE assessment daily    - Set and communicate daily mobility goal to care team and patient/family/caregiver  - Collaborate with rehabilitation services on mobility goals if consulted  - Perform Range of Motion 3 times a day  - Reposition patient every 2 hours    - Record patient progress and toleration of activity level   Outcome: Progressing     Problem: PAIN - ADULT  Goal: Verbalizes/displays adequate comfort level or baseline comfort level  Description: Interventions:  - Encourage patient to monitor pain and request assistance  - Assess pain using appropriate pain scale  - Administer analgesics based on type and severity of pain and evaluate response  - Implement non-pharmacological measures as appropriate and evaluate response  - Consider cultural and social influences on pain and pain management  - Notify physician/advanced practitioner if interventions unsuccessful or patient reports new pain  Outcome: Progressing     Problem: INFECTION - ADULT  Goal: Absence or prevention of progression during hospitalization  Description: INTERVENTIONS:  - Assess and monitor for signs and symptoms of infection  - Monitor lab/diagnostic results  - Monitor all insertion sites, i e  indwelling lines, tubes, and drains  - Monitor endotracheal if appropriate and nasal secretions for changes in amount and color  - [place] appropriate cooling/warming therapies per order  - Administer medications as ordered  - Instruct and encourage patient and family to use good hand hygiene technique  - Identify and instruct in appropriate isolation precautions for identified infection/condition  Outcome: Progressing     Problem: SAFETY ADULT  Goal: Maintain or return to baseline ADL function  Description: INTERVENTIONS:  -  Assess patient's ability to carry out ADLs; assess patient's baseline for ADL function and identify physical deficits which impact ability to perform ADLs (bathing, care of mouth/teeth, toileting, grooming, dressing, etc )  - Assess/evaluate cause of self-care deficits   - Assess range of motion  - Assess patient's mobility; develop plan if impaired  - Assess patient's need for assistive devices and provide as appropriate  - Encourage maximum independence but intervene and supervise when necessary  - Involve family in performance of ADLs  - Assess for home care needs following discharge   - Consider OT consult to assist with ADL evaluation and planning for discharge  - Provide patient education as appropriate  Outcome: Progressing  Goal: Maintains/Returns to pre admission functional level  Description: INTERVENTIONS:  - Perform BMAT or MOVE assessment daily    - Set and communicate daily mobility goal to care team and patient/family/caregiver  - Collaborate with rehabilitation services on mobility goals if consulted  - Perform Range of Motion 3 times a day  - Reposition patient every 2 hours    - Record patient progress and toleration of activity level   Outcome: Progressing  Goal: Patient will remain free of falls  Description: INTERVENTIONS:  - Educate patient/family on patient safety including physical limitations  - Instruct patient to call for assistance with activity   - Consult OT/PT to assist with strengthening/mobility   - Keep Call bell within reach  - Keep bed low and locked with side rails adjusted as appropriate  - Keep care items and personal belongings within reach  - Initiate and maintain comfort rounds  - Make Fall Risk Sign visible to staff  - Offer Toileting every 2 Hours, in advance of need  - Initiate/Maintain bed/chair alarm prn  - Apply yellow socks and bracelet for high fall risk patients  - Consider moving patient to room near nurses station  Outcome: Progressing     Problem: DISCHARGE PLANNING  Goal: Discharge to home or other facility with appropriate resources  Description: INTERVENTIONS:  - Identify barriers to discharge w/patient and caregiver  - Arrange for needed discharge resources and transportation as appropriate  - Identify discharge learning needs (meds, wound care, etc )  - Arrange for interpretive services to assist at discharge as needed  - Refer to Case Management Department for coordinating discharge planning if the patient needs post-hospital services based on physician/advanced practitioner order or complex needs related to functional status, cognitive ability, or social support system  Outcome: Progressing     Problem: Knowledge Deficit  Goal: Patient/family/caregiver demonstrates understanding of disease process, treatment plan, medications, and discharge instructions  Description: Complete learning assessment and assess knowledge base    Interventions:  - Provide teaching at level of understanding  - Provide teaching via preferred learning methods  Outcome: Progressing     Problem: CARDIOVASCULAR - ADULT  Goal: Maintains optimal cardiac output and hemodynamic stability  Description: INTERVENTIONS:  - Monitor I/O, vital signs and rhythm  - Monitor for S/S and trends of decreased cardiac output  - Administer and titrate ordered vasoactive medications to optimize hemodynamic stability  - Assess quality of pulses, skin color and temperature  - Assess for signs of decreased coronary artery perfusion  - Instruct patient to report change in severity of symptoms  Outcome: Progressing  Goal: Absence of cardiac dysrhythmias or at baseline rhythm  Description: INTERVENTIONS:  - Continuous cardiac monitoring, vital signs, obtain 12 lead EKG if ordered  - Administer antiarrhythmic and heart rate control medications as ordered  - Monitor electrolytes and administer replacement therapy as ordered  Outcome: Progressing     Problem: METABOLIC, FLUID AND ELECTROLYTES - ADULT  Goal: Electrolytes maintained within normal limits  Description: INTERVENTIONS:  - Monitor labs and assess patient for signs and symptoms of electrolyte imbalances  - Administer electrolyte replacement as ordered  - Monitor response to electrolyte replacements, including repeat lab results as appropriate  - Instruct patient on fluid and nutrition as appropriate  Outcome: Progressing  Goal: Fluid balance maintained  Description: INTERVENTIONS:  - Monitor labs   - Monitor I/O and WT  - Instruct patient on fluid and nutrition as appropriate  - Assess for signs & symptoms of volume excess or deficit  Outcome: Progressing  Goal: Glucose maintained within target range  Description: INTERVENTIONS:  - Monitor Blood Glucose as ordered  - Assess for signs and symptoms of hyperglycemia and hypoglycemia  - Administer ordered medications to maintain glucose within target range  - Assess nutritional intake and initiate nutrition service referral as needed  Outcome: Progressing     Problem: SKIN/TISSUE INTEGRITY - ADULT  Goal: Incision(s), wounds(s) or drain site(s) healing without S/S of infection  Description: INTERVENTIONS  - Assess and document dressing, incision, wound bed, drain sites and surrounding tissue  - Provide patient and family education  - Perform skin care/dressing changes as needed  Outcome: Progressing  Goal: Pressure injury heals and does not worsen  Description: Interventions:  - Implement low air loss mattress or specialty surface (Criteria met)  - Apply silicone foam dressing  - Use special pressure reducing interventions such as waffle cushion when in chair   - Apply fecal or urinary incontinence containment device   - Perform passive or active ROM every 8  - Turn and reposition patient & offload bony prominences every 2 hours   - Utilize friction reducing device or surface for transfers   - Consider consults to  interdisciplinary teams such as wound team  - Use incontinent care products after each incontinent episode such as barrier cream  - Consider nutrition services referral as needed  Outcome: Progressing     Problem: Potential for Falls  Goal: Patient will remain free of falls  Description: INTERVENTIONS:  - Educate patient/family on patient safety including physical limitations  - Instruct patient to call for assistance with activity   - Consult OT/PT to assist with strengthening/mobility   - Keep Call bell within reach  - Keep bed low and locked with side rails adjusted as appropriate  - Keep care items and personal belongings within reach  - Initiate and maintain comfort rounds  - Make Fall Risk Sign visible to staff  - Offer Toileting every 2 Hours, in advance of need  - Initiate/Maintain bed/chair alarm prn  - Apply yellow socks and bracelet for high fall risk patients  - Consider moving patient to room near nurses station  Outcome: Progressing 28.4

## 2025-05-09 NOTE — RESPIRATORY THERAPY NOTE
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    Do you support?    Thank you for your time,      Claudine   Patient returned call, needs long term disability forms completed from multiple providers. Patient completed valid authorization for Renee Cueto, [employer], [phone], confirmed that completed form should be faxed to her, not [employer] [place] [employer],  patient acknowledged.     Type of Leave: LTD  Reason for Leave: left third digit partial toe amputation, chronic ulcer of left limited to breakdown of skin  Start date of leave: 4/23/25  End date of leave: permanent  Patient can walk 30 - 40 min at most using cane to ambulate, is driving, hand grasping/fine motor not affected, E #3 not to all  F. Moderately limited for complex tasks all others not limited  Can handle financial affairs   Please see TE 5/5/25   RT Protocol Note  Jacek Talley 80 y o  female MRN: [id number]  Unit/Bed#: -01 Encounter: [id number]    Assessment    Principal Problem:    Sepsis ([facility] )  Active Problems:    Stage 3a chronic kidney disease ([facility] )    Paroxysmal A-fib ([facility] )    Hypothyroidism (acquired)    Cirrhosis of liver with ascites (HCC)    Moderate aortic stenosis    Acute on chronic diastolic (congestive) heart failure (HCC)    Acute cystitis with hematuria    Acute on chronic anemia    Hypokalemia    Acute metabolic encephalopathy      Home Pulmonary Medications:    Home Devices/Therapy: Other (Comment) (NONE)    Past Medical History:   Diagnosis Date    LEONARDO (acute kidney injury) ([place] [address] )     Atrial fibrillation ([place] [address] )     CHF (congestive heart failure) (HCC)     diastolic grade 1    Cirrhosis ([place] [address] )     Diabetes mellitus ([place] [address] )     Disease of thyroid gland     hypothyroid    Endometrial ca ([place] [address] )     Hiatal hernia     Hyperlipidemia     Hypertension     Hypothyroid     Lung nodules     Periumbilical hernia     Pulmonary HTN ([place] [address] )     Thoracic aortic aneurysm without rupture ([place] [address] ) 03/25/2022    41 mm     Social History     Socioeconomic History    Marital status:      Spouse name: None    Number of children: None    Years of education: None    Highest education level: None   Occupational History    None   Tobacco Use    Smoking status: Never Smoker    Smokeless tobacco: Never Used   Vaping Use    Vaping Use: Never used   Substance and Sexual Activity    Alcohol use: Never    Drug use: Never    Sexual activity: None   Other Topics Concern    None   Social History Narrative    None     Social Determinants of Health     Financial Resource Strain: Not on file   Food Insecurity: No Food Insecurity    Worried About Running Out of Food in the Last Year: Never true    [place] of Food in the Last Year: Never true   Transportation Needs: No Transportation Needs    Lack of Transportation (Medical):  No    Lack of [place] Forms   Transportation (Non-Medical): No   Physical Activity: Not on file   Stress: Not on file   Social Connections: Not on file   Intimate Partner Violence: Not on file   Housing Stability: Low Risk     Unable to Pay for Housing in the Last Year: No    Number of Places Lived in the Last Year: 1    Unstable Housing in the Last Year: No       Subjective         Objective    Physical Exam:   Assessment Type: Assess only  General Appearance: Drowsy  Chest Assessment: Chest expansion symmetrical  Bilateral Breath Sounds: Clear, Diminished    Vitals:  Blood pressure (!) 98/36, pulse (!) 110, temperature 98 8 °F (37 1 °C), temperature source Tympanic, resp  rate 22, height 5' (1 524 m), weight 117 kg (257 lb 3 2 oz), SpO2 95 %  Results from last 7 days   Lab Units 09/30/22  1519   PH ART  7 539*   PCO2 ART mm Hg 25 7*   PO2 ART mm Hg 60 9*   HCO3 ART mmol/L 21 4*   BASE EXC ART mmol/L -0 4   O2 CONTENT ART mL/dL 11 5*   O2 HGB, ARTERIAL % 90 2*   ABG SOURCE  Radial, Right   KYLEE TEST  Yes   NON VENT ROOM AIR % 21       Imaging and other studies: I have personally reviewed pertinent reports              Plan    Respiratory Plan: No distress/Pulmonary history, Discontinue Protocol        Resp Comments: Pt has no pulm hx, takes no home resp meds, comfortable on RA 04-Jan-2019 19:49

## (undated) DEVICE — GLOVE INDICATOR PI UNDERGLOVE SZ 6.5 BLUE

## (undated) DEVICE — PAD GROUNDING ADULT

## (undated) DEVICE — TUBING SUCTION 5MM X 12 FT

## (undated) DEVICE — MEDI-VAC YANK SUCT HNDL W/TPRD BULBOUS TIP: Brand: CARDINAL HEALTH

## (undated) DEVICE — KERLIX BANDAGE ROLL: Brand: KERLIX

## (undated) DEVICE — STERILE LATEX POWDER-FREE SURGICAL GLOVESWITH NITRILE COATING: Brand: PROTEXIS

## (undated) DEVICE — GLOVE SRG BIOGEL 6

## (undated) DEVICE — BETHLEHEM UNIVERSAL OUTPATIENT: Brand: CARDINAL HEALTH

## (undated) DEVICE — HEAVY DRAINAGE PACK: Brand: CURITY

## (undated) DEVICE — ACE WRAP 6 IN XL STERILE

## (undated) DEVICE — WET SKIN PREP TRAY: Brand: MEDLINE INDUSTRIES, INC.

## (undated) DEVICE — SINGLE PORT MANIFOLD: Brand: NEPTUNE 2

## (undated) DEVICE — PLUMEPEN PRO 10FT

## (undated) DEVICE — MINOR PROCEDURE DRAPE: Brand: CONVERTORS

## (undated) DEVICE — BULB SYRINGE,IRRIGATION WITH PROTECTIVE CAP: Brand: DOVER

## (undated) DEVICE — DRAPE EQUIPMENT RF WAND

## (undated) DEVICE — Device